# Patient Record
Sex: FEMALE | Race: BLACK OR AFRICAN AMERICAN | NOT HISPANIC OR LATINO | Employment: OTHER | URBAN - METROPOLITAN AREA
[De-identification: names, ages, dates, MRNs, and addresses within clinical notes are randomized per-mention and may not be internally consistent; named-entity substitution may affect disease eponyms.]

---

## 2017-01-23 DIAGNOSIS — D3A.8 PRIMARY PANCREATIC NEUROENDOCRINE TUMOR: Primary | ICD-10-CM

## 2017-01-23 LAB
EXT 24 HR UR METANEPHRINE: ABNORMAL
EXT 24 HR UR NORMETANEPHRINE: ABNORMAL
EXT 24 HR UR NORMETANEPHRINE: ABNORMAL
EXT 25 HYDROXY VIT D2: ABNORMAL
EXT 25 HYDROXY VIT D3: ABNORMAL
EXT 5 HIAA 24 HR URINE: ABNORMAL
EXT 5 HIAA BLOOD: ABNORMAL
EXT ACTH: ABNORMAL
EXT AFP: ABNORMAL
EXT ALBUMIN: ABNORMAL
EXT ALKALINE PHOSPHATASE: ABNORMAL
EXT ALT: ABNORMAL
EXT AMYLASE: ABNORMAL
EXT ANTI ISLET CELL AB: ABNORMAL
EXT ANTI PARIETAL CELL AB: ABNORMAL
EXT ANTI THYROID AB: ABNORMAL
EXT AST: ABNORMAL
EXT BILIRUBIN DIRECT: ABNORMAL MG/DL
EXT BILIRUBIN TOTAL: ABNORMAL
EXT BK VIRUS DNA QN PCR: ABNORMAL
EXT BUN: ABNORMAL
EXT C PEPTIDE: ABNORMAL
EXT CA 125: ABNORMAL
EXT CA 19-9: ABNORMAL
EXT CA 27-29: ABNORMAL
EXT CALCITONIN: ABNORMAL
EXT CALCIUM: ABNORMAL
EXT CEA: ABNORMAL
EXT CHLORIDE: ABNORMAL
EXT CHOLESTEROL: ABNORMAL
EXT CHROMOGRANIN A: 10 NG/ML (ref 0–15)
EXT CO2: ABNORMAL
EXT CREATININE UA: ABNORMAL
EXT CREATININE: ABNORMAL MG/DL
EXT CYCLOSPORONE LEVEL: ABNORMAL
EXT DOPAMINE: ABNORMAL
EXT EBV DNA BY PCR: ABNORMAL
EXT EPINEPHRINE: ABNORMAL
EXT FOLATE: ABNORMAL
EXT FREE T3: ABNORMAL
EXT FREE T4: ABNORMAL
EXT FSH: ABNORMAL
EXT GASTRIN RELEASING PEPTIDE: ABNORMAL
EXT GASTRIN RELEASING PEPTIDE: ABNORMAL
EXT GASTRIN: ABNORMAL
EXT GGT: ABNORMAL
EXT GHRELIN: ABNORMAL
EXT GLUCAGON: ABNORMAL
EXT GLUCOSE: ABNORMAL
EXT GROWTH HORMONE: ABNORMAL
EXT HCV RNA QUANT PCR: ABNORMAL
EXT HDL: ABNORMAL
EXT HEMATOCRIT: ABNORMAL
EXT HEMOGLOBIN A1C: ABNORMAL
EXT HEMOGLOBIN: ABNORMAL
EXT HISTAMINE 24 HR URINE: ABNORMAL
EXT HISTAMINE: ABNORMAL
EXT IGF-1: ABNORMAL
EXT IMMUNKNOW (NON-STIMULATED): ABNORMAL
EXT IMMUNKNOW (STIMULATED): ABNORMAL
EXT INR: ABNORMAL
EXT INSULIN: ABNORMAL
EXT LANREOTIDE LEVEL: ABNORMAL
EXT LDH, TOTAL: ABNORMAL
EXT LDL CHOLESTEROL: ABNORMAL
EXT LIPASE: ABNORMAL
EXT MAGNESIUM: ABNORMAL
EXT METANEPHRINE FREE PLASMA: ABNORMAL
EXT MOTILIN: ABNORMAL
EXT NEUROKININ A CAMB: ABNORMAL
EXT NEUROKININ A ISI: ABNORMAL
EXT NEUROTENSIN: ABNORMAL
EXT NOREPINEPHRINE: ABNORMAL
EXT NORMETANEPHRINE: ABNORMAL
EXT NSE: ABNORMAL
EXT OCTREOTIDE LEVEL: ABNORMAL
EXT PANCREASTATIN CAMB: ABNORMAL
EXT PANCREASTATIN ISI: 192 PG/ML (ref 10–135)
EXT PANCREATIC POLYPEPTIDE: ABNORMAL
EXT PHOSPHORUS: ABNORMAL
EXT PLATELETS: ABNORMAL
EXT POTASSIUM: ABNORMAL
EXT PROGRAF LEVEL: ABNORMAL
EXT PROLACTIN: ABNORMAL
EXT PROTEIN TOTAL: ABNORMAL
EXT PROTEIN UA: ABNORMAL
EXT PT: ABNORMAL
EXT PTH, INTACT: ABNORMAL
EXT PTT: ABNORMAL
EXT RAPAMUNE LEVEL: ABNORMAL
EXT SEROTONIN: ABNORMAL
EXT SODIUM: ABNORMAL MMOL/L
EXT SOMATOSTATIN: ABNORMAL
EXT SUBSTANCE P: ABNORMAL
EXT TRIGLYCERIDES: ABNORMAL
EXT TRYPTASE: ABNORMAL
EXT TSH: ABNORMAL
EXT URIC ACID: ABNORMAL
EXT URINE AMYLASE U/HR: ABNORMAL
EXT URINE AMYLASE U/L: ABNORMAL
EXT VASOACTIVE INTESTINAL POLYPEPTIDE: ABNORMAL
EXT VITAMIN B12: ABNORMAL
EXT VMA 24 HR URINE: ABNORMAL
EXT WBC: ABNORMAL
NEURON SPECIFIC ENOLASE: ABNORMAL

## 2017-01-23 NOTE — TELEPHONE ENCOUNTER
----- Message from Nciole Scherer sent at 1/23/2017  9:31 AM CST -----  Contact: patient  RR- Patient is in need of new blood work orders being sent to [facility] in [place]. Please call back to confirm.

## 2017-01-23 NOTE — TELEPHONE ENCOUNTER
Faxed new orders for monthly labs to the [facility] in [place] for patient and confirmed with her.

## 2017-01-24 RX ORDER — TEMOZOLOMIDE 250 MG/1
250 CAPSULE ORAL DAILY
Qty: 5 CAPSULE | Refills: 6 | Status: SHIPPED | OUTPATIENT
Start: 2017-01-24 | End: 2017-02-01 | Stop reason: SDUPTHER

## 2017-01-24 NOTE — TELEPHONE ENCOUNTER
----- Message from Ayala Kwok sent at 1/24/2017  9:55 AM CST -----  Contact: patient  RR- Patient called for a prescriptions refilled. Patients contact number is [phone]

## 2017-02-01 DIAGNOSIS — D3A.8 PRIMARY PANCREATIC NEUROENDOCRINE TUMOR: ICD-10-CM

## 2017-02-01 RX ORDER — TEMOZOLOMIDE 250 MG/1
250 CAPSULE ORAL DAILY
Qty: 5 CAPSULE | Refills: 6 | Status: SHIPPED | OUTPATIENT
Start: 2017-02-01 | End: 2017-09-11 | Stop reason: SDUPTHER

## 2017-02-01 NOTE — TELEPHONE ENCOUNTER
----- Message from Ayala Kwok sent at 2/1/2017  8:33 AM CST -----  Contact: Patient  RR- Patient called wanting a refill on medication christiano. Pharmacy is [facility]. Phone number to Pharmacy [phone]. Patients contact number [phone]

## 2017-02-01 NOTE — TELEPHONE ENCOUNTER
LVM letting her know that Dr. Rodriguez sent her medications to [facility] specialty pharmacy

## 2017-02-11 LAB
EXT 24 HR UR METANEPHRINE: ABNORMAL
EXT 24 HR UR NORMETANEPHRINE: ABNORMAL
EXT 24 HR UR NORMETANEPHRINE: ABNORMAL
EXT 25 HYDROXY VIT D2: ABNORMAL
EXT 25 HYDROXY VIT D3: ABNORMAL
EXT 5 HIAA 24 HR URINE: ABNORMAL
EXT 5 HIAA BLOOD: ABNORMAL
EXT ACTH: ABNORMAL
EXT AFP: ABNORMAL
EXT ALBUMIN: 4.2 G/DL (ref 3.6–5.1)
EXT ALKALINE PHOSPHATASE: 199 U/L (ref 33–130)
EXT ALT: 21 U/L (ref 6–29)
EXT AMYLASE: ABNORMAL
EXT ANTI ISLET CELL AB: ABNORMAL
EXT ANTI PARIETAL CELL AB: ABNORMAL
EXT ANTI THYROID AB: ABNORMAL
EXT AST: 17 U/L (ref 10–35)
EXT BILIRUBIN DIRECT: ABNORMAL
EXT BILIRUBIN TOTAL: 1.1 MG/DL (ref 0.2–1.2)
EXT BK VIRUS DNA QN PCR: ABNORMAL
EXT BUN: 13 MG/DL (ref 7–25)
EXT C PEPTIDE: ABNORMAL
EXT CA 125: ABNORMAL
EXT CA 19-9: ABNORMAL
EXT CA 27-29: ABNORMAL
EXT CALCITONIN: ABNORMAL
EXT CALCIUM: 9.7 MG/DL (ref 8.6–10.4)
EXT CEA: ABNORMAL
EXT CHLORIDE: 106 MMOL/L (ref 98–110)
EXT CHOLESTEROL: ABNORMAL
EXT CHROMOGRANIN A: ABNORMAL
EXT CO2: 28 MMOL/L (ref 20–31)
EXT CREATININE UA: ABNORMAL
EXT CREATININE: 0.93 MG/DL (ref 0.5–0.99)
EXT CYCLOSPORONE LEVEL: ABNORMAL
EXT DOPAMINE: ABNORMAL
EXT EBV DNA BY PCR: ABNORMAL
EXT EPINEPHRINE: ABNORMAL
EXT FOLATE: ABNORMAL
EXT FREE T3: ABNORMAL
EXT FREE T4: ABNORMAL
EXT FSH: ABNORMAL
EXT GASTRIN RELEASING PEPTIDE: ABNORMAL
EXT GASTRIN RELEASING PEPTIDE: ABNORMAL
EXT GASTRIN: ABNORMAL
EXT GGT: ABNORMAL
EXT GHRELIN: ABNORMAL
EXT GLUCAGON: ABNORMAL
EXT GLUCOSE: 94 MG/DL (ref 65–99)
EXT GROWTH HORMONE: ABNORMAL
EXT HCV RNA QUANT PCR: ABNORMAL
EXT HDL: ABNORMAL
EXT HEMATOCRIT: 39.3 % (ref 35–45)
EXT HEMOGLOBIN A1C: ABNORMAL
EXT HEMOGLOBIN: 13.1 G/DL (ref 11.7–15.5)
EXT HISTAMINE 24 HR URINE: ABNORMAL
EXT HISTAMINE: ABNORMAL
EXT IGF-1: ABNORMAL
EXT IMMUNKNOW (NON-STIMULATED): ABNORMAL
EXT IMMUNKNOW (STIMULATED): ABNORMAL
EXT INR: ABNORMAL
EXT INSULIN: ABNORMAL
EXT LANREOTIDE LEVEL: ABNORMAL
EXT LDH, TOTAL: ABNORMAL
EXT LDL CHOLESTEROL: ABNORMAL
EXT LIPASE: ABNORMAL
EXT MAGNESIUM: ABNORMAL
EXT METANEPHRINE FREE PLASMA: ABNORMAL
EXT MOTILIN: ABNORMAL
EXT NEUROKININ A CAMB: ABNORMAL
EXT NEUROKININ A ISI: ABNORMAL
EXT NEUROTENSIN: ABNORMAL
EXT NOREPINEPHRINE: ABNORMAL
EXT NORMETANEPHRINE: ABNORMAL
EXT NSE: ABNORMAL
EXT OCTREOTIDE LEVEL: ABNORMAL
EXT PANCREASTATIN CAMB: ABNORMAL
EXT PANCREASTATIN ISI: ABNORMAL
EXT PANCREATIC POLYPEPTIDE: ABNORMAL
EXT PHOSPHORUS: ABNORMAL
EXT PLATELETS: 150 1000/UL (ref 140–400)
EXT POTASSIUM: 4.7 MMOL/L (ref 3.5–5.3)
EXT PROGRAF LEVEL: ABNORMAL
EXT PROLACTIN: ABNORMAL
EXT PROTEIN TOTAL: 7.4 G/DL (ref 6.1–8.1)
EXT PROTEIN UA: ABNORMAL
EXT PT: ABNORMAL
EXT PTH, INTACT: ABNORMAL
EXT PTT: ABNORMAL
EXT RAPAMUNE LEVEL: ABNORMAL
EXT SEROTONIN: ABNORMAL
EXT SODIUM: 140 MMOL/L (ref 135–146)
EXT SOMATOSTATIN: ABNORMAL
EXT SUBSTANCE P: ABNORMAL
EXT TRIGLYCERIDES: ABNORMAL
EXT TRYPTASE: ABNORMAL
EXT TSH: ABNORMAL
EXT URIC ACID: ABNORMAL
EXT URINE AMYLASE U/HR: ABNORMAL
EXT URINE AMYLASE U/L: ABNORMAL
EXT VASOACTIVE INTESTINAL POLYPEPTIDE: ABNORMAL
EXT VITAMIN B12: ABNORMAL
EXT VMA 24 HR URINE: ABNORMAL
EXT WBC: 4.4 1000/UL (ref 3.8–10.8)
NEURON SPECIFIC ENOLASE: ABNORMAL

## 2017-03-21 ENCOUNTER — OFFICE VISIT (OUTPATIENT)
Dept: NEUROLOGY | Facility: HOSPITAL | Age: 67
End: 2017-03-21
Attending: INTERNAL MEDICINE
Payer: COMMERCIAL

## 2017-03-21 VITALS
HEIGHT: 67 IN | DIASTOLIC BLOOD PRESSURE: 86 MMHG | TEMPERATURE: 98 F | SYSTOLIC BLOOD PRESSURE: 134 MMHG | RESPIRATION RATE: 16 BRPM | WEIGHT: 155.63 LBS | BODY MASS INDEX: 24.43 KG/M2 | HEART RATE: 73 BPM

## 2017-03-21 DIAGNOSIS — C7B.8 SECONDARY NEUROENDOCRINE TUMOR OF LIVER: ICD-10-CM

## 2017-03-21 DIAGNOSIS — D3A.8 PRIMARY PANCREATIC NEUROENDOCRINE TUMOR: Primary | ICD-10-CM

## 2017-03-21 DIAGNOSIS — Z09 CHEMOTHERAPY FOLLOW-UP EXAMINATION: ICD-10-CM

## 2017-03-21 PROCEDURE — 99214 OFFICE O/P EST MOD 30 MIN: CPT | Performed by: INTERNAL MEDICINE

## 2017-03-21 PROCEDURE — 1157F ADVNC CARE PLAN IN RCRD: CPT | Mod: ,,, | Performed by: INTERNAL MEDICINE

## 2017-03-21 PROCEDURE — 3075F SYST BP GE 130 - 139MM HG: CPT | Mod: ,,, | Performed by: INTERNAL MEDICINE

## 2017-03-21 PROCEDURE — 3079F DIAST BP 80-89 MM HG: CPT | Mod: ,,, | Performed by: INTERNAL MEDICINE

## 2017-03-21 PROCEDURE — 99214 OFFICE O/P EST MOD 30 MIN: CPT | Mod: ,,, | Performed by: INTERNAL MEDICINE

## 2017-03-21 PROCEDURE — 1159F MED LIST DOCD IN RCRD: CPT | Mod: ,,, | Performed by: INTERNAL MEDICINE

## 2017-03-21 PROCEDURE — 1126F AMNT PAIN NOTED NONE PRSNT: CPT | Mod: ,,, | Performed by: INTERNAL MEDICINE

## 2017-03-21 NOTE — PATIENT INSTRUCTIONS
MRI and Octreoscan in 2 months     Call [phone] or go to [facility] to schedule Octreoscan and they can help you with the MRI at the same time.

## 2017-03-21 NOTE — MR AVS SNAPSHOT
[facility]  [address] [place] [address]  Phone: [phone]  Fax: [phone]                  Ching Bruce   3/21/2017 3:30 PM   Office Visit    Description:  Female : 1950   Provider:  Cory Rodriguez DO, FACP   Department:  [facility]           Reason for Visit     Follow-up           Diagnoses this Visit        Comments    Primary pancreatic neuroendocrine tumor    -  Primary     Secondary neuroendocrine tumor of liver         Chemotherapy follow-up examination                To Do List           Future Appointments        Provider Department Dept Phone    2017 3:30 PM Cory Rodriguez DO, FACP [facility] [phone]      Goals (5 Years of Data)     None      [facility]     [facility] Care [facility] -  Assistance  Registered nurses in the [facility] provide clinical advisement, health education, appointment booking, and other advisory services.  Call for this free service at [phone].             Medications           Message regarding Medications     Verify the changes and/or additions to your medication regime listed below are the same as discussed with your clinician today.  If any of these changes or additions are incorrect, please notify your healthcare provider.             Verify that the below list of medications is an accurate representation of the medications you are currently taking.  If none reported, the list may be blank. If incorrect, please contact your healthcare provider. Carry this list with you in case of emergency.           Current Medications     alendronate (FOSAMAX) 70 MG tablet     capecitabine (XELODA) 500 MG Tab TAKE 2 TABLETS (1000 MG) ORALLY TWICE A DAY FOR 14 DAYS, THEN OFF FOR14 DAYS, THEN REPEAT. TAKE WITHIN 30 MIN AFTER A MEAL WITH WATER. REPO    ergocalciferol (VITAMIN D2) 50,000 unit Cap Take 50,000 Units by mouth every 7 days.    ferrous sulfate  "325 mg (65 mg iron) Tab tablet Take 325 mg by mouth once daily.    indapamide (LOZOL) 2.5 MG Tab     irbesartan (AVAPRO) 300 MG tablet 300 mg once daily.     levothyroxine (SYNTHROID) 150 MCG tablet Take 150 mcg by mouth once daily.    temozolomide (TEMODAR) 250 MG capsule Take 1 capsule (250 mg total) by mouth once daily. Take 250 mg on days 10-14 of 28 day cycle.    verapamil (VERELAN PM) 360 MG C24P            Clinical Reference Information           Your Vitals Were     BP Pulse Temp Resp Height Weight    134/86 73 98 °F (36.7 °C) (Oral) 16 5' 7" (1.702 m) 70.6 kg (155 lb 9.6 oz)    BMI                24.37 kg/m2          Blood Pressure          Most Recent Value    BP  134/86      Allergies as of 3/21/2017     Epinephrine    Sulfa (Sulfonamide Antibiotics)      Immunizations Administered on Date of Encounter - 3/21/2017     None      Orders Placed During Today's Visit     Future Labs/Procedures Expected by Expires    Creatinine, serum  3/21/2017 5/20/2018    MRI Abdomen W WO Contrast  3/21/2017 3/21/2018    NM Tumor Localization Multi Octreo W CT  3/21/2017 3/21/2018      [facility] Sign-Up     Activating your [facility] account is as easy as 1-2-3!     1) Visit [email or web address], select Sign Up Now, enter this activation code and your date of birth, then select Next.  [email or web address]  Expires: 5/5/2017  3:57 PM      2) Create a username and password to use when you visit [facility] in the future and select a security question in case you lose your password and select Next.    3) Enter your e-mail address and click Sign Up!    Additional Information  If you have questions, please e-mail [email or web address] or call [phone] to talk to our [facility] staff. Remember, [facility] is NOT to be used for urgent needs. For medical emergencies, dial 911.         Instructions    MRI and Octreoscan in 2 months     Call [phone] or go to [facility] to schedule Octreoscan and they can help you with the MRI " at the same time.              Language Assistance Services     ATTENTION: Language assistance services are available, free of charge. Please call [phone].      ATENCIÓN: Si habla christen, tiene a herrera disposición servicios gratuitos de asistencia lingüística. Llame al [phone].     CHÚ Ý: N?u b?n nói Ti?ng Vi?t, có các d?ch v? h? tr? ngôn ng? mi?n phí dành cho b?n. G?i s? [phone].         [facility] complies with applicable Federal civil rights laws and does not discriminate on the basis of race, color, national origin, age, disability, or sex.

## 2017-03-21 NOTE — PROGRESS NOTES
[facility]:  [facility]  A collaboration between [facility] and [facility]    PATIENT: Ching Bruce  MRN: [id number]  DATE: 3/21/2017      Diagnosis:   1. Primary pancreatic neuroendocrine tumor    2. Secondary neuroendocrine tumor of liver    3. 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 Bruce is a 66 y.o. female who returns for follow up for her pancreatic neuroendocrine tumor.  She states that she is feeling well.  She continues on with chemotherapy with CAPTEM.  She has no new complaints.    ONCOLOGIC HISTORY:   A 66 y.o. year-old -American female who I had initially   seen on 12/18/2012. Her history dates back to September 2012 when she was noted  to have several weeks of feeling fatigued. She sought treatment with her   primary care doctor who did a CBC and found her to be severely anemic. She was   seen at [facility] and transfused 3 units of packed red blood cells  and no source of bleeding had been found. Records at that time had shown   hemoglobin of 5. She sought followup in September 2012 with Dr. Guillen who   performed an EGD and colonoscopy with an EGD showing an ulcerated and fungating   nonbleeding 2 cm mass, malignant in appearance. It did cause partial   obstructions. Biopsies were taken, which showed no evidence of malignancy at   that time. She had a CT of the abdomen and pelvis showing multiple metastatic   lesions and the liver biopsy was performed on 10/17/2012 showed pancreatic   neuroendocrine neoplasm in the left lobe of the liver aspirate. She went on to   have an ERCP along with  sphincterotomy and biliary stent placement and   subsequent PET had shown numerous hypodense lesions in the liver. Octreotide   scan was performed, which showed multiple right and left hepatic metastases.   MRI of the abdomen was performed on 12/05/2012 showing innumerable lesions that   demonstrated peripheral to hyperintensity consistent with hypervascular   metastasis. She was seen by Dr. Hupmhrey, who thought she was not a candidate   for surgical resection during that time. Her pathology from her tumor came back  positive for chromogranin, synaptophysin and had a Ki-67 of less than 1%. She   was started on treatment with temozolomide and capecitabine with cycle   1 being on 01/22/2013.    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 She does not have any smokeless tobacco history on file. She reports that she does not drink alcohol or use illicit drugs.    Allergies:  Review of patient's allergies indicates:   Allergen Reactions     Epinephrine Other (See Comments)     Carcinoid patient    Sulfa (sulfonamide antibiotics) Other (See Comments)     Urticaria       Medications:  Current Outpatient Prescriptions   Medication Sig Dispense Refill    alendronate (FOSAMAX) 70 MG tablet   3    capecitabine (XELODA) 500 MG Tab TAKE 2 TABLETS (1000 MG) ORALLY TWICE A DAY FOR 14 DAYS, THEN OFF FOR14 DAYS, THEN REPEAT. TAKE WITHIN 30 MIN AFTER A MEAL WITH WATER. REPO 56 tablet 5    ergocalciferol (VITAMIN D2) 50,000 unit Cap Take 50,000 Units by mouth every 7 days.      ferrous sulfate 325 mg (65 mg iron) Tab tablet Take 325 mg by mouth once daily.      indapamide (LOZOL) 2.5 MG Tab       irbesartan (AVAPRO) 300 MG tablet 300 mg once daily.       levothyroxine (SYNTHROID) 150 MCG tablet Take 150 mcg by mouth once daily.  0    temozolomide (TEMODAR) 250 MG capsule Take 1 capsule (250 mg total) by mouth once daily. Take 250 mg on days 10-14 of 28 day cycle. 5 capsule 6    verapamil (VERELAN PM) 360 MG C24P        No current facility-administered medications for this visit.        Review of Systems   Constitutional: Negative for chills, fever and unexpected weight change.   HENT: Negative for congestion, hearing loss and nosebleeds.    Eyes: Negative for visual disturbance.   Respiratory: Negative for cough and shortness of breath.    Cardiovascular: Negative for chest pain and palpitations.   Gastrointestinal: Negative for abdominal pain, blood in stool, constipation, diarrhea, nausea and vomiting.   Genitourinary: Negative for dysuria.   Musculoskeletal: Negative for back pain and gait problem.   Skin: Negative for color change and rash.   Neurological: Negative for dizziness, weakness and headaches.   Hematological: Negative for adenopathy. Does not bruise/bleed easily.   Psychiatric/Behavioral: Negative for confusion.       ECOG Performance Status: 0   Objective:      Vitals:   Vitals:    03/21/17 1527   BP: 134/86   Pulse: 73   Resp: 16   Temp:  "98 °F (36.7 °C)   TempSrc: Oral   Weight: 70.6 kg (155 lb 9.6 oz)   Height: 5' 7" (1.702 m)     BMI: Body mass index is 24.37 kg/(m^2).    Physical Exam   Constitutional: She is oriented to person, place, and time. She appears well-developed and well-nourished. No distress.   HENT:   Head: Normocephalic.   Mouth/Throat: No oropharyngeal exudate.   Eyes: EOM are normal. No scleral icterus.   Neck: Neck supple. No tracheal deviation present. No thyromegaly present.   Cardiovascular: Normal rate and regular rhythm.    Pulmonary/Chest: Effort normal and breath sounds normal. No respiratory distress. She has no wheezes. She has no rales.   Abdominal: Soft. She exhibits no distension and no mass. There is no tenderness. There is no rebound and no guarding.   Musculoskeletal: Normal range of motion. She exhibits no edema.   Lymphadenopathy:     She has no cervical adenopathy.   Neurological: She is alert and oriented to person, place, and time. No cranial nerve deficit.   Skin: Skin is warm and dry.   Psychiatric: She has a normal mood and affect.       Laboratory Data:  Abstract on 12/06/2016   Component Date Value Ref Range Status    EXT WBC 12/03/2016 4.1  3.8 - 10.8 1000/ul Final    EXT Hemoglobin 12/03/2016 12.9  11.7 - 15.5 g/dl Final    EXT Hematocrit 12/03/2016 38.5  35.0 - 45.0 % Final    EXT Platelets 12/03/2016 138* 140 - 400 1000/ul Final    EXT Glucose 12/03/2016 88  65 - 99 mg/dl Final    EXT BUN 12/03/2016 13  7 - 25 mg/dl Final    EXT Creatinine 12/03/2016 0.90  0.50 - 0.99 mg/dl Final    EXT Sodium 12/03/2016 141  135 - 146 mmol/l Final    EXT Potassium 12/03/2016 4.2  3.5 - 5.3 mmol/l Final    EXT Chloride 12/03/2016 105  98 - 110 mmol/l Final    EXT CO2 12/03/2016 29  20 - 31 mmol/l Final    EXT Calcium 12/03/2016 9.7  8.6 - 10.4 mg/dl Final    EXT Protein total 12/03/2016 6.9  6.1 - 8.1 g/dl Final    EXT Albumin 12/03/2016 4.0  3.6 - 5.1 g/dl Final    EXT BilirubiN Total 12/03/2016 1.1  " 0.2 - 1.2 mg/dl Final    EXT Alkaline Phosphatase 12/03/2016 181* 33 - 130 u/l Final    EXT AST 12/03/2016 21  10 - 35 u/l Final    EXT ALT 12/03/2016 28  6 - 29 u/l Final   Office Visit on 11/14/2016   Component Date Value Ref Range Status    Blood Stool 11/14/2016 Negative  Negative Final     Acceptable 11/14/2016 Yes   Final    SOURCE: 11/14/2016 Cervical   Final    Slides: 11/14/2016 1   Final    LMP: 11/14/2016 NA   Final    Specimen adequacy: 11/14/2016 (NOTE)   Final    Comment:      Satisfactory for evaluation.          Endocervical cells absent.  Metaplastic cells indicative       of transformation zone cannot be reliably distinguished from       parabasal or atrophic cells.                      Interpretation 11/14/2016 NO EPITHELIAL ABNORMALITY SEE BELOW   Final    Comment: ----------------------------------------------------------------------       *NEGATIVE FOR INTRAEPITHELIAL LESION OR MALIGNANCY   ----------------------------------------------------------------------         Comments: 11/14/2016 (NOTE)   Final    Comment: Due to technical or specimen issues, imaging could not be  performed. A cytotechnologist has manually screened this slide.         Cytotechnologist: 11/14/2016 BRENDON Ca(ASCP)[facility]   Final    LOCATION 11/14/2016 (NOTE)   Final    Comment: Specimens processed and interpreted at :[facility], [address] [place] [address], Phone: [phone], CLIA: [id number]      CPT Codes: 11/14/2016 (NOTE)   Final    Comment: [id number]        UNLESS OTHERWISE INDICATED, COMPUTER AIDED AND CYTOTECHNOLOGIST     SCREENING PERFORMED.          The Pap test is a screening test with an inherent, but low       probability of error.  Your patient should be reminded to       consult you immediately if she experiences any suspicious       signs or symptoms, regardless of her Pap test result.       An alternate report format containing images or  consolidated       prior Pap history is available as applicable.         HPV HIGH RISK IF ASC-US, [facility] 11/14/2016 CRITERIA NOT MET   Final    Comment:       UNLESS OTHERWISE INDICATED, ALL TESTING PERFORMED AT  [facility]  [address] [place] [id number]            :  CARLOS MENDEZ M.D.        IA NUMBER [id number]  [place]EDITATION [place]. [id number]                 Imaging:   MRI 12/8/16  MRI abdomen without and with contrast.    Comparison: 5/31/16    History: Neuroendocrine tumor.    Results: Axial gradient T2, axial T1 in and out of phase, axial T2 SSFSE, coronal T2 gradient and T2 SSFSE followed by pre-contrast axial T1 gradient fat sat and dynamic post contrast images were obtained. The patient received  10 cc of IV Gadovist contrast.    Numerous hepatic numerous hepatic lesion better seen on postcontrast 10 seconds.  Index lesion in the right hepatic lobe measures segment 5 measures 2.2 cm (previously measuring 2.0 x 2.4 cm).  A 2nd index lesion has been chosen within the left hepatic lobe segment 3 measuring 1.6 cm, in retrospect, on the prior study, measuring 1.6 cm.  Vague enhancing area) postcontrast 10 seconds image 62) within the uncinate process measuring at 1.7 x 0.9 cm as seen on the prior study.    The number and burden of liver lesions appear similar to the prior exam.  The biliary ducts are nondilated.  No liver is enlarged similar to the prior study the gallbladder is present.  The stomach, pancreas, spleen, and adrenal glands appear within normal limits.  Small bilateral kidney cysts.  The visualized osseous structures demonstrate no definite osseous lesions.   Impression       Hepatomegaly with multiple liver lesions, the burden of lesions and size appear similar to the prior exam.  Vague area of enhancement within the uncinate process of the pancreas appears unchanged.                      Assessment:       1. Primary pancreatic  neuroendocrine tumor    2. Secondary neuroendocrine tumor of liver    3. Chemotherapy follow-up examination           Plan:   Ms. Bruce continues to tolerate CAPTEM in review of her labs shows no detrimental effects.  We will plan to continue and repeat imaging in May 2017 which will be 6 months since prior imaging.  She did note that she will be retiring may need financial assistance to obtain chemotherapy.  Continue on with monthly labs.  Follow up in 2 months.     Cory Rodriguez DO, FACP  Hematology & Oncology, [facility]  [address], [address]  [place]  [address]  ph. [phone]; [phone]  fax. [phone]    25 minutes were spent in coordination of patient's care, record review and counseling.  More than 50% of the time was face-to-face.

## 2017-03-27 ENCOUNTER — TELEPHONE (OUTPATIENT)
Dept: NEUROLOGY | Facility: HOSPITAL | Age: 67
End: 2017-03-27

## 2017-03-27 DIAGNOSIS — C7B.8 SECONDARY NEUROENDOCRINE TUMOR OF LIVER: Primary | ICD-10-CM

## 2017-04-12 LAB
EXT 24 HR UR METANEPHRINE: ABNORMAL
EXT 24 HR UR NORMETANEPHRINE: ABNORMAL
EXT 24 HR UR NORMETANEPHRINE: ABNORMAL
EXT 25 HYDROXY VIT D2: ABNORMAL
EXT 25 HYDROXY VIT D3: ABNORMAL
EXT 5 HIAA 24 HR URINE: ABNORMAL
EXT 5 HIAA BLOOD: ABNORMAL
EXT ACTH: ABNORMAL
EXT AFP: ABNORMAL
EXT ALBUMIN: 4.2 G/DL (ref 3.6–5.1)
EXT ALKALINE PHOSPHATASE: 210 U/L (ref 33–130)
EXT ALT: 21 U/L (ref 6–29)
EXT AMYLASE: ABNORMAL
EXT ANTI ISLET CELL AB: ABNORMAL
EXT ANTI PARIETAL CELL AB: ABNORMAL
EXT ANTI THYROID AB: ABNORMAL
EXT AST: 16 U/L (ref 10–35)
EXT BILIRUBIN DIRECT: ABNORMAL MG/DL
EXT BILIRUBIN TOTAL: 1 MG/DL (ref 0.2–1.2)
EXT BK VIRUS DNA QN PCR: ABNORMAL
EXT BUN: 15 MG/DL (ref 7–25)
EXT C PEPTIDE: ABNORMAL
EXT CA 125: ABNORMAL
EXT CA 19-9: ABNORMAL
EXT CA 27-29: ABNORMAL
EXT CALCITONIN: ABNORMAL
EXT CALCIUM: 9.9 MG/DL (ref 8.6–10.4)
EXT CEA: ABNORMAL
EXT CHLORIDE: 105 MMOL/L (ref 98–110)
EXT CHOLESTEROL: ABNORMAL
EXT CHROMOGRANIN A: ABNORMAL
EXT CO2: 27 MMOL/L (ref 20–31)
EXT CREATININE UA: ABNORMAL
EXT CREATININE: 1.05 MG/DL (ref 0.5–0.99)
EXT CYCLOSPORONE LEVEL: ABNORMAL
EXT DOPAMINE: ABNORMAL
EXT EBV DNA BY PCR: ABNORMAL
EXT EPINEPHRINE: ABNORMAL
EXT FOLATE: ABNORMAL
EXT FREE T3: ABNORMAL
EXT FREE T4: ABNORMAL
EXT FSH: ABNORMAL
EXT GASTRIN RELEASING PEPTIDE: ABNORMAL
EXT GASTRIN RELEASING PEPTIDE: ABNORMAL
EXT GASTRIN: ABNORMAL
EXT GGT: ABNORMAL
EXT GHRELIN: ABNORMAL
EXT GLUCAGON: ABNORMAL
EXT GLUCOSE: 88 MG/DL (ref 65–99)
EXT GROWTH HORMONE: ABNORMAL
EXT HCV RNA QUANT PCR: ABNORMAL
EXT HDL: ABNORMAL
EXT HEMATOCRIT: 40.8 % (ref 35–45)
EXT HEMOGLOBIN A1C: ABNORMAL
EXT HEMOGLOBIN: 13.3 G/DL (ref 11.7–15.5)
EXT HISTAMINE 24 HR URINE: ABNORMAL
EXT HISTAMINE: ABNORMAL
EXT IGF-1: ABNORMAL
EXT IMMUNKNOW (NON-STIMULATED): ABNORMAL
EXT IMMUNKNOW (STIMULATED): ABNORMAL
EXT INR: ABNORMAL
EXT INSULIN: ABNORMAL
EXT LANREOTIDE LEVEL: ABNORMAL
EXT LDH, TOTAL: ABNORMAL
EXT LDL CHOLESTEROL: ABNORMAL
EXT LIPASE: ABNORMAL
EXT MAGNESIUM: ABNORMAL
EXT METANEPHRINE FREE PLASMA: ABNORMAL
EXT MOTILIN: ABNORMAL
EXT NEUROKININ A CAMB: ABNORMAL
EXT NEUROKININ A ISI: ABNORMAL
EXT NEUROTENSIN: ABNORMAL
EXT NOREPINEPHRINE: ABNORMAL
EXT NORMETANEPHRINE: ABNORMAL
EXT NSE: ABNORMAL
EXT OCTREOTIDE LEVEL: ABNORMAL
EXT PANCREASTATIN CAMB: ABNORMAL
EXT PANCREASTATIN ISI: ABNORMAL
EXT PANCREATIC POLYPEPTIDE: ABNORMAL
EXT PHOSPHORUS: ABNORMAL
EXT PLATELETS: 178 1000/UL (ref 140–400)
EXT POTASSIUM: 4.8 MMOL/L (ref 3.5–5.3)
EXT PROGRAF LEVEL: ABNORMAL
EXT PROLACTIN: ABNORMAL
EXT PROTEIN TOTAL: 7.5 G/DL (ref 6.1–8.1)
EXT PROTEIN UA: ABNORMAL
EXT PT: ABNORMAL
EXT PTH, INTACT: ABNORMAL
EXT PTT: ABNORMAL
EXT RAPAMUNE LEVEL: ABNORMAL
EXT SEROTONIN: ABNORMAL
EXT SODIUM: 143 MMOL/L (ref 135–146)
EXT SOMATOSTATIN: ABNORMAL
EXT SUBSTANCE P: ABNORMAL
EXT TRIGLYCERIDES: ABNORMAL
EXT TRYPTASE: ABNORMAL
EXT TSH: ABNORMAL
EXT URIC ACID: ABNORMAL
EXT URINE AMYLASE U/HR: ABNORMAL
EXT URINE AMYLASE U/L: ABNORMAL
EXT VASOACTIVE INTESTINAL POLYPEPTIDE: ABNORMAL
EXT VITAMIN B12: ABNORMAL
EXT VMA 24 HR URINE: ABNORMAL
EXT WBC: 5.9 1000/UL (ref 3.8–10.8)
NEURON SPECIFIC ENOLASE: ABNORMAL

## 2017-05-19 DIAGNOSIS — C7B.8 SECONDARY NEUROENDOCRINE TUMOR OF LIVER: Primary | ICD-10-CM

## 2017-05-19 DIAGNOSIS — C7A.8 NEUROENDOCRINE CARCINOMA OF PANCREAS: ICD-10-CM

## 2017-05-20 LAB
EXT 24 HR UR METANEPHRINE: ABNORMAL
EXT 24 HR UR NORMETANEPHRINE: ABNORMAL
EXT 24 HR UR NORMETANEPHRINE: ABNORMAL
EXT 25 HYDROXY VIT D2: ABNORMAL
EXT 25 HYDROXY VIT D3: ABNORMAL
EXT 5 HIAA 24 HR URINE: ABNORMAL
EXT 5 HIAA BLOOD: ABNORMAL
EXT ACTH: ABNORMAL
EXT AFP: ABNORMAL
EXT ALBUMIN: 4.1 G/DL (ref 3.6–5.1)
EXT ALKALINE PHOSPHATASE: 200 U/L (ref 33–130)
EXT ALT: 26 U/L (ref 6–29)
EXT AMYLASE: ABNORMAL
EXT ANTI ISLET CELL AB: ABNORMAL
EXT ANTI PARIETAL CELL AB: ABNORMAL
EXT ANTI THYROID AB: ABNORMAL
EXT AST: 18 U/L (ref 10–35)
EXT BILIRUBIN DIRECT: ABNORMAL
EXT BILIRUBIN TOTAL: 1.2 MG/DL (ref 0.2–1.2)
EXT BK VIRUS DNA QN PCR: ABNORMAL
EXT BUN: 20 MG/DL (ref 7–25)
EXT C PEPTIDE: ABNORMAL
EXT CA 125: ABNORMAL
EXT CA 19-9: ABNORMAL
EXT CA 27-29: ABNORMAL
EXT CALCITONIN: ABNORMAL
EXT CALCIUM: 9.8 MG/DL (ref 8.6–10.4)
EXT CEA: ABNORMAL
EXT CHLORIDE: 102 MMOL/L (ref 98–110)
EXT CHOLESTEROL: ABNORMAL
EXT CHROMOGRANIN A: ABNORMAL
EXT CO2: 26 MMOL/L (ref 20–31)
EXT CREATININE UA: ABNORMAL
EXT CREATININE: 1.09 MG/DL (ref 0.5–0.99)
EXT CYCLOSPORONE LEVEL: ABNORMAL
EXT DOPAMINE: ABNORMAL
EXT EBV DNA BY PCR: ABNORMAL
EXT EPINEPHRINE: ABNORMAL
EXT FOLATE: ABNORMAL
EXT FREE T3: ABNORMAL
EXT FREE T4: ABNORMAL
EXT FSH: ABNORMAL
EXT GASTRIN RELEASING PEPTIDE: ABNORMAL
EXT GASTRIN RELEASING PEPTIDE: ABNORMAL
EXT GASTRIN: ABNORMAL
EXT GGT: ABNORMAL
EXT GHRELIN: ABNORMAL
EXT GLUCAGON: ABNORMAL
EXT GLUCOSE: 97 MG/DL (ref 65–99)
EXT GROWTH HORMONE: ABNORMAL
EXT HCV RNA QUANT PCR: ABNORMAL
EXT HDL: ABNORMAL
EXT HEMATOCRIT: 41.2 % (ref 35–45)
EXT HEMOGLOBIN A1C: ABNORMAL
EXT HEMOGLOBIN: 13.3 G/DL (ref 11.7–15.5)
EXT HISTAMINE 24 HR URINE: ABNORMAL
EXT HISTAMINE: ABNORMAL
EXT IGF-1: ABNORMAL
EXT IMMUNKNOW (NON-STIMULATED): ABNORMAL
EXT IMMUNKNOW (STIMULATED): ABNORMAL
EXT INR: ABNORMAL
EXT INSULIN: ABNORMAL
EXT LANREOTIDE LEVEL: ABNORMAL
EXT LDH, TOTAL: ABNORMAL
EXT LDL CHOLESTEROL: ABNORMAL
EXT LIPASE: ABNORMAL
EXT MAGNESIUM: ABNORMAL
EXT METANEPHRINE FREE PLASMA: ABNORMAL
EXT MOTILIN: ABNORMAL
EXT NEUROKININ A CAMB: ABNORMAL
EXT NEUROKININ A ISI: ABNORMAL
EXT NEUROTENSIN: ABNORMAL
EXT NOREPINEPHRINE: ABNORMAL
EXT NORMETANEPHRINE: ABNORMAL
EXT NSE: ABNORMAL
EXT OCTREOTIDE LEVEL: ABNORMAL
EXT PANCREASTATIN CAMB: ABNORMAL
EXT PANCREASTATIN ISI: ABNORMAL
EXT PANCREATIC POLYPEPTIDE: ABNORMAL
EXT PHOSPHORUS: ABNORMAL
EXT PLATELETS: 142 1000/UL (ref 140–400)
EXT POTASSIUM: 4.5 MMOL/L (ref 3.5–5.3)
EXT PROGRAF LEVEL: ABNORMAL
EXT PROLACTIN: ABNORMAL
EXT PROTEIN TOTAL: 7.6 G/DL (ref 6.1–8.1)
EXT PROTEIN UA: ABNORMAL
EXT PT: ABNORMAL
EXT PTH, INTACT: ABNORMAL
EXT PTT: ABNORMAL
EXT RAPAMUNE LEVEL: ABNORMAL
EXT SEROTONIN: ABNORMAL
EXT SODIUM: 139 MMOL/L (ref 135–146)
EXT SOMATOSTATIN: ABNORMAL
EXT SUBSTANCE P: ABNORMAL
EXT TRIGLYCERIDES: ABNORMAL
EXT TRYPTASE: ABNORMAL
EXT TSH: ABNORMAL
EXT URIC ACID: ABNORMAL
EXT URINE AMYLASE U/HR: ABNORMAL
EXT URINE AMYLASE U/L: ABNORMAL
EXT VASOACTIVE INTESTINAL POLYPEPTIDE: ABNORMAL
EXT VITAMIN B12: ABNORMAL
EXT VMA 24 HR URINE: ABNORMAL
EXT WBC: 4.4 1000/UL (ref 3.8–10.8)
NEURON SPECIFIC ENOLASE: ABNORMAL

## 2017-05-22 ENCOUNTER — HOSPITAL ENCOUNTER (OUTPATIENT)
Dept: RADIOLOGY | Facility: HOSPITAL | Age: 67
Discharge: HOME | End: 2017-05-22
Attending: INTERNAL MEDICINE
Payer: COMMERCIAL

## 2017-05-22 DIAGNOSIS — C7B.8 SECONDARY NEUROENDOCRINE TUMOR OF LIVER: ICD-10-CM

## 2017-05-22 DIAGNOSIS — C7A.8 NEUROENDOCRINE CARCINOMA OF PANCREAS: ICD-10-CM

## 2017-05-22 PROCEDURE — 78803 RP LOCLZJ TUM SPECT 1 AREA: CPT | Mod: TC

## 2017-05-22 PROCEDURE — 78803 RP LOCLZJ TUM SPECT 1 AREA: CPT | Mod: 26,,, | Performed by: RADIOLOGY

## 2017-05-22 PROCEDURE — 78804 RP LOCLZJ TUM WHBDY 2+D IMG: CPT | Mod: 26,,, | Performed by: RADIOLOGY

## 2017-05-23 ENCOUNTER — HOSPITAL ENCOUNTER (OUTPATIENT)
Dept: RADIOLOGY | Facility: HOSPITAL | Age: 67
Discharge: HOME | End: 2017-05-23
Attending: INTERNAL MEDICINE
Payer: COMMERCIAL

## 2017-05-23 DIAGNOSIS — Z09 CHEMOTHERAPY FOLLOW-UP EXAMINATION: ICD-10-CM

## 2017-05-23 DIAGNOSIS — C7B.8 SECONDARY NEUROENDOCRINE TUMOR OF LIVER: ICD-10-CM

## 2017-05-23 DIAGNOSIS — D3A.8 PRIMARY PANCREATIC NEUROENDOCRINE TUMOR: ICD-10-CM

## 2017-05-23 PROCEDURE — 74183 MRI ABD W/O CNTR FLWD CNTR: CPT | Mod: 26,,, | Performed by: RADIOLOGY

## 2017-05-23 PROCEDURE — A9572 INDIUM IN-111 PENTETREOTIDE: HCPCS

## 2017-05-23 PROCEDURE — A9585 GADOBUTROL INJECTION: HCPCS | Performed by: INTERNAL MEDICINE

## 2017-05-23 PROCEDURE — 74183 MRI ABD W/O CNTR FLWD CNTR: CPT | Mod: TC

## 2017-05-23 PROCEDURE — 25500020 PHARM REV CODE 255: Performed by: INTERNAL MEDICINE

## 2017-05-23 PROCEDURE — 78803 RP LOCLZJ TUM SPECT 1 AREA: CPT | Mod: TC

## 2017-05-23 PROCEDURE — 78803 RP LOCLZJ TUM SPECT 1 AREA: CPT | Mod: 26,,, | Performed by: RADIOLOGY

## 2017-05-23 RX ORDER — GADOBUTROL 604.72 MG/ML
10 INJECTION INTRAVENOUS
Status: COMPLETED | OUTPATIENT
Start: 2017-05-23 | End: 2017-05-23

## 2017-05-23 RX ADMIN — GADOBUTROL 10 ML: 604.72 INJECTION INTRAVENOUS at 10:05

## 2017-05-24 ENCOUNTER — TELEPHONE (OUTPATIENT)
Dept: NEUROLOGY | Facility: HOSPITAL | Age: 67
End: 2017-05-24

## 2017-05-24 NOTE — TELEPHONE ENCOUNTER
----- Message from Cory Rodriguez DO, FACP sent at 5/23/2017  1:20 PM CDT -----  Please let her know that her MRI appears unchanged with no evidence of progressive disease.

## 2017-05-24 NOTE — TELEPHONE ENCOUNTER
Phoned patient on mobile number and relayed the message from Dr Rodriguez to her. She has an appointment with him on Tuesday and I stated that he will probably go over it in more detail then.  She verbalized understanding.

## 2017-05-30 ENCOUNTER — OFFICE VISIT (OUTPATIENT)
Dept: NEUROLOGY | Facility: HOSPITAL | Age: 67
End: 2017-05-30
Attending: INTERNAL MEDICINE
Payer: COMMERCIAL

## 2017-05-30 VITALS
SYSTOLIC BLOOD PRESSURE: 137 MMHG | DIASTOLIC BLOOD PRESSURE: 73 MMHG | WEIGHT: 157 LBS | HEART RATE: 76 BPM | TEMPERATURE: 97 F | BODY MASS INDEX: 24.64 KG/M2 | HEIGHT: 67 IN

## 2017-05-30 DIAGNOSIS — C7A.8 PRIMARY MALIGNANT NEUROENDOCRINE TUMOR OF PANCREAS: ICD-10-CM

## 2017-05-30 DIAGNOSIS — Z09 CHEMOTHERAPY FOLLOW-UP EXAMINATION: ICD-10-CM

## 2017-05-30 DIAGNOSIS — C78.7 SECONDARY MALIGNANT NEOPLASM OF LIVER: ICD-10-CM

## 2017-05-30 DIAGNOSIS — C7B.8 SECONDARY NEUROENDOCRINE TUMOR OF LIVER: ICD-10-CM

## 2017-05-30 DIAGNOSIS — D3A.8 PRIMARY PANCREATIC NEUROENDOCRINE TUMOR: Primary | ICD-10-CM

## 2017-05-30 PROCEDURE — 99214 OFFICE O/P EST MOD 30 MIN: CPT | Mod: ,,, | Performed by: INTERNAL MEDICINE

## 2017-05-30 PROCEDURE — 99214 OFFICE O/P EST MOD 30 MIN: CPT | Performed by: INTERNAL MEDICINE

## 2017-05-30 PROCEDURE — 1159F MED LIST DOCD IN RCRD: CPT | Mod: ,,, | Performed by: INTERNAL MEDICINE

## 2017-05-30 PROCEDURE — 1126F AMNT PAIN NOTED NONE PRSNT: CPT | Mod: ,,, | Performed by: INTERNAL MEDICINE

## 2017-05-30 NOTE — PROGRESS NOTES
NOLANETS:  [facility]  A collaboration between [facility] and [facility]    PATIENT: Ching Bruce  MRN: [id number]  DATE: 5/30/2017      Diagnosis:   1. Primary pancreatic neuroendocrine tumor    2. Secondary neuroendocrine tumor of liver    3. 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 Bruce is a 66 y.o. female who returns for follow up for her pancreatic neuroendocrine tumor.  She states that she is feeling well.  She continues on with chemotherapy with CAPTEM.  She has no new complaints.    ONCOLOGIC HISTORY:   A 66 y.o. year-old -American female who I had initially   seen on 12/18/2012. Her history dates back to September 2012 when she was noted  to have several weeks of feeling fatigued. She sought treatment with her   primary care doctor who did a CBC and found her to be severely anemic. She was   seen at [facility] and transfused 3 units of packed red blood cells  and no source of bleeding had been found. Records at that time had shown   hemoglobin of 5. She sought followup in September 2012 with Dr. Guillen who   performed an EGD and colonoscopy with an EGD showing an ulcerated and fungating   nonbleeding 2 cm mass, malignant in appearance. It did cause partial   obstructions. Biopsies were taken, which showed no evidence of malignancy at   that time. She had a CT of the abdomen and pelvis showing multiple metastatic   lesions and the liver biopsy was performed on 10/17/2012 showed pancreatic   neuroendocrine neoplasm in the left lobe of the liver aspirate. She went on to   have  an ERCP along with sphincterotomy and biliary stent placement and   subsequent PET had shown numerous hypodense lesions in the liver. Octreotide   scan was performed, which showed multiple right and left hepatic metastases.   MRI of the abdomen was performed on 12/05/2012 showing innumerable lesions that   demonstrated peripheral to hyperintensity consistent with hypervascular   metastasis. She was seen by Dr. Humphrey, who thought she was not a candidate   for surgical resection during that time. Her pathology from her tumor came back  positive for chromogranin, synaptophysin and had a Ki-67 of less than 1%. She   was started on treatment with temozolomide and capecitabine with cycle   1 being on 01/22/2013.    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 She does not have any smokeless tobacco history on file. She reports that she does not drink alcohol or use drugs.    Allergies:  Review of patient's allergies indicates:   Allergen Reactions     Epinephrine Other (See Comments)     Carcinoid patient    Sulfa (sulfonamide antibiotics) Other (See Comments)     Urticaria       Medications:  Current Outpatient Prescriptions   Medication Sig Dispense Refill    alendronate (FOSAMAX) 70 MG tablet   3    capecitabine (XELODA) 500 MG Tab TAKE 2 TABLETS (1000 MG) ORALLY TWICE A DAY FOR 14 DAYS, THEN OFF FOR14 DAYS, THEN REPEAT. TAKE WITHIN 30 MIN AFTER A MEAL WITH WATER. REPO 56 tablet 5    ergocalciferol (VITAMIN D2) 50,000 unit Cap Take 50,000 Units by mouth every 7 days.      ferrous sulfate 325 mg (65 mg iron) Tab tablet Take 325 mg by mouth once daily.      indapamide (LOZOL) 2.5 MG Tab 2.5 mg once daily.       irbesartan (AVAPRO) 300 MG tablet 300 mg once daily.       levothyroxine (SYNTHROID) 150 MCG tablet Take 150 mcg by mouth once daily.  0    temozolomide (TEMODAR) 250 MG capsule Take 1 capsule (250 mg total) by mouth once daily. Take 250 mg on days 10-14 of 28 day cycle. 5 capsule 6    verapamil (VERELAN PM) 360 MG C24P        No current facility-administered medications for this visit.        Review of Systems   Constitutional: Negative for chills, fever and unexpected weight change.   HENT: Negative for congestion, hearing loss and nosebleeds.    Eyes: Negative for visual disturbance.   Respiratory: Negative for cough and shortness of breath.    Cardiovascular: Negative for chest pain and palpitations.   Gastrointestinal: Negative for abdominal pain, blood in stool, constipation, diarrhea, nausea and vomiting.   Genitourinary: Negative for dysuria.   Musculoskeletal: Negative for back pain and gait problem.   Skin: Negative for color change and rash.   Neurological: Negative for dizziness, weakness and headaches.   Hematological: Negative for adenopathy. Does not bruise/bleed easily.   Psychiatric/Behavioral: Negative for confusion.       ECOG Performance Status: 0   Objective:      Vitals:   Vitals:    05/30/17 1531   BP: 137/73   Pulse:  "76   Temp: 97.1 °F (36.2 °C)   TempSrc: Oral   Weight: 71.2 kg (157 lb)   Height: 5' 7" (1.702 m)     BMI: Body mass index is 24.59 kg/m².    Physical Exam   Constitutional: She is oriented to person, place, and time. She appears well-developed and well-nourished. No distress.   HENT:   Head: Normocephalic.   Mouth/Throat: No oropharyngeal exudate.   Eyes: EOM are normal. No scleral icterus.   Neck: Neck supple. No tracheal deviation present. No thyromegaly present.   Cardiovascular: Normal rate and regular rhythm.    Pulmonary/Chest: Effort normal and breath sounds normal. No respiratory distress. She has no wheezes. She has no rales.   Abdominal: Soft. She exhibits no distension and no mass. There is no tenderness. There is no rebound and no guarding.   Musculoskeletal: Normal range of motion. She exhibits no edema.   Lymphadenopathy:     She has no cervical adenopathy.   Neurological: She is alert and oriented to person, place, and time. No cranial nerve deficit.   Skin: Skin is warm and dry.   Psychiatric: She has a normal mood and affect.       Laboratory Data:  Abstract on 12/06/2016   Component Date Value Ref Range Status    EXT WBC 12/03/2016 4.1  3.8 - 10.8 1000/ul Final    EXT Hemoglobin 12/03/2016 12.9  11.7 - 15.5 g/dl Final    EXT Hematocrit 12/03/2016 38.5  35.0 - 45.0 % Final    EXT Platelets 12/03/2016 138* 140 - 400 1000/ul Final    EXT Glucose 12/03/2016 88  65 - 99 mg/dl Final    EXT BUN 12/03/2016 13  7 - 25 mg/dl Final    EXT Creatinine 12/03/2016 0.90  0.50 - 0.99 mg/dl Final    EXT Sodium 12/03/2016 141  135 - 146 mmol/l Final    EXT Potassium 12/03/2016 4.2  3.5 - 5.3 mmol/l Final    EXT Chloride 12/03/2016 105  98 - 110 mmol/l Final    EXT CO2 12/03/2016 29  20 - 31 mmol/l Final    EXT Calcium 12/03/2016 9.7  8.6 - 10.4 mg/dl Final    EXT Protein total 12/03/2016 6.9  6.1 - 8.1 g/dl Final    EXT Albumin 12/03/2016 4.0  3.6 - 5.1 g/dl Final    EXT BilirubiN Total 12/03/2016 1.1 "  0.2 - 1.2 mg/dl Final    EXT Alkaline Phosphatase 12/03/2016 181* 33 - 130 u/l Final    EXT AST 12/03/2016 21  10 - 35 u/l Final    EXT ALT 12/03/2016 28  6 - 29 u/l Final   Office Visit on 11/14/2016   Component Date Value Ref Range Status    Blood Stool 11/14/2016 Negative  Negative Final     Acceptable 11/14/2016 Yes   Final    SOURCE: 11/14/2016 Cervical   Final    Slides: 11/14/2016 1   Final    LMP: 11/14/2016 NA   Final    Specimen adequacy: 11/14/2016 (NOTE)   Final    Comment:      Satisfactory for evaluation.          Endocervical cells absent.  Metaplastic cells indicative       of transformation zone cannot be reliably distinguished from       parabasal or atrophic cells.                      Interpretation 11/14/2016 NO EPITHELIAL ABNORMALITY SEE BELOW   Final    Comment: ----------------------------------------------------------------------       *NEGATIVE FOR INTRAEPITHELIAL LESION OR MALIGNANCY   ----------------------------------------------------------------------         Comments: 11/14/2016 (NOTE)   Final    Comment: Due to technical or specimen issues, imaging could not be  performed. A cytotechnologist has manually screened this slide.         Cytotechnologist: 11/14/2016 BRENDON Ca(ASCP)[facility]   Final    LOCATION 11/14/2016 (NOTE)   Final    Comment: Specimens processed and interpreted at :[facility], [address] [facility] [place] [address], Phone: [phone], CLIA: [id number]      CPT Codes: 11/14/2016 (NOTE)   Final    Comment: [id number]        UNLESS OTHERWISE INDICATED, COMPUTER AIDED AND CYTOTECHNOLOGIST     SCREENING PERFORMED.          The Pap test is a screening test with an inherent, but low       probability of error.  Your patient should be reminded to       consult you immediately if she experiences any suspicious       signs or symptoms, regardless of her Pap test result.       An alternate report format containing images or  consolidated       prior Pap history is available as applicable.         HPV HIGH RISK IF ASC-US, SUREPATH 11/14/2016 CRITERIA NOT MET   Final    Comment:       UNLESS OTHERWISE INDICATED, ALL TESTING PERFORMED AT  [facility]  [address] [place] [id number]            :  CARLOS MENDEZ M.D.        IA NUMBER [id number]  [place]. [id number]                 Imaging:   MRI 12/8/16  MRI abdomen without and with contrast.    Comparison: 5/31/16    History: Neuroendocrine tumor.    Results: Axial gradient T2, axial T1 in and out of phase, axial T2 SSFSE, coronal T2 gradient and T2 SSFSE followed by pre-contrast axial T1 gradient fat sat and dynamic post contrast images were obtained. The patient received  10 cc of IV Gadovist contrast.    Numerous hepatic numerous hepatic lesion better seen on postcontrast 10 seconds.  Index lesion in the right hepatic lobe measures segment 5 measures 2.2 cm (previously measuring 2.0 x 2.4 cm).  A 2nd index lesion has been chosen within the left hepatic lobe segment 3 measuring 1.6 cm, in retrospect, on the prior study, measuring 1.6 cm.  Vague enhancing area) postcontrast 10 seconds image 62) within the uncinate process measuring at 1.7 x 0.9 cm as seen on the prior study.    The number and burden of liver lesions appear similar to the prior exam.  The biliary ducts are nondilated.  No liver is enlarged similar to the prior study the gallbladder is present.  The stomach, pancreas, spleen, and adrenal glands appear within normal limits.  Small bilateral kidney cysts.  The visualized osseous structures demonstrate no definite osseous lesions.   Impression       Hepatomegaly with multiple liver lesions, the burden of lesions and size appear similar to the prior exam.  Vague area of enhancement within the uncinate process of the pancreas appears unchanged.                      Assessment:       1. Primary pancreatic  neuroendocrine tumor    2. Secondary neuroendocrine tumor of liver    3. Chemotherapy follow-up examination           Plan:   Ms. Bruce continues to tolerate CAPTEM in review of her labs shows no detrimental effects.  We will plan to continue and repeat imaging in May 2017 which will be 6 months since prior imaging.  She did note that she will be retiring may need financial assistance to obtain chemotherapy.  Continue on with monthly labs.  Follow up in 2 months.     Cory Rodriguez DO, FACP  Hematology & Oncology, [facility]  [address]  [place]  [address]  ph. [phone]; [phone]  fax. [phone]    25 minutes were spent in coordination of patient's care, record review and counseling.  More than 50% of the time was face-to-face.      NOLANETS:  [facility]  A collaboration between [facility] and [facility]    PATIENT: Ching Bruce  MRN: [id number]  DATE: 5/30/2017      Diagnosis:   1. Primary pancreatic neuroendocrine tumor    2. Secondary neuroendocrine tumor of liver    3. 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 Bruce is a 66 y.o. female who returns for follow up for her pancreatic neuroendocrine tumor.  She states that she is feeling well.  She continues on with chemotherapy with CAPTEM.  She has no new complaints.    ONCOLOGIC HISTORY:   A 66 y.o. year-old -American female who I had initially   seen on 12/18/2012. Her history dates back to September 2012 when she was noted  to have several weeks of feeling fatigued. She sought  treatment with her   primary care doctor who did a CBC and found her to be severely anemic. She was   seen at [facility] and transfused 3 units of packed red blood cells  and no source of bleeding had been found. Records at that time had shown   hemoglobin of 5. She sought followup in September 2012 with Dr. Guillen who   performed an EGD and colonoscopy with an EGD showing an ulcerated and fungating   nonbleeding 2 cm mass, malignant in appearance. It did cause partial   obstructions. Biopsies were taken, which showed no evidence of malignancy at   that time. She had a CT of the abdomen and pelvis showing multiple metastatic   lesions and the liver biopsy was performed on 10/17/2012 showed pancreatic   neuroendocrine neoplasm in the left lobe of the liver aspirate. She went on to   have an ERCP along with sphincterotomy and biliary stent placement and   subsequent PET had shown numerous hypodense lesions in the liver. Octreotide   scan was performed, which showed multiple right and left hepatic metastases.   MRI of the abdomen was performed on 12/05/2012 showing innumerable lesions that   demonstrated peripheral to hyperintensity consistent with hypervascular   metastasis. She was seen by Dr. Humphrey, who thought she was not a candidate   for surgical resection during that time. Her pathology from her tumor came back  positive for chromogranin, synaptophysin and had a Ki-67 of less than 1%. She   was started on treatment with temozolomide and capecitabine with cycle   1 being on 01/22/2013.    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 She does not have any smokeless tobacco history on file. She reports that she does not drink alcohol or use drugs.    Allergies:  Review of patient's allergies indicates:   Allergen Reactions    Epinephrine Other (See Comments)     Carcinoid patient    Sulfa (sulfonamide antibiotics) Other (See Comments)     Urticaria       Medications:  Current Outpatient Prescriptions   Medication Sig Dispense Refill    alendronate (FOSAMAX) 70 MG tablet   3    capecitabine (XELODA) 500 MG Tab TAKE 2 TABLETS (1000 MG) ORALLY TWICE A DAY FOR 14 DAYS, THEN OFF FOR14 DAYS, THEN REPEAT. TAKE WITHIN 30 MIN AFTER A MEAL WITH WATER. REPO 56 tablet 5    ergocalciferol (VITAMIN D2) 50,000 unit Cap Take 50,000 Units by mouth every 7 days.      ferrous sulfate 325 mg (65 mg iron) Tab tablet Take 325 mg by mouth once daily.      indapamide (LOZOL) 2.5 MG Tab 2.5 mg once daily.       irbesartan (AVAPRO) 300 MG tablet 300 mg once daily.       levothyroxine (SYNTHROID) 150 MCG tablet Take 150 mcg by mouth once daily.  0    temozolomide (TEMODAR) 250 MG capsule Take 1 capsule (250 mg total) by mouth once daily. Take 250 mg on days 10-14 of 28 day cycle. 5 capsule 6    verapamil (VERELAN PM) 360 MG C24P        No current facility-administered medications for this visit.        Review of Systems   Constitutional: Negative for  "chills, fever and unexpected weight change.   HENT: Negative for congestion, hearing loss and nosebleeds.    Eyes: Negative for visual disturbance.   Respiratory: Negative for cough and shortness of breath.    Cardiovascular: Negative for chest pain and palpitations.   Gastrointestinal: Negative for abdominal pain, blood in stool, constipation, diarrhea, nausea and vomiting.   Genitourinary: Negative for dysuria.   Musculoskeletal: Negative for back pain and gait problem.   Skin: Negative for color change and rash.   Neurological: Negative for dizziness, weakness and headaches.   Hematological: Negative for adenopathy. Does not bruise/bleed easily.   Psychiatric/Behavioral: Negative for confusion.       ECOG Performance Status: 0   Objective:      Vitals:   Vitals:    05/30/17 1531   BP: 137/73   Pulse: 76   Temp: 97.1 °F (36.2 °C)   TempSrc: Oral   Weight: 71.2 kg (157 lb)   Height: 5' 7" (1.702 m)     BMI: Body mass index is 24.59 kg/m².    Physical Exam   Constitutional: She is oriented to person, place, and time. She appears well-developed and well-nourished. No distress.   HENT:   Head: Normocephalic.   Mouth/Throat: No oropharyngeal exudate.   Eyes: EOM are normal. No scleral icterus.   Neck: Neck supple. No tracheal deviation present. No thyromegaly present.   Cardiovascular: Normal rate and regular rhythm.    Pulmonary/Chest: Effort normal and breath sounds normal. No respiratory distress. She has no wheezes. She has no rales.   Abdominal: Soft. She exhibits no distension and no mass. There is no tenderness. There is no rebound and no guarding.   Musculoskeletal: Normal range of motion. She exhibits no edema.   Lymphadenopathy:     She has no cervical adenopathy.   Neurological: She is alert and oriented to person, place, and time. No cranial nerve deficit.   Skin: Skin is warm and dry.   Psychiatric: She has a normal mood and affect.       Laboratory Data:  Abstract on 12/06/2016   Component Date Value Ref " Range Status    EXT WBC 12/03/2016 4.1  3.8 - 10.8 1000/ul Final    EXT Hemoglobin 12/03/2016 12.9  11.7 - 15.5 g/dl Final    EXT Hematocrit 12/03/2016 38.5  35.0 - 45.0 % Final    EXT Platelets 12/03/2016 138* 140 - 400 1000/ul Final    EXT Glucose 12/03/2016 88  65 - 99 mg/dl Final    EXT BUN 12/03/2016 13  7 - 25 mg/dl Final    EXT Creatinine 12/03/2016 0.90  0.50 - 0.99 mg/dl Final    EXT Sodium 12/03/2016 141  135 - 146 mmol/l Final    EXT Potassium 12/03/2016 4.2  3.5 - 5.3 mmol/l Final    EXT Chloride 12/03/2016 105  98 - 110 mmol/l Final    EXT CO2 12/03/2016 29  20 - 31 mmol/l Final    EXT Calcium 12/03/2016 9.7  8.6 - 10.4 mg/dl Final    EXT Protein total 12/03/2016 6.9  6.1 - 8.1 g/dl Final    EXT Albumin 12/03/2016 4.0  3.6 - 5.1 g/dl Final    EXT BilirubiN Total 12/03/2016 1.1  0.2 - 1.2 mg/dl Final    EXT Alkaline Phosphatase 12/03/2016 181* 33 - 130 u/l Final    EXT AST 12/03/2016 21  10 - 35 u/l Final    EXT ALT 12/03/2016 28  6 - 29 u/l Final   Office Visit on 11/14/2016   Component Date Value Ref Range Status    Blood Stool 11/14/2016 Negative  Negative Final     Acceptable 11/14/2016 Yes   Final    SOURCE: 11/14/2016 Cervical   Final    Slides: 11/14/2016 1   Final    LMP: 11/14/2016 NA   Final    Specimen adequacy: 11/14/2016 (NOTE)   Final    Comment:      Satisfactory for evaluation.          Endocervical cells absent.  Metaplastic cells indicative       of transformation zone cannot be reliably distinguished from       parabasal or atrophic cells.                      Interpretation 11/14/2016 NO EPITHELIAL ABNORMALITY SEE BELOW   Final    Comment: ----------------------------------------------------------------------       *NEGATIVE FOR INTRAEPITHELIAL LESION OR MALIGNANCY   ----------------------------------------------------------------------         Comments: 11/14/2016 (NOTE)   Final    Comment: Due to technical or specimen issues, imaging could  not be  performed. A cytotechnologist has manually screened this slide.         Cytotechnologist: 11/14/2016 BRENDON Ca(ASCP)[facility]   Final    LOCATION 11/14/2016 (NOTE)   Final    Comment: Specimens processed and interpreted at :[facility], [address] [place] [address], Phone: [phone], CLIA: [id number]      CPT Codes: 11/14/2016 (NOTE)   Final    Comment: [id number]        UNLESS OTHERWISE INDICATED, COMPUTER AIDED AND CYTOTECHNOLOGIST     SCREENING PERFORMED.          The Pap test is a screening test with an inherent, but low       probability of error.  Your patient should be reminded to       consult you immediately if she experiences any suspicious       signs or symptoms, regardless of her Pap test result.       An alternate report format containing images or consolidated       prior Pap history is available as applicable.         HPV HIGH RISK IF ASC-US, SUREPATH 11/14/2016 CRITERIA NOT MET   Final    Comment:       UNLESS OTHERWISE INDICATED, ALL TESTING PERFORMED AT  [facility]  [address] [place] [address]            :  CARLOS MENDEZ M.D.        CLIA NUMBER [id number]  [place]. [id number]                 Imaging:   MRI 5/23/17  Comparison: MRI abdomen 12/2016.    Findings:  Innumerable enhancing metastatic lesions are again visualized throughout the liver which appear stable in both size and number.  Majority lesions are seen within the right hepatic lobe.  Left hepatic lobe is hypertrophied.  Index right hepatic lobe segment V lesion measures 2.2 cm, unchanged.  Index left hepatic lobe segment III lesion measures 1.3 cm, unchanged.  No definite new lesions or evidence to suggest progression of disease.    There is stable ill-defined area of enhancement within the uncinate process of the pancreas similar to previous examinations with no discrete mass visualized.  Gallbladder is unremarkable.  No evidence  of biliary ductal dilatation.  Stomach, spleen, and adrenal glands are unremarkable.  Small renal cysts are noted.  No ascites.  No evidence to suggest marrow replacement process.   Impression       Innumerable hepatic metastatic lesions.  Index and non-index lesions appear overall stable in both size and number.  No evidence of new lesions or evidence to suggest progression of metastatic disease.                Assessment:       1. Primary pancreatic neuroendocrine tumor    2. Secondary neuroendocrine tumor of liver    3. Chemotherapy follow-up examination           Plan:   Ms. Bruce continues to do well from an oncologic standpoint.  Review of her MRI shows no detrimental changes.  Her octreotide scan is negative.  Labs are appropriate to continue treatment.  I will discuss her case in tumor board as she has been on treatment for more than 4 years.  I have told her we could possibly consider some liver directed therapy in a chemotherapy holiday.  We will notify her of recommendations from tumor board and I will see her back in 2 months or sooner if needed.    Cory Rodriguez DO, FACP  Hematology & Oncology, [facility]  [address], [address]  [place]  [address]  ph. [phone]; [phone]  fax. [phone]    25 minutes were spent in coordination of patient's care, record review and counseling.  More than 50% of the time was face-to-face.

## 2017-05-30 NOTE — PATIENT INSTRUCTIONS
Have labs drawn around June 30, 2017 at [facility]  We will discuss you at the NET Tumor board on Tuesday, June 13 and let you know the recommendations

## 2017-05-31 RX ORDER — CAPECITABINE 500 MG/1
TABLET, FILM COATED ORAL
Qty: 56 TABLET | Refills: 5 | Status: SHIPPED | OUTPATIENT
Start: 2017-05-31 | End: 2017-11-29 | Stop reason: SDUPTHER

## 2017-06-06 ENCOUNTER — CONFERENCE (OUTPATIENT)
Dept: NEUROLOGY | Facility: HOSPITAL | Age: 67
End: 2017-06-06

## 2017-06-06 NOTE — TELEPHONE ENCOUNTER
Multidisciplinary NET Tumor Board Summary:    Attendees:    See sign in sheets     Case Presentation:       Discussion: Reviewed medical history, scans and lab work, on CapTem, KI 67 1%,       Recommendations:   1. Disease is stable  2. Can be TACE'd in the future  3. Michael to decide on chemo

## 2017-06-17 LAB
EXT 24 HR UR METANEPHRINE: ABNORMAL
EXT 24 HR UR NORMETANEPHRINE: ABNORMAL
EXT 24 HR UR NORMETANEPHRINE: ABNORMAL
EXT 25 HYDROXY VIT D2: ABNORMAL
EXT 25 HYDROXY VIT D3: ABNORMAL
EXT 5 HIAA 24 HR URINE: ABNORMAL
EXT 5 HIAA BLOOD: ABNORMAL
EXT ACTH: ABNORMAL
EXT AFP: ABNORMAL
EXT ALBUMIN: 4.1 G/DL (ref 3.6–5.1)
EXT ALKALINE PHOSPHATASE: 196 U/L (ref 33–130)
EXT ALT: 21 U/L (ref 6–29)
EXT AMYLASE: ABNORMAL
EXT ANTI ISLET CELL AB: ABNORMAL
EXT ANTI PARIETAL CELL AB: ABNORMAL
EXT ANTI THYROID AB: ABNORMAL
EXT AST: 21 U/L (ref 10–35)
EXT BILIRUBIN DIRECT: ABNORMAL
EXT BILIRUBIN TOTAL: 1.1 MG/DL (ref 0.2–1.2)
EXT BK VIRUS DNA QN PCR: ABNORMAL
EXT BUN: 13 MG/DL (ref 7–25)
EXT C PEPTIDE: ABNORMAL
EXT CA 125: ABNORMAL
EXT CA 19-9: ABNORMAL
EXT CA 27-29: ABNORMAL
EXT CALCITONIN: ABNORMAL
EXT CALCIUM: 9.5 MG/DL (ref 8.6–10.4)
EXT CEA: ABNORMAL
EXT CHLORIDE: 105 MMOL/L (ref 98–110)
EXT CHOLESTEROL: ABNORMAL
EXT CHROMOGRANIN A: ABNORMAL
EXT CO2: 27 MMOL/L (ref 20–31)
EXT CREATININE UA: ABNORMAL
EXT CREATININE: 0.98 MG/DL (ref 0.5–0.99)
EXT CYCLOSPORONE LEVEL: ABNORMAL
EXT DOPAMINE: ABNORMAL
EXT EBV DNA BY PCR: ABNORMAL
EXT EPINEPHRINE: ABNORMAL
EXT FOLATE: ABNORMAL
EXT FREE T3: ABNORMAL
EXT FREE T4: ABNORMAL
EXT FSH: ABNORMAL
EXT GASTRIN RELEASING PEPTIDE: ABNORMAL
EXT GASTRIN RELEASING PEPTIDE: ABNORMAL
EXT GASTRIN: ABNORMAL
EXT GGT: ABNORMAL
EXT GHRELIN: ABNORMAL
EXT GLUCAGON: ABNORMAL
EXT GLUCOSE: 91 MG/DL (ref 65–99)
EXT GROWTH HORMONE: ABNORMAL
EXT HCV RNA QUANT PCR: ABNORMAL
EXT HDL: ABNORMAL
EXT HEMATOCRIT: 38.1 % (ref 35–45)
EXT HEMOGLOBIN A1C: ABNORMAL
EXT HEMOGLOBIN: 13 G/DL (ref 11.7–15.5)
EXT HISTAMINE 24 HR URINE: ABNORMAL
EXT HISTAMINE: ABNORMAL
EXT IGF-1: ABNORMAL
EXT IMMUNKNOW (NON-STIMULATED): ABNORMAL
EXT IMMUNKNOW (STIMULATED): ABNORMAL
EXT INR: ABNORMAL
EXT INSULIN: ABNORMAL
EXT LANREOTIDE LEVEL: ABNORMAL
EXT LDH, TOTAL: ABNORMAL
EXT LDL CHOLESTEROL: ABNORMAL
EXT LIPASE: ABNORMAL
EXT MAGNESIUM: ABNORMAL
EXT METANEPHRINE FREE PLASMA: ABNORMAL
EXT MOTILIN: ABNORMAL
EXT NEUROKININ A CAMB: ABNORMAL
EXT NEUROKININ A ISI: ABNORMAL
EXT NEUROTENSIN: ABNORMAL
EXT NOREPINEPHRINE: ABNORMAL
EXT NORMETANEPHRINE: ABNORMAL
EXT NSE: ABNORMAL
EXT OCTREOTIDE LEVEL: ABNORMAL
EXT PANCREASTATIN CAMB: ABNORMAL
EXT PANCREASTATIN ISI: ABNORMAL
EXT PANCREATIC POLYPEPTIDE: ABNORMAL
EXT PHOSPHORUS: ABNORMAL
EXT PLATELETS: 140 1000/UL (ref 140–400)
EXT POTASSIUM: 4.5 MMOL/L (ref 3.5–5.3)
EXT PROGRAF LEVEL: ABNORMAL
EXT PROLACTIN: ABNORMAL
EXT PROTEIN TOTAL: 7.1 G/DL (ref 6.1–8.1)
EXT PROTEIN UA: ABNORMAL
EXT PT: ABNORMAL
EXT PTH, INTACT: ABNORMAL
EXT PTT: ABNORMAL
EXT RAPAMUNE LEVEL: ABNORMAL
EXT SEROTONIN: ABNORMAL
EXT SODIUM: 141 MMOL/L (ref 135–146)
EXT SOMATOSTATIN: ABNORMAL
EXT SUBSTANCE P: ABNORMAL
EXT TRIGLYCERIDES: ABNORMAL
EXT TRYPTASE: ABNORMAL
EXT TSH: ABNORMAL
EXT URIC ACID: ABNORMAL
EXT URINE AMYLASE U/HR: ABNORMAL
EXT URINE AMYLASE U/L: ABNORMAL
EXT VASOACTIVE INTESTINAL POLYPEPTIDE: ABNORMAL
EXT VITAMIN B12: ABNORMAL
EXT VMA 24 HR URINE: ABNORMAL
EXT WBC: 4 1000/UL (ref 3.8–10.8)
NEURON SPECIFIC ENOLASE: ABNORMAL

## 2017-07-22 LAB
EXT 24 HR UR METANEPHRINE: ABNORMAL
EXT 24 HR UR NORMETANEPHRINE: ABNORMAL
EXT 24 HR UR NORMETANEPHRINE: ABNORMAL
EXT 25 HYDROXY VIT D2: ABNORMAL
EXT 25 HYDROXY VIT D3: ABNORMAL
EXT 5 HIAA 24 HR URINE: ABNORMAL
EXT 5 HIAA BLOOD: ABNORMAL
EXT ACTH: ABNORMAL
EXT AFP: ABNORMAL
EXT ALBUMIN: 4.1 G/DL (ref 3.6–5.1)
EXT ALKALINE PHOSPHATASE: 168 U/L (ref 33–130)
EXT ALT: 19 U/L (ref 6–29)
EXT AMYLASE: ABNORMAL
EXT ANTI ISLET CELL AB: ABNORMAL
EXT ANTI PARIETAL CELL AB: ABNORMAL
EXT ANTI THYROID AB: ABNORMAL
EXT AST: 18 U/L (ref 10–35)
EXT BILIRUBIN DIRECT: ABNORMAL
EXT BILIRUBIN TOTAL: 0.9 MG/DL (ref 0.2–1.2)
EXT BK VIRUS DNA QN PCR: ABNORMAL
EXT BUN: 11 MG/DL (ref 7–25)
EXT C PEPTIDE: ABNORMAL
EXT CA 125: ABNORMAL
EXT CA 19-9: ABNORMAL
EXT CA 27-29: ABNORMAL
EXT CALCITONIN: ABNORMAL
EXT CALCIUM: 9.9 MG/DL (ref 8.6–10.4)
EXT CEA: ABNORMAL
EXT CHLORIDE: 105 MMOL/L (ref 98–110)
EXT CHOLESTEROL: ABNORMAL
EXT CHROMOGRANIN A: ABNORMAL
EXT CO2: 28 MMOL/L (ref 20–31)
EXT CREATININE UA: ABNORMAL
EXT CREATININE: 1.05 MG/DL (ref 0.5–0.99)
EXT CYCLOSPORONE LEVEL: ABNORMAL
EXT DOPAMINE: ABNORMAL
EXT EBV DNA BY PCR: ABNORMAL
EXT EPINEPHRINE: ABNORMAL
EXT FOLATE: ABNORMAL
EXT FREE T3: ABNORMAL
EXT FREE T4: ABNORMAL
EXT FSH: ABNORMAL
EXT GASTRIN RELEASING PEPTIDE: ABNORMAL
EXT GASTRIN RELEASING PEPTIDE: ABNORMAL
EXT GASTRIN: ABNORMAL
EXT GGT: ABNORMAL
EXT GHRELIN: ABNORMAL
EXT GLUCAGON: ABNORMAL
EXT GLUCOSE: 94 MG/DL (ref 65–99)
EXT GROWTH HORMONE: ABNORMAL
EXT HCV RNA QUANT PCR: ABNORMAL
EXT HDL: ABNORMAL
EXT HEMATOCRIT: 36.9 % (ref 35–45)
EXT HEMOGLOBIN A1C: ABNORMAL
EXT HEMOGLOBIN: 12.9 G/DL (ref 11.7–15.5)
EXT HISTAMINE 24 HR URINE: ABNORMAL
EXT HISTAMINE: ABNORMAL
EXT IGF-1: ABNORMAL
EXT IMMUNKNOW (NON-STIMULATED): ABNORMAL
EXT IMMUNKNOW (STIMULATED): ABNORMAL
EXT INR: ABNORMAL
EXT INSULIN: ABNORMAL
EXT LANREOTIDE LEVEL: ABNORMAL
EXT LDH, TOTAL: ABNORMAL
EXT LDL CHOLESTEROL: ABNORMAL
EXT LIPASE: ABNORMAL
EXT MAGNESIUM: ABNORMAL
EXT METANEPHRINE FREE PLASMA: ABNORMAL
EXT MOTILIN: ABNORMAL
EXT NEUROKININ A CAMB: ABNORMAL
EXT NEUROKININ A ISI: ABNORMAL
EXT NEUROTENSIN: ABNORMAL
EXT NOREPINEPHRINE: ABNORMAL
EXT NORMETANEPHRINE: ABNORMAL
EXT NSE: ABNORMAL
EXT OCTREOTIDE LEVEL: ABNORMAL
EXT PANCREASTATIN CAMB: ABNORMAL
EXT PANCREASTATIN ISI: ABNORMAL
EXT PANCREATIC POLYPEPTIDE: ABNORMAL
EXT PHOSPHORUS: ABNORMAL
EXT PLATELETS: 137 1000/UL (ref 140–400)
EXT POTASSIUM: 4.8 MMOL/L (ref 3.5–5.3)
EXT PROGRAF LEVEL: ABNORMAL
EXT PROLACTIN: ABNORMAL
EXT PROTEIN TOTAL: 7.1 G/DL (ref 6.1–8.1)
EXT PROTEIN UA: ABNORMAL
EXT PT: ABNORMAL
EXT PTH, INTACT: ABNORMAL
EXT PTT: ABNORMAL
EXT RAPAMUNE LEVEL: ABNORMAL
EXT SEROTONIN: ABNORMAL
EXT SODIUM: 143 MMOL/L (ref 135–146)
EXT SOMATOSTATIN: ABNORMAL
EXT SUBSTANCE P: ABNORMAL
EXT TRIGLYCERIDES: ABNORMAL
EXT TRYPTASE: ABNORMAL
EXT TSH: ABNORMAL
EXT URIC ACID: ABNORMAL
EXT URINE AMYLASE U/HR: ABNORMAL
EXT URINE AMYLASE U/L: ABNORMAL
EXT VASOACTIVE INTESTINAL POLYPEPTIDE: ABNORMAL
EXT VITAMIN B12: ABNORMAL
EXT VMA 24 HR URINE: ABNORMAL
EXT WBC: 4.2 1000/UL (ref 3.8–10.8)
NEURON SPECIFIC ENOLASE: ABNORMAL

## 2017-08-08 ENCOUNTER — TELEPHONE (OUTPATIENT)
Dept: NEUROLOGY | Facility: HOSPITAL | Age: 67
End: 2017-08-08

## 2017-08-08 DIAGNOSIS — K83.1 OBSTRUCTION OF BILE DUCT: Primary | ICD-10-CM

## 2017-08-08 NOTE — TELEPHONE ENCOUNTER
ERCP scheduled with pt on Thursday, 8/24/17. Pt instructed Endoscopy staff to call with arrival time to hospital 1st floor admit. Pt instructed verbally and request to have instructions faxed to [phone].

## 2017-08-08 NOTE — TELEPHONE ENCOUNTER
Pt wants to schedule stent replacement.  Pt notified Dr. Lieberman to be notified and she will be contacted.  Pt would like to be contacted at [phone].

## 2017-08-08 NOTE — LETTER
Instructions for ERCP    DATE:Thursday, August 24, 2017     ARRIVAL TIME: Endoscopy Staff to Notify     IMPORTANT:  PLEASE READ CAREFULLY. FAILURE TO FOLLOW THESE INSTRUCTIONS MAY RESULT IN YOUR PROCEDURE BEING CANCELED, RESCHEDULED, OR REPEATED.                                       General Instructions and Notes:  · Since anesthesia is used during an ERCP procedure, it is necessary for a responsible adult (family member or friend 18 years and over) to accompany you home.  · Both you and your responsible adult should be prepared to stay at the hospital at least three (3) hours from check-in to discharge. If they cannot stay during your procedure, your procedure will be canceled.    · Please leave all valuables and jewelry at home.  · Please bring a picture ID, insurance card, & copayment.  · Please look over the clear liquid diet and purchase any necessary items.    If you have any questions about the cost of the procedure, you should contact your health insurance co. as soon as possible.     Clear Liquid Diet   Water, tea, regular or decaffeinated coffee   Carbonated beverages (soft drinks), regular and sugar-free   Gelatin dessert, plain or fruit flavored (no red gelatin)   Apple juice, grape juice, juicy juice   Gatorade, Power Aid, Crystal Lite, Beto-Aid, Lemonade, Limeade   Bouillon, clear consommé, broth 100% fat free beef, chicken or vegetable   Snowballs, popsicles, hard candy, sugar, salt   100% cranberry juice is the only red liquid allowed   Avoid any liquids containing red dye    Avoid any liquids not specifically listed    Avoid dairy products (liquid or powder)   Alcohol is not permitted    Your Medication(s) Instructions  · Please select the blood thinner(s) you are currently taking and follow the instructions on when to stop taking the medication(s) prior to the procedure.         If you obtained Coumadin through [facility], follow the instructions provided by the clinic.    · You may continue to take Aspirin until the day before your procedure or as directed by the scheduling nurse.  · If you are diabetic, see the attached instruction sheet regarding your daily medication. Continue to monitor your blood sugar. When you begin the clear-liquid diet, you may drink beverages with sugar as your source of glucose.  · If you are a female under 60 years old, please be prepared to provide a urine sample on arrival.           Instructions for ERCP    Instructions for the Day Before the Procedure DATE:Wednesday, August 23, 2017   ·  eat a light evening meal. No solid food after 7: 00 pm. You may continue to drink clear liquids listed above until day of procedure.                        Instructions for the Day of the Procedure DATE:***                             · If your appointment is in the afternoon, you may have clear liquids until ***.   · 6 a.m.   Take all of your morning medication except for diabetic and blood thinning. If you take your medications at a different time other than in the morning, please take medications at your regular scheduled times as ordered by your doctor  Arrival time given Endoscopy staff: Hospital 1st floor Admit.  Call [phone] with questions or to reschedule.

## 2017-08-17 ENCOUNTER — TELEPHONE (OUTPATIENT)
Dept: NEUROLOGY | Facility: HOSPITAL | Age: 67
End: 2017-08-17

## 2017-08-17 NOTE — TELEPHONE ENCOUNTER
----- Message from Lovely Cruz LPN sent at 8/8/2017 12:17 PM CDT -----  Pt scheduled on 8/24/17.  CPT 12365, DX K83.1.  Thanks.

## 2017-08-19 LAB
EXT 24 HR UR METANEPHRINE: ABNORMAL
EXT 24 HR UR NORMETANEPHRINE: ABNORMAL
EXT 24 HR UR NORMETANEPHRINE: ABNORMAL
EXT 25 HYDROXY VIT D2: ABNORMAL
EXT 25 HYDROXY VIT D3: ABNORMAL
EXT 5 HIAA 24 HR URINE: ABNORMAL
EXT 5 HIAA BLOOD: ABNORMAL
EXT ACTH: ABNORMAL
EXT AFP: ABNORMAL
EXT ALBUMIN: 4.2 (ref 3.6–5.1)
EXT ALKALINE PHOSPHATASE: 482 (ref 33–130)
EXT ALT: 48 (ref 6–29)
EXT AMYLASE: ABNORMAL
EXT ANTI ISLET CELL AB: ABNORMAL
EXT ANTI PARIETAL CELL AB: ABNORMAL
EXT ANTI THYROID AB: ABNORMAL
EXT AST: 32 (ref 10–35)
EXT BILIRUBIN DIRECT: ABNORMAL MG/DL
EXT BILIRUBIN TOTAL: 0.9 (ref 0.2–1.2)
EXT BK VIRUS DNA QN PCR: ABNORMAL
EXT BUN: 13 (ref 7–25)
EXT C PEPTIDE: ABNORMAL
EXT CA 125: ABNORMAL
EXT CA 19-9: ABNORMAL
EXT CA 27-29: ABNORMAL
EXT CALCITONIN: ABNORMAL
EXT CALCIUM: 10 (ref 8.6–10.4)
EXT CEA: ABNORMAL
EXT CHLORIDE: 105 (ref 98–110)
EXT CHOLESTEROL: ABNORMAL
EXT CHROMOGRANIN A: ABNORMAL
EXT CO2: 28 (ref 20–31)
EXT CREATININE UA: ABNORMAL
EXT CREATININE: 0.89 MG/DL (ref 0.5–0.99)
EXT CYCLOSPORONE LEVEL: ABNORMAL
EXT DOPAMINE: ABNORMAL
EXT EBV DNA BY PCR: ABNORMAL
EXT EPINEPHRINE: ABNORMAL
EXT FOLATE: ABNORMAL
EXT FREE T3: ABNORMAL
EXT FREE T4: ABNORMAL
EXT FSH: ABNORMAL
EXT GASTRIN RELEASING PEPTIDE: ABNORMAL
EXT GASTRIN RELEASING PEPTIDE: ABNORMAL
EXT GASTRIN: ABNORMAL
EXT GGT: ABNORMAL
EXT GHRELIN: ABNORMAL
EXT GLUCAGON: ABNORMAL
EXT GLUCOSE: 92 (ref 65–99)
EXT GROWTH HORMONE: ABNORMAL
EXT HCV RNA QUANT PCR: ABNORMAL
EXT HDL: ABNORMAL
EXT HEMATOCRIT: 38 (ref 35–45)
EXT HEMOGLOBIN A1C: ABNORMAL
EXT HEMOGLOBIN: 12.9 (ref 11.7–15.5)
EXT HISTAMINE 24 HR URINE: ABNORMAL
EXT HISTAMINE: ABNORMAL
EXT IGF-1: ABNORMAL
EXT IMMUNKNOW (NON-STIMULATED): ABNORMAL
EXT IMMUNKNOW (STIMULATED): ABNORMAL
EXT INR: ABNORMAL
EXT INSULIN: ABNORMAL
EXT LANREOTIDE LEVEL: ABNORMAL
EXT LDH, TOTAL: ABNORMAL
EXT LDL CHOLESTEROL: ABNORMAL
EXT LIPASE: ABNORMAL
EXT MAGNESIUM: ABNORMAL
EXT METANEPHRINE FREE PLASMA: ABNORMAL
EXT MOTILIN: ABNORMAL
EXT NEUROKININ A CAMB: ABNORMAL
EXT NEUROKININ A ISI: ABNORMAL
EXT NEUROTENSIN: ABNORMAL
EXT NOREPINEPHRINE: ABNORMAL
EXT NORMETANEPHRINE: ABNORMAL
EXT NSE: ABNORMAL
EXT OCTREOTIDE LEVEL: ABNORMAL
EXT PANCREASTATIN CAMB: ABNORMAL
EXT PANCREASTATIN ISI: ABNORMAL
EXT PANCREATIC POLYPEPTIDE: ABNORMAL
EXT PHOSPHORUS: ABNORMAL
EXT PLATELETS: 225 (ref 140–400)
EXT POTASSIUM: 4.8 (ref 3.5–5.3)
EXT PROGRAF LEVEL: ABNORMAL
EXT PROLACTIN: ABNORMAL
EXT PROTEIN TOTAL: 7.8 (ref 6.1–8.1)
EXT PROTEIN UA: ABNORMAL
EXT PT: ABNORMAL
EXT PTH, INTACT: ABNORMAL
EXT PTT: ABNORMAL
EXT RAPAMUNE LEVEL: ABNORMAL
EXT SEROTONIN: ABNORMAL
EXT SODIUM: 141 MMOL/L (ref 135–146)
EXT SOMATOSTATIN: ABNORMAL
EXT SUBSTANCE P: ABNORMAL
EXT TRIGLYCERIDES: ABNORMAL
EXT TRYPTASE: ABNORMAL
EXT TSH: ABNORMAL
EXT URIC ACID: ABNORMAL
EXT URINE AMYLASE U/HR: ABNORMAL
EXT URINE AMYLASE U/L: ABNORMAL
EXT VASOACTIVE INTESTINAL POLYPEPTIDE: ABNORMAL
EXT VITAMIN B12: ABNORMAL
EXT VMA 24 HR URINE: ABNORMAL
EXT WBC: 4.2 (ref 3.8–10.8)
NEURON SPECIFIC ENOLASE: ABNORMAL

## 2017-08-24 ENCOUNTER — HOSPITAL ENCOUNTER (OUTPATIENT)
Facility: HOSPITAL | Age: 67
Discharge: HOME | End: 2017-08-24
Attending: INTERNAL MEDICINE | Admitting: INTERNAL MEDICINE
Payer: COMMERCIAL

## 2017-08-24 ENCOUNTER — ANESTHESIA (OUTPATIENT)
Dept: ENDOSCOPY | Facility: HOSPITAL | Age: 67
End: 2017-08-24
Payer: COMMERCIAL

## 2017-08-24 ENCOUNTER — ANESTHESIA EVENT (OUTPATIENT)
Dept: ENDOSCOPY | Facility: HOSPITAL | Age: 67
End: 2017-08-24
Payer: COMMERCIAL

## 2017-08-24 VITALS
WEIGHT: 156 LBS | BODY MASS INDEX: 24.48 KG/M2 | SYSTOLIC BLOOD PRESSURE: 145 MMHG | TEMPERATURE: 98 F | RESPIRATION RATE: 17 BRPM | HEIGHT: 67 IN | DIASTOLIC BLOOD PRESSURE: 72 MMHG | HEART RATE: 62 BPM | OXYGEN SATURATION: 99 %

## 2017-08-24 DIAGNOSIS — K80.50 CHOLEDOCHOLITHIASIS: ICD-10-CM

## 2017-08-24 DIAGNOSIS — D3A.00 CARCINOID TUMOR: ICD-10-CM

## 2017-08-24 DIAGNOSIS — D3A.8 PRIMARY PANCREATIC NEUROENDOCRINE TUMOR: Primary | ICD-10-CM

## 2017-08-24 PROCEDURE — C1877 STENT, NON-COAT/COV W/O DEL: HCPCS | Performed by: INTERNAL MEDICINE

## 2017-08-24 PROCEDURE — 25000003 PHARM REV CODE 250: Performed by: INTERNAL MEDICINE

## 2017-08-24 PROCEDURE — 27201089 HC SNARE, DISP (ANY): Performed by: INTERNAL MEDICINE

## 2017-08-24 PROCEDURE — 25000003 PHARM REV CODE 250: Performed by: ANESTHESIOLOGY

## 2017-08-24 PROCEDURE — 63600175 PHARM REV CODE 636 W HCPCS: Performed by: INTERNAL MEDICINE

## 2017-08-24 PROCEDURE — 27201674 HC SPHINCTERTOME: Performed by: INTERNAL MEDICINE

## 2017-08-24 PROCEDURE — 63600175 PHARM REV CODE 636 W HCPCS: Performed by: NURSE ANESTHETIST, CERTIFIED REGISTERED

## 2017-08-24 PROCEDURE — 43276 ERCP STENT EXCHANGE W/DILATE: CPT | Performed by: INTERNAL MEDICINE

## 2017-08-24 PROCEDURE — C1769 GUIDE WIRE: HCPCS | Performed by: INTERNAL MEDICINE

## 2017-08-24 PROCEDURE — 37000008 HC ANESTHESIA 1ST 15 MINUTES: Performed by: INTERNAL MEDICINE

## 2017-08-24 PROCEDURE — 27200999 HC RETRIEVAL BALLOON, ERCP: Performed by: INTERNAL MEDICINE

## 2017-08-24 PROCEDURE — 43264 ERCP REMOVE DUCT CALCULI: CPT | Performed by: INTERNAL MEDICINE

## 2017-08-24 PROCEDURE — 37000009 HC ANESTHESIA EA ADD 15 MINS: Performed by: INTERNAL MEDICINE

## 2017-08-24 RX ORDER — LIDOCAINE HCL/PF 100 MG/5ML
SYRINGE (ML) INTRAVENOUS
Status: DISCONTINUED | OUTPATIENT
Start: 2017-08-24 | End: 2017-08-24

## 2017-08-24 RX ORDER — SODIUM CHLORIDE 0.9 % (FLUSH) 0.9 %
3 SYRINGE (ML) INJECTION
Status: DISCONTINUED | OUTPATIENT
Start: 2017-08-24 | End: 2017-08-24 | Stop reason: HOSPADM

## 2017-08-24 RX ORDER — HYDROMORPHONE HYDROCHLORIDE 2 MG/ML
0.4 INJECTION, SOLUTION INTRAMUSCULAR; INTRAVENOUS; SUBCUTANEOUS EVERY 5 MIN PRN
Status: DISCONTINUED | OUTPATIENT
Start: 2017-08-24 | End: 2017-08-24 | Stop reason: HOSPADM

## 2017-08-24 RX ORDER — INDOMETHACIN 50 MG/1
100 SUPPOSITORY RECTAL ONCE
Status: COMPLETED | OUTPATIENT
Start: 2017-08-24 | End: 2017-08-24

## 2017-08-24 RX ORDER — SUCCINYLCHOLINE CHLORIDE 20 MG/ML
INJECTION INTRAMUSCULAR; INTRAVENOUS
Status: DISCONTINUED | OUTPATIENT
Start: 2017-08-24 | End: 2017-08-24

## 2017-08-24 RX ORDER — ONDANSETRON 2 MG/ML
INJECTION INTRAMUSCULAR; INTRAVENOUS
Status: DISCONTINUED | OUTPATIENT
Start: 2017-08-24 | End: 2017-08-24

## 2017-08-24 RX ORDER — ONDANSETRON 2 MG/ML
4 INJECTION INTRAMUSCULAR; INTRAVENOUS ONCE
Status: COMPLETED | OUTPATIENT
Start: 2017-08-24 | End: 2017-08-24

## 2017-08-24 RX ORDER — SCOLOPAMINE TRANSDERMAL SYSTEM 1 MG/1
1 PATCH, EXTENDED RELEASE TRANSDERMAL ONCE
Status: COMPLETED | OUTPATIENT
Start: 2017-08-24 | End: 2017-08-24

## 2017-08-24 RX ORDER — FENTANYL CITRATE 50 UG/ML
INJECTION, SOLUTION INTRAMUSCULAR; INTRAVENOUS
Status: DISCONTINUED | OUTPATIENT
Start: 2017-08-24 | End: 2017-08-24

## 2017-08-24 RX ORDER — PROPOFOL 10 MG/ML
VIAL (ML) INTRAVENOUS
Status: DISCONTINUED | OUTPATIENT
Start: 2017-08-24 | End: 2017-08-24

## 2017-08-24 RX ORDER — ONDANSETRON 2 MG/ML
4 INJECTION INTRAMUSCULAR; INTRAVENOUS DAILY PRN
Status: DISCONTINUED | OUTPATIENT
Start: 2017-08-24 | End: 2017-08-24 | Stop reason: HOSPADM

## 2017-08-24 RX ORDER — SODIUM CHLORIDE 9 MG/ML
INJECTION, SOLUTION INTRAVENOUS CONTINUOUS
Status: DISCONTINUED | OUTPATIENT
Start: 2017-08-24 | End: 2017-08-24 | Stop reason: HOSPADM

## 2017-08-24 RX ORDER — MIDAZOLAM HYDROCHLORIDE 1 MG/ML
INJECTION, SOLUTION INTRAMUSCULAR; INTRAVENOUS
Status: DISCONTINUED | OUTPATIENT
Start: 2017-08-24 | End: 2017-08-24

## 2017-08-24 RX ADMIN — MIDAZOLAM 2 MG: 1 INJECTION INTRAMUSCULAR; INTRAVENOUS at 09:08

## 2017-08-24 RX ADMIN — INDOMETHACIN 100 MG: 50 SUPPOSITORY RECTAL at 11:08

## 2017-08-24 RX ADMIN — FENTANYL CITRATE 100 MCG: 50 INJECTION, SOLUTION INTRAMUSCULAR; INTRAVENOUS at 10:08

## 2017-08-24 RX ADMIN — SODIUM CHLORIDE: 0.9 INJECTION, SOLUTION INTRAVENOUS at 09:08

## 2017-08-24 RX ADMIN — SUCCINYLCHOLINE CHLORIDE 120 MG: 20 INJECTION, SOLUTION INTRAMUSCULAR; INTRAVENOUS at 10:08

## 2017-08-24 RX ADMIN — SCOPALAMINE 1 PATCH: 1 PATCH, EXTENDED RELEASE TRANSDERMAL at 09:08

## 2017-08-24 RX ADMIN — ONDANSETRON 4 MG: 2 INJECTION, SOLUTION INTRAMUSCULAR; INTRAVENOUS at 10:08

## 2017-08-24 RX ADMIN — ONDANSETRON 4 MG: 2 INJECTION INTRAMUSCULAR; INTRAVENOUS at 09:08

## 2017-08-24 RX ADMIN — OCTREOTIDE ACETATE: 500 INJECTION, SOLUTION INTRAVENOUS; SUBCUTANEOUS at 09:08

## 2017-08-24 RX ADMIN — PROPOFOL 150 MG: 10 INJECTION, EMULSION INTRAVENOUS at 10:08

## 2017-08-24 RX ADMIN — LIDOCAINE HYDROCHLORIDE 50 MG: 20 INJECTION, SOLUTION INTRAVENOUS at 10:08

## 2017-08-24 RX ADMIN — MIDAZOLAM 2 MG: 1 INJECTION INTRAMUSCULAR; INTRAVENOUS at 10:08

## 2017-08-24 NOTE — TRANSFER OF CARE
"Anesthesia Transfer of Care Note    Patient: Ching Bruce    Procedure(s) Performed: Procedure(s) (LRB):  ERCP (N/A)    Patient location: PACU    Anesthesia Type: general    Transport from OR: Transported from OR on 100% O2 by closed face mask with adequate spontaneous ventilation    Post pain: adequate analgesia    Post assessment: no apparent anesthetic complications    Post vital signs: stable    Level of consciousness: awake, alert and oriented    Nausea/Vomiting: no nausea/vomiting    Complications: none    Transfer of care protocol was followed      Last vitals:   Visit Vitals  /81   Pulse 67   Temp 36.6 °C (97.9 °F) (Oral)   Resp 18   Ht 5' 7" (1.702 m)   Wt 70.8 kg (156 lb)   SpO2 100%   Breastfeeding? No   BMI 24.43 kg/m²     "

## 2017-08-24 NOTE — ANESTHESIA PREPROCEDURE EVALUATION
08/24/2017  Ching Bruce is a 66 y.o., female for EGD EERCP stent replacement    Review of patient's allergies indicates:   Allergen Reactions    Epinephrine Other (See Comments)     Carcinoid patient    Sulfa (sulfonamide antibiotics) Other (See Comments)     Urticaria       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    Secondary malignant neoplasm of liver     Secondary neuroendocrine tumor of liver 5/7/2013    Thrombocytopenia 11/12/2013    Thyroid disease     Unspecified essential hypertension 11/12/2013     Past Surgical History:   Procedure Laterality Date    THYROIDECTOMY  2011     Patient Active Problem List   Diagnosis    Primary pancreatic neuroendocrine tumor    Secondary neuroendocrine tumor of liver    Hypercalcemia    Anemia    Neutropenia associated with mucositis due to antineoplastic therapy    Thrombocytopenia    Unspecified essential hypertension    Chemotherapy follow-up examination    Fever    Cholangitis    Biliary obstruction    Choledocholithiasis    Carcinoid tumor     Wt Readings from Last 3 Encounters:   08/24/17 70.8 kg (156 lb)   05/30/17 71.2 kg (157 lb)   03/21/17 70.6 kg (155 lb 9.6 oz)     Temp Readings from Last 3 Encounters:   08/24/17 36.6 °C (97.9 °F) (Oral)   05/30/17 36.2 °C (97.1 °F) (Oral)   03/21/17 36.7 °C (98 °F) (Oral)     BP Readings from Last 3 Encounters:   08/24/17 127/81   05/30/17 137/73   03/21/17 134/86     Pulse  Readings from Last 3 Encounters:   08/24/17 67   05/30/17 76   03/21/17 73       Anesthesia Evaluation    I have reviewed the Patient Summary Reports.        Review of Systems    Lab Results   Component Value Date    WBC 3.59 (L) 10/27/2016    HGB 13.6 10/27/2016    HCT 40.6 10/27/2016     (H) 10/27/2016     (L) 10/27/2016       Chemistry        Component Value Date/Time     (H) 10/27/2016 0701    K 4.9 10/27/2016 0701     10/27/2016 0701    CO2 28 10/27/2016 0701    BUN 10 10/27/2016 0701    CREATININE 1.2 10/27/2016 0701    GLU 95 10/27/2016 0701        Component Value Date/Time    CALCIUM 10.2 10/27/2016 0701    ALKPHOS 296 (H) 07/04/2014 0431    AST 20 07/04/2014 0431    ALT 64 (H) 07/04/2014 0431    BILITOT 1.0 07/04/2014 0431    ESTGFRAFRICA 55 (A) 10/27/2016 0701    EGFRNONAA 48 (A) 10/27/2016 0701            Physical Exam  General:  Well nourished    Airway/Jaw/Neck:  Airway Findings: Mouth Opening: Normal Tongue: Normal  General Airway Assessment: Adult  Mallampati: II  Improves to II with phonation.  TM Distance: Normal, at least 6 cm       Chest/Lungs:  Chest/Lungs Findings: Clear to auscultation, Normal Respiratory Rate     Heart/Vascular:  Heart Findings: Rate: Normal  Rhythm: Regular Rhythm  Sounds: Normal        Mental Status:  Mental Status Findings:  Cooperative         Anesthesia Plan  Type of Anesthesia, risks & benefits discussed:  Anesthesia Type:  MAC, general  Patient's Preference:   Intra-op Monitoring Plan:   Intra-op Monitoring Plan Comments:   Post Op Pain Control Plan:   Post Op Pain Control Plan Comments:   Induction:   IV  Beta Blocker:         Informed Consent: Patient understands risks and agrees with Anesthesia plan.  Questions answered. Anesthesia consent signed with patient.  ASA Score: 2     Day of Surgery Review of History & Physical: I have interviewed and examined the patient. I have reviewed the patient's H&P dated:  There are no significant changes.   H&P update referred to the provider.  H&P completed by Anesthesiologist.       Ready For Surgery From Anesthesia Perspective.

## 2017-08-24 NOTE — DISCHARGE INSTRUCTIONS
Post ERCP Discharge Instructions:     Ching MAGANA Teo  8/24/2017  Jake Lieberman MD    1. Do Not eat or drink anything for 1 hour. Try sips of water first. If tolerated, resume your regular diet or one recommended by your physician.  2. Do not drive, or operate machinery, make critical decisions, or do activities that require coordination or balance for 24 hours.  3. You may experience a sore throat for 24 to 48 hours. You may use throat lozenges or gargle with warm salt water to relieve the discomfort.  4. Because air was put into your stomach during the procedure, you may experience some belching.  5. Go directly to the emergency room if you notice any of the following:                              *Chills and/or fever over 101                *Persistent vomiting or vomiting with blood                *Severe abdominal pain, other than gas cramps                *Severe chest pain                *Black, tarry stools    If you have any questions or problems, please call your Physician:    Jake Lieberman MD Phone: ***    Lab Results: [phone]    If a complication or emergency situation arises and you are unable to reach your Physician - GO TO THE EMERGENCY ROOM.

## 2017-08-24 NOTE — H&P
[facility]    CC: stent exchange    HPI 66 y.o. female with pancreatic neuroendocrine tumor here for biliary stent exchange.  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    Plan:  ERCP with stent exchange today.      Jake Lieberman MD   [address], [address]   [place] [address] [phone]

## 2017-08-24 NOTE — ANESTHESIA POSTPROCEDURE EVALUATION
"Anesthesia Post Evaluation    Patient: Ching Bruce    Procedure(s) Performed: Procedure(s) (LRB):  ERCP (N/A)    Final Anesthesia Type: general  Patient location during evaluation: PACU  Patient participation: Yes- Able to Participate  Level of consciousness: awake and alert  Post-procedure vital signs: reviewed and stable  Pain management: adequate  Airway patency: patent  PONV status at discharge: No PONV  Anesthetic complications: no      Cardiovascular status: hemodynamically stable and blood pressure returned to baseline  Respiratory status: unassisted, spontaneous ventilation and room air  Hydration status: euvolemic  Follow-up not needed.        Visit Vitals  /76   Pulse 60   Temp 36.4 °C (97.6 °F)   Resp 10   Ht 5' 7" (1.702 m)   Wt 70.8 kg (156 lb)   SpO2 97%   Breastfeeding? No   BMI 24.43 kg/m²       Pain/John Score: Pain Assessment Performed: Yes (8/24/2017 11:41 AM)  Presence of Pain: denies (8/24/2017 11:41 AM)  John Score: 9 (8/24/2017 11:41 AM)      "

## 2017-09-07 ENCOUNTER — TELEPHONE (OUTPATIENT)
Dept: ENDOSCOPY | Facility: HOSPITAL | Age: 67
End: 2017-09-07

## 2017-09-08 ENCOUNTER — TELEPHONE (OUTPATIENT)
Dept: NEUROLOGY | Facility: HOSPITAL | Age: 67
End: 2017-09-08

## 2017-09-08 LAB
EXT 24 HR UR METANEPHRINE: ABNORMAL
EXT 24 HR UR NORMETANEPHRINE: ABNORMAL
EXT 24 HR UR NORMETANEPHRINE: ABNORMAL
EXT 25 HYDROXY VIT D2: ABNORMAL
EXT 25 HYDROXY VIT D3: ABNORMAL
EXT 5 HIAA 24 HR URINE: ABNORMAL
EXT 5 HIAA BLOOD: ABNORMAL
EXT ACTH: ABNORMAL
EXT AFP: ABNORMAL
EXT ALBUMIN: 4.3 G/DL (ref 3.6–5.1)
EXT ALKALINE PHOSPHATASE: 205 U/L (ref 33–130)
EXT ALT: 20 U/L (ref 6–29)
EXT AMYLASE: ABNORMAL
EXT ANTI ISLET CELL AB: ABNORMAL
EXT ANTI PARIETAL CELL AB: ABNORMAL
EXT ANTI THYROID AB: ABNORMAL
EXT AST: 20 U/L (ref 10–35)
EXT BILIRUBIN DIRECT: ABNORMAL
EXT BILIRUBIN TOTAL: 1 MG/DL (ref 0.2–1.2)
EXT BK VIRUS DNA QN PCR: ABNORMAL
EXT BUN: 12 MG/DL (ref 7–25)
EXT C PEPTIDE: ABNORMAL
EXT CA 125: ABNORMAL
EXT CA 19-9: ABNORMAL
EXT CA 27-29: ABNORMAL
EXT CALCITONIN: ABNORMAL
EXT CALCIUM: 9.9 MG/DL (ref 8.6–10.4)
EXT CEA: ABNORMAL
EXT CHLORIDE: 103 MMOL/L (ref 98–110)
EXT CHOLESTEROL: ABNORMAL
EXT CHROMOGRANIN A: ABNORMAL
EXT CO2: 27 MMOL/L (ref 20–31)
EXT CREATININE UA: ABNORMAL
EXT CREATININE: 0.96 MG/DL (ref 0.5–0.99)
EXT CYCLOSPORONE LEVEL: ABNORMAL
EXT DOPAMINE: ABNORMAL
EXT EBV DNA BY PCR: ABNORMAL
EXT EPINEPHRINE: ABNORMAL
EXT FOLATE: ABNORMAL
EXT FREE T3: ABNORMAL
EXT FREE T4: ABNORMAL
EXT FSH: ABNORMAL
EXT GASTRIN RELEASING PEPTIDE: ABNORMAL
EXT GASTRIN RELEASING PEPTIDE: ABNORMAL
EXT GASTRIN: ABNORMAL
EXT GGT: ABNORMAL
EXT GHRELIN: ABNORMAL
EXT GLUCAGON: ABNORMAL
EXT GLUCOSE: 93 MG/DL (ref 65–99)
EXT GROWTH HORMONE: ABNORMAL
EXT HCV RNA QUANT PCR: ABNORMAL
EXT HDL: ABNORMAL
EXT HEMATOCRIT: 36.4 % (ref 35–45)
EXT HEMOGLOBIN A1C: ABNORMAL
EXT HEMOGLOBIN: 12.9 G/DL (ref 11.7–15.5)
EXT HISTAMINE 24 HR URINE: ABNORMAL
EXT HISTAMINE: ABNORMAL
EXT IGF-1: ABNORMAL
EXT IMMUNKNOW (NON-STIMULATED): ABNORMAL
EXT IMMUNKNOW (STIMULATED): ABNORMAL
EXT INR: ABNORMAL
EXT INSULIN: ABNORMAL
EXT LANREOTIDE LEVEL: ABNORMAL
EXT LDH, TOTAL: ABNORMAL
EXT LDL CHOLESTEROL: ABNORMAL
EXT LIPASE: ABNORMAL
EXT MAGNESIUM: ABNORMAL
EXT METANEPHRINE FREE PLASMA: ABNORMAL
EXT MOTILIN: ABNORMAL
EXT NEUROKININ A CAMB: ABNORMAL
EXT NEUROKININ A ISI: ABNORMAL
EXT NEUROTENSIN: ABNORMAL
EXT NOREPINEPHRINE: ABNORMAL
EXT NORMETANEPHRINE: ABNORMAL
EXT NSE: ABNORMAL
EXT OCTREOTIDE LEVEL: ABNORMAL
EXT PANCREASTATIN CAMB: ABNORMAL
EXT PANCREASTATIN ISI: 115 PG/ML (ref 10–135)
EXT PANCREATIC POLYPEPTIDE: ABNORMAL
EXT PHOSPHORUS: ABNORMAL
EXT PLATELETS: 152 1000/UL (ref 140–400)
EXT POTASSIUM: 4.2 MMOL/L (ref 3.5–5.3)
EXT PROGRAF LEVEL: ABNORMAL
EXT PROLACTIN: ABNORMAL
EXT PROTEIN TOTAL: 7.7 G/DL (ref 6.1–8.1)
EXT PROTEIN UA: ABNORMAL
EXT PT: ABNORMAL
EXT PTH, INTACT: ABNORMAL
EXT PTT: ABNORMAL
EXT RAPAMUNE LEVEL: ABNORMAL
EXT SEROTONIN: ABNORMAL
EXT SODIUM: 139 MMOL/L (ref 135–146)
EXT SOMATOSTATIN: ABNORMAL
EXT SUBSTANCE P: ABNORMAL
EXT TRIGLYCERIDES: ABNORMAL
EXT TRYPTASE: ABNORMAL
EXT TSH: ABNORMAL
EXT URIC ACID: ABNORMAL
EXT URINE AMYLASE U/HR: ABNORMAL
EXT URINE AMYLASE U/L: ABNORMAL
EXT VASOACTIVE INTESTINAL POLYPEPTIDE: ABNORMAL
EXT VITAMIN B12: ABNORMAL
EXT VMA 24 HR URINE: ABNORMAL
EXT WBC: 4.2 1000/UL (ref 3.8–10.8)
NEURON SPECIFIC ENOLASE: ABNORMAL

## 2017-09-08 NOTE — TELEPHONE ENCOUNTER
----- Message from Geronimo Angel sent at 9/8/2017  9:10 AM CDT -----  Contact: [facility]   [facility] would like a call back from nurse or Dr. Rodriguez in ref to pt labs    Can be reached at [phone] ([place])

## 2017-09-08 NOTE — TELEPHONE ENCOUNTER
[facility] in [place] called and let us know that they do not offer the CgA testing anymore because most insurance doesn't pay for it.  I gave a verbal to hold it and Dr. Rodriguez can decide if he wants to have it drawn when she comes.  She does not wish to come to [place] to have it drawn today.

## 2017-09-11 DIAGNOSIS — D3A.8 PRIMARY PANCREATIC NEUROENDOCRINE TUMOR: ICD-10-CM

## 2017-09-11 RX ORDER — TEMOZOLOMIDE 250 MG/1
CAPSULE ORAL
Qty: 5 CAPSULE | Refills: 5 | Status: SHIPPED | OUTPATIENT
Start: 2017-09-11 | End: 2018-04-09 | Stop reason: SDUPTHER

## 2017-09-12 ENCOUNTER — OFFICE VISIT (OUTPATIENT)
Dept: NEUROLOGY | Facility: HOSPITAL | Age: 67
End: 2017-09-12
Attending: INTERNAL MEDICINE
Payer: COMMERCIAL

## 2017-09-12 VITALS
WEIGHT: 156.5 LBS | RESPIRATION RATE: 16 BRPM | HEART RATE: 65 BPM | SYSTOLIC BLOOD PRESSURE: 131 MMHG | TEMPERATURE: 98 F | HEIGHT: 68 IN | BODY MASS INDEX: 23.72 KG/M2 | DIASTOLIC BLOOD PRESSURE: 81 MMHG

## 2017-09-12 DIAGNOSIS — C7B.8 SECONDARY NEUROENDOCRINE TUMOR OF LIVER: ICD-10-CM

## 2017-09-12 DIAGNOSIS — D3A.8 PRIMARY PANCREATIC NEUROENDOCRINE TUMOR: Primary | ICD-10-CM

## 2017-09-12 DIAGNOSIS — Z09 CHEMOTHERAPY FOLLOW-UP EXAMINATION: ICD-10-CM

## 2017-09-12 PROCEDURE — 99214 OFFICE O/P EST MOD 30 MIN: CPT | Performed by: INTERNAL MEDICINE

## 2017-09-12 PROCEDURE — 3079F DIAST BP 80-89 MM HG: CPT | Mod: ,,, | Performed by: INTERNAL MEDICINE

## 2017-09-12 PROCEDURE — 3008F BODY MASS INDEX DOCD: CPT | Mod: ,,, | Performed by: INTERNAL MEDICINE

## 2017-09-12 PROCEDURE — 99214 OFFICE O/P EST MOD 30 MIN: CPT | Mod: ,,, | Performed by: INTERNAL MEDICINE

## 2017-09-12 PROCEDURE — 1126F AMNT PAIN NOTED NONE PRSNT: CPT | Mod: ,,, | Performed by: INTERNAL MEDICINE

## 2017-09-12 PROCEDURE — 1159F MED LIST DOCD IN RCRD: CPT | Mod: ,,, | Performed by: INTERNAL MEDICINE

## 2017-09-12 PROCEDURE — 3075F SYST BP GE 130 - 139MM HG: CPT | Mod: ,,, | Performed by: INTERNAL MEDICINE

## 2017-09-12 NOTE — PROGRESS NOTES
NOLANETS:  [facility]  A collaboration between [facility] and [facility]    PATIENT: Ching Bruce  MRN: [id number]  DATE: 9/12/2017      Diagnosis:   1. Primary pancreatic neuroendocrine tumor    2. Secondary neuroendocrine tumor of liver    3. Chemotherapy follow-up examination        Chief Complaint: Follow-up (routine follow-up)      Oncologic History:    Oncologic History Pancreatic neuroendocrine tumor with metasatic disease to liver diagnosed 12/12    Oncologic Treatment Capecitabine/Temozolomide (CAPTEM) 1/13 -Present    Pathology Ki-67 1%        Subjective:    Interval History: Ms. Bruce is a 66 y.o. female who returns for follow up for her pancreatic neuroendocrine tumor.  She states that she is feeling well.  She continues on with chemotherapy with CAPTEM.  She has no new complaints.    ONCOLOGIC HISTORY:   A 66 y.o. year-old -American female who I had initially   seen on 12/18/2012. Her history dates back to September 2012 when she was noted  to have several weeks of feeling fatigued. She sought treatment with her   primary care doctor who did a CBC and found her to be severely anemic. She was   seen at [facility] and transfused 3 units of packed red blood cells  and no source of bleeding had been found. Records at that time had shown   hemoglobin of 5. She sought followup in September 2012 with Dr. Guillen who   performed an EGD and colonoscopy with an EGD showing an ulcerated and fungating   nonbleeding 2 cm mass, malignant in appearance. It did cause partial   obstructions. Biopsies were taken, which showed no evidence of malignancy at   that time. She had a CT of the abdomen and pelvis showing multiple metastatic   lesions and the liver biopsy was performed on 10/17/2012 showed pancreatic   neuroendocrine neoplasm in the left lobe of the liver aspirate. She went on to   have an ERCP  along with sphincterotomy and biliary stent placement and   subsequent PET had shown numerous hypodense lesions in the liver. Octreotide   scan was performed, which showed multiple right and left hepatic metastases.   MRI of the abdomen was performed on 12/05/2012 showing innumerable lesions that   demonstrated peripheral to hyperintensity consistent with hypervascular   metastasis. She was seen by Dr. Humphrey, who thought she was not a candidate   for surgical resection during that time. Her pathology from her tumor came back  positive for chromogranin, synaptophysin and had a Ki-67 of less than 1%. She   was started on treatment with temozolomide and capecitabine with cycle   1 being on 01/22/2013.    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 She has never used smokeless tobacco. She reports that she does not drink alcohol or use drugs.    Allergies:  Review of patient's allergies indicates:   Allergen Reactions    Epinephrine Other  (See Comments)     Carcinoid patient    Sulfa (sulfonamide antibiotics) Other (See Comments)     Urticaria       Medications:  Current Outpatient Prescriptions   Medication Sig Dispense Refill    alendronate (FOSAMAX) 70 MG tablet Take 70 mg by mouth every 7 days.   3    capecitabine (XELODA) 500 MG Tab TAKE 2 TABLETS (1000 MG) BY MOUTH TWICE A DAY FOR 14 DAYS, OFF 14 DAYS, THEN REPEAT. TAKE WITHIN 30 MIN AFTER A MEAL WITH WATER. REPORT SIDE 56 tablet 5    ferrous sulfate 325 mg (65 mg iron) Tab tablet Take 325 mg by mouth once daily.      indapamide (LOZOL) 2.5 MG Tab 2.5 mg once daily.       irbesartan (AVAPRO) 300 MG tablet 300 mg once daily.       levothyroxine (SYNTHROID) 150 MCG tablet Take 150 mcg by mouth once daily.  0    temozolomide (TEMODAR) 250 MG capsule TAKE 1 CAPSULE BY MOUTH ONCE DAILY ON DAYS 10-14 OF A 28 DAY CYCLE. 5 capsule 5    verapamil (VERELAN PM) 360 MG C24P Take by mouth once daily.       ergocalciferol (VITAMIN D2) 50,000 unit Cap Take 50,000 Units by mouth every 7 days.       No current facility-administered medications for this visit.        Review of Systems   Constitutional: Negative for chills, fever and unexpected weight change.   HENT: Negative for congestion, hearing loss and nosebleeds.    Eyes: Negative for visual disturbance.   Respiratory: Negative for cough and shortness of breath.    Cardiovascular: Negative for chest pain and palpitations.   Gastrointestinal: Negative for abdominal pain, blood in stool, constipation, diarrhea, nausea and vomiting.   Genitourinary: Negative for dysuria.   Musculoskeletal: Negative for back pain and gait problem.   Skin: Negative for color change and rash.   Neurological: Negative for dizziness, weakness and headaches.   Hematological: Negative for adenopathy. Does not bruise/bleed easily.   Psychiatric/Behavioral: Negative for confusion.       ECOG Performance Status: 0   Objective:      Vitals:   Vitals:    09/12/17 1523   BP:  "131/81   Pulse: 65   Resp: 16   Temp: 98.3 °F (36.8 °C)   TempSrc: Oral   Weight: 71 kg (156 lb 8.4 oz)   Height: 5' 7.52" (1.715 m)     BMI: Body mass index is 24.14 kg/m².    Physical Exam   Constitutional: She is oriented to person, place, and time. She appears well-developed and well-nourished. No distress.   HENT:   Head: Normocephalic.   Mouth/Throat: No oropharyngeal exudate.   Eyes: EOM are normal. No scleral icterus.   Neck: Neck supple. No tracheal deviation present. No thyromegaly present.   Cardiovascular: Normal rate and regular rhythm.    Pulmonary/Chest: Effort normal and breath sounds normal. No respiratory distress. She has no wheezes. She has no rales.   Abdominal: Soft. She exhibits no distension and no mass. There is no tenderness. There is no rebound and no guarding.   Musculoskeletal: Normal range of motion. She exhibits no edema.   Lymphadenopathy:     She has no cervical adenopathy.   Neurological: She is alert and oriented to person, place, and time. No cranial nerve deficit.   Skin: Skin is warm and dry.   Psychiatric: She has a normal mood and affect.       Laboratory Data:  Abstract on 12/06/2016   Component Date Value Ref Range Status    EXT WBC 12/03/2016 4.1  3.8 - 10.8 1000/ul Final    EXT Hemoglobin 12/03/2016 12.9  11.7 - 15.5 g/dl Final    EXT Hematocrit 12/03/2016 38.5  35.0 - 45.0 % Final    EXT Platelets 12/03/2016 138* 140 - 400 1000/ul Final    EXT Glucose 12/03/2016 88  65 - 99 mg/dl Final    EXT BUN 12/03/2016 13  7 - 25 mg/dl Final    EXT Creatinine 12/03/2016 0.90  0.50 - 0.99 mg/dl Final    EXT Sodium 12/03/2016 141  135 - 146 mmol/l Final    EXT Potassium 12/03/2016 4.2  3.5 - 5.3 mmol/l Final    EXT Chloride 12/03/2016 105  98 - 110 mmol/l Final    EXT CO2 12/03/2016 29  20 - 31 mmol/l Final    EXT Calcium 12/03/2016 9.7  8.6 - 10.4 mg/dl Final    EXT Protein total 12/03/2016 6.9  6.1 - 8.1 g/dl Final    EXT Albumin 12/03/2016 4.0  3.6 - 5.1 g/dl Final    " EXT BilirubiN Total 12/03/2016 1.1  0.2 - 1.2 mg/dl Final    EXT Alkaline Phosphatase 12/03/2016 181* 33 - 130 u/l Final    EXT AST 12/03/2016 21  10 - 35 u/l Final    EXT ALT 12/03/2016 28  6 - 29 u/l Final   Office Visit on 11/14/2016   Component Date Value Ref Range Status    Blood Stool 11/14/2016 Negative  Negative Final     Acceptable 11/14/2016 Yes   Final    SOURCE: 11/14/2016 Cervical   Final    Slides: 11/14/2016 1   Final    LMP: 11/14/2016 NA   Final    Specimen adequacy: 11/14/2016 (NOTE)   Final    Comment:      Satisfactory for evaluation.          Endocervical cells absent.  Metaplastic cells indicative       of transformation zone cannot be reliably distinguished from       parabasal or atrophic cells.                      Interpretation 11/14/2016 NO EPITHELIAL ABNORMALITY SEE BELOW   Final    Comment: ----------------------------------------------------------------------       *NEGATIVE FOR INTRAEPITHELIAL LESION OR MALIGNANCY   ----------------------------------------------------------------------         Comments: 11/14/2016 (NOTE)   Final    Comment: Due to technical or specimen issues, imaging could not be  performed. A cytotechnologist has manually screened this slide.         Cytotechnologist: 11/14/2016 BRENDON Ca(ASCP)IAC   Final    LOCATION 11/14/2016 (NOTE)   Final    Comment: Specimens processed and interpreted at :[facility], [address] [place] [address], Phone: [phone], CLIA: [id number]      CPT Codes: 11/14/2016 (NOTE)   Final    Comment: [id number]        UNLESS OTHERWISE INDICATED, COMPUTER AIDED AND CYTOTECHNOLOGIST     SCREENING PERFORMED.          The Pap test is a screening test with an inherent, but low       probability of error.  Your patient should be reminded to       consult you immediately if she experiences any suspicious       signs or symptoms, regardless of her Pap test result.       An alternate  report format containing images or consolidated       prior Pap history is available as applicable.         HPV HIGH RISK IF ASC-US, SUREPATH 11/14/2016 CRITERIA NOT MET   Final    Comment:       UNLESS OTHERWISE INDICATED, ALL TESTING PERFORMED AT  [facility]  [address] [place] [address]            :  CARLOS MENDEZ M.D.        IA NUMBER [id number]  [place]. [id number]                 Imaging:   MRI 12/8/16  MRI abdomen without and with contrast.    Comparison: 5/31/16    History: Neuroendocrine tumor.    Results: Axial gradient T2, axial T1 in and out of phase, axial T2 SSFSE, coronal T2 gradient and T2 SSFSE followed by pre-contrast axial T1 gradient fat sat and dynamic post contrast images were obtained. The patient received  10 cc of IV Gadovist contrast.    Numerous hepatic numerous hepatic lesion better seen on postcontrast 10 seconds.  Index lesion in the right hepatic lobe measures segment 5 measures 2.2 cm (previously measuring 2.0 x 2.4 cm).  A 2nd index lesion has been chosen within the left hepatic lobe segment 3 measuring 1.6 cm, in retrospect, on the prior study, measuring 1.6 cm.  Vague enhancing area) postcontrast 10 seconds image 62) within the uncinate process measuring at 1.7 x 0.9 cm as seen on the prior study.    The number and burden of liver lesions appear similar to the prior exam.  The biliary ducts are nondilated.  No liver is enlarged similar to the prior study the gallbladder is present.  The stomach, pancreas, spleen, and adrenal glands appear within normal limits.  Small bilateral kidney cysts.  The visualized osseous structures demonstrate no definite osseous lesions.   Impression       Hepatomegaly with multiple liver lesions, the burden of lesions and size appear similar to the prior exam.  Vague area of enhancement within the uncinate process of the pancreas appears unchanged.                      Assessment:        1. Primary pancreatic neuroendocrine tumor    2. Secondary neuroendocrine tumor of liver    3. Chemotherapy follow-up examination           Plan:   Ms. Bruce continues to tolerate CAPTEM in review of her labs shows no detrimental effects.  We will plan to continue and repeat imaging in May 2017 which will be 6 months since prior imaging.  She did note that she will be retiring may need financial assistance to obtain chemotherapy.  Continue on with monthly labs.  Follow up in 2 months.     Cory Rodriguez DO, FACP  Hematology & Oncology, [facility]  [address]  [place]  [address]  ph. [phone]; [phone]  fax. [phone]    25 minutes were spent in coordination of patient's care, record review and counseling.  More than 50% of the time was face-to-face.      NOLANETS:  [facility]  A collaboration between [facility] and [facility]    PATIENT: Ching Bruce  MRN: [id number]  DATE: 9/12/2017      Diagnosis:   1. Primary pancreatic neuroendocrine tumor    2. Secondary neuroendocrine tumor of liver    3. Chemotherapy follow-up examination        Chief Complaint: Follow-up (routine follow-up)      Oncologic History:    Oncologic History Pancreatic neuroendocrine tumor with metasatic disease to liver diagnosed 12/12    Oncologic Treatment Capecitabine/Temozolomide (CAPTEM) 1/13 -Present    Pathology Ki-67 1%        Subjective:    Interval History: Ms. Bruce is a 66 y.o. female who returns for follow up for her pancreatic neuroendocrine tumor.  She states that she has been feeling well.  She continues on with CAPTEM without any side effects.  She recently had biliary stent exchange.  No new complaints.    ONCOLOGIC HISTORY:   A 66 y.o. year-old -American female who I had initially   seen on 12/18/2012. Her history dates back to September 2012 when she was  noted  to have several weeks of feeling fatigued. She sought treatment with her   primary care doctor who did a CBC and found her to be severely anemic. She was   seen at [facility] and transfused 3 units of packed red blood cells  and no source of bleeding had been found. Records at that time had shown   hemoglobin of 5. She sought followup in September 2012 with Dr. Guillen who   performed an EGD and colonoscopy with an EGD showing an ulcerated and fungating   nonbleeding 2 cm mass, malignant in appearance. It did cause partial   obstructions. Biopsies were taken, which showed no evidence of malignancy at   that time. She had a CT of the abdomen and pelvis showing multiple metastatic   lesions and the liver biopsy was performed on 10/17/2012 showed pancreatic   neuroendocrine neoplasm in the left lobe of the liver aspirate. She went on to   have an ERCP along with sphincterotomy and biliary stent placement and   subsequent PET had shown numerous hypodense lesions in the liver. Octreotide   scan was performed, which showed multiple right and left hepatic metastases.   MRI of the abdomen was performed on 12/05/2012 showing innumerable lesions that   demonstrated peripheral to hyperintensity consistent with hypervascular   metastasis. She was seen by Dr. Humphrey, who thought she was not a candidate   for surgical resection during that time. Her pathology from her tumor came back  positive for chromogranin, synaptophysin and had a Ki-67 of less than 1%. She   was started on treatment with temozolomide and capecitabine with cycle   1 being on 01/22/2013.    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 She has never used smokeless tobacco. She reports that she does not drink alcohol or use drugs.    Allergies:  Review of patient's allergies indicates:   Allergen Reactions    Epinephrine Other (See Comments)     Carcinoid patient    Sulfa (sulfonamide antibiotics) Other (See Comments)     Urticaria       Medications:  Current Outpatient Prescriptions   Medication Sig Dispense Refill    alendronate (FOSAMAX) 70 MG tablet Take 70 mg by mouth every 7 days.   3    capecitabine (XELODA) 500 MG Tab TAKE 2 TABLETS (1000 MG) BY MOUTH TWICE A DAY FOR 14 DAYS, OFF 14 DAYS, THEN REPEAT. TAKE WITHIN 30 MIN AFTER A MEAL WITH WATER. REPORT SIDE 56 tablet 5    ferrous sulfate 325 mg (65 mg iron) Tab tablet Take 325 mg by mouth once daily.      indapamide (LOZOL) 2.5 MG Tab 2.5 mg once daily.       irbesartan (AVAPRO) 300 MG tablet 300 mg once daily.       levothyroxine (SYNTHROID) 150 MCG tablet Take 150 mcg by mouth once daily.  0    temozolomide (TEMODAR) 250 MG capsule TAKE 1 CAPSULE BY MOUTH ONCE DAILY ON DAYS 10-14 OF A 28 DAY CYCLE. 5 capsule 5    verapamil (VERELAN PM) 360 MG C24P Take by mouth once daily.       ergocalciferol (VITAMIN D2) 50,000 unit Cap Take 50,000 Units by mouth every 7 days.       No current facility-administered  "medications for this visit.        Review of Systems   Constitutional: Negative for chills, fever and unexpected weight change.   HENT: Negative for congestion, hearing loss and nosebleeds.    Eyes: Negative for visual disturbance.   Respiratory: Negative for cough and shortness of breath.    Cardiovascular: Negative for chest pain and palpitations.   Gastrointestinal: Negative for abdominal pain, blood in stool, constipation, diarrhea, nausea and vomiting.   Genitourinary: Negative for dysuria.   Musculoskeletal: Negative for back pain and gait problem.   Skin: Negative for color change and rash.   Neurological: Negative for dizziness, weakness and headaches.   Hematological: Negative for adenopathy. Does not bruise/bleed easily.   Psychiatric/Behavioral: Negative for confusion.       ECOG Performance Status: 0   Objective:      Vitals:   Vitals:    09/12/17 1523   BP: 131/81   Pulse: 65   Resp: 16   Temp: 98.3 °F (36.8 °C)   TempSrc: Oral   Weight: 71 kg (156 lb 8.4 oz)   Height: 5' 7.52" (1.715 m)     BMI: Body mass index is 24.14 kg/m².    Physical Exam   Constitutional: She is oriented to person, place, and time. She appears well-developed and well-nourished. No distress.   HENT:   Head: Normocephalic.   Mouth/Throat: No oropharyngeal exudate.   Eyes: EOM are normal. No scleral icterus.   Neck: Neck supple. No tracheal deviation present. No thyromegaly present.   Cardiovascular: Normal rate and regular rhythm.    Pulmonary/Chest: Effort normal and breath sounds normal. No respiratory distress. She has no wheezes. She has no rales.   Abdominal: Soft. She exhibits no distension and no mass. There is no tenderness. There is no rebound and no guarding.   Musculoskeletal: Normal range of motion. She exhibits no edema.   Lymphadenopathy:     She has no cervical adenopathy.   Neurological: She is alert and oriented to person, place, and time. No cranial nerve deficit.   Skin: Skin is warm and dry.   Psychiatric: She " has a normal mood and affect.       Laboratory Data:  Abstract on 12/06/2016   Component Date Value Ref Range Status    EXT WBC 12/03/2016 4.1  3.8 - 10.8 1000/ul Final    EXT Hemoglobin 12/03/2016 12.9  11.7 - 15.5 g/dl Final    EXT Hematocrit 12/03/2016 38.5  35.0 - 45.0 % Final    EXT Platelets 12/03/2016 138* 140 - 400 1000/ul Final    EXT Glucose 12/03/2016 88  65 - 99 mg/dl Final    EXT BUN 12/03/2016 13  7 - 25 mg/dl Final    EXT Creatinine 12/03/2016 0.90  0.50 - 0.99 mg/dl Final    EXT Sodium 12/03/2016 141  135 - 146 mmol/l Final    EXT Potassium 12/03/2016 4.2  3.5 - 5.3 mmol/l Final    EXT Chloride 12/03/2016 105  98 - 110 mmol/l Final    EXT CO2 12/03/2016 29  20 - 31 mmol/l Final    EXT Calcium 12/03/2016 9.7  8.6 - 10.4 mg/dl Final    EXT Protein total 12/03/2016 6.9  6.1 - 8.1 g/dl Final    EXT Albumin 12/03/2016 4.0  3.6 - 5.1 g/dl Final    EXT BilirubiN Total 12/03/2016 1.1  0.2 - 1.2 mg/dl Final    EXT Alkaline Phosphatase 12/03/2016 181* 33 - 130 u/l Final    EXT AST 12/03/2016 21  10 - 35 u/l Final    EXT ALT 12/03/2016 28  6 - 29 u/l Final   Office Visit on 11/14/2016   Component Date Value Ref Range Status    Blood Stool 11/14/2016 Negative  Negative Final     Acceptable 11/14/2016 Yes   Final    SOURCE: 11/14/2016 Cervical   Final    Slides: 11/14/2016 1   Final    LMP: 11/14/2016 NA   Final    Specimen adequacy: 11/14/2016 (NOTE)   Final    Comment:      Satisfactory for evaluation.          Endocervical cells absent.  Metaplastic cells indicative       of transformation zone cannot be reliably distinguished from       parabasal or atrophic cells.                      Interpretation 11/14/2016 NO EPITHELIAL ABNORMALITY SEE BELOW   Final    Comment: ----------------------------------------------------------------------       *NEGATIVE FOR INTRAEPITHELIAL LESION OR MALIGNANCY   ----------------------------------------------------------------------          Comments: 11/14/2016 (NOTE)   Final    Comment: Due to technical or specimen issues, imaging could not be  performed. A cytotechnologist has manually screened this slide.         Cytotechnologist: 11/14/2016 BRENDON Ca(ASCP)IAC   Final    LOCATION 11/14/2016 (NOTE)   Final    Comment: Specimens processed and interpreted at :[facility], [address] [place] [address], Phone: [phone], CLIA: [id number]      CPT Codes: 11/14/2016 (NOTE)   Final    Comment: [id number]        UNLESS OTHERWISE INDICATED, COMPUTER AIDED AND CYTOTECHNOLOGIST     SCREENING PERFORMED.          The Pap test is a screening test with an inherent, but low       probability of error.  Your patient should be reminded to       consult you immediately if she experiences any suspicious       signs or symptoms, regardless of her Pap test result.       An alternate report format containing images or consolidated       prior Pap history is available as applicable.         HPV HIGH RISK IF ASC-US, SUREPATH 11/14/2016 CRITERIA NOT MET   Final    Comment:       UNLESS OTHERWISE INDICATED, ALL TESTING PERFORMED AT  [facility]  [address] [place] [address]            :  CARLOS MENDEZ M.D.        CLIA NUMBER [id number]  [place]. [id number]                 Imaging:   MRI 5/23/17  Comparison: MRI abdomen 12/2016.    Findings:  Innumerable enhancing metastatic lesions are again visualized throughout the liver which appear stable in both size and number.  Majority lesions are seen within the right hepatic lobe.  Left hepatic lobe is hypertrophied.  Index right hepatic lobe segment V lesion measures 2.2 cm, unchanged.  Index left hepatic lobe segment III lesion measures 1.3 cm, unchanged.  No definite new lesions or evidence to suggest progression of disease.    There is stable ill-defined area of enhancement within the uncinate process of the pancreas similar  to previous examinations with no discrete mass visualized.  Gallbladder is unremarkable.  No evidence of biliary ductal dilatation.  Stomach, spleen, and adrenal glands are unremarkable.  Small renal cysts are noted.  No ascites.  No evidence to suggest marrow replacement process.   Impression       Innumerable hepatic metastatic lesions.  Index and non-index lesions appear overall stable in both size and number.  No evidence of new lesions or evidence to suggest progression of metastatic disease.                Assessment:       1. Primary pancreatic neuroendocrine tumor    2. Secondary neuroendocrine tumor of liver    3. Chemotherapy follow-up examination           Plan:   Ms. Bruce is doing well from an oncologic standpoint.  She remains on CAPTEM and tolerating well.  We had discussed her case in our neuroendocrine tumor board and found that she still has viable disease with recommendations to consider TACE versus continuation of systemic therapy.  I have discussed this with the patient today and she wishes to continue systemic therapy.  Her disease has been otherwise stable.  She will be due for a scan in November 2017 and will see her after that.  She will continue on with monthly lab work.  All questions were answered and she is agreeable with this plan.    Cory Rodriguez DO, [facility]  Hematology & Oncology, [facility]  [address] [place] [address], [address]  [place] Hernández  [address]  ph. [phone]; [phone]  fax. [phone]    25 minutes were spent in coordination of patient's care, record review and counseling.  More than 50% of the time was face-to-face.

## 2017-09-15 ENCOUNTER — TELEPHONE (OUTPATIENT)
Dept: NEUROLOGY | Facility: HOSPITAL | Age: 67
End: 2017-09-15

## 2017-09-27 ENCOUNTER — TELEPHONE (OUTPATIENT)
Dept: NEUROLOGY | Facility: HOSPITAL | Age: 67
End: 2017-09-27

## 2017-09-27 NOTE — TELEPHONE ENCOUNTER
----- Message from Ayala Kwok sent at 9/27/2017 10:18 AM CDT -----  Contact: Pharm[facility]  RR- Pharmacy called for a verbal order for medication temozolomide 250mg call back number [phone] to a pharmacist ext [phone]

## 2017-10-21 LAB
EXT 24 HR UR METANEPHRINE: ABNORMAL
EXT 24 HR UR NORMETANEPHRINE: ABNORMAL
EXT 24 HR UR NORMETANEPHRINE: ABNORMAL
EXT 25 HYDROXY VIT D2: ABNORMAL
EXT 25 HYDROXY VIT D3: ABNORMAL
EXT 5 HIAA 24 HR URINE: ABNORMAL
EXT 5 HIAA BLOOD: ABNORMAL
EXT ACTH: ABNORMAL
EXT AFP: ABNORMAL
EXT ALBUMIN: 4.3 G/DL (ref 3.6–5.1)
EXT ALKALINE PHOSPHATASE: 201 U/L (ref 33–130)
EXT ALT: 20 U/L (ref 6–29)
EXT AMYLASE: ABNORMAL
EXT ANTI ISLET CELL AB: ABNORMAL
EXT ANTI PARIETAL CELL AB: ABNORMAL
EXT ANTI THYROID AB: ABNORMAL
EXT AST: 17 U/L (ref 10–35)
EXT BILIRUBIN DIRECT: ABNORMAL
EXT BILIRUBIN TOTAL: 1.1 MG/DL (ref 0.2–1.2)
EXT BK VIRUS DNA QN PCR: ABNORMAL
EXT BUN: 15 MG/DL (ref 7–25)
EXT C PEPTIDE: ABNORMAL
EXT CA 125: ABNORMAL
EXT CA 19-9: ABNORMAL
EXT CA 27-29: ABNORMAL
EXT CALCITONIN: ABNORMAL
EXT CALCIUM: 10 MG/DL (ref 8.6–10.4)
EXT CEA: ABNORMAL
EXT CHLORIDE: 102 MMOL/L (ref 98–110)
EXT CHOLESTEROL: ABNORMAL
EXT CHROMOGRANIN A: ABNORMAL
EXT CO2: 26 MMOL/L (ref 20–31)
EXT CREATININE UA: ABNORMAL
EXT CREATININE: 0.99 MG/DL (ref 0.5–0.99)
EXT CYCLOSPORONE LEVEL: ABNORMAL
EXT DOPAMINE: ABNORMAL
EXT EBV DNA BY PCR: ABNORMAL
EXT EPINEPHRINE: ABNORMAL
EXT FOLATE: ABNORMAL
EXT FREE T3: ABNORMAL
EXT FREE T4: ABNORMAL
EXT FSH: ABNORMAL
EXT GASTRIN RELEASING PEPTIDE: ABNORMAL
EXT GASTRIN RELEASING PEPTIDE: ABNORMAL
EXT GASTRIN: ABNORMAL
EXT GGT: ABNORMAL
EXT GHRELIN: ABNORMAL
EXT GLUCAGON: ABNORMAL
EXT GLUCOSE: 97 MG/DL (ref 65–99)
EXT GROWTH HORMONE: ABNORMAL
EXT HCV RNA QUANT PCR: ABNORMAL
EXT HDL: ABNORMAL
EXT HEMATOCRIT: 39 % (ref 35–45)
EXT HEMOGLOBIN A1C: ABNORMAL
EXT HEMOGLOBIN: 13.2 G/DL (ref 11.7–15.5)
EXT HISTAMINE 24 HR URINE: ABNORMAL
EXT HISTAMINE: ABNORMAL
EXT IGF-1: ABNORMAL
EXT IMMUNKNOW (NON-STIMULATED): ABNORMAL
EXT IMMUNKNOW (STIMULATED): ABNORMAL
EXT INR: ABNORMAL
EXT INSULIN: ABNORMAL
EXT LANREOTIDE LEVEL: ABNORMAL
EXT LDH, TOTAL: ABNORMAL
EXT LDL CHOLESTEROL: ABNORMAL
EXT LIPASE: ABNORMAL
EXT MAGNESIUM: ABNORMAL
EXT METANEPHRINE FREE PLASMA: ABNORMAL
EXT MOTILIN: ABNORMAL
EXT NEUROKININ A CAMB: ABNORMAL
EXT NEUROKININ A ISI: ABNORMAL
EXT NEUROTENSIN: ABNORMAL
EXT NOREPINEPHRINE: ABNORMAL
EXT NORMETANEPHRINE: ABNORMAL
EXT NSE: ABNORMAL
EXT OCTREOTIDE LEVEL: ABNORMAL
EXT PANCREASTATIN CAMB: ABNORMAL
EXT PANCREASTATIN ISI: ABNORMAL
EXT PANCREATIC POLYPEPTIDE: ABNORMAL
EXT PHOSPHORUS: ABNORMAL
EXT PLATELETS: 146 1000/UL (ref 140–400)
EXT POTASSIUM: 4.5 MMOL/L (ref 3.5–5.3)
EXT PROGRAF LEVEL: ABNORMAL
EXT PROLACTIN: ABNORMAL
EXT PROTEIN TOTAL: 7.6 G/DL (ref 6.1–8.1)
EXT PROTEIN UA: ABNORMAL
EXT PT: ABNORMAL
EXT PTH, INTACT: ABNORMAL
EXT PTT: ABNORMAL
EXT RAPAMUNE LEVEL: ABNORMAL
EXT SEROTONIN: ABNORMAL
EXT SODIUM: 139 MMOL/L (ref 135–146)
EXT SOMATOSTATIN: ABNORMAL
EXT SUBSTANCE P: ABNORMAL
EXT TRIGLYCERIDES: ABNORMAL
EXT TRYPTASE: ABNORMAL
EXT TSH: ABNORMAL
EXT URIC ACID: ABNORMAL
EXT URINE AMYLASE U/HR: ABNORMAL
EXT URINE AMYLASE U/L: ABNORMAL
EXT VASOACTIVE INTESTINAL POLYPEPTIDE: ABNORMAL
EXT VITAMIN B12: ABNORMAL
EXT VMA 24 HR URINE: ABNORMAL
EXT WBC: 4.2 1000/UL (ref 3.8–10.8)
NEURON SPECIFIC ENOLASE: ABNORMAL

## 2017-10-25 ENCOUNTER — TELEPHONE (OUTPATIENT)
Dept: NEUROLOGY | Facility: HOSPITAL | Age: 67
End: 2017-10-25

## 2017-10-25 NOTE — TELEPHONE ENCOUNTER
Did PA for temozolomide  And they should render a decision by Friday and fax th request to us by Friday.

## 2017-10-25 NOTE — TELEPHONE ENCOUNTER
----- Message from Reyna Osorio sent at 10/25/2017  8:21 AM CDT -----  Contact: Patience from [facility]  RR:  [facility] called, they have not received prior authorization for patient's  Temozolomide.  It can be called into [phone].  Thank you  abd

## 2017-11-22 LAB
EXT 24 HR UR METANEPHRINE: ABNORMAL
EXT 24 HR UR NORMETANEPHRINE: ABNORMAL
EXT 24 HR UR NORMETANEPHRINE: ABNORMAL
EXT 25 HYDROXY VIT D2: ABNORMAL
EXT 25 HYDROXY VIT D3: ABNORMAL
EXT 5 HIAA 24 HR URINE: ABNORMAL
EXT 5 HIAA BLOOD: ABNORMAL
EXT ACTH: ABNORMAL
EXT AFP: ABNORMAL
EXT ALBUMIN: 4.2 G/DL (ref 3.6–5.1)
EXT ALKALINE PHOSPHATASE: 192 U/L (ref 33–130)
EXT ALT: 10 U/L (ref 6–29)
EXT AMYLASE: ABNORMAL
EXT ANTI ISLET CELL AB: ABNORMAL
EXT ANTI PARIETAL CELL AB: ABNORMAL
EXT ANTI THYROID AB: ABNORMAL
EXT AST: 13 U/L (ref 10–35)
EXT BILIRUBIN DIRECT: ABNORMAL
EXT BILIRUBIN TOTAL: 1.1 MG/DL (ref 0.2–1.2)
EXT BK VIRUS DNA QN PCR: ABNORMAL
EXT BUN: 14 MG/DL (ref 7–25)
EXT C PEPTIDE: ABNORMAL
EXT CA 125: ABNORMAL
EXT CA 19-9: ABNORMAL
EXT CA 27-29: ABNORMAL
EXT CALCITONIN: ABNORMAL
EXT CALCIUM: 9.8 MG/DL (ref 8.6–10.4)
EXT CEA: ABNORMAL
EXT CHLORIDE: 106 MMOL/L (ref 98–110)
EXT CHOLESTEROL: ABNORMAL
EXT CHROMOGRANIN A: ABNORMAL
EXT CO2: 28 MMOL/L (ref 20–31)
EXT CREATININE UA: ABNORMAL
EXT CREATININE: 1.06 MG/DL (ref 0.5–0.99)
EXT CYCLOSPORONE LEVEL: ABNORMAL
EXT DOPAMINE: ABNORMAL
EXT EBV DNA BY PCR: ABNORMAL
EXT EPINEPHRINE: ABNORMAL
EXT FOLATE: ABNORMAL
EXT FREE T3: ABNORMAL
EXT FREE T4: ABNORMAL
EXT FSH: ABNORMAL
EXT GASTRIN RELEASING PEPTIDE: ABNORMAL
EXT GASTRIN RELEASING PEPTIDE: ABNORMAL
EXT GASTRIN: ABNORMAL
EXT GGT: ABNORMAL
EXT GHRELIN: ABNORMAL
EXT GLUCAGON: ABNORMAL
EXT GLUCOSE: 90 MG/DL (ref 65–99)
EXT GROWTH HORMONE: ABNORMAL
EXT HCV RNA QUANT PCR: ABNORMAL
EXT HDL: ABNORMAL
EXT HEMATOCRIT: 38.7 % (ref 35–45)
EXT HEMOGLOBIN A1C: ABNORMAL
EXT HEMOGLOBIN: 13 G/DL (ref 11.7–15.5)
EXT HISTAMINE 24 HR URINE: ABNORMAL
EXT HISTAMINE: ABNORMAL
EXT IGF-1: ABNORMAL
EXT IMMUNKNOW (NON-STIMULATED): ABNORMAL
EXT IMMUNKNOW (STIMULATED): ABNORMAL
EXT INR: ABNORMAL
EXT INSULIN: ABNORMAL
EXT LANREOTIDE LEVEL: ABNORMAL
EXT LDH, TOTAL: ABNORMAL
EXT LDL CHOLESTEROL: ABNORMAL
EXT LIPASE: ABNORMAL
EXT MAGNESIUM: ABNORMAL
EXT METANEPHRINE FREE PLASMA: ABNORMAL
EXT MOTILIN: ABNORMAL
EXT NEUROKININ A CAMB: ABNORMAL
EXT NEUROKININ A ISI: ABNORMAL
EXT NEUROTENSIN: ABNORMAL
EXT NOREPINEPHRINE: ABNORMAL
EXT NORMETANEPHRINE: ABNORMAL
EXT NSE: ABNORMAL
EXT OCTREOTIDE LEVEL: ABNORMAL
EXT PANCREASTATIN CAMB: ABNORMAL
EXT PANCREASTATIN ISI: ABNORMAL
EXT PANCREATIC POLYPEPTIDE: ABNORMAL
EXT PHOSPHORUS: ABNORMAL
EXT PLATELETS: 146 1000/UL (ref 140–400)
EXT POTASSIUM: 5.2 MMOL/L (ref 3.5–5.3)
EXT PROGRAF LEVEL: ABNORMAL
EXT PROLACTIN: ABNORMAL
EXT PROTEIN TOTAL: 7.5 G/DL (ref 6.1–8.1)
EXT PROTEIN UA: ABNORMAL
EXT PT: ABNORMAL
EXT PTH, INTACT: ABNORMAL
EXT PTT: ABNORMAL
EXT RAPAMUNE LEVEL: ABNORMAL
EXT SEROTONIN: ABNORMAL
EXT SODIUM: 144 MMOL/L (ref 135–146)
EXT SOMATOSTATIN: ABNORMAL
EXT SUBSTANCE P: ABNORMAL
EXT TRIGLYCERIDES: ABNORMAL
EXT TRYPTASE: ABNORMAL
EXT TSH: ABNORMAL
EXT URIC ACID: ABNORMAL
EXT URINE AMYLASE U/HR: ABNORMAL
EXT URINE AMYLASE U/L: ABNORMAL
EXT VASOACTIVE INTESTINAL POLYPEPTIDE: ABNORMAL
EXT VITAMIN B12: ABNORMAL
EXT VMA 24 HR URINE: ABNORMAL
EXT WBC: 4.3 1000/UL (ref 3.8–10.8)
NEURON SPECIFIC ENOLASE: ABNORMAL

## 2017-11-29 DIAGNOSIS — C78.7 SECONDARY MALIGNANT NEOPLASM OF LIVER: ICD-10-CM

## 2017-11-29 DIAGNOSIS — C7A.8 PRIMARY MALIGNANT NEUROENDOCRINE TUMOR OF PANCREAS: ICD-10-CM

## 2017-11-29 RX ORDER — CAPECITABINE 500 MG/1
TABLET, FILM COATED ORAL
Qty: 56 TABLET | Refills: 6 | Status: SHIPPED | OUTPATIENT
Start: 2017-11-29 | End: 2018-07-30 | Stop reason: SDUPTHER

## 2017-12-16 LAB
EXT 24 HR UR METANEPHRINE: ABNORMAL
EXT 24 HR UR NORMETANEPHRINE: ABNORMAL
EXT 24 HR UR NORMETANEPHRINE: ABNORMAL
EXT 25 HYDROXY VIT D2: ABNORMAL
EXT 25 HYDROXY VIT D3: ABNORMAL
EXT 5 HIAA 24 HR URINE: ABNORMAL
EXT 5 HIAA BLOOD: ABNORMAL
EXT ACTH: ABNORMAL
EXT AFP: ABNORMAL
EXT ALBUMIN: 4.4 G/DL (ref 3.6–5.1)
EXT ALKALINE PHOSPHATASE: 193 U/L (ref 33–130)
EXT ALT: 19 U/L (ref 6–29)
EXT AMYLASE: ABNORMAL
EXT ANTI ISLET CELL AB: ABNORMAL
EXT ANTI PARIETAL CELL AB: ABNORMAL
EXT ANTI THYROID AB: ABNORMAL
EXT AST: 18 U/L (ref 10–35)
EXT BILIRUBIN DIRECT: ABNORMAL MG/DL
EXT BILIRUBIN TOTAL: 1.1 MG/DL (ref 0.2–1.2)
EXT BK VIRUS DNA QN PCR: ABNORMAL
EXT BUN: 14 MG/DL (ref 7–25)
EXT C PEPTIDE: ABNORMAL
EXT CA 125: ABNORMAL
EXT CA 19-9: ABNORMAL
EXT CA 27-29: ABNORMAL
EXT CALCITONIN: ABNORMAL
EXT CALCIUM: 10.1 MG/DL (ref 8.6–10.4)
EXT CEA: ABNORMAL
EXT CHLORIDE: 106 MMOL/L (ref 98–110)
EXT CHOLESTEROL: ABNORMAL
EXT CHROMOGRANIN A: ABNORMAL
EXT CO2: 29 MMOL/L (ref 20–31)
EXT CREATININE UA: ABNORMAL
EXT CREATININE: 0.95 MG/DL (ref 0.5–0.99)
EXT CYCLOSPORONE LEVEL: ABNORMAL
EXT DOPAMINE: ABNORMAL
EXT EBV DNA BY PCR: ABNORMAL
EXT EPINEPHRINE: ABNORMAL
EXT FOLATE: ABNORMAL
EXT FREE T3: ABNORMAL
EXT FREE T4: ABNORMAL
EXT FSH: ABNORMAL
EXT GASTRIN RELEASING PEPTIDE: ABNORMAL
EXT GASTRIN RELEASING PEPTIDE: ABNORMAL
EXT GASTRIN: ABNORMAL
EXT GGT: ABNORMAL
EXT GHRELIN: ABNORMAL
EXT GLUCAGON: ABNORMAL
EXT GLUCOSE: 96 MG/DL (ref 65–99)
EXT GROWTH HORMONE: ABNORMAL
EXT HCV RNA QUANT PCR: ABNORMAL
EXT HDL: ABNORMAL
EXT HEMATOCRIT: 39.7 % (ref 35–45)
EXT HEMOGLOBIN A1C: ABNORMAL
EXT HEMOGLOBIN: 13.4 G/DL (ref 11.7–15.5)
EXT HISTAMINE 24 HR URINE: ABNORMAL
EXT HISTAMINE: ABNORMAL
EXT IGF-1: ABNORMAL
EXT IMMUNKNOW (NON-STIMULATED): ABNORMAL
EXT IMMUNKNOW (STIMULATED): ABNORMAL
EXT INR: ABNORMAL
EXT INSULIN: ABNORMAL
EXT LANREOTIDE LEVEL: ABNORMAL
EXT LDH, TOTAL: ABNORMAL
EXT LDL CHOLESTEROL: ABNORMAL
EXT LIPASE: ABNORMAL
EXT MAGNESIUM: ABNORMAL
EXT METANEPHRINE FREE PLASMA: ABNORMAL
EXT MOTILIN: ABNORMAL
EXT NEUROKININ A CAMB: ABNORMAL
EXT NEUROKININ A ISI: ABNORMAL
EXT NEUROTENSIN: ABNORMAL
EXT NOREPINEPHRINE: ABNORMAL
EXT NORMETANEPHRINE: ABNORMAL
EXT NSE: ABNORMAL
EXT OCTREOTIDE LEVEL: ABNORMAL
EXT PANCREASTATIN CAMB: ABNORMAL
EXT PANCREASTATIN ISI: ABNORMAL
EXT PANCREATIC POLYPEPTIDE: ABNORMAL
EXT PHOSPHORUS: ABNORMAL
EXT PLATELETS: 153 1000/UL (ref 140–400)
EXT POTASSIUM: 4.8 MMOL/L (ref 3.5–5.3)
EXT PROGRAF LEVEL: ABNORMAL
EXT PROLACTIN: ABNORMAL
EXT PROTEIN TOTAL: 7.7 G/DL (ref 6.1–8.1)
EXT PROTEIN UA: ABNORMAL
EXT PT: ABNORMAL
EXT PTH, INTACT: ABNORMAL
EXT PTT: ABNORMAL
EXT RAPAMUNE LEVEL: ABNORMAL
EXT SEROTONIN: ABNORMAL
EXT SODIUM: 147 MMOL/L (ref 135–146)
EXT SOMATOSTATIN: ABNORMAL
EXT SUBSTANCE P: ABNORMAL
EXT TRIGLYCERIDES: ABNORMAL
EXT TRYPTASE: ABNORMAL
EXT TSH: ABNORMAL
EXT URIC ACID: ABNORMAL
EXT URINE AMYLASE U/HR: ABNORMAL
EXT URINE AMYLASE U/L: ABNORMAL
EXT VASOACTIVE INTESTINAL POLYPEPTIDE: ABNORMAL
EXT VITAMIN B12: ABNORMAL
EXT VMA 24 HR URINE: ABNORMAL
EXT WBC: 4.2 1000/UL (ref 3.8–10.8)
NEURON SPECIFIC ENOLASE: ABNORMAL

## 2017-12-26 ENCOUNTER — HOSPITAL ENCOUNTER (OUTPATIENT)
Dept: RADIOLOGY | Facility: HOSPITAL | Age: 67
Discharge: HOME | End: 2017-12-26
Attending: INTERNAL MEDICINE
Payer: COMMERCIAL

## 2017-12-26 DIAGNOSIS — D3A.8 PRIMARY PANCREATIC NEUROENDOCRINE TUMOR: ICD-10-CM

## 2017-12-26 DIAGNOSIS — Z09 CHEMOTHERAPY FOLLOW-UP EXAMINATION: ICD-10-CM

## 2017-12-26 DIAGNOSIS — C7B.8 SECONDARY NEUROENDOCRINE TUMOR OF LIVER: ICD-10-CM

## 2017-12-26 PROCEDURE — 74183 MRI ABD W/O CNTR FLWD CNTR: CPT | Mod: TC

## 2017-12-26 PROCEDURE — 25500020 PHARM REV CODE 255: Performed by: INTERNAL MEDICINE

## 2017-12-26 PROCEDURE — 74183 MRI ABD W/O CNTR FLWD CNTR: CPT | Mod: 26,,, | Performed by: RADIOLOGY

## 2017-12-26 PROCEDURE — A9585 GADOBUTROL INJECTION: HCPCS | Performed by: INTERNAL MEDICINE

## 2017-12-26 RX ORDER — GADOBUTROL 604.72 MG/ML
10 INJECTION INTRAVENOUS
Status: COMPLETED | OUTPATIENT
Start: 2017-12-26 | End: 2017-12-26

## 2017-12-26 RX ADMIN — GADOBUTROL 10 ML: 604.72 INJECTION INTRAVENOUS at 07:12

## 2018-01-19 ENCOUNTER — OFFICE VISIT (OUTPATIENT)
Dept: NEUROLOGY | Facility: HOSPITAL | Age: 68
End: 2018-01-19
Attending: INTERNAL MEDICINE
Payer: COMMERCIAL

## 2018-01-19 VITALS
HEART RATE: 77 BPM | DIASTOLIC BLOOD PRESSURE: 86 MMHG | WEIGHT: 160.25 LBS | TEMPERATURE: 99 F | HEIGHT: 67 IN | BODY MASS INDEX: 25.15 KG/M2 | SYSTOLIC BLOOD PRESSURE: 138 MMHG

## 2018-01-19 DIAGNOSIS — Z09 CHEMOTHERAPY FOLLOW-UP EXAMINATION: ICD-10-CM

## 2018-01-19 DIAGNOSIS — D3A.8 PRIMARY PANCREATIC NEUROENDOCRINE TUMOR: Primary | ICD-10-CM

## 2018-01-19 DIAGNOSIS — C7B.8 SECONDARY NEUROENDOCRINE TUMOR OF LIVER: ICD-10-CM

## 2018-01-19 PROCEDURE — 99214 OFFICE O/P EST MOD 30 MIN: CPT | Performed by: INTERNAL MEDICINE

## 2018-01-19 PROCEDURE — 99214 OFFICE O/P EST MOD 30 MIN: CPT | Mod: ,,, | Performed by: INTERNAL MEDICINE

## 2018-01-19 NOTE — PROGRESS NOTES
NOLANETS:  [facility]  A collaboration between [facility] and [facility]    PATIENT: Ching Bruce  MRN: [id number]  DATE: 1/19/2018      Diagnosis:   1. Primary pancreatic neuroendocrine tumor    2. Secondary neuroendocrine tumor of liver    3. Chemotherapy follow-up examination        Chief Complaint: Follow-up (follow up afterMRI)      Oncologic History:    Oncologic History Pancreatic neuroendocrine tumor with metasatic disease to liver diagnosed 12/12    Oncologic Treatment Capecitabine/Temozolomide (CAPTEM) 1/13 -Present    Pathology Ki-67 1%        Subjective:    Interval History: Ms. Bruce is a 67 y.o. female who returns for follow up for her pancreatic neuroendocrine tumor.  She states that she is feeling well.  She continues on with chemotherapy with CAPTEM.  She has no new complaints.    ONCOLOGIC HISTORY:   A 67 y.o. year-old -American female who I had initially   seen on 12/18/2012. Her history dates back to September 2012 when she was noted  to have several weeks of feeling fatigued. She sought treatment with her   primary care doctor who did a CBC and found her to be severely anemic. She was   seen at [facility] and transfused 3 units of packed red blood cells  and no source of bleeding had been found. Records at that time had shown   hemoglobin of 5. She sought followup in September 2012 with Dr. Guillen who   performed an EGD and colonoscopy with an EGD showing an ulcerated and fungating   nonbleeding 2 cm mass, malignant in appearance. It did cause partial   obstructions. Biopsies were taken, which showed no evidence of malignancy at   that time. She had a CT of the abdomen and pelvis showing multiple metastatic   lesions and the liver biopsy was performed on 10/17/2012 showed pancreatic   neuroendocrine neoplasm in the left lobe of the liver aspirate. She went on to   have an ERCP  along with sphincterotomy and biliary stent placement and   subsequent PET had shown numerous hypodense lesions in the liver. Octreotide   scan was performed, which showed multiple right and left hepatic metastases.   MRI of the abdomen was performed on 12/05/2012 showing innumerable lesions that   demonstrated peripheral to hyperintensity consistent with hypervascular   metastasis. She was seen by Dr. Humphrey, who thought she was not a candidate   for surgical resection during that time. Her pathology from her tumor came back  positive for chromogranin, synaptophysin and had a Ki-67 of less than 1%. She   was started on treatment with temozolomide and capecitabine with cycle   1 being on 01/22/2013.    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    Secondary malignant neoplasm of liver     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) 5/7/2013    Thrombocytopenia 11/12/2013    Thyroid disease     Unspecified essential hypertension 11/12/2013       Past Surgical HIstory:   Past Surgical History:   Procedure Laterality Date    THYROIDECTOMY  2011       Family History:   Family History   Problem Relation Age of Onset    Cancer Maternal Grandmother     Diabetes Father     Breast cancer Neg Hx     Colon cancer Neg Hx     Ovarian cancer Neg Hx        Social History:  reports that she has never smoked. She has never used smokeless tobacco. She reports that she does not drink alcohol or use drugs.    Allergies:  Review of patient's allergies indicates:   Allergen  Reactions    Epinephrine Other (See Comments)     Carcinoid patient    Sulfa (sulfonamide antibiotics) Other (See Comments)     Urticaria       Medications:  Current Outpatient Prescriptions   Medication Sig Dispense Refill    alendronate (FOSAMAX) 70 MG tablet Take 70 mg by mouth every 7 days.   3    capecitabine (XELODA) 500 MG Tab TAKE 2 TABLETS (1000MG) BY MOUTH TWICE DAILY FOR 14 DAYS ON, THEN 14 DAYS OFF. THEN REPEAT. TAKE WITHIN 30 MINS  AFTER A MEAL WITH WATER. RE 56 tablet 6    ergocalciferol (VITAMIN D2) 50,000 unit Cap Take 50,000 Units by mouth every 7 days.      ferrous sulfate 325 mg (65 mg iron) Tab tablet Take 325 mg by mouth once daily.      indapamide (LOZOL) 2.5 MG Tab 2.5 mg once daily.       irbesartan (AVAPRO) 300 MG tablet 300 mg once daily.       levothyroxine (SYNTHROID) 150 MCG tablet Take 150 mcg by mouth once daily.  0    temozolomide (TEMODAR) 250 MG capsule TAKE 1 CAPSULE BY MOUTH ONCE DAILY ON DAYS 10-14 OF A 28 DAY CYCLE. 5 capsule 5    verapamil (VERELAN PM) 360 MG C24P Take by mouth once daily.        No current facility-administered medications for this visit.        Review of Systems   Constitutional: Negative for chills, fever and unexpected weight change.   HENT: Negative for congestion, hearing loss and nosebleeds.    Eyes: Negative for visual disturbance.   Respiratory: Negative for cough and shortness of breath.    Cardiovascular: Negative for chest pain and palpitations.   Gastrointestinal: Negative for abdominal pain, blood in stool, constipation, diarrhea, nausea and vomiting.   Genitourinary: Negative for dysuria.   Musculoskeletal: Negative for back pain and gait problem.   Skin: Negative for color change and rash.   Neurological: Negative for dizziness, weakness and headaches.   Hematological: Negative for adenopathy. Does not bruise/bleed easily.   Psychiatric/Behavioral: Negative for confusion.       ECOG Performance Status: 0   Objective:      Vitals:  "  Vitals:    01/19/18 1420   BP: 138/86   Pulse: 77   Temp: 98.5 °F (36.9 °C)   TempSrc: Oral   Weight: 72.7 kg (160 lb 4.4 oz)   Height: 5' 7" (1.702 m)     BMI: Body mass index is 25.1 kg/m².    Physical Exam   Constitutional: She is oriented to person, place, and time. She appears well-developed and well-nourished. No distress.   HENT:   Head: Normocephalic.   Mouth/Throat: No oropharyngeal exudate.   Eyes: EOM are normal. No scleral icterus.   Neck: Neck supple. No tracheal deviation present. No thyromegaly present.   Cardiovascular: Normal rate and regular rhythm.    Pulmonary/Chest: Effort normal and breath sounds normal. No respiratory distress. She has no wheezes. She has no rales.   Abdominal: Soft. She exhibits no distension and no mass. There is no tenderness. There is no rebound and no guarding.   Musculoskeletal: Normal range of motion. She exhibits no edema.   Lymphadenopathy:     She has no cervical adenopathy.   Neurological: She is alert and oriented to person, place, and time. No cranial nerve deficit.   Skin: Skin is warm and dry.   Psychiatric: She has a normal mood and affect.       Laboratory Data:  Abstract on 12/06/2016   Component Date Value Ref Range Status    EXT WBC 12/03/2016 4.1  3.8 - 10.8 1000/ul Final    EXT Hemoglobin 12/03/2016 12.9  11.7 - 15.5 g/dl Final    EXT Hematocrit 12/03/2016 38.5  35.0 - 45.0 % Final    EXT Platelets 12/03/2016 138* 140 - 400 1000/ul Final    EXT Glucose 12/03/2016 88  65 - 99 mg/dl Final    EXT BUN 12/03/2016 13  7 - 25 mg/dl Final    EXT Creatinine 12/03/2016 0.90  0.50 - 0.99 mg/dl Final    EXT Sodium 12/03/2016 141  135 - 146 mmol/l Final    EXT Potassium 12/03/2016 4.2  3.5 - 5.3 mmol/l Final    EXT Chloride 12/03/2016 105  98 - 110 mmol/l Final    EXT CO2 12/03/2016 29  20 - 31 mmol/l Final    EXT Calcium 12/03/2016 9.7  8.6 - 10.4 mg/dl Final    EXT Protein total 12/03/2016 6.9  6.1 - 8.1 g/dl Final    EXT Albumin 12/03/2016 4.0  3.6 " - 5.1 g/dl Final    EXT BilirubiN Total 12/03/2016 1.1  0.2 - 1.2 mg/dl Final    EXT Alkaline Phosphatase 12/03/2016 181* 33 - 130 u/l Final    EXT AST 12/03/2016 21  10 - 35 u/l Final    EXT ALT 12/03/2016 28  6 - 29 u/l Final   Office Visit on 11/14/2016   Component Date Value Ref Range Status    Blood Stool 11/14/2016 Negative  Negative Final     Acceptable 11/14/2016 Yes   Final    SOURCE: 11/14/2016 Cervical   Final    Slides: 11/14/2016 1   Final    LMP: 11/14/2016 NA   Final    Specimen adequacy: 11/14/2016 (NOTE)   Final    Comment:      Satisfactory for evaluation.          Endocervical cells absent.  Metaplastic cells indicative       of transformation zone cannot be reliably distinguished from       parabasal or atrophic cells.                      Interpretation 11/14/2016 NO EPITHELIAL ABNORMALITY SEE BELOW   Final    Comment: ----------------------------------------------------------------------       *NEGATIVE FOR INTRAEPITHELIAL LESION OR MALIGNANCY   ----------------------------------------------------------------------         Comments: 11/14/2016 (NOTE)   Final    Comment: Due to technical or specimen issues, imaging could not be  performed. A cytotechnologist has manually screened this slide.         Cytotechnologist: 11/14/2016 BERNDON Ca(ASCP)IAC   Final    LOCATION 11/14/2016 (NOTE)   Final    Comment: Specimens processed and interpreted at :[facility], [address] [place] [address], Phone: [phone], CLIA: [id number]      CPT Codes: 11/14/2016 (NOTE)   Final    Comment: [id number]        UNLESS OTHERWISE INDICATED, COMPUTER AIDED AND CYTOTECHNOLOGIST     SCREENING PERFORMED.          The Pap test is a screening test with an inherent, but low       probability of error.  Your patient should be reminded to       consult you immediately if she experiences any suspicious       signs or symptoms, regardless of her Pap test result.        An alternate report format containing images or consolidated       prior Pap history is available as applicable.         HPV HIGH RISK IF ASC-US, SUREPATH 11/14/2016 CRITERIA NOT MET   Final    Comment:       UNLESS OTHERWISE INDICATED, ALL TESTING PERFORMED AT  [facility]  [address] [place] [id number]            :  CARLOS MENDEZ M.D.        IA NUMBER [id number]  [place]. [id number]                 Imaging:   MRI 12/8/16  MRI abdomen without and with contrast.    Comparison: 5/31/16    History: Neuroendocrine tumor.    Results: Axial gradient T2, axial T1 in and out of phase, axial T2 SSFSE, coronal T2 gradient and T2 SSFSE followed by pre-contrast axial T1 gradient fat sat and dynamic post contrast images were obtained. The patient received  10 cc of IV Gadovist contrast.    Numerous hepatic numerous hepatic lesion better seen on postcontrast 10 seconds.  Index lesion in the right hepatic lobe measures segment 5 measures 2.2 cm (previously measuring 2.0 x 2.4 cm).  A 2nd index lesion has been chosen within the left hepatic lobe segment 3 measuring 1.6 cm, in retrospect, on the prior study, measuring 1.6 cm.  Vague enhancing area) postcontrast 10 seconds image 62) within the uncinate process measuring at 1.7 x 0.9 cm as seen on the prior study.    The number and burden of liver lesions appear similar to the prior exam.  The biliary ducts are nondilated.  No liver is enlarged similar to the prior study the gallbladder is present.  The stomach, pancreas, spleen, and adrenal glands appear within normal limits.  Small bilateral kidney cysts.  The visualized osseous structures demonstrate no definite osseous lesions.   Impression       Hepatomegaly with multiple liver lesions, the burden of lesions and size appear similar to the prior exam.  Vague area of enhancement within the uncinate process of the pancreas appears unchanged.                       Assessment:       1. Primary pancreatic neuroendocrine tumor    2. Secondary neuroendocrine tumor of liver    3. Chemotherapy follow-up examination           Plan:   Ms. Bruce continues to tolerate CAPTEM in review of her labs shows no detrimental effects.  We will plan to continue and repeat imaging in May 2017 which will be 6 months since prior imaging.  She did note that she will be retiring may need financial assistance to obtain chemotherapy.  Continue on with monthly labs.  Follow up in 2 months.     Cory Rodriguez DO, FACP  Hematology & Oncology, [facility]  [address]  [place]  [address]  ph. [phone]; [phone]  fax. [phone]    25 minutes were spent in coordination of patient's care, record review and counseling.  More than 50% of the time was face-to-face.      NOLANETS:  [facility]  A collaboration between [facility] and [facility]    PATIENT: Ching Bruce  MRN: [id number]  DATE: 1/19/2018      Diagnosis:   1. Primary pancreatic neuroendocrine tumor    2. Secondary neuroendocrine tumor of liver    3. Chemotherapy follow-up examination        Chief Complaint: Follow-up (follow up afterMRI)      Oncologic History:    Oncologic History Pancreatic neuroendocrine tumor with metasatic disease to liver diagnosed 12/12    Oncologic Treatment Capecitabine/Temozolomide (CAPTEM) 1/13 -Present    Pathology Ki-67 1%        Subjective:    Interval History: Ms. Bruce is a 67 y.o. female who returns for follow up for her pancreatic neuroendocrine tumor.  She states that she has been feeling well.  She continues on with CAPTEM without any side effects.  She recently had biliary stent exchange.  No new complaints.    ONCOLOGIC HISTORY:   A 67 y.o. year-old -American female who I had initially   seen on 12/18/2012. Her history dates back to September 2012  when she was noted  to have several weeks of feeling fatigued. She sought treatment with her   primary care doctor who did a CBC and found her to be severely anemic. She was   seen at [facility] and transfused 3 units of packed red blood cells  and no source of bleeding had been found. Records at that time had shown   hemoglobin of 5. She sought followup in September 2012 with Dr. Guillen who   performed an EGD and colonoscopy with an EGD showing an ulcerated and fungating   nonbleeding 2 cm mass, malignant in appearance. It did cause partial   obstructions. Biopsies were taken, which showed no evidence of malignancy at   that time. She had a CT of the abdomen and pelvis showing multiple metastatic   lesions and the liver biopsy was performed on 10/17/2012 showed pancreatic   neuroendocrine neoplasm in the left lobe of the liver aspirate. She went on to   have an ERCP along with sphincterotomy and biliary stent placement and   subsequent PET had shown numerous hypodense lesions in the liver. Octreotide   scan was performed, which showed multiple right and left hepatic metastases.   MRI of the abdomen was performed on 12/05/2012 showing innumerable lesions that   demonstrated peripheral to hyperintensity consistent with hypervascular   metastasis. She was seen by Dr. Humphrey, who thought she was not a candidate   for surgical resection during that time. Her pathology from her tumor came back  positive for chromogranin, synaptophysin and had a Ki-67 of less than 1%. She   was started on treatment with temozolomide and capecitabine with cycle   1 being on 01/22/2013.    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 She has never used smokeless tobacco. She reports that she does not drink alcohol or use drugs.    Allergies:  Review of patient's allergies indicates:   Allergen Reactions    Epinephrine Other (See Comments)     Carcinoid patient    Sulfa (sulfonamide antibiotics) Other (See Comments)     Urticaria       Medications:  Current Outpatient Prescriptions   Medication Sig Dispense Refill    alendronate (FOSAMAX) 70 MG tablet Take 70 mg by mouth every 7 days.   3    capecitabine (XELODA) 500 MG Tab TAKE 2 TABLETS (1000MG) BY MOUTH TWICE DAILY FOR 14 DAYS ON, THEN 14 DAYS OFF. THEN REPEAT. TAKE WITHIN 30 MINS  AFTER A MEAL WITH WATER. RE 56 tablet 6    ergocalciferol (VITAMIN D2) 50,000 unit Cap Take 50,000 Units by mouth every 7 days.      ferrous sulfate 325 mg (65 mg iron) Tab tablet Take 325 mg by mouth once daily.      indapamide (LOZOL) 2.5 MG Tab 2.5 mg once daily.       irbesartan (AVAPRO) 300 MG tablet 300 mg once daily.       levothyroxine (SYNTHROID) 150 MCG tablet Take 150 mcg by mouth once daily.  0    temozolomide (TEMODAR) 250 MG capsule TAKE 1 CAPSULE BY MOUTH ONCE DAILY ON DAYS 10-14 OF A 28 DAY CYCLE. 5 capsule 5    verapamil (VERELAN PM) 360 MG C24P Take by mouth once daily.     "    No current facility-administered medications for this visit.        Review of Systems   Constitutional: Negative for chills, fever and unexpected weight change.   HENT: Negative for congestion, hearing loss and nosebleeds.    Eyes: Negative for visual disturbance.   Respiratory: Negative for cough and shortness of breath.    Cardiovascular: Negative for chest pain and palpitations.   Gastrointestinal: Negative for abdominal pain, blood in stool, constipation, diarrhea, nausea and vomiting.   Genitourinary: Negative for dysuria.   Musculoskeletal: Negative for back pain and gait problem.   Skin: Negative for color change and rash.   Neurological: Negative for dizziness, weakness and headaches.   Hematological: Negative for adenopathy. Does not bruise/bleed easily.   Psychiatric/Behavioral: Negative for confusion.       ECOG Performance Status: 0   Objective:      Vitals:   Vitals:    01/19/18 1420   BP: 138/86   Pulse: 77   Temp: 98.5 °F (36.9 °C)   TempSrc: Oral   Weight: 72.7 kg (160 lb 4.4 oz)   Height: 5' 7" (1.702 m)     BMI: Body mass index is 25.1 kg/m².    Physical Exam   Constitutional: She is oriented to person, place, and time. She appears well-developed and well-nourished. No distress.   HENT:   Head: Normocephalic.   Mouth/Throat: No oropharyngeal exudate.   Eyes: EOM are normal. No scleral icterus.   Neck: Neck supple. No tracheal deviation present. No thyromegaly present.   Cardiovascular: Normal rate and regular rhythm.    Pulmonary/Chest: Effort normal and breath sounds normal. No respiratory distress. She has no wheezes. She has no rales.   Abdominal: Soft. She exhibits no distension and no mass. There is no tenderness. There is no rebound and no guarding.   Musculoskeletal: Normal range of motion. She exhibits no edema.   Lymphadenopathy:     She has no cervical adenopathy.   Neurological: She is alert and oriented to person, place, and time. No cranial nerve deficit.   Skin: Skin is warm and " dry.   Psychiatric: She has a normal mood and affect.       Laboratory Data:  Abstract on 12/06/2016   Component Date Value Ref Range Status    EXT WBC 12/03/2016 4.1  3.8 - 10.8 1000/ul Final    EXT Hemoglobin 12/03/2016 12.9  11.7 - 15.5 g/dl Final    EXT Hematocrit 12/03/2016 38.5  35.0 - 45.0 % Final    EXT Platelets 12/03/2016 138* 140 - 400 1000/ul Final    EXT Glucose 12/03/2016 88  65 - 99 mg/dl Final    EXT BUN 12/03/2016 13  7 - 25 mg/dl Final    EXT Creatinine 12/03/2016 0.90  0.50 - 0.99 mg/dl Final    EXT Sodium 12/03/2016 141  135 - 146 mmol/l Final    EXT Potassium 12/03/2016 4.2  3.5 - 5.3 mmol/l Final    EXT Chloride 12/03/2016 105  98 - 110 mmol/l Final    EXT CO2 12/03/2016 29  20 - 31 mmol/l Final    EXT Calcium 12/03/2016 9.7  8.6 - 10.4 mg/dl Final    EXT Protein total 12/03/2016 6.9  6.1 - 8.1 g/dl Final    EXT Albumin 12/03/2016 4.0  3.6 - 5.1 g/dl Final    EXT BilirubiN Total 12/03/2016 1.1  0.2 - 1.2 mg/dl Final    EXT Alkaline Phosphatase 12/03/2016 181* 33 - 130 u/l Final    EXT AST 12/03/2016 21  10 - 35 u/l Final    EXT ALT 12/03/2016 28  6 - 29 u/l Final   Office Visit on 11/14/2016   Component Date Value Ref Range Status    Blood Stool 11/14/2016 Negative  Negative Final     Acceptable 11/14/2016 Yes   Final    SOURCE: 11/14/2016 Cervical   Final    Slides: 11/14/2016 1   Final    LMP: 11/14/2016 NA   Final    Specimen adequacy: 11/14/2016 (NOTE)   Final    Comment:      Satisfactory for evaluation.          Endocervical cells absent.  Metaplastic cells indicative       of transformation zone cannot be reliably distinguished from       parabasal or atrophic cells.                      Interpretation 11/14/2016 NO EPITHELIAL ABNORMALITY SEE BELOW   Final    Comment: ----------------------------------------------------------------------       *NEGATIVE FOR INTRAEPITHELIAL LESION OR MALIGNANCY    ----------------------------------------------------------------------         Comments: 11/14/2016 (NOTE)   Final    Comment: Due to technical or specimen issues, imaging could not be  performed. A cytotechnologist has manually screened this slide.         Cytotechnologist: 11/14/2016 BRENDON Ca(ASCP)IAC   Final    LOCATION 11/14/2016 (NOTE)   Final    Comment: Specimens processed and interpreted at :[facility], [address] [place] [address], Phone: [phone], CLIA: [id number]      CPT Codes: 11/14/2016 (NOTE)   Final    Comment: [id number]        UNLESS OTHERWISE INDICATED, COMPUTER AIDED AND CYTOTECHNOLOGIST     SCREENING PERFORMED.          The Pap test is a screening test with an inherent, but low       probability of error.  Your patient should be reminded to       consult you immediately if she experiences any suspicious       signs or symptoms, regardless of her Pap test result.       An alternate report format containing images or consolidated       prior Pap history is available as applicable.         HPV HIGH RISK IF ASC-US, SUREPATH 11/14/2016 CRITERIA NOT MET   Final    Comment:       UNLESS OTHERWISE INDICATED, ALL TESTING PERFORMED AT  [facility]  [address] [place] [address]            :  CARLOS MENDEZ M.D.        CLIA NUMBER [id number]  [place]. [id number]                 Imaging:   MRI 12/26/17  MRI abdomen without and with contrast.    Comparison: 5/23/17    Results: Axial gradient T2, axial T1 in and out of phase, axial T2 SSFSE, coronal T2 gradient and T2 SSFSE followed by pre-contrast axial T1 gradient fat sat and dynamic post contrast images were obtained. The patient received  of IV gadolineum contrast.    Numerous hepatic lesions, the overall burden/number and sizes of the lesions are unchanged.  See index lesion below.  The portal venous system is patent.  The biliary ducts are  prominent.  The common bile duct is dilated and measuring 11 mm.  Mild signal abnormality noted in the distal common bile duct could represent sludge or tiny stones. The pancreatic duct is slightly prominent.  The stomach, spleen, adrenal glands and bilateral kidneys appear normal.  Previously mentioned areas of enhancement in the uncinate process pancreas is not definitely seen on today's study.  Small bilateral kidney cysts.  The aorta tapers normally.  No para-aortic lymphadenopathy.  No ascites.  No osseous destructive lesions.   Impression         No interval detrimental change.  Hepatic metastases, not significantly changed from the prior study.   Mildly prominent intrahepatic duct and common bile duct, signal abnormality within the distal common bile duct could represent sludge or stones.    RECIST SUMMARY: Date of prior examination for comparison 5/23/17    Lesion 1: Segment 5 , measuring 2.2 cm, Series 11 Image 41. Prior measurement 2.2 cm  Lesion 2: Segment 3 , measuring 1.7 cm, Series 12 Image 43. Prior measurement 1.3 cm                        Assessment:       1. Primary pancreatic neuroendocrine tumor    2. Secondary neuroendocrine tumor of liver    3. Chemotherapy follow-up examination           Plan:   Ms. Bruce continues to do well and remains on treatment with CAPTEM.  Review of her MRI from 12/2017 shows stability of disease.  We have previously discussed the role of liver directed therapy may be an option upon progression.  At this point, will continue on with monthly labs and CAPTEM.  See me again in 3 months and we'll plan to repeat imaging at 6 months.  All questions were answered and she is agreeable with this plan.      Cory Rodriguez DO, FACP  Hematology & Oncology, [facility]  [address]  [place]  [address]  ph. [phone]; [phone]  fax. [phone]    25 minutes were spent in coordination of patient's care, record review and  counseling.  More than 50% of the time was face-to-face.

## 2018-01-20 LAB
EXT 24 HR UR METANEPHRINE: ABNORMAL
EXT 24 HR UR NORMETANEPHRINE: ABNORMAL
EXT 24 HR UR NORMETANEPHRINE: ABNORMAL
EXT 25 HYDROXY VIT D2: ABNORMAL
EXT 25 HYDROXY VIT D3: ABNORMAL
EXT 5 HIAA 24 HR URINE: ABNORMAL
EXT 5 HIAA BLOOD: ABNORMAL
EXT ACTH: ABNORMAL
EXT AFP: ABNORMAL
EXT ALBUMIN: 4.5 G/DL (ref 3.6–5.1)
EXT ALKALINE PHOSPHATASE: 213 U/L (ref 33–130)
EXT ALT: 24 U/L (ref 6–29)
EXT AMYLASE: ABNORMAL
EXT ANTI ISLET CELL AB: ABNORMAL
EXT ANTI PARIETAL CELL AB: ABNORMAL
EXT ANTI THYROID AB: ABNORMAL
EXT AST: 19 U/L (ref 10–35)
EXT BILIRUBIN DIRECT: ABNORMAL
EXT BILIRUBIN TOTAL: 1.6 MG/DL (ref 0.2–1.2)
EXT BK VIRUS DNA QN PCR: ABNORMAL
EXT BUN: 13 MG/DL (ref 7–25)
EXT C PEPTIDE: ABNORMAL
EXT CA 125: ABNORMAL
EXT CA 19-9: ABNORMAL
EXT CA 27-29: ABNORMAL
EXT CALCITONIN: ABNORMAL
EXT CALCIUM: 10.4 MG/DL (ref 8.6–10.4)
EXT CEA: ABNORMAL
EXT CHLORIDE: 105 MMOL/L (ref 98–110)
EXT CHOLESTEROL: ABNORMAL
EXT CHROMOGRANIN A: ABNORMAL
EXT CO2: 26 MMOL/L (ref 20–31)
EXT CREATININE UA: ABNORMAL
EXT CREATININE: 1.02 MG/DL (ref 0.5–0.99)
EXT CYCLOSPORONE LEVEL: ABNORMAL
EXT DOPAMINE: ABNORMAL
EXT EBV DNA BY PCR: ABNORMAL
EXT EPINEPHRINE: ABNORMAL
EXT FOLATE: ABNORMAL
EXT FREE T3: ABNORMAL
EXT FREE T4: ABNORMAL
EXT FSH: ABNORMAL
EXT GASTRIN RELEASING PEPTIDE: ABNORMAL
EXT GASTRIN RELEASING PEPTIDE: ABNORMAL
EXT GASTRIN: ABNORMAL
EXT GGT: ABNORMAL
EXT GHRELIN: ABNORMAL
EXT GLUCAGON: ABNORMAL
EXT GLUCOSE: 94 MG/DL (ref 65–99)
EXT GROWTH HORMONE: ABNORMAL
EXT HCV RNA QUANT PCR: ABNORMAL
EXT HDL: ABNORMAL
EXT HEMATOCRIT: 39.9 % (ref 35–45)
EXT HEMOGLOBIN A1C: ABNORMAL
EXT HEMOGLOBIN: 13.9 G/DL (ref 11.7–15.5)
EXT HISTAMINE 24 HR URINE: ABNORMAL
EXT HISTAMINE: ABNORMAL
EXT IGF-1: ABNORMAL
EXT IMMUNKNOW (NON-STIMULATED): ABNORMAL
EXT IMMUNKNOW (STIMULATED): ABNORMAL
EXT INR: ABNORMAL
EXT INSULIN: ABNORMAL
EXT LANREOTIDE LEVEL: ABNORMAL
EXT LDH, TOTAL: ABNORMAL
EXT LDL CHOLESTEROL: ABNORMAL
EXT LIPASE: ABNORMAL
EXT MAGNESIUM: ABNORMAL
EXT METANEPHRINE FREE PLASMA: ABNORMAL
EXT MOTILIN: ABNORMAL
EXT NEUROKININ A CAMB: ABNORMAL
EXT NEUROKININ A ISI: ABNORMAL
EXT NEUROTENSIN: ABNORMAL
EXT NOREPINEPHRINE: ABNORMAL
EXT NORMETANEPHRINE: ABNORMAL
EXT NSE: ABNORMAL
EXT OCTREOTIDE LEVEL: ABNORMAL
EXT PANCREASTATIN CAMB: ABNORMAL
EXT PANCREASTATIN ISI: ABNORMAL
EXT PANCREATIC POLYPEPTIDE: ABNORMAL
EXT PHOSPHORUS: ABNORMAL
EXT PLATELETS: 168 1000/UL (ref 140–400)
EXT POTASSIUM: 5 MMOL/L (ref 3.5–5.3)
EXT PROGRAF LEVEL: ABNORMAL
EXT PROLACTIN: ABNORMAL
EXT PROTEIN TOTAL: 8 G/DL (ref 6.1–8.1)
EXT PROTEIN UA: ABNORMAL
EXT PT: ABNORMAL
EXT PTH, INTACT: ABNORMAL
EXT PTT: ABNORMAL
EXT RAPAMUNE LEVEL: ABNORMAL
EXT SEROTONIN: ABNORMAL
EXT SODIUM: 143 MMOL/L (ref 135–146)
EXT SOMATOSTATIN: ABNORMAL
EXT SUBSTANCE P: ABNORMAL
EXT TRIGLYCERIDES: ABNORMAL
EXT TRYPTASE: ABNORMAL
EXT TSH: ABNORMAL
EXT URIC ACID: ABNORMAL
EXT URINE AMYLASE U/HR: ABNORMAL
EXT URINE AMYLASE U/L: ABNORMAL
EXT VASOACTIVE INTESTINAL POLYPEPTIDE: ABNORMAL
EXT VITAMIN B12: ABNORMAL
EXT VMA 24 HR URINE: ABNORMAL
EXT WBC: 5 1000/UL (ref 3.8–10.8)
NEURON SPECIFIC ENOLASE: ABNORMAL

## 2018-01-26 ENCOUNTER — TELEPHONE (OUTPATIENT)
Dept: NEUROLOGY | Facility: HOSPITAL | Age: 68
End: 2018-01-26

## 2018-01-26 DIAGNOSIS — C7A.8 PRIMARY MALIGNANT NEUROENDOCRINE TUMOR OF PANCREAS: Primary | ICD-10-CM

## 2018-01-26 NOTE — TELEPHONE ENCOUNTER
Returned call and LVM that she needed to have a CMP drawn next week to check her bilirubin which has jumped some per Dr Rodriguez's note and to call us back to confirm that she will get the labs drawn and if she had additional questions.

## 2018-01-26 NOTE — TELEPHONE ENCOUNTER
----- Message from Cory Rodriguez DO, FACP sent at 1/26/2018  2:59 PM CST -----  Can you please have her get a CMP next week?  Her bilirubin jumped up a little.

## 2018-02-08 LAB
EXT 24 HR UR METANEPHRINE: ABNORMAL
EXT 24 HR UR NORMETANEPHRINE: ABNORMAL
EXT 24 HR UR NORMETANEPHRINE: ABNORMAL
EXT 25 HYDROXY VIT D2: ABNORMAL
EXT 25 HYDROXY VIT D3: ABNORMAL
EXT 5 HIAA 24 HR URINE: ABNORMAL
EXT 5 HIAA BLOOD: ABNORMAL
EXT ACTH: ABNORMAL
EXT AFP: ABNORMAL
EXT ALBUMIN: 4.3 G/DL (ref 3.6–5.1)
EXT ALKALINE PHOSPHATASE: 166 U/L (ref 33–130)
EXT ALT: 18 U/L (ref 6–29)
EXT AMYLASE: ABNORMAL
EXT ANTI ISLET CELL AB: ABNORMAL
EXT ANTI PARIETAL CELL AB: ABNORMAL
EXT ANTI THYROID AB: ABNORMAL
EXT AST: 16 U/L (ref 10–35)
EXT BILIRUBIN DIRECT: ABNORMAL
EXT BILIRUBIN TOTAL: 1.2 MG/DL (ref 0.2–1.2)
EXT BK VIRUS DNA QN PCR: ABNORMAL
EXT BUN: 13 MG/DL (ref 7–25)
EXT C PEPTIDE: ABNORMAL
EXT CA 125: ABNORMAL
EXT CA 19-9: ABNORMAL
EXT CA 27-29: ABNORMAL
EXT CALCITONIN: ABNORMAL
EXT CALCIUM: 10 MG/DL (ref 8.6–10.4)
EXT CEA: ABNORMAL
EXT CHLORIDE: 106 MMOL/L (ref 98–110)
EXT CHOLESTEROL: ABNORMAL
EXT CHROMOGRANIN A: ABNORMAL
EXT CO2: 27 MMOL/L (ref 20–31)
EXT CREATININE UA: ABNORMAL
EXT CREATININE: 0.95 MG/DL (ref 0.5–0.99)
EXT CYCLOSPORONE LEVEL: ABNORMAL
EXT DOPAMINE: ABNORMAL
EXT EBV DNA BY PCR: ABNORMAL
EXT EPINEPHRINE: ABNORMAL
EXT FOLATE: ABNORMAL
EXT FREE T3: ABNORMAL
EXT FREE T4: ABNORMAL
EXT FSH: ABNORMAL
EXT GASTRIN RELEASING PEPTIDE: ABNORMAL
EXT GASTRIN RELEASING PEPTIDE: ABNORMAL
EXT GASTRIN: ABNORMAL
EXT GGT: ABNORMAL
EXT GHRELIN: ABNORMAL
EXT GLUCAGON: ABNORMAL
EXT GLUCOSE: 96 MG/DL (ref 65–99)
EXT GROWTH HORMONE: ABNORMAL
EXT HCV RNA QUANT PCR: ABNORMAL
EXT HDL: ABNORMAL
EXT HEMATOCRIT: 39.7 % (ref 35–45)
EXT HEMOGLOBIN A1C: ABNORMAL
EXT HEMOGLOBIN: 13.6 G/DL (ref 11.7–15.5)
EXT HISTAMINE 24 HR URINE: ABNORMAL
EXT HISTAMINE: ABNORMAL
EXT IGF-1: ABNORMAL
EXT IMMUNKNOW (NON-STIMULATED): ABNORMAL
EXT IMMUNKNOW (STIMULATED): ABNORMAL
EXT INR: ABNORMAL
EXT INSULIN: ABNORMAL
EXT LANREOTIDE LEVEL: ABNORMAL
EXT LDH, TOTAL: ABNORMAL
EXT LDL CHOLESTEROL: ABNORMAL
EXT LIPASE: ABNORMAL
EXT MAGNESIUM: ABNORMAL
EXT METANEPHRINE FREE PLASMA: ABNORMAL
EXT MOTILIN: ABNORMAL
EXT NEUROKININ A CAMB: ABNORMAL
EXT NEUROKININ A ISI: ABNORMAL
EXT NEUROTENSIN: ABNORMAL
EXT NOREPINEPHRINE: ABNORMAL
EXT NORMETANEPHRINE: ABNORMAL
EXT NSE: ABNORMAL
EXT OCTREOTIDE LEVEL: ABNORMAL
EXT PANCREASTATIN CAMB: ABNORMAL
EXT PANCREASTATIN ISI: ABNORMAL
EXT PANCREATIC POLYPEPTIDE: ABNORMAL
EXT PHOSPHORUS: ABNORMAL
EXT PLATELETS: 166 1000/UL (ref 140–400)
EXT POTASSIUM: 4.7 MMOL/L (ref 3.5–5.3)
EXT PROGRAF LEVEL: ABNORMAL
EXT PROLACTIN: ABNORMAL
EXT PROTEIN TOTAL: 7.5 G/DL (ref 6.1–8.1)
EXT PROTEIN UA: ABNORMAL
EXT PT: ABNORMAL
EXT PTH, INTACT: ABNORMAL
EXT PTT: ABNORMAL
EXT RAPAMUNE LEVEL: ABNORMAL
EXT SEROTONIN: ABNORMAL
EXT SODIUM: 142 MMOL/L (ref 135–146)
EXT SOMATOSTATIN: ABNORMAL
EXT SUBSTANCE P: ABNORMAL
EXT TRIGLYCERIDES: ABNORMAL
EXT TRYPTASE: ABNORMAL
EXT TSH: ABNORMAL
EXT URIC ACID: ABNORMAL
EXT URINE AMYLASE U/HR: ABNORMAL
EXT URINE AMYLASE U/L: ABNORMAL
EXT VASOACTIVE INTESTINAL POLYPEPTIDE: ABNORMAL
EXT VITAMIN B12: ABNORMAL
EXT VMA 24 HR URINE: ABNORMAL
EXT WBC: 5.3 1000/UL (ref 3.8–10.8)
NEURON SPECIFIC ENOLASE: ABNORMAL

## 2018-03-24 LAB
EXT 24 HR UR METANEPHRINE: ABNORMAL
EXT 24 HR UR NORMETANEPHRINE: ABNORMAL
EXT 24 HR UR NORMETANEPHRINE: ABNORMAL
EXT 25 HYDROXY VIT D2: ABNORMAL
EXT 25 HYDROXY VIT D3: ABNORMAL
EXT 5 HIAA 24 HR URINE: ABNORMAL
EXT 5 HIAA BLOOD: ABNORMAL
EXT ACTH: ABNORMAL
EXT AFP: ABNORMAL
EXT ALBUMIN: 4 G/DL (ref 3.6–5.1)
EXT ALKALINE PHOSPHATASE: 158 U/L (ref 33–130)
EXT ALT: 18 U/L (ref 6–29)
EXT AMYLASE: ABNORMAL
EXT ANTI ISLET CELL AB: ABNORMAL
EXT ANTI PARIETAL CELL AB: ABNORMAL
EXT ANTI THYROID AB: ABNORMAL
EXT AST: 15 U/L (ref 10–35)
EXT BILIRUBIN DIRECT: ABNORMAL
EXT BILIRUBIN TOTAL: 1 MG/DL (ref 0.2–1.2)
EXT BK VIRUS DNA QN PCR: ABNORMAL
EXT BUN: 12 MG/DL (ref 7–25)
EXT C PEPTIDE: ABNORMAL
EXT CA 125: ABNORMAL
EXT CA 19-9: ABNORMAL
EXT CA 27-29: ABNORMAL
EXT CALCITONIN: ABNORMAL
EXT CALCIUM: 9.7 MG/DL (ref 8.6–10.4)
EXT CEA: ABNORMAL
EXT CHLORIDE: 106 MMOL/L (ref 98–110)
EXT CHOLESTEROL: ABNORMAL
EXT CHROMOGRANIN A: ABNORMAL
EXT CO2: 29 MMOL/L (ref 20–31)
EXT CREATININE UA: ABNORMAL
EXT CREATININE: 0.97 MG/DL (ref 0.5–0.99)
EXT CYCLOSPORONE LEVEL: ABNORMAL
EXT DOPAMINE: ABNORMAL
EXT EBV DNA BY PCR: ABNORMAL
EXT EPINEPHRINE: ABNORMAL
EXT FOLATE: ABNORMAL
EXT FREE T3: ABNORMAL
EXT FREE T4: ABNORMAL
EXT FSH: ABNORMAL
EXT GASTRIN RELEASING PEPTIDE: ABNORMAL
EXT GASTRIN RELEASING PEPTIDE: ABNORMAL
EXT GASTRIN: ABNORMAL
EXT GGT: ABNORMAL
EXT GHRELIN: ABNORMAL
EXT GLUCAGON: ABNORMAL
EXT GLUCOSE: 88 MG/DL (ref 65–99)
EXT GROWTH HORMONE: ABNORMAL
EXT HCV RNA QUANT PCR: ABNORMAL
EXT HDL: ABNORMAL
EXT HEMATOCRIT: 39.2 % (ref 35–45)
EXT HEMOGLOBIN A1C: ABNORMAL
EXT HEMOGLOBIN: 13.4 G/DL (ref 11.7–15.5)
EXT HISTAMINE 24 HR URINE: ABNORMAL
EXT HISTAMINE: ABNORMAL
EXT IGF-1: ABNORMAL
EXT IMMUNKNOW (NON-STIMULATED): ABNORMAL
EXT IMMUNKNOW (STIMULATED): ABNORMAL
EXT INR: ABNORMAL
EXT INSULIN: ABNORMAL
EXT LANREOTIDE LEVEL: ABNORMAL
EXT LDH, TOTAL: ABNORMAL
EXT LDL CHOLESTEROL: ABNORMAL
EXT LIPASE: ABNORMAL
EXT MAGNESIUM: ABNORMAL
EXT METANEPHRINE FREE PLASMA: ABNORMAL
EXT MOTILIN: ABNORMAL
EXT NEUROKININ A CAMB: ABNORMAL
EXT NEUROKININ A ISI: ABNORMAL
EXT NEUROTENSIN: ABNORMAL
EXT NOREPINEPHRINE: ABNORMAL
EXT NORMETANEPHRINE: ABNORMAL
EXT NSE: ABNORMAL
EXT OCTREOTIDE LEVEL: ABNORMAL
EXT PANCREASTATIN CAMB: ABNORMAL
EXT PANCREASTATIN ISI: ABNORMAL
EXT PANCREATIC POLYPEPTIDE: ABNORMAL
EXT PHOSPHORUS: ABNORMAL
EXT PLATELETS: 154 1000/UL (ref 140–400)
EXT POTASSIUM: 4.7 MMOL/L (ref 3.5–5.3)
EXT PROGRAF LEVEL: ABNORMAL
EXT PROLACTIN: ABNORMAL
EXT PROTEIN TOTAL: 7 G/DL (ref 6.1–8.1)
EXT PROTEIN UA: ABNORMAL
EXT PT: ABNORMAL
EXT PTH, INTACT: ABNORMAL
EXT PTT: ABNORMAL
EXT RAPAMUNE LEVEL: ABNORMAL
EXT SEROTONIN: ABNORMAL
EXT SODIUM: 142 MMOL/L (ref 135–146)
EXT SOMATOSTATIN: ABNORMAL
EXT SUBSTANCE P: ABNORMAL
EXT TRIGLYCERIDES: ABNORMAL
EXT TRYPTASE: ABNORMAL
EXT TSH: ABNORMAL
EXT URIC ACID: ABNORMAL
EXT URINE AMYLASE U/HR: ABNORMAL
EXT URINE AMYLASE U/L: ABNORMAL
EXT VASOACTIVE INTESTINAL POLYPEPTIDE: ABNORMAL
EXT VITAMIN B12: ABNORMAL
EXT VMA 24 HR URINE: ABNORMAL
EXT WBC: 4.5 1000/UL (ref 3.8–10.8)
NEURON SPECIFIC ENOLASE: ABNORMAL

## 2018-03-29 ENCOUNTER — TELEPHONE (OUTPATIENT)
Dept: NEUROLOGY | Facility: HOSPITAL | Age: 68
End: 2018-03-29

## 2018-03-29 DIAGNOSIS — C7B.8 SECONDARY NEUROENDOCRINE TUMOR OF LIVER: Primary | ICD-10-CM

## 2018-03-29 NOTE — TELEPHONE ENCOUNTER
Verified with Ms. Aguirre she will need an MRI prior to her appt with dr. Rodriguez.  She does not need an U/S, the MRI could look at her ducts also.

## 2018-03-29 NOTE — TELEPHONE ENCOUNTER
----- Message from Nicole Scherer sent at 3/29/2018  4:09 PM CDT -----  RR- Patient would like to speak with you about needing scans before her next appointment.

## 2018-04-09 DIAGNOSIS — D3A.8 PRIMARY PANCREATIC NEUROENDOCRINE TUMOR: ICD-10-CM

## 2018-04-09 RX ORDER — TEMOZOLOMIDE 250 MG/1
CAPSULE ORAL
Qty: 5 CAPSULE | Refills: 4 | Status: SHIPPED | OUTPATIENT
Start: 2018-04-09 | End: 2018-09-18 | Stop reason: SDUPTHER

## 2018-04-18 LAB
EXT 24 HR UR METANEPHRINE: ABNORMAL
EXT 24 HR UR NORMETANEPHRINE: ABNORMAL
EXT 24 HR UR NORMETANEPHRINE: ABNORMAL
EXT 25 HYDROXY VIT D2: ABNORMAL
EXT 25 HYDROXY VIT D3: ABNORMAL
EXT 5 HIAA 24 HR URINE: ABNORMAL
EXT 5 HIAA BLOOD: ABNORMAL
EXT ACTH: ABNORMAL
EXT AFP: ABNORMAL
EXT ALBUMIN: 4.2 G/DL (ref 3.6–5.1)
EXT ALKALINE PHOSPHATASE: 178 U/L (ref 33–130)
EXT ALT: 19 U/L (ref 6–29)
EXT AMYLASE: ABNORMAL
EXT ANTI ISLET CELL AB: ABNORMAL
EXT ANTI PARIETAL CELL AB: ABNORMAL
EXT ANTI THYROID AB: ABNORMAL
EXT AST: 17 U/L (ref 10–35)
EXT BILIRUBIN DIRECT: ABNORMAL
EXT BILIRUBIN TOTAL: 0.9 MG/DL (ref 0.2–1.2)
EXT BK VIRUS DNA QN PCR: ABNORMAL
EXT BUN: 17 MG/DL (ref 7–25)
EXT C PEPTIDE: ABNORMAL
EXT CA 125: ABNORMAL
EXT CA 19-9: ABNORMAL
EXT CA 27-29: ABNORMAL
EXT CALCITONIN: ABNORMAL
EXT CALCIUM: 10 MG/DL (ref 8.6–10.4)
EXT CEA: ABNORMAL
EXT CHLORIDE: 105 MMOL/L (ref 98–110)
EXT CHOLESTEROL: ABNORMAL
EXT CHROMOGRANIN A: ABNORMAL
EXT CO2: 27 MMOL/L (ref 20–31)
EXT CREATININE UA: ABNORMAL
EXT CREATININE: 1.01 MG/DL (ref 0.5–0.99)
EXT CYCLOSPORONE LEVEL: ABNORMAL
EXT DOPAMINE: ABNORMAL
EXT EBV DNA BY PCR: ABNORMAL
EXT EPINEPHRINE: ABNORMAL
EXT FOLATE: ABNORMAL
EXT FREE T3: ABNORMAL
EXT FREE T4: ABNORMAL
EXT FSH: ABNORMAL
EXT GASTRIN RELEASING PEPTIDE: ABNORMAL
EXT GASTRIN RELEASING PEPTIDE: ABNORMAL
EXT GASTRIN: ABNORMAL
EXT GGT: ABNORMAL
EXT GHRELIN: ABNORMAL
EXT GLUCAGON: ABNORMAL
EXT GLUCOSE: 94 MG/DL (ref 65–99)
EXT GROWTH HORMONE: ABNORMAL
EXT HCV RNA QUANT PCR: ABNORMAL
EXT HDL: ABNORMAL
EXT HEMATOCRIT: 38.5 % (ref 35–45)
EXT HEMOGLOBIN A1C: ABNORMAL
EXT HEMOGLOBIN: 13.4 G/DL (ref 11.7–15.5)
EXT HISTAMINE 24 HR URINE: ABNORMAL
EXT HISTAMINE: ABNORMAL
EXT IGF-1: ABNORMAL
EXT IMMUNKNOW (NON-STIMULATED): ABNORMAL
EXT IMMUNKNOW (STIMULATED): ABNORMAL
EXT INR: ABNORMAL
EXT INSULIN: ABNORMAL
EXT LANREOTIDE LEVEL: ABNORMAL
EXT LDH, TOTAL: ABNORMAL
EXT LDL CHOLESTEROL: ABNORMAL
EXT LIPASE: ABNORMAL
EXT MAGNESIUM: ABNORMAL
EXT METANEPHRINE FREE PLASMA: ABNORMAL
EXT MOTILIN: ABNORMAL
EXT NEUROKININ A CAMB: ABNORMAL
EXT NEUROKININ A ISI: ABNORMAL
EXT NEUROTENSIN: ABNORMAL
EXT NOREPINEPHRINE: ABNORMAL
EXT NORMETANEPHRINE: ABNORMAL
EXT NSE: ABNORMAL
EXT OCTREOTIDE LEVEL: ABNORMAL
EXT PANCREASTATIN CAMB: ABNORMAL
EXT PANCREASTATIN ISI: ABNORMAL
EXT PANCREATIC POLYPEPTIDE: ABNORMAL
EXT PHOSPHORUS: ABNORMAL
EXT PLATELETS: 144 1000/UL (ref 140–400)
EXT POTASSIUM: 4.8 MMOL/L (ref 3.5–5.3)
EXT PROGRAF LEVEL: ABNORMAL
EXT PROLACTIN: ABNORMAL
EXT PROTEIN TOTAL: 7.4 G/DL (ref 6.1–8.1)
EXT PROTEIN UA: ABNORMAL
EXT PT: ABNORMAL
EXT PTH, INTACT: ABNORMAL
EXT PTT: ABNORMAL
EXT RAPAMUNE LEVEL: ABNORMAL
EXT SEROTONIN: ABNORMAL
EXT SODIUM: 142 MMOL/L (ref 135–146)
EXT SOMATOSTATIN: ABNORMAL
EXT SUBSTANCE P: ABNORMAL
EXT TRIGLYCERIDES: ABNORMAL
EXT TRYPTASE: ABNORMAL
EXT TSH: ABNORMAL
EXT URIC ACID: ABNORMAL
EXT URINE AMYLASE U/HR: ABNORMAL
EXT URINE AMYLASE U/L: ABNORMAL
EXT VASOACTIVE INTESTINAL POLYPEPTIDE: ABNORMAL
EXT VITAMIN B12: ABNORMAL
EXT VMA 24 HR URINE: ABNORMAL
EXT WBC: 5.1 1000/UL (ref 3.8–10.8)
NEURON SPECIFIC ENOLASE: ABNORMAL

## 2018-04-26 ENCOUNTER — HOSPITAL ENCOUNTER (OUTPATIENT)
Dept: RADIOLOGY | Facility: HOSPITAL | Age: 68
Discharge: HOME | End: 2018-04-26
Attending: INTERNAL MEDICINE
Payer: COMMERCIAL

## 2018-04-26 DIAGNOSIS — C7B.8 SECONDARY NEUROENDOCRINE TUMOR OF LIVER: ICD-10-CM

## 2018-04-26 PROCEDURE — 25500020 PHARM REV CODE 255: Performed by: INTERNAL MEDICINE

## 2018-04-26 PROCEDURE — 74183 MRI ABD W/O CNTR FLWD CNTR: CPT | Mod: TC

## 2018-04-26 PROCEDURE — A9585 GADOBUTROL INJECTION: HCPCS | Performed by: INTERNAL MEDICINE

## 2018-04-26 PROCEDURE — 74183 MRI ABD W/O CNTR FLWD CNTR: CPT | Mod: 26,,, | Performed by: RADIOLOGY

## 2018-04-26 RX ORDER — GADOBUTROL 604.72 MG/ML
10 INJECTION INTRAVENOUS
Status: COMPLETED | OUTPATIENT
Start: 2018-04-26 | End: 2018-04-26

## 2018-04-26 RX ADMIN — GADOBUTROL 10 ML: 604.72 INJECTION INTRAVENOUS at 04:04

## 2018-05-01 ENCOUNTER — OFFICE VISIT (OUTPATIENT)
Dept: NEUROLOGY | Facility: HOSPITAL | Age: 68
End: 2018-05-01
Attending: INTERNAL MEDICINE
Payer: COMMERCIAL

## 2018-05-01 VITALS
SYSTOLIC BLOOD PRESSURE: 118 MMHG | WEIGHT: 162.69 LBS | TEMPERATURE: 97 F | BODY MASS INDEX: 25.53 KG/M2 | OXYGEN SATURATION: 99 % | HEART RATE: 68 BPM | DIASTOLIC BLOOD PRESSURE: 77 MMHG | HEIGHT: 67 IN

## 2018-05-01 DIAGNOSIS — C7B.8 SECONDARY NEUROENDOCRINE TUMOR OF LIVER: ICD-10-CM

## 2018-05-01 DIAGNOSIS — Z09 CHEMOTHERAPY FOLLOW-UP EXAMINATION: ICD-10-CM

## 2018-05-01 DIAGNOSIS — D3A.8 PRIMARY PANCREATIC NEUROENDOCRINE TUMOR: Primary | ICD-10-CM

## 2018-05-01 PROCEDURE — 99214 OFFICE O/P EST MOD 30 MIN: CPT | Performed by: INTERNAL MEDICINE

## 2018-05-01 PROCEDURE — 3074F SYST BP LT 130 MM HG: CPT | Mod: CPTII,,, | Performed by: INTERNAL MEDICINE

## 2018-05-01 PROCEDURE — 3078F DIAST BP <80 MM HG: CPT | Mod: CPTII,,, | Performed by: INTERNAL MEDICINE

## 2018-05-01 PROCEDURE — 99214 OFFICE O/P EST MOD 30 MIN: CPT | Mod: ,,, | Performed by: INTERNAL MEDICINE

## 2018-05-01 NOTE — PROGRESS NOTES
NOLANETS:  [facility]  A collaboration between [facility] and [facility]    PATIENT: Chnig Bruce  MRN: [id number]  DATE: 5/1/2018      Diagnosis:   1. Primary pancreatic neuroendocrine tumor    2. Secondary neuroendocrine tumor of liver    3. 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 Bruce is a 67 y.o. female who returns for follow up for her pancreatic neuroendocrine tumor.  She remains on treatment with CAPTEM.  She states that she is tolerating this well.  She is fully active and has no new complaints.    ONCOLOGIC HISTORY:   A 67 y.o. year-old -American female who I had initially   seen on 12/18/2012. Her history dates back to September 2012 when she was noted  to have several weeks of feeling fatigued. She sought treatment with her   primary care doctor who did a CBC and found her to be severely anemic. She was   seen at [facility] and transfused 3 units of packed red blood cells  and no source of bleeding had been found. Records at that time had shown   hemoglobin of 5. She sought followup in September 2012 with Dr. Guillen who   performed an EGD and colonoscopy with an EGD showing an ulcerated and fungating   nonbleeding 2 cm mass, malignant in appearance. It did cause partial   obstructions. Biopsies were taken, which showed no evidence of malignancy at   that time. She had a CT of the abdomen and pelvis showing multiple metastatic   lesions and the liver biopsy was performed on 10/17/2012 showed pancreatic   neuroendocrine neoplasm in the left lobe of the liver aspirate. She went on to    have an ERCP along with sphincterotomy and biliary stent placement and   subsequent PET had shown numerous hypodense lesions in the liver. Octreotide   scan was performed, which showed multiple right and left hepatic metastases.   MRI of the abdomen was performed on 12/05/2012 showing innumerable lesions that   demonstrated peripheral to hyperintensity consistent with hypervascular   metastasis. She was seen by Dr. Humphrey, who thought she was not a candidate   for surgical resection during that time. Her pathology from her tumor came back  positive for chromogranin, synaptophysin and had a Ki-67 of less than 1%. She   was started on treatment with temozolomide and capecitabine with cycle   1 being on 01/22/2013.    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    Secondary malignant neoplasm of liver     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) 5/7/2013    Thrombocytopenia 11/12/2013    Thyroid disease     Unspecified essential hypertension 11/12/2013       Past Surgical HIstory:   Past Surgical History:   Procedure Laterality Date    THYROIDECTOMY  2011       Family History:   Family History   Problem Relation Age of Onset    Cancer Maternal Grandmother     Diabetes Father     Breast cancer Neg Hx     Colon cancer Neg Hx     Ovarian cancer Neg Hx        Social History:  reports that she has never smoked. She has never used smokeless tobacco. She reports that she does not drink alcohol or use drugs.    Allergies:  Review of patient's allergies indicates:    Allergen Reactions    Epinephrine Other (See Comments)     Carcinoid patient    Sulfa (sulfonamide antibiotics) Other (See Comments)     Urticaria       Medications:  Current Outpatient Prescriptions   Medication Sig Dispense Refill    alendronate (FOSAMAX) 70 MG tablet Take 70 mg by mouth every 7 days.   3    capecitabine (XELODA) 500 MG Tab TAKE 2 TABLETS (1000MG) BY MOUTH TWICE DAILY FOR 14 DAYS ON, THEN 14 DAYS OFF. THEN REPEAT. TAKE WITHIN 30 MINS  AFTER A MEAL WITH WATER. RE 56 tablet 6    ergocalciferol (VITAMIN D2) 50,000 unit Cap Take 50,000 Units by mouth every 7 days.      ferrous sulfate 325 mg (65 mg iron) Tab tablet Take 325 mg by mouth once daily.      indapamide (LOZOL) 2.5 MG Tab 2.5 mg once daily.       irbesartan (AVAPRO) 300 MG tablet 300 mg once daily.       levothyroxine (SYNTHROID) 150 MCG tablet Take 150 mcg by mouth once daily.  0    temozolomide (TEMODAR) 250 MG capsule TAKE 1 CAPSULE (250 MG) BY MOUTH ONCE DAILY ON DAYS 10-14 OF A 28 DAYCYCLE. TAKE WITH WATER ON EMPTY STOMACH. SWALLOW WHOLE 5 capsule 4    verapamil (VERELAN PM) 360 MG C24P Take by mouth once daily.        No current facility-administered medications for this visit.        Review of Systems   Constitutional: Negative for chills, fever and unexpected weight change.   HENT: Negative for congestion, hearing loss and nosebleeds.    Eyes: Negative for visual disturbance.   Respiratory: Negative for cough and shortness of breath.    Cardiovascular: Negative for chest pain and palpitations.   Gastrointestinal: Negative for abdominal pain, blood in stool, constipation, diarrhea, nausea and vomiting.   Genitourinary: Negative for dysuria.   Musculoskeletal: Negative for back pain and gait problem.   Skin: Negative for color change and rash.   Neurological: Negative for dizziness, weakness and headaches.   Hematological: Negative for adenopathy. Does not bruise/bleed easily.   Psychiatric/Behavioral: Negative for  "confusion.       ECOG Performance Status: 0   Objective:      Vitals:   Vitals:    05/01/18 1605   BP: 118/77   Pulse: 68   Temp: 97.1 °F (36.2 °C)   TempSrc: Oral   SpO2: 99%   Weight: 73.8 kg (162 lb 11.2 oz)   Height: 5' 7" (1.702 m)     BMI: Body mass index is 25.48 kg/m².    Physical Exam   Constitutional: She is oriented to person, place, and time. She appears well-developed and well-nourished. No distress.   HENT:   Head: Normocephalic.   Mouth/Throat: No oropharyngeal exudate.   Eyes: EOM are normal. No scleral icterus.   Neck: Neck supple. No tracheal deviation present. No thyromegaly present.   Cardiovascular: Normal rate and regular rhythm.    Pulmonary/Chest: Effort normal and breath sounds normal. No respiratory distress. She has no wheezes. She has no rales.   Abdominal: Soft. She exhibits no distension and no mass. There is no tenderness. There is no rebound and no guarding.   Musculoskeletal: Normal range of motion. She exhibits no edema.   Lymphadenopathy:     She has no cervical adenopathy.   Neurological: She is alert and oriented to person, place, and time. No cranial nerve deficit.   Skin: Skin is warm and dry.   Psychiatric: She has a normal mood and affect.       Laboratory Data:  Abstract on 12/06/2016   Component Date Value Ref Range Status    EXT WBC 12/03/2016 4.1  3.8 - 10.8 1000/ul Final    EXT Hemoglobin 12/03/2016 12.9  11.7 - 15.5 g/dl Final    EXT Hematocrit 12/03/2016 38.5  35.0 - 45.0 % Final    EXT Platelets 12/03/2016 138* 140 - 400 1000/ul Final    EXT Glucose 12/03/2016 88  65 - 99 mg/dl Final    EXT BUN 12/03/2016 13  7 - 25 mg/dl Final    EXT Creatinine 12/03/2016 0.90  0.50 - 0.99 mg/dl Final    EXT Sodium 12/03/2016 141  135 - 146 mmol/l Final    EXT Potassium 12/03/2016 4.2  3.5 - 5.3 mmol/l Final    EXT Chloride 12/03/2016 105  98 - 110 mmol/l Final    EXT CO2 12/03/2016 29  20 - 31 mmol/l Final    EXT Calcium 12/03/2016 9.7  8.6 - 10.4 mg/dl Final    EXT " Protein total 12/03/2016 6.9  6.1 - 8.1 g/dl Final    EXT Albumin 12/03/2016 4.0  3.6 - 5.1 g/dl Final    EXT BilirubiN Total 12/03/2016 1.1  0.2 - 1.2 mg/dl Final    EXT Alkaline Phosphatase 12/03/2016 181* 33 - 130 u/l Final    EXT AST 12/03/2016 21  10 - 35 u/l Final    EXT ALT 12/03/2016 28  6 - 29 u/l Final   Office Visit on 11/14/2016   Component Date Value Ref Range Status    Blood Stool 11/14/2016 Negative  Negative Final     Acceptable 11/14/2016 Yes   Final    SOURCE: 11/14/2016 Cervical   Final    Slides: 11/14/2016 1   Final    LMP: 11/14/2016 NA   Final    Specimen adequacy: 11/14/2016 (NOTE)   Final    Comment:      Satisfactory for evaluation.          Endocervical cells absent.  Metaplastic cells indicative       of transformation zone cannot be reliably distinguished from       parabasal or atrophic cells.                      Interpretation 11/14/2016 NO EPITHELIAL ABNORMALITY SEE BELOW   Final    Comment: ----------------------------------------------------------------------       *NEGATIVE FOR INTRAEPITHELIAL LESION OR MALIGNANCY   ----------------------------------------------------------------------         Comments: 11/14/2016 (NOTE)   Final    Comment: Due to technical or specimen issues, imaging could not be  performed. A cytotechnologist has manually screened this slide.         Cytotechnologist: 11/14/2016 BRENDON Ca(ASCP)IAC   Final    LOCATION 11/14/2016 (NOTE)   Final    Comment: Specimens processed and interpreted at :[facility], [address] [place] [address], Phone: [phone], CLIA: [id number]      CPT Codes: 11/14/2016 (NOTE)   Final    Comment: [id number]        UNLESS OTHERWISE INDICATED, COMPUTER AIDED AND CYTOTECHNOLOGIST     SCREENING PERFORMED.          The Pap test is a screening test with an inherent, but low       probability of error.  Your patient should be reminded to       consult you immediately if she  experiences any suspicious       signs or symptoms, regardless of her Pap test result.       An alternate report format containing images or consolidated       prior Pap history is available as applicable.         HPV HIGH RISK IF ASC-US, SUREPATH 11/14/2016 CRITERIA NOT MET   Final    Comment:       UNLESS OTHERWISE INDICATED, ALL TESTING PERFORMED AT  [facility]  [address] [place] [address]            :  CARLOS MENDEZ M.D.        [facility] NUMBER [id number]  [place]. [id number]                 Imaging:   MRI 12/8/16  MRI abdomen without and with contrast.    Comparison: 5/31/16    History: Neuroendocrine tumor.    Results: Axial gradient T2, axial T1 in and out of phase, axial T2 SSFSE, coronal T2 gradient and T2 SSFSE followed by pre-contrast axial T1 gradient fat sat and dynamic post contrast images were obtained. The patient received  10 cc of IV Gadovist contrast.    Numerous hepatic numerous hepatic lesion better seen on postcontrast 10 seconds.  Index lesion in the right hepatic lobe measures segment 5 measures 2.2 cm (previously measuring 2.0 x 2.4 cm).  A 2nd index lesion has been chosen within the left hepatic lobe segment 3 measuring 1.6 cm, in retrospect, on the prior study, measuring 1.6 cm.  Vague enhancing area) postcontrast 10 seconds image 62) within the uncinate process measuring at 1.7 x 0.9 cm as seen on the prior study.    The number and burden of liver lesions appear similar to the prior exam.  The biliary ducts are nondilated.  No liver is enlarged similar to the prior study the gallbladder is present.  The stomach, pancreas, spleen, and adrenal glands appear within normal limits.  Small bilateral kidney cysts.  The visualized osseous structures demonstrate no definite osseous lesions.   Impression       Hepatomegaly with multiple liver lesions, the burden of lesions and size appear similar to the prior exam.  Vague area of  enhancement within the uncinate process of the pancreas appears unchanged.                      Assessment:       1. Primary pancreatic neuroendocrine tumor    2. Secondary neuroendocrine tumor of liver    3. Chemotherapy follow-up examination           Plan:   Ms. Bruce continues to tolerate CAPTEM in review of her labs shows no detrimental effects.  We will plan to continue and repeat imaging in May 2017 which will be 6 months since prior imaging.  She did note that she will be retiring may need financial assistance to obtain chemotherapy.  Continue on with monthly labs.  Follow up in 2 months.     Cory Rodriguez DO, FACP  Hematology & Oncology, [facility]  [address]  [place]  [address]  ph. [phone]; [phone]  fax. [phone]    25 minutes were spent in coordination of patient's care, record review and counseling.  More than 50% of the time was face-to-face.      NOLANETS:  [facility]  A collaboration between [facility] and [facility]    PATIENT: Ching Bruce  MRN: [id number]  DATE: 5/1/2018      Diagnosis:   1. Primary pancreatic neuroendocrine tumor    2. Secondary neuroendocrine tumor of liver    3. 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 Bruce is a 67 y.o. female who returns for follow up for her pancreatic neuroendocrine tumor.  She states that she has been feeling well.  She continues on with CAPTEM without any side effects.  She recently had biliary stent exchange.  No new complaints.    ONCOLOGIC HISTORY:   A 67 y.o. year-old -American  female who I had initially   seen on 12/18/2012. Her history dates back to September 2012 when she was noted  to have several weeks of feeling fatigued. She sought treatment with her   primary care doctor who did a CBC and found her to be severely anemic. She was   seen at [facility] and transfused 3 units of packed red blood cells  and no source of bleeding had been found. Records at that time had shown   hemoglobin of 5. She sought followup in September 2012 with Dr. Guillen who   performed an EGD and colonoscopy with an EGD showing an ulcerated and fungating   nonbleeding 2 cm mass, malignant in appearance. It did cause partial   obstructions. Biopsies were taken, which showed no evidence of malignancy at   that time. She had a CT of the abdomen and pelvis showing multiple metastatic   lesions and the liver biopsy was performed on 10/17/2012 showed pancreatic   neuroendocrine neoplasm in the left lobe of the liver aspirate. She went on to   have an ERCP along with sphincterotomy and biliary stent placement and   subsequent PET had shown numerous hypodense lesions in the liver. Octreotide   scan was performed, which showed multiple right and left hepatic metastases.   MRI of the abdomen was performed on 12/05/2012 showing innumerable lesions that   demonstrated peripheral to hyperintensity consistent with hypervascular   metastasis. She was seen by Dr. Humphrey, who thought she was not a candidate   for surgical resection during that time. Her pathology from her tumor came back  positive for chromogranin, synaptophysin and had a Ki-67 of less than 1%. She   was started on treatment with temozolomide and capecitabine with cycle   1 being on 01/22/2013.    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 She has never used smokeless tobacco. She reports that she does not drink alcohol or use drugs.    Allergies:  Review of patient's allergies indicates:   Allergen Reactions    Epinephrine Other (See Comments)     Carcinoid patient    Sulfa (sulfonamide antibiotics) Other (See Comments)     Urticaria       Medications:  Current Outpatient Prescriptions   Medication Sig Dispense Refill    alendronate (FOSAMAX) 70 MG tablet Take 70 mg by mouth every 7 days.   3    capecitabine (XELODA) 500 MG Tab TAKE 2 TABLETS (1000MG) BY MOUTH TWICE DAILY FOR 14 DAYS ON, THEN 14 DAYS OFF. THEN REPEAT. TAKE WITHIN 30 MINS  AFTER A MEAL WITH WATER. RE 56 tablet 6    ergocalciferol (VITAMIN D2) 50,000 unit Cap Take 50,000 Units by mouth every 7 days.      ferrous sulfate 325 mg (65 mg iron) Tab tablet Take 325 mg by mouth once daily.      indapamide (LOZOL) 2.5 MG Tab 2.5 mg once daily.       irbesartan (AVAPRO) 300 MG tablet 300 mg once daily.       levothyroxine (SYNTHROID) 150 MCG tablet Take 150 mcg by mouth once daily.  0    temozolomide (TEMODAR) 250 MG capsule TAKE 1 CAPSULE (250 MG) BY MOUTH ONCE DAILY ON DAYS  "10-14 OF A 28 DAYCYCLE. TAKE WITH WATER ON EMPTY STOMACH. SWALLOW WHOLE 5 capsule 4    verapamil (VERELAN PM) 360 MG C24P Take by mouth once daily.        No current facility-administered medications for this visit.        Review of Systems   Constitutional: Negative for chills, fever and unexpected weight change.   HENT: Negative for congestion, hearing loss and nosebleeds.    Eyes: Negative for visual disturbance.   Respiratory: Negative for cough and shortness of breath.    Cardiovascular: Negative for chest pain and palpitations.   Gastrointestinal: Negative for abdominal pain, blood in stool, constipation, diarrhea, nausea and vomiting.   Genitourinary: Negative for dysuria.   Musculoskeletal: Negative for back pain and gait problem.   Skin: Negative for color change and rash.   Neurological: Negative for dizziness, weakness and headaches.   Hematological: Negative for adenopathy. Does not bruise/bleed easily.   Psychiatric/Behavioral: Negative for confusion.       ECOG Performance Status: 0   Objective:      Vitals:   Vitals:    05/01/18 1605   BP: 118/77   Pulse: 68   Temp: 97.1 °F (36.2 °C)   TempSrc: Oral   SpO2: 99%   Weight: 73.8 kg (162 lb 11.2 oz)   Height: 5' 7" (1.702 m)     BMI: Body mass index is 25.48 kg/m².    Physical Exam   Constitutional: She is oriented to person, place, and time. She appears well-developed and well-nourished. No distress.   HENT:   Head: Normocephalic.   Mouth/Throat: No oropharyngeal exudate.   Eyes: EOM are normal. No scleral icterus.   Neck: Neck supple. No tracheal deviation present. No thyromegaly present.   Cardiovascular: Normal rate and regular rhythm.    Pulmonary/Chest: Effort normal and breath sounds normal. No respiratory distress. She has no wheezes. She has no rales.   Abdominal: Soft. She exhibits no distension and no mass. There is no tenderness. There is no rebound and no guarding.   Musculoskeletal: Normal range of motion. She exhibits no edema. "   Lymphadenopathy:     She has no cervical adenopathy.   Neurological: She is alert and oriented to person, place, and time. No cranial nerve deficit.   Skin: Skin is warm and dry.   Psychiatric: She has a normal mood and affect.       Laboratory Data:  Abstract on 12/06/2016   Component Date Value Ref Range Status    EXT WBC 12/03/2016 4.1  3.8 - 10.8 1000/ul Final    EXT Hemoglobin 12/03/2016 12.9  11.7 - 15.5 g/dl Final    EXT Hematocrit 12/03/2016 38.5  35.0 - 45.0 % Final    EXT Platelets 12/03/2016 138* 140 - 400 1000/ul Final    EXT Glucose 12/03/2016 88  65 - 99 mg/dl Final    EXT BUN 12/03/2016 13  7 - 25 mg/dl Final    EXT Creatinine 12/03/2016 0.90  0.50 - 0.99 mg/dl Final    EXT Sodium 12/03/2016 141  135 - 146 mmol/l Final    EXT Potassium 12/03/2016 4.2  3.5 - 5.3 mmol/l Final    EXT Chloride 12/03/2016 105  98 - 110 mmol/l Final    EXT CO2 12/03/2016 29  20 - 31 mmol/l Final    EXT Calcium 12/03/2016 9.7  8.6 - 10.4 mg/dl Final    EXT Protein total 12/03/2016 6.9  6.1 - 8.1 g/dl Final    EXT Albumin 12/03/2016 4.0  3.6 - 5.1 g/dl Final    EXT BilirubiN Total 12/03/2016 1.1  0.2 - 1.2 mg/dl Final    EXT Alkaline Phosphatase 12/03/2016 181* 33 - 130 u/l Final    EXT AST 12/03/2016 21  10 - 35 u/l Final    EXT ALT 12/03/2016 28  6 - 29 u/l Final   Office Visit on 11/14/2016   Component Date Value Ref Range Status    Blood Stool 11/14/2016 Negative  Negative Final     Acceptable 11/14/2016 Yes   Final    SOURCE: 11/14/2016 Cervical   Final    Slides: 11/14/2016 1   Final    LMP: 11/14/2016 NA   Final    Specimen adequacy: 11/14/2016 (NOTE)   Final    Comment:      Satisfactory for evaluation.          Endocervical cells absent.  Metaplastic cells indicative       of transformation zone cannot be reliably distinguished from       parabasal or atrophic cells.                      Interpretation 11/14/2016 NO EPITHELIAL ABNORMALITY SEE BELOW   Final    Comment:  ----------------------------------------------------------------------       *NEGATIVE FOR INTRAEPITHELIAL LESION OR MALIGNANCY   ----------------------------------------------------------------------         Comments: 11/14/2016 (NOTE)   Final    Comment: Due to technical or specimen issues, imaging could not be  performed. A cytotechnologist has manually screened this slide.         Cytotechnologist: 11/14/2016 BRENDON Ca(ASCP)IAC   Final    LOCATION 11/14/2016 (NOTE)   Final    Comment: Specimens processed and interpreted at :[facility], [address] [place], [place] [address], Phone: [phone], CLIA: [id number]      CPT Codes: 11/14/2016 (NOTE)   Final    Comment: [id number]        UNLESS OTHERWISE INDICATED, COMPUTER AIDED AND CYTOTECHNOLOGIST     SCREENING PERFORMED.          The Pap test is a screening test with an inherent, but low       probability of error.  Your patient should be reminded to       consult you immediately if she experiences any suspicious       signs or symptoms, regardless of her Pap test result.       An alternate report format containing images or consolidated       prior Pap history is available as applicable.         HPV HIGH RISK IF ASC-US, SUREPATH 11/14/2016 CRITERIA NOT MET   Final    Comment:       UNLESS OTHERWISE INDICATED, ALL TESTING PERFORMED AT  [facility] [facility] [employer] [employer]  [address] [place] [address]            :  CARLOS MENDEZ M.D.        CLIA NUMBER [id number]  [place]. [id number]                 Imaging:   MRI 4/26/18  EXAMINATION:  MRI ABDOMEN W WO CONTRAST    CLINICAL HISTORY:  Secondary neuroendocrine tumor of liver;  Other secondary neuroendocrine tumors    TECHNIQUE:  Multiplanar multisequence images of the abdomen before and after administration of 10 mL Gadavist intravenous contrast.    COMPARISON:  MRI of the abdomen with and without contrast 12/26/2017.  Octreotide scan  05/23/2017.    FINDINGS:  Inferior Thorax: Normal.    Liver: Innumerable hepatic lesions, greater in the right hepatic lobe in the left, are grossly unchanged in size and number.    Gallbladder: No gallstones.  Suggestion of small amount of air in the gallbladder, possibly related to prior ERCP.    Bile Ducts: Common bile duct dilatation to approximately 14 mm is unchanged, with mild prominence of intrahepatic ducts.  Irregular abnormal signal in the distal common bile duct is unchanged.    Pancreas: No mass. No peripancreatic fat stranding.    Spleen: Normal.    Adrenals: Normal.    Kidneys/Ureters: Normal enhancement.  No mass or hydroureteronephrosis.  Small cysts are present bilaterally.  There are bilateral extrarenal pelves.    Bladder: No evidence of obstruction or inflammation.    Reproductive organs: Normal.    GI Tract/Mesentery: No evidence of bowel obstruction or inflammation.    Peritoneal Space: No ascites or free air.    Retroperitoneum: No pathologically enlarged nodes.    Abdominal wall: Normal.    Vasculature: No aneurysm.    Bones: No acute fracture. No suspicious lytic or sclerotic lesions.   Impression       Innumerable metastatic lesions throughout the liver, greater in the right hepatic lobe and in the left, grossly unchanged in size and number.    Mildly prominent intrahepatic bile ducts and prominence of the common bile duct.  Irregular abnormal signal in the distal common bile duct is unchanged and may represent stones or sludge. Suggestion of small amount of air in the gallbladder, possibly related to prior ERCPs, with question of sphincterotomy.    RECIST SUMMARY:    Date of prior examination for comparison: MRI abdomen 12/26/2017    Lesion 1: Hepatic segment 5.  2.1 cm. Series 12 Image 47. Prior measurement 2.2 cm.    Lesion 2: Hepatic segment 3.  1.5 cm. Series 12 Image 57. Prior measurement 1.7 cm.            Assessment:       1. Primary pancreatic neuroendocrine tumor    2. Secondary  neuroendocrine tumor of liver    3. Chemotherapy follow-up examination           Plan:   Ms. Bruce is doing well from an oncologic standpoint.  Review of her MRI shows overall stability of her disease.  Lab work is appropriate to continue therapy with CAPTEM and she is tolerating this well.  I will plan to see her back in another 3 months and she will continue on with monthly labs and I will repeat images at 6 months.  All questions were answered and she is agreeable with this plan.    Cory Rodriguez DO, [facility]ematology & Oncology, [facility]  [address]  [place]  [address]  ph. [phone]; [phone]  fax. [phone]    25 minutes were spent in coordination of patient's care, record review and counseling.  More than 50% of the time was face-to-face.

## 2018-05-16 ENCOUNTER — TELEPHONE (OUTPATIENT)
Dept: NEUROLOGY | Facility: HOSPITAL | Age: 68
End: 2018-05-16

## 2018-05-16 NOTE — TELEPHONE ENCOUNTER
----- Message from Reyna Stark sent at 5/16/2018 11:11 AM CDT -----  Contact: Patient  GI:  Patient is calling to schedule a stent replacement.  She can be reached at [phone].  Thank you  abc

## 2018-05-16 NOTE — TELEPHONE ENCOUNTER
----- Message from Yolanda Luna sent at 5/16/2018  1:56 PM CDT -----  Contact: Patient  GI- -Patient is returning a missed call.  Call back number is [phone] after 3 pm call [phone].

## 2018-05-16 NOTE — TELEPHONE ENCOUNTER
----- Message from Reyna Stark sent at 5/16/2018 11:13 AM CDT -----  Contact: Patient  RR:  Patient called, her PCP had prescribed Verapamil for her.  The pharmacy called her and said it had been discontinued, and her PCP is now at [facility] but cannot write prescriptions yet.  She is wondering if there is a way to get this or something similar called in?  She isn't sure if the medication itself has been discontinued or just the dose.  Ms. Bruce can be reached at [phone].  Thank you  abc

## 2018-05-16 NOTE — TELEPHONE ENCOUNTER
LVM asking patient if she can call PCP and have someone else in the group prescribe the medication or something similar.  If not she can call back and I will see if Dr. Rodriguez can write for a month's worth, until she can find a new PCP or have her PCP write it for her.

## 2018-05-17 ENCOUNTER — TELEPHONE (OUTPATIENT)
Dept: NEUROLOGY | Facility: HOSPITAL | Age: 68
End: 2018-05-17

## 2018-05-17 DIAGNOSIS — K83.1 BILIARY TRACT OBSTRUCTION: Primary | ICD-10-CM

## 2018-05-17 NOTE — TELEPHONE ENCOUNTER
Stent exhange scheduled with pt on Thursday, June 21, 2018.  Pt instructed to eat light meals on Wednesday, June 20, 2018 with nothing to eat or drink after midnight.  Pt notified Endoscopy staff will contact with arrival time by Tuesday, June 19, 2018.

## 2018-05-17 NOTE — TELEPHONE ENCOUNTER
----- Message from Reyna Stark sent at 5/16/2018 11:11 AM CDT -----  Contact: Patient  GI:  Patient is calling to schedule a stent replacement.  She can be reached at [phone].  Thank you  abc

## 2018-05-25 LAB
EXT 24 HR UR METANEPHRINE: ABNORMAL
EXT 24 HR UR NORMETANEPHRINE: ABNORMAL
EXT 24 HR UR NORMETANEPHRINE: ABNORMAL
EXT 25 HYDROXY VIT D2: ABNORMAL
EXT 25 HYDROXY VIT D3: ABNORMAL
EXT 5 HIAA 24 HR URINE: ABNORMAL
EXT 5 HIAA BLOOD: ABNORMAL
EXT ACTH: ABNORMAL
EXT AFP: ABNORMAL
EXT ALBUMIN: 4.3 G/DL (ref 3.6–5.1)
EXT ALKALINE PHOSPHATASE: 211 U/L (ref 33–130)
EXT ALT: 31 U/L (ref 6–29)
EXT AMYLASE: ABNORMAL
EXT ANTI ISLET CELL AB: ABNORMAL
EXT ANTI PARIETAL CELL AB: ABNORMAL
EXT ANTI THYROID AB: ABNORMAL
EXT AST: 27 U/L (ref 10–35)
EXT BILIRUBIN DIRECT: ABNORMAL MG/DL
EXT BILIRUBIN TOTAL: 1.3 MG/DL (ref 0.2–1.2)
EXT BK VIRUS DNA QN PCR: ABNORMAL
EXT BUN: 15 MG/DL (ref 7–25)
EXT C PEPTIDE: ABNORMAL
EXT CA 125: ABNORMAL
EXT CA 19-9: ABNORMAL
EXT CA 27-29: ABNORMAL
EXT CALCITONIN: ABNORMAL
EXT CALCIUM: 10.1 MG/DL (ref 8.6–10.4)
EXT CEA: ABNORMAL
EXT CHLORIDE: 106 MMOL/L (ref 98–110)
EXT CHOLESTEROL: ABNORMAL
EXT CHROMOGRANIN A: ABNORMAL
EXT CO2: 28 MMOL/L (ref 20–31)
EXT CREATININE UA: ABNORMAL
EXT CREATININE: 0.95 MG/DL (ref 0.5–0.99)
EXT CYCLOSPORONE LEVEL: ABNORMAL
EXT DOPAMINE: ABNORMAL
EXT EBV DNA BY PCR: ABNORMAL
EXT EPINEPHRINE: ABNORMAL
EXT FOLATE: ABNORMAL
EXT FREE T3: ABNORMAL
EXT FREE T4: ABNORMAL
EXT FSH: ABNORMAL
EXT GASTRIN RELEASING PEPTIDE: ABNORMAL
EXT GASTRIN RELEASING PEPTIDE: ABNORMAL
EXT GASTRIN: ABNORMAL
EXT GGT: ABNORMAL
EXT GHRELIN: ABNORMAL
EXT GLUCAGON: ABNORMAL
EXT GLUCOSE: 90 MG/DL (ref 65–99)
EXT GROWTH HORMONE: ABNORMAL
EXT HCV RNA QUANT PCR: ABNORMAL
EXT HDL: ABNORMAL
EXT HEMATOCRIT: 41.9 % (ref 35–45)
EXT HEMOGLOBIN A1C: ABNORMAL
EXT HEMOGLOBIN: 14.3 G/DL (ref 11.7–15.5)
EXT HISTAMINE 24 HR URINE: ABNORMAL
EXT HISTAMINE: ABNORMAL
EXT IGF-1: ABNORMAL
EXT IMMUNKNOW (NON-STIMULATED): ABNORMAL
EXT IMMUNKNOW (STIMULATED): ABNORMAL
EXT INR: ABNORMAL
EXT INSULIN: ABNORMAL
EXT LANREOTIDE LEVEL: ABNORMAL
EXT LDH, TOTAL: ABNORMAL
EXT LDL CHOLESTEROL: ABNORMAL
EXT LIPASE: ABNORMAL
EXT MAGNESIUM: ABNORMAL
EXT METANEPHRINE FREE PLASMA: ABNORMAL
EXT MOTILIN: ABNORMAL
EXT NEUROKININ A CAMB: ABNORMAL
EXT NEUROKININ A ISI: ABNORMAL
EXT NEUROTENSIN: ABNORMAL
EXT NOREPINEPHRINE: ABNORMAL
EXT NORMETANEPHRINE: ABNORMAL
EXT NSE: ABNORMAL
EXT OCTREOTIDE LEVEL: ABNORMAL
EXT PANCREASTATIN CAMB: ABNORMAL
EXT PANCREASTATIN ISI: ABNORMAL
EXT PANCREATIC POLYPEPTIDE: ABNORMAL
EXT PHOSPHORUS: ABNORMAL
EXT PLATELETS: 150 1000/UL (ref 140–400)
EXT POTASSIUM: 5.1 MMOL/L (ref 3.5–5.3)
EXT PROGRAF LEVEL: ABNORMAL
EXT PROLACTIN: ABNORMAL
EXT PROTEIN TOTAL: 7.3 G/DL (ref 6.1–8.1)
EXT PROTEIN UA: ABNORMAL
EXT PT: ABNORMAL
EXT PTH, INTACT: ABNORMAL
EXT PTT: ABNORMAL
EXT RAPAMUNE LEVEL: ABNORMAL
EXT SEROTONIN: ABNORMAL
EXT SODIUM: 143 MMOL/L (ref 135–146)
EXT SOMATOSTATIN: ABNORMAL
EXT SUBSTANCE P: ABNORMAL
EXT TRIGLYCERIDES: ABNORMAL
EXT TRYPTASE: ABNORMAL
EXT TSH: ABNORMAL
EXT URIC ACID: ABNORMAL
EXT URINE AMYLASE U/HR: ABNORMAL
EXT URINE AMYLASE U/L: ABNORMAL
EXT VASOACTIVE INTESTINAL POLYPEPTIDE: ABNORMAL
EXT VITAMIN B12: ABNORMAL
EXT VMA 24 HR URINE: ABNORMAL
EXT WBC: 4.1 1000/UL (ref 3.8–10.8)
NEURON SPECIFIC ENOLASE: ABNORMAL

## 2018-05-29 ENCOUNTER — TELEPHONE (OUTPATIENT)
Dept: NEUROLOGY | Facility: HOSPITAL | Age: 68
End: 2018-05-29

## 2018-05-29 NOTE — TELEPHONE ENCOUNTER
----- Message from Cory Rodriguez DO FACALLI sent at 5/29/2018  9:24 AM CDT -----  Bilirubin went up a little bit.  Lets repeat cmp in 2 weeks.

## 2018-05-31 ENCOUNTER — TELEPHONE (OUTPATIENT)
Dept: NEUROLOGY | Facility: HOSPITAL | Age: 68
End: 2018-05-31

## 2018-05-31 NOTE — TELEPHONE ENCOUNTER
----- Message from Iona Reveles RN sent at 5/29/2018  6:20 PM CDT -----  Bilirubin went up a little bit.  Lets repeat cmp in 2 weeks

## 2018-06-11 ENCOUNTER — TELEPHONE (OUTPATIENT)
Dept: NEUROLOGY | Facility: HOSPITAL | Age: 68
End: 2018-06-11

## 2018-06-11 NOTE — TELEPHONE ENCOUNTER
----- Message from Lovely Cruz LPN sent at 5/17/2018  2:33 PM CDT -----  ERCP stent exchange on June 21, 2018.  CPT 43547, [id number].    Thanks.

## 2018-06-18 ENCOUNTER — TELEPHONE (OUTPATIENT)
Dept: NEUROLOGY | Facility: HOSPITAL | Age: 68
End: 2018-06-18

## 2018-06-18 NOTE — TELEPHONE ENCOUNTER
Pt requesting arrival time on Thursday, June 21, 2018.  Pt given tentative arrival time of 930am.  Pt notified Endoscopy staff will contact on Tuesday, June 19, 2018, to confirm arrival time.  Pt acknowledged understanding.

## 2018-06-21 ENCOUNTER — HOSPITAL ENCOUNTER (OUTPATIENT)
Facility: HOSPITAL | Age: 68
Discharge: HOME | End: 2018-06-21
Attending: INTERNAL MEDICINE | Admitting: INTERNAL MEDICINE
Payer: COMMERCIAL

## 2018-06-21 ENCOUNTER — ANESTHESIA EVENT (OUTPATIENT)
Dept: ENDOSCOPY | Facility: HOSPITAL | Age: 68
End: 2018-06-21
Payer: COMMERCIAL

## 2018-06-21 ENCOUNTER — ANESTHESIA (OUTPATIENT)
Dept: ENDOSCOPY | Facility: HOSPITAL | Age: 68
End: 2018-06-21
Payer: COMMERCIAL

## 2018-06-21 DIAGNOSIS — D3A.8 PRIMARY PANCREATIC NEUROENDOCRINE TUMOR: Primary | ICD-10-CM

## 2018-06-21 DIAGNOSIS — K80.50 CHOLEDOCHOLITHIASIS: ICD-10-CM

## 2018-06-21 DIAGNOSIS — K83.1 BILIARY OBSTRUCTION: ICD-10-CM

## 2018-06-21 PROCEDURE — 43277 ERCP EA DUCT/AMPULLA DILATE: CPT

## 2018-06-21 PROCEDURE — 25000003 PHARM REV CODE 250: Performed by: NURSE ANESTHETIST, CERTIFIED REGISTERED

## 2018-06-21 PROCEDURE — 43275 ERCP REMOVE FORGN BODY DUCT: CPT | Performed by: INTERNAL MEDICINE

## 2018-06-21 PROCEDURE — C1769 GUIDE WIRE: HCPCS | Performed by: INTERNAL MEDICINE

## 2018-06-21 PROCEDURE — 63600175 PHARM REV CODE 636 W HCPCS: Performed by: NURSE ANESTHETIST, CERTIFIED REGISTERED

## 2018-06-21 PROCEDURE — C2625 STENT, NON-COR, TEM W/DEL SY: HCPCS | Performed by: INTERNAL MEDICINE

## 2018-06-21 PROCEDURE — 25000003 PHARM REV CODE 250: Performed by: INTERNAL MEDICINE

## 2018-06-21 PROCEDURE — 25000003 PHARM REV CODE 250: Performed by: ANESTHESIOLOGY

## 2018-06-21 PROCEDURE — 63600175 PHARM REV CODE 636 W HCPCS: Performed by: ANESTHESIOLOGY

## 2018-06-21 PROCEDURE — 37000009 HC ANESTHESIA EA ADD 15 MINS: Performed by: INTERNAL MEDICINE

## 2018-06-21 PROCEDURE — 37000008 HC ANESTHESIA 1ST 15 MINUTES: Performed by: INTERNAL MEDICINE

## 2018-06-21 PROCEDURE — 27201089 HC SNARE, DISP (ANY): Performed by: INTERNAL MEDICINE

## 2018-06-21 PROCEDURE — 43276 ERCP STENT EXCHANGE W/DILATE: CPT | Performed by: INTERNAL MEDICINE

## 2018-06-21 PROCEDURE — 63600175 PHARM REV CODE 636 W HCPCS: Performed by: INTERNAL MEDICINE

## 2018-06-21 PROCEDURE — 43277 ERCP EA DUCT/AMPULLA DILATE: CPT | Performed by: INTERNAL MEDICINE

## 2018-06-21 PROCEDURE — 27200999 HC RETRIEVAL BALLOON, ERCP: Performed by: INTERNAL MEDICINE

## 2018-06-21 DEVICE — STENT SYSTEM RMV
Type: IMPLANTABLE DEVICE | Site: BILE DUCT | Status: FUNCTIONAL
Brand: WALLFLEX BILIARY

## 2018-06-21 RX ORDER — ROCURONIUM BROMIDE 10 MG/ML
INJECTION, SOLUTION INTRAVENOUS
Status: DISCONTINUED | OUTPATIENT
Start: 2018-06-21 | End: 2018-06-21

## 2018-06-21 RX ORDER — ONDANSETRON HYDROCHLORIDE 2 MG/ML
INJECTION, SOLUTION INTRAMUSCULAR; INTRAVENOUS
Status: DISCONTINUED | OUTPATIENT
Start: 2018-06-21 | End: 2018-06-21

## 2018-06-21 RX ORDER — SUCCINYLCHOLINE CHLORIDE 20 MG/ML
INJECTION INTRAMUSCULAR; INTRAVENOUS
Status: DISCONTINUED | OUTPATIENT
Start: 2018-06-21 | End: 2018-06-21

## 2018-06-21 RX ORDER — SODIUM CHLORIDE, SODIUM LACTATE, POTASSIUM CHLORIDE, CALCIUM CHLORIDE 600; 310; 30; 20 MG/100ML; MG/100ML; MG/100ML; MG/100ML
INJECTION, SOLUTION INTRAVENOUS CONTINUOUS
Status: DISCONTINUED | OUTPATIENT
Start: 2018-06-21 | End: 2018-06-21

## 2018-06-21 RX ORDER — GLUCAGON 1 MG
KIT INJECTION
Status: DISCONTINUED | OUTPATIENT
Start: 2018-06-21 | End: 2018-06-21

## 2018-06-21 RX ORDER — PROPOFOL 10 MG/ML
VIAL (ML) INTRAVENOUS
Status: DISCONTINUED | OUTPATIENT
Start: 2018-06-21 | End: 2018-06-21

## 2018-06-21 RX ORDER — SODIUM CHLORIDE 9 MG/ML
INJECTION, SOLUTION INTRAVENOUS CONTINUOUS
Status: DISCONTINUED | OUTPATIENT
Start: 2018-06-21 | End: 2018-06-21

## 2018-06-21 RX ORDER — MIDAZOLAM HYDROCHLORIDE 1 MG/ML
INJECTION INTRAMUSCULAR; INTRAVENOUS
Status: DISCONTINUED | OUTPATIENT
Start: 2018-06-21 | End: 2018-06-21

## 2018-06-21 RX ORDER — FENTANYL CITRATE 50 UG/ML
INJECTION, SOLUTION INTRAMUSCULAR; INTRAVENOUS
Status: DISCONTINUED | OUTPATIENT
Start: 2018-06-21 | End: 2018-06-21

## 2018-06-21 RX ORDER — PHENYLEPHRINE HYDROCHLORIDE 10 MG/ML
INJECTION INTRAVENOUS
Status: DISCONTINUED | OUTPATIENT
Start: 2018-06-21 | End: 2018-06-21

## 2018-06-21 RX ORDER — ONDANSETRON 2 MG/ML
8 INJECTION INTRAMUSCULAR; INTRAVENOUS ONCE
Status: COMPLETED | OUTPATIENT
Start: 2018-06-21 | End: 2018-06-21

## 2018-06-21 RX ORDER — LIDOCAINE HCL/PF 100 MG/5ML
SYRINGE (ML) INTRAVENOUS
Status: DISCONTINUED | OUTPATIENT
Start: 2018-06-21 | End: 2018-06-21

## 2018-06-21 RX ORDER — INDOMETHACIN 50 MG/1
100 SUPPOSITORY RECTAL ONCE
Status: COMPLETED | OUTPATIENT
Start: 2018-06-21 | End: 2018-06-21

## 2018-06-21 RX ORDER — GLYCOPYRROLATE 0.2 MG/ML
INJECTION INTRAMUSCULAR; INTRAVENOUS
Status: DISCONTINUED | OUTPATIENT
Start: 2018-06-21 | End: 2018-06-21

## 2018-06-21 RX ORDER — SCOLOPAMINE TRANSDERMAL SYSTEM 1 MG/1
1 PATCH, EXTENDED RELEASE TRANSDERMAL
Status: DISCONTINUED | OUTPATIENT
Start: 2018-06-21 | End: 2018-06-21 | Stop reason: HOSPADM

## 2018-06-21 RX ORDER — SODIUM CHLORIDE, SODIUM LACTATE, POTASSIUM CHLORIDE, CALCIUM CHLORIDE 600; 310; 30; 20 MG/100ML; MG/100ML; MG/100ML; MG/100ML
INJECTION, SOLUTION INTRAVENOUS CONTINUOUS PRN
Status: DISCONTINUED | OUTPATIENT
Start: 2018-06-21 | End: 2018-06-21

## 2018-06-21 RX ADMIN — ROCURONIUM BROMIDE 5 MG: 10 INJECTION, SOLUTION INTRAVENOUS at 08:06

## 2018-06-21 RX ADMIN — INDOMETHACIN 100 MG: 50 SUPPOSITORY RECTAL at 08:06

## 2018-06-21 RX ADMIN — ONDANSETRON 4 MG: 2 INJECTION, SOLUTION INTRAMUSCULAR; INTRAVENOUS at 08:06

## 2018-06-21 RX ADMIN — SODIUM CHLORIDE: 0.9 INJECTION, SOLUTION INTRAVENOUS at 07:06

## 2018-06-21 RX ADMIN — FENTANYL CITRATE 50 MCG: 50 INJECTION, SOLUTION INTRAMUSCULAR; INTRAVENOUS at 08:06

## 2018-06-21 RX ADMIN — SODIUM CHLORIDE, SODIUM LACTATE, POTASSIUM CHLORIDE, AND CALCIUM CHLORIDE: .6; .31; .03; .02 INJECTION, SOLUTION INTRAVENOUS at 07:06

## 2018-06-21 RX ADMIN — GLUCAGON HYDROCHLORIDE 1 MG: KIT at 08:06

## 2018-06-21 RX ADMIN — LIDOCAINE HYDROCHLORIDE 100 MG: 20 INJECTION, SOLUTION INTRAVENOUS at 08:06

## 2018-06-21 RX ADMIN — SCOPALAMINE 1 PATCH: 1 PATCH, EXTENDED RELEASE TRANSDERMAL at 07:06

## 2018-06-21 RX ADMIN — PROPOFOL 150 MG: 10 INJECTION, EMULSION INTRAVENOUS at 08:06

## 2018-06-21 RX ADMIN — OCTREOTIDE ACETATE: 500 INJECTION, SOLUTION INTRAVENOUS; SUBCUTANEOUS at 07:06

## 2018-06-21 RX ADMIN — PHENYLEPHRINE HYDROCHLORIDE 100 MCG: 10 INJECTION INTRAVENOUS at 08:06

## 2018-06-21 RX ADMIN — ONDANSETRON 8 MG: 2 INJECTION INTRAMUSCULAR; INTRAVENOUS at 07:06

## 2018-06-21 RX ADMIN — GLYCOPYRROLATE 0.2 MG: 0.2 INJECTION, SOLUTION INTRAMUSCULAR; INTRAVENOUS at 08:06

## 2018-06-21 RX ADMIN — MIDAZOLAM HYDROCHLORIDE 2 MG: 1 INJECTION, SOLUTION INTRAMUSCULAR; INTRAVENOUS at 08:06

## 2018-06-21 RX ADMIN — SUCCINYLCHOLINE CHLORIDE 100 MG: 20 INJECTION, SOLUTION INTRAMUSCULAR; INTRAVENOUS at 08:06

## 2018-06-21 NOTE — H&P
"[facility]    CC: metastatic neuroendocrine tumor    HPI 67 y.o. female with history of pancreatic head mass causing obstruction of the biliary tree with previously placed biliary stent last August here for replacement. No other current issues.       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    Secondary malignant neoplasm of liver     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) 5/7/2013    Thrombocytopenia 11/12/2013    Thyroid disease     Unspecified essential hypertension 11/12/2013         Review of Systems  General ROS: negative for chills, fever or weight loss  Cardiovascular ROS: no chest pain or dyspnea on exertion  Gastrointestinal ROS: no abdominal pain, change in bowel habits, or black/ bloody stools    Physical Examination  BP (!) 161/97   Pulse 76   Temp 98.1 °F (36.7 °C) (Temporal)   Resp 20   Ht 5' 7.75" (1.721 m)   Wt 70.8 kg (156 lb)   SpO2 99%   Breastfeeding? No   BMI 23.90 kg/m²   General appearance: alert, cooperative, no distress  HENT: Normocephalic, atraumatic, neck symmetrical, no nasal discharge   Lungs: clear to auscultation bilaterally, no dullness to percussion bilaterally  Heart: regular rate and rhythm without rub; no displacement of the PMI   Abdomen: soft, non-tender; bowel sounds normoactive; no organomegaly  Extremities: extremities symmetric; no clubbing, cyanosis, or edema  Neurologic: Alert and oriented X 3, normal strength, normal coordination and gait    Assessment:   67 year old female with metastatic neuroendocrine tumor requiring " biliary stent 2/2 obstruction.     Plan:  ERCP with stent exchange    Jake Lieberman MD   [address], [address]   [place] [address] [phone]

## 2018-06-21 NOTE — [employer] PATIENT INSTRUCTIONS
Discharge Summary/Instructions after an Endoscopic Procedure  Patient Name: Ching Bruce  Patient MRN: [id number]  Patient YOB: 1950 Thursday, June 21, 2018  Jake Lieberman MD  RESTRICTIONS:  During your procedure today, you received medications for sedation.  These   medications may affect your judgment, balance and coordination.  Therefore,   for 24 hours, you have the following restrictions:   - DO NOT drive a car, operate machinery, make legal/financial decisions,   sign important papers or drink alcohol.    ACTIVITY:  Today: no heavy lifting, straining or running due to procedural   sedation/anesthesia.  The following day: return to full activity including work.  DIET:  Eat and drink normally unless instructed otherwise.     TREATMENT FOR COMMON SIDE EFFECTS:  - Mild abdominal pain, nausea, belching, bloating or excessive gas:  rest,   eat lightly and use a heating pad.  - Sore Throat: treat with throat lozenges and/or gargle with warm salt   water.  - Because air was used during the procedure, expelling large amounts of air   from your rectum or belching is normal.  - If a bowel prep was taken, you may not have a bowel movement for 1-3 days.    This is normal.  SYMPTOMS TO WATCH FOR AND REPORT TO YOUR PHYSICIAN:  1. Abdominal pain or bloating, other than gas cramps.  2. Chest pain.  3. Back pain.  4. Signs of infection such as: chills or fever occurring within 24 hours   after the procedure.  5. Rectal bleeding, which would show as bright red, maroon, or black stools.   (A tablespoon of blood from the rectum is not serious, especially if   hemorrhoids are present.)  6. Vomiting.  7. Weakness or dizziness.  GO DIRECTLY TO THE NEAREST EMERGENCY ROOM IF YOU HAVE ANY OF THE FOLLOWING:      Difficulty breathing              Chills and/or fever over 101 F   Persistent vomiting and/or vomiting blood   Severe abdominal pain   Severe chest pain   Black, tarry stools   Bleeding- more than one  tablespoon   Any other symptom or condition that you feel may need urgent attention  Your doctor recommends these additional instructions:  If any biopsies were taken, your doctors clinic will contact you in 1 to 2   weeks with any results.  Discharge to home   Condition stable   Resume previous diet   The signs and symptoms of potential delayed complications were discussed   with the patient. If signs or symptoms of these complications develop, call   the [facility] at [phone].   Return to normal activities tomorrow.  Written discharge instructions were   provided to the patient.  For questions, problems or results please call your physician - Jake Lieberman MD at Work:  [phone].  EMERGENCY PHONE NUMBER: [phone],  LAB RESULTS: [phone]  IF A COMPLICATION OR EMERGENCY SITUATION ARISES AND YOU ARE UNABLE TO REACH   YOUR PHYSICIAN - GO DIRECTLY TO THE EMERGENCY ROOM.  MD Jake Crowell MD  6/21/2018 9:05:27 AM  This report has been verified and signed electronically.  [employer]

## 2018-06-21 NOTE — OR NURSING
Cory radiology notified case is in progress  And the need for technician, stated he will send someone  Up

## 2018-06-21 NOTE — ANESTHESIA POSTPROCEDURE EVALUATION
"Anesthesia Post Evaluation    Patient: Ching Bruce    Procedure(s) Performed: Procedure(s) (LRB):  ERCP, stent exchange (N/A)    Final Anesthesia Type: general  Patient location during evaluation: PACU  Level of consciousness: awake  Post-procedure vital signs: reviewed and stable  Pain management: adequate  Airway patency: patent    Anesthetic complications: no      Cardiovascular status: stable  Respiratory status: spontaneous ventilation  Hydration status: euvolemic  Follow-up not needed.        Visit Vitals  /78   Pulse (!) 58   Temp 36.5 °C (97.7 °F)   Resp (!) 22   Ht 5' 7.75" (1.721 m)   Wt 70.8 kg (156 lb)   SpO2 98%   Breastfeeding? No   BMI 23.90 kg/m²       Pain/John Score: Pain Assessment Performed: Yes (6/21/2018 10:21 AM)  Presence of Pain: denies (6/21/2018 10:21 AM)  John Score: 10 (6/21/2018 10:21 AM)      "

## 2018-06-21 NOTE — TRANSFER OF CARE
"Anesthesia Transfer of Care Note    Patient: Ching Bruce    Procedure(s) Performed: Procedure(s) (LRB):  ERCP, stent exchange (N/A)    Patient location: PACU    Anesthesia Type: general    Transport from OR: Transported from OR on 100% O2 by closed face mask with adequate spontaneous ventilation    Post pain: adequate analgesia    Post assessment: no apparent anesthetic complications    Post vital signs: stable    Level of consciousness: awake and alert    Nausea/Vomiting: no nausea/vomiting    Complications: none    Transfer of care protocol was followed      Last vitals:   Visit Vitals  BP (!) 143/70   Pulse 86   Temp 36.3 °C (97.3 °F) (Skin)   Resp 18   Ht 5' 7.75" (1.721 m)   Wt 70.8 kg (156 lb)   SpO2 100%   Breastfeeding? No   BMI 23.90 kg/m²     "

## 2018-06-21 NOTE — ANESTHESIA PREPROCEDURE EVALUATION
06/21/2018  Ching Bruce is a 67 y.o., female  Having stent replacement (q 6 months).  PMH - NET, HTN, thyroid supplement.    Anesthesia Evaluation    I have reviewed the Patient Summary Reports.    I have reviewed the Nursing Notes.   I have reviewed the Medications.     Review of Systems  Anesthesia Hx:  No problems with previous Anesthesia   Denies Personal Hx of Anesthesia complications.   Cardiovascular:   Hypertension    Hepatic/GI:   Liver Disease,        Physical Exam  General:  Well nourished    Airway/Jaw/Neck:  Airway Findings: Mouth Opening: Normal Tongue: Normal  General Airway Assessment: Adult  Mallampati: III  Improves to II with phonation.  TM Distance: Normal, at least 6 cm  Jaw/Neck Findings:  Neck ROM: Normal ROM      Dental:  Dental Findings: Upper Dentures   Chest/Lungs:  Chest/Lungs Findings: Clear to auscultation, Normal Respiratory Rate     Heart/Vascular:  Heart Findings: Rate: Normal  Rhythm: Regular Rhythm  Sounds: Normal        Mental Status:  Mental Status Findings:  Alert and Oriented         Anesthesia Plan  Type of Anesthesia, risks & benefits discussed:  Anesthesia Type:  general, MAC  Patient's Preference:   Intra-op Monitoring Plan:   Intra-op Monitoring Plan Comments:   Post Op Pain Control Plan:   Post Op Pain Control Plan Comments:   Induction:   IV  Beta Blocker:  Patient is not currently on a Beta-Blocker (No further documentation required).       Informed Consent: Patient understands risks and agrees with Anesthesia plan.  Questions answered.   ASA Score: 3     Day of Surgery Review of History & Physical: I have interviewed and examined the patient. I have reviewed the patient's H&P dated:            Ready For Surgery From Anesthesia Perspective.

## 2018-06-21 NOTE — ANESTHESIA POSTPROCEDURE EVALUATION
"Anesthesia Post Evaluation    Patient: Ching Bruce    Procedure(s) Performed: Procedure(s) (LRB):  ERCP, stent exchange (N/A)    [facility] Anesthesia Post Op Evaluation    Visit Vitals  /78   Pulse (!) 58   Temp 36.5 °C (97.7 °F)   Resp (!) 22   Ht 5' 7.75" (1.721 m)   Wt 70.8 kg (156 lb)   SpO2 98%   Breastfeeding? No   BMI 23.90 kg/m²       Pain/John Score: Pain Assessment Performed: Yes (6/21/2018 10:21 AM)  Presence of Pain: denies (6/21/2018 10:21 AM)  John Score: 10 (6/21/2018 10:21 AM)      "

## 2018-06-22 VITALS
RESPIRATION RATE: 22 BRPM | HEART RATE: 58 BPM | TEMPERATURE: 98 F | OXYGEN SATURATION: 98 % | WEIGHT: 156 LBS | BODY MASS INDEX: 23.64 KG/M2 | SYSTOLIC BLOOD PRESSURE: 136 MMHG | HEIGHT: 68 IN | DIASTOLIC BLOOD PRESSURE: 78 MMHG

## 2018-07-28 LAB
EXT 24 HR UR METANEPHRINE: ABNORMAL
EXT 24 HR UR NORMETANEPHRINE: ABNORMAL
EXT 24 HR UR NORMETANEPHRINE: ABNORMAL
EXT 25 HYDROXY VIT D2: ABNORMAL
EXT 25 HYDROXY VIT D3: ABNORMAL
EXT 5 HIAA 24 HR URINE: ABNORMAL
EXT 5 HIAA BLOOD: ABNORMAL
EXT ACTH: ABNORMAL
EXT AFP: ABNORMAL
EXT ALBUMIN: 4.1 G/DL (ref 3.6–5.1)
EXT ALKALINE PHOSPHATASE: 172 U/L (ref 33–130)
EXT ALT: 24 U/L (ref 6–29)
EXT AMYLASE: ABNORMAL
EXT ANTI ISLET CELL AB: ABNORMAL
EXT ANTI PARIETAL CELL AB: ABNORMAL
EXT ANTI THYROID AB: ABNORMAL
EXT AST: 21 U/L (ref 10–35)
EXT BILIRUBIN DIRECT: ABNORMAL
EXT BILIRUBIN TOTAL: 1 MG/DL (ref 0.2–1.2)
EXT BK VIRUS DNA QN PCR: ABNORMAL
EXT BUN: 14 MG/DL (ref 7–25)
EXT C PEPTIDE: ABNORMAL
EXT CA 125: ABNORMAL
EXT CA 19-9: ABNORMAL
EXT CA 27-29: ABNORMAL
EXT CALCITONIN: ABNORMAL
EXT CALCIUM: 9.8 MG/DL (ref 8.6–10.4)
EXT CEA: ABNORMAL
EXT CHLORIDE: 106 MMOL/L (ref 98–110)
EXT CHOLESTEROL: ABNORMAL
EXT CHROMOGRANIN A: ABNORMAL
EXT CO2: 31 MMOL/L (ref 20–31)
EXT CREATININE UA: ABNORMAL
EXT CREATININE: 1.1 MG/DL (ref 0.5–0.99)
EXT CYCLOSPORONE LEVEL: ABNORMAL
EXT DOPAMINE: ABNORMAL
EXT EBV DNA BY PCR: ABNORMAL
EXT EPINEPHRINE: ABNORMAL
EXT FOLATE: ABNORMAL
EXT FREE T3: ABNORMAL
EXT FREE T4: ABNORMAL
EXT FSH: ABNORMAL
EXT GASTRIN RELEASING PEPTIDE: ABNORMAL
EXT GASTRIN RELEASING PEPTIDE: ABNORMAL
EXT GASTRIN: ABNORMAL
EXT GGT: ABNORMAL
EXT GHRELIN: ABNORMAL
EXT GLUCAGON: ABNORMAL
EXT GLUCOSE: 102 MG/DL (ref 65–99)
EXT GROWTH HORMONE: ABNORMAL
EXT HCV RNA QUANT PCR: ABNORMAL
EXT HDL: ABNORMAL
EXT HEMATOCRIT: 38 % (ref 35–45)
EXT HEMOGLOBIN A1C: ABNORMAL
EXT HEMOGLOBIN: 13 G/DL (ref 11.7–15.5)
EXT HISTAMINE 24 HR URINE: ABNORMAL
EXT HISTAMINE: ABNORMAL
EXT IGF-1: ABNORMAL
EXT IMMUNKNOW (NON-STIMULATED): ABNORMAL
EXT IMMUNKNOW (STIMULATED): ABNORMAL
EXT INR: ABNORMAL
EXT INSULIN: ABNORMAL
EXT LANREOTIDE LEVEL: ABNORMAL
EXT LDH, TOTAL: ABNORMAL
EXT LDL CHOLESTEROL: ABNORMAL
EXT LIPASE: ABNORMAL
EXT MAGNESIUM: ABNORMAL
EXT METANEPHRINE FREE PLASMA: ABNORMAL
EXT MOTILIN: ABNORMAL
EXT NEUROKININ A CAMB: ABNORMAL
EXT NEUROKININ A ISI: ABNORMAL
EXT NEUROTENSIN: ABNORMAL
EXT NOREPINEPHRINE: ABNORMAL
EXT NORMETANEPHRINE: ABNORMAL
EXT NSE: ABNORMAL
EXT OCTREOTIDE LEVEL: ABNORMAL
EXT PANCREASTATIN CAMB: ABNORMAL
EXT PANCREASTATIN ISI: ABNORMAL
EXT PANCREATIC POLYPEPTIDE: ABNORMAL
EXT PHOSPHORUS: ABNORMAL
EXT PLATELETS: 165 1000/UL (ref 140–400)
EXT POTASSIUM: 5 MMOL/L (ref 3.5–5.3)
EXT PROGRAF LEVEL: ABNORMAL
EXT PROLACTIN: ABNORMAL
EXT PROTEIN TOTAL: 7.2 G/DL (ref 6.1–8.1)
EXT PROTEIN UA: ABNORMAL
EXT PT: ABNORMAL
EXT PTH, INTACT: ABNORMAL
EXT PTT: ABNORMAL
EXT RAPAMUNE LEVEL: ABNORMAL
EXT SEROTONIN: ABNORMAL
EXT SODIUM: 144 MMOL/L (ref 135–146)
EXT SOMATOSTATIN: ABNORMAL
EXT SUBSTANCE P: ABNORMAL
EXT TRIGLYCERIDES: ABNORMAL
EXT TRYPTASE: ABNORMAL
EXT TSH: ABNORMAL
EXT URIC ACID: ABNORMAL
EXT URINE AMYLASE U/HR: ABNORMAL
EXT URINE AMYLASE U/L: ABNORMAL
EXT VASOACTIVE INTESTINAL POLYPEPTIDE: ABNORMAL
EXT VITAMIN B12: ABNORMAL
EXT VMA 24 HR URINE: ABNORMAL
EXT WBC: 4.2 1000/UL (ref 3.8–10.8)
NEURON SPECIFIC ENOLASE: ABNORMAL

## 2018-07-30 DIAGNOSIS — C7A.8 PRIMARY MALIGNANT NEUROENDOCRINE TUMOR OF PANCREAS: ICD-10-CM

## 2018-07-30 DIAGNOSIS — C78.7 SECONDARY MALIGNANT NEOPLASM OF LIVER: ICD-10-CM

## 2018-07-30 RX ORDER — CAPECITABINE 500 MG/1
TABLET, FILM COATED ORAL
Qty: 56 TABLET | Refills: 6 | Status: SHIPPED | OUTPATIENT
Start: 2018-07-30 | End: 2018-10-02 | Stop reason: SDUPTHER

## 2018-08-07 ENCOUNTER — OFFICE VISIT (OUTPATIENT)
Dept: NEUROLOGY | Facility: HOSPITAL | Age: 68
End: 2018-08-07
Attending: INTERNAL MEDICINE
Payer: COMMERCIAL

## 2018-08-07 ENCOUNTER — TELEPHONE (OUTPATIENT)
Dept: NEUROLOGY | Facility: HOSPITAL | Age: 68
End: 2018-08-07

## 2018-08-07 VITALS
TEMPERATURE: 99 F | HEART RATE: 70 BPM | HEIGHT: 67 IN | OXYGEN SATURATION: 100 % | BODY MASS INDEX: 25.22 KG/M2 | WEIGHT: 160.69 LBS | SYSTOLIC BLOOD PRESSURE: 155 MMHG | DIASTOLIC BLOOD PRESSURE: 73 MMHG

## 2018-08-07 DIAGNOSIS — C7A.8 PRIMARY MALIGNANT NEUROENDOCRINE TUMOR OF LUNG: Primary | ICD-10-CM

## 2018-08-07 DIAGNOSIS — D3A.8 PRIMARY PANCREATIC NEUROENDOCRINE TUMOR: Primary | ICD-10-CM

## 2018-08-07 DIAGNOSIS — Z09 CHEMOTHERAPY FOLLOW-UP EXAMINATION: ICD-10-CM

## 2018-08-07 DIAGNOSIS — C7B.8 SECONDARY NEUROENDOCRINE TUMOR OF LIVER: ICD-10-CM

## 2018-08-07 PROCEDURE — 3077F SYST BP >= 140 MM HG: CPT | Mod: CPTII,,, | Performed by: INTERNAL MEDICINE

## 2018-08-07 PROCEDURE — 99214 OFFICE O/P EST MOD 30 MIN: CPT | Mod: ,,, | Performed by: INTERNAL MEDICINE

## 2018-08-07 PROCEDURE — 99215 OFFICE O/P EST HI 40 MIN: CPT | Performed by: INTERNAL MEDICINE

## 2018-08-07 PROCEDURE — 3078F DIAST BP <80 MM HG: CPT | Mod: CPTII,,, | Performed by: INTERNAL MEDICINE

## 2018-08-07 RX ORDER — VERAPAMIL HYDROCHLORIDE 180 MG/1
360 TABLET, FILM COATED, EXTENDED RELEASE ORAL DAILY
Refills: 1 | Status: ON HOLD | COMMUNITY
Start: 2018-05-21 | End: 2020-01-01 | Stop reason: HOSPADM

## 2018-08-07 NOTE — PATIENT INSTRUCTIONS
Follow up in 3 months with MRI and visit (appointments made)    Have Pancreastatin done in October at [facility]    Will forward this months labs to Dr. Corea.

## 2018-08-07 NOTE — LETTER
August 7, 2018        Gaby Corea MD  [address]  [place] [address]             [facility] [address] [place] [address]  Phone: [phone]  Fax: [phone]   Patient: Ching Bruce   MR Number: [id number]   YOB: 1950   Date of Visit: 8/7/2018       Dear Dr. Corea:    Thank you for referring Cihng Bruce to me for evaluation. Attached you will find relevant portions of my assessment and plan of care.    If you have questions, please do not hesitate to call me. I look forward to following Ching Bruce along with you.    Sincerely,      Cory Rodriguez DO, FACP            CC  No Recipients    Enclosure

## 2018-08-07 NOTE — PROGRESS NOTES
NOLANETS:  [facility]  A collaboration between [facility] and [facility]    PATIENT: Ching Bruce  MRN: [id number]  DATE: 8/7/2018      Diagnosis:   1. Primary pancreatic neuroendocrine tumor    2. Secondary neuroendocrine tumor of liver    3. 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 Bruce is a 67 y.o. female who returns for follow up for her pancreatic neuroendocrine tumor.  She remains on treatment with CAPTEM.  She states that she is tolerating this well.  She is fully active and has no new complaints.    ONCOLOGIC HISTORY:   A 67 y.o. year-old -American female who I had initially   seen on 12/18/2012. Her history dates back to September 2012 when she was noted  to have several weeks of feeling fatigued. She sought treatment with her   primary care doctor who did a CBC and found her to be severely anemic. She was   seen at [facility] and transfused 3 units of packed red blood cells  and no source of bleeding had been found. Records at that time had shown   hemoglobin of 5. She sought followup in September 2012 with Dr. Guillen who   performed an EGD and colonoscopy with an EGD showing an ulcerated and fungating   nonbleeding 2 cm mass, malignant in appearance. It did cause partial   obstructions. Biopsies were taken, which showed no evidence of malignancy at   that time. She had a CT of the abdomen and pelvis showing multiple metastatic   lesions and the liver biopsy was performed on 10/17/2012 showed pancreatic   neuroendocrine neoplasm in the left lobe of the liver aspirate. She went on to    have an ERCP along with sphincterotomy and biliary stent placement and   subsequent PET had shown numerous hypodense lesions in the liver. Octreotide   scan was performed, which showed multiple right and left hepatic metastases.   MRI of the abdomen was performed on 12/05/2012 showing innumerable lesions that   demonstrated peripheral to hyperintensity consistent with hypervascular   metastasis. She was seen by Dr. Humphrey, who thought she was not a candidate   for surgical resection during that time. Her pathology from her tumor came back  positive for chromogranin, synaptophysin and had a Ki-67 of less than 1%. She   was started on treatment with temozolomide and capecitabine with cycle   1 being on 01/22/2013.    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    Secondary malignant neoplasm of liver     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) 5/7/2013    Thrombocytopenia 11/12/2013    Thyroid disease     Unspecified essential hypertension 11/12/2013       Past Surgical HIstory:   Past Surgical History:   Procedure Laterality Date    ERCP N/A 6/21/2018    Procedure: ERCP, stent exchange;  Surgeon: Jake Lieberman MD;  Location: [facility];  Service: Endoscopy;  Laterality: N/A;    THYROIDECTOMY  2011       Family History:   Family History   Problem Relation Age of Onset    Cancer Maternal Grandmother     Diabetes Father     Breast cancer Neg Hx     Colon cancer Neg Hx     Ovarian cancer Neg Hx        Social History:  reports that she has never smoked. She  has never used smokeless tobacco. She reports that she does not drink alcohol or use drugs.    Allergies:  Review of patient's allergies indicates:   Allergen Reactions    Epinephrine Other (See Comments)     Carcinoid patient    Sulfa (sulfonamide antibiotics) Other (See Comments)     Urticaria       Medications:  Current Outpatient Prescriptions   Medication Sig Dispense Refill    alendronate (FOSAMAX) 70 MG tablet Take 70 mg by mouth every 7 days.   3    capecitabine (XELODA) 500 MG Tab TAKE 2 TABLETS (1000MG) BY MOUTH TWICE DAILY FOR 14 DAYS ON, THEN 14 DAYS OFF. THEN REPEAT. TAKE WITHIN 30 MINS AFTER A MEAL WITH WATER. 56 tablet 6    ergocalciferol (VITAMIN D2) 50,000 unit Cap Take 50,000 Units by mouth every 7 days.      ferrous sulfate 325 mg (65 mg iron) Tab tablet Take 325 mg by mouth once daily.      indapamide (LOZOL) 2.5 MG Tab 2.5 mg once daily.       irbesartan (AVAPRO) 300 MG tablet 300 mg once daily.       levothyroxine (SYNTHROID) 150 MCG tablet Take 150 mcg by mouth once daily.  0    temozolomide (TEMODAR) 250 MG capsule TAKE 1 CAPSULE (250 MG) BY MOUTH ONCE DAILY ON DAYS 10-14 OF A 28 DAYCYCLE. TAKE WITH WATER ON EMPTY STOMACH. SWALLOW WHOLE 5 capsule 4    verapamil (CALAN-SR) 180 MG CR tablet Take 360 mg by mouth once daily.  1    verapamil (VERELAN PM) 360 MG C24P Take by mouth once daily.        No current facility-administered medications for this visit.        Review of Systems   Constitutional: Negative for chills, fever and unexpected weight change.   HENT: Negative for congestion, hearing loss and nosebleeds.    Eyes: Negative for visual disturbance.   Respiratory: Negative for cough and shortness of breath.    Cardiovascular: Negative for chest pain and palpitations.   Gastrointestinal: Negative for abdominal pain, blood in stool, constipation, diarrhea, nausea and vomiting.   Genitourinary: Negative for dysuria.   Musculoskeletal: Negative for back pain and gait problem.  "  Skin: Negative for color change and rash.   Neurological: Negative for dizziness, weakness and headaches.   Hematological: Negative for adenopathy. Does not bruise/bleed easily.   Psychiatric/Behavioral: Negative for confusion.       ECOG Performance Status: 0   Objective:      Vitals:   Vitals:    08/07/18 1557   BP: (!) 155/73   Pulse: 70   Temp: 98.7 °F (37.1 °C)   TempSrc: Oral   SpO2: 100%   Weight: 72.9 kg (160 lb 11.5 oz)   Height: 5' 7" (1.702 m)     BMI: Body mass index is 25.17 kg/m².    Physical Exam   Constitutional: She is oriented to person, place, and time. She appears well-developed and well-nourished. No distress.   HENT:   Head: Normocephalic.   Mouth/Throat: No oropharyngeal exudate.   Eyes: EOM are normal. No scleral icterus.   Neck: Neck supple. No tracheal deviation present. No thyromegaly present.   Cardiovascular: Normal rate and regular rhythm.    Pulmonary/Chest: Effort normal and breath sounds normal. No respiratory distress. She has no wheezes. She has no rales.   Abdominal: Soft. She exhibits no distension and no mass. There is no tenderness. There is no rebound and no guarding.   Musculoskeletal: Normal range of motion. She exhibits no edema.   Lymphadenopathy:     She has no cervical adenopathy.   Neurological: She is alert and oriented to person, place, and time. No cranial nerve deficit.   Skin: Skin is warm and dry.   Psychiatric: She has a normal mood and affect.       Laboratory Data:  Abstract on 12/06/2016   Component Date Value Ref Range Status    EXT WBC 12/03/2016 4.1  3.8 - 10.8 1000/ul Final    EXT Hemoglobin 12/03/2016 12.9  11.7 - 15.5 g/dl Final    EXT Hematocrit 12/03/2016 38.5  35.0 - 45.0 % Final    EXT Platelets 12/03/2016 138* 140 - 400 1000/ul Final    EXT Glucose 12/03/2016 88  65 - 99 mg/dl Final    EXT BUN 12/03/2016 13  7 - 25 mg/dl Final    EXT Creatinine 12/03/2016 0.90  0.50 - 0.99 mg/dl Final    EXT Sodium 12/03/2016 141  135 - 146 mmol/l Final "    EXT Potassium 12/03/2016 4.2  3.5 - 5.3 mmol/l Final    EXT Chloride 12/03/2016 105  98 - 110 mmol/l Final    EXT CO2 12/03/2016 29  20 - 31 mmol/l Final    EXT Calcium 12/03/2016 9.7  8.6 - 10.4 mg/dl Final    EXT Protein total 12/03/2016 6.9  6.1 - 8.1 g/dl Final    EXT Albumin 12/03/2016 4.0  3.6 - 5.1 g/dl Final    EXT BilirubiN Total 12/03/2016 1.1  0.2 - 1.2 mg/dl Final    EXT Alkaline Phosphatase 12/03/2016 181* 33 - 130 u/l Final    EXT AST 12/03/2016 21  10 - 35 u/l Final    EXT ALT 12/03/2016 28  6 - 29 u/l Final   Office Visit on 11/14/2016   Component Date Value Ref Range Status    Blood Stool 11/14/2016 Negative  Negative Final     Acceptable 11/14/2016 Yes   Final    SOURCE: 11/14/2016 Cervical   Final    Slides: 11/14/2016 1   Final    LMP: 11/14/2016 NA   Final    Specimen adequacy: 11/14/2016 (NOTE)   Final    Comment:      Satisfactory for evaluation.          Endocervical cells absent.  Metaplastic cells indicative       of transformation zone cannot be reliably distinguished from       parabasal or atrophic cells.                      Interpretation 11/14/2016 NO EPITHELIAL ABNORMALITY SEE BELOW   Final    Comment: ----------------------------------------------------------------------       *NEGATIVE FOR INTRAEPITHELIAL LESION OR MALIGNANCY   ----------------------------------------------------------------------         Comments: 11/14/2016 (NOTE)   Final    Comment: Due to technical or specimen issues, imaging could not be  performed. A cytotechnologist has manually screened this slide.         Cytotechnologist: 11/14/2016 BRENDON Ca(ASCP)IAC   Final    LOCATION 11/14/2016 (NOTE)   Final    Comment: Specimens processed and interpreted at :[facility], [address] [place] [address], [place] [address], Phone: [phone], CLIA: [id number]      CPT Codes: 11/14/2016 (NOTE)   Final    Comment: [id number]        UNLESS OTHERWISE INDICATED, COMPUTER  AIDED AND CYTOTECHNOLOGIST     SCREENING PERFORMED.          The Pap test is a screening test with an inherent, but low       probability of error.  Your patient should be reminded to       consult you immediately if she experiences any suspicious       signs or symptoms, regardless of her Pap test result.       An alternate report format containing images or consolidated       prior Pap history is available as applicable.         HPV HIGH RISK IF ASC-US, SUREPATH 11/14/2016 CRITERIA NOT MET   Final    Comment:       UNLESS OTHERWISE INDICATED, ALL TESTING PERFORMED AT  [facility]  [address] [place] [id number]            :  CARLOS MENDEZ M.D.        [facility] NUMBER [id number]  [place]. [id number]                 Imaging:   MRI 12/8/16  MRI abdomen without and with contrast.    Comparison: 5/31/16    History: Neuroendocrine tumor.    Results: Axial gradient T2, axial T1 in and out of phase, axial T2 SSFSE, coronal T2 gradient and T2 SSFSE followed by pre-contrast axial T1 gradient fat sat and dynamic post contrast images were obtained. The patient received  10 cc of IV Gadovist contrast.    Numerous hepatic numerous hepatic lesion better seen on postcontrast 10 seconds.  Index lesion in the right hepatic lobe measures segment 5 measures 2.2 cm (previously measuring 2.0 x 2.4 cm).  A 2nd index lesion has been chosen within the left hepatic lobe segment 3 measuring 1.6 cm, in retrospect, on the prior study, measuring 1.6 cm.  Vague enhancing area) postcontrast 10 seconds image 62) within the uncinate process measuring at 1.7 x 0.9 cm as seen on the prior study.    The number and burden of liver lesions appear similar to the prior exam.  The biliary ducts are nondilated.  No liver is enlarged similar to the prior study the gallbladder is present.  The stomach, pancreas, spleen, and adrenal glands appear within normal limits.  Small bilateral kidney cysts.  The  visualized osseous structures demonstrate no definite osseous lesions.   Impression       Hepatomegaly with multiple liver lesions, the burden of lesions and size appear similar to the prior exam.  Vague area of enhancement within the uncinate process of the pancreas appears unchanged.                      Assessment:       1. Primary pancreatic neuroendocrine tumor    2. Secondary neuroendocrine tumor of liver    3. Chemotherapy follow-up examination           Plan:   Ms. Bruce continues to tolerate CAPTEM in review of her labs shows no detrimental effects.  We will plan to continue and repeat imaging in May 2017 which will be 6 months since prior imaging.  She did note that she will be retiring may need financial assistance to obtain chemotherapy.  Continue on with monthly labs.  Follow up in 2 months.     Croy Rodriguez DO, FACP  Hematology & Oncology, [facility]  [address]  [place]  [address]  ph. [phone]; [phone]  fax. [phone]    25 minutes were spent in coordination of patient's care, record review and counseling.  More than 50% of the time was face-to-face.      NOLANETS:  [facility]  A collaboration between [facility] and [facility]    PATIENT: Ching Bruce  MRN: [id number]  DATE: 8/7/2018      Diagnosis:   1. Primary pancreatic neuroendocrine tumor    2. Secondary neuroendocrine tumor of liver    3. 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 Bruce is a 67 y.o. female who returns for follow up for her pancreatic neuroendocrine tumor.  She  states that she has been feeling well.  She continues on with CAPTEM without any side effects.  She recently had biliary stent exchange.  No new complaints.    ONCOLOGIC HISTORY:   A 67 y.o. year-old -American female who I had initially   seen on 12/18/2012. Her history dates back to September 2012 when she was noted  to have several weeks of feeling fatigued. She sought treatment with her   primary care doctor who did a CBC and found her to be severely anemic. She was   seen at [facility] and transfused 3 units of packed red blood cells  and no source of bleeding had been found. Records at that time had shown   hemoglobin of 5. She sought followup in September 2012 with Dr. Guillen who   performed an EGD and colonoscopy with an EGD showing an ulcerated and fungating   nonbleeding 2 cm mass, malignant in appearance. It did cause partial   obstructions. Biopsies were taken, which showed no evidence of malignancy at   that time. She had a CT of the abdomen and pelvis showing multiple metastatic   lesions and the liver biopsy was performed on 10/17/2012 showed pancreatic   neuroendocrine neoplasm in the left lobe of the liver aspirate. She went on to   have an ERCP along with sphincterotomy and biliary stent placement and   subsequent PET had shown numerous hypodense lesions in the liver. Octreotide   scan was performed, which showed multiple right and left hepatic metastases.   MRI of the abdomen was performed on 12/05/2012 showing innumerable lesions that   demonstrated peripheral to hyperintensity consistent with hypervascular   metastasis. She was seen by Dr. Humphrey, who thought she was not a candidate   for surgical resection during that time. Her pathology from her tumor came back  positive for chromogranin, synaptophysin and had a Ki-67 of less than 1%. She   was started on treatment with temozolomide and capecitabine with cycle   1 being on 01/22/2013.    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    Secondary malignant neoplasm of liver     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) 5/7/2013    Thrombocytopenia 11/12/2013    Thyroid disease     Unspecified essential hypertension 11/12/2013       Past Surgical HIstory:   Past Surgical History:   Procedure Laterality Date    ERCP N/A 6/21/2018    Procedure: ERCP, stent exchange;  Surgeon: Jake Lieberman MD;  Location: [facility];  Service: Endoscopy;  Laterality: N/A;    THYROIDECTOMY  2011       Family History:   Family History   Problem Relation Age of Onset    Cancer Maternal Grandmother     Diabetes Father     Breast cancer Neg Hx     Colon cancer Neg Hx     Ovarian cancer Neg Hx        Social History:  reports that she has never smoked. She has never used smokeless tobacco. She reports that she does not drink alcohol or use drugs.    Allergies:  Review of patient's allergies indicates:   Allergen Reactions    Epinephrine Other (See Comments)     Carcinoid patient    Sulfa (sulfonamide antibiotics) Other (See Comments)     Urticaria       Medications:  Current Outpatient Prescriptions   Medication Sig Dispense Refill    alendronate (FOSAMAX) 70 MG tablet Take 70 mg by mouth every 7 days.   3    capecitabine (XELODA) 500 MG Tab TAKE 2 TABLETS (1000MG) BY MOUTH TWICE DAILY FOR 14 DAYS ON, THEN 14 DAYS OFF. THEN REPEAT. TAKE WITHIN 30 MINS AFTER A MEAL WITH WATER. 56 tablet 6    ergocalciferol (VITAMIN D2) 50,000 unit Cap Take 50,000 Units by mouth every 7 days.      ferrous sulfate  "325 mg (65 mg iron) Tab tablet Take 325 mg by mouth once daily.      indapamide (LOZOL) 2.5 MG Tab 2.5 mg once daily.       irbesartan (AVAPRO) 300 MG tablet 300 mg once daily.       levothyroxine (SYNTHROID) 150 MCG tablet Take 150 mcg by mouth once daily.  0    temozolomide (TEMODAR) 250 MG capsule TAKE 1 CAPSULE (250 MG) BY MOUTH ONCE DAILY ON DAYS 10-14 OF A 28 DAYCYCLE. TAKE WITH WATER ON EMPTY STOMACH. SWALLOW WHOLE 5 capsule 4    verapamil (CALAN-SR) 180 MG CR tablet Take 360 mg by mouth once daily.  1    verapamil (VERELAN PM) 360 MG C24P Take by mouth once daily.        No current facility-administered medications for this visit.        Review of Systems   Constitutional: Negative for chills, fever and unexpected weight change.   HENT: Negative for congestion, hearing loss and nosebleeds.    Eyes: Negative for visual disturbance.   Respiratory: Negative for cough and shortness of breath.    Cardiovascular: Negative for chest pain and palpitations.   Gastrointestinal: Negative for abdominal pain, blood in stool, constipation, diarrhea, nausea and vomiting.   Genitourinary: Negative for dysuria.   Musculoskeletal: Negative for back pain and gait problem.   Skin: Negative for color change and rash.   Neurological: Negative for dizziness, weakness and headaches.   Hematological: Negative for adenopathy. Does not bruise/bleed easily.   Psychiatric/Behavioral: Negative for confusion.       ECOG Performance Status: 0   Objective:      Vitals:   Vitals:    08/07/18 1557   BP: (!) 155/73   Pulse: 70   Temp: 98.7 °F (37.1 °C)   TempSrc: Oral   SpO2: 100%   Weight: 72.9 kg (160 lb 11.5 oz)   Height: 5' 7" (1.702 m)     BMI: Body mass index is 25.17 kg/m².    Physical Exam   Constitutional: She is oriented to person, place, and time. She appears well-developed and well-nourished. No distress.   HENT:   Head: Normocephalic.   Mouth/Throat: No oropharyngeal exudate.   Eyes: EOM are normal. No scleral icterus. "   Neck: Neck supple. No tracheal deviation present. No thyromegaly present.   Cardiovascular: Normal rate and regular rhythm.    Pulmonary/Chest: Effort normal and breath sounds normal. No respiratory distress. She has no wheezes. She has no rales.   Abdominal: Soft. She exhibits no distension and no mass. There is no tenderness. There is no rebound and no guarding.   Musculoskeletal: Normal range of motion. She exhibits no edema.   Lymphadenopathy:     She has no cervical adenopathy.   Neurological: She is alert and oriented to person, place, and time. No cranial nerve deficit.   Skin: Skin is warm and dry.   Psychiatric: She has a normal mood and affect.       Laboratory Data:  Abstract on 12/06/2016   Component Date Value Ref Range Status    EXT WBC 12/03/2016 4.1  3.8 - 10.8 1000/ul Final    EXT Hemoglobin 12/03/2016 12.9  11.7 - 15.5 g/dl Final    EXT Hematocrit 12/03/2016 38.5  35.0 - 45.0 % Final    EXT Platelets 12/03/2016 138* 140 - 400 1000/ul Final    EXT Glucose 12/03/2016 88  65 - 99 mg/dl Final    EXT BUN 12/03/2016 13  7 - 25 mg/dl Final    EXT Creatinine 12/03/2016 0.90  0.50 - 0.99 mg/dl Final    EXT Sodium 12/03/2016 141  135 - 146 mmol/l Final    EXT Potassium 12/03/2016 4.2  3.5 - 5.3 mmol/l Final    EXT Chloride 12/03/2016 105  98 - 110 mmol/l Final    EXT CO2 12/03/2016 29  20 - 31 mmol/l Final    EXT Calcium 12/03/2016 9.7  8.6 - 10.4 mg/dl Final    EXT Protein total 12/03/2016 6.9  6.1 - 8.1 g/dl Final    EXT Albumin 12/03/2016 4.0  3.6 - 5.1 g/dl Final    EXT BilirubiN Total 12/03/2016 1.1  0.2 - 1.2 mg/dl Final    EXT Alkaline Phosphatase 12/03/2016 181* 33 - 130 u/l Final    EXT AST 12/03/2016 21  10 - 35 u/l Final    EXT ALT 12/03/2016 28  6 - 29 u/l Final   Office Visit on 11/14/2016   Component Date Value Ref Range Status    Blood Stool 11/14/2016 Negative  Negative Final     Acceptable 11/14/2016 Yes   Final    SOURCE: 11/14/2016 Cervical   Final     Slides: 11/14/2016 1   Final    LMP: 11/14/2016 NA   Final    Specimen adequacy: 11/14/2016 (NOTE)   Final    Comment:      Satisfactory for evaluation.          Endocervical cells absent.  Metaplastic cells indicative       of transformation zone cannot be reliably distinguished from       parabasal or atrophic cells.                      Interpretation 11/14/2016 NO EPITHELIAL ABNORMALITY SEE BELOW   Final    Comment: ----------------------------------------------------------------------       *NEGATIVE FOR INTRAEPITHELIAL LESION OR MALIGNANCY   ----------------------------------------------------------------------         Comments: 11/14/2016 (NOTE)   Final    Comment: Due to technical or specimen issues, imaging could not be  performed. A cytotechnologist has manually screened this slide.         Cytotechnologist: 11/14/2016 BRENDON Ca(ASCP)IAC   Final    LOCATION 11/14/2016 (NOTE)   Final    Comment: Specimens processed and interpreted at :[facility], [address] [place] [address], Phone: [phone], CLIA: [id number]      CPT Codes: 11/14/2016 (NOTE)   Final    Comment: [id number]        UNLESS OTHERWISE INDICATED, COMPUTER AIDED AND CYTOTECHNOLOGIST     SCREENING PERFORMED.          The Pap test is a screening test with an inherent, but low       probability of error.  Your patient should be reminded to       consult you immediately if she experiences any suspicious       signs or symptoms, regardless of her Pap test result.       An alternate report format containing images or consolidated       prior Pap history is available as applicable.         HPV HIGH RISK IF ASC-US, SUREPATH 11/14/2016 CRITERIA NOT MET   Final    Comment:       UNLESS OTHERWISE INDICATED, ALL TESTING PERFORMED AT  [facility]  [address] [place] [address]            :  CARLOS MENDEZ M.D.        CLIA NUMBER [id number]  CAP ACCREDITATION NO.  [id number]                 Imaging:   MRI 4/26/18  EXAMINATION:  MRI ABDOMEN W WO CONTRAST    CLINICAL HISTORY:  Secondary neuroendocrine tumor of liver;  Other secondary neuroendocrine tumors    TECHNIQUE:  Multiplanar multisequence images of the abdomen before and after administration of 10 mL Gadavist intravenous contrast.    COMPARISON:  MRI of the abdomen with and without contrast 12/26/2017.  Octreotide scan 05/23/2017.    FINDINGS:  Inferior Thorax: Normal.    Liver: Innumerable hepatic lesions, greater in the right hepatic lobe in the left, are grossly unchanged in size and number.    Gallbladder: No gallstones.  Suggestion of small amount of air in the gallbladder, possibly related to prior ERCP.    Bile Ducts: Common bile duct dilatation to approximately 14 mm is unchanged, with mild prominence of intrahepatic ducts.  Irregular abnormal signal in the distal common bile duct is unchanged.    Pancreas: No mass. No peripancreatic fat stranding.    Spleen: Normal.    Adrenals: Normal.    Kidneys/Ureters: Normal enhancement.  No mass or hydroureteronephrosis.  Small cysts are present bilaterally.  There are bilateral extrarenal pelves.    Bladder: No evidence of obstruction or inflammation.    Reproductive organs: Normal.    GI Tract/Mesentery: No evidence of bowel obstruction or inflammation.    Peritoneal Space: No ascites or free air.    Retroperitoneum: No pathologically enlarged nodes.    Abdominal wall: Normal.    Vasculature: No aneurysm.    Bones: No acute fracture. No suspicious lytic or sclerotic lesions.   Impression       Innumerable metastatic lesions throughout the liver, greater in the right hepatic lobe and in the left, grossly unchanged in size and number.    Mildly prominent intrahepatic bile ducts and prominence of the common bile duct.  Irregular abnormal signal in the distal common bile duct is unchanged and may represent stones or sludge. Suggestion of small amount of air in the gallbladder,  possibly related to prior ERCPs, with question of sphincterotomy.    RECIST SUMMARY:    Date of prior examination for comparison: MRI abdomen 12/26/2017    Lesion 1: Hepatic segment 5.  2.1 cm. Series 12 Image 47. Prior measurement 2.2 cm.    Lesion 2: Hepatic segment 3.  1.5 cm. Series 12 Image 57. Prior measurement 1.7 cm.            Assessment:       1. Primary pancreatic neuroendocrine tumor    2. Secondary neuroendocrine tumor of liver    3. Chemotherapy follow-up examination           Plan:   Ms. Bruce continues to do well from oncologic standpoint an tolerating treatment with CAPTEM.  She will continue on with treatment.  Labs have been reviewed and bilirubin has decreased following ERCP.  We will continue to check monthly labs and will plan to see her back in another 3 months or sooner if needed.  We will check an MRI and pancreastatin in 3 months.  All questions were answered and she is agreeable with this plan.      Cory Rodriguez DO, FACP  Hematology & Oncology, [facility]  [address], [address]  [place]  [address]  ph. [phone]; [phone]  fax. [phone]    25 minutes were spent in coordination of patient's care, record review and counseling.  More than 50% of the time was face-to-face.

## 2018-08-13 ENCOUNTER — TELEPHONE (OUTPATIENT)
Dept: NEUROLOGY | Facility: HOSPITAL | Age: 68
End: 2018-08-13

## 2018-08-13 NOTE — TELEPHONE ENCOUNTER
----- Message from Jake Lieberman MD sent at 8/12/2018  1:42 PM CDT -----  Plan stent exchange again in one year or sooner if T bili rises     ----- Message -----  From: Lovely Cruz LPN  Sent: 8/8/2018   8:51 AM  To: Jake Lieberman MD    Is there a set return time or based on labs?   ----- Message -----  From: Iona Reveles RN  Sent: 8/7/2018   4:39 PM  To: [facility] Staff    Patient wants to know if there is a set time for her to come back?  She thinks it was a year or if her bili greyson again.

## 2018-08-25 LAB
EXT 24 HR UR METANEPHRINE: ABNORMAL
EXT 24 HR UR NORMETANEPHRINE: ABNORMAL
EXT 24 HR UR NORMETANEPHRINE: ABNORMAL
EXT 25 HYDROXY VIT D2: ABNORMAL
EXT 25 HYDROXY VIT D3: ABNORMAL
EXT 5 HIAA 24 HR URINE: ABNORMAL
EXT 5 HIAA BLOOD: ABNORMAL
EXT ACTH: ABNORMAL
EXT AFP: ABNORMAL
EXT ALBUMIN: 4.2 G/DL (ref 3.6–5.1)
EXT ALKALINE PHOSPHATASE: 184 U/L (ref 33–130)
EXT ALT: 25 U/L (ref 6–29)
EXT AMYLASE: ABNORMAL
EXT ANTI ISLET CELL AB: ABNORMAL
EXT ANTI PARIETAL CELL AB: ABNORMAL
EXT ANTI THYROID AB: ABNORMAL
EXT AST: 21 U/L (ref 10–35)
EXT BILIRUBIN DIRECT: ABNORMAL MG/DL
EXT BILIRUBIN TOTAL: 1.2 MG/DL (ref 0.2–1.2)
EXT BK VIRUS DNA QN PCR: ABNORMAL
EXT BUN: 14 MG/DL (ref 7–25)
EXT C PEPTIDE: ABNORMAL
EXT CA 125: ABNORMAL
EXT CA 19-9: ABNORMAL
EXT CA 27-29: ABNORMAL
EXT CALCITONIN: ABNORMAL
EXT CALCIUM: 9.8 MG/DL (ref 8.6–10.4)
EXT CEA: ABNORMAL
EXT CHLORIDE: 105 MMOL/L (ref 98–110)
EXT CHOLESTEROL: ABNORMAL
EXT CHROMOGRANIN A: ABNORMAL
EXT CO2: 28 MMOL/L (ref 20–32)
EXT CREATININE UA: ABNORMAL
EXT CREATININE: 0.92 MG/DL (ref 0.5–0.99)
EXT CYCLOSPORONE LEVEL: ABNORMAL
EXT DOPAMINE: ABNORMAL
EXT EBV DNA BY PCR: ABNORMAL
EXT EPINEPHRINE: ABNORMAL
EXT FOLATE: ABNORMAL
EXT FREE T3: ABNORMAL
EXT FREE T4: ABNORMAL
EXT FSH: ABNORMAL
EXT GASTRIN RELEASING PEPTIDE: ABNORMAL
EXT GASTRIN RELEASING PEPTIDE: ABNORMAL
EXT GASTRIN: ABNORMAL
EXT GGT: ABNORMAL
EXT GHRELIN: ABNORMAL
EXT GLUCAGON: ABNORMAL
EXT GLUCOSE: 89 MG/DL (ref 65–99)
EXT GROWTH HORMONE: ABNORMAL
EXT HCV RNA QUANT PCR: ABNORMAL
EXT HDL: ABNORMAL
EXT HEMATOCRIT: 37.6 % (ref 35–45)
EXT HEMOGLOBIN A1C: ABNORMAL
EXT HEMOGLOBIN: 13.1 G/DL (ref 11.7–15.5)
EXT HISTAMINE 24 HR URINE: ABNORMAL
EXT HISTAMINE: ABNORMAL
EXT IGF-1: ABNORMAL
EXT IMMUNKNOW (NON-STIMULATED): ABNORMAL
EXT IMMUNKNOW (STIMULATED): ABNORMAL
EXT INR: ABNORMAL
EXT INSULIN: ABNORMAL
EXT LANREOTIDE LEVEL: ABNORMAL
EXT LDH, TOTAL: ABNORMAL
EXT LDL CHOLESTEROL: ABNORMAL
EXT LIPASE: ABNORMAL
EXT MAGNESIUM: ABNORMAL
EXT METANEPHRINE FREE PLASMA: ABNORMAL
EXT MOTILIN: ABNORMAL
EXT NEUROKININ A CAMB: ABNORMAL
EXT NEUROKININ A ISI: ABNORMAL
EXT NEUROTENSIN: ABNORMAL
EXT NOREPINEPHRINE: ABNORMAL
EXT NORMETANEPHRINE: ABNORMAL
EXT NSE: ABNORMAL
EXT OCTREOTIDE LEVEL: ABNORMAL
EXT PANCREASTATIN CAMB: ABNORMAL
EXT PANCREASTATIN ISI: ABNORMAL
EXT PANCREATIC POLYPEPTIDE: ABNORMAL
EXT PHOSPHORUS: ABNORMAL
EXT PLATELETS: 153 1000/UL (ref 140–400)
EXT POTASSIUM: 4.2 MMOL/L (ref 3.5–5.3)
EXT PROGRAF LEVEL: ABNORMAL
EXT PROLACTIN: ABNORMAL
EXT PROTEIN TOTAL: 7.2 G/DL (ref 6.1–8.1)
EXT PROTEIN UA: ABNORMAL
EXT PT: ABNORMAL
EXT PTH, INTACT: ABNORMAL
EXT PTT: ABNORMAL
EXT RAPAMUNE LEVEL: ABNORMAL
EXT SEROTONIN: ABNORMAL
EXT SODIUM: 142 MMOL/L (ref 135–146)
EXT SOMATOSTATIN: ABNORMAL
EXT SUBSTANCE P: ABNORMAL
EXT TRIGLYCERIDES: ABNORMAL
EXT TRYPTASE: ABNORMAL
EXT TSH: ABNORMAL
EXT URIC ACID: ABNORMAL
EXT URINE AMYLASE U/HR: ABNORMAL
EXT URINE AMYLASE U/L: ABNORMAL
EXT VASOACTIVE INTESTINAL POLYPEPTIDE: ABNORMAL
EXT VITAMIN B12: ABNORMAL
EXT VMA 24 HR URINE: ABNORMAL
EXT WBC: 4.1 1000/UL (ref 3.8–10.8)
NEURON SPECIFIC ENOLASE: ABNORMAL

## 2018-09-18 DIAGNOSIS — D3A.8 PRIMARY PANCREATIC NEUROENDOCRINE TUMOR: ICD-10-CM

## 2018-09-19 RX ORDER — TEMOZOLOMIDE 250 MG/1
CAPSULE ORAL
Qty: 5 CAPSULE | Refills: 4 | Status: SHIPPED | OUTPATIENT
Start: 2018-09-19 | End: 2018-10-02 | Stop reason: SDUPTHER

## 2018-09-22 LAB
EXT 24 HR UR METANEPHRINE: ABNORMAL
EXT 24 HR UR NORMETANEPHRINE: ABNORMAL
EXT 24 HR UR NORMETANEPHRINE: ABNORMAL
EXT 25 HYDROXY VIT D2: ABNORMAL
EXT 25 HYDROXY VIT D3: ABNORMAL
EXT 5 HIAA 24 HR URINE: ABNORMAL
EXT 5 HIAA BLOOD: ABNORMAL
EXT ACTH: ABNORMAL
EXT AFP: ABNORMAL
EXT ALBUMIN: 4 G/DL (ref 3.6–5.1)
EXT ALKALINE PHOSPHATASE: 171 U/L (ref 33–130)
EXT ALT: 23 U/L (ref 6–29)
EXT AMYLASE: ABNORMAL
EXT ANTI ISLET CELL AB: ABNORMAL
EXT ANTI PARIETAL CELL AB: ABNORMAL
EXT ANTI THYROID AB: ABNORMAL
EXT AST: 19 U/L (ref 10–35)
EXT BILIRUBIN DIRECT: ABNORMAL
EXT BILIRUBIN TOTAL: 1.1 MG/DL (ref 0.2–1.2)
EXT BK VIRUS DNA QN PCR: ABNORMAL
EXT BUN: 16 MG/DL (ref 7–25)
EXT C PEPTIDE: ABNORMAL
EXT CA 125: ABNORMAL
EXT CA 19-9: ABNORMAL
EXT CA 27-29: ABNORMAL
EXT CALCITONIN: ABNORMAL
EXT CALCIUM: 9.8 MG/DL (ref 8.6–10.4)
EXT CEA: ABNORMAL
EXT CHLORIDE: 103 MMOL/L (ref 98–110)
EXT CHOLESTEROL: ABNORMAL
EXT CHROMOGRANIN A: ABNORMAL
EXT CO2: 25 MMOL/L (ref 20–32)
EXT CREATININE UA: ABNORMAL
EXT CREATININE: 0.96 MG/DL (ref 0.5–0.99)
EXT CYCLOSPORONE LEVEL: ABNORMAL
EXT DOPAMINE: ABNORMAL
EXT EBV DNA BY PCR: ABNORMAL
EXT EPINEPHRINE: ABNORMAL
EXT FOLATE: ABNORMAL
EXT FREE T3: ABNORMAL
EXT FREE T4: ABNORMAL
EXT FSH: ABNORMAL
EXT GASTRIN RELEASING PEPTIDE: ABNORMAL
EXT GASTRIN RELEASING PEPTIDE: ABNORMAL
EXT GASTRIN: ABNORMAL
EXT GGT: ABNORMAL
EXT GHRELIN: ABNORMAL
EXT GLUCAGON: ABNORMAL
EXT GLUCOSE: 88 MG/DL (ref 65–99)
EXT GROWTH HORMONE: ABNORMAL
EXT HCV RNA QUANT PCR: ABNORMAL
EXT HDL: ABNORMAL
EXT HEMATOCRIT: 37.6 % (ref 35–45)
EXT HEMOGLOBIN A1C: ABNORMAL
EXT HEMOGLOBIN: 13.2 G/DL (ref 11.7–15.5)
EXT HISTAMINE 24 HR URINE: ABNORMAL
EXT HISTAMINE: ABNORMAL
EXT IGF-1: ABNORMAL
EXT IMMUNKNOW (NON-STIMULATED): ABNORMAL
EXT IMMUNKNOW (STIMULATED): ABNORMAL
EXT INR: ABNORMAL
EXT INSULIN: ABNORMAL
EXT LANREOTIDE LEVEL: ABNORMAL
EXT LDH, TOTAL: ABNORMAL
EXT LDL CHOLESTEROL: ABNORMAL
EXT LIPASE: ABNORMAL
EXT MAGNESIUM: ABNORMAL
EXT METANEPHRINE FREE PLASMA: ABNORMAL
EXT MOTILIN: ABNORMAL
EXT NEUROKININ A CAMB: ABNORMAL
EXT NEUROKININ A ISI: ABNORMAL
EXT NEUROTENSIN: ABNORMAL
EXT NOREPINEPHRINE: ABNORMAL
EXT NORMETANEPHRINE: ABNORMAL
EXT NSE: ABNORMAL
EXT OCTREOTIDE LEVEL: ABNORMAL
EXT PANCREASTATIN CAMB: ABNORMAL
EXT PANCREASTATIN ISI: ABNORMAL
EXT PANCREATIC POLYPEPTIDE: ABNORMAL
EXT PHOSPHORUS: ABNORMAL
EXT PLATELETS: 153 1000/UL (ref 140–400)
EXT POTASSIUM: 4.5 MMOL/L (ref 3.5–5.3)
EXT PROGRAF LEVEL: ABNORMAL
EXT PROLACTIN: ABNORMAL
EXT PROTEIN TOTAL: 7 G/DL (ref 6.1–8.1)
EXT PROTEIN UA: ABNORMAL
EXT PT: ABNORMAL
EXT PTH, INTACT: ABNORMAL
EXT PTT: ABNORMAL
EXT RAPAMUNE LEVEL: ABNORMAL
EXT SEROTONIN: ABNORMAL
EXT SODIUM: 138 MMOL/L (ref 135–146)
EXT SOMATOSTATIN: ABNORMAL
EXT SUBSTANCE P: ABNORMAL
EXT TRIGLYCERIDES: ABNORMAL
EXT TRYPTASE: ABNORMAL
EXT TSH: ABNORMAL
EXT URIC ACID: ABNORMAL
EXT URINE AMYLASE U/HR: ABNORMAL
EXT URINE AMYLASE U/L: ABNORMAL
EXT VASOACTIVE INTESTINAL POLYPEPTIDE: ABNORMAL
EXT VITAMIN B12: ABNORMAL
EXT VMA 24 HR URINE: ABNORMAL
EXT WBC: 4.5 1000/UL (ref 3.8–10.8)
NEURON SPECIFIC ENOLASE: ABNORMAL

## 2018-10-02 DIAGNOSIS — C78.7 SECONDARY MALIGNANT NEOPLASM OF LIVER: ICD-10-CM

## 2018-10-02 DIAGNOSIS — D3A.8 PRIMARY PANCREATIC NEUROENDOCRINE TUMOR: ICD-10-CM

## 2018-10-02 DIAGNOSIS — C7A.8 PRIMARY MALIGNANT NEUROENDOCRINE TUMOR OF PANCREAS: ICD-10-CM

## 2018-10-02 RX ORDER — TEMOZOLOMIDE 250 MG/1
250 CAPSULE ORAL DAILY
Qty: 5 CAPSULE | Refills: 4 | Status: SHIPPED | OUTPATIENT
Start: 2018-10-02 | End: 2018-10-25 | Stop reason: SDUPTHER

## 2018-10-02 RX ORDER — CAPECITABINE 500 MG/1
1000 TABLET, FILM COATED ORAL 2 TIMES DAILY
Qty: 56 TABLET | Refills: 6 | Status: SHIPPED | OUTPATIENT
Start: 2018-10-02 | End: 2018-10-25 | Stop reason: SDUPTHER

## 2018-10-19 LAB
EXT 24 HR UR METANEPHRINE: ABNORMAL
EXT 24 HR UR NORMETANEPHRINE: ABNORMAL
EXT 24 HR UR NORMETANEPHRINE: ABNORMAL
EXT 25 HYDROXY VIT D2: ABNORMAL
EXT 25 HYDROXY VIT D3: ABNORMAL
EXT 5 HIAA 24 HR URINE: ABNORMAL
EXT 5 HIAA BLOOD: ABNORMAL
EXT ACTH: ABNORMAL
EXT AFP: ABNORMAL
EXT ALBUMIN: 4.4 G/DL (ref 3.6–5.1)
EXT ALKALINE PHOSPHATASE: 184 U/L (ref 33–130)
EXT ALT: 31 U/L (ref 6–29)
EXT AMYLASE: ABNORMAL
EXT ANTI ISLET CELL AB: ABNORMAL
EXT ANTI PARIETAL CELL AB: ABNORMAL
EXT ANTI THYROID AB: ABNORMAL
EXT AST: 23 U/L (ref 10–35)
EXT BILIRUBIN DIRECT: ABNORMAL
EXT BILIRUBIN TOTAL: 1.4 MG/DL (ref 0.2–1.2)
EXT BK VIRUS DNA QN PCR: ABNORMAL
EXT BUN: 14 MG/DL (ref 7–25)
EXT C PEPTIDE: ABNORMAL
EXT CA 125: ABNORMAL
EXT CA 19-9: ABNORMAL
EXT CA 27-29: ABNORMAL
EXT CALCITONIN: ABNORMAL
EXT CALCIUM: 10.2 MG/DL (ref 8.6–10.4)
EXT CEA: ABNORMAL
EXT CHLORIDE: 104 MMOL/L (ref 98–110)
EXT CHOLESTEROL: ABNORMAL
EXT CHROMOGRANIN A: ABNORMAL
EXT CO2: 27 MMOL/L (ref 20–32)
EXT CREATININE UA: ABNORMAL
EXT CREATININE: 1.03 MG/DL (ref 0.5–0.99)
EXT CYCLOSPORONE LEVEL: ABNORMAL
EXT DOPAMINE: ABNORMAL
EXT EBV DNA BY PCR: ABNORMAL
EXT EPINEPHRINE: ABNORMAL
EXT FOLATE: ABNORMAL
EXT FREE T3: ABNORMAL
EXT FREE T4: ABNORMAL
EXT FSH: ABNORMAL
EXT GASTRIN RELEASING PEPTIDE: ABNORMAL
EXT GASTRIN RELEASING PEPTIDE: ABNORMAL
EXT GASTRIN: ABNORMAL
EXT GGT: ABNORMAL
EXT GHRELIN: ABNORMAL
EXT GLUCAGON: ABNORMAL
EXT GLUCOSE: 84 MG/DL (ref 65–99)
EXT GROWTH HORMONE: ABNORMAL
EXT HCV RNA QUANT PCR: ABNORMAL
EXT HDL: ABNORMAL
EXT HEMATOCRIT: 39.6 % (ref 35–45)
EXT HEMOGLOBIN A1C: ABNORMAL
EXT HEMOGLOBIN: 13.9 G/DL (ref 11.7–15.5)
EXT HISTAMINE 24 HR URINE: ABNORMAL
EXT HISTAMINE: ABNORMAL
EXT IGF-1: ABNORMAL
EXT IMMUNKNOW (NON-STIMULATED): ABNORMAL
EXT IMMUNKNOW (STIMULATED): ABNORMAL
EXT INR: ABNORMAL
EXT INSULIN: ABNORMAL
EXT LANREOTIDE LEVEL: ABNORMAL
EXT LDH, TOTAL: ABNORMAL
EXT LDL CHOLESTEROL: ABNORMAL
EXT LIPASE: ABNORMAL
EXT MAGNESIUM: ABNORMAL
EXT METANEPHRINE FREE PLASMA: ABNORMAL
EXT MOTILIN: ABNORMAL
EXT NEUROKININ A CAMB: ABNORMAL
EXT NEUROKININ A ISI: ABNORMAL
EXT NEUROTENSIN: ABNORMAL
EXT NOREPINEPHRINE: ABNORMAL
EXT NORMETANEPHRINE: ABNORMAL
EXT NSE: ABNORMAL
EXT OCTREOTIDE LEVEL: ABNORMAL
EXT PANCREASTATIN CAMB: ABNORMAL
EXT PANCREASTATIN ISI: 122 PG/ML (ref 10–135)
EXT PANCREATIC POLYPEPTIDE: ABNORMAL
EXT PHOSPHORUS: ABNORMAL
EXT PLATELETS: 148 1000/UL (ref 140–400)
EXT POTASSIUM: 4.7 MMOL/L (ref 3.5–5.3)
EXT PROGRAF LEVEL: ABNORMAL
EXT PROLACTIN: ABNORMAL
EXT PROTEIN TOTAL: 7.6 G/DL (ref 6.1–8.1)
EXT PROTEIN UA: ABNORMAL
EXT PT: ABNORMAL
EXT PTH, INTACT: ABNORMAL
EXT PTT: ABNORMAL
EXT RAPAMUNE LEVEL: ABNORMAL
EXT SEROTONIN: ABNORMAL
EXT SODIUM: 141 MMOL/L (ref 135–146)
EXT SOMATOSTATIN: ABNORMAL
EXT SUBSTANCE P: ABNORMAL
EXT TRIGLYCERIDES: ABNORMAL
EXT TRYPTASE: ABNORMAL
EXT TSH: ABNORMAL
EXT URIC ACID: ABNORMAL
EXT URINE AMYLASE U/HR: ABNORMAL
EXT URINE AMYLASE U/L: ABNORMAL
EXT VASOACTIVE INTESTINAL POLYPEPTIDE: ABNORMAL
EXT VITAMIN B12: ABNORMAL
EXT VMA 24 HR URINE: ABNORMAL
EXT WBC: 4.4 1000/UL (ref 3.8–10.8)
NEURON SPECIFIC ENOLASE: ABNORMAL

## 2018-10-25 DIAGNOSIS — C7A.8 PRIMARY MALIGNANT NEUROENDOCRINE TUMOR OF PANCREAS: ICD-10-CM

## 2018-10-25 DIAGNOSIS — D3A.8 PRIMARY PANCREATIC NEUROENDOCRINE TUMOR: ICD-10-CM

## 2018-10-25 DIAGNOSIS — C78.7 SECONDARY MALIGNANT NEOPLASM OF LIVER: ICD-10-CM

## 2018-10-25 RX ORDER — TEMOZOLOMIDE 250 MG/1
250 CAPSULE ORAL DAILY
Qty: 5 CAPSULE | Refills: 4 | Status: ON HOLD | OUTPATIENT
Start: 2018-10-25 | End: 2020-01-01 | Stop reason: HOSPADM

## 2018-10-25 RX ORDER — CAPECITABINE 500 MG/1
1000 TABLET, FILM COATED ORAL 2 TIMES DAILY
Qty: 56 TABLET | Refills: 6 | Status: ON HOLD | OUTPATIENT
Start: 2018-10-25 | End: 2020-01-01

## 2018-10-25 NOTE — TELEPHONE ENCOUNTER
----- Message from Fely Nash sent at 10/25/2018  1:24 PM CDT -----  Contact: Pt  Pt called to speak with nurse Miriam have some questions   Callback#[phone]  Thank You

## 2018-10-25 NOTE — TELEPHONE ENCOUNTER
----- Message from Fely Nash sent at 10/25/2018  1:30 PM CDT -----  Contact: Pt   Rx Refill/Request     Is this a Refill or New Rx:  Refill  Rx Name and Strength:  capecitabine (XELODA) 500 MG Tab &temozolomide (TEMODAR) 250 MG capsule  Preferred Pharmacy with phone number: [facility] - [place], [place] - [facility] [address]   [phone] (Phone)  [phone] (Fax)  Communication Preference:Phone [phone]  Additional Information:   Thank You  ABBY Nash

## 2018-10-25 NOTE — TELEPHONE ENCOUNTER
----- Message from Fely Nash sent at 10/25/2018  1:30 PM CDT -----  Contact: Pt   Rx Refill/Request     Is this a Refill or New Rx:  Refill  Rx Name and Strength:  capecitabine (XELODA) 500 MG Tab &temozolomide (TEMODAR) 250 MG capsule  Preferred Pharmacy with phone number: [facility] - [place], [place] - [facility] [address]   [phone] (Phone)  [phone] (Fax)  Communication Preference:Phone [phone]  Additional Information:   Thank You  ABBY Nash

## 2018-11-01 ENCOUNTER — HOSPITAL ENCOUNTER (OUTPATIENT)
Dept: RADIOLOGY | Facility: HOSPITAL | Age: 68
Discharge: HOME | End: 2018-11-01
Attending: INTERNAL MEDICINE
Payer: COMMERCIAL

## 2018-11-01 DIAGNOSIS — C7A.8 PRIMARY MALIGNANT NEUROENDOCRINE TUMOR OF LUNG: ICD-10-CM

## 2018-11-01 PROCEDURE — A9585 GADOBUTROL INJECTION: HCPCS | Performed by: INTERNAL MEDICINE

## 2018-11-01 PROCEDURE — 74183 MRI ABD W/O CNTR FLWD CNTR: CPT | Mod: TC

## 2018-11-01 PROCEDURE — 25500020 PHARM REV CODE 255: Performed by: INTERNAL MEDICINE

## 2018-11-01 PROCEDURE — 74183 MRI ABD W/O CNTR FLWD CNTR: CPT | Mod: 26,,, | Performed by: RADIOLOGY

## 2018-11-01 RX ORDER — GADOBUTROL 604.72 MG/ML
10 INJECTION INTRAVENOUS
Status: COMPLETED | OUTPATIENT
Start: 2018-11-01 | End: 2018-11-01

## 2018-11-01 RX ADMIN — GADOBUTROL 10 ML: 604.72 INJECTION INTRAVENOUS at 01:11

## 2018-11-06 ENCOUNTER — OFFICE VISIT (OUTPATIENT)
Dept: NEUROLOGY | Facility: HOSPITAL | Age: 68
End: 2018-11-06
Attending: INTERNAL MEDICINE
Payer: COMMERCIAL

## 2018-11-06 VITALS
WEIGHT: 161.38 LBS | BODY MASS INDEX: 25.33 KG/M2 | HEART RATE: 72 BPM | HEIGHT: 67 IN | OXYGEN SATURATION: 99 % | SYSTOLIC BLOOD PRESSURE: 149 MMHG | TEMPERATURE: 98 F | DIASTOLIC BLOOD PRESSURE: 92 MMHG

## 2018-11-06 DIAGNOSIS — C7B.8 SECONDARY NEUROENDOCRINE TUMOR OF LIVER: ICD-10-CM

## 2018-11-06 DIAGNOSIS — D3A.8 PRIMARY PANCREATIC NEUROENDOCRINE TUMOR: ICD-10-CM

## 2018-11-06 DIAGNOSIS — C7A.8 NEUROENDOCRINE CANCER: Primary | ICD-10-CM

## 2018-11-06 PROCEDURE — 1101F PT FALLS ASSESS-DOCD LE1/YR: CPT | Mod: CPTII,,, | Performed by: INTERNAL MEDICINE

## 2018-11-06 PROCEDURE — 3077F SYST BP >= 140 MM HG: CPT | Mod: CPTII,,, | Performed by: INTERNAL MEDICINE

## 2018-11-06 PROCEDURE — 99215 OFFICE O/P EST HI 40 MIN: CPT | Performed by: INTERNAL MEDICINE

## 2018-11-06 PROCEDURE — 99214 OFFICE O/P EST MOD 30 MIN: CPT | Mod: ,,, | Performed by: INTERNAL MEDICINE

## 2018-11-06 PROCEDURE — 3080F DIAST BP >= 90 MM HG: CPT | Mod: CPTII,,, | Performed by: INTERNAL MEDICINE

## 2018-11-06 NOTE — PROGRESS NOTES
NOLANETS:  [facility]  A collaboration between [facility] and [facility]    PATIENT: Ching Bruce  MRN: [id number]  DATE: 11/6/2018      Diagnosis:   1. Neuroendocrine cancer    2. Primary pancreatic neuroendocrine tumor    3. 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 Bruce is a 67 y.o. female who returns for follow up for her pancreatic neuroendocrine tumor.  She remains on treatment with CAPTEM.  She states that she is tolerating this well.  She is fully active and has no new complaints.    ONCOLOGIC HISTORY:   A 67 y.o. year-old -American female who I had initially   seen on 12/18/2012. Her history dates back to September 2012 when she was noted  to have several weeks of feeling fatigued. She sought treatment with her   primary care doctor who did a CBC and found her to be severely anemic. She was   seen at [facility] and transfused 3 units of packed red blood cells  and no source of bleeding had been found. Records at that time had shown   hemoglobin of 5. She sought followup in September 2012 with Dr. Guillen who   performed an EGD and colonoscopy with an EGD showing an ulcerated and fungating   nonbleeding 2 cm mass, malignant in appearance. It did cause partial   obstructions. Biopsies were taken, which showed no evidence of malignancy at   that time. She had a CT of the abdomen and pelvis showing multiple metastatic   lesions and the liver biopsy was performed on 10/17/2012 showed pancreatic   neuroendocrine neoplasm in the left lobe of the liver aspirate. She  went on to   have an ERCP along with sphincterotomy and biliary stent placement and   subsequent PET had shown numerous hypodense lesions in the liver. Octreotide   scan was performed, which showed multiple right and left hepatic metastases.   MRI of the abdomen was performed on 12/05/2012 showing innumerable lesions that   demonstrated peripheral to hyperintensity consistent with hypervascular   metastasis. She was seen by Dr. Humphrey, who thought she was not a candidate   for surgical resection during that time. Her pathology from her tumor came back  positive for chromogranin, synaptophysin and had a Ki-67 of less than 1%. She   was started on treatment with temozolomide and capecitabine with cycle   1 being on 01/22/2013.    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    Secondary malignant neoplasm of liver     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) 5/7/2013    Thrombocytopenia 11/12/2013    Thyroid disease     Unspecified essential hypertension 11/12/2013       Past Surgical HIstory:   Past Surgical History:   Procedure Laterality Date    ERCP N/A 6/21/2018    Procedure: ERCP, stent exchange;  Surgeon: Jake Lieberman MD;  Location: [facility];  Service: Endoscopy;  Laterality: N/A;    ERCP N/A 8/24/2017    Performed by [facility]man MD at [facility]    ERCP N/A 10/27/2016    [facility] by [facility] at [facility]    ERCP N/A 12/18/2014    [facility] at [facility]    ERCP Left 7/3/2014    Performed by Jake [facility]berman,  MD at [facility] END[facility]    ERCP, stent exchange N/A 6/21/2018    [facility] at [facility]    ERCP- with stent replacement N/A 3/7/2016    [facility] by [facility] at [facility]    ERCP/STENT REPLACEMENT N/A 7/16/2015    [facility] by [facility] at [facility] END[facility]    THYROIDECTOMY  2011       Family History:   Family History   Problem Relation Age of Onset    Cancer Maternal Grandmother     Diabetes Father     Breast cancer Neg Hx     Colon cancer Neg Hx     Ovarian cancer Neg Hx        Social History:  reports that  has never smoked. she has never used smokeless tobacco. She reports that she does not drink alcohol or use drugs.    Allergies:  Review of patient's allergies indicates:   Allergen Reactions    Epinephrine Other (See Comments)     Carcinoid patient    Sulfa (sulfonamide antibiotics) Other (See Comments)     Urticaria       Medications:  Current Outpatient Medications   Medication Sig Dispense Refill    alendronate (FOSAMAX) 70 MG tablet Take 70 mg by mouth every 7 days.   3    capecitabine (XELODA) 500 MG Tab Take 2 tablets (1,000 mg total) by mouth 2 (two) times daily. 56 tablet 6    ergocalciferol (VITAMIN D2) 50,000 unit Cap Take 50,000 Units by mouth every 7 days.      ferrous sulfate 325 mg (65 mg iron) Tab tablet Take 325 mg by mouth once daily.      indapamide (LOZOL) 2.5 MG Tab 2.5 mg once daily.       irbesartan (AVAPRO) 300 MG tablet 300 mg once daily.       levothyroxine (SYNTHROID) 150 MCG tablet Take 150 mcg by mouth once daily.  0    temozolomide (TEMODAR) 250 MG capsule Take 1 capsule (250 mg total) by mouth once daily. 5 capsule 4    verapamil (CALAN-SR) 180 MG CR tablet Take 360 mg by mouth once daily.  1    verapamil (VERELAN PM) 360 MG C24P Take by mouth once daily.        No current facility-administered medications for this visit.        Review of Systems   Constitutional: Negative for chills, fever and unexpected weight change.   HENT:  "Negative for congestion, hearing loss and nosebleeds.    Eyes: Negative for visual disturbance.   Respiratory: Negative for cough and shortness of breath.    Cardiovascular: Negative for chest pain and palpitations.   Gastrointestinal: Negative for abdominal pain, blood in stool, constipation, diarrhea, nausea and vomiting.   Genitourinary: Negative for dysuria.   Musculoskeletal: Negative for back pain and gait problem.   Skin: Negative for color change and rash.   Neurological: Negative for dizziness, weakness and headaches.   Hematological: Negative for adenopathy. Does not bruise/bleed easily.   Psychiatric/Behavioral: Negative for confusion.       ECOG Performance Status: 0   Objective:      Vitals:   Vitals:    11/06/18 1601   BP: (!) 149/92   Pulse: 72   Temp: 97.9 °F (36.6 °C)   TempSrc: Oral   SpO2: 99%   Weight: 73.2 kg (161 lb 6 oz)   Height: 5' 7" (1.702 m)     BMI: Body mass index is 25.28 kg/m².    Physical Exam   Constitutional: She is oriented to person, place, and time. She appears well-developed and well-nourished. No distress.   HENT:   Head: Normocephalic.   Mouth/Throat: No oropharyngeal exudate.   Eyes: EOM are normal. No scleral icterus.   Neck: Neck supple. No tracheal deviation present. No thyromegaly present.   Cardiovascular: Normal rate and regular rhythm.   Pulmonary/Chest: Effort normal and breath sounds normal. No respiratory distress. She has no wheezes. She has no rales.   Abdominal: Soft. She exhibits no distension and no mass. There is no tenderness. There is no rebound and no guarding.   Musculoskeletal: Normal range of motion. She exhibits no edema.   Lymphadenopathy:     She has no cervical adenopathy.   Neurological: She is alert and oriented to person, place, and time. No cranial nerve deficit.   Skin: Skin is warm and dry.   Psychiatric: She has a normal mood and affect.       Laboratory Data:  Abstract on 12/06/2016   Component Date Value Ref Range Status    EXT WBC " 12/03/2016 4.1  3.8 - 10.8 1000/ul Final    EXT Hemoglobin 12/03/2016 12.9  11.7 - 15.5 g/dl Final    EXT Hematocrit 12/03/2016 38.5  35.0 - 45.0 % Final    EXT Platelets 12/03/2016 138* 140 - 400 1000/ul Final    EXT Glucose 12/03/2016 88  65 - 99 mg/dl Final    EXT BUN 12/03/2016 13  7 - 25 mg/dl Final    EXT Creatinine 12/03/2016 0.90  0.50 - 0.99 mg/dl Final    EXT Sodium 12/03/2016 141  135 - 146 mmol/l Final    EXT Potassium 12/03/2016 4.2  3.5 - 5.3 mmol/l Final    EXT Chloride 12/03/2016 105  98 - 110 mmol/l Final    EXT CO2 12/03/2016 29  20 - 31 mmol/l Final    EXT Calcium 12/03/2016 9.7  8.6 - 10.4 mg/dl Final    EXT Protein total 12/03/2016 6.9  6.1 - 8.1 g/dl Final    EXT Albumin 12/03/2016 4.0  3.6 - 5.1 g/dl Final    EXT BilirubiN Total 12/03/2016 1.1  0.2 - 1.2 mg/dl Final    EXT Alkaline Phosphatase 12/03/2016 181* 33 - 130 u/l Final    EXT AST 12/03/2016 21  10 - 35 u/l Final    EXT ALT 12/03/2016 28  6 - 29 u/l Final   Office Visit on 11/14/2016   Component Date Value Ref Range Status    Blood Stool 11/14/2016 Negative  Negative Final     Acceptable 11/14/2016 Yes   Final    SOURCE: 11/14/2016 Cervical   Final    Slides: 11/14/2016 1   Final    LMP: 11/14/2016 NA   Final    Specimen adequacy: 11/14/2016 (NOTE)   Final    Comment:      Satisfactory for evaluation.          Endocervical cells absent.  Metaplastic cells indicative       of transformation zone cannot be reliably distinguished from       parabasal or atrophic cells.                      Interpretation 11/14/2016 NO EPITHELIAL ABNORMALITY SEE BELOW   Final    Comment: ----------------------------------------------------------------------       *NEGATIVE FOR INTRAEPITHELIAL LESION OR MALIGNANCY   ----------------------------------------------------------------------         Comments: 11/14/2016 (NOTE)   Final    Comment: Due to technical or specimen issues, imaging could not be  performed. A  cytotechnologist has manually screened this slide.         Cytotechnologist: 11/14/2016 BRENDON Ca(ASCP)IAC   Final    LOCATION 11/14/2016 (NOTE)   Final    Comment: Specimens processed and interpreted at :[facility], [address] [place] [address], Phone: [phone], CLIA: [id number]      CPT Codes: 11/14/2016 (NOTE)   Final    Comment: [id number]        UNLESS OTHERWISE INDICATED, COMPUTER AIDED AND CYTOTECHNOLOGIST     SCREENING PERFORMED.          The Pap test is a screening test with an inherent, but low       probability of error.  Your patient should be reminded to       consult you immediately if she experiences any suspicious       signs or symptoms, regardless of her Pap test result.       An alternate report format containing images or consolidated       prior Pap history is available as applicable.         HPV HIGH RISK IF ASC-US, SUREPATH 11/14/2016 CRITERIA NOT MET   Final    Comment:       UNLESS OTHERWISE INDICATED, ALL TESTING PERFORMED AT  [facility]  [address] [place] [id number]            :  CARLOS MENDEZ M.D.        CLIA NUMBER [id number]  [place]. [id number]                 Imaging:   MRI 12/8/16  MRI abdomen without and with contrast.    Comparison: 5/31/16    History: Neuroendocrine tumor.    Results: Axial gradient T2, axial T1 in and out of phase, axial T2 SSFSE, coronal T2 gradient and T2 SSFSE followed by pre-contrast axial T1 gradient fat sat and dynamic post contrast images were obtained. The patient received  10 cc of IV Gadovist contrast.    Numerous hepatic numerous hepatic lesion better seen on postcontrast 10 seconds.  Index lesion in the right hepatic lobe measures segment 5 measures 2.2 cm (previously measuring 2.0 x 2.4 cm).  A 2nd index lesion has been chosen within the left hepatic lobe segment 3 measuring 1.6 cm, in retrospect, on the prior study, measuring 1.6 cm.  Vague  enhancing area) postcontrast 10 seconds image 62) within the uncinate process measuring at 1.7 x 0.9 cm as seen on the prior study.    The number and burden of liver lesions appear similar to the prior exam.  The biliary ducts are nondilated.  No liver is enlarged similar to the prior study the gallbladder is present.  The stomach, pancreas, spleen, and adrenal glands appear within normal limits.  Small bilateral kidney cysts.  The visualized osseous structures demonstrate no definite osseous lesions.   Impression       Hepatomegaly with multiple liver lesions, the burden of lesions and size appear similar to the prior exam.  Vague area of enhancement within the uncinate process of the pancreas appears unchanged.                      Assessment:       1. Neuroendocrine cancer    2. Primary pancreatic neuroendocrine tumor    3. Secondary neuroendocrine tumor of liver           Plan:   Ms. Bruce continues to tolerate CAPTEM in review of her labs shows no detrimental effects.  We will plan to continue and repeat imaging in May 2017 which will be 6 months since prior imaging.  She did note that she will be retiring may need financial assistance to obtain chemotherapy.  Continue on with monthly labs.  Follow up in 2 months.     Cory Rodriguez DO, FACP  Hematology & Oncology, [facility]  [address], [address]  [place]  [address]  ph. [phone]; [phone]  fax. [phone]    25 minutes were spent in coordination of patient's care, record review and counseling.  More than 50% of the time was face-to-face.      NOLANETS:  [facility]  A collaboration between [facility] and [facility]    PATIENT: Ching Bruce  MRN: [id number]  DATE: 11/6/2018      Diagnosis:   1. Neuroendocrine cancer    2. Primary pancreatic neuroendocrine tumor    3. Secondary neuroendocrine tumor of liver        Chief  Complaint: Follow-up (reivew scans and labs/ 3month follow up)      Oncologic History:    Oncologic History Pancreatic neuroendocrine tumor with metasatic disease to liver diagnosed 12/12    Oncologic Treatment Capecitabine/Temozolomide (CAPTEM) 1/13 -Present    Pathology Ki-67 1%        Subjective:    Interval History: Ms. Bruce is a 67 y.o. female who returns for follow up for her pancreatic neuroendocrine tumor.  She states that she has been feeling well.  She continues on with CAPTEM without any side effects.  She recently had biliary stent exchange.  No new complaints.    ONCOLOGIC HISTORY:   A 67 y.o. year-old -American female who I had initially   seen on 12/18/2012. Her history dates back to September 2012 when she was noted  to have several weeks of feeling fatigued. She sought treatment with her   primary care doctor who did a CBC and found her to be severely anemic. She was   seen at [facility] and transfused 3 units of packed red blood cells  and no source of bleeding had been found. Records at that time had shown   hemoglobin of 5. She sought followup in September 2012 with Dr. Guillen who   performed an EGD and colonoscopy with an EGD showing an ulcerated and fungating   nonbleeding 2 cm mass, malignant in appearance. It did cause partial   obstructions. Biopsies were taken, which showed no evidence of malignancy at   that time. She had a CT of the abdomen and pelvis showing multiple metastatic   lesions and the liver biopsy was performed on 10/17/2012 showed pancreatic   neuroendocrine neoplasm in the left lobe of the liver aspirate. She went on to   have an ERCP along with sphincterotomy and biliary stent placement and   subsequent PET had shown numerous hypodense lesions in the liver. Octreotide   scan was performed, which showed multiple right and left hepatic metastases.   MRI of the abdomen was performed on 12/05/2012 showing innumerable lesions that   demonstrated  peripheral to hyperintensity consistent with hypervascular   metastasis. She was seen by Dr. Humphrey, who thought she was not a candidate   for surgical resection during that time. Her pathology from her tumor came back  positive for chromogranin, synaptophysin and had a Ki-67 of less than 1%. She   was started on treatment with temozolomide and capecitabine with cycle   1 being on 01/22/2013.    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    Secondary malignant neoplasm of liver     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) 5/7/2013    Thrombocytopenia 11/12/2013    Thyroid disease     Unspecified essential hypertension 11/12/2013       Past Surgical HIstory:   Past Surgical History:   Procedure Laterality Date    ERCP N/A 6/21/2018    Procedure: ERCP, stent exchange;  Surgeon: Jake Lieberman MD;  Location: [facility];  Service: Endoscopy;  Laterality: N/A;    ERCP N/A 8/24/2017    [facility]formed by [facility]man MD at [facility]    ERCP N/A 10/27/2016    [facility] at [facility]    ERCP N/A 12/18/2014    [facility] at [facility]    ERCP Left 7/3/2014    [facility] at [facility]    ERCP, stent exchange N/A 6/21/2018    [facility] at [facility]    ERCP- with stent replacement N/A 3/7/2016    [facility] at [facility]    ERCP/STENT REPLACEMENT N/A 7/16/2015    [facility] by [facility] at [facility]    THYROIDECTOMY  2011       Family History:    Family History   Problem Relation Age of Onset    Cancer Maternal Grandmother     Diabetes Father     Breast cancer Neg Hx     Colon cancer Neg Hx     Ovarian cancer Neg Hx        Social History:  reports that  has never smoked. she has never used smokeless tobacco. She reports that she does not drink alcohol or use drugs.    Allergies:  Review of patient's allergies indicates:   Allergen Reactions    Epinephrine Other (See Comments)     Carcinoid patient    Sulfa (sulfonamide antibiotics) Other (See Comments)     Urticaria       Medications:  Current Outpatient Medications   Medication Sig Dispense Refill    alendronate (FOSAMAX) 70 MG tablet Take 70 mg by mouth every 7 days.   3    capecitabine (XELODA) 500 MG Tab Take 2 tablets (1,000 mg total) by mouth 2 (two) times daily. 56 tablet 6    ergocalciferol (VITAMIN D2) 50,000 unit Cap Take 50,000 Units by mouth every 7 days.      ferrous sulfate 325 mg (65 mg iron) Tab tablet Take 325 mg by mouth once daily.      indapamide (LOZOL) 2.5 MG Tab 2.5 mg once daily.       irbesartan (AVAPRO) 300 MG tablet 300 mg once daily.       levothyroxine (SYNTHROID) 150 MCG tablet Take 150 mcg by mouth once daily.  0    temozolomide (TEMODAR) 250 MG capsule Take 1 capsule (250 mg total) by mouth once daily. 5 capsule 4    verapamil (CALAN-SR) 180 MG CR tablet Take 360 mg by mouth once daily.  1    verapamil (VERELAN PM) 360 MG C24P Take by mouth once daily.        No current facility-administered medications for this visit.        Review of Systems   Constitutional: Negative for chills, fever and unexpected weight change.   HENT: Negative for congestion, hearing loss and nosebleeds.    Eyes: Negative for visual disturbance.   Respiratory: Negative for cough and shortness of breath.    Cardiovascular: Negative for chest pain and palpitations.   Gastrointestinal: Negative for abdominal pain, blood in stool, constipation, diarrhea, nausea and vomiting.  "  Genitourinary: Negative for dysuria.   Musculoskeletal: Negative for back pain and gait problem.   Skin: Negative for color change and rash.   Neurological: Negative for dizziness, weakness and headaches.   Hematological: Negative for adenopathy. Does not bruise/bleed easily.   Psychiatric/Behavioral: Negative for confusion.       ECOG Performance Status: 0   Objective:      Vitals:   Vitals:    11/06/18 1601   BP: (!) 149/92   Pulse: 72   Temp: 97.9 °F (36.6 °C)   TempSrc: Oral   SpO2: 99%   Weight: 73.2 kg (161 lb 6 oz)   Height: 5' 7" (1.702 m)     BMI: Body mass index is 25.28 kg/m².    Physical Exam   Constitutional: She is oriented to person, place, and time. She appears well-developed and well-nourished. No distress.   HENT:   Head: Normocephalic.   Mouth/Throat: No oropharyngeal exudate.   Eyes: EOM are normal. No scleral icterus.   Neck: Neck supple. No tracheal deviation present. No thyromegaly present.   Cardiovascular: Normal rate and regular rhythm.    Pulmonary/Chest: Effort normal and breath sounds normal. No respiratory distress. She has no wheezes. She has no rales.   Abdominal: Soft. She exhibits no distension and no mass. There is no tenderness. There is no rebound and no guarding.   Musculoskeletal: Normal range of motion. She exhibits no edema.   Lymphadenopathy:     She has no cervical adenopathy.   Neurological: She is alert and oriented to person, place, and time. No cranial nerve deficit.   Skin: Skin is warm and dry.   Psychiatric: She has a normal mood and affect.       Laboratory Data:  Abstract on 12/06/2016   Component Date Value Ref Range Status    EXT WBC 12/03/2016 4.1  3.8 - 10.8 1000/ul Final    EXT Hemoglobin 12/03/2016 12.9  11.7 - 15.5 g/dl Final    EXT Hematocrit 12/03/2016 38.5  35.0 - 45.0 % Final    EXT Platelets 12/03/2016 138* 140 - 400 1000/ul Final    EXT Glucose 12/03/2016 88  65 - 99 mg/dl Final    EXT BUN 12/03/2016 13  7 - 25 mg/dl Final    EXT Creatinine " 12/03/2016 0.90  0.50 - 0.99 mg/dl Final    EXT Sodium 12/03/2016 141  135 - 146 mmol/l Final    EXT Potassium 12/03/2016 4.2  3.5 - 5.3 mmol/l Final    EXT Chloride 12/03/2016 105  98 - 110 mmol/l Final    EXT CO2 12/03/2016 29  20 - 31 mmol/l Final    EXT Calcium 12/03/2016 9.7  8.6 - 10.4 mg/dl Final    EXT Protein total 12/03/2016 6.9  6.1 - 8.1 g/dl Final    EXT Albumin 12/03/2016 4.0  3.6 - 5.1 g/dl Final    EXT BilirubiN Total 12/03/2016 1.1  0.2 - 1.2 mg/dl Final    EXT Alkaline Phosphatase 12/03/2016 181* 33 - 130 u/l Final    EXT AST 12/03/2016 21  10 - 35 u/l Final    EXT ALT 12/03/2016 28  6 - 29 u/l Final   Office Visit on 11/14/2016   Component Date Value Ref Range Status    Blood Stool 11/14/2016 Negative  Negative Final     Acceptable 11/14/2016 Yes   Final    SOURCE: 11/14/2016 Cervical   Final    Slides: 11/14/2016 1   Final    LMP: 11/14/2016 NA   Final    Specimen adequacy: 11/14/2016 (NOTE)   Final    Comment:      Satisfactory for evaluation.          Endocervical cells absent.  Metaplastic cells indicative       of transformation zone cannot be reliably distinguished from       parabasal or atrophic cells.                      Interpretation 11/14/2016 NO EPITHELIAL ABNORMALITY SEE BELOW   Final    Comment: ----------------------------------------------------------------------       *NEGATIVE FOR INTRAEPITHELIAL LESION OR MALIGNANCY   ----------------------------------------------------------------------         Comments: 11/14/2016 (NOTE)   Final    Comment: Due to technical or specimen issues, imaging could not be  performed. A cytotechnologist has manually screened this slide.         Cytotechnologist: 11/14/2016 BRENDON Ca(ASCP)IAC   Final    LOCATION 11/14/2016 (NOTE)   Final    Comment: Specimens processed and interpreted at :[facility], [address] [place] [address], Phone: [phone], CLIA: [id number]      CPT  Codes: 11/14/2016 (NOTE)   Final    Comment: [id number]        UNLESS OTHERWISE INDICATED, COMPUTER AIDED AND CYTOTECHNOLOGIST     SCREENING PERFORMED.          The Pap test is a screening test with an inherent, but low       probability of error.  Your patient should be reminded to       consult you immediately if she experiences any suspicious       signs or symptoms, regardless of her Pap test result.       An alternate report format containing images or consolidated       prior Pap history is available as applicable.         HPV HIGH RISK IF ASC-US, SUREPATH 11/14/2016 CRITERIA NOT MET   Final    Comment:       UNLESS OTHERWISE INDICATED, ALL TESTING PERFORMED AT  [facility], [place].  [address] [place] [address]            :  CARLOS MENDEZ M.D.        [facility] NUMBER [id number]  [place]. [id number]                 Imaging:     MRI 11/1/18  COMPARISON:  04/26/2018.    FINDINGS:  The lung bases are clear.  There are numerous liver lesions the index lesions previously measured are not significantly changed.  One of the lesion within hepatic segment 8 measuring 2.1 cm, series 12, image 33, appears slightly larger compared to the prior exam where it was measuring 1.6 cm.  Numerous other lesions appear similar in size.  There are no definite new lesions.  There is mild intrahepatic biliary ducts prominence.  The common bile duct is prominent, unchanged from the prior study, no definite obstructive process seen.  It is unchanged.  The portal venous system is patent.  The gallbladder is present.  The stomach, pancreas, spleen, adrenal glands and bilateral kidneys appear normal.  The aorta tapers normally, no para-aortic lymphadenopathy.  The visualized osseous structures demonstrate no osseous lesions.      Impression       Numerous liver lesions consistent with metastatic disease.  The index lesions do not demonstrate significant change.  One of the lesion, a non index  lesion,  within the segment 8 appears slightly larger, no definite new lesions identified.    Biliary duct dilation, similar to the prior exam, no definite obstructive process seen.    RECIST SUMMARY:    Date of prior examination for comparison: 04/26/2018    Lesion 1: Hepatic segment 5.  2.1 cm  . Series 12 image 59.  Prior measurement 2.1 cm  .    Lesion 2: Hepatic segment 3.  1.3 cm. Series 12 image 64.  Prior measurement 1.5 cm.            Assessment:       1. Neuroendocrine cancer    2. Primary pancreatic neuroendocrine tumor    3. Secondary neuroendocrine tumor of liver           Plan:   Ms. Bruce is doing well from an oncologic standpoint and review of her MRI shows essentially stable disease.  She has been on CAPTEM for a number of years I do think it is reasonable to take a break from treatment.  We will plan to repeat an MRI in another 3 months and if at that time her known lesions grow then we can resume CAPTEM or perhaps discussed other types of therapy.  She had a negative octreotide scan in the past, however, I would like to get a gallium 68 PET-CT on her at this time.  Also, her bilirubin is noted to be mildly elevated will check labs on her today and she may need a repeat ERCP with stent change.  I will plan to see her back in another 3 months or sooner if needed.  All questions were answered and she is agreeable with this plan.    Cory Rodriguez DO, Located within [facility]  Hematology & Oncology, [facility]  [address] [place] [address], [address]  [place]  [address]  ph. [phone]; [phone]  fax. [phone]    25 minutes were spent in coordination of patient's care, record review and counseling.  More than 50% of the time was face-to-face.

## 2018-11-30 ENCOUNTER — HOSPITAL ENCOUNTER (OUTPATIENT)
Dept: RADIOLOGY | Facility: HOSPITAL | Age: 68
Discharge: HOME | End: 2018-11-30
Attending: INTERNAL MEDICINE
Payer: COMMERCIAL

## 2018-11-30 DIAGNOSIS — C7A.8 NEUROENDOCRINE CANCER: ICD-10-CM

## 2018-11-30 PROCEDURE — 78815 PET IMAGE W/CT SKULL-THIGH: CPT | Mod: 26,PI,, | Performed by: RADIOLOGY

## 2018-11-30 PROCEDURE — A9587 GALLIUM GA-68: HCPCS | Mod: TB

## 2018-11-30 PROCEDURE — 78815 PET IMAGE W/CT SKULL-THIGH: CPT | Mod: TC

## 2018-12-15 LAB
EXT 24 HR UR METANEPHRINE: ABNORMAL
EXT 24 HR UR NORMETANEPHRINE: ABNORMAL
EXT 24 HR UR NORMETANEPHRINE: ABNORMAL
EXT 25 HYDROXY VIT D2: ABNORMAL
EXT 25 HYDROXY VIT D3: ABNORMAL
EXT 5 HIAA 24 HR URINE: ABNORMAL
EXT 5 HIAA BLOOD: ABNORMAL
EXT ACTH: ABNORMAL
EXT AFP: ABNORMAL
EXT ALBUMIN: 4 G/DL (ref 3.6–5.1)
EXT ALKALINE PHOSPHATASE: 165 U/L (ref 33–130)
EXT ALT: 21 U/L (ref 6–29)
EXT AMYLASE: ABNORMAL
EXT ANTI ISLET CELL AB: ABNORMAL
EXT ANTI PARIETAL CELL AB: ABNORMAL
EXT ANTI THYROID AB: ABNORMAL
EXT AST: 18 U/L (ref 10–35)
EXT BILIRUBIN DIRECT: ABNORMAL
EXT BILIRUBIN TOTAL: 1 MG/DL (ref 0.2–1.2)
EXT BK VIRUS DNA QN PCR: ABNORMAL
EXT BUN: 14 MG/DL (ref 7–25)
EXT C PEPTIDE: ABNORMAL
EXT CA 125: ABNORMAL
EXT CA 19-9: ABNORMAL
EXT CA 27-29: ABNORMAL
EXT CALCITONIN: ABNORMAL
EXT CALCIUM: 9.8 MG/DL (ref 8.6–10.4)
EXT CEA: ABNORMAL
EXT CHLORIDE: 105 MMOL/L (ref 98–110)
EXT CHOLESTEROL: ABNORMAL
EXT CHROMOGRANIN A: ABNORMAL
EXT CO2: 33 MMOL/L (ref 20–32)
EXT CREATININE UA: ABNORMAL
EXT CREATININE: 0.98 MG/DL (ref 0.5–0.99)
EXT CYCLOSPORONE LEVEL: ABNORMAL
EXT DOPAMINE: ABNORMAL
EXT EBV DNA BY PCR: ABNORMAL
EXT EPINEPHRINE: ABNORMAL
EXT FOLATE: ABNORMAL
EXT FREE T3: ABNORMAL
EXT FREE T4: ABNORMAL
EXT FSH: ABNORMAL
EXT GASTRIN RELEASING PEPTIDE: ABNORMAL
EXT GASTRIN RELEASING PEPTIDE: ABNORMAL
EXT GASTRIN: ABNORMAL
EXT GGT: ABNORMAL
EXT GHRELIN: ABNORMAL
EXT GLUCAGON: ABNORMAL
EXT GLUCOSE: 88 MG/DL (ref 65–99)
EXT GROWTH HORMONE: ABNORMAL
EXT HCV RNA QUANT PCR: ABNORMAL
EXT HDL: ABNORMAL
EXT HEMATOCRIT: 39.8 % (ref 35–45)
EXT HEMOGLOBIN A1C: ABNORMAL
EXT HEMOGLOBIN: 13.7 G/DL (ref 11.7–15.5)
EXT HISTAMINE 24 HR URINE: ABNORMAL
EXT HISTAMINE: ABNORMAL
EXT IGF-1: ABNORMAL
EXT IMMUNKNOW (NON-STIMULATED): ABNORMAL
EXT IMMUNKNOW (STIMULATED): ABNORMAL
EXT INR: ABNORMAL
EXT INSULIN: ABNORMAL
EXT LANREOTIDE LEVEL: ABNORMAL
EXT LDH, TOTAL: ABNORMAL
EXT LDL CHOLESTEROL: ABNORMAL
EXT LIPASE: ABNORMAL
EXT MAGNESIUM: ABNORMAL
EXT METANEPHRINE FREE PLASMA: ABNORMAL
EXT MOTILIN: ABNORMAL
EXT NEUROKININ A CAMB: ABNORMAL
EXT NEUROKININ A ISI: ABNORMAL
EXT NEUROTENSIN: ABNORMAL
EXT NOREPINEPHRINE: ABNORMAL
EXT NORMETANEPHRINE: ABNORMAL
EXT NSE: ABNORMAL
EXT OCTREOTIDE LEVEL: ABNORMAL
EXT PANCREASTATIN CAMB: ABNORMAL
EXT PANCREASTATIN ISI: ABNORMAL
EXT PANCREATIC POLYPEPTIDE: ABNORMAL
EXT PHOSPHORUS: ABNORMAL
EXT PLATELETS: 162 1000/UL (ref 140–400)
EXT POTASSIUM: 4.8 MMOL/L (ref 3.5–5.3)
EXT PROGRAF LEVEL: ABNORMAL
EXT PROLACTIN: ABNORMAL
EXT PROTEIN TOTAL: 7.3 G/DL (ref 6.1–8.1)
EXT PROTEIN UA: ABNORMAL
EXT PT: ABNORMAL
EXT PTH, INTACT: ABNORMAL
EXT PTT: ABNORMAL
EXT RAPAMUNE LEVEL: ABNORMAL
EXT SEROTONIN: ABNORMAL
EXT SODIUM: 144 MMOL/L (ref 135–146)
EXT SOMATOSTATIN: ABNORMAL
EXT SUBSTANCE P: ABNORMAL
EXT TRIGLYCERIDES: ABNORMAL
EXT TRYPTASE: ABNORMAL
EXT TSH: ABNORMAL
EXT URIC ACID: ABNORMAL
EXT URINE AMYLASE U/HR: ABNORMAL
EXT URINE AMYLASE U/L: ABNORMAL
EXT VASOACTIVE INTESTINAL POLYPEPTIDE: ABNORMAL
EXT VITAMIN B12: ABNORMAL
EXT VMA 24 HR URINE: ABNORMAL
EXT WBC: 4.6 1000/UL (ref 3.8–10.8)
NEURON SPECIFIC ENOLASE: ABNORMAL

## 2019-01-22 ENCOUNTER — TELEPHONE (OUTPATIENT)
Dept: NEUROLOGY | Facility: HOSPITAL | Age: 69
End: 2019-01-22

## 2019-01-22 NOTE — TELEPHONE ENCOUNTER
----- Message from Ayala Marroquin sent at 1/22/2019 10:59 AM CST -----  Patient is requesting lab orders. Pt is also requesting a copy of the orders to be faxed to her too. Her fax number is [phone]. Pt can be reached at [phone]

## 2019-02-02 LAB
EXT 24 HR UR METANEPHRINE: ABNORMAL
EXT 24 HR UR NORMETANEPHRINE: ABNORMAL
EXT 24 HR UR NORMETANEPHRINE: ABNORMAL
EXT 25 HYDROXY VIT D2: ABNORMAL
EXT 25 HYDROXY VIT D3: ABNORMAL
EXT 5 HIAA 24 HR URINE: ABNORMAL
EXT 5 HIAA BLOOD: ABNORMAL
EXT ACTH: ABNORMAL
EXT AFP: ABNORMAL
EXT ALBUMIN: 3.9 G/DL (ref 3.6–5.1)
EXT ALKALINE PHOSPHATASE: 172 U/L (ref 33–130)
EXT ALT: 24 U/L (ref 6–29)
EXT AMYLASE: ABNORMAL
EXT ANTI ISLET CELL AB: ABNORMAL
EXT ANTI PARIETAL CELL AB: ABNORMAL
EXT ANTI THYROID AB: ABNORMAL
EXT AST: 16 U/L (ref 10–35)
EXT BILIRUBIN DIRECT: ABNORMAL MG/DL
EXT BILIRUBIN TOTAL: ABNORMAL
EXT BK VIRUS DNA QN PCR: ABNORMAL
EXT BUN: 12 MG/DL (ref 7–25)
EXT C PEPTIDE: ABNORMAL
EXT CA 125: ABNORMAL
EXT CA 19-9: ABNORMAL
EXT CA 27-29: ABNORMAL
EXT CALCITONIN: ABNORMAL
EXT CALCIUM: 9.9 MG/DL (ref 8.6–10.4)
EXT CEA: ABNORMAL
EXT CHLORIDE: 104 MMOL/L (ref 98–110)
EXT CHOLESTEROL: ABNORMAL
EXT CHROMOGRANIN A: ABNORMAL
EXT CO2: 29 MMOL/L (ref 20–32)
EXT CREATININE UA: ABNORMAL
EXT CREATININE: 0.93 MG/DL (ref 0.5–0.99)
EXT CYCLOSPORONE LEVEL: ABNORMAL
EXT DOPAMINE: ABNORMAL
EXT EBV DNA BY PCR: ABNORMAL
EXT EPINEPHRINE: ABNORMAL
EXT FOLATE: ABNORMAL
EXT FREE T3: ABNORMAL
EXT FREE T4: ABNORMAL
EXT FSH: ABNORMAL
EXT GASTRIN RELEASING PEPTIDE: ABNORMAL
EXT GASTRIN RELEASING PEPTIDE: ABNORMAL
EXT GASTRIN: ABNORMAL
EXT GGT: ABNORMAL
EXT GHRELIN: ABNORMAL
EXT GLUCAGON: ABNORMAL
EXT GLUCOSE: 86 MG/DL (ref 65–99)
EXT GROWTH HORMONE: ABNORMAL
EXT HCV RNA QUANT PCR: ABNORMAL
EXT HDL: ABNORMAL
EXT HEMATOCRIT: 37.4 % (ref 35–45)
EXT HEMOGLOBIN A1C: ABNORMAL
EXT HEMOGLOBIN: 12.9 G/DL (ref 11.7–15.5)
EXT HISTAMINE 24 HR URINE: ABNORMAL
EXT HISTAMINE: ABNORMAL
EXT IGF-1: ABNORMAL
EXT IMMUNKNOW (NON-STIMULATED): ABNORMAL
EXT IMMUNKNOW (STIMULATED): ABNORMAL
EXT INR: ABNORMAL
EXT INSULIN: ABNORMAL
EXT LANREOTIDE LEVEL: ABNORMAL
EXT LDH, TOTAL: ABNORMAL
EXT LDL CHOLESTEROL: ABNORMAL
EXT LIPASE: ABNORMAL
EXT MAGNESIUM: ABNORMAL
EXT METANEPHRINE FREE PLASMA: ABNORMAL
EXT MOTILIN: ABNORMAL
EXT NEUROKININ A CAMB: ABNORMAL
EXT NEUROKININ A ISI: ABNORMAL
EXT NEUROTENSIN: ABNORMAL
EXT NOREPINEPHRINE: ABNORMAL
EXT NORMETANEPHRINE: ABNORMAL
EXT NSE: ABNORMAL
EXT OCTREOTIDE LEVEL: ABNORMAL
EXT PANCREASTATIN CAMB: ABNORMAL
EXT PANCREASTATIN ISI: ABNORMAL
EXT PANCREATIC POLYPEPTIDE: ABNORMAL
EXT PHOSPHORUS: ABNORMAL
EXT PLATELETS: 141 1000/UL (ref 140–400)
EXT POTASSIUM: 4.5 MMOL/L (ref 3.5–5.3)
EXT PROGRAF LEVEL: ABNORMAL
EXT PROLACTIN: ABNORMAL
EXT PROTEIN TOTAL: 7 G/DL (ref 6.1–8.1)
EXT PROTEIN UA: ABNORMAL
EXT PT: ABNORMAL
EXT PTH, INTACT: ABNORMAL
EXT PTT: ABNORMAL
EXT RAPAMUNE LEVEL: ABNORMAL
EXT SEROTONIN: ABNORMAL
EXT SODIUM: 140 MMOL/L (ref 135–146)
EXT SOMATOSTATIN: ABNORMAL
EXT SUBSTANCE P: ABNORMAL
EXT TRIGLYCERIDES: ABNORMAL
EXT TRYPTASE: ABNORMAL
EXT TSH: ABNORMAL
EXT URIC ACID: ABNORMAL
EXT URINE AMYLASE U/HR: ABNORMAL
EXT URINE AMYLASE U/L: ABNORMAL
EXT VASOACTIVE INTESTINAL POLYPEPTIDE: ABNORMAL
EXT VITAMIN B12: ABNORMAL
EXT VMA 24 HR URINE: ABNORMAL
EXT WBC: 4.1 1000/UL (ref 3.8–10.8)
NEURON SPECIFIC ENOLASE: ABNORMAL

## 2019-02-12 ENCOUNTER — OFFICE VISIT (OUTPATIENT)
Dept: NEUROLOGY | Facility: HOSPITAL | Age: 69
End: 2019-02-12
Attending: INTERNAL MEDICINE
Payer: COMMERCIAL

## 2019-02-12 VITALS
WEIGHT: 162.06 LBS | DIASTOLIC BLOOD PRESSURE: 83 MMHG | HEART RATE: 61 BPM | TEMPERATURE: 100 F | BODY MASS INDEX: 25.44 KG/M2 | SYSTOLIC BLOOD PRESSURE: 125 MMHG | HEIGHT: 67 IN | OXYGEN SATURATION: 100 %

## 2019-02-12 DIAGNOSIS — D3A.8 PRIMARY PANCREATIC NEUROENDOCRINE TUMOR: Primary | ICD-10-CM

## 2019-02-12 DIAGNOSIS — C7B.8 SECONDARY NEUROENDOCRINE TUMOR OF LIVER: ICD-10-CM

## 2019-02-12 PROCEDURE — 3074F SYST BP LT 130 MM HG: CPT | Mod: CPTII,,, | Performed by: INTERNAL MEDICINE

## 2019-02-12 PROCEDURE — 3079F DIAST BP 80-89 MM HG: CPT | Mod: CPTII,,, | Performed by: INTERNAL MEDICINE

## 2019-02-12 PROCEDURE — 99214 PR OFFICE/OUTPT VISIT, EST, LEVL IV, 30-39 MIN: ICD-10-PCS | Mod: ,,, | Performed by: INTERNAL MEDICINE

## 2019-02-12 PROCEDURE — 1101F PT FALLS ASSESS-DOCD LE1/YR: CPT | Mod: CPTII,,, | Performed by: INTERNAL MEDICINE

## 2019-02-12 PROCEDURE — 1101F PR PT FALLS ASSESS DOC 0-1 FALLS W/OUT INJ PAST YR: ICD-10-PCS | Mod: CPTII,,, | Performed by: INTERNAL MEDICINE

## 2019-02-12 PROCEDURE — 99214 OFFICE O/P EST MOD 30 MIN: CPT | Performed by: INTERNAL MEDICINE

## 2019-02-12 PROCEDURE — 3079F PR MOST RECENT DIASTOLIC BLOOD PRESSURE 80-89 MM HG: ICD-10-PCS | Mod: CPTII,,, | Performed by: INTERNAL MEDICINE

## 2019-02-12 PROCEDURE — 3074F PR MOST RECENT SYSTOLIC BLOOD PRESSURE < 130 MM HG: ICD-10-PCS | Mod: CPTII,,, | Performed by: INTERNAL MEDICINE

## 2019-02-12 PROCEDURE — 99214 OFFICE O/P EST MOD 30 MIN: CPT | Mod: ,,, | Performed by: INTERNAL MEDICINE

## 2019-02-12 NOTE — PROGRESS NOTES
[facility]:  [facility]  A collaboration between [facility] and [facility]    PATIENT: Ching Bruce  MRN: [id number]  DATE: 2/12/2019      Diagnosis:   1. Primary pancreatic neuroendocrine tumor    2. Secondary neuroendocrine tumor of liver        Chief Complaint: Follow-up (3 month follow up)      Oncologic History:    Oncologic History Pancreatic neuroendocrine tumor with metasatic disease to liver diagnosed 12/12    Oncologic Treatment Capecitabine/Temozolomide (CAPTEM) 1/13 -11/2018     Pathology Ki-67 1%        Subjective:    Interval History: Ms. Bruce is a 68 y.o. female who returns for follow up for her pancreatic neuroendocrine tumor.  When I saw her last we discontinued CAPTEM in order to give her a break from chemotherapy.  She states that since this time she has been feeling well.  She has noted more energy and is more active.  She has no other new complaints.      ONCOLOGIC HISTORY:   A 68 y.o. year-old -American female who I had initially   seen on 12/18/2012. Her history dates back to September 2012 when she was noted  to have several weeks of feeling fatigued. She sought treatment with her   primary care doctor who did a CBC and found her to be severely anemic. She was   seen at [facility] and transfused 3 units of packed red blood cells  and no source of bleeding had been found. Records at that time had shown   hemoglobin of 5. She sought followup in September 2012 with Dr. Guillen who   performed an EGD and colonoscopy with an EGD showing an ulcerated and fungating   nonbleeding 2 cm mass, malignant in appearance. It did cause partial   obstructions. Biopsies were taken, which showed no evidence of malignancy at   that time. She had a CT of the abdomen and pelvis showing multiple metastatic   lesions and the liver biopsy was performed on 10/17/2012 showed pancreatic    neuroendocrine neoplasm in the left lobe of the liver aspirate. She went on to   have an ERCP along with sphincterotomy and biliary stent placement and   subsequent PET had shown numerous hypodense lesions in the liver. Octreotide   scan was performed, which showed multiple right and left hepatic metastases.   MRI of the abdomen was performed on 12/05/2012 showing innumerable lesions that   demonstrated peripheral to hyperintensity consistent with hypervascular   metastasis. She was seen by Dr. Humphrey, who thought she was not a candidate   for surgical resection during that time. Her pathology from her tumor came back  positive for chromogranin, synaptophysin and had a Ki-67 of less than 1%. She   was started on treatment with temozolomide and capecitabine with cycle   1 being on 01/22/2013.    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    Secondary malignant neoplasm of liver     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) 5/7/2013    Thrombocytopenia 11/12/2013    Thyroid disease     Unspecified essential hypertension 11/12/2013       Past Surgical HIstory:   Past Surgical History:   Procedure Laterality Date    ERCP N/A 8/24/2017    [facility]    ERCP N/A 10/27/2016    [facility]    ERCP N/A 12/18/2014    [facility]    ERCP Left 7/3/2014    [facility]    ERCP, stent exchange N/A 6/21/2018    Performed by Jake GLOVER  MD [facility]    ERCP- with stent replacement N/A 3/7/2016    [facility]    ERCP/STENT REPLACEMENT N/A 7/16/2015    [facility]    THYROIDECTOMY  2011       Family History:   Family History   Problem Relation Age of Onset    Cancer Maternal Grandmother     Diabetes Father     Breast cancer Neg Hx     Colon cancer Neg Hx     Ovarian cancer Neg Hx        Social History:  reports that  has never smoked. she has never used smokeless tobacco. She reports that she does not drink alcohol or use drugs.    Allergies:  Review of patient's allergies indicates:   Allergen Reactions    Epinephrine Other (See Comments)     Carcinoid patient    Sulfa (sulfonamide antibiotics) Other (See Comments)     Urticaria       Medications:  Current Outpatient Medications   Medication Sig Dispense Refill    alendronate (FOSAMAX) 70 MG tablet Take 70 mg by mouth every 7 days.   3    ferrous sulfate 325 mg (65 mg iron) Tab tablet Take 325 mg by mouth once daily.      indapamide (LOZOL) 2.5 MG Tab 2.5 mg once daily.       irbesartan (AVAPRO) 300 MG tablet 300 mg once daily.       levothyroxine (SYNTHROID) 150 MCG tablet Take 150 mcg by mouth once daily.  0    temozolomide (TEMODAR) 250 MG capsule Take 1 capsule (250 mg total) by mouth once daily. 5 capsule 4    capecitabine (XELODA) 500 MG Tab Take 2 tablets (1,000 mg total) by mouth 2 (two) times daily. 56 tablet 6    verapamil (CALAN-SR) 180 MG CR tablet Take 360 mg by mouth once daily.  1     No current facility-administered medications for this visit.        Review of Systems   Constitutional: Negative for chills, fever and unexpected weight change.   HENT: Negative for congestion, hearing loss and nosebleeds.    Eyes: Negative for visual disturbance.   Respiratory: Negative for cough and shortness of breath.    Cardiovascular: Negative for chest pain and palpitations.   Gastrointestinal:  "Negative for abdominal pain, blood in stool, constipation, diarrhea, nausea and vomiting.   Genitourinary: Negative for dysuria.   Musculoskeletal: Negative for back pain and gait problem.   Skin: Negative for color change and rash.   Neurological: Negative for dizziness, weakness and headaches.   Hematological: Negative for adenopathy. Does not bruise/bleed easily.   Psychiatric/Behavioral: Negative for confusion.       ECOG Performance Status: 0   Objective:      Vitals:   Vitals:    02/12/19 1501   BP: 125/83   Pulse: 61   Temp: 100 °F (37.8 °C)   TempSrc: Oral   SpO2: 100%   Weight: 73.5 kg (162 lb 0.6 oz)   Height: 5' 7" (1.702 m)     BMI: Body mass index is 25.38 kg/m².    Physical Exam   Constitutional: She is oriented to person, place, and time. She appears well-developed and well-nourished. No distress.   HENT:   Head: Normocephalic.   Mouth/Throat: No oropharyngeal exudate.   Eyes: EOM are normal. No scleral icterus.   Neck: Neck supple. No tracheal deviation present. No thyromegaly present.   Cardiovascular: Normal rate and regular rhythm.    Pulmonary/Chest: Effort normal and breath sounds normal. No respiratory distress. She has no wheezes. She has no rales.   Abdominal: Soft. She exhibits no distension and no mass. There is no tenderness. There is no rebound and no guarding.   Musculoskeletal: Normal range of motion. She exhibits no edema.   Lymphadenopathy:     She has no cervical adenopathy.   Neurological: She is alert and oriented to person, place, and time. No cranial nerve deficit.   Skin: Skin is warm and dry.   Psychiatric: She has a normal mood and affect.       Laboratory Data:  Abstract on 12/06/2016   Component Date Value Ref Range Status    EXT WBC 12/03/2016 4.1  3.8 - 10.8 1000/ul Final    EXT Hemoglobin 12/03/2016 12.9  11.7 - 15.5 g/dl Final    EXT Hematocrit 12/03/2016 38.5  35.0 - 45.0 % Final    EXT Platelets 12/03/2016 138* 140 - 400 1000/ul Final    EXT Glucose 12/03/2016 88  " 65 - 99 mg/dl Final    EXT BUN 12/03/2016 13  7 - 25 mg/dl Final    EXT Creatinine 12/03/2016 0.90  0.50 - 0.99 mg/dl Final    EXT Sodium 12/03/2016 141  135 - 146 mmol/l Final    EXT Potassium 12/03/2016 4.2  3.5 - 5.3 mmol/l Final    EXT Chloride 12/03/2016 105  98 - 110 mmol/l Final    EXT CO2 12/03/2016 29  20 - 31 mmol/l Final    EXT Calcium 12/03/2016 9.7  8.6 - 10.4 mg/dl Final    EXT Protein total 12/03/2016 6.9  6.1 - 8.1 g/dl Final    EXT Albumin 12/03/2016 4.0  3.6 - 5.1 g/dl Final    EXT BilirubiN Total 12/03/2016 1.1  0.2 - 1.2 mg/dl Final    EXT Alkaline Phosphatase 12/03/2016 181* 33 - 130 u/l Final    EXT AST 12/03/2016 21  10 - 35 u/l Final    EXT ALT 12/03/2016 28  6 - 29 u/l Final   Office Visit on 11/14/2016   Component Date Value Ref Range Status    Blood Stool 11/14/2016 Negative  Negative Final     Acceptable 11/14/2016 Yes   Final    SOURCE: 11/14/2016 Cervical   Final    Slides: 11/14/2016 1   Final    LMP: 11/14/2016 NA   Final    Specimen adequacy: 11/14/2016 (NOTE)   Final    Comment:      Satisfactory for evaluation.          Endocervical cells absent.  Metaplastic cells indicative       of transformation zone cannot be reliably distinguished from       parabasal or atrophic cells.                      Interpretation 11/14/2016 NO EPITHELIAL ABNORMALITY SEE BELOW   Final    Comment: ----------------------------------------------------------------------       *NEGATIVE FOR INTRAEPITHELIAL LESION OR MALIGNANCY   ----------------------------------------------------------------------         Comments: 11/14/2016 (NOTE)   Final    Comment: Due to technical or specimen issues, imaging could not be  performed. A cytotechnologist has manually screened this slide.         Cytotechnologist: 11/14/2016 BRENDON Ca(ASCP)[facility]   Final    LOCATION 11/14/2016 (NOTE)   Final    Comment: Specimens processed and interpreted at :[facility],  [address] [place] [address], Phone: [phone], CLIA: [id number]      CPT Codes: 11/14/2016 (NOTE)   Final    Comment: [id number]        UNLESS OTHERWISE INDICATED, COMPUTER AIDED AND CYTOTECHNOLOGIST     SCREENING PERFORMED.          The Pap test is a screening test with an inherent, but low       probability of error.  Your patient should be reminded to       consult you immediately if she experiences any suspicious       signs or symptoms, regardless of her Pap test result.       An alternate report format containing images or consolidated       prior Pap history is available as applicable.         HPV HIGH RISK IF ASC-US, SUREPATH 11/14/2016 CRITERIA NOT MET   Final    Comment:       UNLESS OTHERWISE INDICATED, ALL TESTING PERFORMED AT  [facility]  [address] [place] [address]            :  CARLOS MENDEZ M.D.        CLIA NUMBER [id number]  [place]. [id number]                 Imaging:     MRI 11/1/18  COMPARISON:  04/26/2018.    FINDINGS:  The lung bases are clear.  There are numerous liver lesions the index lesions previously measured are not significantly changed.  One of the lesion within hepatic segment 8 measuring 2.1 cm, series 12, image 33, appears slightly larger compared to the prior exam where it was measuring 1.6 cm.  Numerous other lesions appear similar in size.  There are no definite new lesions.  There is mild intrahepatic biliary ducts prominence.  The common bile duct is prominent, unchanged from the prior study, no definite obstructive process seen.  It is unchanged.  The portal venous system is patent.  The gallbladder is present.  The stomach, pancreas, spleen, adrenal glands and bilateral kidneys appear normal.  The aorta tapers normally, no para-aortic lymphadenopathy.  The visualized osseous structures demonstrate no osseous lesions.      Impression       Numerous liver lesions consistent with metastatic disease.  The  index lesions do not demonstrate significant change.  One of the lesion, a non index lesion,  within the segment 8 appears slightly larger, no definite new lesions identified.    Biliary duct dilation, similar to the prior exam, no definite obstructive process seen.    RECIST SUMMARY:    Date of prior examination for comparison: 04/26/2018    Lesion 1: Hepatic segment 5.  2.1 cm  . Series 12 image 59.  Prior measurement 2.1 cm  .    Lesion 2: Hepatic segment 3.  1.3 cm. Series 12 image 64.  Prior measurement 1.5 cm.       Gallium 68 PET-CT 11/30/2018  FINDINGS:  Patient was administered 5.53 millicuries of gallium 68 octreotide intravenously.    There are diffuse numerous liver metastases.    Index anterior right dome SUV max 29.64.    There is physiologic intracranial, head and neck activity.  Heart mediastinum are normal.  Spleen stomach pancreas and duodenal activity are normal.  There is a biliary stent.  There is physiologic GI and  activity.  No bone or lung lesions are seen.  Lungs are clear.      Impression       See above    Diffuse metastatic disease to the liver.    Index liver lesion SUV max 29.64.          Assessment:       1. Primary pancreatic neuroendocrine tumor    2. Secondary neuroendocrine tumor of liver           Plan:   Ms. Bruce continues to do well clinically and is asymptomatic from her disease.  We will plan to check an MRI of the abdomen now.  If she has any evidence of progressive disease we could choose to resume CAPTEM or potentially move on to PRRT which was not available in the [place] at the time we initiated treatment but is now FDA approved.  Additionally, we may offer liver directed therapy but will discuss her case in our neuroendocrine tumor board once we have her MRI.  Follow back up me in another 4 weeks.  All questions were answered and she is agreeable with this plan.    Cory Rodriguez DO, FACP  Hematology & Oncology, [facility]  [address] [place] [address]  [address]  [place]  [address]  ph. [phone]; [phone]  fax. [phone]    25 minutes were spent in coordination of patient's care, record review and counseling.  More than 50% of the time was face-to-face.

## 2019-03-12 ENCOUNTER — HOSPITAL ENCOUNTER (OUTPATIENT)
Dept: RADIOLOGY | Facility: HOSPITAL | Age: 69
Discharge: HOME | End: 2019-03-12
Attending: INTERNAL MEDICINE
Payer: COMMERCIAL

## 2019-03-12 DIAGNOSIS — C7A.8 NEUROENDOCRINE CANCER: ICD-10-CM

## 2019-03-12 DIAGNOSIS — D3A.8 PRIMARY PANCREATIC NEUROENDOCRINE TUMOR: ICD-10-CM

## 2019-03-12 PROCEDURE — 74183 MRI ABD W/O CNTR FLWD CNTR: CPT | Mod: TC

## 2019-03-12 PROCEDURE — 74183 MRI ABD W/O CNTR FLWD CNTR: CPT | Mod: 26,,, | Performed by: RADIOLOGY

## 2019-03-12 PROCEDURE — 25500020 PHARM REV CODE 255: Performed by: INTERNAL MEDICINE

## 2019-03-12 PROCEDURE — 74183 MRI ABDOMEN W WO CONTRAST: ICD-10-PCS | Mod: 26,,, | Performed by: RADIOLOGY

## 2019-03-12 PROCEDURE — A9585 GADOBUTROL INJECTION: HCPCS | Performed by: INTERNAL MEDICINE

## 2019-03-12 RX ORDER — GADOBUTROL 604.72 MG/ML
10 INJECTION INTRAVENOUS
Status: COMPLETED | OUTPATIENT
Start: 2019-03-12 | End: 2019-03-12

## 2019-03-12 RX ADMIN — GADOBUTROL 10 ML: 604.72 INJECTION INTRAVENOUS at 12:03

## 2019-03-16 LAB
EXT 24 HR UR METANEPHRINE: ABNORMAL
EXT 24 HR UR NORMETANEPHRINE: ABNORMAL
EXT 24 HR UR NORMETANEPHRINE: ABNORMAL
EXT 25 HYDROXY VIT D2: ABNORMAL
EXT 25 HYDROXY VIT D3: ABNORMAL
EXT 5 HIAA 24 HR URINE: ABNORMAL
EXT 5 HIAA BLOOD: ABNORMAL
EXT ACTH: ABNORMAL
EXT AFP: ABNORMAL
EXT ALBUMIN: 3.9 G/DL (ref 3.6–5.1)
EXT ALKALINE PHOSPHATASE: 183 U/L (ref 33–130)
EXT ALT: 24 U/L (ref 6–29)
EXT AMYLASE: ABNORMAL
EXT ANTI ISLET CELL AB: ABNORMAL
EXT ANTI PARIETAL CELL AB: ABNORMAL
EXT ANTI THYROID AB: ABNORMAL
EXT AST: 17 U/L (ref 10–35)
EXT BILIRUBIN DIRECT: ABNORMAL MG/DL
EXT BILIRUBIN TOTAL: 0.8 MG/DL (ref 0.2–1.2)
EXT BK VIRUS DNA QN PCR: ABNORMAL
EXT BUN: 14 MG/DL (ref 7–25)
EXT C PEPTIDE: ABNORMAL
EXT CA 125: ABNORMAL
EXT CA 19-9: ABNORMAL
EXT CA 27-29: ABNORMAL
EXT CALCITONIN: ABNORMAL
EXT CALCIUM: 9.4 MG/DL (ref 8.6–10.4)
EXT CEA: ABNORMAL
EXT CHLORIDE: 104 MMOL/L (ref 98–110)
EXT CHOLESTEROL: ABNORMAL
EXT CHROMOGRANIN A: ABNORMAL
EXT CO2: 28 MMOL/L (ref 20–32)
EXT CREATININE UA: ABNORMAL
EXT CREATININE: 0.86 MG/DL (ref 0.5–0.99)
EXT CYCLOSPORONE LEVEL: ABNORMAL
EXT DOPAMINE: ABNORMAL
EXT EBV DNA BY PCR: ABNORMAL
EXT EPINEPHRINE: ABNORMAL
EXT FOLATE: ABNORMAL
EXT FREE T3: ABNORMAL
EXT FREE T4: ABNORMAL
EXT FSH: ABNORMAL
EXT GASTRIN RELEASING PEPTIDE: ABNORMAL
EXT GASTRIN RELEASING PEPTIDE: ABNORMAL
EXT GASTRIN: ABNORMAL
EXT GGT: ABNORMAL
EXT GHRELIN: ABNORMAL
EXT GLUCAGON: ABNORMAL
EXT GLUCOSE: 89 MG/DL (ref 65–99)
EXT GROWTH HORMONE: ABNORMAL
EXT HCV RNA QUANT PCR: ABNORMAL
EXT HDL: ABNORMAL
EXT HEMATOCRIT: 41.3 % (ref 35–45)
EXT HEMOGLOBIN A1C: ABNORMAL
EXT HEMOGLOBIN: 13.9 G/DL (ref 11.7–15.5)
EXT HISTAMINE 24 HR URINE: ABNORMAL
EXT HISTAMINE: ABNORMAL
EXT IGF-1: ABNORMAL
EXT IMMUNKNOW (NON-STIMULATED): ABNORMAL
EXT IMMUNKNOW (STIMULATED): ABNORMAL
EXT INR: ABNORMAL
EXT INSULIN: ABNORMAL
EXT LANREOTIDE LEVEL: ABNORMAL
EXT LDH, TOTAL: ABNORMAL
EXT LDL CHOLESTEROL: ABNORMAL
EXT LIPASE: ABNORMAL
EXT MAGNESIUM: ABNORMAL
EXT METANEPHRINE FREE PLASMA: ABNORMAL
EXT MOTILIN: ABNORMAL
EXT NEUROKININ A CAMB: ABNORMAL
EXT NEUROKININ A ISI: ABNORMAL
EXT NEUROTENSIN: ABNORMAL
EXT NOREPINEPHRINE: ABNORMAL
EXT NORMETANEPHRINE: ABNORMAL
EXT NSE: ABNORMAL
EXT OCTREOTIDE LEVEL: ABNORMAL
EXT PANCREASTATIN CAMB: ABNORMAL
EXT PANCREASTATIN ISI: ABNORMAL
EXT PANCREATIC POLYPEPTIDE: ABNORMAL
EXT PHOSPHORUS: ABNORMAL
EXT PLATELETS: 148 1000/UL (ref 140–400)
EXT POTASSIUM: 4.2 MMOL/L (ref 3.5–5.3)
EXT PROGRAF LEVEL: ABNORMAL
EXT PROLACTIN: ABNORMAL
EXT PROTEIN TOTAL: 7 G/DL (ref 6.1–8.1)
EXT PROTEIN UA: ABNORMAL
EXT PT: ABNORMAL
EXT PTH, INTACT: ABNORMAL
EXT PTT: ABNORMAL
EXT RAPAMUNE LEVEL: ABNORMAL
EXT SEROTONIN: ABNORMAL
EXT SODIUM: 139 MMOL/L (ref 135–146)
EXT SOMATOSTATIN: ABNORMAL
EXT SUBSTANCE P: ABNORMAL
EXT TRIGLYCERIDES: ABNORMAL
EXT TRYPTASE: ABNORMAL
EXT TSH: ABNORMAL
EXT URIC ACID: ABNORMAL
EXT URINE AMYLASE U/HR: ABNORMAL
EXT URINE AMYLASE U/L: ABNORMAL
EXT VASOACTIVE INTESTINAL POLYPEPTIDE: ABNORMAL
EXT VITAMIN B12: ABNORMAL
EXT VMA 24 HR URINE: ABNORMAL
EXT WBC: 5.4 1000/UL (ref 3.8–10.8)
NEURON SPECIFIC ENOLASE: ABNORMAL

## 2019-03-26 ENCOUNTER — OFFICE VISIT (OUTPATIENT)
Dept: NEUROLOGY | Facility: HOSPITAL | Age: 69
End: 2019-03-26
Attending: INTERNAL MEDICINE
Payer: COMMERCIAL

## 2019-03-26 VITALS
WEIGHT: 162.69 LBS | HEIGHT: 67 IN | BODY MASS INDEX: 25.53 KG/M2 | DIASTOLIC BLOOD PRESSURE: 79 MMHG | RESPIRATION RATE: 16 BRPM | SYSTOLIC BLOOD PRESSURE: 134 MMHG | HEART RATE: 69 BPM | OXYGEN SATURATION: 100 % | TEMPERATURE: 99 F

## 2019-03-26 DIAGNOSIS — D3A.8 PRIMARY PANCREATIC NEUROENDOCRINE TUMOR: Primary | ICD-10-CM

## 2019-03-26 DIAGNOSIS — C7B.8 SECONDARY NEUROENDOCRINE TUMOR OF LIVER: ICD-10-CM

## 2019-03-26 PROCEDURE — 1101F PT FALLS ASSESS-DOCD LE1/YR: CPT | Mod: CPTII,,, | Performed by: INTERNAL MEDICINE

## 2019-03-26 PROCEDURE — 99215 OFFICE O/P EST HI 40 MIN: CPT | Performed by: INTERNAL MEDICINE

## 2019-03-26 PROCEDURE — 1101F PR PT FALLS ASSESS DOC 0-1 FALLS W/OUT INJ PAST YR: ICD-10-PCS | Mod: CPTII,,, | Performed by: INTERNAL MEDICINE

## 2019-03-26 PROCEDURE — 99215 PR OFFICE/OUTPT VISIT, EST, LEVL V, 40-54 MIN: ICD-10-PCS | Mod: ,,, | Performed by: INTERNAL MEDICINE

## 2019-03-26 PROCEDURE — 3078F PR MOST RECENT DIASTOLIC BLOOD PRESSURE < 80 MM HG: ICD-10-PCS | Mod: CPTII,,, | Performed by: INTERNAL MEDICINE

## 2019-03-26 PROCEDURE — 3078F DIAST BP <80 MM HG: CPT | Mod: CPTII,,, | Performed by: INTERNAL MEDICINE

## 2019-03-26 PROCEDURE — 99215 OFFICE O/P EST HI 40 MIN: CPT | Mod: ,,, | Performed by: INTERNAL MEDICINE

## 2019-03-26 PROCEDURE — 3075F PR MOST RECENT SYSTOLIC BLOOD PRESS GE 130-139MM HG: ICD-10-PCS | Mod: CPTII,,, | Performed by: INTERNAL MEDICINE

## 2019-03-26 PROCEDURE — 3075F SYST BP GE 130 - 139MM HG: CPT | Mod: CPTII,,, | Performed by: INTERNAL MEDICINE

## 2019-03-26 RX ORDER — FERROUS GLUCONATE 324(38)MG
324 TABLET ORAL
COMMUNITY
Start: 2019-03-06 | End: 2020-01-01

## 2019-03-26 NOTE — PATIENT INSTRUCTIONS
Continue with Monthly labs    Have pancreastatin done with your next lab draw    Return in 6 months

## 2019-03-26 NOTE — PROGRESS NOTES
NOLANETS:  [facility]  A collaboration between [facility] and [facility]    PATIENT: Ching Bruce  MRN: [id number]  DATE: 3/26/2019      Diagnosis:   1. Primary pancreatic neuroendocrine tumor    2. 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 Bruce is a 68 y.o. female who returns for follow up for her pancreatic neuroendocrine tumor.  She states that she has been feeling well.  She denies any abdominal pain, nausea, vomiting, diarrhea.  She is without complaints.      ONCOLOGIC HISTORY:   A 68 y.o. year-old -American female who I had initially   seen on 12/18/2012. Her history dates back to September 2012 when she was noted  to have several weeks of feeling fatigued. She sought treatment with her   primary care doctor who did a CBC and found her to be severely anemic. She was   seen at [facility] and transfused 3 units of packed red blood cells  and no source of bleeding had been found. Records at that time had shown   hemoglobin of 5. She sought followup in September 2012 with Dr. Guillen who   performed an EGD and colonoscopy with an EGD showing an ulcerated and fungating   nonbleeding 2 cm mass, malignant in appearance. It did cause partial   obstructions. Biopsies were taken, which showed no evidence of malignancy at   that time. She had a CT of the abdomen and pelvis showing multiple metastatic   lesions and the liver biopsy was performed on 10/17/2012 showed pancreatic   neuroendocrine neoplasm in the left lobe of the liver aspirate. She went on to   have an ERCP along with  sphincterotomy and biliary stent placement and   subsequent PET had shown numerous hypodense lesions in the liver. Octreotide   scan was performed, which showed multiple right and left hepatic metastases.   MRI of the abdomen was performed on 12/05/2012 showing innumerable lesions that   demonstrated peripheral to hyperintensity consistent with hypervascular   metastasis. She was seen by Dr. Humphrey, who thought she was not a candidate   for surgical resection during that time. Her pathology from her tumor came back  positive for chromogranin, synaptophysin and had a Ki-67 of less than 1%. She   was started on treatment with temozolomide and capecitabine with cycle   1 being on 01/22/2013.    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    Secondary malignant neoplasm of liver     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) 5/7/2013    Thrombocytopenia 11/12/2013    Thyroid disease     Unspecified essential hypertension 11/12/2013       Past Surgical HIstory:   Past Surgical History:   Procedure Laterality Date    ERCP N/A 8/24/2017    [facility]    ERCP N/A 10/27/2016    [facility] at [facility]    ERCP N/A 12/18/2014    [facility]    ERCP Left 7/3/2014    [facility]    ERCP, stent exchange N/A 6/21/2018    [facility]    ERCP- with stent replacement N/A 3/7/2016    [facility]    ERCP/STENT REPLACEMENT N/A 7/16/2015    [facility] at [facility]    THYROIDECTOMY  2011       Family History:   Family History   Problem Relation Age of Onset    Cancer Maternal Grandmother     Diabetes Father     Breast cancer Neg Hx     Colon cancer Neg Hx     Ovarian cancer Neg Hx        Social History:  reports that she has never smoked. She has never used smokeless tobacco. She reports that she does not drink alcohol or use drugs.    Allergies:  Review of patient's allergies indicates:   Allergen Reactions    Epinephrine Other (See Comments)     Carcinoid patient    Sulfa (sulfonamide antibiotics) Other (See Comments)     Urticaria       Medications:  Current Outpatient Medications   Medication Sig Dispense Refill    alendronate (FOSAMAX) 70 MG tablet Take 70 mg by mouth every 7 days.   3    ferrous gluconate (FERGON) 324 MG tablet Take 324 mg by mouth.      ferrous sulfate 325 mg (65 mg iron) Tab tablet Take 325 mg by mouth once daily.      indapamide (LOZOL) 2.5 MG Tab 2.5 mg once daily.       irbesartan (AVAPRO) 300 MG tablet 300 mg once daily.       levothyroxine (SYNTHROID) 150 MCG tablet Take 150 mcg by mouth once daily.  0    verapamil (CALAN-SR) 180 MG CR tablet Take 360 mg by mouth once daily.  1    capecitabine (XELODA) 500 MG Tab Take 2 tablets (1,000 mg total) by mouth 2 (two) times daily. 56 tablet 6    temozolomide (TEMODAR) 250 MG capsule Take 1 capsule (250 mg total) by mouth once daily. 5 capsule 4     No current facility-administered medications for this visit.        Review of Systems   Constitutional: Negative for chills, fever and unexpected weight change.   HENT: Negative for congestion, hearing loss and nosebleeds.    Eyes: Negative for visual disturbance.   Respiratory: Negative for cough and shortness of breath.    Cardiovascular: Negative for chest pain and palpitations.   Gastrointestinal: Negative for abdominal pain, blood in  "stool, constipation, diarrhea, nausea and vomiting.   Genitourinary: Negative for dysuria.   Musculoskeletal: Negative for back pain and gait problem.   Skin: Negative for color change and rash.   Neurological: Negative for dizziness, weakness and headaches.   Hematological: Negative for adenopathy. Does not bruise/bleed easily.   Psychiatric/Behavioral: Negative for confusion.       ECOG Performance Status: 0   Objective:      Vitals:   Vitals:    03/26/19 1703   BP: 134/79   Pulse: 69   Resp: 16   Temp: 98.5 °F (36.9 °C)   SpO2: 100%   Weight: 73.8 kg (162 lb 11.2 oz)   Height: 5' 7" (1.702 m)     BMI: Body mass index is 25.48 kg/m².    Physical Exam   Constitutional: She is oriented to person, place, and time. She appears well-developed and well-nourished. No distress.   HENT:   Head: Normocephalic.   Mouth/Throat: No oropharyngeal exudate.   Eyes: EOM are normal. No scleral icterus.   Neck: Neck supple. No tracheal deviation present. No thyromegaly present.   Cardiovascular: Normal rate and regular rhythm.    Pulmonary/Chest: Effort normal and breath sounds normal. No respiratory distress. She has no wheezes. She has no rales.   Abdominal: Soft. She exhibits no distension and no mass. There is no tenderness. There is no rebound and no guarding.   Musculoskeletal: Normal range of motion. She exhibits no edema.   Lymphadenopathy:     She has no cervical adenopathy.   Neurological: She is alert and oriented to person, place, and time. No cranial nerve deficit.   Skin: Skin is warm and dry.   Psychiatric: She has a normal mood and affect.       Laboratory Data:  Abstract on 12/06/2016   Component Date Value Ref Range Status    EXT WBC 12/03/2016 4.1  3.8 - 10.8 1000/ul Final    EXT Hemoglobin 12/03/2016 12.9  11.7 - 15.5 g/dl Final    EXT Hematocrit 12/03/2016 38.5  35.0 - 45.0 % Final    EXT Platelets 12/03/2016 138* 140 - 400 1000/ul Final    EXT Glucose 12/03/2016 88  65 - 99 mg/dl Final    EXT BUN " 12/03/2016 13  7 - 25 mg/dl Final    EXT Creatinine 12/03/2016 0.90  0.50 - 0.99 mg/dl Final    EXT Sodium 12/03/2016 141  135 - 146 mmol/l Final    EXT Potassium 12/03/2016 4.2  3.5 - 5.3 mmol/l Final    EXT Chloride 12/03/2016 105  98 - 110 mmol/l Final    EXT CO2 12/03/2016 29  20 - 31 mmol/l Final    EXT Calcium 12/03/2016 9.7  8.6 - 10.4 mg/dl Final    EXT Protein total 12/03/2016 6.9  6.1 - 8.1 g/dl Final    EXT Albumin 12/03/2016 4.0  3.6 - 5.1 g/dl Final    EXT BilirubiN Total 12/03/2016 1.1  0.2 - 1.2 mg/dl Final    EXT Alkaline Phosphatase 12/03/2016 181* 33 - 130 u/l Final    EXT AST 12/03/2016 21  10 - 35 u/l Final    EXT ALT 12/03/2016 28  6 - 29 u/l Final   Office Visit on 11/14/2016   Component Date Value Ref Range Status    Blood Stool 11/14/2016 Negative  Negative Final     Acceptable 11/14/2016 Yes   Final    SOURCE: 11/14/2016 Cervical   Final    Slides: 11/14/2016 1   Final    LMP: 11/14/2016 NA   Final    Specimen adequacy: 11/14/2016 (NOTE)   Final    Comment:      Satisfactory for evaluation.          Endocervical cells absent.  Metaplastic cells indicative       of transformation zone cannot be reliably distinguished from       parabasal or atrophic cells.                      Interpretation 11/14/2016 NO EPITHELIAL ABNORMALITY SEE BELOW   Final    Comment: ----------------------------------------------------------------------       *NEGATIVE FOR INTRAEPITHELIAL LESION OR MALIGNANCY   ----------------------------------------------------------------------         Comments: 11/14/2016 (NOTE)   Final    Comment: Due to technical or specimen issues, imaging could not be  performed. A cytotechnologist has manually screened this slide.         Cytotechnologist: 11/14/2016 [facility]   Final    LOCATION 11/14/2016 (NOTE)   Final    Comment: Specimens processed and interpreted at :[facility], [address] [place] [address]  [address], Phone: [phone], CLIA: [id number]      CPT Codes: 11/14/2016 (NOTE)   Final    Comment: [id number]        UNLESS OTHERWISE INDICATED, COMPUTER AIDED AND CYTOTECHNOLOGIST     SCREENING PERFORMED.          The Pap test is a screening test with an inherent, but low       probability of error.  Your patient should be reminded to       consult you immediately if she experiences any suspicious       signs or symptoms, regardless of her Pap test result.       An alternate report format containing images or consolidated       prior Pap history is available as applicable.         HPV HIGH RISK IF ASC-US, [facility] 11/14/2016 CRITERIA NOT MET   Final    Comment:       UNLESS OTHERWISE INDICATED, ALL TESTING PERFORMED AT  [facility]  [address] [place] [address]            :  CARLOS MENDEZ M.D.        CLIA NUMBER [id number]  [place]. [id number]                 Imaging:     MRI 11/1/18  COMPARISON:  04/26/2018.    FINDINGS:  The lung bases are clear.  There are numerous liver lesions the index lesions previously measured are not significantly changed.  One of the lesion within hepatic segment 8 measuring 2.1 cm, series 12, image 33, appears slightly larger compared to the prior exam where it was measuring 1.6 cm.  Numerous other lesions appear similar in size.  There are no definite new lesions.  There is mild intrahepatic biliary ducts prominence.  The common bile duct is prominent, unchanged from the prior study, no definite obstructive process seen.  It is unchanged.  The portal venous system is patent.  The gallbladder is present.  The stomach, pancreas, spleen, adrenal glands and bilateral kidneys appear normal.  The aorta tapers normally, no para-aortic lymphadenopathy.  The visualized osseous structures demonstrate no osseous lesions.      Impression       Numerous liver lesions consistent with metastatic disease.  The index lesions do not  demonstrate significant change.  One of the lesion, a non index lesion,  within the segment 8 appears slightly larger, no definite new lesions identified.    Biliary duct dilation, similar to the prior exam, no definite obstructive process seen.    RECIST SUMMARY:    Date of prior examination for comparison: 04/26/2018    Lesion 1: Hepatic segment 5.  2.1 cm  . Series 12 image 59.  Prior measurement 2.1 cm  .    Lesion 2: Hepatic segment 3.  1.3 cm. Series 12 image 64.  Prior measurement 1.5 cm.       Gallium 68 PET-CT 11/30/2018  FINDINGS:  Patient was administered 5.53 millicuries of gallium 68 octreotide intravenously.    There are diffuse numerous liver metastases.    Index anterior right dome SUV max 29.64.    There is physiologic intracranial, head and neck activity.  Heart mediastinum are normal.  Spleen stomach pancreas and duodenal activity are normal.  There is a biliary stent.  There is physiologic GI and  activity.  No bone or lung lesions are seen.  Lungs are clear.      Impression       See above    Diffuse metastatic disease to the liver.    Index liver lesion SUV max 29.64.     MRI  03/12/2019  COMPARISON:  11/01/2018    FINDINGS:  Numerous lesions throughout the lumbar who liver, consistent with metastatic disease, similar to the prior exam. RECIST measurements, as below.    Prior cholecystectomy.  Mild diffuse enlargement of the pancreatic duct.   small hemorrhagic cyst in the left kidney.  No splenic masses.  The adrenal glands are unremarkable.  The abdominal aorta tapers normally.  No lymphadenopathy.    Common bile duct dilatation with filling defect noted measuring 1 cm.  Mild intrahepatic duct dilatation.    No fluid collections.  No marrow replacement process.    RECIST SUMMARY:    Date of prior examination for comparison: 11/01/2018    Lesion 1: Hepatic segment 5.  2.1 cm. Series 11 image 31.  Prior measurement 2.1 cm.    Lesion 2: Hepatic segment 3.  1.3 cm. Series 11 image 58.  Prior  measurement 1.3 cm.      Impression       Numerous liver masses, consistent with metastatic disease, similar to the 11/01/2018 exam.  RECIST measurements, as below.    Dilated common bile duct with filling defect, suggesting choledocholithiasis.    RECIST SUMMARY:    Date of prior examination for comparison: 11/01/2018    Lesion 1: Hepatic segment 5.  2.1 cm. Series 11 image 31.  Prior measurement 2.1 cm.    Lesion 2: Hepatic segment 3.  1.3 cm. Series 11 image 58.  Prior measurement 1.3 cm.            Assessment:       1. Primary pancreatic neuroendocrine tumor    2. Secondary neuroendocrine tumor of liver           Plan:   Ms. Bruce is doing well clinically and review of her MRI shows overall stable disease.  She has been off chemo since 11/2018.  At this point we will continue to monitor off treatment and will repeat MRI in 6 months.  Will ask GI to look at MRI to see if she needs stent exchange.  Follow back up with me in 6 months report any new symptoms in the interim.  All questions were answered and she is agreeable with this plan.    Cory Rodriguez DO, [facility]  Hematology & Oncology, [facility]  [address]  [place]  [address]  ph. [phone]; [phone]  fax. [phone]    25 minutes were spent in coordination of patient's care, record review and counseling.  More than 50% of the time was face-to-face.

## 2019-03-28 ENCOUNTER — TELEPHONE (OUTPATIENT)
Dept: NEUROLOGY | Facility: HOSPITAL | Age: 69
End: 2019-03-28

## 2019-03-28 DIAGNOSIS — K83.1 BILIARY OBSTRUCTION: Primary | ICD-10-CM

## 2019-03-28 NOTE — TELEPHONE ENCOUNTER
----- Message from Jake Lieberman MD sent at 3/28/2019  8:58 AM CDT -----  Richie Murry,   Please set this patient up for an ERCP with me on a Thursday in April or May. See if you can find a Thursday where the advanced endoscopy doctors aren't scheduled to work in the unit on Thursday so I can use the ERCP room. Sometimes they skip Thursdays   Thanks!   Dr. Lieberman     ----- Message -----  From: Cory Rodriguez DO, FACP  Sent: 3/28/2019   8:56 AM  To: Jake Lieberman MD    That would be great.  Thanks!  Tima    ----- Message -----  From: Jake Lieberman MD  Sent: 3/28/2019   8:54 AM  To: Cory Rodriguez DO, FACP    Reviewed. She may be asymptomatic but it has been 9 months since her stent was changed. We should change it sometime in the next 1-2 months. Would you like me to schedule?     ----- Message -----  From: Cory Rodriguez DO, FACP  Sent: 3/26/2019   6:29 PM  To: Jake Lieberman MD, Iona Reveles, RN    Richie Covarrubias,  Can you please look at her MRI.  Looks like they read this as a bile duct blockage.  Do think she needs her stent exchanged now?  She's asymptomatic.    Tima

## 2019-03-28 NOTE — TELEPHONE ENCOUNTER
ERCP scheduled with pt on Thursday, May 2, 2019.  Pt instructed to eat light meals on Wednesday, May 1, 2019 with nothing to eat or drink after midnight.  Pt to be contacted by Endoscopy staff 2 days prior to procedure with arrival time.  Pt acknowledged understanding.

## 2019-04-02 ENCOUNTER — CONFERENCE (OUTPATIENT)
Dept: NEUROLOGY | Facility: HOSPITAL | Age: 69
End: 2019-04-02

## 2019-04-02 NOTE — TELEPHONE ENCOUNTER
[facility]      NEUROENDOCRINE TUMOR MULTIDISCIPLINARY TUMOR BOARD  _____________________________________________________________________    PRESENTER:   Cory Rodriguez DO    REASON FOR PRESENTATION:  Treatment Plan and Scan Review    ATTENDEES:   Surgery:              MD ELISE Mcbride MD T. Ramcharan, MD  Interventional Radiology - Colt Bonner MD  Pathology - Dinah Pierre MD  Oncology - Cory Rodriguez DO  Gastroenterology - Not present   Nursing  Research    PATIENT STATUS:  Established Patient    PATIENT SUMMARY:  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    Secondary malignant neoplasm of liver     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) 5/7/2013    Thrombocytopenia 11/12/2013    Thyroid disease     Unspecified essential hypertension 11/12/2013       Past Surgical History:   Procedure Laterality Date    ERCP N/A 8/24/2017    [facility]    ERCP N/A 10/27/2016    [facility] at [facility]    ERCP N/A 12/18/2014    [facility] at [facility]    ERCP Left 7/3/2014    [facility] at [facility]    ERCP, stent exchange N/A 6/21/2018    [facility] at [facility]    ERCP- with stent replacement N/A 3/7/2016    [facility] at [facility] END[facility]    ERCP/STENT REPLACEMENT N/A 7/16/2015    [facility] by [facility] at [facility] ENDO    THYROIDECTOMY  2011      ________________________________________________________________    DISCUSSION:  Scans reviewed- no progression      BOARD RECOMMENDATIONS:     Stable dz, repeat scans in 6 mos- MRI

## 2019-04-24 LAB
EXT 24 HR UR METANEPHRINE: ABNORMAL
EXT 24 HR UR NORMETANEPHRINE: ABNORMAL
EXT 24 HR UR NORMETANEPHRINE: ABNORMAL
EXT 25 HYDROXY VIT D2: ABNORMAL
EXT 25 HYDROXY VIT D3: ABNORMAL
EXT 5 HIAA 24 HR URINE: ABNORMAL
EXT 5 HIAA BLOOD: ABNORMAL
EXT ACTH: ABNORMAL
EXT AFP: ABNORMAL
EXT ALBUMIN: 4.1 G/DL (ref 3.6–5.1)
EXT ALKALINE PHOSPHATASE: 174 U/L (ref 33–130)
EXT ALT: 21 U/L (ref 6–29)
EXT AMYLASE: ABNORMAL
EXT ANTI ISLET CELL AB: ABNORMAL
EXT ANTI PARIETAL CELL AB: ABNORMAL
EXT ANTI THYROID AB: ABNORMAL
EXT AST: 15 U/L (ref 10–35)
EXT BILIRUBIN DIRECT: ABNORMAL
EXT BILIRUBIN TOTAL: 1.3 MG/DL (ref 0.2–1.2)
EXT BK VIRUS DNA QN PCR: ABNORMAL
EXT BUN: 12 MG/DL (ref 7–25)
EXT C PEPTIDE: ABNORMAL
EXT CA 125: ABNORMAL
EXT CA 19-9: ABNORMAL
EXT CA 27-29: ABNORMAL
EXT CALCITONIN: ABNORMAL
EXT CALCIUM: 9.6 MG/DL (ref 8.6–10.4)
EXT CEA: ABNORMAL
EXT CHLORIDE: 105 MMOL/L (ref 98–110)
EXT CHOLESTEROL: ABNORMAL
EXT CHROMOGRANIN A: ABNORMAL
EXT CO2: 26 MMOL/L (ref 20–32)
EXT CREATININE UA: ABNORMAL
EXT CREATININE: 1.01 MG/DL (ref 0.5–0.99)
EXT CYCLOSPORONE LEVEL: ABNORMAL
EXT DOPAMINE: ABNORMAL
EXT EBV DNA BY PCR: ABNORMAL
EXT EPINEPHRINE: ABNORMAL
EXT FOLATE: ABNORMAL
EXT FREE T3: ABNORMAL
EXT FREE T4: ABNORMAL
EXT FSH: ABNORMAL
EXT GASTRIN RELEASING PEPTIDE: ABNORMAL
EXT GASTRIN RELEASING PEPTIDE: ABNORMAL
EXT GASTRIN: ABNORMAL
EXT GGT: ABNORMAL
EXT GHRELIN: ABNORMAL
EXT GLUCAGON: ABNORMAL
EXT GLUCOSE: 92 MG/DL (ref 65–99)
EXT GROWTH HORMONE: ABNORMAL
EXT HCV RNA QUANT PCR: ABNORMAL
EXT HDL: ABNORMAL
EXT HEMATOCRIT: 40.7 % (ref 35–45)
EXT HEMOGLOBIN A1C: ABNORMAL %
EXT HEMOGLOBIN: 14.2 G/DL (ref 11.7–15.5)
EXT HISTAMINE 24 HR URINE: ABNORMAL
EXT HISTAMINE: ABNORMAL
EXT IGF-1: ABNORMAL
EXT IMMUNKNOW (NON-STIMULATED): ABNORMAL
EXT IMMUNKNOW (STIMULATED): ABNORMAL
EXT INR: ABNORMAL
EXT INSULIN: ABNORMAL
EXT LANREOTIDE LEVEL: ABNORMAL
EXT LDH, TOTAL: ABNORMAL
EXT LDL CHOLESTEROL: ABNORMAL
EXT LIPASE: ABNORMAL
EXT MAGNESIUM: ABNORMAL
EXT METANEPHRINE FREE PLASMA: ABNORMAL
EXT MOTILIN: ABNORMAL
EXT NEUROKININ A CAMB: ABNORMAL
EXT NEUROKININ A ISI: ABNORMAL
EXT NEUROTENSIN: ABNORMAL
EXT NOREPINEPHRINE: ABNORMAL
EXT NORMETANEPHRINE: ABNORMAL
EXT NSE: ABNORMAL
EXT OCTREOTIDE LEVEL: ABNORMAL
EXT PANCREASTATIN CAMB: ABNORMAL
EXT PANCREASTATIN ISI: 167 PG/ML (ref 10–135)
EXT PANCREATIC POLYPEPTIDE: ABNORMAL
EXT PHOSPHORUS: ABNORMAL
EXT PLATELETS: 179 1000/UL (ref 140–400)
EXT POTASSIUM: 4.6 MMOL/L (ref 3.5–5.3)
EXT PROGRAF LEVEL: ABNORMAL
EXT PROLACTIN: ABNORMAL
EXT PROTEIN TOTAL: 7.3 G/DL (ref 6.1–8.1)
EXT PROTEIN UA: ABNORMAL
EXT PT: ABNORMAL
EXT PTH, INTACT: ABNORMAL
EXT PTT: ABNORMAL
EXT RAPAMUNE LEVEL: ABNORMAL
EXT SEROTONIN: ABNORMAL
EXT SODIUM: 141 MMOL/L (ref 135–146)
EXT SOMATOSTATIN: ABNORMAL
EXT SUBSTANCE P: ABNORMAL
EXT TRIGLYCERIDES: ABNORMAL
EXT TRYPTASE: ABNORMAL
EXT TSH: ABNORMAL
EXT URIC ACID: ABNORMAL
EXT URINE AMYLASE U/HR: ABNORMAL
EXT URINE AMYLASE U/L: ABNORMAL
EXT VASOACTIVE INTESTINAL POLYPEPTIDE: ABNORMAL
EXT VITAMIN B12: ABNORMAL
EXT VMA 24 HR URINE: ABNORMAL
EXT WBC: 5 1000/UL (ref 3.8–10.8)
NEURON SPECIFIC ENOLASE: ABNORMAL

## 2019-05-21 ENCOUNTER — TELEPHONE (OUTPATIENT)
Dept: ENDOSCOPY | Facility: HOSPITAL | Age: 69
End: 2019-05-21

## 2019-05-21 LAB
EXT 24 HR UR METANEPHRINE: ABNORMAL
EXT 24 HR UR NORMETANEPHRINE: ABNORMAL
EXT 24 HR UR NORMETANEPHRINE: ABNORMAL
EXT 25 HYDROXY VIT D2: ABNORMAL
EXT 25 HYDROXY VIT D3: ABNORMAL
EXT 5 HIAA 24 HR URINE: ABNORMAL
EXT 5 HIAA BLOOD: ABNORMAL
EXT ACTH: ABNORMAL
EXT AFP: ABNORMAL
EXT ALBUMIN: 4.3 G/DL (ref 3.6–5.1)
EXT ALKALINE PHOSPHATASE: 186 U/L (ref 33–130)
EXT ALT: 21 U/L (ref 6–29)
EXT AMYLASE: ABNORMAL
EXT ANTI ISLET CELL AB: ABNORMAL
EXT ANTI PARIETAL CELL AB: ABNORMAL
EXT ANTI THYROID AB: ABNORMAL
EXT AST: 16 U/L (ref 10–35)
EXT BILIRUBIN DIRECT: ABNORMAL
EXT BILIRUBIN TOTAL: 0.9 MG/DL (ref 0.2–1.2)
EXT BK VIRUS DNA QN PCR: ABNORMAL
EXT BUN: 14 MG/DL (ref 7–25)
EXT C PEPTIDE: ABNORMAL
EXT CA 125: ABNORMAL
EXT CA 19-9: ABNORMAL
EXT CA 27-29: ABNORMAL
EXT CALCITONIN: ABNORMAL
EXT CALCIUM: 10.4 MG/DL (ref 8.6–10.4)
EXT CEA: ABNORMAL
EXT CHLORIDE: 102 MMOL/L (ref 98–110)
EXT CHOLESTEROL: ABNORMAL
EXT CHROMOGRANIN A: ABNORMAL
EXT CO2: 30 MMOL/L (ref 20–32)
EXT CREATININE UA: ABNORMAL
EXT CREATININE: 1.02 MG/DL (ref 0.5–0.99)
EXT CYCLOSPORONE LEVEL: ABNORMAL
EXT DOPAMINE: ABNORMAL
EXT EBV DNA BY PCR: ABNORMAL
EXT EPINEPHRINE: ABNORMAL
EXT FOLATE: ABNORMAL
EXT FREE T3: ABNORMAL
EXT FREE T4: ABNORMAL
EXT FSH: ABNORMAL
EXT GASTRIN RELEASING PEPTIDE: ABNORMAL
EXT GASTRIN RELEASING PEPTIDE: ABNORMAL
EXT GASTRIN: ABNORMAL
EXT GGT: ABNORMAL
EXT GHRELIN: ABNORMAL
EXT GLUCAGON: ABNORMAL
EXT GLUCOSE: 93 MG/DL (ref 65–99)
EXT GROWTH HORMONE: ABNORMAL
EXT HCV RNA QUANT PCR: ABNORMAL
EXT HDL: ABNORMAL
EXT HEMATOCRIT: 40.4 % (ref 35–45)
EXT HEMOGLOBIN A1C: ABNORMAL %
EXT HEMOGLOBIN: 13.6 G/DL (ref 11.7–15.5)
EXT HISTAMINE 24 HR URINE: ABNORMAL
EXT HISTAMINE: ABNORMAL
EXT IGF-1: ABNORMAL
EXT IMMUNKNOW (NON-STIMULATED): ABNORMAL
EXT IMMUNKNOW (STIMULATED): ABNORMAL
EXT INR: ABNORMAL
EXT INSULIN: ABNORMAL
EXT LANREOTIDE LEVEL: ABNORMAL
EXT LDH, TOTAL: ABNORMAL
EXT LDL CHOLESTEROL: ABNORMAL
EXT LIPASE: ABNORMAL
EXT MAGNESIUM: ABNORMAL
EXT METANEPHRINE FREE PLASMA: ABNORMAL
EXT MOTILIN: ABNORMAL
EXT NEUROKININ A CAMB: ABNORMAL
EXT NEUROKININ A ISI: ABNORMAL
EXT NEUROTENSIN: ABNORMAL
EXT NOREPINEPHRINE: ABNORMAL
EXT NORMETANEPHRINE: ABNORMAL
EXT NSE: ABNORMAL
EXT OCTREOTIDE LEVEL: ABNORMAL
EXT PANCREASTATIN CAMB: ABNORMAL
EXT PANCREASTATIN ISI: ABNORMAL
EXT PANCREATIC POLYPEPTIDE: ABNORMAL
EXT PHOSPHORUS: ABNORMAL
EXT PLATELETS: 160 1000/UL (ref 140–400)
EXT POTASSIUM: 4.6 MMOL/L (ref 3.5–5.3)
EXT PROGRAF LEVEL: ABNORMAL
EXT PROLACTIN: ABNORMAL
EXT PROTEIN TOTAL: 7.5 G/DL (ref 6.1–8.1)
EXT PROTEIN UA: ABNORMAL
EXT PT: ABNORMAL
EXT PTH, INTACT: ABNORMAL
EXT PTT: ABNORMAL
EXT RAPAMUNE LEVEL: ABNORMAL
EXT SEROTONIN: ABNORMAL
EXT SODIUM: 139 MMOL/L (ref 135–146)
EXT SOMATOSTATIN: ABNORMAL
EXT SUBSTANCE P: ABNORMAL
EXT TRIGLYCERIDES: ABNORMAL
EXT TRYPTASE: ABNORMAL
EXT TSH: ABNORMAL
EXT URIC ACID: ABNORMAL
EXT URINE AMYLASE U/HR: ABNORMAL
EXT URINE AMYLASE U/L: ABNORMAL
EXT VASOACTIVE INTESTINAL POLYPEPTIDE: ABNORMAL
EXT VITAMIN B12: ABNORMAL
EXT VMA 24 HR URINE: ABNORMAL
EXT WBC: 6 1000/UL (ref 3.8–10.8)
NEURON SPECIFIC ENOLASE: ABNORMAL

## 2019-05-21 NOTE — TELEPHONE ENCOUNTER
Spoke with patient about arrival time @ *. 1100    NPO status reviewed: Patient may eat until 7:00pm.  After 7pm, pt may have CLEAR liquids ONLY until completely NPO at Midnight.    Medications: Do not take Insulin or oral diabetic medications the day of the procedure.    Take as prescribed: heart, seizure and blood pressure medication in the morning with a sip of water (less than an ounce).  Take any breathing medications and bring inhalers to hospital with you.     Leave all valuables and jewelry at home. Wear comfortable clothes to procedure to change into hospital gown.   You cannot drive for 24 hours after your procedure because you will receive sedation for your procedure to make you comfortable.    A ride must be provided at discharge.

## 2019-05-23 ENCOUNTER — ANESTHESIA (OUTPATIENT)
Dept: ENDOSCOPY | Facility: HOSPITAL | Age: 69
End: 2019-05-23
Payer: COMMERCIAL

## 2019-05-23 ENCOUNTER — ANESTHESIA EVENT (OUTPATIENT)
Dept: ENDOSCOPY | Facility: HOSPITAL | Age: 69
End: 2019-05-23
Payer: COMMERCIAL

## 2019-05-23 ENCOUNTER — HOSPITAL ENCOUNTER (OUTPATIENT)
Facility: HOSPITAL | Age: 69
Discharge: HOME | End: 2019-05-23
Attending: INTERNAL MEDICINE | Admitting: INTERNAL MEDICINE
Payer: COMMERCIAL

## 2019-05-23 VITALS
DIASTOLIC BLOOD PRESSURE: 74 MMHG | TEMPERATURE: 98 F | RESPIRATION RATE: 18 BRPM | OXYGEN SATURATION: 98 % | SYSTOLIC BLOOD PRESSURE: 136 MMHG | HEART RATE: 74 BPM

## 2019-05-23 DIAGNOSIS — K80.50 GALL STONES, COMMON BILE DUCT: ICD-10-CM

## 2019-05-23 DIAGNOSIS — Z46.89 ENCOUNTER FOR REPLACEMENT OF BILIARY STENT: ICD-10-CM

## 2019-05-23 DIAGNOSIS — K83.1 STRICTURE OF BILE DUCT: ICD-10-CM

## 2019-05-23 PROCEDURE — 63600175 PHARM REV CODE 636 W HCPCS: Performed by: INTERNAL MEDICINE

## 2019-05-23 PROCEDURE — 43276 ERCP STENT EXCHANGE W/DILATE: CPT | Performed by: INTERNAL MEDICINE

## 2019-05-23 PROCEDURE — 43264 ERCP REMOVE DUCT CALCULI: CPT | Performed by: INTERNAL MEDICINE

## 2019-05-23 PROCEDURE — 37000009 HC ANESTHESIA EA ADD 15 MINS: Performed by: INTERNAL MEDICINE

## 2019-05-23 PROCEDURE — 43275 ERCP REMOVE FORGN BODY DUCT: CPT | Performed by: INTERNAL MEDICINE

## 2019-05-23 PROCEDURE — C1874 STENT, COATED/COV W/DEL SYS: HCPCS | Performed by: INTERNAL MEDICINE

## 2019-05-23 PROCEDURE — C1769 GUIDE WIRE: HCPCS | Performed by: INTERNAL MEDICINE

## 2019-05-23 PROCEDURE — 25000003 PHARM REV CODE 250: Performed by: INTERNAL MEDICINE

## 2019-05-23 PROCEDURE — 27202125 HC BALLOON, EXTRACTION (ANY): Performed by: INTERNAL MEDICINE

## 2019-05-23 PROCEDURE — 37000008 HC ANESTHESIA 1ST 15 MINUTES: Performed by: INTERNAL MEDICINE

## 2019-05-23 PROCEDURE — 63600175 PHARM REV CODE 636 W HCPCS: Performed by: NURSE ANESTHETIST, CERTIFIED REGISTERED

## 2019-05-23 PROCEDURE — 27201089 HC SNARE, DISP (ANY): Performed by: INTERNAL MEDICINE

## 2019-05-23 PROCEDURE — 25000003 PHARM REV CODE 250: Performed by: ANESTHESIOLOGY

## 2019-05-23 DEVICE — STENT SYSTEM RMV
Type: IMPLANTABLE DEVICE | Site: BILE DUCT | Status: FUNCTIONAL
Brand: WALLFLEX BILIARY

## 2019-05-23 RX ORDER — DEXAMETHASONE SODIUM PHOSPHATE 4 MG/ML
INJECTION, SOLUTION INTRA-ARTICULAR; INTRALESIONAL; INTRAMUSCULAR; INTRAVENOUS; SOFT TISSUE
Status: DISCONTINUED | OUTPATIENT
Start: 2019-05-23 | End: 2019-05-23

## 2019-05-23 RX ORDER — SUCCINYLCHOLINE CHLORIDE 20 MG/ML
INJECTION INTRAMUSCULAR; INTRAVENOUS
Status: DISCONTINUED | OUTPATIENT
Start: 2019-05-23 | End: 2019-05-23

## 2019-05-23 RX ORDER — LIDOCAINE HCL/PF 100 MG/5ML
SYRINGE (ML) INTRAVENOUS
Status: DISCONTINUED | OUTPATIENT
Start: 2019-05-23 | End: 2019-05-23

## 2019-05-23 RX ORDER — SODIUM CHLORIDE 0.9 % (FLUSH) 0.9 %
10 SYRINGE (ML) INJECTION
Status: CANCELLED | OUTPATIENT
Start: 2019-05-23

## 2019-05-23 RX ORDER — SODIUM CHLORIDE 0.9 % (FLUSH) 0.9 %
10 SYRINGE (ML) INJECTION
Status: DISCONTINUED | OUTPATIENT
Start: 2019-05-23 | End: 2019-05-23 | Stop reason: HOSPADM

## 2019-05-23 RX ORDER — HYDROMORPHONE HYDROCHLORIDE 2 MG/ML
0.5 INJECTION, SOLUTION INTRAMUSCULAR; INTRAVENOUS; SUBCUTANEOUS EVERY 5 MIN PRN
Status: CANCELLED | OUTPATIENT
Start: 2019-05-23

## 2019-05-23 RX ORDER — ONDANSETRON 2 MG/ML
INJECTION INTRAMUSCULAR; INTRAVENOUS
Status: DISCONTINUED | OUTPATIENT
Start: 2019-05-23 | End: 2019-05-23

## 2019-05-23 RX ORDER — DIPHENHYDRAMINE HYDROCHLORIDE 50 MG/ML
25 INJECTION INTRAMUSCULAR; INTRAVENOUS EVERY 6 HOURS PRN
Status: CANCELLED | OUTPATIENT
Start: 2019-05-23

## 2019-05-23 RX ORDER — PROPOFOL 10 MG/ML
VIAL (ML) INTRAVENOUS
Status: DISCONTINUED | OUTPATIENT
Start: 2019-05-23 | End: 2019-05-23

## 2019-05-23 RX ORDER — OXYCODONE HYDROCHLORIDE 5 MG/1
5 TABLET ORAL
Status: CANCELLED | OUTPATIENT
Start: 2019-05-23

## 2019-05-23 RX ORDER — SCOLOPAMINE TRANSDERMAL SYSTEM 1 MG/1
1 PATCH, EXTENDED RELEASE TRANSDERMAL ONCE
Status: DISCONTINUED | OUTPATIENT
Start: 2019-05-23 | End: 2019-05-23 | Stop reason: HOSPADM

## 2019-05-23 RX ORDER — FENTANYL CITRATE 50 UG/ML
INJECTION, SOLUTION INTRAMUSCULAR; INTRAVENOUS
Status: DISCONTINUED | OUTPATIENT
Start: 2019-05-23 | End: 2019-05-23

## 2019-05-23 RX ORDER — SODIUM CHLORIDE 9 MG/ML
INJECTION, SOLUTION INTRAVENOUS CONTINUOUS
Status: DISCONTINUED | OUTPATIENT
Start: 2019-05-23 | End: 2019-05-23 | Stop reason: HOSPADM

## 2019-05-23 RX ORDER — ONDANSETRON 2 MG/ML
8 INJECTION INTRAMUSCULAR; INTRAVENOUS ONCE
Status: COMPLETED | OUTPATIENT
Start: 2019-05-23 | End: 2019-05-23

## 2019-05-23 RX ORDER — ONDANSETRON 2 MG/ML
4 INJECTION INTRAMUSCULAR; INTRAVENOUS DAILY PRN
Status: CANCELLED | OUTPATIENT
Start: 2019-05-23

## 2019-05-23 RX ADMIN — FENTANYL CITRATE 100 MCG: 50 INJECTION, SOLUTION INTRAMUSCULAR; INTRAVENOUS at 03:05

## 2019-05-23 RX ADMIN — PROPOFOL 150 MG: 10 INJECTION, EMULSION INTRAVENOUS at 03:05

## 2019-05-23 RX ADMIN — ONDANSETRON 8 MG: 2 INJECTION, SOLUTION INTRAMUSCULAR; INTRAVENOUS at 03:05

## 2019-05-23 RX ADMIN — OCTREOTIDE ACETATE: 500 INJECTION, SOLUTION INTRAVENOUS; SUBCUTANEOUS at 11:05

## 2019-05-23 RX ADMIN — FENTANYL CITRATE 50 MCG: 50 INJECTION, SOLUTION INTRAMUSCULAR; INTRAVENOUS at 03:05

## 2019-05-23 RX ADMIN — SODIUM CHLORIDE: 0.9 INJECTION, SOLUTION INTRAVENOUS at 11:05

## 2019-05-23 RX ADMIN — LIDOCAINE HYDROCHLORIDE 100 MG: 20 INJECTION, SOLUTION INTRAVENOUS at 03:05

## 2019-05-23 RX ADMIN — ONDANSETRON 8 MG: 2 INJECTION INTRAMUSCULAR; INTRAVENOUS at 11:05

## 2019-05-23 RX ADMIN — SCOPALAMINE 1 PATCH: 1 PATCH, EXTENDED RELEASE TRANSDERMAL at 11:05

## 2019-05-23 RX ADMIN — DEXAMETHASONE SODIUM PHOSPHATE 8 MG: 4 INJECTION, SOLUTION INTRAMUSCULAR; INTRAVENOUS at 03:05

## 2019-05-23 RX ADMIN — SUCCINYLCHOLINE CHLORIDE 120 MG: 20 INJECTION, SOLUTION INTRAMUSCULAR; INTRAVENOUS at 03:05

## 2019-05-23 NOTE — [employer] PATIENT INSTRUCTIONS
Discharge Summary/Instructions after an Endoscopic Procedure  Patient Name: Ching Bruce  Patient MRN: [id number]  Patient YOB: 1950  Thursday, May 23, 2019  Jake Lieberman MD  RESTRICTIONS:  During your procedure today, you received medications for sedation.  These   medications may affect your judgment, balance and coordination.  Therefore,   for 24 hours, you have the following restrictions:   - DO NOT drive a car, operate machinery, make legal/financial decisions,   sign important papers or drink alcohol.    ACTIVITY:  Today: no heavy lifting, straining or running due to procedural   sedation/anesthesia.  The following day: return to full activity including work.  DIET:  Eat and drink normally unless instructed otherwise.     TREATMENT FOR COMMON SIDE EFFECTS:  - Mild abdominal pain, nausea, belching, bloating or excessive gas:  rest,   eat lightly and use a heating pad.  - Sore Throat: treat with throat lozenges and/or gargle with warm salt   water.  - Because air was used during the procedure, expelling large amounts of air   from your rectum or belching is normal.  - If a bowel prep was taken, you may not have a bowel movement for 1-3 days.    This is normal.  SYMPTOMS TO WATCH FOR AND REPORT TO YOUR PHYSICIAN:  1. Abdominal pain or bloating, other than gas cramps.  2. Chest pain.  3. Back pain.  4. Signs of infection such as: chills or fever occurring within 24 hours   after the procedure.  5. Rectal bleeding, which would show as bright red, maroon, or black stools.   (A tablespoon of blood from the rectum is not serious, especially if   hemorrhoids are present.)  6. Vomiting.  7. Weakness or dizziness.  GO DIRECTLY TO THE NEAREST EMERGENCY ROOM IF YOU HAVE ANY OF THE FOLLOWING:      Difficulty breathing              Chills and/or fever over 101 F   Persistent vomiting and/or vomiting blood   Severe abdominal pain   Severe chest pain   Black, tarry stools   Bleeding- more than one tablespoon   Any  other symptom or condition that you feel may need urgent attention  Your doctor recommends these additional instructions:  If any biopsies were taken, your doctors clinic will contact you in 1 to 2   weeks with any results.  Discharge to home   Condition stable   Resume previous diet   The signs and symptoms of potential delayed complications were discussed   with the patient. If signs or symptoms of these complications develop, call   the [facility] at [phone].   Return to normal activities tomorrow.  Written discharge instructions were   provided to the patient.   Repeat ERCP in 6 months to exchange stent.   - Discharge patient to home.  For questions, problems or results please call your physician - Jake Lieberman MD at Work:  [phone].  EMERGENCY PHONE NUMBER: [phone],  LAB RESULTS: [phone]  IF A COMPLICATION OR EMERGENCY SITUATION ARISES AND YOU ARE UNABLE TO REACH   YOUR PHYSICIAN - GO DIRECTLY TO THE EMERGENCY ROOM.  MD Jake Crowell MD  5/23/2019 5:12:25 PM  This report has been verified and signed electronically.  [employer]

## 2019-05-23 NOTE — ANESTHESIA POSTPROCEDURE EVALUATION
Anesthesia Post Evaluation    Patient: Ching Bruce    Procedure(s) Performed: Procedure(s) (LRB):  ERCP (ENDOSCOPIC RETROGRADE CHOLANGIOPANCREATOGRAPHY), stent exchange (N/A)    Final Anesthesia Type: general  Patient location during evaluation: PACU  Patient participation: Yes- Able to Participate  Level of consciousness: awake  Post-procedure vital signs: reviewed and stable  Pain management: adequate  Airway patency: patent  PONV status at discharge: No PONV  Anesthetic complications: no      Cardiovascular status: stable  Respiratory status: unassisted  Hydration status: euvolemic  Follow-up not needed.          Vitals Value Taken Time   BP  5/23/2019  4:46 PM   Temp  5/23/2019  4:46 PM   Pulse  5/23/2019  4:46 PM   Resp  5/23/2019  4:46 PM   SpO2  5/23/2019  4:46 PM         No case tracking events are documented in the log.      Pain/John Score: No data recorded

## 2019-05-23 NOTE — PLAN OF CARE
Patient sleeping, arousable. VSS. Denies any pain at this time. Dr.Clarke Quinones to release patient from PACU. Report to Shobha PATEL with time for questions, verbalized understanding. Patient discharged to [facility] via stretcher.

## 2019-05-23 NOTE — OR NURSING
Patient intubated. VSS. Bite block inserted.  Transferred to ERCP Bed. Matthew hugger applied. [id number]

## 2019-05-23 NOTE — ANESTHESIA RELEASE NOTE
Anesthesia Release from PACU Note    Patient: Ching Bruce    Procedure(s) Performed: Procedure(s) (LRB):  ERCP (ENDOSCOPIC RETROGRADE CHOLANGIOPANCREATOGRAPHY), stent exchange (N/A)    Anesthesia type: general    Post pain: Adequate analgesia    Post assessment: no apparent anesthetic complications    Last Vitals:   Visit Vitals  Breastfeeding? No       Post vital signs: stable    Level of consciousness: awake    Nausea/Vomiting: no nausea/no vomiting    Complications: none    Airway Patency: patent    Respiratory: unassisted, spontaneous ventilation    Cardiovascular: stable and blood pressure at baseline    Hydration: euvolemic

## 2019-05-23 NOTE — ANESTHESIA PREPROCEDURE EVALUATION
05/23/2019  Ching Bruce is a 68 y.o., female  Having stent replacement (q 6 months).  PMH - NET, HTN, thyroid supplement.    Pre-op Assessment    I have reviewed the Patient Summary Reports.     I have reviewed the Nursing Notes.   I have reviewed the Medications.     Review of Systems  Anesthesia Hx:  No problems with previous Anesthesia   Denies Personal Hx of Anesthesia complications.   Cardiovascular:   Hypertension    Hepatic/GI:   Liver Disease,        Physical Exam  General:  Well nourished    Airway/Jaw/Neck:  Airway Findings: Mouth Opening: Normal Tongue: Normal  General Airway Assessment: Adult  Mallampati: III  Improves to II with phonation.  TM Distance: Normal, at least 6 cm  Jaw/Neck Findings:  Neck ROM: Normal ROM      Dental:  Dental Findings: Upper Dentures   Chest/Lungs:  Chest/Lungs Findings: Clear to auscultation, Normal Respiratory Rate     Heart/Vascular:  Heart Findings: Rate: Normal  Rhythm: Regular Rhythm  Sounds: Normal        Mental Status:  Mental Status Findings:  Alert and Oriented         Anesthesia Plan  Type of Anesthesia, risks & benefits discussed:  Anesthesia Type:  general, MAC  Patient's Preference:   Intra-op Monitoring Plan:   Intra-op Monitoring Plan Comments:   Post Op Pain Control Plan:   Post Op Pain Control Plan Comments:   Induction:   IV  Beta Blocker:  Patient is not currently on a Beta-Blocker (No further documentation required).       Informed Consent: Patient understands risks and agrees with Anesthesia plan.  Questions answered.   ASA Score: 3     Day of Surgery Review of History & Physical: I have interviewed and examined the patient. I have reviewed the patient's H&P dated:            Ready For Surgery From Anesthesia Perspective.

## 2019-05-23 NOTE — TRANSFER OF CARE
Anesthesia Transfer of Care Note    Patient: Ching Bruce    Procedure(s) Performed: Procedure(s) (LRB):  ERCP (ENDOSCOPIC RETROGRADE CHOLANGIOPANCREATOGRAPHY), stent exchange (N/A)    Patient location: PACU    Anesthesia Type: general    Transport from OR: Transported from OR on 100% O2 by closed face mask with adequate spontaneous ventilation    Post pain: adequate analgesia    Post assessment: no apparent anesthetic complications    Post vital signs: stable    Level of consciousness: sedated    Nausea/Vomiting: no nausea/vomiting    Complications: none    Transfer of care protocol was followed      Last vitals:   Visit Vitals  Breastfeeding? No

## 2019-07-26 LAB
EXT 24 HR UR METANEPHRINE: ABNORMAL
EXT 24 HR UR NORMETANEPHRINE: ABNORMAL
EXT 24 HR UR NORMETANEPHRINE: ABNORMAL
EXT 25 HYDROXY VIT D2: ABNORMAL
EXT 25 HYDROXY VIT D3: ABNORMAL
EXT 5 HIAA 24 HR URINE: ABNORMAL
EXT 5 HIAA BLOOD: ABNORMAL
EXT ACTH: ABNORMAL
EXT AFP: ABNORMAL
EXT ALBUMIN: 4.3 G/DL (ref 3.6–5.1)
EXT ALKALINE PHOSPHATASE: 178 U/L (ref 33–130)
EXT ALT: 23 U/L (ref 6–29)
EXT AMYLASE: ABNORMAL
EXT ANTI ISLET CELL AB: ABNORMAL
EXT ANTI PARIETAL CELL AB: ABNORMAL
EXT ANTI THYROID AB: ABNORMAL
EXT AST: 18 U/L (ref 10–35)
EXT BILIRUBIN DIRECT: ABNORMAL MG/DL
EXT BILIRUBIN TOTAL: 0.8 MG/DL (ref 0.2–1.2)
EXT BK VIRUS DNA QN PCR: ABNORMAL
EXT BUN: 13 MG/DL (ref 7–25)
EXT C PEPTIDE: ABNORMAL
EXT CA 125: ABNORMAL
EXT CA 19-9: ABNORMAL
EXT CA 27-29: ABNORMAL
EXT CALCITONIN: ABNORMAL
EXT CALCIUM: 10.1 MG/DL (ref 8.6–10.4)
EXT CEA: ABNORMAL
EXT CHLORIDE: 102 MMOL/L (ref 98–110)
EXT CHOLESTEROL: ABNORMAL
EXT CHROMOGRANIN A: ABNORMAL
EXT CO2: 29 MMOL/L (ref 20–32)
EXT CREATININE UA: ABNORMAL
EXT CREATININE: 1.01 MG/DL (ref 0.5–0.99)
EXT CYCLOSPORONE LEVEL: ABNORMAL
EXT DOPAMINE: ABNORMAL
EXT EBV DNA BY PCR: ABNORMAL
EXT EPINEPHRINE: ABNORMAL
EXT FOLATE: ABNORMAL
EXT FREE T3: ABNORMAL
EXT FREE T4: ABNORMAL
EXT FSH: ABNORMAL
EXT GASTRIN RELEASING PEPTIDE: ABNORMAL
EXT GASTRIN RELEASING PEPTIDE: ABNORMAL
EXT GASTRIN: ABNORMAL
EXT GGT: 3 G/DL (ref 1.9–3.7)
EXT GHRELIN: ABNORMAL
EXT GLUCAGON: ABNORMAL
EXT GLUCOSE: 86 MG/DL (ref 65–99)
EXT GROWTH HORMONE: ABNORMAL
EXT HCV RNA QUANT PCR: ABNORMAL
EXT HDL: ABNORMAL
EXT HEMATOCRIT: 39.2 % (ref 35–45)
EXT HEMOGLOBIN A1C: ABNORMAL %
EXT HEMOGLOBIN: 13.4 G/DL (ref 11.7–15.5)
EXT HISTAMINE 24 HR URINE: ABNORMAL
EXT HISTAMINE: ABNORMAL
EXT IGF-1: ABNORMAL
EXT IMMUNKNOW (NON-STIMULATED): ABNORMAL
EXT IMMUNKNOW (STIMULATED): ABNORMAL
EXT INR: ABNORMAL
EXT INSULIN: ABNORMAL
EXT LANREOTIDE LEVEL: ABNORMAL
EXT LDH, TOTAL: ABNORMAL
EXT LDL CHOLESTEROL: ABNORMAL
EXT LIPASE: ABNORMAL
EXT MAGNESIUM: ABNORMAL
EXT METANEPHRINE FREE PLASMA: ABNORMAL
EXT MOTILIN: ABNORMAL
EXT NEUROKININ A CAMB: ABNORMAL
EXT NEUROKININ A ISI: ABNORMAL
EXT NEUROTENSIN: ABNORMAL
EXT NOREPINEPHRINE: ABNORMAL
EXT NORMETANEPHRINE: ABNORMAL
EXT NSE: ABNORMAL
EXT OCTREOTIDE LEVEL: ABNORMAL
EXT PANCREASTATIN CAMB: ABNORMAL
EXT PANCREASTATIN ISI: ABNORMAL
EXT PANCREATIC POLYPEPTIDE: ABNORMAL
EXT PHOSPHORUS: ABNORMAL
EXT PLATELETS: 161 1000/UL (ref 140–400)
EXT POTASSIUM: 4.8 MMOL/L (ref 3.5–5.3)
EXT PROGRAF LEVEL: ABNORMAL
EXT PROLACTIN: ABNORMAL
EXT PROTEIN TOTAL: 7.3 G/DL (ref 6.1–8.1)
EXT PROTEIN UA: ABNORMAL
EXT PT: ABNORMAL
EXT PTH, INTACT: ABNORMAL
EXT PTT: ABNORMAL
EXT RAPAMUNE LEVEL: ABNORMAL
EXT SEROTONIN: ABNORMAL
EXT SODIUM: 140 MMOL/L (ref 135–146)
EXT SOMATOSTATIN: ABNORMAL
EXT SUBSTANCE P: ABNORMAL
EXT TRIGLYCERIDES: ABNORMAL
EXT TRYPTASE: ABNORMAL
EXT TSH: ABNORMAL
EXT URIC ACID: ABNORMAL
EXT URINE AMYLASE U/HR: ABNORMAL
EXT URINE AMYLASE U/L: ABNORMAL
EXT VASOACTIVE INTESTINAL POLYPEPTIDE: ABNORMAL
EXT VITAMIN B12: ABNORMAL
EXT VMA 24 HR URINE: ABNORMAL
EXT WBC: 5 1000/UL (ref 3.8–10.8)
NEURON SPECIFIC ENOLASE: ABNORMAL

## 2019-08-09 ENCOUNTER — TELEPHONE (OUTPATIENT)
Dept: NEUROLOGY | Facility: HOSPITAL | Age: 69
End: 2019-08-09

## 2019-08-09 NOTE — TELEPHONE ENCOUNTER
----- Message from Meli Rivera sent at 8/8/2019  1:49 PM CDT -----  Contact: self [phone]  GI - Patient is calling to see how soon she can schedule her stent replacement surgery. Please call

## 2019-08-26 ENCOUNTER — TELEPHONE (OUTPATIENT)
Dept: NEUROLOGY | Facility: HOSPITAL | Age: 69
End: 2019-08-26

## 2019-08-26 NOTE — TELEPHONE ENCOUNTER
----- Message from Meli Rivera sent at 8/26/2019 11:55 AM CDT -----  Contact: self [phone]  RR - Patient is calling to get lab orders. Please call

## 2019-08-30 ENCOUNTER — TELEPHONE (OUTPATIENT)
Dept: NEUROLOGY | Facility: HOSPITAL | Age: 69
End: 2019-08-30

## 2019-08-30 NOTE — TELEPHONE ENCOUNTER
----- Message from Melanie Lindsay sent at 8/29/2019 10:50 AM CDT -----  Contact: Patient/ [phone]  ----Patient is requesting a callback in regards to scheduling an appointment for a stint replacement.    Please call.

## 2019-09-06 ENCOUNTER — TELEPHONE (OUTPATIENT)
Dept: NEUROLOGY | Facility: HOSPITAL | Age: 69
End: 2019-09-06

## 2019-09-06 NOTE — TELEPHONE ENCOUNTER
----- Message from Meli Rivera sent at 9/5/2019 10:26 AM CDT -----  Contact: self  [phone]  GI - Patient is calling to set her stint replacement appointment. Please call

## 2019-09-09 ENCOUNTER — TELEPHONE (OUTPATIENT)
Dept: NEUROLOGY | Facility: HOSPITAL | Age: 69
End: 2019-09-09

## 2019-09-09 NOTE — TELEPHONE ENCOUNTER
----- Message from Jake Lieberman MD sent at 2019  3:53 PM CDT -----  Contact: my chart  You may direct schedule for ERCP with me     ----- Message -----  From: Lovely Cruz LPN  Sent: 2019  11:06 AM  To: Jake Lieberman MD    Ok to direct schedule or do you want to see in clinic?  ----- Message -----  From: Connie Hager  Sent: 2019  11:57 AM  To: Mio Bruce Ching FISCHER, 68 yrs, None  MRN:   [id number]  ::   1950  Lang:   English  Last BMI:   None  Pt Fernando Status:   Fernando  Prim Cvg::   CIGNA/CIGNA OPEN ACCESS PLUS  Cvg Last Verified Date:   9/3/2019  Patient Types:   None  PCP:   Gaby Corea MD  Care Team:     Allergies:   Epinephrine (Reaction: Other (See Comments))  ,   Sulfa (Sulfonamide Antibiotics) (Reaction: Other (See Comments))  Patient Portal:   Active, Login > 90 days  FYI  None   More Detail >>  Appointment Request  Ching MAGANA Teo  Sent:  10:39 AM  To: [facility]     Ching Bruce  MRN: [id number] : 1950  Pt Home: [phone]    Entered: [phone]      Message     Appointment Request From: Ching Bruce    With Provider: Mio    Preferred Date Range: Any date 2019 or later    Preferred Times: Thursday Morning    Reason for visit: stent Replacement    Comments:  Need to schedule my stent replacement on this date if possible.

## 2019-09-16 ENCOUNTER — TELEPHONE (OUTPATIENT)
Dept: NEUROLOGY | Facility: HOSPITAL | Age: 69
End: 2019-09-16

## 2019-09-16 DIAGNOSIS — K83.1 BILIARY OBSTRUCTION: Primary | ICD-10-CM

## 2019-09-16 NOTE — TELEPHONE ENCOUNTER
Pt called to confirm ercp scheduled on my chart for 11/14/19.  ERCP scheduled with pt on Thursday, November 14, 2019.  Pt notified Endoscopy staff will contact with arrival time 2 days prior to procedure. Pt instructed nothing to eat after 7pm, clear liquids after.  Pt acknowledged understanding.

## 2019-09-22 LAB
ALBUMIN SERPL-MCNC: 3.9 G/DL (ref 3.6–5.1)
ALBUMIN/GLOB SERPL: 1.4 (CALC) (ref 1–2.5)
ALP SERPL-CCNC: 160 U/L (ref 33–130)
ALT SERPL-CCNC: 27 U/L (ref 6–29)
AST SERPL-CCNC: 22 U/L (ref 10–35)
BASOPHILS # BLD AUTO: 9 CELLS/UL (ref 0–200)
BASOPHILS NFR BLD AUTO: 0.2 %
BILIRUB SERPL-MCNC: 0.8 MG/DL (ref 0.2–1.2)
BUN SERPL-MCNC: 16 MG/DL (ref 7–25)
BUN/CREAT SERPL: 15 (CALC) (ref 6–22)
CALCIUM SERPL-MCNC: 9.4 MG/DL (ref 8.6–10.4)
CHLORIDE SERPL-SCNC: 105 MMOL/L (ref 98–110)
CO2 SERPL-SCNC: 27 MMOL/L (ref 20–32)
CREAT SERPL-MCNC: 1.06 MG/DL (ref 0.5–0.99)
EOSINOPHIL # BLD AUTO: 52 CELLS/UL (ref 15–500)
EOSINOPHIL NFR BLD AUTO: 1.1 %
ERYTHROCYTE [DISTWIDTH] IN BLOOD BY AUTOMATED COUNT: 12.5 % (ref 11–15)
GFRSERPLBLD MDRD-ARVRAT: 54 ML/MIN/1.73M2
GLOBULIN SER CALC-MCNC: 2.7 G/DL (CALC) (ref 1.9–3.7)
GLUCOSE SERPL-MCNC: 86 MG/DL (ref 65–99)
HCT VFR BLD AUTO: 38.1 % (ref 35–45)
HGB BLD-MCNC: 12.6 G/DL (ref 11.7–15.5)
LYMPHOCYTES # BLD AUTO: 1039 CELLS/UL (ref 850–3900)
LYMPHOCYTES NFR BLD AUTO: 22.1 %
MCH RBC QN AUTO: 31.9 PG (ref 27–33)
MCHC RBC AUTO-ENTMCNC: 33.1 G/DL (ref 32–36)
MCV RBC AUTO: 96.5 FL (ref 80–100)
MONOCYTES # BLD AUTO: 766 CELLS/UL (ref 200–950)
MONOCYTES NFR BLD AUTO: 16.3 %
NEUTROPHILS # BLD AUTO: 2834 CELLS/UL (ref 1500–7800)
NEUTROPHILS NFR BLD AUTO: 60.3 %
PLATELET # BLD AUTO: 152 THOUSAND/UL (ref 140–400)
PMV BLD REES-ECKER: 12 FL (ref 7.5–12.5)
POTASSIUM SERPL-SCNC: 4.3 MMOL/L (ref 3.5–5.3)
PROT SERPL-MCNC: 6.6 G/DL (ref 6.1–8.1)
RBC # BLD AUTO: 3.95 MILLION/UL (ref 3.8–5.1)
SODIUM SERPL-SCNC: 140 MMOL/L (ref 135–146)
WBC # BLD AUTO: 4.7 THOUSAND/UL (ref 3.8–10.8)

## 2019-09-30 ENCOUNTER — HOSPITAL ENCOUNTER (OUTPATIENT)
Dept: RADIOLOGY | Facility: HOSPITAL | Age: 69
Discharge: HOME | End: 2019-09-30
Attending: INTERNAL MEDICINE
Payer: COMMERCIAL

## 2019-09-30 DIAGNOSIS — C7B.8 SECONDARY NEUROENDOCRINE TUMOR OF LIVER: ICD-10-CM

## 2019-09-30 DIAGNOSIS — D3A.8 PRIMARY PANCREATIC NEUROENDOCRINE TUMOR: ICD-10-CM

## 2019-09-30 PROCEDURE — 74183 MRI ABD W/O CNTR FLWD CNTR: CPT | Mod: 26,,, | Performed by: RADIOLOGY

## 2019-09-30 PROCEDURE — 74183 MRI ABD W/O CNTR FLWD CNTR: CPT | Mod: TC

## 2019-09-30 PROCEDURE — A9585 GADOBUTROL INJECTION: HCPCS | Performed by: INTERNAL MEDICINE

## 2019-09-30 PROCEDURE — 74183 MRI ABDOMEN W WO CONTRAST: ICD-10-PCS | Mod: 26,,, | Performed by: RADIOLOGY

## 2019-09-30 PROCEDURE — 25500020 PHARM REV CODE 255: Performed by: INTERNAL MEDICINE

## 2019-09-30 RX ORDER — GADOBUTROL 604.72 MG/ML
10 INJECTION INTRAVENOUS
Status: COMPLETED | OUTPATIENT
Start: 2019-09-30 | End: 2019-09-30

## 2019-09-30 RX ADMIN — GADOBUTROL 10 ML: 604.72 INJECTION INTRAVENOUS at 12:09

## 2019-10-01 ENCOUNTER — OFFICE VISIT (OUTPATIENT)
Dept: NEUROLOGY | Facility: HOSPITAL | Age: 69
End: 2019-10-01
Attending: INTERNAL MEDICINE
Payer: COMMERCIAL

## 2019-10-01 VITALS
TEMPERATURE: 99 F | RESPIRATION RATE: 16 BRPM | SYSTOLIC BLOOD PRESSURE: 117 MMHG | DIASTOLIC BLOOD PRESSURE: 71 MMHG | HEIGHT: 67 IN | HEART RATE: 71 BPM | BODY MASS INDEX: 25.35 KG/M2 | WEIGHT: 161.5 LBS | OXYGEN SATURATION: 99 %

## 2019-10-01 DIAGNOSIS — C7B.8 SECONDARY NEUROENDOCRINE TUMOR OF LIVER: ICD-10-CM

## 2019-10-01 DIAGNOSIS — D3A.8 PRIMARY PANCREATIC NEUROENDOCRINE TUMOR: Primary | ICD-10-CM

## 2019-10-01 PROCEDURE — 99214 OFFICE O/P EST MOD 30 MIN: CPT | Performed by: INTERNAL MEDICINE

## 2019-10-01 PROCEDURE — 3074F PR MOST RECENT SYSTOLIC BLOOD PRESSURE < 130 MM HG: ICD-10-PCS | Mod: CPTII,,, | Performed by: INTERNAL MEDICINE

## 2019-10-01 PROCEDURE — 99214 PR OFFICE/OUTPT VISIT, EST, LEVL IV, 30-39 MIN: ICD-10-PCS | Mod: ,,, | Performed by: INTERNAL MEDICINE

## 2019-10-01 PROCEDURE — 99214 OFFICE O/P EST MOD 30 MIN: CPT | Mod: ,,, | Performed by: INTERNAL MEDICINE

## 2019-10-01 PROCEDURE — 3078F PR MOST RECENT DIASTOLIC BLOOD PRESSURE < 80 MM HG: ICD-10-PCS | Mod: CPTII,,, | Performed by: INTERNAL MEDICINE

## 2019-10-01 PROCEDURE — 1101F PR PT FALLS ASSESS DOC 0-1 FALLS W/OUT INJ PAST YR: ICD-10-PCS | Mod: CPTII,,, | Performed by: INTERNAL MEDICINE

## 2019-10-01 PROCEDURE — 3074F SYST BP LT 130 MM HG: CPT | Mod: CPTII,,, | Performed by: INTERNAL MEDICINE

## 2019-10-01 PROCEDURE — 3078F DIAST BP <80 MM HG: CPT | Mod: CPTII,,, | Performed by: INTERNAL MEDICINE

## 2019-10-01 PROCEDURE — 1101F PT FALLS ASSESS-DOCD LE1/YR: CPT | Mod: CPTII,,, | Performed by: INTERNAL MEDICINE

## 2019-10-01 NOTE — PROGRESS NOTES
NOLAN[facility]:  [facility]  A collaboration between [facility] and [facility]    PATIENT: Ching Bruce  MRN: [id number]  DATE: 10/1/2019      Diagnosis:   1. Primary pancreatic neuroendocrine tumor    2. Secondary neuroendocrine tumor of liver        Chief Complaint: Carcinoid Tumor (pancreatic)      Oncologic History:    Oncologic History Pancreatic neuroendocrine tumor with metasatic disease to liver diagnosed 12/12    Oncologic Treatment Capecitabine/Temozolomide (CAPTEM) 1/13 -11/2018     Pathology Ki-67 1%        Subjective:    Interval History: Ms. Bruce is a 68 y.o. female who returns for follow up for her pancreatic neuroendocrine tumor.  She states that she has been feeling well. She is excited about her long term after 22 years at her job next month.  She has no new complaints.    ONCOLOGIC HISTORY:   A 68 y.o. year-old -American female who I had initially   seen on 12/18/2012. Her history dates back to September 2012 when she was noted  to have several weeks of feeling fatigued. She sought treatment with her   primary care doctor who did a CBC and found her to be severely anemic. She was   seen at [facility] and transfused 3 units of packed red blood cells  and no source of bleeding had been found. Records at that time had shown   hemoglobin of 5. She sought followup in September 2012 with Dr. Guillen who   performed an EGD and colonoscopy with an EGD showing an ulcerated and fungating   nonbleeding 2 cm mass, malignant in appearance. It did cause partial   obstructions. Biopsies were taken, which showed no evidence of malignancy at   that time. She had a CT of the abdomen and pelvis showing multiple metastatic   lesions and the liver biopsy was performed on 10/17/2012 showed pancreatic   neuroendocrine neoplasm in the left lobe of the liver aspirate. She went on to   have an ERCP along  with sphincterotomy and biliary stent placement and   subsequent PET had shown numerous hypodense lesions in the liver. Octreotide   scan was performed, which showed multiple right and left hepatic metastases.   MRI of the abdomen was performed on 12/05/2012 showing innumerable lesions that   demonstrated peripheral to hyperintensity consistent with hypervascular   metastasis. She was seen by Dr. Humphrey, who thought she was not a candidate   for surgical resection during that time. Her pathology from her tumor came back  positive for chromogranin, synaptophysin and had a Ki-67 of less than 1%. She   was started on treatment with temozolomide and capecitabine with cycle   1 being on 01/22/2013.    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    Secondary malignant neoplasm of liver     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) 5/7/2013    Thrombocytopenia 11/12/2013    Thyroid disease     Unspecified essential hypertension 11/12/2013       Past Surgical HIstory:   Past Surgical History:   Procedure Laterality Date    ERCP N/A 6/21/2018    Procedure: ERCP, stent exchange;  Surgeon: Jake Lieberman MD;  Location: [facility] ENDO;  Service: Endoscopy;  Laterality: N/A;    ERCP N/A 5/23/2019    Procedure: ERCP (ENDOSCOPIC RETROGRADE CHOLANGIOPANCREATOGRAPHY), stent exchange;  Surgeon: Jake Lieberman MD;  Location: [facility] ENDO;  Service: Endoscopy;  Laterality: N/A;    THYROIDECTOMY  2011       Family History:   Family History   Problem Relation Age of Onset    Cancer  Maternal Grandmother     Diabetes Father     Breast cancer Neg Hx     Colon cancer Neg Hx     Ovarian cancer Neg Hx        Social History:  reports that she has never smoked. She has never used smokeless tobacco. She reports that she does not drink alcohol or use drugs.    Allergies:  Review of patient's allergies indicates:   Allergen Reactions    Epinephrine Other (See Comments)     Carcinoid patient    Sulfa (sulfonamide antibiotics) Other (See Comments)     Urticaria       Medications:  Current Outpatient Medications   Medication Sig Dispense Refill    alendronate (FOSAMAX) 70 MG tablet Take 70 mg by mouth every 7 days.   3    capecitabine (XELODA) 500 MG Tab Take 2 tablets (1,000 mg total) by mouth 2 (two) times daily. 56 tablet 6    ferrous gluconate (FERGON) 324 MG tablet Take 324 mg by mouth.      indapamide (LOZOL) 2.5 MG Tab 2.5 mg once daily.       irbesartan (AVAPRO) 300 MG tablet 300 mg once daily.       levothyroxine (SYNTHROID) 150 MCG tablet Take 150 mcg by mouth once daily.  0    temozolomide (TEMODAR) 250 MG capsule Take 1 capsule (250 mg total) by mouth once daily. 5 capsule 4    verapamil (CALAN-SR) 180 MG CR tablet Take 360 mg by mouth once daily.  1     No current facility-administered medications for this visit.        Review of Systems   Constitutional: Negative for chills, fever and unexpected weight change.   HENT: Negative for congestion, hearing loss and nosebleeds.    Eyes: Negative for visual disturbance.   Respiratory: Negative for cough and shortness of breath.    Cardiovascular: Negative for chest pain and palpitations.   Gastrointestinal: Negative for abdominal pain, blood in stool, constipation, diarrhea, nausea and vomiting.   Genitourinary: Negative for dysuria.   Musculoskeletal: Negative for back pain and gait problem.   Skin: Negative for color change and rash.   Neurological: Negative for dizziness, weakness and headaches.   Hematological: Negative for  "adenopathy. Does not bruise/bleed easily.   Psychiatric/Behavioral: Negative for confusion.       ECOG Performance Status: 0   Objective:      Vitals:   Vitals:    10/01/19 1619   BP: 117/71   Pulse: 71   Resp: 16   Temp: 99 °F (37.2 °C)   SpO2: 99%   Weight: 73.2 kg (161 lb 7.8 oz)   Height: 5' 7" (1.702 m)     BMI: Body mass index is 25.29 kg/m².    Physical Exam   Constitutional: She is oriented to person, place, and time. She appears well-developed and well-nourished. No distress.   HENT:   Head: Normocephalic.   Mouth/Throat: No oropharyngeal exudate.   Eyes: EOM are normal. No scleral icterus.   Neck: Neck supple. No tracheal deviation present. No thyromegaly present.   Cardiovascular: Normal rate and regular rhythm.    Pulmonary/Chest: Effort normal and breath sounds normal. No respiratory distress. She has no wheezes. She has no rales.   Abdominal: Soft. She exhibits no distension and no mass. There is no tenderness. There is no rebound and no guarding.   Musculoskeletal: Normal range of motion. She exhibits no edema.   Lymphadenopathy:     She has no cervical adenopathy.   Neurological: She is alert and oriented to person, place, and time. No cranial nerve deficit.   Skin: Skin is warm and dry.   Psychiatric: She has a normal mood and affect.       Laboratory Data:  Abstract on 12/06/2016   Component Date Value Ref Range Status    EXT WBC 12/03/2016 4.1  3.8 - 10.8 1000/ul Final    EXT Hemoglobin 12/03/2016 12.9  11.7 - 15.5 g/dl Final    EXT Hematocrit 12/03/2016 38.5  35.0 - 45.0 % Final    EXT Platelets 12/03/2016 138* 140 - 400 1000/ul Final    EXT Glucose 12/03/2016 88  65 - 99 mg/dl Final    EXT BUN 12/03/2016 13  7 - 25 mg/dl Final    EXT Creatinine 12/03/2016 0.90  0.50 - 0.99 mg/dl Final    EXT Sodium 12/03/2016 141  135 - 146 mmol/l Final    EXT Potassium 12/03/2016 4.2  3.5 - 5.3 mmol/l Final    EXT Chloride 12/03/2016 105  98 - 110 mmol/l Final    EXT CO2 12/03/2016 29  20 - 31 mmol/l " Final    EXT Calcium 12/03/2016 9.7  8.6 - 10.4 mg/dl Final    EXT Protein total 12/03/2016 6.9  6.1 - 8.1 g/dl Final    EXT Albumin 12/03/2016 4.0  3.6 - 5.1 g/dl Final    EXT BilirubiN Total 12/03/2016 1.1  0.2 - 1.2 mg/dl Final    EXT Alkaline Phosphatase 12/03/2016 181* 33 - 130 u/l Final    EXT AST 12/03/2016 21  10 - 35 u/l Final    EXT ALT 12/03/2016 28  6 - 29 u/l Final   Office Visit on 11/14/2016   Component Date Value Ref Range Status    Blood Stool 11/14/2016 Negative  Negative Final     Acceptable 11/14/2016 Yes   Final    SOURCE: 11/14/2016 Cervical   Final    Slides: 11/14/2016 1   Final    LMP: 11/14/2016 NA   Final    Specimen adequacy: 11/14/2016 (NOTE)   Final    Comment:      Satisfactory for evaluation.          Endocervical cells absent.  Metaplastic cells indicative       of transformation zone cannot be reliably distinguished from       parabasal or atrophic cells.                      Interpretation 11/14/2016 NO EPITHELIAL ABNORMALITY SEE BELOW   Final    Comment: ----------------------------------------------------------------------       *NEGATIVE FOR INTRAEPITHELIAL LESION OR MALIGNANCY   ----------------------------------------------------------------------         Comments: 11/14/2016 (NOTE)   Final    Comment: Due to technical or specimen issues, imaging could not be  performed. A cytotechnologist has manually screened this slide.         Cytotechnologist: 11/14/2016 BRENDON Ca(ASCP)IAC   Final    LOCATION 11/14/2016 (NOTE)   Final    Comment: Specimens processed and interpreted at :[facility], [address] [place] [address], Phone: [phone], CLIA: [id number]      CPT Codes: 11/14/2016 (NOTE)   Final    Comment: [id number]        UNLESS OTHERWISE INDICATED, COMPUTER AIDED AND CYTOTECHNOLOGIST     SCREENING PERFORMED.          The Pap test is a screening test with an inherent, but low       probability of error.  Your  patient should be reminded to       consult you immediately if she experiences any suspicious       signs or symptoms, regardless of her Pap test result.       An alternate report format containing images or consolidated       prior Pap history is available as applicable.         HPV HIGH RISK IF ASC-US, [facility] 11/14/2016 CRITERIA NOT MET   Final    Comment:       UNLESS OTHERWISE INDICATED, ALL TESTING PERFORMED AT  [facility]  [address] [place] [address]            :  CARLOS MENDEZ M.D.        IA NUMBER [id number]  [place]EDIT[place]. [id number]                 Imaging:   MRI 09/30/2019    COMPARISON:  MRI abdomen 03/12/2019, gallium 68 PET-CT scan 11/30/2018    FINDINGS:  Visualized lower chest is normal.    Innumerable arterially enhancing lesions throughout both lobes of the liver, mostly on the right.  Index lesions measure 2.2 cm in hepatic segment 8 on series 11, image 32, previously 2.1 cm, and 1.5 cm in hepatic segment 2/3 on series 11, image 65, previously 1.5 cm.  Nodular liver contour likely reflects metastatic disease, however, underlying cirrhosis could give a similar appearance.  Minimally intrahepatic bile ducts.  A common bile duct stent is in place.  The gallbladder is normal.  The spleen, pancreas, and adrenal glands are normal.  Subcentimeter T2 hyperintense foci in the right kidney are too small to definitively characterize.  1.5 cm T2 hyperintense focus in the upper pole of the left kidney may represent a cyst.  The renal pelvises are prominent, but no carmenza hydronephrosis.  Included loops of bowel are normal.    No mesenteric or retroperitoneal lymphadenopathy.  The aorta tapers normally.  The main portal vein is patent.  No free fluid.    No destructive osseous lesions.  Soft tissues are normal.      Impression       1. Innumerable hepatic metastatic lesions are similar to prior.  2. Unchanged mild intrahepatic biliary ductal  dilation.  A common bile duct stent remains in place.  3. Bilateral renal pelviectasis, nonspecific.  RECIST SUMMARY:    Date of prior examination for comparison: 03/21/2019    Lesion 1: Hepatic segment 8.  2.2 cm. Series 11, image 32.  Prior measurement 2.1 cm.    Lesion 2: Hepatic segment 2/3.  1.5 cm. Series 11, image 65.  Prior measurement 1.5 cm.              Assessment:       1. Primary pancreatic neuroendocrine tumor    2. Secondary neuroendocrine tumor of liver           Plan:   Ms. Bruce continues to do well from an oncologic standpoint and is asymptomatic from her disease.  Review of her MRI shows no detrimental changes. Will plan to continue surveillance and repeat an MRI in another 6 months and plan to see back at that time.  In the interim she is planning on having a stent exchanged next month.  She is to report any new symptoms.  All questions were answered and she is agreeable with this plan.    Cory Rodriguez DO, FACP  Hematology & Oncology, [facility]  [address], [address]  [facility] [place]  [address]  ph. [phone]; [phone]  fax. [phone]    25 minutes were spent in coordination of patient's care, record review and counseling.  More than 50% of the time was face-to-face.

## 2019-11-05 LAB
ALBUMIN SERPL-MCNC: 3.9 G/DL (ref 3.6–5.1)
ALBUMIN/GLOB SERPL: 1.2 (CALC) (ref 1–2.5)
ALP SERPL-CCNC: 195 U/L (ref 33–130)
ALT SERPL-CCNC: 30 U/L (ref 6–29)
AST SERPL-CCNC: 26 U/L (ref 10–35)
BASOPHILS # BLD AUTO: 9 CELLS/UL (ref 0–200)
BASOPHILS NFR BLD AUTO: 0.2 %
BILIRUB SERPL-MCNC: 1 MG/DL (ref 0.2–1.2)
BUN SERPL-MCNC: 13 MG/DL (ref 7–25)
BUN/CREAT SERPL: 12 (CALC) (ref 6–22)
CALCIUM SERPL-MCNC: 9.6 MG/DL (ref 8.6–10.4)
CHLORIDE SERPL-SCNC: 102 MMOL/L (ref 98–110)
CO2 SERPL-SCNC: 26 MMOL/L (ref 20–32)
CREAT SERPL-MCNC: 1.1 MG/DL (ref 0.5–0.99)
EOSINOPHIL # BLD AUTO: 22 CELLS/UL (ref 15–500)
EOSINOPHIL NFR BLD AUTO: 0.5 %
ERYTHROCYTE [DISTWIDTH] IN BLOOD BY AUTOMATED COUNT: 12.3 % (ref 11–15)
GFRSERPLBLD MDRD-ARVRAT: 52 ML/MIN/1.73M2
GLOBULIN SER CALC-MCNC: 3.2 G/DL (CALC) (ref 1.9–3.7)
GLUCOSE SERPL-MCNC: 85 MG/DL (ref 65–99)
HCT VFR BLD AUTO: 36.7 % (ref 35–45)
HGB BLD-MCNC: 12.3 G/DL (ref 11.7–15.5)
LYMPHOCYTES # BLD AUTO: 1184 CELLS/UL (ref 850–3900)
LYMPHOCYTES NFR BLD AUTO: 26.9 %
MCH RBC QN AUTO: 32.2 PG (ref 27–33)
MCHC RBC AUTO-ENTMCNC: 33.5 G/DL (ref 32–36)
MCV RBC AUTO: 96.1 FL (ref 80–100)
MONOCYTES # BLD AUTO: 972 CELLS/UL (ref 200–950)
MONOCYTES NFR BLD AUTO: 22.1 %
NEUTROPHILS # BLD AUTO: 2213 CELLS/UL (ref 1500–7800)
NEUTROPHILS NFR BLD AUTO: 50.3 %
PLATELET # BLD AUTO: 161 THOUSAND/UL (ref 140–400)
PMV BLD REES-ECKER: 11.4 FL (ref 7.5–12.5)
POTASSIUM SERPL-SCNC: 4.4 MMOL/L (ref 3.5–5.3)
PROT SERPL-MCNC: 7.1 G/DL (ref 6.1–8.1)
RBC # BLD AUTO: 3.82 MILLION/UL (ref 3.8–5.1)
SODIUM SERPL-SCNC: 137 MMOL/L (ref 135–146)
WBC # BLD AUTO: 4.4 THOUSAND/UL (ref 3.8–10.8)

## 2019-11-12 ENCOUNTER — TELEPHONE (OUTPATIENT)
Dept: ENDOSCOPY | Facility: HOSPITAL | Age: 69
End: 2019-11-12

## 2019-11-12 NOTE — TELEPHONE ENCOUNTER
Spoke with patient about arrival time @0800  NPO status reviewed: Patient must have nothing to eat after midnight.  Pt may have CLEAR liquids ONLY until completely NPO 3 hrs @ 0500  Medications: Do not take Insulin or oral diabetic medications the day of the procedure.  Take as prescribed: heart, seizure and blood pressure medication in the morning with a sip of water (less than an ounce).  Take any breathing medications and bring inhalers to hospital with you Leave all valuables and jewelry at home.     Wear comfortable clothes to procedure to change into hospital gown You cannot drive for 24 hours after your procedure because you will receive sedation for your procedure to make you comfortable.  A ride must be provided at discharge.

## 2019-11-14 ENCOUNTER — HOSPITAL ENCOUNTER (OUTPATIENT)
Facility: HOSPITAL | Age: 69
Discharge: HOME | End: 2019-11-14
Attending: INTERNAL MEDICINE | Admitting: INTERNAL MEDICINE
Payer: COMMERCIAL

## 2019-11-14 ENCOUNTER — ANESTHESIA EVENT (OUTPATIENT)
Dept: ENDOSCOPY | Facility: HOSPITAL | Age: 69
End: 2019-11-14
Payer: COMMERCIAL

## 2019-11-14 ENCOUNTER — ANESTHESIA (OUTPATIENT)
Dept: ENDOSCOPY | Facility: HOSPITAL | Age: 69
End: 2019-11-14
Payer: COMMERCIAL

## 2019-11-14 VITALS
SYSTOLIC BLOOD PRESSURE: 114 MMHG | HEART RATE: 55 BPM | HEIGHT: 67 IN | DIASTOLIC BLOOD PRESSURE: 57 MMHG | BODY MASS INDEX: 25.11 KG/M2 | OXYGEN SATURATION: 99 % | TEMPERATURE: 98 F | WEIGHT: 160 LBS | RESPIRATION RATE: 20 BRPM

## 2019-11-14 DIAGNOSIS — K83.1 BILIARY OBSTRUCTION: ICD-10-CM

## 2019-11-14 DIAGNOSIS — K80.51 CALCULUS OF BILE DUCT WITHOUT CHOLECYSTITIS WITH OBSTRUCTION: ICD-10-CM

## 2019-11-14 DIAGNOSIS — D3A.00 CARCINOID TUMOR: ICD-10-CM

## 2019-11-14 PROCEDURE — 25000003 PHARM REV CODE 250: Performed by: NURSE ANESTHETIST, CERTIFIED REGISTERED

## 2019-11-14 PROCEDURE — 27202125 HC BALLOON, EXTRACTION (ANY): Performed by: INTERNAL MEDICINE

## 2019-11-14 PROCEDURE — 43264 ERCP REMOVE DUCT CALCULI: CPT | Performed by: INTERNAL MEDICINE

## 2019-11-14 PROCEDURE — 63600175 PHARM REV CODE 636 W HCPCS: Performed by: NURSE ANESTHETIST, CERTIFIED REGISTERED

## 2019-11-14 PROCEDURE — C1769 GUIDE WIRE: HCPCS | Performed by: INTERNAL MEDICINE

## 2019-11-14 PROCEDURE — 43276 ERCP STENT EXCHANGE W/DILATE: CPT | Performed by: INTERNAL MEDICINE

## 2019-11-14 PROCEDURE — 25000003 PHARM REV CODE 250: Performed by: ANESTHESIOLOGY

## 2019-11-14 PROCEDURE — 37000008 HC ANESTHESIA 1ST 15 MINUTES: Performed by: INTERNAL MEDICINE

## 2019-11-14 PROCEDURE — 37000009 HC ANESTHESIA EA ADD 15 MINS: Performed by: INTERNAL MEDICINE

## 2019-11-14 PROCEDURE — 27201089 HC SNARE, DISP (ANY): Performed by: INTERNAL MEDICINE

## 2019-11-14 PROCEDURE — C1874 STENT, COATED/COV W/DEL SYS: HCPCS | Performed by: INTERNAL MEDICINE

## 2019-11-14 PROCEDURE — 25500020 PHARM REV CODE 255: Performed by: INTERNAL MEDICINE

## 2019-11-14 PROCEDURE — 63600175 PHARM REV CODE 636 W HCPCS: Performed by: INTERNAL MEDICINE

## 2019-11-14 RX ORDER — SCOLOPAMINE TRANSDERMAL SYSTEM 1 MG/1
1 PATCH, EXTENDED RELEASE TRANSDERMAL ONCE
Status: DISCONTINUED | OUTPATIENT
Start: 2019-11-14 | End: 2019-11-14 | Stop reason: HOSPADM

## 2019-11-14 RX ORDER — MIDAZOLAM HYDROCHLORIDE 1 MG/ML
INJECTION INTRAMUSCULAR; INTRAVENOUS
Status: DISCONTINUED | OUTPATIENT
Start: 2019-11-14 | End: 2019-11-14

## 2019-11-14 RX ORDER — FENTANYL CITRATE 50 UG/ML
INJECTION, SOLUTION INTRAMUSCULAR; INTRAVENOUS
Status: DISCONTINUED | OUTPATIENT
Start: 2019-11-14 | End: 2019-11-14

## 2019-11-14 RX ORDER — ONDANSETRON 8 MG/1
8 TABLET, ORALLY DISINTEGRATING ORAL ONCE
Status: COMPLETED | OUTPATIENT
Start: 2019-11-14 | End: 2019-11-14

## 2019-11-14 RX ORDER — PROPOFOL 10 MG/ML
VIAL (ML) INTRAVENOUS
Status: DISCONTINUED | OUTPATIENT
Start: 2019-11-14 | End: 2019-11-14

## 2019-11-14 RX ORDER — ONDANSETRON HYDROCHLORIDE 2 MG/ML
INJECTION, SOLUTION INTRAMUSCULAR; INTRAVENOUS
Status: DISCONTINUED | OUTPATIENT
Start: 2019-11-14 | End: 2019-11-14

## 2019-11-14 RX ORDER — SODIUM CHLORIDE 0.9 % (FLUSH) 0.9 %
10 SYRINGE (ML) INJECTION
Status: DISCONTINUED | OUTPATIENT
Start: 2019-11-14 | End: 2019-11-14 | Stop reason: HOSPADM

## 2019-11-14 RX ORDER — GLYCOPYRROLATE 0.2 MG/ML
INJECTION INTRAMUSCULAR; INTRAVENOUS
Status: DISCONTINUED | OUTPATIENT
Start: 2019-11-14 | End: 2019-11-14

## 2019-11-14 RX ORDER — PHENYLEPHRINE HYDROCHLORIDE 10 MG/ML
INJECTION INTRAVENOUS
Status: DISCONTINUED | OUTPATIENT
Start: 2019-11-14 | End: 2019-11-14

## 2019-11-14 RX ORDER — SODIUM CHLORIDE 9 MG/ML
INJECTION, SOLUTION INTRAVENOUS CONTINUOUS
Status: DISCONTINUED | OUTPATIENT
Start: 2019-11-14 | End: 2019-11-14 | Stop reason: HOSPADM

## 2019-11-14 RX ORDER — ROCURONIUM BROMIDE 10 MG/ML
INJECTION, SOLUTION INTRAVENOUS
Status: DISCONTINUED | OUTPATIENT
Start: 2019-11-14 | End: 2019-11-14

## 2019-11-14 RX ORDER — LIDOCAINE HCL/PF 100 MG/5ML
SYRINGE (ML) INTRAVENOUS
Status: DISCONTINUED | OUTPATIENT
Start: 2019-11-14 | End: 2019-11-14

## 2019-11-14 RX ORDER — SUCCINYLCHOLINE CHLORIDE 20 MG/ML
INJECTION INTRAMUSCULAR; INTRAVENOUS
Status: DISCONTINUED | OUTPATIENT
Start: 2019-11-14 | End: 2019-11-14

## 2019-11-14 RX ADMIN — ROCURONIUM BROMIDE 5 MG: 10 INJECTION, SOLUTION INTRAVENOUS at 09:11

## 2019-11-14 RX ADMIN — PROPOFOL 150 MG: 10 INJECTION, EMULSION INTRAVENOUS at 09:11

## 2019-11-14 RX ADMIN — ONDANSETRON 8 MG: 2 INJECTION, SOLUTION INTRAMUSCULAR; INTRAVENOUS at 09:11

## 2019-11-14 RX ADMIN — SUCCINYLCHOLINE CHLORIDE 100 MG: 20 INJECTION, SOLUTION INTRAMUSCULAR; INTRAVENOUS at 09:11

## 2019-11-14 RX ADMIN — PHENYLEPHRINE HYDROCHLORIDE 100 MCG: 10 INJECTION INTRAVENOUS at 09:11

## 2019-11-14 RX ADMIN — GLYCOPYRROLATE 0.2 MG: 0.2 INJECTION, SOLUTION INTRAMUSCULAR; INTRAVENOUS at 09:11

## 2019-11-14 RX ADMIN — SCOPALAMINE 1 PATCH: 1 PATCH, EXTENDED RELEASE TRANSDERMAL at 08:11

## 2019-11-14 RX ADMIN — IOHEXOL 2 ML: 300 INJECTION, SOLUTION INTRAVENOUS at 09:11

## 2019-11-14 RX ADMIN — FENTANYL CITRATE 100 MCG: 50 INJECTION, SOLUTION INTRAMUSCULAR; INTRAVENOUS at 09:11

## 2019-11-14 RX ADMIN — ONDANSETRON 8 MG: 8 TABLET, ORALLY DISINTEGRATING ORAL at 11:11

## 2019-11-14 RX ADMIN — SODIUM CHLORIDE: 0.9 INJECTION, SOLUTION INTRAVENOUS at 08:11

## 2019-11-14 RX ADMIN — MIDAZOLAM HYDROCHLORIDE 2 MG: 1 INJECTION, SOLUTION INTRAMUSCULAR; INTRAVENOUS at 09:11

## 2019-11-14 RX ADMIN — LIDOCAINE HYDROCHLORIDE 70 MG: 20 INJECTION, SOLUTION INTRAVENOUS at 09:11

## 2019-11-14 NOTE — [employer] PATIENT INSTRUCTIONS
Discharge Summary/Instructions after an Endoscopic Procedure  Patient Name: Ching Bruce  Patient MRN: [id number]  Patient YOB: 1950 Wednesday, November 13, 2019  Jake Lieberman MD  RESTRICTIONS:  During your procedure today, you received medications for sedation.  These   medications may affect your judgment, balance and coordination.  Therefore,   for 24 hours, you have the following restrictions:   - DO NOT drive a car, operate machinery, make legal/financial decisions,   sign important papers or drink alcohol.    ACTIVITY:  Today: no heavy lifting, straining or running due to procedural   sedation/anesthesia.  The following day: return to full activity including work.  DIET:  Eat and drink normally unless instructed otherwise.     TREATMENT FOR COMMON SIDE EFFECTS:  - Mild abdominal pain, nausea, belching, bloating or excessive gas:  rest,   eat lightly and use a heating pad.  - Sore Throat: treat with throat lozenges and/or gargle with warm salt   water.  - Because air was used during the procedure, expelling large amounts of air   from your rectum or belching is normal.  - If a bowel prep was taken, you may not have a bowel movement for 1-3 days.    This is normal.  SYMPTOMS TO WATCH FOR AND REPORT TO YOUR PHYSICIAN:  1. Abdominal pain or bloating, other than gas cramps.  2. Chest pain.  3. Back pain.  4. Signs of infection such as: chills or fever occurring within 24 hours   after the procedure.  5. Rectal bleeding, which would show as bright red, maroon, or black stools.   (A tablespoon of blood from the rectum is not serious, especially if   hemorrhoids are present.)  6. Vomiting.  7. Weakness or dizziness.  GO DIRECTLY TO THE NEAREST EMERGENCY ROOM IF YOU HAVE ANY OF THE FOLLOWING:      Difficulty breathing              Chills and/or fever over 101 F   Persistent vomiting and/or vomiting blood   Severe abdominal pain   Severe chest pain   Black, tarry stools   Bleeding- more than one  tablespoon   Any other symptom or condition that you feel may need urgent attention  Your doctor recommends these additional instructions:  If any biopsies were taken, your doctors clinic will contact you in 1 to 2   weeks with any results.  - Discharge patient to home.   - Discharge to home  Return for stent exchange in 6-9 months depending on labs and clinical   course.   Condition stable   Resume previous diet   The signs and symptoms of potential delayed complications were discussed   with the patient. If signs or symptoms of these complications develop, call   the [facility] at [phone].   Return to normal activities tomorrow.  Written discharge instructions were   provided to the patient.  For questions, problems or results please call your physician - Jake Lieberman MD at Work:  [phone].  EMERGENCY PHONE NUMBER: [phone],  LAB RESULTS: [phone]  IF A COMPLICATION OR EMERGENCY SITUATION ARISES AND YOU ARE UNABLE TO REACH   YOUR PHYSICIAN - GO DIRECTLY TO THE EMERGENCY ROOM.  MD Jake Crowell MD  11/14/2019 9:53:57 AM  This report has been verified and signed electronically.  PROVATION

## 2019-11-14 NOTE — H&P
[facility]    CC: stent exchange     HPI 65 y.o. female with pancreatic neuroendocrine tumor here for biliary stent exchange.  Currently, she is doing well with no complaints.    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  General appearance: alert, cooperative, no distress  HENT: Normocephalic, atraumatic, neck symmetrical, no nasal discharge   Lungs: clear to auscultation bilaterally, no dullness to percussion bilaterally  Heart: regular rate and rhythm without rub; no displacement of the PMI   Abdomen: soft, non-tender; bowel sounds normoactive; no organomegaly  Extremities: extremities symmetric; no clubbing, cyanosis, or edema  Neurologic: Alert and oriented X 3, normal strength, normal coordination and gait    Assessment:   Recurrent biliary obstruction      Plan:  ERCP with stent exchange today     Jake Lieberman MD   [address], [address]   [place] [address]   [phone]

## 2019-11-14 NOTE — ANESTHESIA PREPROCEDURE EVALUATION
11/14/2019  Ching Bruce is a 68 y.o., female presents for ERCP under MAC.    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    Secondary malignant neoplasm of liver     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) 5/7/2013    Thrombocytopenia 11/12/2013    Thyroid disease     Unspecified essential hypertension 11/12/2013     Past Surgical History:   Procedure Laterality Date    ERCP N/A 6/21/2018    Procedure: ERCP, stent exchange;  Surgeon: Jake Lieberman MD;  Location: [facility] ENDO;  Service: Endoscopy;  Laterality: N/A;    ERCP N/A 5/23/2019    Procedure: ERCP (ENDOSCOPIC RETROGRADE CHOLANGIOPANCREATOGRAPHY), stent exchange;  Surgeon: Jake Lieberman MD;  Location: [facility] ENDO;  Service: Endoscopy;  Laterality: N/A;    THYROIDECTOMY  2011         Anesthesia Evaluation    I have reviewed the Patient Summary Reports.    I have reviewed the Nursing Notes.   I have reviewed the Medications.     Review of Systems  Anesthesia Hx:  No problems with previous Anesthesia    Cardiovascular:   Hypertension    Pulmonary:  Pulmonary Normal    Renal/:   Chronic Renal Disease    Hepatic/GI:   Liver Disease,    Neurological:  Neurology Normal    Endocrine:  Endocrine Normal        Physical Exam  General:  Well nourished    Airway/Jaw/Neck:  Airway Findings: Mouth Opening: Normal Tongue: Normal  General Airway Assessment: Adult  Mallampati: II  TM Distance: Normal, at least 6 cm        Chest/Lungs:  Chest/Lungs Findings: Clear to auscultation, Normal Respiratory Rate     Heart/Vascular:  Heart Findings: Rate: Normal  Rhythm: Regular Rhythm  Sounds: Normal        Mental Status:  Mental Status Findings:  Cooperative, Alert and Oriented       Lab Results   Component Value Date    WBC 4.4 11/04/2019    HGB 12.3 11/04/2019    HCT 36.7 11/04/2019    MCV 96.1 11/04/2019     11/04/2019       BMP  Lab Results   Component Value Date     11/04/2019    K 4.4 11/04/2019     11/04/2019    CO2 26 11/04/2019    BUN 13 11/04/2019    CREATININE 1.10 (H) 11/04/2019    CALCIUM 9.6 11/04/2019    ANIONGAP 12 11/06/2018    ESTGFRAFRICA 60 11/04/2019    EGFRNONAA 52 (L) 11/04/2019         Anesthesia Plan  Type of Anesthesia, risks & benefits discussed:  Anesthesia Type:  MAC  Patient's Preference:   Intra-op Monitoring Plan: standard ASA monitors  Intra-op Monitoring Plan Comments:   Post Op Pain Control Plan: per primary service following discharge from PACU  Post Op Pain Control Plan Comments:   Induction:   IV  Beta Blocker:         Informed Consent: Patient understands risks and agrees with Anesthesia plan.  Questions answered. Anesthesia consent signed with patient.  ASA Score: 3     Day of Surgery Review of History & Physical:  There are no significant changes.          Ready For Surgery From Anesthesia Perspective.

## 2019-11-15 NOTE — ANESTHESIA POSTPROCEDURE EVALUATION
Anesthesia Post Evaluation    Patient: Ching Bruce    Procedure(s) Performed: Procedure(s) (LRB):  ERCP (ENDOSCOPIC RETROGRADE CHOLANGIOPANCREATOGRAPHY), stent exchange (N/A)    Final Anesthesia Type: general    Patient location during evaluation: PACU  Patient participation: Yes- Able to Participate  Level of consciousness: awake and alert, oriented and awake  Post-procedure vital signs: reviewed and stable  Pain management: adequate  Airway patency: patent    PONV status at discharge: No PONV  Anesthetic complications: no      Cardiovascular status: blood pressure returned to baseline  Respiratory status: unassisted and room air  Hydration status: euvolemic  Follow-up not needed.          Vitals Value Taken Time   /57 11/14/2019 11:35 AM   Temp 36.5 °C (97.7 °F) 11/14/2019 11:05 AM   Pulse 55 11/14/2019 11:35 AM   Resp 20 11/14/2019 11:35 AM   SpO2 99 % 11/14/2019 11:35 AM         Event Time     Out of Recovery 11:01:32          Pain/John Score: John Score: 10 (11/14/2019 11:35 AM)

## 2020-01-01 ENCOUNTER — TELEPHONE (OUTPATIENT)
Dept: NEUROLOGY | Facility: HOSPITAL | Age: 70
End: 2020-01-01

## 2020-01-01 ENCOUNTER — INFUSION (OUTPATIENT)
Dept: INFUSION THERAPY | Facility: HOSPITAL | Age: 70
End: 2020-01-01
Attending: INTERNAL MEDICINE
Payer: MEDICARE

## 2020-01-01 ENCOUNTER — LAB VISIT (OUTPATIENT)
Dept: FAMILY MEDICINE | Facility: CLINIC | Age: 70
End: 2020-01-01
Payer: MEDICARE

## 2020-01-01 ENCOUNTER — HOSPITAL ENCOUNTER (OUTPATIENT)
Dept: RADIOLOGY | Facility: HOSPITAL | Age: 70
Discharge: HOME | End: 2020-07-07
Attending: INTERNAL MEDICINE
Payer: MEDICARE

## 2020-01-01 ENCOUNTER — HOSPITAL ENCOUNTER (INPATIENT)
Facility: HOSPITAL | Age: 70
LOS: 2 days | DRG: 871 | End: 2020-12-11
Attending: EMERGENCY MEDICINE | Admitting: SURGERY
Payer: MEDICARE

## 2020-01-01 ENCOUNTER — OFFICE VISIT (OUTPATIENT)
Dept: NEUROLOGY | Facility: HOSPITAL | Age: 70
End: 2020-01-01
Attending: INTERNAL MEDICINE
Payer: MEDICARE

## 2020-01-01 ENCOUNTER — PATIENT MESSAGE (OUTPATIENT)
Dept: GASTROENTEROLOGY | Facility: CLINIC | Age: 70
End: 2020-01-01

## 2020-01-01 ENCOUNTER — LAB VISIT (OUTPATIENT)
Dept: LAB | Facility: HOSPITAL | Age: 70
End: 2020-01-01
Attending: INTERNAL MEDICINE
Payer: MEDICARE

## 2020-01-01 ENCOUNTER — ANESTHESIA (OUTPATIENT)
Dept: ENDOSCOPY | Facility: HOSPITAL | Age: 70
End: 2020-01-01
Payer: MEDICARE

## 2020-01-01 ENCOUNTER — TELEPHONE (OUTPATIENT)
Dept: ENDOSCOPY | Facility: HOSPITAL | Age: 70
End: 2020-01-01

## 2020-01-01 ENCOUNTER — HOSPITAL ENCOUNTER (OUTPATIENT)
Dept: RADIOLOGY | Facility: HOSPITAL | Age: 70
Discharge: HOME | End: 2020-07-20
Attending: INTERNAL MEDICINE
Payer: MEDICARE

## 2020-01-01 ENCOUNTER — ANESTHESIA EVENT (OUTPATIENT)
Dept: EMERGENCY MEDICINE | Facility: HOSPITAL | Age: 70
DRG: 641 | End: 2020-01-01
Payer: MEDICARE

## 2020-01-01 ENCOUNTER — ANESTHESIA (OUTPATIENT)
Dept: INTENSIVE CARE | Facility: HOSPITAL | Age: 70
DRG: 871 | End: 2020-01-01
Payer: MEDICARE

## 2020-01-01 ENCOUNTER — HOSPITAL ENCOUNTER (OUTPATIENT)
Dept: RADIOLOGY | Facility: HOSPITAL | Age: 70
Discharge: HOME | End: 2020-09-02
Attending: INTERNAL MEDICINE
Payer: MEDICARE

## 2020-01-01 ENCOUNTER — HOSPITAL ENCOUNTER (OUTPATIENT)
Facility: HOSPITAL | Age: 70
Discharge: HOME | End: 2020-07-23
Attending: INTERNAL MEDICINE | Admitting: INTERNAL MEDICINE
Payer: MEDICARE

## 2020-01-01 ENCOUNTER — HOSPITAL ENCOUNTER (INPATIENT)
Facility: HOSPITAL | Age: 70
LOS: 7 days | Discharge: LEFT BEFORE DISCHARGE | DRG: 643 | End: 2020-10-26
Attending: EMERGENCY MEDICINE | Admitting: EMERGENCY MEDICINE
Payer: MEDICARE

## 2020-01-01 ENCOUNTER — ANESTHESIA EVENT (OUTPATIENT)
Dept: SURGERY | Facility: HOSPITAL | Age: 70
DRG: 180 | End: 2020-01-01
Payer: MEDICARE

## 2020-01-01 ENCOUNTER — TELEPHONE (OUTPATIENT)
Dept: GASTROENTEROLOGY | Facility: CLINIC | Age: 70
End: 2020-01-01

## 2020-01-01 ENCOUNTER — ANESTHESIA EVENT (OUTPATIENT)
Dept: ENDOSCOPY | Facility: HOSPITAL | Age: 70
End: 2020-01-01
Payer: MEDICARE

## 2020-01-01 ENCOUNTER — HOSPITAL ENCOUNTER (OUTPATIENT)
Facility: HOSPITAL | Age: 70
Discharge: HOME | End: 2020-09-22
Attending: INTERNAL MEDICINE | Admitting: INTERNAL MEDICINE
Payer: MEDICARE

## 2020-01-01 ENCOUNTER — HOSPITAL ENCOUNTER (INPATIENT)
Facility: HOSPITAL | Age: 70
LOS: 8 days | Discharge: HOME HEALTH | DRG: 641 | End: 2020-10-07
Attending: EMERGENCY MEDICINE | Admitting: SURGERY
Payer: MEDICARE

## 2020-01-01 ENCOUNTER — HOSPITAL ENCOUNTER (OUTPATIENT)
Facility: HOSPITAL | Age: 70
LOS: 1 days | Discharge: HOME | End: 2020-11-24
Attending: EMERGENCY MEDICINE | Admitting: SURGERY
Payer: MEDICARE

## 2020-01-01 ENCOUNTER — DOCUMENTATION ONLY (OUTPATIENT)
Dept: HEMATOLOGY/ONCOLOGY | Facility: CLINIC | Age: 70
End: 2020-01-01

## 2020-01-01 ENCOUNTER — ANESTHESIA (OUTPATIENT)
Dept: EMERGENCY MEDICINE | Facility: HOSPITAL | Age: 70
DRG: 641 | End: 2020-01-01
Payer: MEDICARE

## 2020-01-01 ENCOUNTER — HOSPITAL ENCOUNTER (INPATIENT)
Facility: HOSPITAL | Age: 70
LOS: 8 days | Discharge: HOME | DRG: 442 | End: 2020-09-09
Attending: EMERGENCY MEDICINE | Admitting: SURGERY
Payer: MEDICARE

## 2020-01-01 ENCOUNTER — LAB VISIT (OUTPATIENT)
Dept: URGENT CARE | Facility: CLINIC | Age: 70
End: 2020-01-01
Payer: MEDICARE

## 2020-01-01 ENCOUNTER — EXTERNAL HOME HEALTH (OUTPATIENT)
Dept: HOME HEALTH SERVICES | Facility: HOSPITAL | Age: 70
End: 2020-01-01

## 2020-01-01 ENCOUNTER — ANESTHESIA EVENT (OUTPATIENT)
Dept: INTENSIVE CARE | Facility: HOSPITAL | Age: 70
DRG: 871 | End: 2020-01-01
Payer: MEDICARE

## 2020-01-01 ENCOUNTER — HOSPITAL ENCOUNTER (INPATIENT)
Facility: HOSPITAL | Age: 70
LOS: 11 days | Discharge: HOSPICE | DRG: 180 | End: 2020-11-06
Attending: EMERGENCY MEDICINE | Admitting: SURGERY
Payer: MEDICARE

## 2020-01-01 ENCOUNTER — TELEPHONE (OUTPATIENT)
Dept: INTERVENTIONAL RADIOLOGY/VASCULAR | Facility: HOSPITAL | Age: 70
End: 2020-01-01

## 2020-01-01 ENCOUNTER — TELEPHONE (OUTPATIENT)
Dept: PHARMACY | Facility: CLINIC | Age: 70
End: 2020-01-01

## 2020-01-01 ENCOUNTER — DOCUMENTATION ONLY (OUTPATIENT)
Dept: NEUROLOGY | Facility: HOSPITAL | Age: 70
End: 2020-01-01

## 2020-01-01 ENCOUNTER — ANESTHESIA (OUTPATIENT)
Dept: SURGERY | Facility: HOSPITAL | Age: 70
DRG: 180 | End: 2020-01-01
Payer: MEDICARE

## 2020-01-01 VITALS
HEIGHT: 67 IN | WEIGHT: 146.06 LBS | OXYGEN SATURATION: 100 % | DIASTOLIC BLOOD PRESSURE: 86 MMHG | BODY MASS INDEX: 22.92 KG/M2 | SYSTOLIC BLOOD PRESSURE: 163 MMHG | HEART RATE: 114 BPM

## 2020-01-01 VITALS
SYSTOLIC BLOOD PRESSURE: 117 MMHG | BODY MASS INDEX: 25.08 KG/M2 | HEART RATE: 77 BPM | TEMPERATURE: 97 F | WEIGHT: 159.81 LBS | OXYGEN SATURATION: 98 % | DIASTOLIC BLOOD PRESSURE: 60 MMHG | RESPIRATION RATE: 17 BRPM | HEIGHT: 67 IN

## 2020-01-01 VITALS
HEART RATE: 74 BPM | DIASTOLIC BLOOD PRESSURE: 64 MMHG | OXYGEN SATURATION: 100 % | BODY MASS INDEX: 23.7 KG/M2 | HEIGHT: 67 IN | RESPIRATION RATE: 20 BRPM | WEIGHT: 151 LBS | SYSTOLIC BLOOD PRESSURE: 122 MMHG | TEMPERATURE: 99 F

## 2020-01-01 VITALS
SYSTOLIC BLOOD PRESSURE: 98 MMHG | OXYGEN SATURATION: 100 % | DIASTOLIC BLOOD PRESSURE: 49 MMHG | TEMPERATURE: 98 F | RESPIRATION RATE: 18 BRPM | HEART RATE: 82 BPM

## 2020-01-01 VITALS
DIASTOLIC BLOOD PRESSURE: 56 MMHG | RESPIRATION RATE: 17 BRPM | SYSTOLIC BLOOD PRESSURE: 120 MMHG | OXYGEN SATURATION: 98 % | TEMPERATURE: 98 F | HEART RATE: 93 BPM

## 2020-01-01 VITALS — BODY MASS INDEX: 23.51 KG/M2 | WEIGHT: 149.81 LBS | OXYGEN SATURATION: 100 % | HEART RATE: 119 BPM | HEIGHT: 67 IN

## 2020-01-01 VITALS
TEMPERATURE: 98 F | SYSTOLIC BLOOD PRESSURE: 138 MMHG | DIASTOLIC BLOOD PRESSURE: 61 MMHG | RESPIRATION RATE: 18 BRPM | HEART RATE: 110 BPM | OXYGEN SATURATION: 99 %

## 2020-01-01 VITALS
HEART RATE: 101 BPM | WEIGHT: 132.5 LBS | SYSTOLIC BLOOD PRESSURE: 132 MMHG | DIASTOLIC BLOOD PRESSURE: 65 MMHG | OXYGEN SATURATION: 100 % | RESPIRATION RATE: 20 BRPM | HEIGHT: 67 IN | BODY MASS INDEX: 20.8 KG/M2 | TEMPERATURE: 98 F

## 2020-01-01 VITALS
TEMPERATURE: 97 F | BODY MASS INDEX: 20.5 KG/M2 | WEIGHT: 130.63 LBS | SYSTOLIC BLOOD PRESSURE: 109 MMHG | HEART RATE: 91 BPM | OXYGEN SATURATION: 99 % | DIASTOLIC BLOOD PRESSURE: 62 MMHG | HEIGHT: 67 IN

## 2020-01-01 VITALS
TEMPERATURE: 98 F | SYSTOLIC BLOOD PRESSURE: 114 MMHG | BODY MASS INDEX: 20.4 KG/M2 | DIASTOLIC BLOOD PRESSURE: 54 MMHG | HEART RATE: 83 BPM | OXYGEN SATURATION: 95 % | WEIGHT: 130 LBS | HEIGHT: 67 IN | RESPIRATION RATE: 19 BRPM

## 2020-01-01 VITALS
OXYGEN SATURATION: 98 % | DIASTOLIC BLOOD PRESSURE: 38 MMHG | SYSTOLIC BLOOD PRESSURE: 74 MMHG | DIASTOLIC BLOOD PRESSURE: 53 MMHG | HEIGHT: 67 IN | SYSTOLIC BLOOD PRESSURE: 104 MMHG | BODY MASS INDEX: 18.82 KG/M2 | RESPIRATION RATE: 32 BRPM | TEMPERATURE: 98 F | WEIGHT: 119.94 LBS | HEART RATE: 75 BPM | TEMPERATURE: 100 F | RESPIRATION RATE: 17 BRPM | OXYGEN SATURATION: 57 %

## 2020-01-01 VITALS
BODY MASS INDEX: 22.92 KG/M2 | HEART RATE: 116 BPM | HEIGHT: 67 IN | RESPIRATION RATE: 18 BRPM | SYSTOLIC BLOOD PRESSURE: 164 MMHG | WEIGHT: 146.06 LBS | OXYGEN SATURATION: 99 % | TEMPERATURE: 98 F | DIASTOLIC BLOOD PRESSURE: 78 MMHG

## 2020-01-01 VITALS
HEART RATE: 72 BPM | RESPIRATION RATE: 20 BRPM | BODY MASS INDEX: 19.1 KG/M2 | WEIGHT: 121.69 LBS | SYSTOLIC BLOOD PRESSURE: 121 MMHG | TEMPERATURE: 98 F | DIASTOLIC BLOOD PRESSURE: 58 MMHG | HEIGHT: 67 IN | OXYGEN SATURATION: 100 %

## 2020-01-01 VITALS
BODY MASS INDEX: 22.56 KG/M2 | HEIGHT: 67 IN | RESPIRATION RATE: 20 BRPM | WEIGHT: 143.75 LBS | OXYGEN SATURATION: 97 % | TEMPERATURE: 99 F | HEART RATE: 88 BPM | DIASTOLIC BLOOD PRESSURE: 56 MMHG | SYSTOLIC BLOOD PRESSURE: 113 MMHG

## 2020-01-01 VITALS
HEART RATE: 105 BPM | RESPIRATION RATE: 17 BRPM | SYSTOLIC BLOOD PRESSURE: 131 MMHG | DIASTOLIC BLOOD PRESSURE: 63 MMHG | OXYGEN SATURATION: 95 % | TEMPERATURE: 98 F

## 2020-01-01 VITALS
SYSTOLIC BLOOD PRESSURE: 119 MMHG | RESPIRATION RATE: 20 BRPM | WEIGHT: 147 LBS | HEART RATE: 92 BPM | BODY MASS INDEX: 23.07 KG/M2 | TEMPERATURE: 98 F | HEIGHT: 67 IN | OXYGEN SATURATION: 99 % | DIASTOLIC BLOOD PRESSURE: 59 MMHG

## 2020-01-01 DIAGNOSIS — G93.41 METABOLIC ENCEPHALOPATHY: ICD-10-CM

## 2020-01-01 DIAGNOSIS — E83.52 HYPERCALCEMIA: Primary | ICD-10-CM

## 2020-01-01 DIAGNOSIS — R17 INCREASED BILIRUBIN LEVEL: ICD-10-CM

## 2020-01-01 DIAGNOSIS — R17 JAUNDICE: ICD-10-CM

## 2020-01-01 DIAGNOSIS — D3A.8 PRIMARY PANCREATIC NEUROENDOCRINE TUMOR: ICD-10-CM

## 2020-01-01 DIAGNOSIS — D3A.8 PRIMARY PANCREATIC NEUROENDOCRINE TUMOR: Primary | ICD-10-CM

## 2020-01-01 DIAGNOSIS — C7B.8 SECONDARY NEUROENDOCRINE TUMOR OF LIVER: ICD-10-CM

## 2020-01-01 DIAGNOSIS — R53.1 WEAKNESS: ICD-10-CM

## 2020-01-01 DIAGNOSIS — E89.0 POSTOPERATIVE HYPOTHYROIDISM: ICD-10-CM

## 2020-01-01 DIAGNOSIS — R63.4 WEIGHT LOSS: Primary | ICD-10-CM

## 2020-01-01 DIAGNOSIS — R52 PAIN: ICD-10-CM

## 2020-01-01 DIAGNOSIS — E87.6 HYPOKALEMIA: ICD-10-CM

## 2020-01-01 DIAGNOSIS — R07.9 CHEST PAIN, UNSPECIFIED TYPE: ICD-10-CM

## 2020-01-01 DIAGNOSIS — Z71.89 COUNSELING REGARDING ADVANCE CARE PLANNING AND GOALS OF CARE: ICD-10-CM

## 2020-01-01 DIAGNOSIS — R10.84 GENERALIZED ABDOMINAL PAIN: ICD-10-CM

## 2020-01-01 DIAGNOSIS — K80.51 CALCULUS OF BILE DUCT WITHOUT CHOLECYSTITIS WITH OBSTRUCTION: ICD-10-CM

## 2020-01-01 DIAGNOSIS — C7A.8 OTHER MALIGNANT NEUROENDOCRINE TUMORS: Primary | ICD-10-CM

## 2020-01-01 DIAGNOSIS — E21.3 HYPERPARATHYROIDISM, UNSPECIFIED: ICD-10-CM

## 2020-01-01 DIAGNOSIS — C7B.8 SECONDARY NEUROENDOCRINE TUMOR OF LIVER: Primary | ICD-10-CM

## 2020-01-01 DIAGNOSIS — E83.52 HYPERCALCEMIA: ICD-10-CM

## 2020-01-01 DIAGNOSIS — K59.00 CONSTIPATION, UNSPECIFIED CONSTIPATION TYPE: ICD-10-CM

## 2020-01-01 DIAGNOSIS — R06.00 DYSPNEA AND RESPIRATORY ABNORMALITIES: ICD-10-CM

## 2020-01-01 DIAGNOSIS — R50.9 FEVER, UNSPECIFIED FEVER CAUSE: ICD-10-CM

## 2020-01-01 DIAGNOSIS — E46 MALNUTRITION COMPROMISING BODILY FUNCTION: ICD-10-CM

## 2020-01-01 DIAGNOSIS — K83.09 CHOLANGITIS: ICD-10-CM

## 2020-01-01 DIAGNOSIS — R18.8 OTHER ASCITES: ICD-10-CM

## 2020-01-01 DIAGNOSIS — R41.0 CONFUSION: ICD-10-CM

## 2020-01-01 DIAGNOSIS — Z01.812 PRE-PROCEDURE LAB EXAM: ICD-10-CM

## 2020-01-01 DIAGNOSIS — Z71.89 ADVANCED CARE PLANNING/COUNSELING DISCUSSION: ICD-10-CM

## 2020-01-01 DIAGNOSIS — E03.9 ADULT MYXEDEMA: ICD-10-CM

## 2020-01-01 DIAGNOSIS — D64.9 ANEMIA, UNSPECIFIED TYPE: ICD-10-CM

## 2020-01-01 DIAGNOSIS — I95.9 HYPOTENSION: ICD-10-CM

## 2020-01-01 DIAGNOSIS — K83.1 BILIARY OBSTRUCTION: ICD-10-CM

## 2020-01-01 DIAGNOSIS — Z71.89 GOALS OF CARE, COUNSELING/DISCUSSION: ICD-10-CM

## 2020-01-01 DIAGNOSIS — C7A.8 NEUROENDOCRINE CANCER: ICD-10-CM

## 2020-01-01 DIAGNOSIS — R19.00 ABDOMINAL SWELLING: ICD-10-CM

## 2020-01-01 DIAGNOSIS — R06.00 DYSPNEA, UNSPECIFIED TYPE: ICD-10-CM

## 2020-01-01 DIAGNOSIS — E89.0 POSTOPERATIVE HYPOTHYROIDISM: Primary | ICD-10-CM

## 2020-01-01 DIAGNOSIS — Z11.9 SCREENING EXAMINATION FOR UNSPECIFIED INFECTIOUS DISEASE: ICD-10-CM

## 2020-01-01 DIAGNOSIS — C7A.8 NEUROENDOCRINE CARCINOMA METASTATIC TO MULTIPLE SITES: ICD-10-CM

## 2020-01-01 DIAGNOSIS — D69.6 THROMBOCYTOPENIA: ICD-10-CM
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
## 2020-01-01 DIAGNOSIS — E89.0 HISTORY OF THYROIDECTOMY: ICD-10-CM

## 2020-01-01 DIAGNOSIS — D53.9 MACROCYTIC ANEMIA: ICD-10-CM

## 2020-01-01 DIAGNOSIS — Z51.5 PALLIATIVE CARE ENCOUNTER: ICD-10-CM

## 2020-01-01 DIAGNOSIS — K59.00 CONSTIPATION: ICD-10-CM

## 2020-01-01 DIAGNOSIS — R11.0 NAUSEA: ICD-10-CM

## 2020-01-01 DIAGNOSIS — C7A.8 OTHER MALIGNANT NEUROENDOCRINE TUMORS: ICD-10-CM

## 2020-01-01 DIAGNOSIS — R63.0 ANOREXIA: ICD-10-CM

## 2020-01-01 DIAGNOSIS — I95.9 HYPOTENSION, UNSPECIFIED HYPOTENSION TYPE: ICD-10-CM

## 2020-01-01 DIAGNOSIS — I50.30 HEART FAILURE WITH PRESERVED EJECTION FRACTION, UNSPECIFIED HF CHRONICITY: ICD-10-CM

## 2020-01-01 DIAGNOSIS — R11.2 NAUSEA AND VOMITING, INTRACTABILITY OF VOMITING NOT SPECIFIED, UNSPECIFIED VOMITING TYPE: ICD-10-CM

## 2020-01-01 DIAGNOSIS — R79.89 ELEVATED TROPONIN: ICD-10-CM

## 2020-01-01 DIAGNOSIS — E87.6 HYPOKALEMIA: Primary | ICD-10-CM

## 2020-01-01 DIAGNOSIS — Z09 CHEMOTHERAPY FOLLOW-UP EXAMINATION: ICD-10-CM

## 2020-01-01 DIAGNOSIS — K12.31 NEUTROPENIA ASSOCIATED WITH MUCOSITIS DUE TO ANTINEOPLASTIC THERAPY: ICD-10-CM

## 2020-01-01 DIAGNOSIS — R18.0 MALIGNANT ASCITES: ICD-10-CM

## 2020-01-01 DIAGNOSIS — D3A.00 CARCINOID TUMOR, UNSPECIFIED SITE, UNSPECIFIED WHETHER MALIGNANT: ICD-10-CM

## 2020-01-01 DIAGNOSIS — D50.9 IRON DEFICIENCY ANEMIA, UNSPECIFIED IRON DEFICIENCY ANEMIA TYPE: Primary | ICD-10-CM

## 2020-01-01 DIAGNOSIS — Z46.89 ENCOUNTER FOR REPLACEMENT OF BILIARY STENT: ICD-10-CM

## 2020-01-01 DIAGNOSIS — R30.0 DYSURIA: ICD-10-CM

## 2020-01-01 DIAGNOSIS — T45.1X5S NEUTROPENIA ASSOCIATED WITH MUCOSITIS DUE TO ANTINEOPLASTIC THERAPY: ICD-10-CM

## 2020-01-01 DIAGNOSIS — Z91.89 AT HIGH RISK FOR MALNUTRITION: ICD-10-CM

## 2020-01-01 DIAGNOSIS — R63.0 ANOREXIA: Primary | ICD-10-CM

## 2020-01-01 DIAGNOSIS — N17.1 ACUTE RENAL FAILURE WITH ACUTE CORTICAL NECROSIS: ICD-10-CM

## 2020-01-01 DIAGNOSIS — E03.9 ACQUIRED HYPOTHYROIDISM: ICD-10-CM

## 2020-01-01 DIAGNOSIS — R41.82 ALTERED MENTAL STATUS, UNSPECIFIED ALTERED MENTAL STATUS TYPE: ICD-10-CM

## 2020-01-01 DIAGNOSIS — R53.83 FATIGUE: ICD-10-CM

## 2020-01-01 DIAGNOSIS — J90 BILATERAL PLEURAL EFFUSION: ICD-10-CM

## 2020-01-01 DIAGNOSIS — K83.1 STRICTURE OF BILE DUCT: ICD-10-CM

## 2020-01-01 DIAGNOSIS — K80.50 GALL STONES, COMMON BILE DUCT: ICD-10-CM

## 2020-01-01 DIAGNOSIS — R06.89 DYSPNEA AND RESPIRATORY ABNORMALITIES: ICD-10-CM

## 2020-01-01 DIAGNOSIS — I10 ESSENTIAL HYPERTENSION: ICD-10-CM

## 2020-01-01 DIAGNOSIS — D70.1 NEUTROPENIA ASSOCIATED WITH MUCOSITIS DUE TO ANTINEOPLASTIC THERAPY: ICD-10-CM

## 2020-01-01 DIAGNOSIS — R53.81 PHYSICAL DEBILITY: ICD-10-CM

## 2020-01-01 DIAGNOSIS — R53.1 WEAK: ICD-10-CM

## 2020-01-01 DIAGNOSIS — R94.31 ABNORMAL ECG: ICD-10-CM

## 2020-01-01 DIAGNOSIS — R82.90 CLOUDY URINE: Primary | ICD-10-CM

## 2020-01-01 DIAGNOSIS — R40.0 SOMNOLENCE: ICD-10-CM

## 2020-01-01 DIAGNOSIS — E46 MALNUTRITION COMPROMISING BODILY FUNCTION: Primary | ICD-10-CM

## 2020-01-01 DIAGNOSIS — E44.0 MODERATE MALNUTRITION: ICD-10-CM

## 2020-01-01 DIAGNOSIS — R53.83 FATIGUE, UNSPECIFIED TYPE: ICD-10-CM

## 2020-01-01 DIAGNOSIS — C7B.8 NEUROENDOCRINE CARCINOMA METASTATIC TO MULTIPLE SITES: ICD-10-CM

## 2020-01-01 LAB
1,25(OH)2D3 SERPL-MCNC: 87 PG/ML (ref 20–79)
25(OH)D3+25(OH)D2 SERPL-MCNC: 12 NG/ML (ref 30–96)
25(OH)D3+25(OH)D2 SERPL-MCNC: 14 NG/ML (ref 30–96)
ABO + RH BLD: NORMAL
ACANTHOCYTES BLD QL SMEAR: PRESENT
ALBUMIN FLD-MCNC: 0.9 G/DL
ALBUMIN SERPL BCP-MCNC: 1.7 G/DL (ref 3.5–5.2)
ALBUMIN SERPL BCP-MCNC: 1.8 G/DL (ref 3.5–5.2)
ALBUMIN SERPL BCP-MCNC: 1.8 G/DL (ref 3.5–5.2)
ALBUMIN SERPL BCP-MCNC: 2 G/DL (ref 3.5–5.2)
ALBUMIN SERPL BCP-MCNC: 2 G/DL (ref 3.5–5.2)
ALBUMIN SERPL BCP-MCNC: 2.1 G/DL (ref 3.5–5.2)
ALBUMIN SERPL BCP-MCNC: 2.2 G/DL (ref 3.5–5.2)
ALBUMIN SERPL BCP-MCNC: 2.3 G/DL (ref 3.5–5.2)
ALBUMIN SERPL BCP-MCNC: 2.4 G/DL (ref 3.5–5.2)
ALBUMIN SERPL BCP-MCNC: 2.5 G/DL (ref 3.5–5.2)
ALBUMIN SERPL BCP-MCNC: 2.6 G/DL (ref 3.5–5.2)
ALBUMIN SERPL BCP-MCNC: 2.7 G/DL (ref 3.5–5.2)
ALBUMIN SERPL BCP-MCNC: 2.8 G/DL (ref 3.5–5.2)
ALBUMIN SERPL BCP-MCNC: 2.9 G/DL (ref 3.5–5.2)
ALBUMIN SERPL BCP-MCNC: 3 G/DL (ref 3.5–5.2)
ALBUMIN SERPL BCP-MCNC: 3.1 G/DL (ref 3.5–5.2)
ALBUMIN SERPL BCP-MCNC: 3.2 G/DL (ref 3.5–5.2)
ALBUMIN SERPL BCP-MCNC: 3.4 G/DL (ref 3.5–5.2)
ALBUMIN SERPL BCP-MCNC: 3.4 G/DL (ref 3.5–5.2)
ALBUMIN SERPL BCP-MCNC: 3.7 G/DL (ref 3.5–5.2)
ALBUMIN SERPL BCP-MCNC: 4.1 G/DL (ref 3.5–5.2)
ALBUMIN SERPL BCP-MCNC: 4.5 G/DL (ref 3.5–5.2)
ALBUMIN SERPL BCP-MCNC: 4.6 G/DL (ref 3.5–5.2)
ALBUMIN SERPL BCP-MCNC: 4.9 G/DL (ref 3.5–5.2)
ALBUMIN SERPL-MCNC: 3 G/DL (ref 3.6–5.1)
ALBUMIN SERPL-MCNC: 3 G/DL (ref 3.6–5.1)
ALBUMIN SERPL-MCNC: 3.5 G/DL (ref 3.6–5.1)
ALBUMIN SERPL-MCNC: 4.2 G/DL (ref 3.6–5.1)
ALBUMIN/GLOB SERPL: 0.9 (CALC) (ref 1–2.5)
ALBUMIN/GLOB SERPL: 0.9 (CALC) (ref 1–2.5)
ALBUMIN/GLOB SERPL: 1.2 (CALC) (ref 1–2.5)
ALBUMIN/GLOB SERPL: 1.3 (CALC) (ref 1–2.5)
ALLENS TEST: ABNORMAL
ALP SERPL-CCNC: 263 U/L (ref 37–153)
ALP SERPL-CCNC: 332 U/L (ref 55–135)
ALP SERPL-CCNC: 343 U/L (ref 55–135)
ALP SERPL-CCNC: 346 U/L (ref 37–153)
ALP SERPL-CCNC: 349 U/L (ref 55–135)
ALP SERPL-CCNC: 366 U/L (ref 55–135)
ALP SERPL-CCNC: 376 U/L (ref 55–135)
ALP SERPL-CCNC: 385 U/L (ref 55–135)
ALP SERPL-CCNC: 387 U/L (ref 55–135)
ALP SERPL-CCNC: 387 U/L (ref 55–135)
ALP SERPL-CCNC: 391 U/L (ref 55–135)
ALP SERPL-CCNC: 393 U/L (ref 55–135)
ALP SERPL-CCNC: 401 U/L (ref 55–135)
ALP SERPL-CCNC: 403 U/L (ref 55–135)
ALP SERPL-CCNC: 418 U/L (ref 55–135)
ALP SERPL-CCNC: 421 U/L (ref 55–135)
ALP SERPL-CCNC: 422 U/L (ref 55–135)
ALP SERPL-CCNC: 423 U/L (ref 55–135)
ALP SERPL-CCNC: 424 U/L (ref 55–135)
ALP SERPL-CCNC: 426 U/L (ref 55–135)
ALP SERPL-CCNC: 427 U/L (ref 55–135)
ALP SERPL-CCNC: 427 U/L (ref 55–135)
ALP SERPL-CCNC: 431 U/L (ref 55–135)
ALP SERPL-CCNC: 431 U/L (ref 55–135)
ALP SERPL-CCNC: 433 U/L (ref 55–135)
ALP SERPL-CCNC: 434 U/L (ref 55–135)
ALP SERPL-CCNC: 435 U/L (ref 55–135)
ALP SERPL-CCNC: 435 U/L (ref 55–135)
ALP SERPL-CCNC: 437 U/L (ref 55–135)
ALP SERPL-CCNC: 442 U/L (ref 55–135)
ALP SERPL-CCNC: 443 U/L (ref 55–135)
ALP SERPL-CCNC: 444 U/L (ref 55–135)
ALP SERPL-CCNC: 446 U/L (ref 55–135)
ALP SERPL-CCNC: 448 U/L (ref 55–135)
ALP SERPL-CCNC: 452 U/L (ref 55–135)
ALP SERPL-CCNC: 455 U/L (ref 55–135)
ALP SERPL-CCNC: 456 U/L (ref 55–135)
ALP SERPL-CCNC: 463 U/L (ref 55–135)
ALP SERPL-CCNC: 464 U/L (ref 55–135)
ALP SERPL-CCNC: 464 U/L (ref 55–135)
ALP SERPL-CCNC: 465 U/L (ref 55–135)
ALP SERPL-CCNC: 466 U/L (ref 55–135)
ALP SERPL-CCNC: 467 U/L (ref 55–135)
ALP SERPL-CCNC: 468 U/L (ref 55–135)
ALP SERPL-CCNC: 476 U/L (ref 55–135)
ALP SERPL-CCNC: 495 U/L (ref 55–135)
ALP SERPL-CCNC: 502 U/L (ref 55–135)
ALP SERPL-CCNC: 507 U/L (ref 55–135)
ALP SERPL-CCNC: 513 U/L (ref 55–135)
ALP SERPL-CCNC: 516 U/L (ref 55–135)
ALP SERPL-CCNC: 520 U/L (ref 55–135)
ALP SERPL-CCNC: 521 U/L (ref 55–135)
ALP SERPL-CCNC: 524 U/L (ref 55–135)
ALP SERPL-CCNC: 527 U/L (ref 55–135)
ALP SERPL-CCNC: 528 U/L (ref 37–153)
ALP SERPL-CCNC: 546 U/L (ref 55–135)
ALP SERPL-CCNC: 549 U/L (ref 37–153)
ALP SERPL-CCNC: 562 U/L (ref 55–135)
ALP SERPL-CCNC: 567 U/L (ref 55–135)
ALP SERPL-CCNC: 572 U/L (ref 55–135)
ALP SERPL-CCNC: 582 U/L (ref 55–135)
ALP SERPL-CCNC: 583 U/L (ref 55–135)
ALP SERPL-CCNC: 607 U/L (ref 55–135)
ALP SERPL-CCNC: 611 U/L (ref 55–135)
ALP SERPL-CCNC: 618 U/L (ref 55–135)
ALP SERPL-CCNC: 641 U/L (ref 55–135)
ALP SERPL-CCNC: 643 U/L (ref 55–135)
ALP SERPL-CCNC: 727 U/L (ref 55–135)
ALT SERPL W/O P-5'-P-CCNC: 10 U/L (ref 10–44)
ALT SERPL W/O P-5'-P-CCNC: 11 U/L (ref 10–44)
ALT SERPL W/O P-5'-P-CCNC: 12 U/L (ref 10–44)
ALT SERPL W/O P-5'-P-CCNC: 13 U/L (ref 10–44)
ALT SERPL W/O P-5'-P-CCNC: 14 U/L (ref 10–44)
ALT SERPL W/O P-5'-P-CCNC: 15 U/L (ref 10–44)
ALT SERPL W/O P-5'-P-CCNC: 15 U/L (ref 10–44)
ALT SERPL W/O P-5'-P-CCNC: 16 U/L (ref 10–44)
ALT SERPL W/O P-5'-P-CCNC: 17 U/L (ref 10–44)
ALT SERPL W/O P-5'-P-CCNC: 17 U/L (ref 10–44)
ALT SERPL W/O P-5'-P-CCNC: 18 U/L (ref 10–44)
ALT SERPL W/O P-5'-P-CCNC: 19 U/L (ref 10–44)
ALT SERPL W/O P-5'-P-CCNC: 20 U/L (ref 10–44)
ALT SERPL W/O P-5'-P-CCNC: 21 U/L (ref 10–44)
ALT SERPL W/O P-5'-P-CCNC: 25 U/L (ref 10–44)
ALT SERPL W/O P-5'-P-CCNC: 26 U/L (ref 10–44)
ALT SERPL W/O P-5'-P-CCNC: 29 U/L (ref 10–44)
ALT SERPL W/O P-5'-P-CCNC: 31 U/L (ref 10–44)
ALT SERPL W/O P-5'-P-CCNC: 7 U/L (ref 10–44)
ALT SERPL W/O P-5'-P-CCNC: 9 U/L (ref 10–44)
ALT SERPL-CCNC: 16 U/L (ref 6–29)
ALT SERPL-CCNC: 17 U/L (ref 6–29)
ALT SERPL-CCNC: 19 U/L (ref 6–29)
ALT SERPL-CCNC: 20 U/L (ref 6–29)
AMMONIA PLAS-SCNC: 35 UMOL/L (ref 10–50)
AMMONIA PLAS-SCNC: 44 UMOL/L (ref 10–50)
AMMONIA PLAS-SCNC: 54 UMOL/L (ref 10–50)
ANION GAP SERPL CALC-SCNC: 10 MMOL/L (ref 8–16)
ANION GAP SERPL CALC-SCNC: 11 MMOL/L (ref 8–16)
ANION GAP SERPL CALC-SCNC: 12 MMOL/L (ref 8–16)
ANION GAP SERPL CALC-SCNC: 13 MMOL/L (ref 8–16)
ANION GAP SERPL CALC-SCNC: 14 MMOL/L (ref 8–16)
ANION GAP SERPL CALC-SCNC: 15 MMOL/L (ref 8–16)
ANION GAP SERPL CALC-SCNC: 23 MMOL/L (ref 8–16)
ANION GAP SERPL CALC-SCNC: 24 MMOL/L (ref 8–16)
ANION GAP SERPL CALC-SCNC: 25 MMOL/L (ref 8–16)
ANION GAP SERPL CALC-SCNC: 5 MMOL/L (ref 8–16)
ANION GAP SERPL CALC-SCNC: 6 MMOL/L (ref 8–16)
ANION GAP SERPL CALC-SCNC: 7 MMOL/L (ref 8–16)
ANION GAP SERPL CALC-SCNC: 8 MMOL/L (ref 8–16)
ANION GAP SERPL CALC-SCNC: 9 MMOL/L (ref 8–16)
ANISOCYTOSIS BLD QL SMEAR: ABNORMAL
ANISOCYTOSIS BLD QL SMEAR: SLIGHT
AORTIC ROOT ANNULUS: 2.73 CM
AORTIC ROOT ANNULUS: 2.82 CM
APPEARANCE FLD: CLEAR
ASCENDING AORTA: 2.6 CM
ASCENDING AORTA: 2.79 CM
AST SERPL-CCNC: 15 U/L (ref 10–35)
AST SERPL-CCNC: 15 U/L (ref 10–40)
AST SERPL-CCNC: 16 U/L (ref 10–40)
AST SERPL-CCNC: 17 U/L (ref 10–35)
AST SERPL-CCNC: 17 U/L (ref 10–40)
AST SERPL-CCNC: 18 U/L (ref 10–40)
AST SERPL-CCNC: 19 U/L (ref 10–40)
AST SERPL-CCNC: 20 U/L (ref 10–40)
AST SERPL-CCNC: 21 U/L (ref 10–40)
AST SERPL-CCNC: 22 U/L (ref 10–40)
AST SERPL-CCNC: 23 U/L (ref 10–35)
AST SERPL-CCNC: 23 U/L (ref 10–40)
AST SERPL-CCNC: 23 U/L (ref 10–40)
AST SERPL-CCNC: 24 U/L (ref 10–40)
AST SERPL-CCNC: 25 U/L (ref 10–35)
AST SERPL-CCNC: 25 U/L (ref 10–40)
AST SERPL-CCNC: 25 U/L (ref 10–40)
AST SERPL-CCNC: 26 U/L (ref 10–40)
AST SERPL-CCNC: 27 U/L (ref 10–40)
AST SERPL-CCNC: 28 U/L (ref 10–40)
AST SERPL-CCNC: 29 U/L (ref 10–40)
AST SERPL-CCNC: 29 U/L (ref 10–40)
AST SERPL-CCNC: 33 U/L (ref 10–40)
AST SERPL-CCNC: 37 U/L (ref 10–40)
AST SERPL-CCNC: 43 U/L (ref 10–40)
AST SERPL-CCNC: 74 U/L (ref 10–40)
AST SERPL-CCNC: 82 U/L (ref 10–40)
AV INDEX (PROSTH): 0.74
AV INDEX (PROSTH): 0.84
AV MEAN GRADIENT: 10 MMHG
AV MEAN GRADIENT: 6 MMHG
AV PEAK GRADIENT: 11 MMHG
AV PEAK GRADIENT: 17 MMHG
AV VALVE AREA: 1.89 CM2
AV VALVE AREA: 2.04 CM2
AV VELOCITY RATIO: 0.75
AV VELOCITY RATIO: 0.78
BACTERIA #/AREA URNS HPF: ABNORMAL /HPF
BACTERIA #/AREA URNS HPF: ABNORMAL /HPF
BACTERIA BLD CULT: NORMAL
BACTERIA BLD CULT: NORMAL
BACTERIA SPEC AEROBE CULT: NO GROWTH
BACTERIA SPEC ANAEROBE CULT: NORMAL
BACTERIA UR CULT: NO GROWTH
BASO STIPL BLD QL SMEAR: ABNORMAL
BASOPHILS # BLD AUTO: 0 K/UL (ref 0–0.2)
BASOPHILS # BLD AUTO: 0 K/UL (ref 0–0.2)
BASOPHILS # BLD AUTO: 0.01 K/UL (ref 0–0.2)
BASOPHILS # BLD AUTO: 0.02 K/UL (ref 0–0.2)
BASOPHILS # BLD AUTO: 11 CELLS/UL (ref 0–200)
BASOPHILS # BLD AUTO: 21 CELLS/UL (ref 0–200)
BASOPHILS # BLD AUTO: 7 CELLS/UL (ref 0–200)
BASOPHILS # BLD AUTO: 9 CELLS/UL (ref 0–200)
BASOPHILS # BLD AUTO: ABNORMAL K/UL (ref 0–0.2)
BASOPHILS NFR BLD AUTO: 0.1 %
BASOPHILS NFR BLD AUTO: 0.1 %
BASOPHILS NFR BLD AUTO: 0.2 %
BASOPHILS NFR BLD AUTO: 0.4 %
BASOPHILS NFR BLD: 0 % (ref 0–1.9)
BASOPHILS NFR BLD: 0.1 % (ref 0–1.9)
BASOPHILS NFR BLD: 0.2 % (ref 0–1.9)
BASOPHILS NFR BLD: 0.3 % (ref 0–1.9)
BILIRUB DIRECT SERPL-MCNC: 0.4 MG/DL
BILIRUB INDIRECT SERPL-MCNC: 0.7 MG/DL (CALC) (ref 0.2–1.2)
BILIRUB SERPL-MCNC: 0.7 MG/DL (ref 0.1–1)
BILIRUB SERPL-MCNC: 0.7 MG/DL (ref 0.2–1.2)
BILIRUB SERPL-MCNC: 0.8 MG/DL (ref 0.1–1)
BILIRUB SERPL-MCNC: 0.8 MG/DL (ref 0.2–1.2)
BILIRUB SERPL-MCNC: 0.9 MG/DL (ref 0.1–1)
BILIRUB SERPL-MCNC: 1.1 MG/DL (ref 0.1–1)
BILIRUB SERPL-MCNC: 1.1 MG/DL (ref 0.1–1)
BILIRUB SERPL-MCNC: 1.1 MG/DL (ref 0.2–1.2)
BILIRUB SERPL-MCNC: 1.3 MG/DL (ref 0.1–1)
BILIRUB SERPL-MCNC: 1.3 MG/DL (ref 0.2–1.2)
BILIRUB SERPL-MCNC: 1.4 MG/DL (ref 0.1–1)
BILIRUB SERPL-MCNC: 1.5 MG/DL (ref 0.1–1)
BILIRUB SERPL-MCNC: 1.6 MG/DL (ref 0.1–1)
BILIRUB SERPL-MCNC: 1.6 MG/DL (ref 0.1–1)
BILIRUB SERPL-MCNC: 1.7 MG/DL (ref 0.1–1)
BILIRUB SERPL-MCNC: 1.8 MG/DL (ref 0.1–1)
BILIRUB SERPL-MCNC: 1.9 MG/DL (ref 0.1–1)
BILIRUB SERPL-MCNC: 2 MG/DL (ref 0.1–1)
BILIRUB SERPL-MCNC: 2.1 MG/DL (ref 0.1–1)
BILIRUB SERPL-MCNC: 2.2 MG/DL (ref 0.1–1)
BILIRUB SERPL-MCNC: 2.3 MG/DL (ref 0.1–1)
BILIRUB SERPL-MCNC: 2.4 MG/DL (ref 0.1–1)
BILIRUB SERPL-MCNC: 2.5 MG/DL (ref 0.1–1)
BILIRUB SERPL-MCNC: 2.5 MG/DL (ref 0.1–1)
BILIRUB SERPL-MCNC: 2.6 MG/DL (ref 0.1–1)
BILIRUB SERPL-MCNC: 2.8 MG/DL (ref 0.1–1)
BILIRUB SERPL-MCNC: 3 MG/DL (ref 0.1–1)
BILIRUB SERPL-MCNC: 3.2 MG/DL (ref 0.1–1)
BILIRUB SERPL-MCNC: 3.3 MG/DL (ref 0.1–1)
BILIRUB UR QL STRIP: ABNORMAL
BILIRUB UR QL STRIP: NEGATIVE
BLD GP AB SCN CELLS X3 SERPL QL: NORMAL
BLD PROD TYP BPU: NORMAL
BLOOD UNIT EXPIRATION DATE: NORMAL
BLOOD UNIT TYPE CODE: 5100
BLOOD UNIT TYPE: NORMAL
BNP SERPL-MCNC: 104 PG/ML (ref 0–99)
BNP SERPL-MCNC: 1596 PG/ML (ref 0–99)
BNP SERPL-MCNC: 322 PG/ML (ref 0–99)
BODY FLD TYPE: NORMAL
BODY FLUID SOURCE, LDH: NORMAL
BSA FOR ECHO PROCEDURE: 1.6 M2
BSA FOR ECHO PROCEDURE: 1.78 M2
BUN SERPL-MCNC: 11 MG/DL (ref 8–23)
BUN SERPL-MCNC: 12 MG/DL (ref 7–25)
BUN SERPL-MCNC: 12 MG/DL (ref 8–23)
BUN SERPL-MCNC: 13 MG/DL (ref 8–23)
BUN SERPL-MCNC: 14 MG/DL (ref 8–23)
BUN SERPL-MCNC: 15 MG/DL (ref 8–23)
BUN SERPL-MCNC: 16 MG/DL (ref 8–23)
BUN SERPL-MCNC: 17 MG/DL (ref 8–23)
BUN SERPL-MCNC: 18 MG/DL (ref 8–23)
BUN SERPL-MCNC: 19 MG/DL (ref 8–23)
BUN SERPL-MCNC: 20 MG/DL (ref 8–23)
BUN SERPL-MCNC: 20 MG/DL (ref 8–23)
BUN SERPL-MCNC: 21 MG/DL (ref 8–23)
BUN SERPL-MCNC: 23 MG/DL (ref 8–23)
BUN SERPL-MCNC: 23 MG/DL (ref 8–23)
BUN SERPL-MCNC: 24 MG/DL (ref 8–23)
BUN SERPL-MCNC: 25 MG/DL (ref 8–23)
BUN SERPL-MCNC: 26 MG/DL (ref 8–23)
BUN SERPL-MCNC: 26 MG/DL (ref 8–23)
BUN SERPL-MCNC: 27 MG/DL (ref 8–23)
BUN SERPL-MCNC: 28 MG/DL (ref 8–23)
BUN SERPL-MCNC: 29 MG/DL (ref 8–23)
BUN SERPL-MCNC: 30 MG/DL (ref 8–23)
BUN SERPL-MCNC: 30 MG/DL (ref 8–23)
BUN SERPL-MCNC: 31 MG/DL (ref 8–23)
BUN SERPL-MCNC: 32 MG/DL (ref 8–23)
BUN SERPL-MCNC: 33 MG/DL (ref 8–23)
BUN SERPL-MCNC: 33 MG/DL (ref 8–23)
BUN SERPL-MCNC: 39 MG/DL (ref 8–23)
BUN SERPL-MCNC: 8 MG/DL (ref 8–23)
BUN SERPL-MCNC: 82 MG/DL (ref 8–23)
BUN SERPL-MCNC: 83 MG/DL (ref 8–23)
BUN SERPL-MCNC: 9 MG/DL (ref 7–25)
BUN SERPL-MCNC: 9 MG/DL (ref 7–25)
BUN SERPL-MCNC: 9 MG/DL (ref 8–23)
BUN SERPL-MCNC: 9 MG/DL (ref 8–23)
BUN SERPL-MCNC: 95 MG/DL (ref 8–23)
BUN/CREAT SERPL: 11 (CALC) (ref 6–22)
BUN/CREAT SERPL: ABNORMAL (CALC) (ref 6–22)
BUN/CREAT SERPL: ABNORMAL (CALC) (ref 6–22)
BURR CELLS BLD QL SMEAR: ABNORMAL
BURR CELLS BLD QL SMEAR: ABNORMAL
CA-I BLDV-SCNC: 0.94 MMOL/L (ref 1.06–1.42)
CALCIUM 24H UR-MRATE: 26 MG/HR (ref 4–12)
CALCIUM SERPL-MCNC: 10.1 MG/DL (ref 8.6–10.4)
CALCIUM SERPL-MCNC: 10.1 MG/DL (ref 8.7–10.5)
CALCIUM SERPL-MCNC: 10.1 MG/DL (ref 8.7–10.5)
CALCIUM SERPL-MCNC: 10.2 MG/DL (ref 8.7–10.5)
CALCIUM SERPL-MCNC: 10.3 MG/DL (ref 8.7–10.5)
CALCIUM SERPL-MCNC: 10.4 MG/DL (ref 8.7–10.5)
CALCIUM SERPL-MCNC: 10.6 MG/DL (ref 8.7–10.5)
CALCIUM SERPL-MCNC: 10.6 MG/DL (ref 8.7–10.5)
CALCIUM SERPL-MCNC: 10.7 MG/DL (ref 8.7–10.5)
CALCIUM SERPL-MCNC: 10.7 MG/DL (ref 8.7–10.5)
CALCIUM SERPL-MCNC: 10.8 MG/DL (ref 8.7–10.5)
CALCIUM SERPL-MCNC: 10.9 MG/DL (ref 8.7–10.5)
CALCIUM SERPL-MCNC: 11 MG/DL (ref 8.7–10.5)
CALCIUM SERPL-MCNC: 11.1 MG/DL (ref 8.7–10.5)
CALCIUM SERPL-MCNC: 11.2 MG/DL (ref 8.7–10.5)
CALCIUM SERPL-MCNC: 11.4 MG/DL (ref 8.7–10.5)
CALCIUM SERPL-MCNC: 11.5 MG/DL (ref 8.7–10.5)
CALCIUM SERPL-MCNC: 11.6 MG/DL (ref 8.7–10.5)
CALCIUM SERPL-MCNC: 11.7 MG/DL (ref 8.7–10.5)
CALCIUM SERPL-MCNC: 11.7 MG/DL (ref 8.7–10.5)
CALCIUM SERPL-MCNC: 11.8 MG/DL (ref 8.7–10.5)
CALCIUM SERPL-MCNC: 11.9 MG/DL (ref 8.7–10.5)
CALCIUM SERPL-MCNC: 12.1 MG/DL (ref 8.7–10.5)
CALCIUM SERPL-MCNC: 12.2 MG/DL (ref 8.7–10.5)
CALCIUM SERPL-MCNC: 12.6 MG/DL (ref 8.7–10.5)
CALCIUM SERPL-MCNC: 12.6 MG/DL (ref 8.7–10.5)
CALCIUM SERPL-MCNC: 12.7 MG/DL (ref 8.7–10.5)
CALCIUM SERPL-MCNC: 12.8 MG/DL (ref 8.7–10.5)
CALCIUM SERPL-MCNC: 12.8 MG/DL (ref 8.7–10.5)
CALCIUM SERPL-MCNC: 13.1 MG/DL (ref 8.6–10.4)
CALCIUM SERPL-MCNC: 13.2 MG/DL (ref 8.7–10.5)
CALCIUM SERPL-MCNC: 13.3 MG/DL (ref 8.7–10.5)
CALCIUM SERPL-MCNC: 13.3 MG/DL (ref 8.7–10.5)
CALCIUM SERPL-MCNC: 13.4 MG/DL (ref 8.7–10.5)
CALCIUM SERPL-MCNC: 13.7 MG/DL (ref 8.7–10.5)
CALCIUM SERPL-MCNC: 14 MG/DL (ref 8.7–10.5)
CALCIUM SERPL-MCNC: 14.2 MG/DL (ref 8.7–10.5)
CALCIUM SERPL-MCNC: 14.4 MG/DL (ref 8.7–10.5)
CALCIUM SERPL-MCNC: 14.6 MG/DL (ref 8.7–10.5)
CALCIUM SERPL-MCNC: 14.7 MG/DL (ref 8.7–10.5)
CALCIUM SERPL-MCNC: 15.5 MG/DL (ref 8.7–10.5)
CALCIUM SERPL-MCNC: 15.6 MG/DL (ref 8.7–10.5)
CALCIUM SERPL-MCNC: 7 MG/DL (ref 8.7–10.5)
CALCIUM SERPL-MCNC: 7.1 MG/DL (ref 8.7–10.5)
CALCIUM SERPL-MCNC: 7.4 MG/DL (ref 8.7–10.5)
CALCIUM SERPL-MCNC: 8.8 MG/DL (ref 8.7–10.5)
CALCIUM SERPL-MCNC: 8.9 MG/DL (ref 8.7–10.5)
CALCIUM SERPL-MCNC: 9 MG/DL (ref 8.7–10.5)
CALCIUM SERPL-MCNC: 9.3 MG/DL (ref 8.7–10.5)
CALCIUM SERPL-MCNC: 9.5 MG/DL (ref 8.7–10.5)
CALCIUM SERPL-MCNC: 9.6 MG/DL (ref 8.7–10.5)
CALCIUM SERPL-MCNC: 9.6 MG/DL (ref 8.7–10.5)
CALCIUM SERPL-MCNC: 9.7 MG/DL (ref 8.6–10.4)
CALCIUM SERPL-MCNC: 9.8 MG/DL (ref 8.7–10.5)
CALCIUM SERPL-MCNC: 9.8 MG/DL (ref 8.7–10.5)
CALCIUM SERPL-MCNC: 9.9 MG/DL (ref 8.7–10.5)
CALCIUM UR-MCNC: 13.6 MG/DL (ref 0–15)
CALCIUM URINE (MG/SPEC): 622 MG/SPEC
CAOX CRY URNS QL MICRO: ABNORMAL
CHLORIDE SERPL-SCNC: 100 MMOL/L (ref 95–110)
CHLORIDE SERPL-SCNC: 101 MMOL/L (ref 95–110)
CHLORIDE SERPL-SCNC: 102 MMOL/L (ref 95–110)
CHLORIDE SERPL-SCNC: 103 MMOL/L (ref 95–110)
CHLORIDE SERPL-SCNC: 103 MMOL/L (ref 98–110)
CHLORIDE SERPL-SCNC: 103 MMOL/L (ref 98–110)
CHLORIDE SERPL-SCNC: 104 MMOL/L (ref 95–110)
CHLORIDE SERPL-SCNC: 104 MMOL/L (ref 98–110)
CHLORIDE SERPL-SCNC: 105 MMOL/L (ref 95–110)
CHLORIDE SERPL-SCNC: 106 MMOL/L (ref 95–110)
CHLORIDE SERPL-SCNC: 107 MMOL/L (ref 95–110)
CHLORIDE SERPL-SCNC: 108 MMOL/L (ref 95–110)
CHLORIDE SERPL-SCNC: 109 MMOL/L (ref 95–110)
CHLORIDE SERPL-SCNC: 110 MMOL/L (ref 95–110)
CHLORIDE SERPL-SCNC: 111 MMOL/L (ref 95–110)
CHLORIDE SERPL-SCNC: 112 MMOL/L (ref 95–110)
CHLORIDE SERPL-SCNC: 113 MMOL/L (ref 95–110)
CHLORIDE SERPL-SCNC: 114 MMOL/L (ref 95–110)
CHLORIDE SERPL-SCNC: 114 MMOL/L (ref 95–110)
CHLORIDE SERPL-SCNC: 115 MMOL/L (ref 95–110)
CHLORIDE SERPL-SCNC: 116 MMOL/L (ref 95–110)
CHLORIDE SERPL-SCNC: 116 MMOL/L (ref 95–110)
CHLORIDE SERPL-SCNC: 99 MMOL/L (ref 95–110)
CHLORIDE SERPL-SCNC: 99 MMOL/L (ref 95–110)
CLARITY UR REFRACT.AUTO: CLEAR
CLARITY UR: ABNORMAL
CLARITY UR: CLEAR
CO2 SERPL-SCNC: 13 MMOL/L (ref 23–29)
CO2 SERPL-SCNC: 14 MMOL/L (ref 23–29)
CO2 SERPL-SCNC: 17 MMOL/L (ref 23–29)
CO2 SERPL-SCNC: 18 MMOL/L (ref 23–29)
CO2 SERPL-SCNC: 20 MMOL/L (ref 23–29)
CO2 SERPL-SCNC: 20 MMOL/L (ref 23–29)
CO2 SERPL-SCNC: 21 MMOL/L (ref 23–29)
CO2 SERPL-SCNC: 21 MMOL/L (ref 23–29)
CO2 SERPL-SCNC: 22 MMOL/L (ref 23–29)
CO2 SERPL-SCNC: 23 MMOL/L (ref 23–29)
CO2 SERPL-SCNC: 24 MMOL/L (ref 23–29)
CO2 SERPL-SCNC: 25 MMOL/L (ref 23–29)
CO2 SERPL-SCNC: 26 MMOL/L (ref 23–29)
CO2 SERPL-SCNC: 27 MMOL/L (ref 20–32)
CO2 SERPL-SCNC: 27 MMOL/L (ref 23–29)
CO2 SERPL-SCNC: 28 MMOL/L (ref 23–29)
CO2 SERPL-SCNC: 29 MMOL/L (ref 20–32)
CO2 SERPL-SCNC: 29 MMOL/L (ref 23–29)
CO2 SERPL-SCNC: 30 MMOL/L (ref 20–32)
CO2 SERPL-SCNC: 30 MMOL/L (ref 23–29)
CO2 SERPL-SCNC: 31 MMOL/L (ref 23–29)
CO2 SERPL-SCNC: 32 MMOL/L (ref 23–29)
CO2 SERPL-SCNC: 33 MMOL/L (ref 23–29)
CO2 SERPL-SCNC: 33 MMOL/L (ref 23–29)
CO2 SERPL-SCNC: 9 MMOL/L (ref 23–29)
CODING SYSTEM: NORMAL
COLOR FLD: NORMAL
COLOR UR AUTO: YELLOW
COLOR UR: ABNORMAL
COLOR UR: YELLOW
CREAT SERPL-MCNC: 0.6 MG/DL (ref 0.5–1.4)
CREAT SERPL-MCNC: 0.68 MG/DL (ref 0.5–0.99)
CREAT SERPL-MCNC: 0.7 MG/DL (ref 0.5–1.4)
CREAT SERPL-MCNC: 0.76 MG/DL (ref 0.5–0.99)
CREAT SERPL-MCNC: 0.8 MG/DL (ref 0.5–1.4)
CREAT SERPL-MCNC: 0.9 MG/DL (ref 0.5–1.4)
CREAT SERPL-MCNC: 1 MG/DL (ref 0.5–1.4)
CREAT SERPL-MCNC: 1.05 MG/DL (ref 0.5–0.99)
CREAT SERPL-MCNC: 1.1 MG/DL (ref 0.5–1.4)
CREAT SERPL-MCNC: 1.2 MG/DL (ref 0.5–1.4)
CREAT SERPL-MCNC: 1.3 MG/DL (ref 0.5–1.4)
CREAT SERPL-MCNC: 1.4 MG/DL (ref 0.5–1.4)
CREAT SERPL-MCNC: 1.5 MG/DL (ref 0.5–1.4)
CREAT SERPL-MCNC: 1.6 MG/DL (ref 0.5–1.4)
CREAT SERPL-MCNC: 1.6 MG/DL (ref 0.5–1.4)
CREAT SERPL-MCNC: 1.7 MG/DL (ref 0.5–1.4)
CREAT SERPL-MCNC: 1.8 MG/DL (ref 0.5–1.4)
CREAT SERPL-MCNC: 1.9 MG/DL (ref 0.5–1.4)
CREAT SERPL-MCNC: 2.1 MG/DL (ref 0.5–1.4)
CREAT SERPL-MCNC: 2.4 MG/DL (ref 0.5–1.4)
CREAT SERPL-MCNC: 4.4 MG/DL (ref 0.5–1.4)
CREAT SERPL-MCNC: 4.8 MG/DL (ref 0.5–1.4)
CREAT SERPL-MCNC: 5.1 MG/DL (ref 0.5–1.4)
CRP SERPL-MCNC: 23.7 MG/L (ref 0–8.2)
CRP SERPL-MCNC: 26.7 MG/L (ref 0–8.2)
CRP SERPL-MCNC: 27.9 MG/L (ref 0–8.2)
CRP SERPL-MCNC: 39.8 MG/L (ref 0–8.2)
CRP SERPL-MCNC: 52.9 MG/L (ref 0–8.2)
CRP SERPL-MCNC: 66.1 MG/L (ref 0–8.2)
CTP QC/QA: YES
CTP QC/QA: YES
CV ECHO LV RWT: 0.27 CM
CV ECHO LV RWT: 0.34 CM
DACRYOCYTES BLD QL SMEAR: ABNORMAL
DELSYS: ABNORMAL
DELSYS: ABNORMAL
DIFFERENTIAL METHOD: ABNORMAL
DISPENSE STATUS: NORMAL
DOP CALC AO PEAK VEL: 1.64 M/S
DOP CALC AO PEAK VEL: 2.07 M/S
DOP CALC AO VTI: 27.05 CM
DOP CALC AO VTI: 32.83 CM
DOP CALC LVOT AREA: 2.4 CM2
DOP CALC LVOT AREA: 2.6 CM2
DOP CALC LVOT DIAMETER: 1.76 CM
DOP CALC LVOT DIAMETER: 1.81 CM
DOP CALC LVOT PEAK VEL: 1.23 M/S
DOP CALC LVOT PEAK VEL: 1.62 M/S
DOP CALC LVOT STROKE VOLUME: 51.18 CM3
DOP CALC LVOT STROKE VOLUME: 66.94 CM3
DOP CALCLVOT PEAK VEL VTI: 19.9 CM
DOP CALCLVOT PEAK VEL VTI: 27.53 CM
E WAVE DECELERATION TIME: 160.44 MSEC
E WAVE DECELERATION TIME: 183.9 MSEC
E/A RATIO: 0.67
E/A RATIO: 1.15
E/E' RATIO: 5.12 M/S
E/E' RATIO: 7.08 M/S
ECHO LV POSTERIOR WALL: 0.65 CM (ref 0.6–1.1)
ECHO LV POSTERIOR WALL: 0.8 CM (ref 0.6–1.1)
EOSINOPHIL # BLD AUTO: 0 K/UL (ref 0–0.5)
EOSINOPHIL # BLD AUTO: 0.2 K/UL (ref 0–0.5)
EOSINOPHIL # BLD AUTO: 21 CELLS/UL (ref 15–500)
EOSINOPHIL # BLD AUTO: 23 CELLS/UL (ref 15–500)
EOSINOPHIL # BLD AUTO: 42 CELLS/UL (ref 15–500)
EOSINOPHIL # BLD AUTO: 9 CELLS/UL (ref 15–500)
EOSINOPHIL # BLD AUTO: ABNORMAL K/UL (ref 0–0.5)
EOSINOPHIL NFR BLD AUTO: 0.1 %
EOSINOPHIL NFR BLD AUTO: 0.3 %
EOSINOPHIL NFR BLD AUTO: 0.4 %
EOSINOPHIL NFR BLD AUTO: 0.8 %
EOSINOPHIL NFR BLD: 0 % (ref 0–8)
EOSINOPHIL NFR BLD: 0.1 % (ref 0–8)
EOSINOPHIL NFR BLD: 0.2 % (ref 0–8)
EOSINOPHIL NFR BLD: 0.3 % (ref 0–8)
EOSINOPHIL NFR BLD: 0.4 % (ref 0–8)
EOSINOPHIL NFR BLD: 0.5 % (ref 0–8)
EOSINOPHIL NFR BLD: 1 % (ref 0–8)
EOSINOPHIL NFR BLD: 2.7 % (ref 0–8)
ERYTHROCYTE [DISTWIDTH] IN BLOOD BY AUTOMATED COUNT: 12.2 % (ref 11–15)
ERYTHROCYTE [DISTWIDTH] IN BLOOD BY AUTOMATED COUNT: 13.1 % (ref 11–15)
ERYTHROCYTE [DISTWIDTH] IN BLOOD BY AUTOMATED COUNT: 13.1 % (ref 11–15)
ERYTHROCYTE [DISTWIDTH] IN BLOOD BY AUTOMATED COUNT: 13.2 % (ref 11–15)
ERYTHROCYTE [DISTWIDTH] IN BLOOD BY AUTOMATED COUNT: 13.6 % (ref 11.5–14.5)
ERYTHROCYTE [DISTWIDTH] IN BLOOD BY AUTOMATED COUNT: 15.5 % (ref 11.5–14.5)
ERYTHROCYTE [DISTWIDTH] IN BLOOD BY AUTOMATED COUNT: 15.6 % (ref 11.5–14.5)
ERYTHROCYTE [DISTWIDTH] IN BLOOD BY AUTOMATED COUNT: 15.7 % (ref 11.5–14.5)
ERYTHROCYTE [DISTWIDTH] IN BLOOD BY AUTOMATED COUNT: 15.8 % (ref 11.5–14.5)
ERYTHROCYTE [DISTWIDTH] IN BLOOD BY AUTOMATED COUNT: 15.8 % (ref 11.5–14.5)
ERYTHROCYTE [DISTWIDTH] IN BLOOD BY AUTOMATED COUNT: 16.8 % (ref 11.5–14.5)
ERYTHROCYTE [DISTWIDTH] IN BLOOD BY AUTOMATED COUNT: 16.8 % (ref 11.5–14.5)
ERYTHROCYTE [DISTWIDTH] IN BLOOD BY AUTOMATED COUNT: 17.3 % (ref 11.5–14.5)
ERYTHROCYTE [DISTWIDTH] IN BLOOD BY AUTOMATED COUNT: 17.3 % (ref 11.5–14.5)
ERYTHROCYTE [DISTWIDTH] IN BLOOD BY AUTOMATED COUNT: 17.4 % (ref 11.5–14.5)
ERYTHROCYTE [DISTWIDTH] IN BLOOD BY AUTOMATED COUNT: 17.5 % (ref 11.5–14.5)
ERYTHROCYTE [DISTWIDTH] IN BLOOD BY AUTOMATED COUNT: 17.6 % (ref 11.5–14.5)
ERYTHROCYTE [DISTWIDTH] IN BLOOD BY AUTOMATED COUNT: 17.6 % (ref 11.5–14.5)
ERYTHROCYTE [DISTWIDTH] IN BLOOD BY AUTOMATED COUNT: 17.7 % (ref 11.5–14.5)
ERYTHROCYTE [DISTWIDTH] IN BLOOD BY AUTOMATED COUNT: 17.8 % (ref 11.5–14.5)
ERYTHROCYTE [DISTWIDTH] IN BLOOD BY AUTOMATED COUNT: 17.8 % (ref 11.5–14.5)
ERYTHROCYTE [DISTWIDTH] IN BLOOD BY AUTOMATED COUNT: 17.9 % (ref 11.5–14.5)
ERYTHROCYTE [DISTWIDTH] IN BLOOD BY AUTOMATED COUNT: 18 % (ref 11.5–14.5)
ERYTHROCYTE [DISTWIDTH] IN BLOOD BY AUTOMATED COUNT: 18 % (ref 11.5–14.5)
ERYTHROCYTE [DISTWIDTH] IN BLOOD BY AUTOMATED COUNT: 18.2 % (ref 11.5–14.5)
ERYTHROCYTE [DISTWIDTH] IN BLOOD BY AUTOMATED COUNT: 18.3 % (ref 11.5–14.5)
ERYTHROCYTE [DISTWIDTH] IN BLOOD BY AUTOMATED COUNT: 18.5 % (ref 11.5–14.5)
ERYTHROCYTE [DISTWIDTH] IN BLOOD BY AUTOMATED COUNT: 18.6 % (ref 11.5–14.5)
ERYTHROCYTE [DISTWIDTH] IN BLOOD BY AUTOMATED COUNT: 19.3 % (ref 11.5–14.5)
ERYTHROCYTE [DISTWIDTH] IN BLOOD BY AUTOMATED COUNT: 19.9 % (ref 11.5–14.5)
ERYTHROCYTE [DISTWIDTH] IN BLOOD BY AUTOMATED COUNT: 20 % (ref 11.5–14.5)
ERYTHROCYTE [DISTWIDTH] IN BLOOD BY AUTOMATED COUNT: 20.2 % (ref 11.5–14.5)
ERYTHROCYTE [DISTWIDTH] IN BLOOD BY AUTOMATED COUNT: 20.4 % (ref 11.5–14.5)
ERYTHROCYTE [DISTWIDTH] IN BLOOD BY AUTOMATED COUNT: 20.5 % (ref 11.5–14.5)
ERYTHROCYTE [DISTWIDTH] IN BLOOD BY AUTOMATED COUNT: 20.5 % (ref 11.5–14.5)
ERYTHROCYTE [DISTWIDTH] IN BLOOD BY AUTOMATED COUNT: 20.7 % (ref 11.5–14.5)
ERYTHROCYTE [DISTWIDTH] IN BLOOD BY AUTOMATED COUNT: 20.9 % (ref 11.5–14.5)
ERYTHROCYTE [DISTWIDTH] IN BLOOD BY AUTOMATED COUNT: 21.8 % (ref 11.5–14.5)
ERYTHROCYTE [DISTWIDTH] IN BLOOD BY AUTOMATED COUNT: 22.3 % (ref 11.5–14.5)
ERYTHROCYTE [DISTWIDTH] IN BLOOD BY AUTOMATED COUNT: 22.5 % (ref 11.5–14.5)
ERYTHROCYTE [SEDIMENTATION RATE] IN BLOOD BY WESTERGREN METHOD: 20 MM/H
ERYTHROCYTE [SEDIMENTATION RATE] IN BLOOD BY WESTERGREN METHOD: 32 MM/H
EST. GFR  (AFRICAN AMERICAN): 10 ML/MIN/1.73 M^2
EST. GFR  (AFRICAN AMERICAN): 11 ML/MIN/1.73 M^2
EST. GFR  (AFRICAN AMERICAN): 23 ML/MIN/1.73 M^2
EST. GFR  (AFRICAN AMERICAN): 27 ML/MIN/1.73 M^2
EST. GFR  (AFRICAN AMERICAN): 30 ML/MIN/1.73 M^2
EST. GFR  (AFRICAN AMERICAN): 33 ML/MIN/1.73 M^2
EST. GFR  (AFRICAN AMERICAN): 35 ML/MIN/1.73 M^2
EST. GFR  (AFRICAN AMERICAN): 38 ML/MIN/1.73 M^2
EST. GFR  (AFRICAN AMERICAN): 38 ML/MIN/1.73 M^2
EST. GFR  (AFRICAN AMERICAN): 41 ML/MIN/1.73 M^2
EST. GFR  (AFRICAN AMERICAN): 44 ML/MIN/1.73 M^2
EST. GFR  (AFRICAN AMERICAN): 48 ML/MIN/1.73 M^2
EST. GFR  (AFRICAN AMERICAN): 53 ML/MIN/1.73 M^2
EST. GFR  (AFRICAN AMERICAN): 59 ML/MIN/1.73 M^2
EST. GFR  (AFRICAN AMERICAN): 9 ML/MIN/1.73 M^2
EST. GFR  (AFRICAN AMERICAN): >60 ML/MIN/1.73 M^2
EST. GFR  (NON AFRICAN AMERICAN): 10 ML/MIN/1.73 M^2
EST. GFR  (NON AFRICAN AMERICAN): 20 ML/MIN/1.73 M^2
EST. GFR  (NON AFRICAN AMERICAN): 23 ML/MIN/1.73 M^2
EST. GFR  (NON AFRICAN AMERICAN): 26 ML/MIN/1.73 M^2
EST. GFR  (NON AFRICAN AMERICAN): 28 ML/MIN/1.73 M^2
EST. GFR  (NON AFRICAN AMERICAN): 30 ML/MIN/1.73 M^2
EST. GFR  (NON AFRICAN AMERICAN): 33 ML/MIN/1.73 M^2
EST. GFR  (NON AFRICAN AMERICAN): 33 ML/MIN/1.73 M^2
EST. GFR  (NON AFRICAN AMERICAN): 35 ML/MIN/1.73 M^2
EST. GFR  (NON AFRICAN AMERICAN): 38 ML/MIN/1.73 M^2
EST. GFR  (NON AFRICAN AMERICAN): 42 ML/MIN/1.73 M^2
EST. GFR  (NON AFRICAN AMERICAN): 46 ML/MIN/1.73 M^2
EST. GFR  (NON AFRICAN AMERICAN): 51 ML/MIN/1.73 M^2
EST. GFR  (NON AFRICAN AMERICAN): 57.6 ML/MIN/1.73 M^2
EST. GFR  (NON AFRICAN AMERICAN): 58 ML/MIN/1.73 M^2
EST. GFR  (NON AFRICAN AMERICAN): 8 ML/MIN/1.73 M^2
EST. GFR  (NON AFRICAN AMERICAN): 9 ML/MIN/1.73 M^2
EST. GFR  (NON AFRICAN AMERICAN): >60 ML/MIN/1.73 M^2
EXT 24 HR UR METANEPHRINE: ABNORMAL
EXT 24 HR UR NORMETANEPHRINE: ABNORMAL
EXT 24 HR UR NORMETANEPHRINE: ABNORMAL
EXT 25 HYDROXY VIT D2: ABNORMAL
EXT 25 HYDROXY VIT D3: ABNORMAL
EXT 5 HIAA 24 HR URINE: ABNORMAL
EXT 5 HIAA BLOOD: ABNORMAL
EXT ACTH: ABNORMAL
EXT AFP: ABNORMAL
EXT ALBUMIN: 4.2 G/DL (ref 3.6–5.1)
EXT ALKALINE PHOSPHATASE: 263 U/L (ref 37–153)
EXT ALT: 20 U/L (ref 6–29)
EXT AMYLASE: ABNORMAL
EXT ANTI ISLET CELL AB: ABNORMAL
EXT ANTI PARIETAL CELL AB: ABNORMAL
EXT ANTI THYROID AB: ABNORMAL
EXT AST: 15 U/L (ref 10–35)
EXT BILIRUBIN DIRECT: ABNORMAL
EXT BILIRUBIN TOTAL: 0.7 MG/DL (ref 0.2–1.2)
EXT BK VIRUS DNA QN PCR: ABNORMAL
EXT BUN: 12 MG/DL (ref 7–25)
EXT C PEPTIDE: ABNORMAL
EXT CA 125: ABNORMAL
EXT CA 19-9: ABNORMAL
EXT CA 27-29: ABNORMAL
EXT CALCITONIN: ABNORMAL
EXT CALCIUM: 10.1 MG/DL (ref 8.6–10.4)
EXT CEA: ABNORMAL
EXT CHLORIDE: 104 MMOL/L (ref 98–110)
EXT CHOLESTEROL: ABNORMAL
EXT CHROMOGRANIN A: ABNORMAL
EXT CO2: 30 MMOL/L (ref 20–32)
EXT CREATININE UA: ABNORMAL
EXT CREATININE: 1.05 MG/DL (ref 0.5–0.99)
EXT CYCLOSPORONE LEVEL: ABNORMAL
EXT DOPAMINE: ABNORMAL
EXT EBV DNA BY PCR: ABNORMAL
EXT EPINEPHRINE: ABNORMAL
EXT FOLATE: ABNORMAL
EXT FREE T3: ABNORMAL
EXT FREE T4: ABNORMAL
EXT FSH: ABNORMAL
EXT GASTRIN RELEASING PEPTIDE: ABNORMAL
EXT GASTRIN RELEASING PEPTIDE: ABNORMAL
EXT GASTRIN: ABNORMAL
EXT GGT: ABNORMAL
EXT GHRELIN: ABNORMAL
EXT GLUCAGON: ABNORMAL
EXT GLUCOSE: 77 MG/DL (ref 65–99)
EXT GROWTH HORMONE: ABNORMAL
EXT HCV RNA QUANT PCR: ABNORMAL
EXT HDL: ABNORMAL
EXT HEMATOCRIT: 41.5 % (ref 35–45)
EXT HEMOGLOBIN A1C: ABNORMAL
EXT HEMOGLOBIN: 13.7 G/DL (ref 11.7–15.5)
EXT HISTAMINE 24 HR URINE: ABNORMAL
EXT HISTAMINE: ABNORMAL
EXT IGF-1: ABNORMAL
EXT IMMUNKNOW (NON-STIMULATED): ABNORMAL
EXT IMMUNKNOW (STIMULATED): ABNORMAL
EXT INR: ABNORMAL
EXT INSULIN: ABNORMAL
EXT LANREOTIDE LEVEL: ABNORMAL
EXT LDH, TOTAL: ABNORMAL
EXT LDL CHOLESTEROL: ABNORMAL
EXT LIPASE: ABNORMAL
EXT MAGNESIUM: ABNORMAL
EXT METANEPHRINE FREE PLASMA: ABNORMAL
EXT MOTILIN: ABNORMAL
EXT NEUROKININ A CAMB: ABNORMAL
EXT NEUROKININ A ISI: ABNORMAL
EXT NEUROTENSIN: ABNORMAL
EXT NOREPINEPHRINE: ABNORMAL
EXT NORMETANEPHRINE: ABNORMAL
EXT NSE: ABNORMAL
EXT OCTREOTIDE LEVEL: ABNORMAL
EXT PANCREASTATIN CAMB: ABNORMAL
EXT PANCREASTATIN ISI: ABNORMAL
EXT PANCREATIC POLYPEPTIDE: ABNORMAL
EXT PHOSPHORUS: ABNORMAL
EXT PLATELETS: 159 1000/UL (ref 140–400)
EXT POTASSIUM: 4.6 MMOL/L (ref 3.5–5.3)
EXT PROGRAF LEVEL: ABNORMAL
EXT PROLACTIN: ABNORMAL
EXT PROTEIN TOTAL: 7.4 G/DL (ref 6.1–8.1)
EXT PROTEIN UA: ABNORMAL
EXT PT: ABNORMAL
EXT PTH, INTACT: ABNORMAL
EXT PTT: ABNORMAL
EXT RAPAMUNE LEVEL: ABNORMAL
EXT SEROTONIN: ABNORMAL
EXT SODIUM: 141 MMOL/L (ref 135–146)
EXT SOMATOSTATIN: ABNORMAL
EXT SUBSTANCE P: ABNORMAL
EXT TRIGLYCERIDES: ABNORMAL
EXT TRYPTASE: ABNORMAL
EXT TSH: ABNORMAL
EXT URIC ACID: ABNORMAL
EXT URINE AMYLASE U/HR: ABNORMAL
EXT URINE AMYLASE U/L: ABNORMAL
EXT VASOACTIVE INTESTINAL POLYPEPTIDE: ABNORMAL
EXT VITAMIN B12: ABNORMAL
EXT VMA 24 HR URINE: ABNORMAL
EXT WBC: 5.3 1000/UL (ref 3.8–10.8)
FINAL PATHOLOGIC DIAGNOSIS: NORMAL
FINAL PATHOLOGIC DIAGNOSIS: NORMAL
FIO2: 100
FIO2: 50
FOLATE SERPL-MCNC: 14.8 NG/ML (ref 4–24)
FRACTIONAL SHORTENING: 34 % (ref 28–44)
FRACTIONAL SHORTENING: 35 % (ref 28–44)
GFRSERPLBLD MDRD-ARVRAT: 54 ML/MIN/1.73M2
GFRSERPLBLD MDRD-ARVRAT: 80 ML/MIN/1.73M2
GFRSERPLBLD MDRD-ARVRAT: 89 ML/MIN/1.73M2
GIANT PLATELETS BLD QL SMEAR: PRESENT
GLOBULIN SER CALC-MCNC: 2.9 G/DL (CALC) (ref 1.9–3.7)
GLOBULIN SER CALC-MCNC: 3.2 G/DL (CALC) (ref 1.9–3.7)
GLOBULIN SER CALC-MCNC: 3.3 G/DL (CALC) (ref 1.9–3.7)
GLOBULIN SER CALC-MCNC: 3.4 G/DL (CALC) (ref 1.9–3.7)
GLUCOSE SERPL-MCNC: 101 MG/DL (ref 70–110)
GLUCOSE SERPL-MCNC: 101 MG/DL (ref 70–110)
GLUCOSE SERPL-MCNC: 102 MG/DL (ref 70–110)
GLUCOSE SERPL-MCNC: 104 MG/DL (ref 70–110)
GLUCOSE SERPL-MCNC: 105 MG/DL (ref 70–110)
GLUCOSE SERPL-MCNC: 106 MG/DL (ref 70–110)
GLUCOSE SERPL-MCNC: 108 MG/DL (ref 70–110)
GLUCOSE SERPL-MCNC: 109 MG/DL (ref 70–110)
GLUCOSE SERPL-MCNC: 110 MG/DL (ref 65–99)
GLUCOSE SERPL-MCNC: 110 MG/DL (ref 70–110)
GLUCOSE SERPL-MCNC: 110 MG/DL (ref 70–110)
GLUCOSE SERPL-MCNC: 111 MG/DL (ref 70–110)
GLUCOSE SERPL-MCNC: 112 MG/DL (ref 70–110)
GLUCOSE SERPL-MCNC: 112 MG/DL (ref 70–110)
GLUCOSE SERPL-MCNC: 113 MG/DL (ref 70–110)
GLUCOSE SERPL-MCNC: 113 MG/DL (ref 70–110)
GLUCOSE SERPL-MCNC: 114 MG/DL (ref 70–110)
GLUCOSE SERPL-MCNC: 116 MG/DL (ref 70–110)
GLUCOSE SERPL-MCNC: 118 MG/DL (ref 70–110)
GLUCOSE SERPL-MCNC: 119 MG/DL (ref 70–110)
GLUCOSE SERPL-MCNC: 121 MG/DL (ref 70–110)
GLUCOSE SERPL-MCNC: 121 MG/DL (ref 70–110)
GLUCOSE SERPL-MCNC: 122 MG/DL (ref 70–110)
GLUCOSE SERPL-MCNC: 122 MG/DL (ref 70–110)
GLUCOSE SERPL-MCNC: 123 MG/DL (ref 70–110)
GLUCOSE SERPL-MCNC: 124 MG/DL (ref 70–110)
GLUCOSE SERPL-MCNC: 125 MG/DL (ref 70–110)
GLUCOSE SERPL-MCNC: 125 MG/DL (ref 70–110)
GLUCOSE SERPL-MCNC: 128 MG/DL (ref 70–110)
GLUCOSE SERPL-MCNC: 128 MG/DL (ref 70–110)
GLUCOSE SERPL-MCNC: 131 MG/DL (ref 70–110)
GLUCOSE SERPL-MCNC: 131 MG/DL (ref 70–110)
GLUCOSE SERPL-MCNC: 132 MG/DL (ref 70–110)
GLUCOSE SERPL-MCNC: 134 MG/DL (ref 70–110)
GLUCOSE SERPL-MCNC: 136 MG/DL (ref 70–110)
GLUCOSE SERPL-MCNC: 143 MG/DL (ref 70–110)
GLUCOSE SERPL-MCNC: 148 MG/DL (ref 70–110)
GLUCOSE SERPL-MCNC: 177 MG/DL (ref 70–110)
GLUCOSE SERPL-MCNC: 310 MG/DL (ref 70–110)
GLUCOSE SERPL-MCNC: 330 MG/DL (ref 70–110)
GLUCOSE SERPL-MCNC: 48 MG/DL (ref 70–110)
GLUCOSE SERPL-MCNC: 53 MG/DL (ref 70–110)
GLUCOSE SERPL-MCNC: 61 MG/DL (ref 70–110)
GLUCOSE SERPL-MCNC: 63 MG/DL (ref 70–110)
GLUCOSE SERPL-MCNC: 65 MG/DL (ref 70–110)
GLUCOSE SERPL-MCNC: 66 MG/DL (ref 70–110)
GLUCOSE SERPL-MCNC: 67 MG/DL (ref 70–110)
GLUCOSE SERPL-MCNC: 68 MG/DL (ref 70–110)
GLUCOSE SERPL-MCNC: 71 MG/DL (ref 70–110)
GLUCOSE SERPL-MCNC: 75 MG/DL (ref 70–110)
GLUCOSE SERPL-MCNC: 77 MG/DL (ref 65–99)
GLUCOSE SERPL-MCNC: 77 MG/DL (ref 70–110)
GLUCOSE SERPL-MCNC: 77 MG/DL (ref 70–110)
GLUCOSE SERPL-MCNC: 80 MG/DL (ref 70–110)
GLUCOSE SERPL-MCNC: 80 MG/DL (ref 70–110)
GLUCOSE SERPL-MCNC: 81 MG/DL (ref 70–110)
GLUCOSE SERPL-MCNC: 81 MG/DL (ref 70–110)
GLUCOSE SERPL-MCNC: 82 MG/DL (ref 70–110)
GLUCOSE SERPL-MCNC: 82 MG/DL (ref 70–110)
GLUCOSE SERPL-MCNC: 84 MG/DL (ref 70–110)
GLUCOSE SERPL-MCNC: 85 MG/DL (ref 70–110)
GLUCOSE SERPL-MCNC: 86 MG/DL (ref 70–110)
GLUCOSE SERPL-MCNC: 88 MG/DL (ref 65–99)
GLUCOSE SERPL-MCNC: 88 MG/DL (ref 70–110)
GLUCOSE SERPL-MCNC: 90 MG/DL (ref 70–110)
GLUCOSE SERPL-MCNC: 91 MG/DL (ref 70–110)
GLUCOSE SERPL-MCNC: 92 MG/DL (ref 70–110)
GLUCOSE SERPL-MCNC: 93 MG/DL (ref 70–110)
GLUCOSE SERPL-MCNC: 95 MG/DL (ref 70–110)
GLUCOSE SERPL-MCNC: 95 MG/DL (ref 70–110)
GLUCOSE SERPL-MCNC: 97 MG/DL (ref 70–110)
GLUCOSE SERPL-MCNC: 99 MG/DL (ref 70–110)
GLUCOSE SERPL-MCNC: 99 MG/DL (ref 70–110)
GLUCOSE UR QL STRIP: NEGATIVE
GRAN CASTS #/AREA URNS LPF: 2 /LPF
GRAN CASTS #/AREA URNS LPF: 3 /LPF
GROSS: NORMAL
HCO3 UR-SCNC: 10 MMOL/L (ref 24–28)
HCO3 UR-SCNC: 10.1 MMOL/L (ref 24–28)
HCO3 UR-SCNC: 14.7 MMOL/L (ref 24–28)
HCT VFR BLD AUTO: 22.2 % (ref 37–48.5)
HCT VFR BLD AUTO: 22.5 % (ref 37–48.5)
HCT VFR BLD AUTO: 22.8 % (ref 37–48.5)
HCT VFR BLD AUTO: 23.2 % (ref 37–48.5)
HCT VFR BLD AUTO: 23.2 % (ref 37–48.5)
HCT VFR BLD AUTO: 23.4 % (ref 37–48.5)
HCT VFR BLD AUTO: 23.7 % (ref 37–48.5)
HCT VFR BLD AUTO: 23.9 % (ref 37–48.5)
HCT VFR BLD AUTO: 24.2 % (ref 37–48.5)
HCT VFR BLD AUTO: 24.2 % (ref 37–48.5)
HCT VFR BLD AUTO: 24.4 % (ref 37–48.5)
HCT VFR BLD AUTO: 24.5 % (ref 37–48.5)
HCT VFR BLD AUTO: 24.6 % (ref 37–48.5)
HCT VFR BLD AUTO: 25.3 % (ref 37–48.5)
HCT VFR BLD AUTO: 25.4 % (ref 37–48.5)
HCT VFR BLD AUTO: 25.4 % (ref 37–48.5)
HCT VFR BLD AUTO: 25.5 % (ref 37–48.5)
HCT VFR BLD AUTO: 25.7 % (ref 37–48.5)
HCT VFR BLD AUTO: 25.8 % (ref 37–48.5)
HCT VFR BLD AUTO: 25.9 % (ref 37–48.5)
HCT VFR BLD AUTO: 26 % (ref 37–48.5)
HCT VFR BLD AUTO: 26 % (ref 37–48.5)
HCT VFR BLD AUTO: 26.9 % (ref 37–48.5)
HCT VFR BLD AUTO: 26.9 % (ref 37–48.5)
HCT VFR BLD AUTO: 27.2 % (ref 37–48.5)
HCT VFR BLD AUTO: 27.2 % (ref 37–48.5)
HCT VFR BLD AUTO: 27.3 % (ref 37–48.5)
HCT VFR BLD AUTO: 27.3 % (ref 37–48.5)
HCT VFR BLD AUTO: 27.5 % (ref 37–48.5)
HCT VFR BLD AUTO: 27.6 % (ref 37–48.5)
HCT VFR BLD AUTO: 27.6 % (ref 37–48.5)
HCT VFR BLD AUTO: 27.7 % (ref 37–48.5)
HCT VFR BLD AUTO: 27.8 % (ref 37–48.5)
HCT VFR BLD AUTO: 28 % (ref 37–48.5)
HCT VFR BLD AUTO: 28.2 % (ref 37–48.5)
HCT VFR BLD AUTO: 28.4 % (ref 37–48.5)
HCT VFR BLD AUTO: 28.5 % (ref 37–48.5)
HCT VFR BLD AUTO: 28.6 % (ref 37–48.5)
HCT VFR BLD AUTO: 28.7 % (ref 37–48.5)
HCT VFR BLD AUTO: 29 % (ref 37–48.5)
HCT VFR BLD AUTO: 29 % (ref 37–48.5)
HCT VFR BLD AUTO: 31.5 % (ref 37–48.5)
HCT VFR BLD AUTO: 31.9 % (ref 37–48.5)
HCT VFR BLD AUTO: 32.2 % (ref 37–48.5)
HCT VFR BLD AUTO: 32.6 % (ref 37–48.5)
HCT VFR BLD AUTO: 33.6 % (ref 37–48.5)
HCT VFR BLD AUTO: 34.5 % (ref 35–45)
HCT VFR BLD AUTO: 34.6 % (ref 37–48.5)
HCT VFR BLD AUTO: 35.1 % (ref 35–45)
HCT VFR BLD AUTO: 36.3 % (ref 37–48.5)
HCT VFR BLD AUTO: 38.9 % (ref 35–45)
HCT VFR BLD AUTO: 41.5 % (ref 35–45)
HGB BLD-MCNC: 10.1 G/DL (ref 12–16)
HGB BLD-MCNC: 10.2 G/DL (ref 12–16)
HGB BLD-MCNC: 10.2 G/DL (ref 12–16)
HGB BLD-MCNC: 11 G/DL (ref 12–16)
HGB BLD-MCNC: 11.2 G/DL (ref 12–16)
HGB BLD-MCNC: 11.3 G/DL (ref 11.7–15.5)
HGB BLD-MCNC: 11.5 G/DL (ref 11.7–15.5)
HGB BLD-MCNC: 11.9 G/DL (ref 12–16)
HGB BLD-MCNC: 12.7 G/DL (ref 11.7–15.5)
HGB BLD-MCNC: 13.7 G/DL (ref 11.7–15.5)
HGB BLD-MCNC: 6.7 G/DL (ref 12–16)
HGB BLD-MCNC: 6.9 G/DL (ref 12–16)
HGB BLD-MCNC: 6.9 G/DL (ref 12–16)
HGB BLD-MCNC: 7.1 G/DL (ref 12–16)
HGB BLD-MCNC: 7.1 G/DL (ref 12–16)
HGB BLD-MCNC: 7.2 G/DL (ref 12–16)
HGB BLD-MCNC: 7.2 G/DL (ref 12–16)
HGB BLD-MCNC: 7.3 G/DL (ref 12–16)
HGB BLD-MCNC: 7.7 G/DL (ref 12–16)
HGB BLD-MCNC: 7.8 G/DL (ref 12–16)
HGB BLD-MCNC: 7.9 G/DL (ref 12–16)
HGB BLD-MCNC: 7.9 G/DL (ref 12–16)
HGB BLD-MCNC: 8 G/DL (ref 12–16)
HGB BLD-MCNC: 8 G/DL (ref 12–16)
HGB BLD-MCNC: 8.1 G/DL (ref 12–16)
HGB BLD-MCNC: 8.2 G/DL (ref 12–16)
HGB BLD-MCNC: 8.2 G/DL (ref 12–16)
HGB BLD-MCNC: 8.3 G/DL (ref 12–16)
HGB BLD-MCNC: 8.4 G/DL (ref 12–16)
HGB BLD-MCNC: 8.5 G/DL (ref 12–16)
HGB BLD-MCNC: 8.5 G/DL (ref 12–16)
HGB BLD-MCNC: 8.6 G/DL (ref 12–16)
HGB BLD-MCNC: 8.7 G/DL (ref 12–16)
HGB BLD-MCNC: 8.7 G/DL (ref 12–16)
HGB BLD-MCNC: 8.8 G/DL (ref 12–16)
HGB BLD-MCNC: 8.9 G/DL (ref 12–16)
HGB BLD-MCNC: 9 G/DL (ref 12–16)
HGB BLD-MCNC: 9.1 G/DL (ref 12–16)
HGB BLD-MCNC: 9.4 G/DL (ref 12–16)
HGB BLD-MCNC: 9.7 G/DL (ref 12–16)
HGB UR QL STRIP: ABNORMAL
HGB UR QL STRIP: NEGATIVE
HOWELL-JOLLY BOD BLD QL SMEAR: ABNORMAL
HYALINE CASTS #/AREA URNS LPF: 1 /LPF
HYALINE CASTS #/AREA URNS LPF: 1 /LPF
HYPOCHROMIA BLD QL SMEAR: ABNORMAL
IMM GRANULOCYTES # BLD AUTO: 0.02 K/UL (ref 0–0.04)
IMM GRANULOCYTES # BLD AUTO: 0.03 K/UL (ref 0–0.04)
IMM GRANULOCYTES # BLD AUTO: 0.04 K/UL (ref 0–0.04)
IMM GRANULOCYTES # BLD AUTO: 0.05 K/UL (ref 0–0.04)
IMM GRANULOCYTES # BLD AUTO: 0.06 K/UL (ref 0–0.04)
IMM GRANULOCYTES # BLD AUTO: 0.07 K/UL (ref 0–0.04)
IMM GRANULOCYTES # BLD AUTO: 0.07 K/UL (ref 0–0.04)
IMM GRANULOCYTES # BLD AUTO: 0.08 K/UL (ref 0–0.04)
IMM GRANULOCYTES # BLD AUTO: 0.09 K/UL (ref 0–0.04)
IMM GRANULOCYTES # BLD AUTO: 0.11 K/UL (ref 0–0.04)
IMM GRANULOCYTES # BLD AUTO: 0.19 K/UL (ref 0–0.04)
IMM GRANULOCYTES # BLD AUTO: ABNORMAL K/UL (ref 0–0.04)
IMM GRANULOCYTES NFR BLD AUTO: 0.3 % (ref 0–0.5)
IMM GRANULOCYTES NFR BLD AUTO: 0.3 % (ref 0–0.5)
IMM GRANULOCYTES NFR BLD AUTO: 0.4 % (ref 0–0.5)
IMM GRANULOCYTES NFR BLD AUTO: 0.5 % (ref 0–0.5)
IMM GRANULOCYTES NFR BLD AUTO: 0.6 % (ref 0–0.5)
IMM GRANULOCYTES NFR BLD AUTO: 0.7 % (ref 0–0.5)
IMM GRANULOCYTES NFR BLD AUTO: 0.8 % (ref 0–0.5)
IMM GRANULOCYTES NFR BLD AUTO: 0.9 % (ref 0–0.5)
IMM GRANULOCYTES NFR BLD AUTO: 1 % (ref 0–0.5)
IMM GRANULOCYTES NFR BLD AUTO: 1.1 % (ref 0–0.5)
IMM GRANULOCYTES NFR BLD AUTO: 1.4 % (ref 0–0.5)
IMM GRANULOCYTES NFR BLD AUTO: ABNORMAL % (ref 0–0.5)
INR PPP: 1.1 (ref 0.8–1.2)
INR PPP: 1.1 (ref 0.8–1.2)
INR PPP: 1.3 (ref 0.8–1.2)
INR PPP: 1.3 (ref 0.8–1.2)
INTERVENTRICULAR SEPTUM: 0.43 CM (ref 0.6–1.1)
INTERVENTRICULAR SEPTUM: 0.82 CM (ref 0.6–1.1)
IVRT: 34.25 MSEC
IVRT: 85.63 MSEC
KETONES UR QL STRIP: NEGATIVE
LA MAJOR: 5.18 CM
LA MAJOR: 5.84 CM
LA MINOR: 5.1 CM
LA MINOR: 5.71 CM
LA WIDTH: 3.75 CM
LA WIDTH: 3.88 CM
LACTATE SERPL-SCNC: 1 MMOL/L (ref 0.5–2.2)
LACTATE SERPL-SCNC: 1.2 MMOL/L (ref 0.5–2.2)
LACTATE SERPL-SCNC: 3.3 MMOL/L (ref 0.5–2.2)
LACTATE SERPL-SCNC: 3.7 MMOL/L (ref 0.5–2.2)
LACTATE SERPL-SCNC: 8.9 MMOL/L (ref 0.5–2.2)
LDH FLD L TO P-CCNC: 51 U/L
LDH SERPL L TO P-CCNC: 169 U/L (ref 110–260)
LEFT ATRIUM SIZE: 3.5 CM
LEFT ATRIUM SIZE: 4.11 CM
LEFT ATRIUM VOLUME INDEX MOD: 52.4 ML/M2
LEFT ATRIUM VOLUME INDEX: 32.2 ML/M2
LEFT ATRIUM VOLUME INDEX: 48.1 ML/M2
LEFT ATRIUM VOLUME MOD: 85.25 CM3
LEFT ATRIUM VOLUME: 57.34 CM3
LEFT ATRIUM VOLUME: 78.27 CM3
LEFT INTERNAL DIMENSION IN SYSTOLE: 3.08 CM (ref 2.1–4)
LEFT INTERNAL DIMENSION IN SYSTOLE: 3.17 CM (ref 2.1–4)
LEFT VENTRICLE DIASTOLIC VOLUME INDEX: 61.06 ML/M2
LEFT VENTRICLE DIASTOLIC VOLUME INDEX: 63.19 ML/M2
LEFT VENTRICLE DIASTOLIC VOLUME: 102.84 ML
LEFT VENTRICLE DIASTOLIC VOLUME: 108.63 ML
LEFT VENTRICLE MASS INDEX: 44 G/M2
LEFT VENTRICLE MASS INDEX: 77 G/M2
LEFT VENTRICLE SYSTOLIC VOLUME INDEX: 22.6 ML/M2
LEFT VENTRICLE SYSTOLIC VOLUME INDEX: 22.9 ML/M2
LEFT VENTRICLE SYSTOLIC VOLUME: 37.33 ML
LEFT VENTRICLE SYSTOLIC VOLUME: 40.16 ML
LEFT VENTRICULAR INTERNAL DIMENSION IN DIASTOLE: 4.71 CM (ref 3.5–6)
LEFT VENTRICULAR INTERNAL DIMENSION IN DIASTOLE: 4.82 CM (ref 3.5–6)
LEFT VENTRICULAR MASS: 124.69 G
LEFT VENTRICULAR MASS: 78.31 G
LEUKOCYTE ESTERASE UR QL STRIP: ABNORMAL
LEUKOCYTE ESTERASE UR QL STRIP: NEGATIVE
LIPASE SERPL-CCNC: 45 U/L (ref 4–60)
LV LATERAL E/E' RATIO: 4.57 M/S
LV LATERAL E/E' RATIO: 6.54 M/S
LV SEPTAL E/E' RATIO: 5.82 M/S
LV SEPTAL E/E' RATIO: 7.73 M/S
LYMPHOCYTES # BLD AUTO: 0.7 K/UL (ref 1–4.8)
LYMPHOCYTES # BLD AUTO: 0.8 K/UL (ref 1–4.8)
LYMPHOCYTES # BLD AUTO: 0.9 K/UL (ref 1–4.8)
LYMPHOCYTES # BLD AUTO: 1 K/UL (ref 1–4.8)
LYMPHOCYTES # BLD AUTO: 1.1 K/UL (ref 1–4.8)
LYMPHOCYTES # BLD AUTO: 1.2 K/UL (ref 1–4.8)
LYMPHOCYTES # BLD AUTO: 1.3 K/UL (ref 1–4.8)
LYMPHOCYTES # BLD AUTO: 1.4 K/UL (ref 1–4.8)
LYMPHOCYTES # BLD AUTO: 1.5 K/UL (ref 1–4.8)
LYMPHOCYTES # BLD AUTO: 1.6 K/UL (ref 1–4.8)
LYMPHOCYTES # BLD AUTO: 1.8 K/UL (ref 1–4.8)
LYMPHOCYTES # BLD AUTO: 1.9 K/UL (ref 1–4.8)
LYMPHOCYTES # BLD AUTO: 1009 CELLS/UL (ref 850–3900)
LYMPHOCYTES # BLD AUTO: 1085 CELLS/UL (ref 850–3900)
LYMPHOCYTES # BLD AUTO: 1283 CELLS/UL (ref 850–3900)
LYMPHOCYTES # BLD AUTO: 2.3 K/UL (ref 1–4.8)
LYMPHOCYTES # BLD AUTO: 783 CELLS/UL (ref 850–3900)
LYMPHOCYTES # BLD AUTO: ABNORMAL K/UL (ref 1–4.8)
LYMPHOCYTES NFR BLD AUTO: 15.5 %
LYMPHOCYTES NFR BLD AUTO: 17.7 %
LYMPHOCYTES NFR BLD AUTO: 24.2 %
LYMPHOCYTES NFR BLD AUTO: 9.1 %
LYMPHOCYTES NFR BLD: 10 % (ref 18–48)
LYMPHOCYTES NFR BLD: 10.9 % (ref 18–48)
LYMPHOCYTES NFR BLD: 11.1 % (ref 18–48)
LYMPHOCYTES NFR BLD: 11.1 % (ref 18–48)
LYMPHOCYTES NFR BLD: 11.2 % (ref 18–48)
LYMPHOCYTES NFR BLD: 11.4 % (ref 18–48)
LYMPHOCYTES NFR BLD: 12 % (ref 18–48)
LYMPHOCYTES NFR BLD: 12.4 % (ref 18–48)
LYMPHOCYTES NFR BLD: 12.5 % (ref 18–48)
LYMPHOCYTES NFR BLD: 13.1 % (ref 18–48)
LYMPHOCYTES NFR BLD: 13.2 % (ref 18–48)
LYMPHOCYTES NFR BLD: 13.2 % (ref 18–48)
LYMPHOCYTES NFR BLD: 13.4 % (ref 18–48)
LYMPHOCYTES NFR BLD: 13.5 % (ref 18–48)
LYMPHOCYTES NFR BLD: 13.6 % (ref 18–48)
LYMPHOCYTES NFR BLD: 13.9 % (ref 18–48)
LYMPHOCYTES NFR BLD: 14 % (ref 18–48)
LYMPHOCYTES NFR BLD: 14.1 % (ref 18–48)
LYMPHOCYTES NFR BLD: 14.1 % (ref 18–48)
LYMPHOCYTES NFR BLD: 14.5 % (ref 18–48)
LYMPHOCYTES NFR BLD: 14.9 % (ref 18–48)
LYMPHOCYTES NFR BLD: 15.1 % (ref 18–48)
LYMPHOCYTES NFR BLD: 15.2 % (ref 18–48)
LYMPHOCYTES NFR BLD: 15.4 % (ref 18–48)
LYMPHOCYTES NFR BLD: 16 % (ref 18–48)
LYMPHOCYTES NFR BLD: 16.2 % (ref 18–48)
LYMPHOCYTES NFR BLD: 16.5 % (ref 18–48)
LYMPHOCYTES NFR BLD: 17.7 % (ref 18–48)
LYMPHOCYTES NFR BLD: 17.9 % (ref 18–48)
LYMPHOCYTES NFR BLD: 18 % (ref 18–48)
LYMPHOCYTES NFR BLD: 18.3 % (ref 18–48)
LYMPHOCYTES NFR BLD: 18.5 % (ref 18–48)
LYMPHOCYTES NFR BLD: 18.5 % (ref 18–48)
LYMPHOCYTES NFR BLD: 18.6 % (ref 18–48)
LYMPHOCYTES NFR BLD: 19.7 % (ref 18–48)
LYMPHOCYTES NFR BLD: 19.7 % (ref 18–48)
LYMPHOCYTES NFR BLD: 20.8 % (ref 18–48)
LYMPHOCYTES NFR BLD: 23.5 % (ref 18–48)
LYMPHOCYTES NFR BLD: 24 % (ref 18–48)
LYMPHOCYTES NFR BLD: 25.9 % (ref 18–48)
LYMPHOCYTES NFR BLD: 28.1 % (ref 18–48)
LYMPHOCYTES NFR BLD: 6 % (ref 18–48)
LYMPHOCYTES NFR BLD: 7.6 % (ref 18–48)
LYMPHOCYTES NFR BLD: 8 % (ref 18–48)
LYMPHOCYTES NFR BLD: 8.9 % (ref 18–48)
LYMPHOCYTES NFR BLD: 9.8 % (ref 18–48)
LYMPHOCYTES NFR BLD: 9.9 % (ref 18–48)
LYMPHOCYTES NFR FLD MANUAL: 23 %
MAGNESIUM SERPL-MCNC: 1.5 MG/DL (ref 1.6–2.6)
MAGNESIUM SERPL-MCNC: 1.6 MG/DL (ref 1.6–2.6)
MAGNESIUM SERPL-MCNC: 1.7 MG/DL (ref 1.6–2.6)
MAGNESIUM SERPL-MCNC: 1.8 MG/DL (ref 1.6–2.6)
MAGNESIUM SERPL-MCNC: 1.9 MG/DL (ref 1.6–2.6)
MAGNESIUM SERPL-MCNC: 2 MG/DL (ref 1.6–2.6)
MAGNESIUM SERPL-MCNC: 2.1 MG/DL (ref 1.6–2.6)
MAGNESIUM SERPL-MCNC: 2.2 MG/DL (ref 1.6–2.6)
MAGNESIUM SERPL-MCNC: 2.2 MG/DL (ref 1.6–2.6)
MAGNESIUM SERPL-MCNC: 2.3 MG/DL (ref 1.6–2.6)
MCH RBC QN AUTO: 30.7 PG (ref 27–33)
MCH RBC QN AUTO: 30.8 PG (ref 27–33)
MCH RBC QN AUTO: 31.1 PG (ref 27–31)
MCH RBC QN AUTO: 31.1 PG (ref 27–31)
MCH RBC QN AUTO: 31.1 PG (ref 27–33)
MCH RBC QN AUTO: 31.2 PG (ref 27–33)
MCH RBC QN AUTO: 31.4 PG (ref 27–31)
MCH RBC QN AUTO: 31.5 PG (ref 27–31)
MCH RBC QN AUTO: 31.6 PG (ref 27–31)
MCH RBC QN AUTO: 31.6 PG (ref 27–31)
MCH RBC QN AUTO: 31.7 PG (ref 27–31)
MCH RBC QN AUTO: 31.8 PG (ref 27–31)
MCH RBC QN AUTO: 31.8 PG (ref 27–31)
MCH RBC QN AUTO: 31.9 PG (ref 27–31)
MCH RBC QN AUTO: 32 PG (ref 27–31)
MCH RBC QN AUTO: 32 PG (ref 27–31)
MCH RBC QN AUTO: 32.1 PG (ref 27–31)
MCH RBC QN AUTO: 32.1 PG (ref 27–31)
MCH RBC QN AUTO: 32.2 PG (ref 27–31)
MCH RBC QN AUTO: 32.3 PG (ref 27–31)
MCH RBC QN AUTO: 32.4 PG (ref 27–31)
MCH RBC QN AUTO: 32.5 PG (ref 27–31)
MCH RBC QN AUTO: 32.6 PG (ref 27–31)
MCH RBC QN AUTO: 32.9 PG (ref 27–31)
MCH RBC QN AUTO: 33 PG (ref 27–31)
MCH RBC QN AUTO: 33.3 PG (ref 27–31)
MCH RBC QN AUTO: 33.5 PG (ref 27–31)
MCH RBC QN AUTO: 33.9 PG (ref 27–31)
MCH RBC QN AUTO: 34.5 PG (ref 27–31)
MCH RBC QN AUTO: 34.5 PG (ref 27–31)
MCH RBC QN AUTO: 34.6 PG (ref 27–31)
MCH RBC QN AUTO: 35.2 PG (ref 27–31)
MCH RBC QN AUTO: 35.4 PG (ref 27–31)
MCH RBC QN AUTO: 35.5 PG (ref 27–31)
MCH RBC QN AUTO: 35.6 PG (ref 27–31)
MCH RBC QN AUTO: 35.6 PG (ref 27–31)
MCH RBC QN AUTO: 36 PG (ref 27–31)
MCH RBC QN AUTO: 36.1 PG (ref 27–31)
MCHC RBC AUTO-ENTMCNC: 29 G/DL (ref 32–36)
MCHC RBC AUTO-ENTMCNC: 29.4 G/DL (ref 32–36)
MCHC RBC AUTO-ENTMCNC: 29.6 G/DL (ref 32–36)
MCHC RBC AUTO-ENTMCNC: 29.8 G/DL (ref 32–36)
MCHC RBC AUTO-ENTMCNC: 29.8 G/DL (ref 32–36)
MCHC RBC AUTO-ENTMCNC: 30.2 G/DL (ref 32–36)
MCHC RBC AUTO-ENTMCNC: 30.3 G/DL (ref 32–36)
MCHC RBC AUTO-ENTMCNC: 30.3 G/DL (ref 32–36)
MCHC RBC AUTO-ENTMCNC: 30.5 G/DL (ref 32–36)
MCHC RBC AUTO-ENTMCNC: 30.6 G/DL (ref 32–36)
MCHC RBC AUTO-ENTMCNC: 30.6 G/DL (ref 32–36)
MCHC RBC AUTO-ENTMCNC: 30.7 G/DL (ref 32–36)
MCHC RBC AUTO-ENTMCNC: 30.8 G/DL (ref 32–36)
MCHC RBC AUTO-ENTMCNC: 30.9 G/DL (ref 32–36)
MCHC RBC AUTO-ENTMCNC: 31 G/DL (ref 32–36)
MCHC RBC AUTO-ENTMCNC: 31.1 G/DL (ref 32–36)
MCHC RBC AUTO-ENTMCNC: 31.3 G/DL (ref 32–36)
MCHC RBC AUTO-ENTMCNC: 31.4 G/DL (ref 32–36)
MCHC RBC AUTO-ENTMCNC: 31.5 G/DL (ref 32–36)
MCHC RBC AUTO-ENTMCNC: 31.6 G/DL (ref 32–36)
MCHC RBC AUTO-ENTMCNC: 31.6 G/DL (ref 32–36)
MCHC RBC AUTO-ENTMCNC: 31.7 G/DL (ref 32–36)
MCHC RBC AUTO-ENTMCNC: 31.8 G/DL (ref 32–36)
MCHC RBC AUTO-ENTMCNC: 31.9 G/DL (ref 32–36)
MCHC RBC AUTO-ENTMCNC: 31.9 G/DL (ref 32–36)
MCHC RBC AUTO-ENTMCNC: 32 G/DL (ref 32–36)
MCHC RBC AUTO-ENTMCNC: 32.2 G/DL (ref 32–36)
MCHC RBC AUTO-ENTMCNC: 32.3 G/DL (ref 32–36)
MCHC RBC AUTO-ENTMCNC: 32.4 G/DL (ref 32–36)
MCHC RBC AUTO-ENTMCNC: 32.4 G/DL (ref 32–36)
MCHC RBC AUTO-ENTMCNC: 32.6 G/DL (ref 32–36)
MCHC RBC AUTO-ENTMCNC: 32.8 G/DL (ref 32–36)
MCHC RBC AUTO-ENTMCNC: 33 G/DL (ref 32–36)
MCHC RBC AUTO-ENTMCNC: 33.3 G/DL (ref 32–36)
MCHC RBC AUTO-ENTMCNC: 33.3 G/DL (ref 32–36)
MCV RBC AUTO: 100 FL (ref 82–98)
MCV RBC AUTO: 101 FL (ref 82–98)
MCV RBC AUTO: 102 FL (ref 82–98)
MCV RBC AUTO: 103 FL (ref 82–98)
MCV RBC AUTO: 104 FL (ref 82–98)
MCV RBC AUTO: 105 FL (ref 82–98)
MCV RBC AUTO: 105 FL (ref 82–98)
MCV RBC AUTO: 106 FL (ref 82–98)
MCV RBC AUTO: 106 FL (ref 82–98)
MCV RBC AUTO: 108 FL (ref 82–98)
MCV RBC AUTO: 109 FL (ref 82–98)
MCV RBC AUTO: 110 FL (ref 82–98)
MCV RBC AUTO: 111 FL (ref 82–98)
MCV RBC AUTO: 113 FL (ref 82–98)
MCV RBC AUTO: 117 FL (ref 82–98)
MCV RBC AUTO: 92.5 FL (ref 80–100)
MCV RBC AUTO: 93 FL (ref 80–100)
MCV RBC AUTO: 95.6 FL (ref 80–100)
MCV RBC AUTO: 96.7 FL (ref 80–100)
MCV RBC AUTO: 98 FL (ref 82–98)
MCV RBC AUTO: 99 FL (ref 82–98)
MESOTHL CELL NFR FLD MANUAL: 66 %
MICROSCOPIC COMMENT: ABNORMAL
MICROSCOPIC COMMENT: ABNORMAL
MICROSCOPIC EXAM: NORMAL
MODE: ABNORMAL
MODE: ABNORMAL
MONOCYTES # BLD AUTO: 0.6 K/UL (ref 0.3–1)
MONOCYTES # BLD AUTO: 0.7 K/UL (ref 0.3–1)
MONOCYTES # BLD AUTO: 0.8 K/UL (ref 0.3–1)
MONOCYTES # BLD AUTO: 0.9 K/UL (ref 0.3–1)
MONOCYTES # BLD AUTO: 1 K/UL (ref 0.3–1)
MONOCYTES # BLD AUTO: 1.1 K/UL (ref 0.3–1)
MONOCYTES # BLD AUTO: 1.2 K/UL (ref 0.3–1)
MONOCYTES # BLD AUTO: 1.3 K/UL (ref 0.3–1)
MONOCYTES # BLD AUTO: 1.3 K/UL (ref 0.3–1)
MONOCYTES # BLD AUTO: 1.4 K/UL (ref 0.3–1)
MONOCYTES # BLD AUTO: 1.4 K/UL (ref 0.3–1)
MONOCYTES # BLD AUTO: 1002 CELLS/UL (ref 200–950)
MONOCYTES # BLD AUTO: 756 CELLS/UL (ref 200–950)
MONOCYTES # BLD AUTO: 821 CELLS/UL (ref 200–950)
MONOCYTES # BLD AUTO: 903 CELLS/UL (ref 200–950)
MONOCYTES # BLD AUTO: ABNORMAL K/UL (ref 0.3–1)
MONOCYTES NFR BLD AUTO: 10.5 %
MONOCYTES NFR BLD AUTO: 10.8 %
MONOCYTES NFR BLD AUTO: 14.4 %
MONOCYTES NFR BLD AUTO: 18.9 %
MONOCYTES NFR BLD: 0 % (ref 4–15)
MONOCYTES NFR BLD: 0 % (ref 4–15)
MONOCYTES NFR BLD: 10.7 % (ref 4–15)
MONOCYTES NFR BLD: 10.8 % (ref 4–15)
MONOCYTES NFR BLD: 10.9 % (ref 4–15)
MONOCYTES NFR BLD: 11 % (ref 4–15)
MONOCYTES NFR BLD: 11 % (ref 4–15)
MONOCYTES NFR BLD: 11.3 % (ref 4–15)
MONOCYTES NFR BLD: 11.3 % (ref 4–15)
MONOCYTES NFR BLD: 11.4 % (ref 4–15)
MONOCYTES NFR BLD: 11.7 % (ref 4–15)
MONOCYTES NFR BLD: 11.7 % (ref 4–15)
MONOCYTES NFR BLD: 11.9 % (ref 4–15)
MONOCYTES NFR BLD: 12 % (ref 4–15)
MONOCYTES NFR BLD: 12.1 % (ref 4–15)
MONOCYTES NFR BLD: 12.2 % (ref 4–15)
MONOCYTES NFR BLD: 12.3 % (ref 4–15)
MONOCYTES NFR BLD: 12.4 % (ref 4–15)
MONOCYTES NFR BLD: 12.4 % (ref 4–15)
MONOCYTES NFR BLD: 12.7 % (ref 4–15)
MONOCYTES NFR BLD: 12.7 % (ref 4–15)
MONOCYTES NFR BLD: 12.8 % (ref 4–15)
MONOCYTES NFR BLD: 12.8 % (ref 4–15)
MONOCYTES NFR BLD: 12.9 % (ref 4–15)
MONOCYTES NFR BLD: 13.1 % (ref 4–15)
MONOCYTES NFR BLD: 13.2 % (ref 4–15)
MONOCYTES NFR BLD: 13.5 % (ref 4–15)
MONOCYTES NFR BLD: 13.5 % (ref 4–15)
MONOCYTES NFR BLD: 13.6 % (ref 4–15)
MONOCYTES NFR BLD: 13.6 % (ref 4–15)
MONOCYTES NFR BLD: 13.7 % (ref 4–15)
MONOCYTES NFR BLD: 14 % (ref 4–15)
MONOCYTES NFR BLD: 14 % (ref 4–15)
MONOCYTES NFR BLD: 14.1 % (ref 4–15)
MONOCYTES NFR BLD: 14.2 % (ref 4–15)
MONOCYTES NFR BLD: 14.6 % (ref 4–15)
MONOCYTES NFR BLD: 14.6 % (ref 4–15)
MONOCYTES NFR BLD: 15 % (ref 4–15)
MONOCYTES NFR BLD: 15 % (ref 4–15)
MONOCYTES NFR BLD: 15.4 % (ref 4–15)
MONOCYTES NFR BLD: 16.3 % (ref 4–15)
MONOCYTES NFR BLD: 16.6 % (ref 4–15)
MONOCYTES NFR BLD: 6.7 % (ref 4–15)
MONOCYTES NFR BLD: 7 % (ref 4–15)
MONOCYTES NFR BLD: 8 % (ref 4–15)
MONOS+MACROS NFR FLD MANUAL: 5 %
MV A" WAVE DURATION": 8.85 MSEC
MV PEAK A VEL: 0.74 M/S
MV PEAK A VEL: 0.96 M/S
MV PEAK E VEL: 0.64 M/S
MV PEAK E VEL: 0.85 M/S
MV STENOSIS PRESSURE HALF TIME: 46.53 MS
MV VALVE AREA P 1/2 METHOD: 4.73 CM2
NEURON SPECIFIC ENOLASE: ABNORMAL
NEUTROPHILS # BLD AUTO: 11.1 K/UL (ref 1.8–7.7)
NEUTROPHILS # BLD AUTO: 2952 CELLS/UL (ref 1500–7800)
NEUTROPHILS # BLD AUTO: 3.2 K/UL (ref 1.8–7.7)
NEUTROPHILS # BLD AUTO: 3.5 K/UL (ref 1.8–7.7)
NEUTROPHILS # BLD AUTO: 3.5 K/UL (ref 1.8–7.7)
NEUTROPHILS # BLD AUTO: 3.7 K/UL (ref 1.8–7.7)
NEUTROPHILS # BLD AUTO: 3.8 K/UL (ref 1.8–7.7)
NEUTROPHILS # BLD AUTO: 3.8 K/UL (ref 1.8–7.7)
NEUTROPHILS # BLD AUTO: 3.9 K/UL (ref 1.8–7.7)
NEUTROPHILS # BLD AUTO: 3.9 K/UL (ref 1.8–7.7)
NEUTROPHILS # BLD AUTO: 3836 CELLS/UL (ref 1500–7800)
NEUTROPHILS # BLD AUTO: 4 K/UL (ref 1.8–7.7)
NEUTROPHILS # BLD AUTO: 4.1 K/UL (ref 1.8–7.7)
NEUTROPHILS # BLD AUTO: 4.2 K/UL (ref 1.8–7.7)
NEUTROPHILS # BLD AUTO: 4.3 K/UL (ref 1.8–7.7)
NEUTROPHILS # BLD AUTO: 4.5 K/UL (ref 1.8–7.7)
NEUTROPHILS # BLD AUTO: 4.5 K/UL (ref 1.8–7.7)
NEUTROPHILS # BLD AUTO: 4.6 K/UL (ref 1.8–7.7)
NEUTROPHILS # BLD AUTO: 4.7 K/UL (ref 1.8–7.7)
NEUTROPHILS # BLD AUTO: 4.7 K/UL (ref 1.8–7.7)
NEUTROPHILS # BLD AUTO: 4.8 K/UL (ref 1.8–7.7)
NEUTROPHILS # BLD AUTO: 4.9 K/UL (ref 1.8–7.7)
NEUTROPHILS # BLD AUTO: 5.1 K/UL (ref 1.8–7.7)
NEUTROPHILS # BLD AUTO: 5.2 K/UL (ref 1.8–7.7)
NEUTROPHILS # BLD AUTO: 5.5 K/UL (ref 1.8–7.7)
NEUTROPHILS # BLD AUTO: 5.6 K/UL (ref 1.8–7.7)
NEUTROPHILS # BLD AUTO: 5.7 K/UL (ref 1.8–7.7)
NEUTROPHILS # BLD AUTO: 5.7 K/UL (ref 1.8–7.7)
NEUTROPHILS # BLD AUTO: 5.9 K/UL (ref 1.8–7.7)
NEUTROPHILS # BLD AUTO: 5.9 K/UL (ref 1.8–7.7)
NEUTROPHILS # BLD AUTO: 5131 CELLS/UL (ref 1500–7800)
NEUTROPHILS # BLD AUTO: 6.3 K/UL (ref 1.8–7.7)
NEUTROPHILS # BLD AUTO: 6.3 K/UL (ref 1.8–7.7)
NEUTROPHILS # BLD AUTO: 6.4 K/UL (ref 1.8–7.7)
NEUTROPHILS # BLD AUTO: 6.4 K/UL (ref 1.8–7.7)
NEUTROPHILS # BLD AUTO: 6897 CELLS/UL (ref 1500–7800)
NEUTROPHILS # BLD AUTO: 7.2 K/UL (ref 1.8–7.7)
NEUTROPHILS # BLD AUTO: 7.2 K/UL (ref 1.8–7.7)
NEUTROPHILS # BLD AUTO: 7.9 K/UL (ref 1.8–7.7)
NEUTROPHILS # BLD AUTO: 8.2 K/UL (ref 1.8–7.7)
NEUTROPHILS # BLD AUTO: 8.6 K/UL (ref 1.8–7.7)
NEUTROPHILS # BLD AUTO: 8.7 K/UL (ref 1.8–7.7)
NEUTROPHILS NFR BLD AUTO: 55.7 %
NEUTROPHILS NFR BLD AUTO: 67.3 %
NEUTROPHILS NFR BLD AUTO: 73.3 %
NEUTROPHILS NFR BLD AUTO: 80.2 %
NEUTROPHILS NFR BLD: 55.5 % (ref 38–73)
NEUTROPHILS NFR BLD: 60.4 % (ref 38–73)
NEUTROPHILS NFR BLD: 62.3 % (ref 38–73)
NEUTROPHILS NFR BLD: 65.6 % (ref 38–73)
NEUTROPHILS NFR BLD: 65.6 % (ref 38–73)
NEUTROPHILS NFR BLD: 66.1 % (ref 38–73)
NEUTROPHILS NFR BLD: 66.2 % (ref 38–73)
NEUTROPHILS NFR BLD: 66.8 % (ref 38–73)
NEUTROPHILS NFR BLD: 66.8 % (ref 38–73)
NEUTROPHILS NFR BLD: 67 % (ref 38–73)
NEUTROPHILS NFR BLD: 67.1 % (ref 38–73)
NEUTROPHILS NFR BLD: 67.1 % (ref 38–73)
NEUTROPHILS NFR BLD: 68 % (ref 38–73)
NEUTROPHILS NFR BLD: 68.1 % (ref 38–73)
NEUTROPHILS NFR BLD: 68.2 % (ref 38–73)
NEUTROPHILS NFR BLD: 69.1 % (ref 38–73)
NEUTROPHILS NFR BLD: 69.3 % (ref 38–73)
NEUTROPHILS NFR BLD: 69.9 % (ref 38–73)
NEUTROPHILS NFR BLD: 70 % (ref 38–73)
NEUTROPHILS NFR BLD: 70.7 % (ref 38–73)
NEUTROPHILS NFR BLD: 71 % (ref 38–73)
NEUTROPHILS NFR BLD: 71.6 % (ref 38–73)
NEUTROPHILS NFR BLD: 71.6 % (ref 38–73)
NEUTROPHILS NFR BLD: 71.7 % (ref 38–73)
NEUTROPHILS NFR BLD: 71.9 % (ref 38–73)
NEUTROPHILS NFR BLD: 71.9 % (ref 38–73)
NEUTROPHILS NFR BLD: 72.1 % (ref 38–73)
NEUTROPHILS NFR BLD: 72.9 % (ref 38–73)
NEUTROPHILS NFR BLD: 72.9 % (ref 38–73)
NEUTROPHILS NFR BLD: 73 % (ref 38–73)
NEUTROPHILS NFR BLD: 73.4 % (ref 38–73)
NEUTROPHILS NFR BLD: 73.6 % (ref 38–73)
NEUTROPHILS NFR BLD: 73.8 % (ref 38–73)
NEUTROPHILS NFR BLD: 73.9 % (ref 38–73)
NEUTROPHILS NFR BLD: 74 % (ref 38–73)
NEUTROPHILS NFR BLD: 74.1 % (ref 38–73)
NEUTROPHILS NFR BLD: 74.3 % (ref 38–73)
NEUTROPHILS NFR BLD: 74.4 % (ref 38–73)
NEUTROPHILS NFR BLD: 74.8 % (ref 38–73)
NEUTROPHILS NFR BLD: 75.2 % (ref 38–73)
NEUTROPHILS NFR BLD: 75.3 % (ref 38–73)
NEUTROPHILS NFR BLD: 75.8 % (ref 38–73)
NEUTROPHILS NFR BLD: 76 % (ref 38–73)
NEUTROPHILS NFR BLD: 76.9 % (ref 38–73)
NEUTROPHILS NFR BLD: 77 % (ref 38–73)
NEUTROPHILS NFR BLD: 77.8 % (ref 38–73)
NEUTROPHILS NFR BLD: 84.1 % (ref 38–73)
NEUTROPHILS NFR BLD: 86 % (ref 38–73)
NEUTROPHILS NFR BLD: 92 % (ref 38–73)
NEUTROPHILS NFR FLD MANUAL: 6 %
NEUTS BAND NFR BLD MANUAL: 13 %
NITRITE UR QL STRIP: NEGATIVE
NITRITE UR QL STRIP: POSITIVE
NRBC BLD-RTO: 0 /100 WBC
NRBC BLD-RTO: 12 /100 WBC
NRBC BLD-RTO: 12 /100 WBC
NRBC BLD-RTO: 20 /100 WBC
NUM UNITS TRANS PACKED RBC: NORMAL
OVALOCYTES BLD QL SMEAR: ABNORMAL
PAPPENHEIMER BOD BLD QL SMEAR: PRESENT
PCO2 BLDA: 25.7 MMHG (ref 35–45)
PCO2 BLDA: 36.4 MMHG (ref 35–45)
PCO2 BLDA: 47.5 MMHG (ref 35–45)
PEEP: 5
PEEP: 5
PH SMN: 6.93 [PH] (ref 7.35–7.45)
PH SMN: 7.05 [PH] (ref 7.35–7.45)
PH SMN: 7.37 [PH] (ref 7.35–7.45)
PH UR STRIP: 5 [PH] (ref 5–8)
PH UR STRIP: 6 [PH] (ref 5–8)
PH UR STRIP: 6 [PH] (ref 5–8)
PH UR STRIP: 7 [PH] (ref 5–8)
PH UR STRIP: 8 [PH] (ref 5–8)
PHOSPHATE SERPL-MCNC: 1.4 MG/DL (ref 2.7–4.5)
PHOSPHATE SERPL-MCNC: 1.7 MG/DL (ref 2.7–4.5)
PHOSPHATE SERPL-MCNC: 10.1 MG/DL (ref 2.7–4.5)
PHOSPHATE SERPL-MCNC: 10.3 MG/DL (ref 2.7–4.5)
PHOSPHATE SERPL-MCNC: 2 MG/DL (ref 2.7–4.5)
PHOSPHATE SERPL-MCNC: 2 MG/DL (ref 2.7–4.5)
PHOSPHATE SERPL-MCNC: 2.1 MG/DL (ref 2.7–4.5)
PHOSPHATE SERPL-MCNC: 2.2 MG/DL (ref 2.7–4.5)
PHOSPHATE SERPL-MCNC: 2.2 MG/DL (ref 2.7–4.5)
PHOSPHATE SERPL-MCNC: 2.3 MG/DL (ref 2.7–4.5)
PHOSPHATE SERPL-MCNC: 2.4 MG/DL (ref 2.7–4.5)
PHOSPHATE SERPL-MCNC: 2.6 MG/DL (ref 2.7–4.5)
PHOSPHATE SERPL-MCNC: 2.6 MG/DL (ref 2.7–4.5)
PHOSPHATE SERPL-MCNC: 2.7 MG/DL (ref 2.7–4.5)
PHOSPHATE SERPL-MCNC: 2.7 MG/DL (ref 2.7–4.5)
PHOSPHATE SERPL-MCNC: 2.8 MG/DL (ref 2.7–4.5)
PHOSPHATE SERPL-MCNC: 2.9 MG/DL (ref 2.7–4.5)
PHOSPHATE SERPL-MCNC: 3 MG/DL (ref 2.7–4.5)
PHOSPHATE SERPL-MCNC: 3.1 MG/DL (ref 2.7–4.5)
PHOSPHATE SERPL-MCNC: 3.3 MG/DL (ref 2.7–4.5)
PHOSPHATE SERPL-MCNC: 3.4 MG/DL (ref 2.7–4.5)
PHOSPHATE SERPL-MCNC: 3.6 MG/DL (ref 2.7–4.5)
PHOSPHATE SERPL-MCNC: 3.7 MG/DL (ref 2.7–4.5)
PHOSPHATE SERPL-MCNC: 3.8 MG/DL (ref 2.7–4.5)
PHOSPHATE SERPL-MCNC: 3.9 MG/DL (ref 2.7–4.5)
PHOSPHATE SERPL-MCNC: 4.3 MG/DL (ref 2.7–4.5)
PHOSPHATE SERPL-MCNC: 4.3 MG/DL (ref 2.7–4.5)
PHOSPHATE SERPL-MCNC: 4.5 MG/DL (ref 2.7–4.5)
PHOSPHATE SERPL-MCNC: 5.2 MG/DL (ref 2.7–4.5)
PHOSPHATE SERPL-MCNC: 5.2 MG/DL (ref 2.7–4.5)
PHOSPHATE SERPL-MCNC: 9.2 MG/DL (ref 2.7–4.5)
PHOSPHATE SERPL-MCNC: >15 MG/DL (ref 2.7–4.5)
PISA MRMAX VEL: 0.03 M/S
PISA TR MAX VEL: 2.49 M/S
PISA TR MAX VEL: 2.53 M/S
PLATELET # BLD AUTO: 100 K/UL (ref 150–350)
PLATELET # BLD AUTO: 101 K/UL (ref 150–350)
PLATELET # BLD AUTO: 107 K/UL (ref 150–350)
PLATELET # BLD AUTO: 108 K/UL (ref 150–350)
PLATELET # BLD AUTO: 111 K/UL (ref 150–350)
PLATELET # BLD AUTO: 111 K/UL (ref 150–350)
PLATELET # BLD AUTO: 112 K/UL (ref 150–350)
PLATELET # BLD AUTO: 113 K/UL (ref 150–350)
PLATELET # BLD AUTO: 115 K/UL (ref 150–350)
PLATELET # BLD AUTO: 116 K/UL (ref 150–350)
PLATELET # BLD AUTO: 117 K/UL (ref 150–350)
PLATELET # BLD AUTO: 118 K/UL (ref 150–350)
PLATELET # BLD AUTO: 119 K/UL (ref 150–350)
PLATELET # BLD AUTO: 121 K/UL (ref 150–350)
PLATELET # BLD AUTO: 122 K/UL (ref 150–350)
PLATELET # BLD AUTO: 124 K/UL (ref 150–350)
PLATELET # BLD AUTO: 127 K/UL (ref 150–350)
PLATELET # BLD AUTO: 127 K/UL (ref 150–350)
PLATELET # BLD AUTO: 129 K/UL (ref 150–350)
PLATELET # BLD AUTO: 131 K/UL (ref 150–350)
PLATELET # BLD AUTO: 132 K/UL (ref 150–350)
PLATELET # BLD AUTO: 132 K/UL (ref 150–350)
PLATELET # BLD AUTO: 138 K/UL (ref 150–350)
PLATELET # BLD AUTO: 138 K/UL (ref 150–350)
PLATELET # BLD AUTO: 139 K/UL (ref 150–350)
PLATELET # BLD AUTO: 139 K/UL (ref 150–350)
PLATELET # BLD AUTO: 141 K/UL (ref 150–350)
PLATELET # BLD AUTO: 143 K/UL (ref 150–350)
PLATELET # BLD AUTO: 145 K/UL (ref 150–350)
PLATELET # BLD AUTO: 149 THOUSAND/UL (ref 140–400)
PLATELET # BLD AUTO: 153 K/UL (ref 150–350)
PLATELET # BLD AUTO: 159 THOUSAND/UL (ref 140–400)
PLATELET # BLD AUTO: 160 K/UL (ref 150–350)
PLATELET # BLD AUTO: 164 THOUSAND/UL (ref 140–400)
PLATELET # BLD AUTO: 169 K/UL (ref 150–350)
PLATELET # BLD AUTO: 170 THOUSAND/UL (ref 140–400)
PLATELET # BLD AUTO: 61 K/UL (ref 150–350)
PLATELET # BLD AUTO: 81 K/UL (ref 150–350)
PLATELET # BLD AUTO: 83 K/UL (ref 150–350)
PLATELET # BLD AUTO: 88 K/UL (ref 150–350)
PLATELET # BLD AUTO: 89 K/UL (ref 150–350)
PLATELET # BLD AUTO: 90 K/UL (ref 150–350)
PLATELET # BLD AUTO: 91 K/UL (ref 150–350)
PLATELET # BLD AUTO: 99 K/UL (ref 150–350)
PLATELET BLD QL SMEAR: ABNORMAL
PMV BLD AUTO: 10.5 FL (ref 9.2–12.9)
PMV BLD AUTO: 10.6 FL (ref 9.2–12.9)
PMV BLD AUTO: 10.8 FL (ref 9.2–12.9)
PMV BLD AUTO: 10.9 FL (ref 9.2–12.9)
PMV BLD AUTO: 11 FL (ref 9.2–12.9)
PMV BLD AUTO: 11 FL (ref 9.2–12.9)
PMV BLD AUTO: 11.1 FL (ref 9.2–12.9)
PMV BLD AUTO: 11.1 FL (ref 9.2–12.9)
PMV BLD AUTO: 11.2 FL (ref 9.2–12.9)
PMV BLD AUTO: 11.3 FL (ref 9.2–12.9)
PMV BLD AUTO: 11.3 FL (ref 9.2–12.9)
PMV BLD AUTO: 11.4 FL (ref 9.2–12.9)
PMV BLD AUTO: 11.5 FL (ref 9.2–12.9)
PMV BLD AUTO: 11.5 FL (ref 9.2–12.9)
PMV BLD AUTO: 11.6 FL (ref 9.2–12.9)
PMV BLD AUTO: 11.7 FL (ref 9.2–12.9)
PMV BLD AUTO: 11.8 FL (ref 9.2–12.9)
PMV BLD AUTO: 11.8 FL (ref 9.2–12.9)
PMV BLD AUTO: 11.9 FL (ref 9.2–12.9)
PMV BLD AUTO: 11.9 FL (ref 9.2–12.9)
PMV BLD AUTO: 12 FL (ref 9.2–12.9)
PMV BLD AUTO: 12.2 FL (ref 9.2–12.9)
PMV BLD AUTO: 12.3 FL (ref 9.2–12.9)
PMV BLD AUTO: 12.4 FL (ref 9.2–12.9)
PMV BLD AUTO: 12.6 FL (ref 9.2–12.9)
PMV BLD AUTO: 12.6 FL (ref 9.2–12.9)
PMV BLD AUTO: 12.9 FL (ref 9.2–12.9)
PMV BLD AUTO: 13.5 FL (ref 9.2–12.9)
PMV BLD REES-ECKER: 10.8 FL (ref 7.5–12.5)
PMV BLD REES-ECKER: 11.5 FL (ref 7.5–12.5)
PMV BLD REES-ECKER: 11.5 FL (ref 7.5–12.5)
PMV BLD REES-ECKER: 12.1 FL (ref 7.5–12.5)
PO2 BLDA: 218 MMHG (ref 80–100)
PO2 BLDA: 29 MMHG (ref 40–60)
PO2 BLDA: 89 MMHG (ref 80–100)
POC BE: -11 MMOL/L
POC BE: -20 MMOL/L
POC BE: -22 MMOL/L
POC SATURATED O2: 56 % (ref 95–100)
POC SATURATED O2: 92 % (ref 95–100)
POC SATURATED O2: 99 % (ref 95–100)
POC TCO2: 11 MMOL/L (ref 23–27)
POC TCO2: 11 MMOL/L (ref 23–27)
POC TCO2: 15 MMOL/L (ref 24–29)
POCT GLUCOSE: 100 MG/DL (ref 70–110)
POCT GLUCOSE: 101 MG/DL (ref 70–110)
POCT GLUCOSE: 102 MG/DL (ref 70–110)
POCT GLUCOSE: 102 MG/DL (ref 70–110)
POCT GLUCOSE: 104 MG/DL (ref 70–110)
POCT GLUCOSE: 105 MG/DL (ref 70–110)
POCT GLUCOSE: 106 MG/DL (ref 70–110)
POCT GLUCOSE: 109 MG/DL (ref 70–110)
POCT GLUCOSE: 109 MG/DL (ref 70–110)
POCT GLUCOSE: 110 MG/DL (ref 70–110)
POCT GLUCOSE: 110 MG/DL (ref 70–110)
POCT GLUCOSE: 111 MG/DL (ref 70–110)
POCT GLUCOSE: 113 MG/DL (ref 70–110)
POCT GLUCOSE: 113 MG/DL (ref 70–110)
POCT GLUCOSE: 115 MG/DL (ref 70–110)
POCT GLUCOSE: 116 MG/DL (ref 70–110)
POCT GLUCOSE: 117 MG/DL (ref 70–110)
POCT GLUCOSE: 119 MG/DL (ref 70–110)
POCT GLUCOSE: 121 MG/DL (ref 70–110)
POCT GLUCOSE: 122 MG/DL (ref 70–110)
POCT GLUCOSE: 125 MG/DL (ref 70–110)
POCT GLUCOSE: 127 MG/DL (ref 70–110)
POCT GLUCOSE: 141 MG/DL (ref 70–110)
POCT GLUCOSE: 141 MG/DL (ref 70–110)
POCT GLUCOSE: 149 MG/DL (ref 70–110)
POCT GLUCOSE: 155 MG/DL (ref 70–110)
POCT GLUCOSE: 158 MG/DL (ref 70–110)
POCT GLUCOSE: 162 MG/DL (ref 70–110)
POCT GLUCOSE: 165 MG/DL (ref 70–110)
POCT GLUCOSE: 165 MG/DL (ref 70–110)
POCT GLUCOSE: 171 MG/DL (ref 70–110)
POCT GLUCOSE: 78 MG/DL (ref 70–110)
POCT GLUCOSE: 85 MG/DL (ref 70–110)
POCT GLUCOSE: 85 MG/DL (ref 70–110)
POCT GLUCOSE: 86 MG/DL (ref 70–110)
POCT GLUCOSE: 89 MG/DL (ref 70–110)
POCT GLUCOSE: 90 MG/DL (ref 70–110)
POCT GLUCOSE: 95 MG/DL (ref 70–110)
POCT GLUCOSE: 96 MG/DL (ref 70–110)
POCT GLUCOSE: 97 MG/DL (ref 70–110)
POCT GLUCOSE: 98 MG/DL (ref 70–110)
POCT GLUCOSE: 98 MG/DL (ref 70–110)
POCT GLUCOSE: 99 MG/DL (ref 70–110)
POCT GLUCOSE: 99 MG/DL (ref 70–110)
POIKILOCYTOSIS BLD QL SMEAR: SLIGHT
POLYCHROMASIA BLD QL SMEAR: ABNORMAL
POTASSIUM SERPL-SCNC: 2.6 MMOL/L (ref 3.5–5.1)
POTASSIUM SERPL-SCNC: 2.6 MMOL/L (ref 3.5–5.1)
POTASSIUM SERPL-SCNC: 2.7 MMOL/L (ref 3.5–5.1)
POTASSIUM SERPL-SCNC: 2.7 MMOL/L (ref 3.5–5.1)
POTASSIUM SERPL-SCNC: 2.8 MMOL/L (ref 3.5–5.1)
POTASSIUM SERPL-SCNC: 2.9 MMOL/L (ref 3.5–5.1)
POTASSIUM SERPL-SCNC: 3 MMOL/L (ref 3.5–5.1)
POTASSIUM SERPL-SCNC: 3 MMOL/L (ref 3.5–5.1)
POTASSIUM SERPL-SCNC: 3.1 MMOL/L (ref 3.5–5.1)
POTASSIUM SERPL-SCNC: 3.2 MMOL/L (ref 3.5–5.1)
POTASSIUM SERPL-SCNC: 3.3 MMOL/L (ref 3.5–5.1)
POTASSIUM SERPL-SCNC: 3.4 MMOL/L (ref 3.5–5.1)
POTASSIUM SERPL-SCNC: 3.5 MMOL/L (ref 3.5–5.1)
POTASSIUM SERPL-SCNC: 3.6 MMOL/L (ref 3.5–5.1)
POTASSIUM SERPL-SCNC: 3.7 MMOL/L (ref 3.5–5.1)
POTASSIUM SERPL-SCNC: 3.8 MMOL/L (ref 3.5–5.1)
POTASSIUM SERPL-SCNC: 3.8 MMOL/L (ref 3.5–5.3)
POTASSIUM SERPL-SCNC: 3.9 MMOL/L (ref 3.5–5.1)
POTASSIUM SERPL-SCNC: 4 MMOL/L (ref 3.5–5.1)
POTASSIUM SERPL-SCNC: 4.1 MMOL/L (ref 3.5–5.1)
POTASSIUM SERPL-SCNC: 4.1 MMOL/L (ref 3.5–5.1)
POTASSIUM SERPL-SCNC: 4.2 MMOL/L (ref 3.5–5.1)
POTASSIUM SERPL-SCNC: 4.3 MMOL/L (ref 3.5–5.1)
POTASSIUM SERPL-SCNC: 4.4 MMOL/L (ref 3.5–5.1)
POTASSIUM SERPL-SCNC: 4.4 MMOL/L (ref 3.5–5.3)
POTASSIUM SERPL-SCNC: 4.5 MMOL/L (ref 3.5–5.1)
POTASSIUM SERPL-SCNC: 4.6 MMOL/L (ref 3.5–5.3)
POTASSIUM SERPL-SCNC: 4.7 MMOL/L (ref 3.5–5.1)
POTASSIUM SERPL-SCNC: 4.8 MMOL/L (ref 3.5–5.1)
POTASSIUM SERPL-SCNC: 5 MMOL/L (ref 3.5–5.1)
POTASSIUM SERPL-SCNC: 5.1 MMOL/L (ref 3.5–5.1)
POTASSIUM SERPL-SCNC: 7.4 MMOL/L (ref 3.5–5.1)
PREALB SERPL-MCNC: 11 MG/DL (ref 20–43)
PREALB SERPL-MCNC: 8 MG/DL (ref 20–43)
PREALB SERPL-MCNC: 9 MG/DL (ref 20–43)
PROCALCITONIN SERPL IA-MCNC: 3.43 NG/ML
PROT SERPL-MCNC: 5.7 G/DL (ref 6–8.4)
PROT SERPL-MCNC: 5.9 G/DL (ref 6–8.4)
PROT SERPL-MCNC: 6 G/DL (ref 6–8.4)
PROT SERPL-MCNC: 6.1 G/DL (ref 6–8.4)
PROT SERPL-MCNC: 6.2 G/DL (ref 6–8.4)
PROT SERPL-MCNC: 6.3 G/DL (ref 6.1–8.1)
PROT SERPL-MCNC: 6.3 G/DL (ref 6–8.4)
PROT SERPL-MCNC: 6.4 G/DL (ref 6.1–8.1)
PROT SERPL-MCNC: 6.4 G/DL (ref 6.1–8.1)
PROT SERPL-MCNC: 6.4 G/DL (ref 6–8.4)
PROT SERPL-MCNC: 6.5 G/DL (ref 6–8.4)
PROT SERPL-MCNC: 6.6 G/DL (ref 6–8.4)
PROT SERPL-MCNC: 6.7 G/DL (ref 6–8.4)
PROT SERPL-MCNC: 6.7 G/DL (ref 6–8.4)
PROT SERPL-MCNC: 6.8 G/DL (ref 6–8.4)
PROT SERPL-MCNC: 6.9 G/DL (ref 6–8.4)
PROT SERPL-MCNC: 7 G/DL (ref 6–8.4)
PROT SERPL-MCNC: 7.1 G/DL (ref 6–8.4)
PROT SERPL-MCNC: 7.2 G/DL (ref 6–8.4)
PROT SERPL-MCNC: 7.2 G/DL (ref 6–8.4)
PROT SERPL-MCNC: 7.3 G/DL (ref 6–8.4)
PROT SERPL-MCNC: 7.4 G/DL (ref 6.1–8.1)
PROT SERPL-MCNC: 7.4 G/DL (ref 6–8.4)
PROT SERPL-MCNC: 7.4 G/DL (ref 6–8.4)
PROT SERPL-MCNC: 7.5 G/DL (ref 6–8.4)
PROT SERPL-MCNC: 7.6 G/DL (ref 6–8.4)
PROT SERPL-MCNC: 7.6 G/DL (ref 6–8.4)
PROT SERPL-MCNC: 8.1 G/DL (ref 6–8.4)
PROT UR QL STRIP: ABNORMAL
PROT UR QL STRIP: ABNORMAL
PROT UR QL STRIP: NEGATIVE
PROTHROMBIN TIME: 11.5 SEC (ref 9–12.5)
PROTHROMBIN TIME: 11.9 SEC (ref 9–12.5)
PROTHROMBIN TIME: 13.9 SEC (ref 9–12.5)
PROTHROMBIN TIME: 13.9 SEC (ref 9–12.5)
PTH RELATED PROT SERPL-SCNC: 101 PMOL/L
PTH RELATED PROT SERPL-SCNC: 35 PMOL/L
PTH RELATED PROT SERPL-SCNC: 40 PMOL/L
PTH RELATED PROT SERPL-SCNC: 43 PMOL/L
PTH RELATED PROT SERPL-SCNC: 74 PMOL/L
PTH RELATED PROT SERPL-SCNC: 77 PMOL/L
PTH RELATED PROT SERPL-SCNC: 79 PMOL/L
PTH RELATED PROT SERPL-SCNC: 79 PMOL/L
PTH RELATED PROT SERPL-SCNC: 81 PMOL/L
PTH RELATED PROT SERPL-SCNC: 84 PMOL/L
PTH RELATED PROT SERPL-SCNC: 86 PMOL/L
PTH RELATED PROT SERPL-SCNC: 88 PMOL/L
PTH RELATED PROT SERPL-SCNC: 88 PMOL/L
PTH-INTACT SERPL-MCNC: 116.9 PG/ML (ref 9–77)
PTH-INTACT SERPL-MCNC: 145.2 PG/ML (ref 9–77)
PTH-INTACT SERPL-MCNC: 156 PG/ML (ref 9–77)
PTH-INTACT SERPL-MCNC: 192.8 PG/ML (ref 9–77)
PTH-INTACT SERPL-MCNC: 197.7 PG/ML (ref 9–77)
PTH-INTACT SERPL-MCNC: 209 PG/ML (ref 9–77)
PTH-INTACT SERPL-MCNC: 219.5 PG/ML (ref 9–77)
PTH-INTACT SERPL-MCNC: 357.6 PG/ML (ref 9–77)
PTH-INTACT SERPL-MCNC: 368.9 PG/ML (ref 9–77)
PTH-INTACT SERPL-MCNC: 443.2 PG/ML (ref 9–77)
PTH-INTACT SERPL-MCNC: 561.1 PG/ML (ref 9–77)
PULM VEIN S/D RATIO: 1
PULM VEIN S/D RATIO: 1.33
PV PEAK D VEL: 0.4 M/S
PV PEAK D VEL: 0.43 M/S
PV PEAK S VEL: 0.4 M/S
PV PEAK S VEL: 0.57 M/S
PV PEAK VELOCITY: 0.83 CM/S
PV PEAK VELOCITY: 1.01 CM/S
RA MAJOR: 4.43 CM
RA MAJOR: 5.56 CM
RA PRESSURE: 3 MMHG
RA PRESSURE: 8 MMHG
RA WIDTH: 3.52 CM
RA WIDTH: 4.19 CM
RBC # BLD AUTO: 2.06 M/UL (ref 4–5.4)
RBC # BLD AUTO: 2.16 M/UL (ref 4–5.4)
RBC # BLD AUTO: 2.17 M/UL (ref 4–5.4)
RBC # BLD AUTO: 2.21 M/UL (ref 4–5.4)
RBC # BLD AUTO: 2.22 M/UL (ref 4–5.4)
RBC # BLD AUTO: 2.23 M/UL (ref 4–5.4)
RBC # BLD AUTO: 2.26 M/UL (ref 4–5.4)
RBC # BLD AUTO: 2.28 M/UL (ref 4–5.4)
RBC # BLD AUTO: 2.3 M/UL (ref 4–5.4)
RBC # BLD AUTO: 2.31 M/UL (ref 4–5.4)
RBC # BLD AUTO: 2.33 M/UL (ref 4–5.4)
RBC # BLD AUTO: 2.34 M/UL (ref 4–5.4)
RBC # BLD AUTO: 2.35 M/UL (ref 4–5.4)
RBC # BLD AUTO: 2.36 M/UL (ref 4–5.4)
RBC # BLD AUTO: 2.37 M/UL (ref 4–5.4)
RBC # BLD AUTO: 2.39 M/UL (ref 4–5.4)
RBC # BLD AUTO: 2.42 M/UL (ref 4–5.4)
RBC # BLD AUTO: 2.42 M/UL (ref 4–5.4)
RBC # BLD AUTO: 2.45 M/UL (ref 4–5.4)
RBC # BLD AUTO: 2.47 M/UL (ref 4–5.4)
RBC # BLD AUTO: 2.49 M/UL (ref 4–5.4)
RBC # BLD AUTO: 2.5 M/UL (ref 4–5.4)
RBC # BLD AUTO: 2.51 M/UL (ref 4–5.4)
RBC # BLD AUTO: 2.53 M/UL (ref 4–5.4)
RBC # BLD AUTO: 2.54 M/UL (ref 4–5.4)
RBC # BLD AUTO: 2.6 M/UL (ref 4–5.4)
RBC # BLD AUTO: 2.61 M/UL (ref 4–5.4)
RBC # BLD AUTO: 2.63 M/UL (ref 4–5.4)
RBC # BLD AUTO: 2.64 M/UL (ref 4–5.4)
RBC # BLD AUTO: 2.64 M/UL (ref 4–5.4)
RBC # BLD AUTO: 2.68 M/UL (ref 4–5.4)
RBC # BLD AUTO: 2.68 M/UL (ref 4–5.4)
RBC # BLD AUTO: 2.72 M/UL (ref 4–5.4)
RBC # BLD AUTO: 2.73 M/UL (ref 4–5.4)
RBC # BLD AUTO: 2.75 M/UL (ref 4–5.4)
RBC # BLD AUTO: 2.76 M/UL (ref 4–5.4)
RBC # BLD AUTO: 2.78 M/UL (ref 4–5.4)
RBC # BLD AUTO: 2.79 M/UL (ref 4–5.4)
RBC # BLD AUTO: 2.8 M/UL (ref 4–5.4)
RBC # BLD AUTO: 2.83 M/UL (ref 4–5.4)
RBC # BLD AUTO: 2.86 M/UL (ref 4–5.4)
RBC # BLD AUTO: 2.93 M/UL (ref 4–5.4)
RBC # BLD AUTO: 3.12 M/UL (ref 4–5.4)
RBC # BLD AUTO: 3.16 M/UL (ref 4–5.4)
RBC # BLD AUTO: 3.2 M/UL (ref 4–5.4)
RBC # BLD AUTO: 3.22 M/UL (ref 4–5.4)
RBC # BLD AUTO: 3.45 M/UL (ref 4–5.4)
RBC # BLD AUTO: 3.63 MILLION/UL (ref 3.8–5.1)
RBC # BLD AUTO: 3.72 M/UL (ref 4–5.4)
RBC # BLD AUTO: 3.73 MILLION/UL (ref 3.8–5.1)
RBC # BLD AUTO: 4.07 MILLION/UL (ref 3.8–5.1)
RBC # BLD AUTO: 4.46 MILLION/UL (ref 3.8–5.1)
RBC #/AREA URNS HPF: 3 /HPF (ref 0–4)
RBC #/AREA URNS HPF: 6 /HPF (ref 0–4)
RIGHT VENTRICULAR END-DIASTOLIC DIMENSION: 2.69 CM
RIGHT VENTRICULAR END-DIASTOLIC DIMENSION: 2.89 CM
RV TISSUE DOPPLER FREE WALL SYSTOLIC VELOCITY 1 (APICAL 4 CHAMBER VIEW): 9.6 CM/S
SAMPLE: ABNORMAL
SARS-COV-2 RDRP RESP QL NAA+PROBE: NEGATIVE
SARS-COV-2 RNA RESP QL NAA+PROBE: NOT DETECTED
SARS-COV-2 RNA RESP QL NAA+PROBE: NOT DETECTED
SCHISTOCYTES BLD QL SMEAR: ABNORMAL
SCHISTOCYTES BLD QL SMEAR: PRESENT
SCHISTOCYTES BLD QL SMEAR: PRESENT
SITE: ABNORMAL
SODIUM SERPL-SCNC: 133 MMOL/L (ref 136–145)
SODIUM SERPL-SCNC: 134 MMOL/L (ref 136–145)
SODIUM SERPL-SCNC: 134 MMOL/L (ref 136–145)
SODIUM SERPL-SCNC: 135 MMOL/L (ref 136–145)
SODIUM SERPL-SCNC: 135 MMOL/L (ref 136–145)
SODIUM SERPL-SCNC: 136 MMOL/L (ref 136–145)
SODIUM SERPL-SCNC: 137 MMOL/L (ref 135–146)
SODIUM SERPL-SCNC: 137 MMOL/L (ref 136–145)
SODIUM SERPL-SCNC: 138 MMOL/L (ref 136–145)
SODIUM SERPL-SCNC: 139 MMOL/L (ref 136–145)
SODIUM SERPL-SCNC: 140 MMOL/L (ref 136–145)
SODIUM SERPL-SCNC: 141 MMOL/L (ref 135–146)
SODIUM SERPL-SCNC: 141 MMOL/L (ref 135–146)
SODIUM SERPL-SCNC: 141 MMOL/L (ref 136–145)
SODIUM SERPL-SCNC: 142 MMOL/L (ref 136–145)
SODIUM SERPL-SCNC: 143 MMOL/L (ref 136–145)
SODIUM SERPL-SCNC: 144 MMOL/L (ref 136–145)
SODIUM SERPL-SCNC: 145 MMOL/L (ref 136–145)
SODIUM SERPL-SCNC: 146 MMOL/L (ref 136–145)
SODIUM SERPL-SCNC: 147 MMOL/L (ref 136–145)
SODIUM SERPL-SCNC: 148 MMOL/L (ref 136–145)
SP GR UR STRIP: 1 (ref 1–1.03)
SP GR UR STRIP: 1.01 (ref 1–1.03)
SP GR UR STRIP: 1.02 (ref 1–1.03)
SPECIMEN SOURCE: NORMAL
SPHEROCYTES BLD QL SMEAR: ABNORMAL
STJ: 2.66 CM
STJ: 2.73 CM
T3FREE SERPL-MCNC: 1.2 PG/ML (ref 2.3–4.2)
T3FREE SERPL-MCNC: 1.6 PG/ML (ref 2.3–4.2)
T3FREE SERPL-MCNC: 1.7 PG/ML (ref 2.3–4.2)
T3FREE SERPL-MCNC: 1.7 PG/ML (ref 2.3–4.2)
T4 FREE SERPL-MCNC: 0.89 NG/DL (ref 0.71–1.51)
T4 FREE SERPL-MCNC: 0.94 NG/DL (ref 0.71–1.51)
T4 FREE SERPL-MCNC: 0.98 NG/DL (ref 0.71–1.51)
T4 FREE SERPL-MCNC: 1.17 NG/DL (ref 0.71–1.51)
T4 FREE SERPL-MCNC: 1.33 NG/DL (ref 0.71–1.51)
T4 FREE SERPL-MCNC: 1.33 NG/DL (ref 0.71–1.51)
T4 FREE SERPL-MCNC: 1.5 NG/DL (ref 0.71–1.51)
T4 FREE SERPL-MCNC: 1.68 NG/DL (ref 0.71–1.51)
TARGETS BLD QL SMEAR: ABNORMAL
TDI LATERAL: 0.13 M/S
TDI LATERAL: 0.14 M/S
TDI SEPTAL: 0.11 M/S
TDI SEPTAL: 0.11 M/S
TDI: 0.12 M/S
TDI: 0.13 M/S
TR MAX PG: 25 MMHG
TR MAX PG: 26 MMHG
TROPONIN I SERPL DL<=0.01 NG/ML-MCNC: 0.01 NG/ML (ref 0–0.03)
TROPONIN I SERPL DL<=0.01 NG/ML-MCNC: 0.02 NG/ML (ref 0–0.03)
TROPONIN I SERPL DL<=0.01 NG/ML-MCNC: 0.11 NG/ML (ref 0–0.03)
TROPONIN I SERPL DL<=0.01 NG/ML-MCNC: 0.13 NG/ML (ref 0–0.03)
TROPONIN I SERPL DL<=0.01 NG/ML-MCNC: 0.16 NG/ML (ref 0–0.03)
TSH SERPL DL<=0.005 MIU/L-ACNC: 0.28 UIU/ML (ref 0.4–4)
TSH SERPL DL<=0.005 MIU/L-ACNC: 0.91 UIU/ML (ref 0.4–4)
TSH SERPL DL<=0.005 MIU/L-ACNC: 14.21 UIU/ML (ref 0.4–4)
TSH SERPL DL<=0.005 MIU/L-ACNC: 4 UIU/ML (ref 0.4–4)
TSH SERPL DL<=0.005 MIU/L-ACNC: 4.09 UIU/ML (ref 0.4–4)
TSH SERPL DL<=0.005 MIU/L-ACNC: 4.85 UIU/ML (ref 0.4–4)
TV REST PULMONARY ARTERY PRESSURE: 29 MMHG
TV REST PULMONARY ARTERY PRESSURE: 33 MMHG
URINE COLLECTION DURATION: 24 HR
URINE VOLUME: 4575 ML
URN SPEC COLLECT METH UR: ABNORMAL
URN SPEC COLLECT METH UR: NORMAL
URN SPEC COLLECT METH UR: NORMAL
UROBILINOGEN UR STRIP-ACNC: ABNORMAL EU/DL
UROBILINOGEN UR STRIP-ACNC: NEGATIVE EU/DL
VIT B12 SERPL-MCNC: 277 PG/ML (ref 210–950)
VT: 350
VT: 350
WBC # BLD AUTO: 11.21 K/UL (ref 3.9–12.7)
WBC # BLD AUTO: 11.33 K/UL (ref 3.9–12.7)
WBC # BLD AUTO: 11.48 K/UL (ref 3.9–12.7)
WBC # BLD AUTO: 11.5 K/UL (ref 3.9–12.7)
WBC # BLD AUTO: 13.2 K/UL (ref 3.9–12.7)
WBC # BLD AUTO: 15.7 K/UL (ref 3.9–12.7)
WBC # BLD AUTO: 17.26 K/UL (ref 3.9–12.7)
WBC # BLD AUTO: 4.71 K/UL (ref 3.9–12.7)
WBC # BLD AUTO: 4.86 K/UL (ref 3.9–12.7)
WBC # BLD AUTO: 5.13 K/UL (ref 3.9–12.7)
WBC # BLD AUTO: 5.25 K/UL (ref 3.9–12.7)
WBC # BLD AUTO: 5.3 THOUSAND/UL (ref 3.8–10.8)
WBC # BLD AUTO: 5.44 K/UL (ref 3.9–12.7)
WBC # BLD AUTO: 5.52 K/UL (ref 3.9–12.7)
WBC # BLD AUTO: 5.54 K/UL (ref 3.9–12.7)
WBC # BLD AUTO: 5.63 K/UL (ref 3.9–12.7)
WBC # BLD AUTO: 5.64 K/UL (ref 3.9–12.7)
WBC # BLD AUTO: 5.7 THOUSAND/UL (ref 3.8–10.8)
WBC # BLD AUTO: 5.73 K/UL (ref 3.9–12.7)
WBC # BLD AUTO: 5.76 K/UL (ref 3.9–12.7)
WBC # BLD AUTO: 5.78 K/UL (ref 3.9–12.7)
WBC # BLD AUTO: 5.89 K/UL (ref 3.9–12.7)
WBC # BLD AUTO: 5.96 K/UL (ref 3.9–12.7)
WBC # BLD AUTO: 5.98 K/UL (ref 3.9–12.7)
WBC # BLD AUTO: 5.99 K/UL (ref 3.9–12.7)
WBC # BLD AUTO: 6.05 K/UL (ref 3.9–12.7)
WBC # BLD AUTO: 6.18 K/UL (ref 3.9–12.7)
WBC # BLD AUTO: 6.19 K/UL (ref 3.9–12.7)
WBC # BLD AUTO: 6.31 K/UL (ref 3.9–12.7)
WBC # BLD AUTO: 6.32 K/UL (ref 3.9–12.7)
WBC # BLD AUTO: 6.36 K/UL (ref 3.9–12.7)
WBC # BLD AUTO: 6.49 K/UL (ref 3.9–12.7)
WBC # BLD AUTO: 6.53 K/UL (ref 3.9–12.7)
WBC # BLD AUTO: 6.56 K/UL (ref 3.9–12.7)
WBC # BLD AUTO: 6.65 K/UL (ref 3.9–12.7)
WBC # BLD AUTO: 6.81 K/UL (ref 3.9–12.7)
WBC # BLD AUTO: 6.95 K/UL (ref 3.9–12.7)
WBC # BLD AUTO: 6.95 K/UL (ref 3.9–12.7)
WBC # BLD AUTO: 6.97 K/UL (ref 3.9–12.7)
WBC # BLD AUTO: 7 THOUSAND/UL (ref 3.8–10.8)
WBC # BLD AUTO: 7.31 K/UL (ref 3.9–12.7)
WBC # BLD AUTO: 7.39 K/UL (ref 3.9–12.7)
WBC # BLD AUTO: 7.45 K/UL (ref 3.9–12.7)
WBC # BLD AUTO: 7.57 K/UL (ref 3.9–12.7)
WBC # BLD AUTO: 7.89 K/UL (ref 3.9–12.7)
WBC # BLD AUTO: 7.99 K/UL (ref 3.9–12.7)
WBC # BLD AUTO: 8.07 K/UL (ref 3.9–12.7)
WBC # BLD AUTO: 8.24 K/UL (ref 3.9–12.7)
WBC # BLD AUTO: 8.46 K/UL (ref 3.9–12.7)
WBC # BLD AUTO: 8.51 K/UL (ref 3.9–12.7)
WBC # BLD AUTO: 8.52 K/UL (ref 3.9–12.7)
WBC # BLD AUTO: 8.6 THOUSAND/UL (ref 3.8–10.8)
WBC # BLD AUTO: 8.85 K/UL (ref 3.9–12.7)
WBC # BLD AUTO: 8.9 K/UL (ref 3.9–12.7)
WBC # BLD AUTO: 8.95 K/UL (ref 3.9–12.7)
WBC # BLD AUTO: 9.53 K/UL (ref 3.9–12.7)
WBC # BLD AUTO: 9.68 K/UL (ref 3.9–12.7)
WBC # FLD: 227 /CU MM
WBC #/AREA URNS HPF: 4 /HPF (ref 0–5)
WBC #/AREA URNS HPF: 40 /HPF (ref 0–5)
YEAST URNS QL MICRO: ABNORMAL

## 2020-01-01 PROCEDURE — 81003 URINALYSIS AUTO W/O SCOPE: CPT

## 2020-01-01 PROCEDURE — 96372 THER/PROPH/DIAG INJ SC/IM: CPT

## 2020-01-01 PROCEDURE — 83615 LACTATE (LD) (LDH) ENZYME: CPT

## 2020-01-01 PROCEDURE — 88342 IMHCHEM/IMCYTCHM 1ST ANTB: CPT | Mod: 26,59,, | Performed by: PATHOLOGY

## 2020-01-01 PROCEDURE — 36415 COLL VENOUS BLD VENIPUNCTURE: CPT

## 2020-01-01 PROCEDURE — 83735 ASSAY OF MAGNESIUM: CPT

## 2020-01-01 PROCEDURE — 99291 PR CRITICAL CARE, E/M 30-74 MINUTES: ICD-10-PCS | Mod: ,,, | Performed by: EMERGENCY MEDICINE

## 2020-01-01 PROCEDURE — 63600175 PHARM REV CODE 636 W HCPCS: Performed by: SURGERY

## 2020-01-01 PROCEDURE — 63600175 PHARM REV CODE 636 W HCPCS: Performed by: STUDENT IN AN ORGANIZED HEALTH CARE EDUCATION/TRAINING PROGRAM

## 2020-01-01 PROCEDURE — 80053 COMPREHEN METABOLIC PANEL: CPT

## 2020-01-01 PROCEDURE — 25000003 PHARM REV CODE 250: Performed by: STUDENT IN AN ORGANIZED HEALTH CARE EDUCATION/TRAINING PROGRAM

## 2020-01-01 PROCEDURE — 94799 UNLISTED PULMONARY SVC/PX: CPT

## 2020-01-01 PROCEDURE — 96360 HYDRATION IV INFUSION INIT: CPT

## 2020-01-01 PROCEDURE — 85025 COMPLETE CBC W/AUTO DIFF WBC: CPT

## 2020-01-01 PROCEDURE — 99239 HOSP IP/OBS DSCHRG MGMT >30: CPT | Mod: GC,,, | Performed by: HOSPITALIST

## 2020-01-01 PROCEDURE — 94003 VENT MGMT INPAT SUBQ DAY: CPT

## 2020-01-01 PROCEDURE — 25000003 PHARM REV CODE 250: Performed by: SURGERY

## 2020-01-01 PROCEDURE — 84443 ASSAY THYROID STIM HORMONE: CPT

## 2020-01-01 PROCEDURE — 85007 BL SMEAR W/DIFF WBC COUNT: CPT

## 2020-01-01 PROCEDURE — 97535 SELF CARE MNGMENT TRAINING: CPT | Mod: CO

## 2020-01-01 PROCEDURE — 97116 GAIT TRAINING THERAPY: CPT | Mod: CQ

## 2020-01-01 PROCEDURE — 63600175 PHARM REV CODE 636 W HCPCS: Performed by: NURSE ANESTHETIST, CERTIFIED REGISTERED

## 2020-01-01 PROCEDURE — 94761 N-INVAS EAR/PLS OXIMETRY MLT: CPT

## 2020-01-01 PROCEDURE — 88307 TISSUE EXAM BY PATHOLOGIST: CPT | Mod: 26,,, | Performed by: PATHOLOGY

## 2020-01-01 PROCEDURE — 99900035 HC TECH TIME PER 15 MIN (STAT)

## 2020-01-01 PROCEDURE — P9047 ALBUMIN (HUMAN), 25%, 50ML: HCPCS | Mod: JG | Performed by: STUDENT IN AN ORGANIZED HEALTH CARE EDUCATION/TRAINING PROGRAM

## 2020-01-01 PROCEDURE — A9585 GADOBUTROL INJECTION: HCPCS | Performed by: SURGERY

## 2020-01-01 PROCEDURE — 25000003 PHARM REV CODE 250: Performed by: HOSPITALIST

## 2020-01-01 PROCEDURE — 83735 ASSAY OF MAGNESIUM: CPT | Mod: 91

## 2020-01-01 PROCEDURE — 99215 PR OFFICE/OUTPT VISIT, EST, LEVL V, 40-54 MIN: ICD-10-PCS | Mod: GV,,, | Performed by: NURSE PRACTITIONER

## 2020-01-01 PROCEDURE — 25500020 PHARM REV CODE 255: Performed by: INTERNAL MEDICINE

## 2020-01-01 PROCEDURE — U0002 COVID-19 LAB TEST NON-CDC: HCPCS

## 2020-01-01 PROCEDURE — 36556 INSERT NON-TUNNEL CV CATH: CPT

## 2020-01-01 PROCEDURE — 25000003 PHARM REV CODE 250: Performed by: INTERNAL MEDICINE

## 2020-01-01 PROCEDURE — S0028 INJECTION, FAMOTIDINE, 20 MG: HCPCS | Performed by: STUDENT IN AN ORGANIZED HEALTH CARE EDUCATION/TRAINING PROGRAM

## 2020-01-01 PROCEDURE — 88112 CYTOPATH CELL ENHANCE TECH: CPT | Performed by: PATHOLOGY

## 2020-01-01 PROCEDURE — 63600175 PHARM REV CODE 636 W HCPCS: Mod: JG | Performed by: STUDENT IN AN ORGANIZED HEALTH CARE EDUCATION/TRAINING PROGRAM

## 2020-01-01 PROCEDURE — 11000001 HC ACUTE MED/SURG PRIVATE ROOM

## 2020-01-01 PROCEDURE — 51702 INSERT TEMP BLADDER CATH: CPT

## 2020-01-01 PROCEDURE — 99291 CRITICAL CARE FIRST HOUR: CPT | Mod: 25

## 2020-01-01 PROCEDURE — 85027 COMPLETE CBC AUTOMATED: CPT

## 2020-01-01 PROCEDURE — 3077F PR MOST RECENT SYSTOLIC BLOOD PRESSURE >= 140 MM HG: ICD-10-PCS | Mod: CPTII,,, | Performed by: INTERNAL MEDICINE

## 2020-01-01 PROCEDURE — A9585 GADOBUTROL INJECTION: HCPCS | Performed by: INTERNAL MEDICINE

## 2020-01-01 PROCEDURE — 88305 TISSUE EXAM BY PATHOLOGIST: ICD-10-PCS | Mod: 26,,, | Performed by: PATHOLOGY

## 2020-01-01 PROCEDURE — 93010 EKG 12-LEAD: ICD-10-PCS | Mod: ,,, | Performed by: INTERNAL MEDICINE

## 2020-01-01 PROCEDURE — 27000221 HC OXYGEN, UP TO 24 HOURS

## 2020-01-01 PROCEDURE — 12000002 HC ACUTE/MED SURGE SEMI-PRIVATE ROOM

## 2020-01-01 PROCEDURE — 97166 OT EVAL MOD COMPLEX 45 MIN: CPT

## 2020-01-01 PROCEDURE — 84100 ASSAY OF PHOSPHORUS: CPT

## 2020-01-01 PROCEDURE — 99223 PR INITIAL HOSPITAL CARE,LEVL III: ICD-10-PCS | Mod: ,,, | Performed by: NURSE PRACTITIONER

## 2020-01-01 PROCEDURE — 93005 ELECTROCARDIOGRAM TRACING: CPT

## 2020-01-01 PROCEDURE — G0378 HOSPITAL OBSERVATION PER HR: HCPCS | Mod: CS

## 2020-01-01 PROCEDURE — 1101F PT FALLS ASSESS-DOCD LE1/YR: CPT | Mod: CPTII,,, | Performed by: INTERNAL MEDICINE

## 2020-01-01 PROCEDURE — 96366 THER/PROPH/DIAG IV INF ADDON: CPT

## 2020-01-01 PROCEDURE — A4216 STERILE WATER/SALINE, 10 ML: HCPCS | Performed by: SURGERY

## 2020-01-01 PROCEDURE — 96375 TX/PRO/DX INJ NEW DRUG ADDON: CPT

## 2020-01-01 PROCEDURE — 99233 PR SUBSEQUENT HOSPITAL CARE,LEVL III: ICD-10-PCS | Mod: GC,,, | Performed by: HOSPITALIST

## 2020-01-01 PROCEDURE — 20000000 HC ICU ROOM

## 2020-01-01 PROCEDURE — 96365 THER/PROPH/DIAG IV INF INIT: CPT

## 2020-01-01 PROCEDURE — 99497 ADVNCD CARE PLAN 30 MIN: CPT | Mod: 25,,, | Performed by: CLINICAL NURSE SPECIALIST

## 2020-01-01 PROCEDURE — 99233 SBSQ HOSP IP/OBS HIGH 50: CPT | Mod: ,,, | Performed by: HOSPITALIST

## 2020-01-01 PROCEDURE — 99223 1ST HOSP IP/OBS HIGH 75: CPT | Mod: ,,, | Performed by: HOSPITALIST

## 2020-01-01 PROCEDURE — 97530 THERAPEUTIC ACTIVITIES: CPT

## 2020-01-01 PROCEDURE — 86850 RBC ANTIBODY SCREEN: CPT

## 2020-01-01 PROCEDURE — 88307 TISSUE EXAM BY PATHOLOGIST: CPT | Performed by: PATHOLOGY

## 2020-01-01 PROCEDURE — 63600175 PHARM REV CODE 636 W HCPCS: Performed by: EMERGENCY MEDICINE

## 2020-01-01 PROCEDURE — A4216 STERILE WATER/SALINE, 10 ML: HCPCS | Performed by: STUDENT IN AN ORGANIZED HEALTH CARE EDUCATION/TRAINING PROGRAM

## 2020-01-01 PROCEDURE — 96374 THER/PROPH/DIAG INJ IV PUSH: CPT

## 2020-01-01 PROCEDURE — 87070 CULTURE OTHR SPECIMN AEROBIC: CPT

## 2020-01-01 PROCEDURE — 82397 CHEMILUMINESCENT ASSAY: CPT

## 2020-01-01 PROCEDURE — P9016 RBC LEUKOCYTES REDUCED: HCPCS

## 2020-01-01 PROCEDURE — 63600175 PHARM REV CODE 636 W HCPCS: Performed by: INTERNAL MEDICINE

## 2020-01-01 PROCEDURE — 99285 EMERGENCY DEPT VISIT HI MDM: CPT | Mod: 25

## 2020-01-01 PROCEDURE — 25000003 PHARM REV CODE 250: Performed by: NURSE ANESTHETIST, CERTIFIED REGISTERED

## 2020-01-01 PROCEDURE — 3008F PR BODY MASS INDEX (BMI) DOCUMENTED: ICD-10-PCS | Mod: CPTII,,, | Performed by: INTERNAL MEDICINE

## 2020-01-01 PROCEDURE — 97116 GAIT TRAINING THERAPY: CPT

## 2020-01-01 PROCEDURE — 88360 TUMOR IMMUNOHISTOCHEM/MANUAL: CPT | Performed by: PATHOLOGY

## 2020-01-01 PROCEDURE — 99233 SBSQ HOSP IP/OBS HIGH 50: CPT | Mod: GC,,, | Performed by: HOSPITALIST

## 2020-01-01 PROCEDURE — 37000009 HC ANESTHESIA EA ADD 15 MINS: Performed by: SURGERY

## 2020-01-01 PROCEDURE — 97802 MEDICAL NUTRITION INDIV IN: CPT

## 2020-01-01 PROCEDURE — 99215 PR OFFICE/OUTPT VISIT, EST, LEVL V, 40-54 MIN: ICD-10-PCS | Mod: 95,,, | Performed by: INTERNAL MEDICINE

## 2020-01-01 PROCEDURE — 96361 HYDRATE IV INFUSION ADD-ON: CPT

## 2020-01-01 PROCEDURE — C1751 CATH, INF, PER/CENT/MIDLINE: HCPCS

## 2020-01-01 PROCEDURE — 97530 THERAPEUTIC ACTIVITIES: CPT | Mod: CO

## 2020-01-01 PROCEDURE — 88305 TISSUE EXAM BY PATHOLOGIST: CPT | Mod: 26,,, | Performed by: PATHOLOGY

## 2020-01-01 PROCEDURE — 80053 COMPREHEN METABOLIC PANEL: CPT | Mod: 91

## 2020-01-01 PROCEDURE — C1769 GUIDE WIRE: HCPCS | Performed by: INTERNAL MEDICINE

## 2020-01-01 PROCEDURE — 80069 RENAL FUNCTION PANEL: CPT

## 2020-01-01 PROCEDURE — 25000003 PHARM REV CODE 250: Performed by: EMERGENCY MEDICINE

## 2020-01-01 PROCEDURE — 99497 PR ADVNCD CARE PLAN 30 MIN: ICD-10-PCS | Mod: 25,,, | Performed by: CLINICAL NURSE SPECIALIST

## 2020-01-01 PROCEDURE — 1126F PR PAIN SEVERITY QUANTIFIED, NO PAIN PRESENT: ICD-10-PCS | Mod: ,,, | Performed by: INTERNAL MEDICINE

## 2020-01-01 PROCEDURE — 88305 TISSUE EXAM BY PATHOLOGIST: CPT | Performed by: PATHOLOGY

## 2020-01-01 PROCEDURE — 3288F PR FALLS RISK ASSESSMENT DOCUMENTED: ICD-10-PCS | Mod: CPTII,,, | Performed by: INTERNAL MEDICINE

## 2020-01-01 PROCEDURE — 43275 ERCP REMOVE FORGN BODY DUCT: CPT | Mod: ,,, | Performed by: INTERNAL MEDICINE

## 2020-01-01 PROCEDURE — 88360 TUMOR IMMUNOHISTOCHEM/MANUAL: CPT | Mod: 26,,, | Performed by: PATHOLOGY

## 2020-01-01 PROCEDURE — 36569 INSJ PICC 5 YR+ W/O IMAGING: CPT

## 2020-01-01 PROCEDURE — 99214 PR OFFICE/OUTPT VISIT, EST, LEVL IV, 30-39 MIN: ICD-10-PCS | Mod: 95,,, | Performed by: INTERNAL MEDICINE

## 2020-01-01 PROCEDURE — 87040 BLOOD CULTURE FOR BACTERIA: CPT

## 2020-01-01 PROCEDURE — 3077F SYST BP >= 140 MM HG: CPT | Mod: CPTII,,, | Performed by: INTERNAL MEDICINE

## 2020-01-01 PROCEDURE — 84134 ASSAY OF PREALBUMIN: CPT

## 2020-01-01 PROCEDURE — U0002 COVID-19 LAB TEST NON-CDC: HCPCS | Performed by: EMERGENCY MEDICINE

## 2020-01-01 PROCEDURE — 83970 ASSAY OF PARATHORMONE: CPT | Mod: 91

## 2020-01-01 PROCEDURE — 43264 ERCP REMOVE DUCT CALCULI: CPT | Performed by: INTERNAL MEDICINE

## 2020-01-01 PROCEDURE — 96367 TX/PROPH/DG ADDL SEQ IV INF: CPT

## 2020-01-01 PROCEDURE — 3008F BODY MASS INDEX DOCD: CPT | Mod: CPTII,,, | Performed by: INTERNAL MEDICINE

## 2020-01-01 PROCEDURE — 80048 BASIC METABOLIC PNL TOTAL CA: CPT

## 2020-01-01 PROCEDURE — 1159F MED LIST DOCD IN RCRD: CPT | Mod: ,,, | Performed by: INTERNAL MEDICINE

## 2020-01-01 PROCEDURE — 25500020 PHARM REV CODE 255: Performed by: SURGERY

## 2020-01-01 PROCEDURE — P9047 ALBUMIN (HUMAN), 25%, 50ML: HCPCS | Mod: JG | Performed by: SURGERY

## 2020-01-01 PROCEDURE — 1159F MED LIST DOCD IN RCRD: CPT | Mod: 95,,, | Performed by: INTERNAL MEDICINE

## 2020-01-01 PROCEDURE — 99233 SBSQ HOSP IP/OBS HIGH 50: CPT | Mod: ,,, | Performed by: CLINICAL NURSE SPECIALIST

## 2020-01-01 PROCEDURE — 84481 FREE ASSAY (FT-3): CPT

## 2020-01-01 PROCEDURE — C9113 INJ PANTOPRAZOLE SODIUM, VIA: HCPCS | Performed by: SURGERY

## 2020-01-01 PROCEDURE — 83605 ASSAY OF LACTIC ACID: CPT

## 2020-01-01 PROCEDURE — 43275 PR ERCP W/REMOVAL FOREIGN BODY/STENT FROM BILIARY/PANCREATIC DUCT: ICD-10-PCS | Mod: ,,, | Performed by: INTERNAL MEDICINE

## 2020-01-01 PROCEDURE — 84100 ASSAY OF PHOSPHORUS: CPT | Mod: 91

## 2020-01-01 PROCEDURE — G0378 HOSPITAL OBSERVATION PER HR: HCPCS

## 2020-01-01 PROCEDURE — 1101F PR PT FALLS ASSESS DOC 0-1 FALLS W/OUT INJ PAST YR: ICD-10-PCS | Mod: CPTII,95,, | Performed by: INTERNAL MEDICINE

## 2020-01-01 PROCEDURE — 83970 ASSAY OF PARATHORMONE: CPT

## 2020-01-01 PROCEDURE — 1101F PR PT FALLS ASSESS DOC 0-1 FALLS W/OUT INJ PAST YR: ICD-10-PCS | Mod: CPTII,,, | Performed by: INTERNAL MEDICINE

## 2020-01-01 PROCEDURE — B4185 PARENTERAL SOL 10 GM LIPIDS: HCPCS | Performed by: STUDENT IN AN ORGANIZED HEALTH CARE EDUCATION/TRAINING PROGRAM

## 2020-01-01 PROCEDURE — 93010 EKG 12-LEAD: ICD-10-PCS | Mod: 76,,, | Performed by: INTERNAL MEDICINE

## 2020-01-01 PROCEDURE — 97535 SELF CARE MNGMENT TRAINING: CPT

## 2020-01-01 PROCEDURE — 37799 UNLISTED PX VASCULAR SURGERY: CPT

## 2020-01-01 PROCEDURE — 85610 PROTHROMBIN TIME: CPT

## 2020-01-01 PROCEDURE — 81000 URINALYSIS NONAUTO W/SCOPE: CPT

## 2020-01-01 PROCEDURE — 82330 ASSAY OF CALCIUM: CPT

## 2020-01-01 PROCEDURE — 99223 PR INITIAL HOSPITAL CARE,LEVL III: ICD-10-PCS | Mod: ,,, | Performed by: HOSPITALIST

## 2020-01-01 PROCEDURE — 21400001 HC TELEMETRY ROOM

## 2020-01-01 PROCEDURE — 63600175 PHARM REV CODE 636 W HCPCS

## 2020-01-01 PROCEDURE — 96368 THER/DIAG CONCURRENT INF: CPT

## 2020-01-01 PROCEDURE — 96372 THER/PROPH/DIAG INJ SC/IM: CPT | Mod: 59

## 2020-01-01 PROCEDURE — C9290 INJ, BUPIVACAINE LIPOSOME: HCPCS | Performed by: SURGERY

## 2020-01-01 PROCEDURE — 97165 OT EVAL LOW COMPLEX 30 MIN: CPT

## 2020-01-01 PROCEDURE — 88112 CYTOPATH CELL ENHANCE TECH: CPT | Mod: 26,,, | Performed by: PATHOLOGY

## 2020-01-01 PROCEDURE — 84439 ASSAY OF FREE THYROXINE: CPT

## 2020-01-01 PROCEDURE — 99233 PR SUBSEQUENT HOSPITAL CARE,LEVL III: ICD-10-PCS | Mod: ,,, | Performed by: CLINICAL NURSE SPECIALIST

## 2020-01-01 PROCEDURE — 97803 MED NUTRITION INDIV SUBSEQ: CPT

## 2020-01-01 PROCEDURE — 97110 THERAPEUTIC EXERCISES: CPT

## 2020-01-01 PROCEDURE — 97116 GAIT TRAINING THERAPY: CPT | Performed by: PHYSICAL THERAPIST

## 2020-01-01 PROCEDURE — 94002 VENT MGMT INPAT INIT DAY: CPT

## 2020-01-01 PROCEDURE — 25000003 PHARM REV CODE 250

## 2020-01-01 PROCEDURE — 96376 TX/PRO/DX INJ SAME DRUG ADON: CPT

## 2020-01-01 PROCEDURE — 1126F AMNT PAIN NOTED NONE PRSNT: CPT | Mod: ,,, | Performed by: INTERNAL MEDICINE

## 2020-01-01 PROCEDURE — 71000033 HC RECOVERY, INTIAL HOUR: Performed by: SURGERY

## 2020-01-01 PROCEDURE — 82040 ASSAY OF SERUM ALBUMIN: CPT

## 2020-01-01 PROCEDURE — 27202125 HC BALLOON, EXTRACTION (ANY): Performed by: INTERNAL MEDICINE

## 2020-01-01 PROCEDURE — 99223 PR INITIAL HOSPITAL CARE,LEVL III: ICD-10-PCS | Mod: ,,, | Performed by: CLINICAL NURSE SPECIALIST

## 2020-01-01 PROCEDURE — 82397 CHEMILUMINESCENT ASSAY: CPT | Mod: 91

## 2020-01-01 PROCEDURE — 83880 ASSAY OF NATRIURETIC PEPTIDE: CPT

## 2020-01-01 PROCEDURE — 99231 SBSQ HOSP IP/OBS SF/LOW 25: CPT | Mod: ,,, | Performed by: INTERNAL MEDICINE

## 2020-01-01 PROCEDURE — P9045 ALBUMIN (HUMAN), 5%, 250 ML: HCPCS | Mod: JG

## 2020-01-01 PROCEDURE — 99233 PR SUBSEQUENT HOSPITAL CARE,LEVL III: ICD-10-PCS | Mod: ,,, | Performed by: HOSPITALIST

## 2020-01-01 PROCEDURE — 96374 THER/PROPH/DIAG INJ IV PUSH: CPT | Mod: 59

## 2020-01-01 PROCEDURE — 36430 TRANSFUSION BLD/BLD COMPNT: CPT

## 2020-01-01 PROCEDURE — 82140 ASSAY OF AMMONIA: CPT

## 2020-01-01 PROCEDURE — 99239 PR HOSPITAL DISCHARGE DAY,>30 MIN: ICD-10-PCS | Mod: GC,,, | Performed by: HOSPITALIST

## 2020-01-01 PROCEDURE — 97162 PT EVAL MOD COMPLEX 30 MIN: CPT | Performed by: PHYSICAL THERAPIST

## 2020-01-01 PROCEDURE — 63600175 PHARM REV CODE 636 W HCPCS: Mod: JG

## 2020-01-01 PROCEDURE — 99203 PR OFFICE/OUTPT VISIT, NEW, LEVL III, 30-44 MIN: ICD-10-PCS | Mod: 25,,, | Performed by: OTOLARYNGOLOGY

## 2020-01-01 PROCEDURE — 1101F PT FALLS ASSESS-DOCD LE1/YR: CPT | Mod: CPTII,95,, | Performed by: INTERNAL MEDICINE

## 2020-01-01 PROCEDURE — 97110 THERAPEUTIC EXERCISES: CPT | Mod: CO

## 2020-01-01 PROCEDURE — 88333 PATH CONSLTJ SURG CYTO XM 1: CPT | Mod: 26,,, | Performed by: PATHOLOGY

## 2020-01-01 PROCEDURE — 88360 TUMOR IMMUNOHISTOCHEM/MANUAL: CPT | Mod: 59 | Performed by: PATHOLOGY

## 2020-01-01 PROCEDURE — 63600175 PHARM REV CODE 636 W HCPCS: Mod: JG | Performed by: SURGERY

## 2020-01-01 PROCEDURE — U0003 INFECTIOUS AGENT DETECTION BY NUCLEIC ACID (DNA OR RNA); SEVERE ACUTE RESPIRATORY SYNDROME CORONAVIRUS 2 (SARS-COV-2) (CORONAVIRUS DISEASE [COVID-19]), AMPLIFIED PROBE TECHNIQUE, MAKING USE OF HIGH THROUGHPUT TECHNOLOGIES AS DESCRIBED BY CMS-2020-01-R: HCPCS

## 2020-01-01 PROCEDURE — S0171 BUMETANIDE 0.5 MG: HCPCS | Performed by: STUDENT IN AN ORGANIZED HEALTH CARE EDUCATION/TRAINING PROGRAM

## 2020-01-01 PROCEDURE — 1159F PR MEDICATION LIST DOCUMENTED IN MEDICAL RECORD: ICD-10-PCS | Mod: 95,,, | Performed by: INTERNAL MEDICINE

## 2020-01-01 PROCEDURE — C1750 CATH, HEMODIALYSIS,LONG-TERM: HCPCS | Performed by: SURGERY

## 2020-01-01 PROCEDURE — 88341 PR IHC OR ICC EACH ADD'L SINGLE ANTIBODY  STAINPR: ICD-10-PCS | Mod: 26,59,, | Performed by: PATHOLOGY

## 2020-01-01 PROCEDURE — 3079F DIAST BP 80-89 MM HG: CPT | Mod: CPTII,,, | Performed by: INTERNAL MEDICINE

## 2020-01-01 PROCEDURE — 31575 DIAGNOSTIC LARYNGOSCOPY: CPT | Mod: ,,, | Performed by: OTOLARYNGOLOGY

## 2020-01-01 PROCEDURE — 86140 C-REACTIVE PROTEIN: CPT

## 2020-01-01 PROCEDURE — 84439 ASSAY OF FREE THYROXINE: CPT | Mod: 91

## 2020-01-01 PROCEDURE — 83690 ASSAY OF LIPASE: CPT

## 2020-01-01 PROCEDURE — 82306 VITAMIN D 25 HYDROXY: CPT

## 2020-01-01 PROCEDURE — G0008 ADMIN INFLUENZA VIRUS VAC: HCPCS | Performed by: SURGERY

## 2020-01-01 PROCEDURE — 1159F PR MEDICATION LIST DOCUMENTED IN MEDICAL RECORD: ICD-10-PCS | Mod: ,,, | Performed by: INTERNAL MEDICINE

## 2020-01-01 PROCEDURE — 99000 PR SPECIMEN HANDLING,DR OFF->LAB: ICD-10-PCS | Mod: S$GLB,,, | Performed by: PHYSICIAN ASSISTANT

## 2020-01-01 PROCEDURE — 97530 THERAPEUTIC ACTIVITIES: CPT | Performed by: PHYSICAL THERAPIST

## 2020-01-01 PROCEDURE — 25000003 PHARM REV CODE 250: Performed by: NURSE PRACTITIONER

## 2020-01-01 PROCEDURE — 99498 PR ADVNCD CARE PLAN ADDL 30 MIN: ICD-10-PCS | Mod: ,,, | Performed by: NURSE PRACTITIONER

## 2020-01-01 PROCEDURE — 3078F PR MOST RECENT DIASTOLIC BLOOD PRESSURE < 80 MM HG: ICD-10-PCS | Mod: CPTII,,, | Performed by: INTERNAL MEDICINE

## 2020-01-01 PROCEDURE — 84484 ASSAY OF TROPONIN QUANT: CPT

## 2020-01-01 PROCEDURE — 87040 BLOOD CULTURE FOR BACTERIA: CPT | Mod: 59

## 2020-01-01 PROCEDURE — 99215 OFFICE O/P EST HI 40 MIN: CPT | Performed by: INTERNAL MEDICINE

## 2020-01-01 PROCEDURE — 99222 PR INITIAL HOSPITAL CARE,LEVL II: ICD-10-PCS | Mod: ,,, | Performed by: INTERNAL MEDICINE

## 2020-01-01 PROCEDURE — 87086 URINE CULTURE/COLONY COUNT: CPT

## 2020-01-01 PROCEDURE — 92610 EVALUATE SWALLOWING FUNCTION: CPT

## 2020-01-01 PROCEDURE — 99222 1ST HOSP IP/OBS MODERATE 55: CPT | Mod: ,,, | Performed by: INTERNAL MEDICINE

## 2020-01-01 PROCEDURE — 88333 PR  INTRAOPERATIVE CYTO PATH CONSULT, INITIAL SITE: ICD-10-PCS | Mod: 26,,, | Performed by: PATHOLOGY

## 2020-01-01 PROCEDURE — 82607 VITAMIN B-12: CPT

## 2020-01-01 PROCEDURE — 3078F DIAST BP <80 MM HG: CPT | Mod: CPTII,,, | Performed by: INTERNAL MEDICINE

## 2020-01-01 PROCEDURE — 99214 OFFICE O/P EST MOD 30 MIN: CPT | Mod: 95,,, | Performed by: INTERNAL MEDICINE

## 2020-01-01 PROCEDURE — 36000707: Performed by: SURGERY

## 2020-01-01 PROCEDURE — A9587 GALLIUM GA-68: HCPCS

## 2020-01-01 PROCEDURE — 99213 OFFICE O/P EST LOW 20 MIN: CPT | Mod: 95,,, | Performed by: INTERNAL MEDICINE

## 2020-01-01 PROCEDURE — 80048 BASIC METABOLIC PNL TOTAL CA: CPT | Mod: 91

## 2020-01-01 PROCEDURE — 99215 OFFICE O/P EST HI 40 MIN: CPT | Mod: 95,,, | Performed by: INTERNAL MEDICINE

## 2020-01-01 PROCEDURE — 88360 PR  TUMOR IMMUNOHISTOCHEM/MANUAL: ICD-10-PCS | Mod: 26,,, | Performed by: PATHOLOGY

## 2020-01-01 PROCEDURE — 36556 INSERT NON-TUNNEL CV CATH: CPT | Performed by: STUDENT IN AN ORGANIZED HEALTH CARE EDUCATION/TRAINING PROGRAM

## 2020-01-01 PROCEDURE — 99214 PR OFFICE/OUTPT VISIT, EST, LEVL IV, 30-39 MIN: ICD-10-PCS | Mod: ,,, | Performed by: INTERNAL MEDICINE

## 2020-01-01 PROCEDURE — 87075 CULTR BACTERIA EXCEPT BLOOD: CPT

## 2020-01-01 PROCEDURE — 82962 GLUCOSE BLOOD TEST: CPT

## 2020-01-01 PROCEDURE — 99223 1ST HOSP IP/OBS HIGH 75: CPT | Mod: ,,, | Performed by: CLINICAL NURSE SPECIALIST

## 2020-01-01 PROCEDURE — 97530 THERAPEUTIC ACTIVITIES: CPT | Mod: CQ

## 2020-01-01 PROCEDURE — 37000009 HC ANESTHESIA EA ADD 15 MINS: Performed by: INTERNAL MEDICINE

## 2020-01-01 PROCEDURE — 93010 ELECTROCARDIOGRAM REPORT: CPT | Mod: 76,,, | Performed by: INTERNAL MEDICINE

## 2020-01-01 PROCEDURE — 88333 PATH CONSLTJ SURG CYTO XM 1: CPT | Performed by: PATHOLOGY

## 2020-01-01 PROCEDURE — 97161 PT EVAL LOW COMPLEX 20 MIN: CPT

## 2020-01-01 PROCEDURE — C1751 CATH, INF, PER/CENT/MIDLINE: HCPCS | Performed by: STUDENT IN AN ORGANIZED HEALTH CARE EDUCATION/TRAINING PROGRAM

## 2020-01-01 PROCEDURE — 97110 THERAPEUTIC EXERCISES: CPT | Performed by: PHYSICAL THERAPIST

## 2020-01-01 PROCEDURE — 99232 SBSQ HOSP IP/OBS MODERATE 35: CPT | Mod: ,,, | Performed by: INTERNAL MEDICINE

## 2020-01-01 PROCEDURE — 85048 AUTOMATED LEUKOCYTE COUNT: CPT

## 2020-01-01 PROCEDURE — 84484 ASSAY OF TROPONIN QUANT: CPT | Mod: 91

## 2020-01-01 PROCEDURE — 86920 COMPATIBILITY TEST SPIN: CPT

## 2020-01-01 PROCEDURE — 93010 ELECTROCARDIOGRAM REPORT: CPT | Mod: ,,, | Performed by: INTERNAL MEDICINE

## 2020-01-01 PROCEDURE — 99215 OFFICE O/P EST HI 40 MIN: CPT | Mod: ,,, | Performed by: INTERNAL MEDICINE

## 2020-01-01 PROCEDURE — 78815 PET IMAGE W/CT SKULL-THIGH: CPT | Mod: TC

## 2020-01-01 PROCEDURE — 99000 SPECIMEN HANDLING OFFICE-LAB: CPT | Mod: S$GLB,,, | Performed by: PHYSICIAN ASSISTANT

## 2020-01-01 PROCEDURE — 74328 X-RAY BILE DUCT ENDOSCOPY: CPT | Mod: 26,,, | Performed by: INTERNAL MEDICINE

## 2020-01-01 PROCEDURE — 82746 ASSAY OF FOLIC ACID SERUM: CPT

## 2020-01-01 PROCEDURE — 99232 PR SUBSEQUENT HOSPITAL CARE,LEVL II: ICD-10-PCS | Mod: ,,, | Performed by: INTERNAL MEDICINE

## 2020-01-01 PROCEDURE — 97110 THERAPEUTIC EXERCISES: CPT | Mod: CQ

## 2020-01-01 PROCEDURE — 74328 PR  X-RAY FOR BILE DUCT ENDOSCOPY: ICD-10-PCS | Mod: 26,,, | Performed by: INTERNAL MEDICINE

## 2020-01-01 PROCEDURE — 78815 NM PET 68GA DOTATATE WHOLE BODY: ICD-10-PCS | Mod: 26,PS,, | Performed by: RADIOLOGY

## 2020-01-01 PROCEDURE — 25000242 PHARM REV CODE 250 ALT 637 W/ HCPCS: Performed by: STUDENT IN AN ORGANIZED HEALTH CARE EDUCATION/TRAINING PROGRAM

## 2020-01-01 PROCEDURE — 99223 PR INITIAL HOSPITAL CARE,LEVL III: ICD-10-PCS | Mod: ,,, | Performed by: INTERNAL MEDICINE

## 2020-01-01 PROCEDURE — 94640 AIRWAY INHALATION TREATMENT: CPT

## 2020-01-01 PROCEDURE — 90471 IMMUNIZATION ADMIN: CPT | Performed by: SURGERY

## 2020-01-01 PROCEDURE — 74183 MRI ABD W/O CNTR FLWD CNTR: CPT | Mod: 26,,, | Performed by: RADIOLOGY

## 2020-01-01 PROCEDURE — 99214 OFFICE O/P EST MOD 30 MIN: CPT | Mod: ,,, | Performed by: INTERNAL MEDICINE

## 2020-01-01 PROCEDURE — 43275 ERCP REMOVE FORGN BODY DUCT: CPT | Performed by: INTERNAL MEDICINE

## 2020-01-01 PROCEDURE — 63600175 PHARM REV CODE 636 W HCPCS: Performed by: HOSPITALIST

## 2020-01-01 PROCEDURE — 82340 ASSAY OF CALCIUM IN URINE: CPT

## 2020-01-01 PROCEDURE — 99231 PR SUBSEQUENT HOSPITAL CARE,LEVL I: ICD-10-PCS | Mod: ,,, | Performed by: INTERNAL MEDICINE

## 2020-01-01 PROCEDURE — 97162 PT EVAL MOD COMPLEX 30 MIN: CPT

## 2020-01-01 PROCEDURE — A9698 NON-RAD CONTRAST MATERIALNOC: HCPCS | Performed by: SURGERY

## 2020-01-01 PROCEDURE — 88341 IMHCHEM/IMCYTCHM EA ADD ANTB: CPT | Mod: 26,59,, | Performed by: PATHOLOGY

## 2020-01-01 PROCEDURE — 3288F FALL RISK ASSESSMENT DOCD: CPT | Mod: CPTII,,, | Performed by: INTERNAL MEDICINE

## 2020-01-01 PROCEDURE — 99497 ADVNCD CARE PLAN 30 MIN: CPT | Mod: 25,,, | Performed by: NURSE PRACTITIONER

## 2020-01-01 PROCEDURE — 1100F PTFALLS ASSESS-DOCD GE2>/YR: CPT | Mod: CPTII,,, | Performed by: INTERNAL MEDICINE

## 2020-01-01 PROCEDURE — 92526 ORAL FUNCTION THERAPY: CPT

## 2020-01-01 PROCEDURE — 99215 PR OFFICE/OUTPT VISIT, EST, LEVL V, 40-54 MIN: ICD-10-PCS | Mod: ,,, | Performed by: INTERNAL MEDICINE

## 2020-01-01 PROCEDURE — 84145 PROCALCITONIN (PCT): CPT

## 2020-01-01 PROCEDURE — 90694 VACC AIIV4 NO PRSRV 0.5ML IM: CPT | Performed by: SURGERY

## 2020-01-01 PROCEDURE — 37000008 HC ANESTHESIA 1ST 15 MINUTES: Performed by: SURGERY

## 2020-01-01 PROCEDURE — 31575 PR LARYNGOSCOPY, FLEXIBLE; DIAGNOSTIC: ICD-10-PCS | Mod: ,,, | Performed by: OTOLARYNGOLOGY

## 2020-01-01 PROCEDURE — 99498 ADVNCD CARE PLAN ADDL 30 MIN: CPT | Mod: ,,, | Performed by: NURSE PRACTITIONER

## 2020-01-01 PROCEDURE — 76937 US GUIDE VASCULAR ACCESS: CPT | Performed by: ANESTHESIOLOGY

## 2020-01-01 PROCEDURE — 88112 PR  CYTOPATH, CELL ENHANCE TECH: ICD-10-PCS | Mod: 26,,, | Performed by: PATHOLOGY

## 2020-01-01 PROCEDURE — 99214 OFFICE O/P EST MOD 30 MIN: CPT | Performed by: INTERNAL MEDICINE

## 2020-01-01 PROCEDURE — 3079F PR MOST RECENT DIASTOLIC BLOOD PRESSURE 80-89 MM HG: ICD-10-PCS | Mod: CPTII,,, | Performed by: INTERNAL MEDICINE

## 2020-01-01 PROCEDURE — 99215 OFFICE O/P EST HI 40 MIN: CPT | Mod: GV,,, | Performed by: NURSE PRACTITIONER

## 2020-01-01 PROCEDURE — 99211 OFF/OP EST MAY X REQ PHY/QHP: CPT | Mod: 27

## 2020-01-01 PROCEDURE — 63600175 PHARM REV CODE 636 W HCPCS: Performed by: RADIOLOGY

## 2020-01-01 PROCEDURE — 89051 BODY FLUID CELL COUNT: CPT

## 2020-01-01 PROCEDURE — 1100F PR PT FALLS ASSESS DOC 2+ FALLS/FALL W/INJURY/YR: ICD-10-PCS | Mod: CPTII,,, | Performed by: INTERNAL MEDICINE

## 2020-01-01 PROCEDURE — 90472 IMMUNIZATION ADMIN EACH ADD: CPT | Performed by: SURGERY

## 2020-01-01 PROCEDURE — 3074F PR MOST RECENT SYSTOLIC BLOOD PRESSURE < 130 MM HG: ICD-10-PCS | Mod: CPTII,,, | Performed by: INTERNAL MEDICINE

## 2020-01-01 PROCEDURE — 37000008 HC ANESTHESIA 1ST 15 MINUTES: Performed by: INTERNAL MEDICINE

## 2020-01-01 PROCEDURE — 36000706: Performed by: SURGERY

## 2020-01-01 PROCEDURE — 99223 1ST HOSP IP/OBS HIGH 75: CPT | Mod: ,,, | Performed by: NURSE PRACTITIONER

## 2020-01-01 PROCEDURE — A4217 STERILE WATER/SALINE, 500 ML: HCPCS | Performed by: SURGERY

## 2020-01-01 PROCEDURE — 78815 PET IMAGE W/CT SKULL-THIGH: CPT | Mod: 26,PS,, | Performed by: RADIOLOGY

## 2020-01-01 PROCEDURE — 74183 MRI ABDOMEN W WO CONTRAST: ICD-10-PCS | Mod: 26,,, | Performed by: RADIOLOGY

## 2020-01-01 PROCEDURE — 99203 OFFICE O/P NEW LOW 30 MIN: CPT | Mod: 25,,, | Performed by: OTOLARYNGOLOGY

## 2020-01-01 PROCEDURE — 84155 ASSAY OF PROTEIN SERUM: CPT

## 2020-01-01 PROCEDURE — 99223 1ST HOSP IP/OBS HIGH 75: CPT | Mod: ,,, | Performed by: INTERNAL MEDICINE

## 2020-01-01 PROCEDURE — 88342 CHG IMMUNOCYTOCHEMISTRY: ICD-10-PCS | Mod: 26,59,, | Performed by: PATHOLOGY

## 2020-01-01 PROCEDURE — 74183 MRI ABD W/O CNTR FLWD CNTR: CPT | Mod: TC

## 2020-01-01 PROCEDURE — 82042 OTHER SOURCE ALBUMIN QUAN EA: CPT

## 2020-01-01 PROCEDURE — 82803 BLOOD GASES ANY COMBINATION: CPT

## 2020-01-01 PROCEDURE — 3074F SYST BP LT 130 MM HG: CPT | Mod: CPTII,,, | Performed by: INTERNAL MEDICINE

## 2020-01-01 PROCEDURE — 82652 VIT D 1 25-DIHYDROXY: CPT

## 2020-01-01 PROCEDURE — 88307 PR  SURG PATH,LEVEL V: ICD-10-PCS | Mod: 26,,, | Performed by: PATHOLOGY

## 2020-01-01 PROCEDURE — 99291 CRITICAL CARE FIRST HOUR: CPT | Mod: ,,, | Performed by: EMERGENCY MEDICINE

## 2020-01-01 PROCEDURE — 99213 PR OFFICE/OUTPT VISIT, EST, LEVL III, 20-29 MIN: ICD-10-PCS | Mod: 95,,, | Performed by: INTERNAL MEDICINE

## 2020-01-01 PROCEDURE — 99497 PR ADVNCD CARE PLAN 30 MIN: ICD-10-PCS | Mod: 25,,, | Performed by: NURSE PRACTITIONER

## 2020-01-01 DEVICE — IMPLANTABLE DEVICE: Type: IMPLANTABLE DEVICE | Site: ABDOMEN | Status: FUNCTIONAL

## 2020-01-01 RX ORDER — CINACALCET 30 MG/1
30 TABLET, FILM COATED ORAL 2 TIMES DAILY WITH MEALS
Status: DISCONTINUED | OUTPATIENT
Start: 2020-01-01 | End: 2020-01-01

## 2020-01-01 RX ORDER — SODIUM CHLORIDE 9 MG/ML
INJECTION, SOLUTION INTRAVENOUS CONTINUOUS
Status: DISCONTINUED | OUTPATIENT
Start: 2020-01-01 | End: 2020-01-01

## 2020-01-01 RX ORDER — POTASSIUM CHLORIDE 7.45 MG/ML
10 INJECTION INTRAVENOUS
Status: COMPLETED | OUTPATIENT
Start: 2020-01-01 | End: 2020-01-01

## 2020-01-01 RX ORDER — PROPOFOL 10 MG/ML
0-50 INJECTION, EMULSION INTRAVENOUS CONTINUOUS
Status: DISCONTINUED | OUTPATIENT
Start: 2020-01-01 | End: 2020-01-01 | Stop reason: HOSPADM

## 2020-01-01 RX ORDER — POTASSIUM CHLORIDE 20 MEQ/1
40 TABLET, EXTENDED RELEASE ORAL 2 TIMES DAILY
Status: COMPLETED | OUTPATIENT
Start: 2020-01-01 | End: 2020-01-01

## 2020-01-01 RX ORDER — POTASSIUM CHLORIDE 29.8 MG/ML
40 INJECTION INTRAVENOUS ONCE
Status: COMPLETED | OUTPATIENT
Start: 2020-01-01 | End: 2020-01-01

## 2020-01-01 RX ORDER — URSODIOL 300 MG/1
300 CAPSULE ORAL 2 TIMES DAILY
Status: DISCONTINUED | OUTPATIENT
Start: 2020-01-01 | End: 2020-01-01 | Stop reason: HOSPADM

## 2020-01-01 RX ORDER — ASPIRIN 325 MG
325 TABLET, DELAYED RELEASE (ENTERIC COATED) ORAL
Status: COMPLETED | OUTPATIENT
Start: 2020-01-01 | End: 2020-01-01

## 2020-01-01 RX ORDER — TALC
6 POWDER (GRAM) TOPICAL NIGHTLY PRN
Status: DISCONTINUED | OUTPATIENT
Start: 2020-01-01 | End: 2020-01-01 | Stop reason: HOSPADM

## 2020-01-01 RX ORDER — ZOLEDRONIC ACID 4 MG/100ML
4 SOLUTION INTRAVENOUS ONCE
Status: COMPLETED | OUTPATIENT
Start: 2020-01-01 | End: 2020-01-01

## 2020-01-01 RX ORDER — HEPARIN 100 UNIT/ML
500 SYRINGE INTRAVENOUS
Status: CANCELLED | OUTPATIENT
Start: 2020-01-01

## 2020-01-01 RX ORDER — QUETIAPINE FUMARATE 25 MG/1
25 TABLET, FILM COATED ORAL NIGHTLY
Status: DISCONTINUED | OUTPATIENT
Start: 2020-01-01 | End: 2020-01-01 | Stop reason: HOSPADM

## 2020-01-01 RX ORDER — SYRING-NEEDL,DISP,INSUL,0.3 ML 29 G X1/2"
296 SYRINGE, EMPTY DISPOSABLE MISCELLANEOUS ONCE
Status: COMPLETED | OUTPATIENT
Start: 2020-01-01 | End: 2020-01-01

## 2020-01-01 RX ORDER — QUETIAPINE FUMARATE 25 MG/1
50 TABLET, FILM COATED ORAL ONCE AS NEEDED
Status: COMPLETED | OUTPATIENT
Start: 2020-01-01 | End: 2020-01-01

## 2020-01-01 RX ORDER — ENOXAPARIN SODIUM 100 MG/ML
40 INJECTION SUBCUTANEOUS EVERY 24 HOURS
Status: DISCONTINUED | OUTPATIENT
Start: 2020-01-01 | End: 2020-01-01 | Stop reason: HOSPADM

## 2020-01-01 RX ORDER — GABAPENTIN 300 MG/1
300 CAPSULE ORAL 3 TIMES DAILY
Status: DISCONTINUED | OUTPATIENT
Start: 2020-01-01 | End: 2020-01-01 | Stop reason: HOSPADM

## 2020-01-01 RX ORDER — BISACODYL 10 MG
10 SUPPOSITORY, RECTAL RECTAL DAILY PRN
Status: DISCONTINUED | OUTPATIENT
Start: 2020-01-01 | End: 2020-01-01 | Stop reason: HOSPADM

## 2020-01-01 RX ORDER — SODIUM CHLORIDE 0.9 % (FLUSH) 0.9 %
10 SYRINGE (ML) INJECTION
Status: DISCONTINUED | OUTPATIENT
Start: 2020-01-01 | End: 2020-01-01 | Stop reason: HOSPADM

## 2020-01-01 RX ORDER — CALCITONIN SALMON 200 [USP'U]/ML
4 INJECTION, SOLUTION INTRAMUSCULAR; SUBCUTANEOUS ONCE
Status: DISCONTINUED | OUTPATIENT
Start: 2020-01-01 | End: 2020-01-01

## 2020-01-01 RX ORDER — FUROSEMIDE 10 MG/ML
80 INJECTION INTRAMUSCULAR; INTRAVENOUS ONCE
Status: COMPLETED | OUTPATIENT
Start: 2020-01-01 | End: 2020-01-01

## 2020-01-01 RX ORDER — MAGNESIUM SULFATE HEPTAHYDRATE 40 MG/ML
2 INJECTION, SOLUTION INTRAVENOUS ONCE
Status: COMPLETED | OUTPATIENT
Start: 2020-01-01 | End: 2020-01-01

## 2020-01-01 RX ORDER — ENOXAPARIN SODIUM 100 MG/ML
40 INJECTION SUBCUTANEOUS EVERY 24 HOURS
Status: COMPLETED | OUTPATIENT
Start: 2020-01-01 | End: 2020-01-01

## 2020-01-01 RX ORDER — SPIRONOLACTONE 50 MG/1
50 TABLET, FILM COATED ORAL DAILY
Qty: 30 TABLET | Refills: 11 | Status: SHIPPED | OUTPATIENT
Start: 2020-01-01 | End: 2020-01-01

## 2020-01-01 RX ORDER — LANREOTIDE ACETATE 120 MG/.5ML
120 INJECTION SUBCUTANEOUS ONCE
Status: CANCELLED | OUTPATIENT
Start: 2020-01-01

## 2020-01-01 RX ORDER — OLMESARTAN MEDOXOMIL 40 MG/1
40 TABLET ORAL DAILY
Status: ON HOLD | COMMUNITY
Start: 2020-01-01 | End: 2020-01-01 | Stop reason: HOSPADM

## 2020-01-01 RX ORDER — LACTULOSE 10 G/15ML
200 SOLUTION ORAL; RECTAL ONCE
Status: COMPLETED | OUTPATIENT
Start: 2020-01-01 | End: 2020-01-01

## 2020-01-01 RX ORDER — ALBUMIN HUMAN 50 G/1000ML
SOLUTION INTRAVENOUS
Status: COMPLETED
Start: 2020-01-01 | End: 2020-01-01

## 2020-01-01 RX ORDER — FUROSEMIDE 20 MG/1
20 TABLET ORAL EVERY 12 HOURS
Qty: 28 TABLET | Refills: 0 | Status: SHIPPED | OUTPATIENT
Start: 2020-01-01 | End: 2020-01-01 | Stop reason: SDUPTHER

## 2020-01-01 RX ORDER — FUROSEMIDE 20 MG/1
20 TABLET ORAL EVERY 8 HOURS
Qty: 42 TABLET | Refills: 0 | Status: SHIPPED | OUTPATIENT
Start: 2020-01-01 | End: 2020-01-01 | Stop reason: SDUPTHER

## 2020-01-01 RX ORDER — LEVOTHYROXINE SODIUM 175 UG/1
175 TABLET ORAL
Qty: 30 TABLET | Refills: 11 | Status: ON HOLD | OUTPATIENT
Start: 2020-01-01 | End: 2020-01-01 | Stop reason: HOSPADM

## 2020-01-01 RX ORDER — CALCITONIN SALMON 200 [IU]/.09ML
1 SPRAY, METERED NASAL DAILY
Status: DISCONTINUED | OUTPATIENT
Start: 2020-01-01 | End: 2020-01-01

## 2020-01-01 RX ORDER — ONDANSETRON 8 MG/1
8 TABLET, ORALLY DISINTEGRATING ORAL EVERY 8 HOURS PRN
Status: DISCONTINUED | OUTPATIENT
Start: 2020-01-01 | End: 2020-01-01 | Stop reason: HOSPADM

## 2020-01-01 RX ORDER — SPIRONOLACTONE 25 MG/1
50 TABLET ORAL DAILY
Status: DISCONTINUED | OUTPATIENT
Start: 2020-01-01 | End: 2020-01-01 | Stop reason: HOSPADM

## 2020-01-01 RX ORDER — FAMOTIDINE 10 MG/ML
20 INJECTION INTRAVENOUS 2 TIMES DAILY
Status: DISCONTINUED | OUTPATIENT
Start: 2020-01-01 | End: 2020-01-01

## 2020-01-01 RX ORDER — FUROSEMIDE 10 MG/ML
20 INJECTION INTRAMUSCULAR; INTRAVENOUS
Status: COMPLETED | OUTPATIENT
Start: 2020-01-01 | End: 2020-01-01

## 2020-01-01 RX ORDER — ONDANSETRON 2 MG/ML
8 INJECTION INTRAMUSCULAR; INTRAVENOUS ONCE
Status: COMPLETED | OUTPATIENT
Start: 2020-01-01 | End: 2020-01-01

## 2020-01-01 RX ORDER — CALCITONIN SALMON 200 [USP'U]/ML
4 INJECTION, SOLUTION INTRAMUSCULAR; SUBCUTANEOUS EVERY 12 HOURS
Status: DISCONTINUED | OUTPATIENT
Start: 2020-01-01 | End: 2020-01-01

## 2020-01-01 RX ORDER — SPIRONOLACTONE 50 MG/1
50 TABLET, FILM COATED ORAL DAILY
Qty: 30 TABLET | Refills: 12 | Status: SHIPPED | OUTPATIENT
Start: 2020-01-01 | End: 2020-01-01 | Stop reason: SDUPTHER

## 2020-01-01 RX ORDER — CINACALCET 30 MG/1
90 TABLET, FILM COATED ORAL 2 TIMES DAILY WITH MEALS
Status: DISCONTINUED | OUTPATIENT
Start: 2020-01-01 | End: 2020-01-01 | Stop reason: HOSPADM

## 2020-01-01 RX ORDER — POTASSIUM CHLORIDE 7.45 MG/ML
10 INJECTION INTRAVENOUS
Status: DISCONTINUED | OUTPATIENT
Start: 2020-01-01 | End: 2020-01-01

## 2020-01-01 RX ORDER — FUROSEMIDE 10 MG/ML
60 INJECTION INTRAMUSCULAR; INTRAVENOUS 2 TIMES DAILY
Status: DISCONTINUED | OUTPATIENT
Start: 2020-01-01 | End: 2020-01-01 | Stop reason: HOSPADM

## 2020-01-01 RX ORDER — FUROSEMIDE 10 MG/ML
40 INJECTION INTRAMUSCULAR; INTRAVENOUS ONCE
Status: COMPLETED | OUTPATIENT
Start: 2020-01-01 | End: 2020-01-01

## 2020-01-01 RX ORDER — SODIUM CHLORIDE 9 MG/ML
INJECTION, SOLUTION INTRAVENOUS CONTINUOUS
Status: DISCONTINUED | OUTPATIENT
Start: 2020-01-01 | End: 2020-01-01 | Stop reason: HOSPADM

## 2020-01-01 RX ORDER — LIDOCAINE HYDROCHLORIDE 20 MG/ML
INJECTION INTRAVENOUS
Status: DISCONTINUED | OUTPATIENT
Start: 2020-01-01 | End: 2020-01-01

## 2020-01-01 RX ORDER — FUROSEMIDE 10 MG/ML
20 INJECTION INTRAMUSCULAR; INTRAVENOUS EVERY 8 HOURS
Status: DISCONTINUED | OUTPATIENT
Start: 2020-01-01 | End: 2020-01-01

## 2020-01-01 RX ORDER — SODIUM CHLORIDE 450 MG/100ML
INJECTION, SOLUTION INTRAVENOUS CONTINUOUS
Status: DISCONTINUED | OUTPATIENT
Start: 2020-01-01 | End: 2020-01-01 | Stop reason: HOSPADM

## 2020-01-01 RX ORDER — ALBUMIN HUMAN 250 G/1000ML
12.5 SOLUTION INTRAVENOUS ONCE
Status: COMPLETED | OUTPATIENT
Start: 2020-01-01 | End: 2020-01-01

## 2020-01-01 RX ORDER — NOREPINEPHRINE BITARTRATE/D5W 4MG/250ML
0-3 PLASTIC BAG, INJECTION (ML) INTRAVENOUS CONTINUOUS
Status: DISCONTINUED | OUTPATIENT
Start: 2020-01-01 | End: 2020-01-01

## 2020-01-01 RX ORDER — POTASSIUM CHLORIDE 1.5 G/1.58G
40 POWDER, FOR SOLUTION ORAL
Status: DISCONTINUED | OUTPATIENT
Start: 2020-01-01 | End: 2020-01-01

## 2020-01-01 RX ORDER — PHENYLEPHRINE HYDROCHLORIDE 10 MG/ML
100 INJECTION INTRAVENOUS ONCE
Status: DISCONTINUED | OUTPATIENT
Start: 2020-01-01 | End: 2020-01-01 | Stop reason: HOSPADM

## 2020-01-01 RX ORDER — LACTULOSE 10 G/15ML
10 SOLUTION ORAL EVERY 6 HOURS
Status: DISCONTINUED | OUTPATIENT
Start: 2020-01-01 | End: 2020-01-01

## 2020-01-01 RX ORDER — ATROPINE SULFATE 0.1 MG/ML
INJECTION INTRAVENOUS
Status: COMPLETED
Start: 2020-01-01 | End: 2020-01-01

## 2020-01-01 RX ORDER — OXYCODONE HYDROCHLORIDE 5 MG/1
5 TABLET ORAL EVERY 4 HOURS PRN
Status: DISCONTINUED | OUTPATIENT
Start: 2020-01-01 | End: 2020-01-01 | Stop reason: HOSPADM

## 2020-01-01 RX ORDER — SODIUM CHLORIDE 0.9 % (FLUSH) 0.9 %
10 SYRINGE (ML) INJECTION
Status: CANCELLED | OUTPATIENT
Start: 2020-01-01

## 2020-01-01 RX ORDER — ONDANSETRON 8 MG/1
1 TABLET, ORALLY DISINTEGRATING ORAL EVERY 8 HOURS PRN
Status: ON HOLD | COMMUNITY
Start: 2020-01-01 | End: 2020-01-01 | Stop reason: HOSPADM

## 2020-01-01 RX ORDER — PROPOFOL 10 MG/ML
VIAL (ML) INTRAVENOUS CONTINUOUS PRN
Status: DISCONTINUED | OUTPATIENT
Start: 2020-01-01 | End: 2020-01-01

## 2020-01-01 RX ORDER — TRAMADOL HYDROCHLORIDE 50 MG/1
50 TABLET ORAL EVERY 6 HOURS PRN
Status: DISCONTINUED | OUTPATIENT
Start: 2020-01-01 | End: 2020-01-01 | Stop reason: HOSPADM

## 2020-01-01 RX ORDER — SODIUM,POTASSIUM PHOSPHATES 280-250MG
2 POWDER IN PACKET (EA) ORAL EVERY 4 HOURS
Status: DISCONTINUED | OUTPATIENT
Start: 2020-01-01 | End: 2020-01-01

## 2020-01-01 RX ORDER — LIDOCAINE HYDROCHLORIDE 10 MG/ML
1 INJECTION INFILTRATION; PERINEURAL ONCE
Status: COMPLETED | OUTPATIENT
Start: 2020-01-01 | End: 2020-01-01

## 2020-01-01 RX ORDER — FAMOTIDINE 10 MG/ML
20 INJECTION INTRAVENOUS DAILY
Status: DISCONTINUED | OUTPATIENT
Start: 2020-01-01 | End: 2020-01-01 | Stop reason: HOSPADM

## 2020-01-01 RX ORDER — LIDOCAINE HYDROCHLORIDE 10 MG/ML
1 INJECTION, SOLUTION EPIDURAL; INFILTRATION; INTRACAUDAL; PERINEURAL ONCE
Status: DISCONTINUED | OUTPATIENT
Start: 2020-01-01 | End: 2020-01-01 | Stop reason: HOSPADM

## 2020-01-01 RX ORDER — FUROSEMIDE 10 MG/ML
60 INJECTION INTRAMUSCULAR; INTRAVENOUS ONCE
Status: COMPLETED | OUTPATIENT
Start: 2020-01-01 | End: 2020-01-01

## 2020-01-01 RX ORDER — POLYETHYLENE GLYCOL 3350 17 G/17G
17 POWDER, FOR SOLUTION ORAL DAILY PRN
Status: DISCONTINUED | OUTPATIENT
Start: 2020-01-01 | End: 2020-01-01 | Stop reason: HOSPADM

## 2020-01-01 RX ORDER — ALBUTEROL SULFATE 2.5 MG/.5ML
2.5 SOLUTION RESPIRATORY (INHALATION)
Status: DISCONTINUED | OUTPATIENT
Start: 2020-01-01 | End: 2020-01-01 | Stop reason: HOSPADM

## 2020-01-01 RX ORDER — IBUPROFEN 200 MG
24 TABLET ORAL
Status: DISCONTINUED | OUTPATIENT
Start: 2020-01-01 | End: 2020-01-01 | Stop reason: HOSPADM

## 2020-01-01 RX ORDER — MIDAZOLAM HYDROCHLORIDE 5 MG/ML
INJECTION INTRAMUSCULAR; INTRAVENOUS CODE/TRAUMA/SEDATION MEDICATION
Status: COMPLETED | OUTPATIENT
Start: 2020-01-01 | End: 2020-01-01

## 2020-01-01 RX ORDER — FERROUS GLUCONATE 324(37.5)
324 TABLET ORAL DAILY
Status: DISCONTINUED | OUTPATIENT
Start: 2020-01-01 | End: 2020-01-01 | Stop reason: HOSPADM

## 2020-01-01 RX ORDER — CEFEPIME HYDROCHLORIDE 1 G/50ML
1 INJECTION, SOLUTION INTRAVENOUS
Status: DISCONTINUED | OUTPATIENT
Start: 2020-01-01 | End: 2020-01-01 | Stop reason: HOSPADM

## 2020-01-01 RX ORDER — PANTOPRAZOLE SODIUM 40 MG/10ML
40 INJECTION, POWDER, LYOPHILIZED, FOR SOLUTION INTRAVENOUS DAILY
Status: DISCONTINUED | OUTPATIENT
Start: 2020-01-01 | End: 2020-01-01

## 2020-01-01 RX ORDER — DEXTROSE MONOHYDRATE, SODIUM CHLORIDE, AND POTASSIUM CHLORIDE 50; 1.49; 9 G/1000ML; G/1000ML; G/1000ML
INJECTION, SOLUTION INTRAVENOUS CONTINUOUS
Status: DISCONTINUED | OUTPATIENT
Start: 2020-01-01 | End: 2020-01-01

## 2020-01-01 RX ORDER — DEXTROSE MONOHYDRATE, SODIUM CHLORIDE, AND POTASSIUM CHLORIDE 50; 1.49; 4.5 G/1000ML; G/1000ML; G/1000ML
INJECTION, SOLUTION INTRAVENOUS CONTINUOUS
Status: DISCONTINUED | OUTPATIENT
Start: 2020-01-01 | End: 2020-01-01

## 2020-01-01 RX ORDER — SODIUM CHLORIDE 9 MG/ML
INJECTION, SOLUTION INTRAVENOUS CONTINUOUS
Status: ACTIVE | OUTPATIENT
Start: 2020-01-01 | End: 2020-01-01

## 2020-01-01 RX ORDER — ZOLEDRONIC ACID 0.04 MG/ML
4 INJECTION, SOLUTION INTRAVENOUS ONCE
Status: CANCELLED | OUTPATIENT
Start: 2020-01-01

## 2020-01-01 RX ORDER — SODIUM CHLORIDE 9 MG/ML
INJECTION, SOLUTION INTRAVENOUS ONCE
Status: CANCELLED
Start: 2020-01-01

## 2020-01-01 RX ORDER — POTASSIUM CHLORIDE 20 MEQ/1
20 TABLET, EXTENDED RELEASE ORAL ONCE
Status: COMPLETED | OUTPATIENT
Start: 2020-01-01 | End: 2020-01-01

## 2020-01-01 RX ORDER — AMOXICILLIN 250 MG
1 CAPSULE ORAL 2 TIMES DAILY
Status: DISCONTINUED | OUTPATIENT
Start: 2020-01-01 | End: 2020-01-01 | Stop reason: HOSPADM

## 2020-01-01 RX ORDER — ZOLEDRONIC ACID 4 MG/5ML
4 INJECTION INTRAVENOUS ONCE
Status: DISCONTINUED | OUTPATIENT
Start: 2020-01-01 | End: 2020-01-01

## 2020-01-01 RX ORDER — ENOXAPARIN SODIUM 100 MG/ML
40 INJECTION SUBCUTANEOUS EVERY 24 HOURS
Status: CANCELLED | OUTPATIENT
Start: 2020-01-01

## 2020-01-01 RX ORDER — ACETAMINOPHEN 325 MG/1
650 TABLET ORAL EVERY 4 HOURS PRN
Status: DISCONTINUED | OUTPATIENT
Start: 2020-01-01 | End: 2020-01-01 | Stop reason: HOSPADM

## 2020-01-01 RX ORDER — FUROSEMIDE 40 MG/1
40 TABLET ORAL DAILY
Qty: 30 TABLET | Refills: 11 | Status: ON HOLD | OUTPATIENT
Start: 2020-01-01 | End: 2020-01-01 | Stop reason: HOSPADM

## 2020-01-01 RX ORDER — SODIUM CHLORIDE 0.9 % (FLUSH) 0.9 %
10 SYRINGE (ML) INJECTION EVERY 6 HOURS
Status: DISCONTINUED | OUTPATIENT
Start: 2020-01-01 | End: 2020-01-01 | Stop reason: HOSPADM

## 2020-01-01 RX ORDER — CALCITONIN SALMON 200 [USP'U]/ML
4 INJECTION, SOLUTION INTRAMUSCULAR; SUBCUTANEOUS DAILY
Status: DISCONTINUED | OUTPATIENT
Start: 2020-01-01 | End: 2020-01-01 | Stop reason: SDUPTHER

## 2020-01-01 RX ORDER — ATROPINE SULFATE 0.1 MG/ML
0.5 INJECTION INTRAVENOUS
Status: DISCONTINUED | OUTPATIENT
Start: 2020-01-01 | End: 2020-01-01 | Stop reason: HOSPADM

## 2020-01-01 RX ORDER — ACETAMINOPHEN 325 MG/1
650 TABLET ORAL
Status: CANCELLED | OUTPATIENT
Start: 2020-01-01

## 2020-01-01 RX ORDER — FUROSEMIDE 20 MG/1
20 TABLET ORAL 2 TIMES DAILY
Qty: 60 TABLET | Refills: 11 | Status: ON HOLD | OUTPATIENT
Start: 2020-01-01 | End: 2020-01-01 | Stop reason: HOSPADM

## 2020-01-01 RX ORDER — SODIUM,POTASSIUM PHOSPHATES 280-250MG
1 POWDER IN PACKET (EA) ORAL 3 TIMES DAILY
Qty: 90 PACKET | Refills: 0 | Status: ON HOLD | OUTPATIENT
Start: 2020-01-01 | End: 2020-01-01 | Stop reason: HOSPADM

## 2020-01-01 RX ORDER — ALBUMIN HUMAN 50 G/1000ML
25 SOLUTION INTRAVENOUS ONCE
Status: COMPLETED | OUTPATIENT
Start: 2020-01-01 | End: 2020-01-01

## 2020-01-01 RX ORDER — LANREOTIDE ACETATE 120 MG/.5ML
120 INJECTION SUBCUTANEOUS
Status: CANCELLED | OUTPATIENT
Start: 2020-01-01

## 2020-01-01 RX ORDER — ENOXAPARIN SODIUM 100 MG/ML
30 INJECTION SUBCUTANEOUS EVERY 24 HOURS
Status: DISCONTINUED | OUTPATIENT
Start: 2020-01-01 | End: 2020-01-01 | Stop reason: HOSPADM

## 2020-01-01 RX ORDER — DRONABINOL 2.5 MG/1
2.5 CAPSULE ORAL
Status: DISCONTINUED | OUTPATIENT
Start: 2020-01-01 | End: 2020-01-01

## 2020-01-01 RX ORDER — URSODIOL 300 MG/1
300 CAPSULE ORAL 2 TIMES DAILY
Qty: 60 CAPSULE | Refills: 11 | Status: ON HOLD | OUTPATIENT
Start: 2020-01-01 | End: 2020-01-01 | Stop reason: HOSPADM

## 2020-01-01 RX ORDER — FAMOTIDINE 20 MG/1
20 TABLET, FILM COATED ORAL 2 TIMES DAILY
Status: DISCONTINUED | OUTPATIENT
Start: 2020-01-01 | End: 2020-01-01 | Stop reason: HOSPADM

## 2020-01-01 RX ORDER — FUROSEMIDE 10 MG/ML
40 INJECTION INTRAMUSCULAR; INTRAVENOUS
Status: COMPLETED | OUTPATIENT
Start: 2020-01-01 | End: 2020-01-01

## 2020-01-01 RX ORDER — ACETAMINOPHEN 325 MG/1
650 TABLET ORAL EVERY 8 HOURS PRN
Status: DISCONTINUED | OUTPATIENT
Start: 2020-01-01 | End: 2020-01-01 | Stop reason: HOSPADM

## 2020-01-01 RX ORDER — BUPIVACAINE HYDROCHLORIDE 2.5 MG/ML
INJECTION, SOLUTION EPIDURAL; INFILTRATION; INTRACAUDAL
Status: DISCONTINUED | OUTPATIENT
Start: 2020-01-01 | End: 2020-01-01 | Stop reason: HOSPADM

## 2020-01-01 RX ORDER — LEVOTHYROXINE SODIUM ANHYDROUS 100 UG/5ML
200 INJECTION, POWDER, LYOPHILIZED, FOR SOLUTION INTRAVENOUS DAILY
Status: DISCONTINUED | OUTPATIENT
Start: 2020-01-01 | End: 2020-01-01

## 2020-01-01 RX ORDER — FERROUS GLUCONATE 324(38)MG
1 TABLET ORAL DAILY
Status: ON HOLD | COMMUNITY
Start: 2020-01-01 | End: 2020-01-01 | Stop reason: HOSPADM

## 2020-01-01 RX ORDER — GLUCAGON 1 MG
1 KIT INJECTION
Status: DISCONTINUED | OUTPATIENT
Start: 2020-01-01 | End: 2020-01-01 | Stop reason: HOSPADM

## 2020-01-01 RX ORDER — SODIUM BICARBONATE 1 MEQ/ML
50 SYRINGE (ML) INTRAVENOUS ONCE
Status: COMPLETED | OUTPATIENT
Start: 2020-01-01 | End: 2020-01-01

## 2020-01-01 RX ORDER — ARGININE/LYSINE/0.9 % SOD CHL 25-25MG/ML
1000 PLASTIC BAG, INJECTION (ML) INTRAVENOUS
Status: CANCELLED | OUTPATIENT
Start: 2020-01-01

## 2020-01-01 RX ORDER — SPIRONOLACTONE 50 MG/1
50 TABLET, FILM COATED ORAL EVERY 8 HOURS
Qty: 42 TABLET | Refills: 0 | Status: ON HOLD | OUTPATIENT
Start: 2020-01-01 | End: 2020-01-01 | Stop reason: HOSPADM

## 2020-01-01 RX ORDER — SODIUM,POTASSIUM PHOSPHATES 280-250MG
1 POWDER IN PACKET (EA) ORAL 3 TIMES DAILY
Qty: 90 PACKET | Refills: 0 | Status: SHIPPED | OUTPATIENT
Start: 2020-01-01 | End: 2020-01-01

## 2020-01-01 RX ORDER — FUROSEMIDE 40 MG/1
40 TABLET ORAL 2 TIMES DAILY
Status: DISCONTINUED | OUTPATIENT
Start: 2020-01-01 | End: 2020-01-01 | Stop reason: HOSPADM

## 2020-01-01 RX ORDER — IBUPROFEN 200 MG
16 TABLET ORAL
Status: DISCONTINUED | OUTPATIENT
Start: 2020-01-01 | End: 2020-01-01 | Stop reason: HOSPADM

## 2020-01-01 RX ORDER — SODIUM,POTASSIUM PHOSPHATES 280-250MG
2 POWDER IN PACKET (EA) ORAL
Status: COMPLETED | OUTPATIENT
Start: 2020-01-01 | End: 2020-01-01

## 2020-01-01 RX ORDER — CINACALCET 30 MG/1
60 TABLET, FILM COATED ORAL 2 TIMES DAILY WITH MEALS
Qty: 120 TABLET | Refills: 11 | Status: ON HOLD
Start: 2020-01-01 | End: 2020-01-01 | Stop reason: HOSPADM

## 2020-01-01 RX ORDER — SODIUM,POTASSIUM PHOSPHATES 280-250MG
2 POWDER IN PACKET (EA) ORAL EVERY 4 HOURS
Status: DISPENSED | OUTPATIENT
Start: 2020-01-01 | End: 2020-01-01

## 2020-01-01 RX ORDER — SYRING-NEEDL,DISP,INSUL,0.3 ML 29 G X1/2"
296 SYRINGE, EMPTY DISPOSABLE MISCELLANEOUS
Status: COMPLETED | OUTPATIENT
Start: 2020-01-01 | End: 2020-01-01

## 2020-01-01 RX ORDER — CINACALCET 30 MG/1
30 TABLET, FILM COATED ORAL 2 TIMES DAILY WITH MEALS
Status: DISCONTINUED | OUTPATIENT
Start: 2020-01-01 | End: 2020-01-01 | Stop reason: HOSPADM

## 2020-01-01 RX ORDER — LIDOCAINE HYDROCHLORIDE 10 MG/ML
INJECTION INFILTRATION; PERINEURAL
Status: COMPLETED
Start: 2020-01-01 | End: 2020-01-01

## 2020-01-01 RX ORDER — LEVOTHYROXINE SODIUM 75 UG/1
150 TABLET ORAL
Status: DISCONTINUED | OUTPATIENT
Start: 2020-01-01 | End: 2020-01-01

## 2020-01-01 RX ORDER — CINACALCET 30 MG/1
60 TABLET, FILM COATED ORAL 2 TIMES DAILY WITH MEALS
Status: DISCONTINUED | OUTPATIENT
Start: 2020-01-01 | End: 2020-01-01

## 2020-01-01 RX ORDER — MAGNESIUM SULFATE 1 G/100ML
1 INJECTION INTRAVENOUS ONCE
Status: COMPLETED | OUTPATIENT
Start: 2020-01-01 | End: 2020-01-01

## 2020-01-01 RX ORDER — FUROSEMIDE 10 MG/ML
60 INJECTION INTRAMUSCULAR; INTRAVENOUS 2 TIMES DAILY
Status: DISCONTINUED | OUTPATIENT
Start: 2020-01-01 | End: 2020-01-01

## 2020-01-01 RX ORDER — MIDODRINE HYDROCHLORIDE 5 MG/1
5 TABLET ORAL EVERY 8 HOURS
Status: DISCONTINUED | OUTPATIENT
Start: 2020-01-01 | End: 2020-01-01 | Stop reason: HOSPADM

## 2020-01-01 RX ORDER — ERGOCALCIFEROL 1.25 MG/1
50000 CAPSULE ORAL
Status: DISCONTINUED | OUTPATIENT
Start: 2020-01-01 | End: 2020-01-01

## 2020-01-01 RX ORDER — SPIRONOLACTONE 50 MG/1
50 TABLET, FILM COATED ORAL DAILY
Status: DISCONTINUED | OUTPATIENT
Start: 2020-01-01 | End: 2020-01-01 | Stop reason: HOSPADM

## 2020-01-01 RX ORDER — ALBUMIN HUMAN 250 G/1000ML
50 SOLUTION INTRAVENOUS ONCE
Status: COMPLETED | OUTPATIENT
Start: 2020-01-01 | End: 2020-01-01

## 2020-01-01 RX ORDER — POTASSIUM CHLORIDE 1.5 G/1.58G
40 POWDER, FOR SOLUTION ORAL 2 TIMES DAILY
Status: DISPENSED | OUTPATIENT
Start: 2020-01-01 | End: 2020-01-01

## 2020-01-01 RX ORDER — POTASSIUM CHLORIDE 29.8 MG/ML
40 INJECTION INTRAVENOUS EVERY 4 HOURS
Status: COMPLETED | OUTPATIENT
Start: 2020-01-01 | End: 2020-01-01

## 2020-01-01 RX ORDER — HYDROCODONE BITARTRATE AND ACETAMINOPHEN 500; 5 MG/1; MG/1
TABLET ORAL
Status: DISCONTINUED | OUTPATIENT
Start: 2020-01-01 | End: 2020-01-01 | Stop reason: HOSPADM

## 2020-01-01 RX ORDER — PSEUDOEPHEDRINE/ACETAMINOPHEN 30MG-500MG
100 TABLET ORAL
Status: COMPLETED | OUTPATIENT
Start: 2020-01-01 | End: 2020-01-01

## 2020-01-01 RX ORDER — CALCITONIN SALMON 200 [USP'U]/ML
4 INJECTION, SOLUTION INTRAMUSCULAR; SUBCUTANEOUS ONCE
Status: COMPLETED | OUTPATIENT
Start: 2020-01-01 | End: 2020-01-01

## 2020-01-01 RX ORDER — FUROSEMIDE 20 MG/1
20 TABLET ORAL EVERY 8 HOURS
Qty: 90 TABLET | Refills: 12 | Status: SHIPPED | OUTPATIENT
Start: 2020-01-01 | End: 2020-01-01 | Stop reason: SDUPTHER

## 2020-01-01 RX ORDER — DRONABINOL 2.5 MG/1
2.5 CAPSULE ORAL
Status: DISCONTINUED | OUTPATIENT
Start: 2020-01-01 | End: 2020-01-01 | Stop reason: HOSPADM

## 2020-01-01 RX ORDER — FUROSEMIDE 20 MG/1
20 TABLET ORAL EVERY 12 HOURS
Qty: 60 TABLET | Refills: 0 | Status: ON HOLD | OUTPATIENT
Start: 2020-01-01 | End: 2020-01-01 | Stop reason: HOSPADM

## 2020-01-01 RX ORDER — SODIUM CHLORIDE, SODIUM LACTATE, POTASSIUM CHLORIDE, CALCIUM CHLORIDE 600; 310; 30; 20 MG/100ML; MG/100ML; MG/100ML; MG/100ML
INJECTION, SOLUTION INTRAVENOUS CONTINUOUS
Status: DISCONTINUED | OUTPATIENT
Start: 2020-01-01 | End: 2020-01-01

## 2020-01-01 RX ORDER — LORAZEPAM 2 MG/ML
INJECTION INTRAMUSCULAR
Status: COMPLETED
Start: 2020-01-01 | End: 2020-01-01

## 2020-01-01 RX ORDER — ACETAMINOPHEN 325 MG/1
650 TABLET ORAL EVERY 6 HOURS PRN
Status: DISCONTINUED | OUTPATIENT
Start: 2020-01-01 | End: 2020-01-01 | Stop reason: HOSPADM

## 2020-01-01 RX ORDER — QUETIAPINE FUMARATE 25 MG/1
50 TABLET, FILM COATED ORAL ONCE
Status: COMPLETED | OUTPATIENT
Start: 2020-01-01 | End: 2020-01-01

## 2020-01-01 RX ORDER — ONDANSETRON 2 MG/ML
4 INJECTION INTRAMUSCULAR; INTRAVENOUS EVERY 6 HOURS PRN
Status: DISCONTINUED | OUTPATIENT
Start: 2020-01-01 | End: 2020-01-01 | Stop reason: HOSPADM

## 2020-01-01 RX ORDER — SPIRONOLACTONE 50 MG/1
50 TABLET, FILM COATED ORAL DAILY
Qty: 30 TABLET | Refills: 11 | Status: ON HOLD | OUTPATIENT
Start: 2020-01-01 | End: 2020-01-01 | Stop reason: HOSPADM

## 2020-01-01 RX ORDER — CALCITONIN SALMON 200 [USP'U]/ML
4 INJECTION, SOLUTION INTRAMUSCULAR; SUBCUTANEOUS 2 TIMES DAILY
Status: COMPLETED | OUTPATIENT
Start: 2020-01-01 | End: 2020-01-01

## 2020-01-01 RX ORDER — POTASSIUM CHLORIDE 7.45 MG/ML
10 INJECTION INTRAVENOUS
Status: DISPENSED | OUTPATIENT
Start: 2020-01-01 | End: 2020-01-01

## 2020-01-01 RX ORDER — POTASSIUM CHLORIDE 1.5 G/1.58G
40 POWDER, FOR SOLUTION ORAL
Status: DISPENSED | OUTPATIENT
Start: 2020-01-01 | End: 2020-01-01

## 2020-01-01 RX ORDER — FUROSEMIDE 10 MG/ML
60 INJECTION INTRAMUSCULAR; INTRAVENOUS ONCE
Status: DISCONTINUED | OUTPATIENT
Start: 2020-01-01 | End: 2020-01-01

## 2020-01-01 RX ORDER — ENOXAPARIN SODIUM 100 MG/ML
40 INJECTION SUBCUTANEOUS
Status: DISCONTINUED | OUTPATIENT
Start: 2020-01-01 | End: 2020-01-01

## 2020-01-01 RX ORDER — FENTANYL CITRATE 50 UG/ML
25 INJECTION, SOLUTION INTRAMUSCULAR; INTRAVENOUS EVERY 30 MIN PRN
Status: DISCONTINUED | OUTPATIENT
Start: 2020-01-01 | End: 2020-01-01 | Stop reason: HOSPADM

## 2020-01-01 RX ORDER — SPIRONOLACTONE 25 MG/1
50 TABLET ORAL ONCE
Status: COMPLETED | OUTPATIENT
Start: 2020-01-01 | End: 2020-01-01

## 2020-01-01 RX ORDER — FUROSEMIDE 10 MG/ML
20 INJECTION INTRAMUSCULAR; INTRAVENOUS EVERY 6 HOURS
Status: DISCONTINUED | OUTPATIENT
Start: 2020-01-01 | End: 2020-01-01

## 2020-01-01 RX ORDER — DIPHENHYDRAMINE HYDROCHLORIDE 50 MG/ML
50 INJECTION INTRAMUSCULAR; INTRAVENOUS
Status: CANCELLED | OUTPATIENT
Start: 2020-01-01

## 2020-01-01 RX ORDER — METOPROLOL TARTRATE 1 MG/ML
5 INJECTION, SOLUTION INTRAVENOUS ONCE
Status: COMPLETED | OUTPATIENT
Start: 2020-01-01 | End: 2020-01-01

## 2020-01-01 RX ORDER — POTASSIUM CHLORIDE 29.8 MG/ML
40 INJECTION INTRAVENOUS ONCE
Status: DISCONTINUED | OUTPATIENT
Start: 2020-01-01 | End: 2020-01-01

## 2020-01-01 RX ORDER — POTASSIUM CHLORIDE 14.9 MG/ML
20 INJECTION INTRAVENOUS ONCE
Status: COMPLETED | OUTPATIENT
Start: 2020-01-01 | End: 2020-01-01

## 2020-01-01 RX ORDER — METOCLOPRAMIDE HYDROCHLORIDE 5 MG/ML
5 INJECTION INTRAMUSCULAR; INTRAVENOUS EVERY 6 HOURS PRN
Status: DISCONTINUED | OUTPATIENT
Start: 2020-01-01 | End: 2020-01-01

## 2020-01-01 RX ORDER — LEVOTHYROXINE SODIUM 200 UG/1
200 TABLET ORAL
Qty: 30 TABLET | Refills: 0 | Status: ON HOLD | OUTPATIENT
Start: 2020-01-01 | End: 2020-01-01 | Stop reason: HOSPADM

## 2020-01-01 RX ORDER — FUROSEMIDE 10 MG/ML
60 INJECTION INTRAMUSCULAR; INTRAVENOUS 3 TIMES DAILY
Status: DISCONTINUED | OUTPATIENT
Start: 2020-01-01 | End: 2020-01-01

## 2020-01-01 RX ORDER — ENOXAPARIN SODIUM 100 MG/ML
40 INJECTION SUBCUTANEOUS EVERY 24 HOURS
Status: DISCONTINUED | OUTPATIENT
Start: 2020-01-01 | End: 2020-01-01

## 2020-01-01 RX ORDER — DOCUSATE SODIUM 50 MG AND SENNOSIDES 8.6 MG 8.6; 5 MG/1; MG/1
1 TABLET, FILM COATED ORAL DAILY
Status: ON HOLD | COMMUNITY
Start: 2020-01-01 | End: 2020-01-01 | Stop reason: HOSPADM

## 2020-01-01 RX ORDER — IPRATROPIUM BROMIDE AND ALBUTEROL SULFATE 2.5; .5 MG/3ML; MG/3ML
3 SOLUTION RESPIRATORY (INHALATION) EVERY 6 HOURS PRN
Status: DISCONTINUED | OUTPATIENT
Start: 2020-01-01 | End: 2020-01-01 | Stop reason: HOSPADM

## 2020-01-01 RX ORDER — TALC
6 POWDER (GRAM) TOPICAL NIGHTLY PRN
Status: DISCONTINUED | OUTPATIENT
Start: 2020-01-01 | End: 2020-01-01

## 2020-01-01 RX ORDER — DOPAMINE HCL IN DEXTROSE 5 % 400MG/.25L
2 INFUSION BOTTLE (ML) INTRAVENOUS CONTINUOUS
Status: DISCONTINUED | OUTPATIENT
Start: 2020-01-01 | End: 2020-01-01

## 2020-01-01 RX ORDER — CYANOCOBALAMIN 1000 UG/ML
1000 INJECTION, SOLUTION INTRAMUSCULAR; SUBCUTANEOUS DAILY
Status: COMPLETED | OUTPATIENT
Start: 2020-01-01 | End: 2020-01-01

## 2020-01-01 RX ORDER — ONDANSETRON 8 MG/1
8 TABLET, ORALLY DISINTEGRATING ORAL ONCE
Qty: 1 TABLET | Refills: 0 | Status: SHIPPED | OUTPATIENT
Start: 2020-01-01 | End: 2020-01-01

## 2020-01-01 RX ORDER — POTASSIUM CHLORIDE 20 MEQ/1
40 TABLET, EXTENDED RELEASE ORAL ONCE
Status: COMPLETED | OUTPATIENT
Start: 2020-01-01 | End: 2020-01-01

## 2020-01-01 RX ORDER — PANTOPRAZOLE SODIUM 40 MG/1
40 TABLET, DELAYED RELEASE ORAL DAILY
Status: DISCONTINUED | OUTPATIENT
Start: 2020-01-01 | End: 2020-01-01 | Stop reason: HOSPADM

## 2020-01-01 RX ORDER — CYPROHEPTADINE HYDROCHLORIDE 4 MG/1
TABLET ORAL
Qty: 120 TABLET | Refills: 3 | Status: ON HOLD | OUTPATIENT
Start: 2020-01-01 | End: 2020-01-01 | Stop reason: HOSPADM

## 2020-01-01 RX ORDER — LEVOTHYROXINE SODIUM 100 UG/1
200 TABLET ORAL
Status: DISCONTINUED | OUTPATIENT
Start: 2020-01-01 | End: 2020-01-01

## 2020-01-01 RX ORDER — FENTANYL CITRATE 50 UG/ML
INJECTION, SOLUTION INTRAMUSCULAR; INTRAVENOUS CODE/TRAUMA/SEDATION MEDICATION
Status: COMPLETED | OUTPATIENT
Start: 2020-01-01 | End: 2020-01-01

## 2020-01-01 RX ORDER — FUROSEMIDE 40 MG/1
40 TABLET ORAL DAILY
Status: DISCONTINUED | OUTPATIENT
Start: 2020-01-01 | End: 2020-01-01 | Stop reason: HOSPADM

## 2020-01-01 RX ORDER — CALCITONIN SALMON 200 [USP'U]/ML
4 INJECTION, SOLUTION INTRAMUSCULAR; SUBCUTANEOUS EVERY 12 HOURS
Status: COMPLETED | OUTPATIENT
Start: 2020-01-01 | End: 2020-01-01

## 2020-01-01 RX ORDER — SODIUM CHLORIDE 9 MG/ML
1000 INJECTION, SOLUTION INTRAVENOUS
Status: COMPLETED | OUTPATIENT
Start: 2020-01-01 | End: 2020-01-01

## 2020-01-01 RX ORDER — ENOXAPARIN SODIUM 100 MG/ML
40 INJECTION SUBCUTANEOUS
Status: DISCONTINUED | OUTPATIENT
Start: 2020-01-01 | End: 2020-01-01 | Stop reason: HOSPADM

## 2020-01-01 RX ORDER — SODIUM CHLORIDE 9 MG/ML
INJECTION, SOLUTION INTRAVENOUS ONCE
Status: CANCELLED | OUTPATIENT
Start: 2020-01-01

## 2020-01-01 RX ORDER — SODIUM CHLORIDE 9 MG/ML
INJECTION, SOLUTION INTRAVENOUS ONCE
Status: COMPLETED | OUTPATIENT
Start: 2020-01-01 | End: 2020-01-01

## 2020-01-01 RX ORDER — LIDOCAINE HYDROCHLORIDE 10 MG/ML
1 INJECTION, SOLUTION EPIDURAL; INFILTRATION; INTRACAUDAL; PERINEURAL ONCE
Status: DISCONTINUED | OUTPATIENT
Start: 2020-01-01 | End: 2020-01-01

## 2020-01-01 RX ORDER — CHLORHEXIDINE GLUCONATE ORAL RINSE 1.2 MG/ML
15 SOLUTION DENTAL 2 TIMES DAILY
Status: DISCONTINUED | OUTPATIENT
Start: 2020-01-01 | End: 2020-01-01 | Stop reason: HOSPADM

## 2020-01-01 RX ORDER — SODIUM CHLORIDE 9 MG/ML
INJECTION, SOLUTION INTRAVENOUS CONTINUOUS PRN
Status: DISCONTINUED | OUTPATIENT
Start: 2020-01-01 | End: 2020-01-01

## 2020-01-01 RX ORDER — ZOLEDRONIC ACID 4 MG/5ML
5 INJECTION INTRAVENOUS ONCE
Status: DISCONTINUED | OUTPATIENT
Start: 2020-01-01 | End: 2020-01-01

## 2020-01-01 RX ORDER — CINACALCET 30 MG/1
60 TABLET, FILM COATED ORAL 2 TIMES DAILY WITH MEALS
Status: DISCONTINUED | OUTPATIENT
Start: 2020-01-01 | End: 2020-01-01 | Stop reason: HOSPADM

## 2020-01-01 RX ORDER — PHENYLEPHRINE HCL IN 0.9% NACL 1 MG/10 ML
SYRINGE (ML) INTRAVENOUS
Status: COMPLETED
Start: 2020-01-01 | End: 2020-01-01

## 2020-01-01 RX ORDER — SPIRONOLACTONE 25 MG/1
100 TABLET ORAL EVERY 8 HOURS
Status: DISCONTINUED | OUTPATIENT
Start: 2020-01-01 | End: 2020-01-01 | Stop reason: HOSPADM

## 2020-01-01 RX ORDER — POTASSIUM CHLORIDE 20 MEQ/1
40 TABLET, EXTENDED RELEASE ORAL 2 TIMES DAILY
Qty: 8 TABLET | Refills: 0 | Status: SHIPPED | OUTPATIENT
Start: 2020-01-01 | End: 2020-01-01

## 2020-01-01 RX ORDER — FUROSEMIDE 10 MG/ML
20 INJECTION INTRAMUSCULAR; INTRAVENOUS ONCE
Status: COMPLETED | OUTPATIENT
Start: 2020-01-01 | End: 2020-01-01

## 2020-01-01 RX ORDER — POTASSIUM CHLORIDE 20 MEQ/1
40 TABLET, EXTENDED RELEASE ORAL ONCE
Status: DISCONTINUED | OUTPATIENT
Start: 2020-01-01 | End: 2020-01-01

## 2020-01-01 RX ORDER — DRONABINOL 2.5 MG/1
2.5 CAPSULE ORAL
Qty: 60 CAPSULE | Refills: 3 | Status: ON HOLD | OUTPATIENT
Start: 2020-01-01 | End: 2020-01-01 | Stop reason: HOSPADM

## 2020-01-01 RX ORDER — LIDOCAINE HYDROCHLORIDE 10 MG/ML
INJECTION, SOLUTION EPIDURAL; INFILTRATION; INTRACAUDAL; PERINEURAL
Status: DISCONTINUED | OUTPATIENT
Start: 2020-01-01 | End: 2020-01-01 | Stop reason: HOSPADM

## 2020-01-01 RX ORDER — ALBUMIN HUMAN 250 G/1000ML
25 SOLUTION INTRAVENOUS ONCE
Status: COMPLETED | OUTPATIENT
Start: 2020-01-01 | End: 2020-01-01

## 2020-01-01 RX ORDER — ENOXAPARIN SODIUM 100 MG/ML
30 INJECTION SUBCUTANEOUS
Status: DISCONTINUED | OUTPATIENT
Start: 2020-01-01 | End: 2020-01-01

## 2020-01-01 RX ORDER — GABAPENTIN 300 MG/1
300 CAPSULE ORAL 3 TIMES DAILY
Qty: 90 CAPSULE | Refills: 11 | Status: ON HOLD | OUTPATIENT
Start: 2020-01-01 | End: 2020-01-01 | Stop reason: HOSPADM

## 2020-01-01 RX ORDER — LINEZOLID 2 MG/ML
600 INJECTION, SOLUTION INTRAVENOUS
Status: DISCONTINUED | OUTPATIENT
Start: 2020-01-01 | End: 2020-01-01 | Stop reason: HOSPADM

## 2020-01-01 RX ORDER — ALBUMIN HUMAN 250 G/1000ML
12.5 SOLUTION INTRAVENOUS ONCE
Status: DISCONTINUED | OUTPATIENT
Start: 2020-01-01 | End: 2020-01-01 | Stop reason: HOSPADM

## 2020-01-01 RX ORDER — PROPOFOL 10 MG/ML
VIAL (ML) INTRAVENOUS
Status: DISCONTINUED | OUTPATIENT
Start: 2020-01-01 | End: 2020-01-01

## 2020-01-01 RX ORDER — METOPROLOL TARTRATE 1 MG/ML
INJECTION, SOLUTION INTRAVENOUS
Status: COMPLETED
Start: 2020-01-01 | End: 2020-01-01

## 2020-01-01 RX ORDER — CINACALCET 90 MG/1
90 TABLET, FILM COATED ORAL 2 TIMES DAILY WITH MEALS
Qty: 60 TABLET | Refills: 11 | Status: ON HOLD | OUTPATIENT
Start: 2020-01-01 | End: 2020-01-01 | Stop reason: HOSPADM

## 2020-01-01 RX ORDER — LEVOTHYROXINE SODIUM 175 UG/1
175 TABLET ORAL
Qty: 30 TABLET | Refills: 3 | Status: ON HOLD
Start: 2020-01-01 | End: 2020-01-01 | Stop reason: HOSPADM

## 2020-01-01 RX ORDER — ZOLEDRONIC ACID 4 MG/100ML
4 SOLUTION INTRAVENOUS
Status: COMPLETED | OUTPATIENT
Start: 2020-01-01 | End: 2020-01-01

## 2020-01-01 RX ORDER — FUROSEMIDE 10 MG/ML
60 INJECTION INTRAMUSCULAR; INTRAVENOUS EVERY 24 HOURS
Status: DISCONTINUED | OUTPATIENT
Start: 2020-01-01 | End: 2020-01-01

## 2020-01-01 RX ORDER — SODIUM BICARBONATE 1 MEQ/ML
SYRINGE (ML) INTRAVENOUS
Status: COMPLETED
Start: 2020-01-01 | End: 2020-01-01

## 2020-01-01 RX ORDER — FUROSEMIDE 20 MG/1
20 TABLET ORAL 2 TIMES DAILY
Status: DISCONTINUED | OUTPATIENT
Start: 2020-01-01 | End: 2020-01-01

## 2020-01-01 RX ORDER — DEXTROSE 50 % IN WATER (D50W) INTRAVENOUS SYRINGE
25
Status: COMPLETED | OUTPATIENT
Start: 2020-01-01 | End: 2020-01-01

## 2020-01-01 RX ORDER — ZOLEDRONIC ACID 0.04 MG/ML
4 INJECTION, SOLUTION INTRAVENOUS ONCE
Status: COMPLETED | OUTPATIENT
Start: 2020-01-01 | End: 2020-01-01

## 2020-01-01 RX ORDER — INSULIN ASPART 100 [IU]/ML
0-5 INJECTION, SOLUTION INTRAVENOUS; SUBCUTANEOUS
Status: DISCONTINUED | OUTPATIENT
Start: 2020-01-01 | End: 2020-01-01 | Stop reason: HOSPADM

## 2020-01-01 RX ORDER — FUROSEMIDE 10 MG/ML
60 INJECTION INTRAMUSCULAR; INTRAVENOUS ONCE
Status: CANCELLED | OUTPATIENT
Start: 2020-01-01 | End: 2020-01-01

## 2020-01-01 RX ORDER — FERROUS GLUCONATE 324(38)MG
324 TABLET ORAL DAILY
Status: ON HOLD | COMMUNITY
End: 2020-01-01

## 2020-01-01 RX ORDER — POLYETHYLENE GLYCOL 3350 17 G/17G
17 POWDER, FOR SOLUTION ORAL DAILY
Status: DISCONTINUED | OUTPATIENT
Start: 2020-01-01 | End: 2020-01-01 | Stop reason: HOSPADM

## 2020-01-01 RX ORDER — FERROUS SULFATE 325(65) MG
325 TABLET ORAL DAILY
Status: ON HOLD | COMMUNITY
End: 2020-01-01

## 2020-01-01 RX ORDER — FUROSEMIDE 20 MG/1
20 TABLET ORAL 2 TIMES DAILY
Status: DISCONTINUED | OUTPATIENT
Start: 2020-01-01 | End: 2020-01-01 | Stop reason: HOSPADM

## 2020-01-01 RX ORDER — POTASSIUM CHLORIDE 1.5 G/1.58G
40 POWDER, FOR SOLUTION ORAL ONCE
Status: COMPLETED | OUTPATIENT
Start: 2020-01-01 | End: 2020-01-01

## 2020-01-01 RX ORDER — CALCITONIN SALMON 200 [USP'U]/ML
4 INJECTION, SOLUTION INTRAMUSCULAR; SUBCUTANEOUS DAILY
Status: COMPLETED | OUTPATIENT
Start: 2020-01-01 | End: 2020-01-01

## 2020-01-01 RX ORDER — LANREOTIDE ACETATE 120 MG/.5ML
120 INJECTION SUBCUTANEOUS
Status: CANCELLED
Start: 2020-01-01

## 2020-01-01 RX ORDER — SYRING-NEEDL,DISP,INSUL,0.3 ML 29 G X1/2"
296 SYRINGE, EMPTY DISPOSABLE MISCELLANEOUS ONCE
Status: DISCONTINUED | OUTPATIENT
Start: 2020-01-01 | End: 2020-01-01

## 2020-01-01 RX ORDER — CINACALCET 30 MG/1
30 TABLET, FILM COATED ORAL 2 TIMES DAILY WITH MEALS
Qty: 60 TABLET | Refills: 11 | Status: ON HOLD | OUTPATIENT
Start: 2020-01-01 | End: 2020-01-01 | Stop reason: SDUPTHER

## 2020-01-01 RX ORDER — VALACYCLOVIR HYDROCHLORIDE 500 MG/1
1000 TABLET, FILM COATED ORAL 2 TIMES DAILY
Status: DISCONTINUED | OUTPATIENT
Start: 2020-01-01 | End: 2020-01-01

## 2020-01-01 RX ORDER — MUPIROCIN 20 MG/G
OINTMENT TOPICAL 2 TIMES DAILY
Status: DISCONTINUED | OUTPATIENT
Start: 2020-01-01 | End: 2020-01-01 | Stop reason: HOSPADM

## 2020-01-01 RX ORDER — SODIUM,POTASSIUM PHOSPHATES 280-250MG
1 POWDER IN PACKET (EA) ORAL
Status: DISCONTINUED | OUTPATIENT
Start: 2020-01-01 | End: 2020-01-01 | Stop reason: HOSPADM

## 2020-01-01 RX ORDER — MIDODRINE HYDROCHLORIDE 5 MG/1
5 TABLET ORAL EVERY 8 HOURS
Qty: 90 TABLET | Refills: 11 | Status: ON HOLD | OUTPATIENT
Start: 2020-01-01 | End: 2020-01-01 | Stop reason: HOSPADM

## 2020-01-01 RX ORDER — ALBUMIN HUMAN 250 G/1000ML
12.5 SOLUTION INTRAVENOUS CONTINUOUS
Status: DISCONTINUED | OUTPATIENT
Start: 2020-01-01 | End: 2020-01-01

## 2020-01-01 RX ORDER — VALACYCLOVIR HYDROCHLORIDE 500 MG/1
1000 TABLET, FILM COATED ORAL 3 TIMES DAILY
Status: DISCONTINUED | OUTPATIENT
Start: 2020-01-01 | End: 2020-01-01

## 2020-01-01 RX ORDER — GADOBUTROL 604.72 MG/ML
10 INJECTION INTRAVENOUS
Status: COMPLETED | OUTPATIENT
Start: 2020-01-01 | End: 2020-01-01

## 2020-01-01 RX ORDER — FUROSEMIDE 10 MG/ML
40 INJECTION INTRAMUSCULAR; INTRAVENOUS DAILY
Status: DISCONTINUED | OUTPATIENT
Start: 2020-01-01 | End: 2020-01-01

## 2020-01-01 RX ORDER — VALACYCLOVIR HYDROCHLORIDE 500 MG/1
1000 TABLET, FILM COATED ORAL DAILY
Status: DISCONTINUED | OUTPATIENT
Start: 2020-01-01 | End: 2020-01-01 | Stop reason: HOSPADM

## 2020-01-01 RX ORDER — SODIUM CHLORIDE FOR INHALATION 3 %
4 VIAL, NEBULIZER (ML) INHALATION
Status: DISCONTINUED | OUTPATIENT
Start: 2020-01-01 | End: 2020-01-01 | Stop reason: HOSPADM

## 2020-01-01 RX ORDER — FUROSEMIDE 10 MG/ML
40 INJECTION INTRAMUSCULAR; INTRAVENOUS
Status: DISCONTINUED | OUTPATIENT
Start: 2020-01-01 | End: 2020-01-01

## 2020-01-01 RX ORDER — ALENDRONATE SODIUM 70 MG/1
70 TABLET ORAL
Qty: 4 TABLET | Refills: 0 | Status: ON HOLD | OUTPATIENT
Start: 2020-01-01 | End: 2020-01-01

## 2020-01-01 RX ORDER — BISACODYL 5 MG
5 TABLET, DELAYED RELEASE (ENTERIC COATED) ORAL 2 TIMES DAILY
Status: DISCONTINUED | OUTPATIENT
Start: 2020-01-01 | End: 2020-01-01 | Stop reason: HOSPADM

## 2020-01-01 RX ORDER — MIDAZOLAM HYDROCHLORIDE 1 MG/ML
INJECTION INTRAMUSCULAR; INTRAVENOUS CODE/TRAUMA/SEDATION MEDICATION
Status: COMPLETED | OUTPATIENT
Start: 2020-01-01 | End: 2020-01-01

## 2020-01-01 RX ORDER — ALBUMIN HUMAN 250 G/1000ML
12.5 SOLUTION INTRAVENOUS 2 TIMES DAILY PRN
Status: DISCONTINUED | OUTPATIENT
Start: 2020-01-01 | End: 2020-01-01 | Stop reason: HOSPADM

## 2020-01-01 RX ADMIN — MAGNESIUM SULFATE HEPTAHYDRATE: 500 INJECTION, SOLUTION INTRAMUSCULAR; INTRAVENOUS at 10:10

## 2020-01-01 RX ADMIN — MIDODRINE HYDROCHLORIDE 5 MG: 5 TABLET ORAL at 05:11

## 2020-01-01 RX ADMIN — Medication 10 ML: at 05:09

## 2020-01-01 RX ADMIN — MIDODRINE HYDROCHLORIDE 5 MG: 5 TABLET ORAL at 01:11

## 2020-01-01 RX ADMIN — DOCUSATE SODIUM 50 MG AND SENNOSIDES 8.6 MG 1 TABLET: 8.6; 5 TABLET, FILM COATED ORAL at 10:10

## 2020-01-01 RX ADMIN — SODIUM CHLORIDE, SODIUM LACTATE, POTASSIUM CHLORIDE, AND CALCIUM CHLORIDE: .6; .31; .03; .02 INJECTION, SOLUTION INTRAVENOUS at 09:10

## 2020-01-01 RX ADMIN — SPIRONOLACTONE 100 MG: 25 TABLET ORAL at 05:09

## 2020-01-01 RX ADMIN — MIDODRINE HYDROCHLORIDE 5 MG: 5 TABLET ORAL at 06:11

## 2020-01-01 RX ADMIN — VALACYCLOVIR HYDROCHLORIDE 1000 MG: 500 TABLET, FILM COATED ORAL at 04:11

## 2020-01-01 RX ADMIN — LACTULOSE 200 G: 10 SOLUTION ORAL at 08:09

## 2020-01-01 RX ADMIN — LORAZEPAM: 2 INJECTION INTRAMUSCULAR; INTRAVENOUS at 05:12

## 2020-01-01 RX ADMIN — OCTREOTIDE ACETATE 250 MCG/HR: 1000 INJECTION, SOLUTION INTRAVENOUS; SUBCUTANEOUS at 06:10

## 2020-01-01 RX ADMIN — FENTANYL CITRATE 25 MCG: 50 INJECTION INTRAMUSCULAR; INTRAVENOUS at 10:12

## 2020-01-01 RX ADMIN — QUETIAPINE FUMARATE 50 MG: 25 TABLET ORAL at 02:10

## 2020-01-01 RX ADMIN — FAMOTIDINE 20 MG: 20 TABLET ORAL at 08:10

## 2020-01-01 RX ADMIN — SODIUM CHLORIDE 1000 ML: 0.9 INJECTION, SOLUTION INTRAVENOUS at 02:11

## 2020-01-01 RX ADMIN — URSODIOL 300 MG: 300 CAPSULE ORAL at 08:10

## 2020-01-01 RX ADMIN — VASOPRESSIN 0.04 UNITS/MIN: 20 INJECTION INTRAVENOUS at 05:12

## 2020-01-01 RX ADMIN — CYANOCOBALAMIN 1000 MCG: 1000 INJECTION, SOLUTION INTRAMUSCULAR; SUBCUTANEOUS at 08:10

## 2020-01-01 RX ADMIN — ZOLEDRONIC ACID 3 MG: 4 INJECTION, SOLUTION, CONCENTRATE INTRAVENOUS at 02:11

## 2020-01-01 RX ADMIN — SODIUM CHLORIDE 1000 ML: 0.9 INJECTION, SOLUTION INTRAVENOUS at 03:08

## 2020-01-01 RX ADMIN — FUROSEMIDE 20 MG: 20 INJECTION, SOLUTION INTRAMUSCULAR; INTRAVENOUS at 05:09

## 2020-01-01 RX ADMIN — MAGNESIUM SULFATE 2 G: 2 INJECTION INTRAVENOUS at 12:10

## 2020-01-01 RX ADMIN — URSODIOL 300 MG: 300 CAPSULE ORAL at 09:11

## 2020-01-01 RX ADMIN — ENOXAPARIN SODIUM 40 MG: 40 INJECTION SUBCUTANEOUS at 04:10

## 2020-01-01 RX ADMIN — LEVOTHYROXINE SODIUM 175 MCG: 100 TABLET ORAL at 06:10

## 2020-01-01 RX ADMIN — LEVOTHYROXINE SODIUM 200 MCG: 75 TABLET ORAL at 05:10

## 2020-01-01 RX ADMIN — ALBUMIN HUMAN 25 G: 50 SOLUTION INTRAVENOUS at 06:12

## 2020-01-01 RX ADMIN — FUROSEMIDE 20 MG: 20 INJECTION, SOLUTION INTRAMUSCULAR; INTRAVENOUS at 10:09

## 2020-01-01 RX ADMIN — CINACALCET HYDROCHLORIDE 90 MG: 30 TABLET, FILM COATED ORAL at 05:11

## 2020-01-01 RX ADMIN — CYANOCOBALAMIN 1000 MCG: 1000 INJECTION, SOLUTION INTRAMUSCULAR; SUBCUTANEOUS at 09:10

## 2020-01-01 RX ADMIN — IOTHALAMATE MEGLUMINE 10 ML: 430 INJECTION INTRAVASCULAR at 10:09

## 2020-01-01 RX ADMIN — CINACALCET HYDROCHLORIDE 30 MG: 30 TABLET, FILM COATED ORAL at 05:10

## 2020-01-01 RX ADMIN — MAGNESIUM CITRATE 296 ML: 1.75 LIQUID ORAL at 05:09

## 2020-01-01 RX ADMIN — FUROSEMIDE 60 MG: 10 INJECTION, SOLUTION INTRAVENOUS at 09:10

## 2020-01-01 RX ADMIN — CINACALCET HYDROCHLORIDE 30 MG: 30 TABLET, FILM COATED ORAL at 09:10

## 2020-01-01 RX ADMIN — DRONABINOL 2.5 MG: 2.5 CAPSULE ORAL at 05:11

## 2020-01-01 RX ADMIN — DOCUSATE SODIUM AND SENNOSIDES 1 TABLET: 8.6; 5 TABLET, FILM COATED ORAL at 09:10

## 2020-01-01 RX ADMIN — VALACYCLOVIR HYDROCHLORIDE 1000 MG: 500 TABLET, FILM COATED ORAL at 01:11

## 2020-01-01 RX ADMIN — CINACALCET HYDROCHLORIDE 90 MG: 30 TABLET, FILM COATED ORAL at 05:10

## 2020-01-01 RX ADMIN — POTASSIUM CHLORIDE 10 MEQ: 7.46 INJECTION, SOLUTION INTRAVENOUS at 10:11

## 2020-01-01 RX ADMIN — CINACALCET 60 MG: 30 TABLET, FILM COATED ORAL at 06:10

## 2020-01-01 RX ADMIN — Medication 10 ML: at 11:09

## 2020-01-01 RX ADMIN — SPIRONOLACTONE 100 MG: 25 TABLET ORAL at 03:09

## 2020-01-01 RX ADMIN — Medication 10 ML: at 12:09

## 2020-01-01 RX ADMIN — DRONABINOL 2.5 MG: 2.5 CAPSULE ORAL at 05:10

## 2020-01-01 RX ADMIN — DRONABINOL 2.5 MG: 2.5 CAPSULE ORAL at 06:10

## 2020-01-01 RX ADMIN — ALBUMIN (HUMAN) 12.5 G: 12.5 SOLUTION INTRAVENOUS at 08:11

## 2020-01-01 RX ADMIN — SPIRONOLACTONE 100 MG: 25 TABLET ORAL at 01:09

## 2020-01-01 RX ADMIN — SODIUM CHLORIDE: 450 INJECTION, SOLUTION INTRAVENOUS at 05:10

## 2020-01-01 RX ADMIN — ALBUMIN (HUMAN) 25 G: 12.5 SOLUTION INTRAVENOUS at 07:10

## 2020-01-01 RX ADMIN — URSODIOL 300 MG: 300 CAPSULE ORAL at 10:10

## 2020-01-01 RX ADMIN — SODIUM CHLORIDE 1000 ML: 0.9 INJECTION, SOLUTION INTRAVENOUS at 01:09

## 2020-01-01 RX ADMIN — POTASSIUM CHLORIDE 10 MEQ: 7.46 INJECTION, SOLUTION INTRAVENOUS at 09:10

## 2020-01-01 RX ADMIN — Medication 10 ML: at 11:12

## 2020-01-01 RX ADMIN — POTASSIUM CHLORIDE 10 MEQ: 7.46 INJECTION, SOLUTION INTRAVENOUS at 12:11

## 2020-01-01 RX ADMIN — SODIUM CHLORIDE: 450 INJECTION, SOLUTION INTRAVENOUS at 04:10

## 2020-01-01 RX ADMIN — FERROUS GLUCONATE TAB 324 MG (37.5 MG ELEMENTAL IRON) 324 MG: 324 (37.5 FE) TAB at 08:11

## 2020-01-01 RX ADMIN — SODIUM CHLORIDE 75 ML/HR: 0.9 INJECTION, SOLUTION INTRAVENOUS at 02:12

## 2020-01-01 RX ADMIN — LACTULOSE 10 G: 20 SOLUTION ORAL at 11:09

## 2020-01-01 RX ADMIN — SODIUM CHLORIDE 1000 ML: 0.9 INJECTION, SOLUTION INTRAVENOUS at 08:09

## 2020-01-01 RX ADMIN — POTASSIUM CHLORIDE 10 MEQ: 7.46 INJECTION, SOLUTION INTRAVENOUS at 05:10

## 2020-01-01 RX ADMIN — CALCITONIN SALMON 266 UNITS: 200 INJECTION, SOLUTION INTRAMUSCULAR; SUBCUTANEOUS at 12:10

## 2020-01-01 RX ADMIN — FAMOTIDINE 20 MG: 10 INJECTION, SOLUTION INTRAVENOUS at 09:09

## 2020-01-01 RX ADMIN — BISACODYL 10 MG: 10 SUPPOSITORY RECTAL at 06:09

## 2020-01-01 RX ADMIN — SODIUM BICARBONATE 50 MEQ: 84 INJECTION INTRAVENOUS at 06:12

## 2020-01-01 RX ADMIN — URSODIOL 300 MG: 300 CAPSULE ORAL at 08:11

## 2020-01-01 RX ADMIN — PIPERACILLIN AND TAZOBACTAM 4.5 G: 4; .5 INJECTION, POWDER, LYOPHILIZED, FOR SOLUTION INTRAVENOUS; PARENTERAL at 02:12

## 2020-01-01 RX ADMIN — DRONABINOL 2.5 MG: 2.5 CAPSULE ORAL at 03:10

## 2020-01-01 RX ADMIN — ZOLEDRONIC ACID 4 MG: 0.04 INJECTION, SOLUTION INTRAVENOUS at 02:12

## 2020-01-01 RX ADMIN — POTASSIUM CHLORIDE 10 MEQ: 7.46 INJECTION, SOLUTION INTRAVENOUS at 04:09

## 2020-01-01 RX ADMIN — FAMOTIDINE 20 MG: 10 INJECTION, SOLUTION INTRAVENOUS at 09:10

## 2020-01-01 RX ADMIN — FENTANYL CITRATE 25 MCG: 50 INJECTION INTRAMUSCULAR; INTRAVENOUS at 03:10

## 2020-01-01 RX ADMIN — CINACALCET 60 MG: 30 TABLET, FILM COATED ORAL at 08:10

## 2020-01-01 RX ADMIN — ALBUMIN (HUMAN) 12.5 G: 0.25 INJECTION, SOLUTION INTRAVENOUS at 11:09

## 2020-01-01 RX ADMIN — SODIUM CHLORIDE: 0.9 INJECTION, SOLUTION INTRAVENOUS at 06:10

## 2020-01-01 RX ADMIN — SPIRONOLACTONE 50 MG: 25 TABLET ORAL at 08:11

## 2020-01-01 RX ADMIN — POTASSIUM CHLORIDE 40 MEQ: 1500 TABLET, EXTENDED RELEASE ORAL at 08:09

## 2020-01-01 RX ADMIN — AMIODARONE HYDROCHLORIDE 1 MG/MIN: 1.8 INJECTION, SOLUTION INTRAVENOUS at 11:12

## 2020-01-01 RX ADMIN — FUROSEMIDE 40 MG: 40 TABLET ORAL at 12:10

## 2020-01-01 RX ADMIN — CEFTRIAXONE 2 G: 2 INJECTION, SOLUTION INTRAVENOUS at 10:12

## 2020-01-01 RX ADMIN — FUROSEMIDE 60 MG: 10 INJECTION, SOLUTION INTRAVENOUS at 06:10

## 2020-01-01 RX ADMIN — SPIRONOLACTONE 100 MG: 25 TABLET ORAL at 02:09

## 2020-01-01 RX ADMIN — POTASSIUM CHLORIDE 10 MEQ: 7.46 INJECTION, SOLUTION INTRAVENOUS at 07:09

## 2020-01-01 RX ADMIN — DEXTROSE, SODIUM CHLORIDE, AND POTASSIUM CHLORIDE: 5; .45; .15 INJECTION INTRAVENOUS at 05:10

## 2020-01-01 RX ADMIN — ALBUMIN (HUMAN) 12.5 G: 0.25 INJECTION, SOLUTION INTRAVENOUS at 06:09

## 2020-01-01 RX ADMIN — ENOXAPARIN SODIUM 40 MG: 40 INJECTION SUBCUTANEOUS at 04:11

## 2020-01-01 RX ADMIN — POTASSIUM BICARBONATE 50 MEQ: 978 TABLET, EFFERVESCENT ORAL at 04:10

## 2020-01-01 RX ADMIN — POTASSIUM CHLORIDE 40 MEQ: 29.8 INJECTION, SOLUTION INTRAVENOUS at 10:09

## 2020-01-01 RX ADMIN — FUROSEMIDE 20 MG: 20 INJECTION, SOLUTION INTRAMUSCULAR; INTRAVENOUS at 02:09

## 2020-01-01 RX ADMIN — POLYETHYLENE GLYCOL 3350 17 G: 17 POWDER, FOR SOLUTION ORAL at 08:09

## 2020-01-01 RX ADMIN — ENOXAPARIN SODIUM 40 MG: 40 INJECTION SUBCUTANEOUS at 06:10

## 2020-01-01 RX ADMIN — POTASSIUM PHOSPHATE, MONOBASIC AND POTASSIUM PHOSPHATE, DIBASIC 30 MMOL: 224; 236 INJECTION, SOLUTION, CONCENTRATE INTRAVENOUS at 10:10

## 2020-01-01 RX ADMIN — SODIUM CHLORIDE: 0.9 INJECTION, SOLUTION INTRAVENOUS at 08:10

## 2020-01-01 RX ADMIN — MELATONIN TAB 3 MG 6 MG: 3 TAB at 09:10

## 2020-01-01 RX ADMIN — POTASSIUM PHOSPHATE, MONOBASIC AND POTASSIUM PHOSPHATE, DIBASIC 20 MMOL: 224; 236 INJECTION, SOLUTION, CONCENTRATE INTRAVENOUS at 11:11

## 2020-01-01 RX ADMIN — SPIRONOLACTONE 100 MG: 25 TABLET ORAL at 08:09

## 2020-01-01 RX ADMIN — OCTREOTIDE ACETATE 250 MCG/HR: 1000 INJECTION, SOLUTION INTRAVENOUS; SUBCUTANEOUS at 01:11

## 2020-01-01 RX ADMIN — FUROSEMIDE 20 MG: 20 INJECTION, SOLUTION INTRAMUSCULAR; INTRAVENOUS at 06:09

## 2020-01-01 RX ADMIN — BISACODYL 5 MG: 5 TABLET, COATED ORAL at 08:10

## 2020-01-01 RX ADMIN — POTASSIUM CHLORIDE 10 MEQ: 7.46 INJECTION, SOLUTION INTRAVENOUS at 10:09

## 2020-01-01 RX ADMIN — SPIRONOLACTONE 50 MG: 25 TABLET ORAL at 09:10

## 2020-01-01 RX ADMIN — DOCUSATE SODIUM 50 MG AND SENNOSIDES 8.6 MG 1 TABLET: 8.6; 5 TABLET, FILM COATED ORAL at 09:10

## 2020-01-01 RX ADMIN — LEVOTHYROXINE SODIUM 175 MCG: 25 TABLET ORAL at 05:11

## 2020-01-01 RX ADMIN — POTASSIUM & SODIUM PHOSPHATES POWDER PACK 280-160-250 MG 1 PACKET: 280-160-250 PACK at 08:11

## 2020-01-01 RX ADMIN — FUROSEMIDE 40 MG: 10 INJECTION, SOLUTION INTRAVENOUS at 08:09

## 2020-01-01 RX ADMIN — LACTULOSE 10 G: 20 SOLUTION ORAL at 05:10

## 2020-01-01 RX ADMIN — POTASSIUM CHLORIDE 40 MEQ: 1500 TABLET, EXTENDED RELEASE ORAL at 10:09

## 2020-01-01 RX ADMIN — SODIUM CHLORIDE: 0.9 INJECTION, SOLUTION INTRAVENOUS at 04:10

## 2020-01-01 RX ADMIN — POTASSIUM CHLORIDE, DEXTROSE MONOHYDRATE AND SODIUM CHLORIDE: 150; 5; 900 INJECTION, SOLUTION INTRAVENOUS at 05:09

## 2020-01-01 RX ADMIN — CALCITONIN SALMON 1 SPRAY: 200 SPRAY, METERED NASAL at 08:09

## 2020-01-01 RX ADMIN — FUROSEMIDE 20 MG: 20 INJECTION, SOLUTION INTRAMUSCULAR; INTRAVENOUS at 12:11

## 2020-01-01 RX ADMIN — POTASSIUM & SODIUM PHOSPHATES POWDER PACK 280-160-250 MG 2 PACKET: 280-160-250 PACK at 04:10

## 2020-01-01 RX ADMIN — SPIRONOLACTONE 100 MG: 25 TABLET ORAL at 06:09

## 2020-01-01 RX ADMIN — PANTOPRAZOLE SODIUM 40 MG: 40 TABLET, DELAYED RELEASE ORAL at 08:09

## 2020-01-01 RX ADMIN — CALCITONIN SALMON 236 UNITS: 200 INJECTION, SOLUTION INTRAMUSCULAR; SUBCUTANEOUS at 11:10

## 2020-01-01 RX ADMIN — POTASSIUM CHLORIDE 10 MEQ: 7.46 INJECTION, SOLUTION INTRAVENOUS at 05:11

## 2020-01-01 RX ADMIN — ENOXAPARIN SODIUM 30 MG: 30 INJECTION SUBCUTANEOUS at 05:11

## 2020-01-01 RX ADMIN — FUROSEMIDE 60 MG: 10 INJECTION, SOLUTION INTRAMUSCULAR; INTRAVENOUS at 07:10

## 2020-01-01 RX ADMIN — SODIUM CHLORIDE: 0.9 INJECTION, SOLUTION INTRAVENOUS at 11:10

## 2020-01-01 RX ADMIN — URSODIOL 300 MG: 300 CAPSULE ORAL at 09:10

## 2020-01-01 RX ADMIN — FUROSEMIDE 60 MG: 10 INJECTION, SOLUTION INTRAVENOUS at 10:10

## 2020-01-01 RX ADMIN — LIDOCAINE HYDROCHLORIDE 1 ML: 10 INJECTION INFILTRATION; PERINEURAL at 06:12

## 2020-01-01 RX ADMIN — POTASSIUM CHLORIDE 10 MEQ: 7.46 INJECTION, SOLUTION INTRAVENOUS at 12:10

## 2020-01-01 RX ADMIN — POTASSIUM CHLORIDE 10 MEQ: 7.46 INJECTION, SOLUTION INTRAVENOUS at 01:10

## 2020-01-01 RX ADMIN — ALBUMIN (HUMAN) 12.5 G: 0.25 INJECTION, SOLUTION INTRAVENOUS at 08:09

## 2020-01-01 RX ADMIN — SODIUM CHLORIDE: 0.9 INJECTION, SOLUTION INTRAVENOUS at 11:11

## 2020-01-01 RX ADMIN — DOCUSATE SODIUM AND SENNOSIDES 1 TABLET: 8.6; 5 TABLET, FILM COATED ORAL at 08:10

## 2020-01-01 RX ADMIN — POTASSIUM PHOSPHATE, MONOBASIC AND POTASSIUM PHOSPHATE, DIBASIC 20 MMOL: 224; 236 INJECTION, SOLUTION, CONCENTRATE INTRAVENOUS at 06:11

## 2020-01-01 RX ADMIN — FUROSEMIDE 60 MG: 10 INJECTION, SOLUTION INTRAMUSCULAR; INTRAVENOUS at 09:10

## 2020-01-01 RX ADMIN — SODIUM CHLORIDE 1000 ML: 0.9 INJECTION, SOLUTION INTRAVENOUS at 02:10

## 2020-01-01 RX ADMIN — Medication 10 ML: at 05:12

## 2020-01-01 RX ADMIN — MUPIROCIN: 20 OINTMENT TOPICAL at 08:12

## 2020-01-01 RX ADMIN — POTASSIUM CHLORIDE 40 MEQ: 29.8 INJECTION, SOLUTION INTRAVENOUS at 05:09

## 2020-01-01 RX ADMIN — DRONABINOL 2.5 MG: 2.5 CAPSULE ORAL at 07:10

## 2020-01-01 RX ADMIN — DOCUSATE SODIUM 50 MG AND SENNOSIDES 8.6 MG 1 TABLET: 8.6; 5 TABLET, FILM COATED ORAL at 08:11

## 2020-01-01 RX ADMIN — ALBUMIN (HUMAN) 12.5 G: 12.5 SOLUTION INTRAVENOUS at 09:11

## 2020-01-01 RX ADMIN — LEVOTHYROXINE SODIUM 200 MCG: 100 TABLET ORAL at 06:10

## 2020-01-01 RX ADMIN — GABAPENTIN 300 MG: 300 CAPSULE ORAL at 03:11

## 2020-01-01 RX ADMIN — LEVOTHYROXINE SODIUM 250 MCG: 100 TABLET ORAL at 05:09

## 2020-01-01 RX ADMIN — OCTREOTIDE ACETATE 500 MCG/HR: 1000 INJECTION, SOLUTION INTRAVENOUS; SUBCUTANEOUS at 11:10

## 2020-01-01 RX ADMIN — OCTREOTIDE ACETATE 125 MCG/HR: 1000 INJECTION, SOLUTION INTRAVENOUS; SUBCUTANEOUS at 09:09

## 2020-01-01 RX ADMIN — CINACALCET HYDROCHLORIDE 30 MG: 30 TABLET, FILM COATED ORAL at 06:10

## 2020-01-01 RX ADMIN — CINACALCET 60 MG: 30 TABLET, FILM COATED ORAL at 05:10

## 2020-01-01 RX ADMIN — FUROSEMIDE 60 MG: 10 INJECTION, SOLUTION INTRAVENOUS at 09:11

## 2020-01-01 RX ADMIN — ALBUMIN (HUMAN) 12.5 G: 0.25 INJECTION, SOLUTION INTRAVENOUS at 12:09

## 2020-01-01 RX ADMIN — GABAPENTIN 300 MG: 300 CAPSULE ORAL at 02:11

## 2020-01-01 RX ADMIN — LACTULOSE 10 G: 20 SOLUTION ORAL at 06:09

## 2020-01-01 RX ADMIN — FUROSEMIDE 60 MG: 10 INJECTION, SOLUTION INTRAVENOUS at 06:11

## 2020-01-01 RX ADMIN — DRONABINOL 2.5 MG: 2.5 CAPSULE ORAL at 06:11

## 2020-01-01 RX ADMIN — ALBUMIN (HUMAN) 12.5 G: 12.5 SOLUTION INTRAVENOUS at 06:11

## 2020-01-01 RX ADMIN — POTASSIUM CHLORIDE 10 MEQ: 7.46 INJECTION, SOLUTION INTRAVENOUS at 01:11

## 2020-01-01 RX ADMIN — Medication 0.1 MCG/KG/MIN: at 09:12

## 2020-01-01 RX ADMIN — SODIUM CHLORIDE: 0.9 INJECTION, SOLUTION INTRAVENOUS at 04:11

## 2020-01-01 RX ADMIN — SODIUM CHLORIDE: 0.9 INJECTION, SOLUTION INTRAVENOUS at 07:10

## 2020-01-01 RX ADMIN — METOPROLOL TARTRATE 5 MG: 1 INJECTION, SOLUTION INTRAVENOUS at 11:12

## 2020-01-01 RX ADMIN — POTASSIUM CHLORIDE 10 MEQ: 7.46 INJECTION, SOLUTION INTRAVENOUS at 02:11

## 2020-01-01 RX ADMIN — ALBUTEROL SULFATE 2.5 MG: 2.5 SOLUTION RESPIRATORY (INHALATION) at 07:12

## 2020-01-01 RX ADMIN — ENOXAPARIN SODIUM 40 MG: 40 INJECTION SUBCUTANEOUS at 05:10

## 2020-01-01 RX ADMIN — VASOPRESSIN 0.04 UNITS/MIN: 20 INJECTION INTRAVENOUS at 11:12

## 2020-01-01 RX ADMIN — FAMOTIDINE 20 MG: 20 TABLET ORAL at 09:10

## 2020-01-01 RX ADMIN — ALBUMIN (HUMAN) 50 G: 12.5 SOLUTION INTRAVENOUS at 05:10

## 2020-01-01 RX ADMIN — QUETIAPINE FUMARATE 50 MG: 25 TABLET ORAL at 10:10

## 2020-01-01 RX ADMIN — PHYTONADIONE 10 MG: 10 INJECTION, EMULSION INTRAMUSCULAR; INTRAVENOUS; SUBCUTANEOUS at 09:09

## 2020-01-01 RX ADMIN — SODIUM CHLORIDE: 450 INJECTION, SOLUTION INTRAVENOUS at 01:10

## 2020-01-01 RX ADMIN — ALBUMIN (HUMAN) 12.5 G: 0.25 INJECTION, SOLUTION INTRAVENOUS at 10:09

## 2020-01-01 RX ADMIN — BISACODYL 5 MG: 5 TABLET, COATED ORAL at 09:10

## 2020-01-01 RX ADMIN — FUROSEMIDE 40 MG: 40 TABLET ORAL at 09:10

## 2020-01-01 RX ADMIN — POTASSIUM CHLORIDE 10 MEQ: 7.46 INJECTION, SOLUTION INTRAVENOUS at 06:10

## 2020-01-01 RX ADMIN — MAGNESIUM SULFATE HEPTAHYDRATE 2 G: 40 INJECTION, SOLUTION INTRAVENOUS at 08:10

## 2020-01-01 RX ADMIN — CALCIUM GLUCONATE 1000 MG: 98 INJECTION, SOLUTION INTRAVENOUS at 07:12

## 2020-01-01 RX ADMIN — POTASSIUM CHLORIDE 40 MEQ: 29.8 INJECTION, SOLUTION INTRAVENOUS at 02:10

## 2020-01-01 RX ADMIN — GABAPENTIN 300 MG: 300 CAPSULE ORAL at 08:11

## 2020-01-01 RX ADMIN — GABAPENTIN 300 MG: 300 CAPSULE ORAL at 09:11

## 2020-01-01 RX ADMIN — A/SINGAPORE/GP1908/2015 IVR-180 (AN A/MICHIGAN/45/2015 (H1N1)PDM09-LIKE VIRUS, A/HONG KONG/4801/2014, NYMC X-263B (H3N2) (AN A/HONG KONG/4801/2014-LIKE VIRUS), AND B/BRISBANE/60/2008, WILD TYPE (A B/BRISBANE/60/2008-LIKE VIRUS) 0.5 ML: 15; 15; 15 INJECTION, SUSPENSION INTRAMUSCULAR at 11:10

## 2020-01-01 RX ADMIN — AMIODARONE HYDROCHLORIDE 150 MG: 1.5 INJECTION, SOLUTION INTRAVENOUS at 11:12

## 2020-01-01 RX ADMIN — POTASSIUM CHLORIDE 10 MEQ: 7.46 INJECTION, SOLUTION INTRAVENOUS at 06:11

## 2020-01-01 RX ADMIN — ONDANSETRON 8 MG: 8 TABLET, ORALLY DISINTEGRATING ORAL at 04:10

## 2020-01-01 RX ADMIN — ENOXAPARIN SODIUM 30 MG: 40 INJECTION SUBCUTANEOUS at 06:11

## 2020-01-01 RX ADMIN — POTASSIUM CHLORIDE 10 MEQ: 7.46 INJECTION, SOLUTION INTRAVENOUS at 11:11

## 2020-01-01 RX ADMIN — ALBUMIN (HUMAN) 12.5 G: 0.25 INJECTION, SOLUTION INTRAVENOUS at 05:09

## 2020-01-01 RX ADMIN — SODIUM CHLORIDE 500 ML: 0.9 INJECTION, SOLUTION INTRAVENOUS at 04:09

## 2020-01-01 RX ADMIN — Medication 50 MEQ: at 06:12

## 2020-01-01 RX ADMIN — OCTREOTIDE ACETATE 500 MCG: 500 INJECTION, SOLUTION INTRAVENOUS; SUBCUTANEOUS at 09:09

## 2020-01-01 RX ADMIN — POTASSIUM CHLORIDE 10 MEQ: 7.46 INJECTION, SOLUTION INTRAVENOUS at 10:10

## 2020-01-01 RX ADMIN — ENOXAPARIN SODIUM 30 MG: 40 INJECTION SUBCUTANEOUS at 05:11

## 2020-01-01 RX ADMIN — DRONABINOL 2.5 MG: 2.5 CAPSULE ORAL at 04:11

## 2020-01-01 RX ADMIN — NOREPINEPHRINE BITARTRATE 3 MCG/KG/MIN: 1 INJECTION, SOLUTION, CONCENTRATE INTRAVENOUS at 03:12

## 2020-01-01 RX ADMIN — MANNITOL: 250 INJECTION, SOLUTION INTRAVENOUS at 06:12

## 2020-01-01 RX ADMIN — LINEZOLID 600 MG: 600 INJECTION, SOLUTION INTRAVENOUS at 12:12

## 2020-01-01 RX ADMIN — CALCITONIN SALMON 200 UNITS: 200 INJECTION, SOLUTION INTRAMUSCULAR; SUBCUTANEOUS at 10:11

## 2020-01-01 RX ADMIN — ENOXAPARIN SODIUM 40 MG: 40 INJECTION SUBCUTANEOUS at 04:09

## 2020-01-01 RX ADMIN — OCTREOTIDE ACETATE 500 MCG/HR: 1000 INJECTION, SOLUTION INTRAVENOUS; SUBCUTANEOUS at 03:10

## 2020-01-01 RX ADMIN — Medication 10 ML: at 02:09

## 2020-01-01 RX ADMIN — ALBUMIN (HUMAN) 12.5 G: 12.5 SOLUTION INTRAVENOUS at 12:12

## 2020-01-01 RX ADMIN — MUPIROCIN: 20 OINTMENT TOPICAL at 11:12

## 2020-01-01 RX ADMIN — Medication 10 ML: at 06:09

## 2020-01-01 RX ADMIN — QUETIAPINE FUMARATE 25 MG: 25 TABLET ORAL at 08:10

## 2020-01-01 RX ADMIN — SODIUM CHLORIDE 1000 ML: 0.9 INJECTION, SOLUTION INTRAVENOUS at 09:12

## 2020-01-01 RX ADMIN — MAGNESIUM CITRATE 296 ML: 1.75 LIQUID ORAL at 04:09

## 2020-01-01 RX ADMIN — POTASSIUM & SODIUM PHOSPHATES POWDER PACK 280-160-250 MG 1 PACKET: 280-160-250 PACK at 10:11

## 2020-01-01 RX ADMIN — OCTREOTIDE ACETATE 250 MCG/HR: 1000 INJECTION, SOLUTION INTRAVENOUS; SUBCUTANEOUS at 12:09

## 2020-01-01 RX ADMIN — CALCITONIN SALMON 244 UNITS: 200 INJECTION, SOLUTION INTRAMUSCULAR; SUBCUTANEOUS at 08:09

## 2020-01-01 RX ADMIN — ALBUMIN (HUMAN) 12.5 G: 0.25 INJECTION, SOLUTION INTRAVENOUS at 09:09

## 2020-01-01 RX ADMIN — PROPOFOL 80 MG: 10 INJECTION, EMULSION INTRAVENOUS at 09:09

## 2020-01-01 RX ADMIN — DRONABINOL 2.5 MG: 2.5 CAPSULE ORAL at 04:10

## 2020-01-01 RX ADMIN — OCTREOTIDE ACETATE 250 MCG/HR: 500 INJECTION, SOLUTION INTRAVENOUS; SUBCUTANEOUS at 01:09

## 2020-01-01 RX ADMIN — DRONABINOL 2.5 MG: 2.5 CAPSULE ORAL at 03:11

## 2020-01-01 RX ADMIN — FUROSEMIDE 40 MG: 40 TABLET ORAL at 08:09

## 2020-01-01 RX ADMIN — SPIRONOLACTONE 50 MG: 50 TABLET, FILM COATED ORAL at 09:10

## 2020-01-01 RX ADMIN — FAMOTIDINE 20 MG: 10 INJECTION, SOLUTION INTRAVENOUS at 08:10

## 2020-01-01 RX ADMIN — Medication 10 ML: at 01:09

## 2020-01-01 RX ADMIN — LACTULOSE 10 G: 20 SOLUTION ORAL at 12:10

## 2020-01-01 RX ADMIN — CALCITONIN SALMON 236 UNITS: 200 INJECTION, SOLUTION INTRAMUSCULAR; SUBCUTANEOUS at 06:10

## 2020-01-01 RX ADMIN — SODIUM CHLORIDE 500 ML: 0.9 INJECTION, SOLUTION INTRAVENOUS at 10:11

## 2020-01-01 RX ADMIN — DEXTROSE, SODIUM CHLORIDE, AND POTASSIUM CHLORIDE: 5; .45; .15 INJECTION INTRAVENOUS at 07:10

## 2020-01-01 RX ADMIN — OCTREOTIDE ACETATE 250 MCG/HR: 1000 INJECTION, SOLUTION INTRAVENOUS; SUBCUTANEOUS at 04:10

## 2020-01-01 RX ADMIN — SODIUM CHLORIDE: 0.9 INJECTION, SOLUTION INTRAVENOUS at 10:11

## 2020-01-01 RX ADMIN — PHENYLEPHRINE HYDROCHLORIDE 0.5 MCG/KG/MIN: 10 INJECTION INTRAVENOUS at 06:12

## 2020-01-01 RX ADMIN — CALCITONIN SALMON 266 UNITS: 200 INJECTION, SOLUTION INTRAMUSCULAR; SUBCUTANEOUS at 09:10

## 2020-01-01 RX ADMIN — DEXTROSE, SODIUM CHLORIDE, AND POTASSIUM CHLORIDE: 5; .45; .15 INJECTION INTRAVENOUS at 12:10

## 2020-01-01 RX ADMIN — CINACALCET HYDROCHLORIDE 90 MG: 30 TABLET, FILM COATED ORAL at 09:11

## 2020-01-01 RX ADMIN — POTASSIUM CHLORIDE 10 MEQ: 7.46 INJECTION, SOLUTION INTRAVENOUS at 07:11

## 2020-01-01 RX ADMIN — LEVOTHYROXINE SODIUM 175 MCG: 25 TABLET ORAL at 06:11

## 2020-01-01 RX ADMIN — ALBUMIN (HUMAN) 12.5 G: 12.5 SOLUTION INTRAVENOUS at 05:11

## 2020-01-01 RX ADMIN — URSODIOL 300 MG: 300 CAPSULE ORAL at 01:11

## 2020-01-01 RX ADMIN — Medication 6 MG: at 10:10

## 2020-01-01 RX ADMIN — METOROPROLOL TARTRATE 5 MG: 5 INJECTION, SOLUTION INTRAVENOUS at 11:12

## 2020-01-01 RX ADMIN — FUROSEMIDE 60 MG: 10 INJECTION, SOLUTION INTRAVENOUS at 10:11

## 2020-01-01 RX ADMIN — CHLORHEXIDINE GLUCONATE 15 ML: 1.2 RINSE ORAL at 08:12

## 2020-01-01 RX ADMIN — MAGNESIUM SULFATE IN WATER 2 G: 40 INJECTION, SOLUTION INTRAVENOUS at 11:10

## 2020-01-01 RX ADMIN — SODIUM CHLORIDE: 0.9 INJECTION, SOLUTION INTRAVENOUS at 05:11

## 2020-01-01 RX ADMIN — POTASSIUM CHLORIDE 40 MEQ: 1.5 POWDER, FOR SOLUTION ORAL at 09:10

## 2020-01-01 RX ADMIN — FUROSEMIDE 20 MG: 20 INJECTION, SOLUTION INTRAMUSCULAR; INTRAVENOUS at 09:09

## 2020-01-01 RX ADMIN — SODIUM CHLORIDE: 0.9 INJECTION, SOLUTION INTRAVENOUS at 05:10

## 2020-01-01 RX ADMIN — POTASSIUM & SODIUM PHOSPHATES POWDER PACK 280-160-250 MG 1 PACKET: 280-160-250 PACK at 12:11

## 2020-01-01 RX ADMIN — DEXTROSE 2 G: 50 INJECTION, SOLUTION INTRAVENOUS at 11:11

## 2020-01-01 RX ADMIN — SODIUM CHLORIDE: 0.9 INJECTION, SOLUTION INTRAVENOUS at 03:10

## 2020-01-01 RX ADMIN — MIDAZOLAM HYDROCHLORIDE 1 MG: 5 INJECTION, SOLUTION INTRAMUSCULAR; INTRAVENOUS at 10:07

## 2020-01-01 RX ADMIN — DOCUSATE SODIUM 50 MG AND SENNOSIDES 8.6 MG 1 TABLET: 8.6; 5 TABLET, FILM COATED ORAL at 01:11

## 2020-01-01 RX ADMIN — CALCITONIN SALMON 266 UNITS: 200 INJECTION, SOLUTION INTRAMUSCULAR; SUBCUTANEOUS at 02:09

## 2020-01-01 RX ADMIN — POTASSIUM CHLORIDE 10 MEQ: 7.46 INJECTION, SOLUTION INTRAVENOUS at 08:09

## 2020-01-01 RX ADMIN — PROPOFOL 30 MCG/KG/MIN: 10 INJECTION, EMULSION INTRAVENOUS at 11:11

## 2020-01-01 RX ADMIN — QUETIAPINE FUMARATE 25 MG: 25 TABLET ORAL at 09:10

## 2020-01-01 RX ADMIN — CINACALCET HYDROCHLORIDE 30 MG: 30 TABLET, FILM COATED ORAL at 04:10

## 2020-01-01 RX ADMIN — ALBUMIN (HUMAN) 12.5 G: 0.25 INJECTION, SOLUTION INTRAVENOUS at 02:09

## 2020-01-01 RX ADMIN — CINACALCET HYDROCHLORIDE 30 MG: 30 TABLET, FILM COATED ORAL at 08:10

## 2020-01-01 RX ADMIN — OCTREOTIDE ACETATE 250 MCG/HR: 1000 INJECTION, SOLUTION INTRAVENOUS; SUBCUTANEOUS at 09:10

## 2020-01-01 RX ADMIN — POLYETHYLENE GLYCOL 3350 17 G: 17 POWDER, FOR SOLUTION ORAL at 09:10

## 2020-01-01 RX ADMIN — FERROUS GLUCONATE TAB 324 MG (37.5 MG ELEMENTAL IRON) 324 MG: 324 (37.5 FE) TAB at 08:10

## 2020-01-01 RX ADMIN — OCTREOTIDE ACETATE 250 MCG/HR: 1000 INJECTION, SOLUTION INTRAVENOUS; SUBCUTANEOUS at 04:09

## 2020-01-01 RX ADMIN — AMIODARONE HYDROCHLORIDE 0.5 MG/MIN: 1.8 INJECTION, SOLUTION INTRAVENOUS at 05:12

## 2020-01-01 RX ADMIN — POTASSIUM CHLORIDE 10 MEQ: 7.46 INJECTION, SOLUTION INTRAVENOUS at 09:11

## 2020-01-01 RX ADMIN — FUROSEMIDE 40 MG: 10 INJECTION, SOLUTION INTRAMUSCULAR; INTRAVENOUS at 09:10

## 2020-01-01 RX ADMIN — CINACALCET HYDROCHLORIDE 90 MG: 30 TABLET, FILM COATED ORAL at 04:11

## 2020-01-01 RX ADMIN — FUROSEMIDE 80 MG: 10 INJECTION, SOLUTION INTRAVENOUS at 08:09

## 2020-01-01 RX ADMIN — LEVOTHYROXINE SODIUM 175 MCG: 25 TABLET ORAL at 06:12

## 2020-01-01 RX ADMIN — LEVOTHYROXINE SODIUM 175 MCG: 100 TABLET ORAL at 06:11

## 2020-01-01 RX ADMIN — FUROSEMIDE 60 MG: 10 INJECTION, SOLUTION INTRAVENOUS at 05:10

## 2020-01-01 RX ADMIN — LEVOTHYROXINE SODIUM 150 MCG: 75 TABLET ORAL at 05:09

## 2020-01-01 RX ADMIN — SPIRONOLACTONE 50 MG: 25 TABLET ORAL at 09:11

## 2020-01-01 RX ADMIN — MIDODRINE HYDROCHLORIDE 5 MG: 5 TABLET ORAL at 09:11

## 2020-01-01 RX ADMIN — ZOLEDRONIC ACID 4 MG: 4 INJECTION, SOLUTION, CONCENTRATE INTRAVENOUS at 01:10

## 2020-01-01 RX ADMIN — DOCUSATE SODIUM 50 MG AND SENNOSIDES 8.6 MG 1 TABLET: 8.6; 5 TABLET, FILM COATED ORAL at 08:10

## 2020-01-01 RX ADMIN — SPIRONOLACTONE 50 MG: 25 TABLET ORAL at 08:10

## 2020-01-01 RX ADMIN — CINACALCET HYDROCHLORIDE 90 MG: 30 TABLET, FILM COATED ORAL at 08:10

## 2020-01-01 RX ADMIN — IOHEXOL 75 ML: 350 INJECTION, SOLUTION INTRAVENOUS at 11:11

## 2020-01-01 RX ADMIN — LIDOCAINE HYDROCHLORIDE 1 ML: 10 INJECTION, SOLUTION INFILTRATION; PERINEURAL at 06:12

## 2020-01-01 RX ADMIN — ALBUMIN (HUMAN) 12.5 G: 0.25 INJECTION, SOLUTION INTRAVENOUS at 03:09

## 2020-01-01 RX ADMIN — FUROSEMIDE 60 MG: 10 INJECTION, SOLUTION INTRAMUSCULAR; INTRAVENOUS at 08:10

## 2020-01-01 RX ADMIN — SODIUM CHLORIDE 1000 ML: 0.9 INJECTION, SOLUTION INTRAVENOUS at 12:10

## 2020-01-01 RX ADMIN — MAGNESIUM SULFATE HEPTAHYDRATE 2 G: 40 INJECTION, SOLUTION INTRAVENOUS at 11:11

## 2020-01-01 RX ADMIN — CEFEPIME HYDROCHLORIDE 1 G: 1 INJECTION, SOLUTION INTRAVENOUS at 04:12

## 2020-01-01 RX ADMIN — OCTREOTIDE ACETATE 250 MCG/HR: 1000 INJECTION, SOLUTION INTRAVENOUS; SUBCUTANEOUS at 11:11

## 2020-01-01 RX ADMIN — SPIRONOLACTONE 100 MG: 25 TABLET ORAL at 09:09

## 2020-01-01 RX ADMIN — CALCITONIN SALMON 266 UNITS: 200 INJECTION, SOLUTION INTRAMUSCULAR; SUBCUTANEOUS at 11:10

## 2020-01-01 RX ADMIN — AZITHROMYCIN MONOHYDRATE 500 MG: 500 INJECTION, POWDER, LYOPHILIZED, FOR SOLUTION INTRAVENOUS at 10:12

## 2020-01-01 RX ADMIN — ENOXAPARIN SODIUM 40 MG: 40 INJECTION SUBCUTANEOUS at 05:09

## 2020-01-01 RX ADMIN — MIDAZOLAM HYDROCHLORIDE 0.5 MG: 1 INJECTION, SOLUTION INTRAMUSCULAR; INTRAVENOUS at 03:10

## 2020-01-01 RX ADMIN — POTASSIUM PHOSPHATE, MONOBASIC AND POTASSIUM PHOSPHATE, DIBASIC 30 MMOL: 224; 236 INJECTION, SOLUTION, CONCENTRATE INTRAVENOUS at 08:10

## 2020-01-01 RX ADMIN — CALCITONIN SALMON 200 UNITS: 200 INJECTION, SOLUTION INTRAMUSCULAR; SUBCUTANEOUS at 08:11

## 2020-01-01 RX ADMIN — ALBUMIN (HUMAN) 50 G: 12.5 SOLUTION INTRAVENOUS at 12:09

## 2020-01-01 RX ADMIN — POTASSIUM & SODIUM PHOSPHATES POWDER PACK 280-160-250 MG 1 PACKET: 280-160-250 PACK at 06:11

## 2020-01-01 RX ADMIN — POTASSIUM CHLORIDE 10 MEQ: 7.46 INJECTION, SOLUTION INTRAVENOUS at 03:10

## 2020-01-01 RX ADMIN — MAGNESIUM SULFATE IN WATER 2 G: 40 INJECTION, SOLUTION INTRAVENOUS at 12:09

## 2020-01-01 RX ADMIN — SODIUM CHLORIDE: 0.9 INJECTION, SOLUTION INTRAVENOUS at 01:10

## 2020-01-01 RX ADMIN — CINACALCET HYDROCHLORIDE 90 MG: 30 TABLET, FILM COATED ORAL at 09:10

## 2020-01-01 RX ADMIN — GABAPENTIN 300 MG: 300 CAPSULE ORAL at 01:11

## 2020-01-01 RX ADMIN — LEVOTHYROXINE SODIUM 200 MCG: 75 TABLET ORAL at 06:10

## 2020-01-01 RX ADMIN — POTASSIUM & SODIUM PHOSPHATES POWDER PACK 280-160-250 MG 1 PACKET: 280-160-250 PACK at 09:11

## 2020-01-01 RX ADMIN — SODIUM PHOSPHATE, MONOBASIC, MONOHYDRATE 39.99 MMOL: 276; 142 INJECTION, SOLUTION INTRAVENOUS at 09:10

## 2020-01-01 RX ADMIN — FUROSEMIDE 40 MG: 10 INJECTION, SOLUTION INTRAMUSCULAR; INTRAVENOUS at 04:10

## 2020-01-01 RX ADMIN — PANTOPRAZOLE SODIUM 40 MG: 40 INJECTION, POWDER, LYOPHILIZED, FOR SOLUTION INTRAVENOUS at 08:09

## 2020-01-01 RX ADMIN — CINACALCET HYDROCHLORIDE 60 MG: 30 TABLET, FILM COATED ORAL at 09:10

## 2020-01-01 RX ADMIN — OXYCODONE HYDROCHLORIDE 5 MG: 5 TABLET ORAL at 01:11

## 2020-01-01 RX ADMIN — NOREPINEPHRINE BITARTRATE 3 MCG/KG/MIN: 1 INJECTION, SOLUTION, CONCENTRATE INTRAVENOUS at 11:12

## 2020-01-01 RX ADMIN — GADOBUTROL 10 ML: 604.72 INJECTION INTRAVENOUS at 11:10

## 2020-01-01 RX ADMIN — LEVOTHYROXINE SODIUM ANHYDROUS 200 MCG: 100 INJECTION, POWDER, LYOPHILIZED, FOR SOLUTION INTRAVENOUS at 08:09

## 2020-01-01 RX ADMIN — FUROSEMIDE 20 MG: 20 INJECTION, SOLUTION INTRAMUSCULAR; INTRAVENOUS at 01:09

## 2020-01-01 RX ADMIN — IOHEXOL 100 ML: 350 INJECTION, SOLUTION INTRAVENOUS at 02:10

## 2020-01-01 RX ADMIN — MIDODRINE HYDROCHLORIDE 5 MG: 5 TABLET ORAL at 02:11

## 2020-01-01 RX ADMIN — FENTANYL CITRATE 25 MCG: 50 INJECTION INTRAMUSCULAR; INTRAVENOUS at 11:12

## 2020-01-01 RX ADMIN — POTASSIUM & SODIUM PHOSPHATES POWDER PACK 280-160-250 MG 2 PACKET: 280-160-250 PACK at 12:10

## 2020-01-01 RX ADMIN — OXYCODONE HYDROCHLORIDE 5 MG: 5 TABLET ORAL at 07:11

## 2020-01-01 RX ADMIN — ALBUMIN (HUMAN) 12.5 G: 0.25 INJECTION, SOLUTION INTRAVENOUS at 01:09

## 2020-01-01 RX ADMIN — CINACALCET HYDROCHLORIDE 90 MG: 30 TABLET, FILM COATED ORAL at 08:11

## 2020-01-01 RX ADMIN — LACTULOSE 10 G: 20 SOLUTION ORAL at 12:09

## 2020-01-01 RX ADMIN — SODIUM CHLORIDE: 450 INJECTION, SOLUTION INTRAVENOUS at 12:10

## 2020-01-01 RX ADMIN — SODIUM CHLORIDE: 0.9 INJECTION, SOLUTION INTRAVENOUS at 12:11

## 2020-01-01 RX ADMIN — SODIUM CHLORIDE 1000 ML: 0.9 INJECTION, SOLUTION INTRAVENOUS at 07:09

## 2020-01-01 RX ADMIN — AMIODARONE HYDROCHLORIDE 0.5 MG/MIN: 1.8 INJECTION, SOLUTION INTRAVENOUS at 08:12

## 2020-01-01 RX ADMIN — OCTREOTIDE ACETATE 500 MCG/HR: 1000 INJECTION, SOLUTION INTRAVENOUS; SUBCUTANEOUS at 01:10

## 2020-01-01 RX ADMIN — ALBUMIN (HUMAN) 25 G: 12.5 SOLUTION INTRAVENOUS at 06:12

## 2020-01-01 RX ADMIN — POTASSIUM PHOSPHATE, MONOBASIC AND POTASSIUM PHOSPHATE, DIBASIC 15 MMOL: 224; 236 INJECTION, SOLUTION, CONCENTRATE INTRAVENOUS at 01:11

## 2020-01-01 RX ADMIN — SOYBEAN OIL 250 ML: 20 INJECTION, SOLUTION INTRAVENOUS at 10:09

## 2020-01-01 RX ADMIN — SODIUM PHOSPHATE, DIBASIC AND SODIUM PHOSPHATE, MONOBASIC 1 ENEMA: 7; 19 ENEMA RECTAL at 05:10

## 2020-01-01 RX ADMIN — MAGNESIUM SULFATE 1 G: 1 INJECTION INTRAVENOUS at 06:11

## 2020-01-01 RX ADMIN — ALBUMIN (HUMAN) 12.5 G: 12.5 SOLUTION INTRAVENOUS at 08:12

## 2020-01-01 RX ADMIN — SODIUM CHLORIDE: 450 INJECTION, SOLUTION INTRAVENOUS at 09:10

## 2020-01-01 RX ADMIN — ZOLEDRONIC ACID 4 MG: 4 INJECTION, SOLUTION, CONCENTRATE INTRAVENOUS at 12:10

## 2020-01-01 RX ADMIN — FENTANYL CITRATE 50 MCG: 50 INJECTION, SOLUTION INTRAMUSCULAR; INTRAVENOUS at 10:07

## 2020-01-01 RX ADMIN — PHYTONADIONE 1 MG: 2 INJECTION, EMULSION INTRAMUSCULAR; INTRAVENOUS; SUBCUTANEOUS at 12:11

## 2020-01-01 RX ADMIN — ALBUMIN (HUMAN) 12.5 G: 0.25 INJECTION, SOLUTION INTRAVENOUS at 04:09

## 2020-01-01 RX ADMIN — FUROSEMIDE 20 MG: 20 TABLET ORAL at 05:11

## 2020-01-01 RX ADMIN — POTASSIUM CHLORIDE 10 MEQ: 7.46 INJECTION, SOLUTION INTRAVENOUS at 08:11

## 2020-01-01 RX ADMIN — LINEZOLID 600 MG: 600 INJECTION, SOLUTION INTRAVENOUS at 02:12

## 2020-01-01 RX ADMIN — POTASSIUM PHOSPHATE, MONOBASIC AND POTASSIUM PHOSPHATE, DIBASIC 30 MMOL: 224; 236 INJECTION, SOLUTION INTRAVENOUS at 08:09

## 2020-01-01 RX ADMIN — SODIUM CHLORIDE: 450 INJECTION, SOLUTION INTRAVENOUS at 06:10

## 2020-01-01 RX ADMIN — FUROSEMIDE 20 MG: 20 INJECTION, SOLUTION INTRAMUSCULAR; INTRAVENOUS at 10:11

## 2020-01-01 RX ADMIN — SPIRONOLACTONE 50 MG: 25 TABLET ORAL at 01:11

## 2020-01-01 RX ADMIN — PHYTONADIONE 10 MG: 10 INJECTION, EMULSION INTRAMUSCULAR; INTRAVENOUS; SUBCUTANEOUS at 09:10

## 2020-01-01 RX ADMIN — DOPAMINE HYDROCHLORIDE 2 MCG/KG/MIN: 160 INJECTION, SOLUTION INTRAVENOUS at 09:12

## 2020-01-01 RX ADMIN — POTASSIUM CHLORIDE 40 MEQ: 29.8 INJECTION, SOLUTION INTRAVENOUS at 01:09

## 2020-01-01 RX ADMIN — CEFTRIAXONE SODIUM 1 G: 1 INJECTION, POWDER, FOR SOLUTION INTRAMUSCULAR; INTRAVENOUS at 12:11

## 2020-01-01 RX ADMIN — GADOBUTROL 10 ML: 604.72 INJECTION INTRAVENOUS at 02:07

## 2020-01-01 RX ADMIN — SODIUM CHLORIDE 1000 ML: 0.9 INJECTION, SOLUTION INTRAVENOUS at 02:12

## 2020-01-01 RX ADMIN — POTASSIUM CHLORIDE 20 MEQ: 14.9 INJECTION, SOLUTION INTRAVENOUS at 06:10

## 2020-01-01 RX ADMIN — ONDANSETRON 4 MG: 2 INJECTION INTRAMUSCULAR; INTRAVENOUS at 08:12

## 2020-01-01 RX ADMIN — CEFTRIAXONE SODIUM 1 G: 1 INJECTION, POWDER, FOR SOLUTION INTRAMUSCULAR; INTRAVENOUS at 02:11

## 2020-01-01 RX ADMIN — Medication 1 MG: at 07:12

## 2020-01-01 RX ADMIN — NOREPINEPHRINE BITARTRATE 0.26 MCG/KG/MIN: 1 INJECTION, SOLUTION, CONCENTRATE INTRAVENOUS at 12:12

## 2020-01-01 RX ADMIN — ZOLEDRONIC ACID 4 MG: 0.04 INJECTION, SOLUTION INTRAVENOUS at 01:08

## 2020-01-01 RX ADMIN — LEVOTHYROXINE SODIUM 200 MCG: 100 TABLET ORAL at 08:10

## 2020-01-01 RX ADMIN — ALBUMIN (HUMAN) 12.5 G: 12.5 SOLUTION INTRAVENOUS at 10:12

## 2020-01-01 RX ADMIN — DOCUSATE SODIUM 50 MG AND SENNOSIDES 8.6 MG 1 TABLET: 8.6; 5 TABLET, FILM COATED ORAL at 09:11

## 2020-01-01 RX ADMIN — CINACALCET HYDROCHLORIDE 90 MG: 30 TABLET, FILM COATED ORAL at 07:11

## 2020-01-01 RX ADMIN — LEVOTHYROXINE SODIUM 150 MCG: 75 TABLET ORAL at 06:09

## 2020-01-01 RX ADMIN — FUROSEMIDE 20 MG: 20 TABLET ORAL at 08:11

## 2020-01-01 RX ADMIN — CINACALCET HYDROCHLORIDE 90 MG: 30 TABLET, FILM COATED ORAL at 01:11

## 2020-01-01 RX ADMIN — FUROSEMIDE 20 MG: 20 INJECTION, SOLUTION INTRAMUSCULAR; INTRAVENOUS at 03:09

## 2020-01-01 RX ADMIN — BISACODYL 5 MG: 5 TABLET, COATED ORAL at 08:09

## 2020-01-01 RX ADMIN — POTASSIUM PHOSPHATE, MONOBASIC AND POTASSIUM PHOSPHATE, DIBASIC 30 MMOL: 224; 236 INJECTION, SOLUTION, CONCENTRATE INTRAVENOUS at 03:10

## 2020-01-01 RX ADMIN — OCTREOTIDE ACETATE 500 MCG/HR: 1000 INJECTION, SOLUTION INTRAVENOUS; SUBCUTANEOUS at 05:10

## 2020-01-01 RX ADMIN — POTASSIUM & SODIUM PHOSPHATES POWDER PACK 280-160-250 MG 2 PACKET: 280-160-250 PACK at 09:10

## 2020-01-01 RX ADMIN — ALBUMIN (HUMAN) 12.5 G: 0.25 INJECTION, SOLUTION INTRAVENOUS at 07:09

## 2020-01-01 RX ADMIN — ALBUMIN (HUMAN) 50 G: 12.5 SOLUTION INTRAVENOUS at 02:11

## 2020-01-01 RX ADMIN — SODIUM BICARBONATE: 84 INJECTION, SOLUTION INTRAVENOUS at 07:12

## 2020-01-01 RX ADMIN — OCTREOTIDE ACETATE 250 MCG/HR: 1000 INJECTION, SOLUTION INTRAVENOUS; SUBCUTANEOUS at 02:11

## 2020-01-01 RX ADMIN — OCTREOTIDE ACETATE 250 MCG/HR: 1000 INJECTION, SOLUTION INTRAVENOUS; SUBCUTANEOUS at 02:09

## 2020-01-01 RX ADMIN — CALCIUM GLUCONATE 1000 MG: 98 INJECTION, SOLUTION INTRAVENOUS at 06:12

## 2020-01-01 RX ADMIN — SPIRONOLACTONE 100 MG: 25 TABLET ORAL at 10:09

## 2020-01-01 RX ADMIN — FUROSEMIDE 60 MG: 10 INJECTION, SOLUTION INTRAVENOUS at 08:10

## 2020-01-01 RX ADMIN — CALCITONIN SALMON 1 SPRAY: 200 SPRAY, METERED NASAL at 10:09

## 2020-01-01 RX ADMIN — POTASSIUM CHLORIDE 10 MEQ: 7.46 INJECTION, SOLUTION INTRAVENOUS at 04:10

## 2020-01-01 RX ADMIN — DEXTROSE, SODIUM CHLORIDE, AND POTASSIUM CHLORIDE: 5; .45; .15 INJECTION INTRAVENOUS at 03:10

## 2020-01-01 RX ADMIN — POTASSIUM CHLORIDE 40 MEQ: 29.8 INJECTION, SOLUTION INTRAVENOUS at 09:10

## 2020-01-01 RX ADMIN — FOLIC ACID: 5 INJECTION, SOLUTION INTRAMUSCULAR; INTRAVENOUS; SUBCUTANEOUS at 05:09

## 2020-01-01 RX ADMIN — SODIUM CHLORIDE 75 ML/HR: 0.9 INJECTION, SOLUTION INTRAVENOUS at 12:12

## 2020-01-01 RX ADMIN — NOREPINEPHRINE BITARTRATE 3 MCG/KG/MIN: 1 INJECTION, SOLUTION, CONCENTRATE INTRAVENOUS at 08:12

## 2020-01-01 RX ADMIN — MAGNESIUM SULFATE IN WATER 2 G: 40 INJECTION, SOLUTION INTRAVENOUS at 08:10

## 2020-01-01 RX ADMIN — DEXTROSE, SODIUM CHLORIDE, AND POTASSIUM CHLORIDE: 5; .45; .15 INJECTION INTRAVENOUS at 10:10

## 2020-01-01 RX ADMIN — FAMOTIDINE 20 MG: 10 INJECTION, SOLUTION INTRAVENOUS at 08:09

## 2020-01-01 RX ADMIN — POLYETHYLENE GLYCOL (3350) 17 G: 17 POWDER, FOR SOLUTION ORAL at 08:11

## 2020-01-01 RX ADMIN — SODIUM CHLORIDE: 0.9 INJECTION, SOLUTION INTRAVENOUS at 01:08

## 2020-01-01 RX ADMIN — Medication 100 ML: at 04:09

## 2020-01-01 RX ADMIN — CEFTRIAXONE SODIUM 1 G: 1 INJECTION, POWDER, FOR SOLUTION INTRAMUSCULAR; INTRAVENOUS at 03:11

## 2020-01-01 RX ADMIN — POTASSIUM PHOSPHATE, MONOBASIC AND POTASSIUM PHOSPHATE, DIBASIC 30 MMOL: 224; 236 INJECTION, SOLUTION, CONCENTRATE INTRAVENOUS at 01:10

## 2020-01-01 RX ADMIN — CEFTRIAXONE SODIUM 1 G: 1 INJECTION, POWDER, FOR SOLUTION INTRAMUSCULAR; INTRAVENOUS at 01:11

## 2020-01-01 RX ADMIN — FERROUS GLUCONATE TAB 324 MG (37.5 MG ELEMENTAL IRON) 324 MG: 324 (37.5 FE) TAB at 01:11

## 2020-01-01 RX ADMIN — FUROSEMIDE 40 MG: 10 INJECTION, SOLUTION INTRAVENOUS at 05:09

## 2020-01-01 RX ADMIN — FUROSEMIDE 20 MG: 10 INJECTION, SOLUTION INTRAMUSCULAR; INTRAVENOUS at 11:11

## 2020-01-01 RX ADMIN — ALBUMIN (HUMAN) 12.5 G: 12.5 SOLUTION INTRAVENOUS at 05:12

## 2020-01-01 RX ADMIN — ERGOCALCIFEROL 50000 UNITS: 1.25 CAPSULE ORAL at 05:09

## 2020-01-01 RX ADMIN — CYANOCOBALAMIN 1000 MCG: 1000 INJECTION, SOLUTION INTRAMUSCULAR; SUBCUTANEOUS at 06:09

## 2020-01-01 RX ADMIN — SODIUM PHOSPHATE, MONOBASIC, MONOHYDRATE 39.99 MMOL: 276; 142 INJECTION, SOLUTION INTRAVENOUS at 05:09

## 2020-01-01 RX ADMIN — CINACALCET HYDROCHLORIDE 60 MG: 30 TABLET, FILM COATED ORAL at 04:10

## 2020-01-01 RX ADMIN — MAGNESIUM SULFATE IN WATER 2 G: 40 INJECTION, SOLUTION INTRAVENOUS at 08:11

## 2020-01-01 RX ADMIN — POTASSIUM CHLORIDE 10 MEQ: 7.46 INJECTION, SOLUTION INTRAVENOUS at 02:10

## 2020-01-01 RX ADMIN — SODIUM CHLORIDE: 0.9 INJECTION, SOLUTION INTRAVENOUS at 03:09

## 2020-01-01 RX ADMIN — DEXTROSE, SODIUM CHLORIDE, AND POTASSIUM CHLORIDE: 5; .45; .15 INJECTION INTRAVENOUS at 11:10

## 2020-01-01 RX ADMIN — BISACODYL 5 MG: 5 TABLET, COATED ORAL at 09:09

## 2020-01-01 RX ADMIN — SODIUM CHLORIDE: 0.9 INJECTION, SOLUTION INTRAVENOUS at 09:09

## 2020-01-01 RX ADMIN — SODIUM CHLORIDE: 0.9 INJECTION, SOLUTION INTRAVENOUS at 04:09

## 2020-01-01 RX ADMIN — SODIUM PHOSPHATE, MONOBASIC, MONOHYDRATE 39.99 MMOL: 276; 142 INJECTION, SOLUTION INTRAVENOUS at 10:10

## 2020-01-01 RX ADMIN — POTASSIUM CHLORIDE 10 MEQ: 7.46 INJECTION, SOLUTION INTRAVENOUS at 11:10

## 2020-01-01 RX ADMIN — URSODIOL 300 MG: 300 CAPSULE ORAL at 01:10

## 2020-01-01 RX ADMIN — CINACALCET HYDROCHLORIDE 60 MG: 30 TABLET, FILM COATED ORAL at 05:10

## 2020-01-01 RX ADMIN — PHYTONADIONE 10 MG: 10 INJECTION, EMULSION INTRAMUSCULAR; INTRAVENOUS; SUBCUTANEOUS at 08:10

## 2020-01-01 RX ADMIN — OCTREOTIDE ACETATE 500 MCG/HR: 1000 INJECTION, SOLUTION INTRAVENOUS; SUBCUTANEOUS at 09:12

## 2020-01-01 RX ADMIN — POTASSIUM & SODIUM PHOSPHATES POWDER PACK 280-160-250 MG 1 PACKET: 280-160-250 PACK at 11:11

## 2020-01-01 RX ADMIN — SODIUM PHOSPHATE, DIBASIC AND SODIUM PHOSPHATE, MONOBASIC 1 ENEMA: 7; 19 ENEMA RECTAL at 01:10

## 2020-01-01 RX ADMIN — LINEZOLID 600 MG: 600 INJECTION, SOLUTION INTRAVENOUS at 11:12

## 2020-01-01 RX ADMIN — FUROSEMIDE 40 MG: 10 INJECTION, SOLUTION INTRAVENOUS at 05:10

## 2020-01-01 RX ADMIN — SODIUM PHOSPHATE, MONOBASIC, MONOHYDRATE 39.99 MMOL: 276; 142 INJECTION, SOLUTION INTRAVENOUS at 06:10

## 2020-01-01 RX ADMIN — SPIRONOLACTONE 50 MG: 50 TABLET, FILM COATED ORAL at 08:10

## 2020-01-01 RX ADMIN — NOREPINEPHRINE BITARTRATE 3 MCG/KG/MIN: 1 INJECTION, SOLUTION, CONCENTRATE INTRAVENOUS at 05:12

## 2020-01-01 RX ADMIN — OCTREOTIDE ACETATE 250 MCG/HR: 1000 INJECTION, SOLUTION INTRAVENOUS; SUBCUTANEOUS at 05:11

## 2020-01-01 RX ADMIN — Medication 0.2 MCG/KG/MIN: at 02:12

## 2020-01-01 RX ADMIN — CALCITONIN SALMON 200 UNITS: 200 INJECTION, SOLUTION INTRAMUSCULAR; SUBCUTANEOUS at 11:11

## 2020-01-01 RX ADMIN — IOHEXOL 1000 ML: 9 SOLUTION ORAL at 02:10

## 2020-01-01 RX ADMIN — SODIUM CHLORIDE, SODIUM LACTATE, POTASSIUM CHLORIDE, AND CALCIUM CHLORIDE 1000 ML: .6; .31; .03; .02 INJECTION, SOLUTION INTRAVENOUS at 12:10

## 2020-01-01 RX ADMIN — POTASSIUM CHLORIDE 10 MEQ: 7.46 INJECTION, SOLUTION INTRAVENOUS at 09:09

## 2020-01-01 RX ADMIN — ZOLEDRONIC ACID 4 MG: 4 SOLUTION INTRAVENOUS at 03:08

## 2020-01-01 RX ADMIN — OCTREOTIDE ACETATE 250 MCG/HR: 1000 INJECTION, SOLUTION INTRAVENOUS; SUBCUTANEOUS at 03:11

## 2020-01-01 RX ADMIN — ALBUMIN (HUMAN) 12.5 G: 12.5 SOLUTION INTRAVENOUS at 05:10

## 2020-01-01 RX ADMIN — POTASSIUM CHLORIDE 40 MEQ: 1.5 FOR SOLUTION ORAL at 08:09

## 2020-01-01 RX ADMIN — ENOXAPARIN SODIUM 40 MG: 40 INJECTION SUBCUTANEOUS at 05:11

## 2020-01-01 RX ADMIN — OCTREOTIDE ACETATE 500 MCG/HR: 1000 INJECTION, SOLUTION INTRAVENOUS; SUBCUTANEOUS at 12:10

## 2020-01-01 RX ADMIN — LEVOTHYROXINE SODIUM 200 MCG: 75 TABLET ORAL at 06:09

## 2020-01-01 RX ADMIN — MAGNESIUM SULFATE 1 G: 1 INJECTION INTRAVENOUS at 08:12

## 2020-01-01 RX ADMIN — SPIRONOLACTONE 50 MG: 25 TABLET ORAL at 08:09

## 2020-01-01 RX ADMIN — OCTREOTIDE ACETATE 125 MCG/HR: 1000 INJECTION, SOLUTION INTRAVENOUS; SUBCUTANEOUS at 07:09

## 2020-01-01 RX ADMIN — POTASSIUM & SODIUM PHOSPHATES POWDER PACK 280-160-250 MG 1 PACKET: 280-160-250 PACK at 03:11

## 2020-01-01 RX ADMIN — POTASSIUM & SODIUM PHOSPHATES POWDER PACK 280-160-250 MG 1 PACKET: 280-160-250 PACK at 05:11

## 2020-01-01 RX ADMIN — POTASSIUM CHLORIDE 20 MEQ: 1500 TABLET, EXTENDED RELEASE ORAL at 08:10

## 2020-01-01 RX ADMIN — Medication 10 ML: at 10:09

## 2020-01-01 RX ADMIN — MAGNESIUM SULFATE IN WATER 2 G: 40 INJECTION, SOLUTION INTRAVENOUS at 08:09

## 2020-01-01 RX ADMIN — POTASSIUM CHLORIDE 40 MEQ: 1.5 FOR SOLUTION ORAL at 09:09

## 2020-01-01 RX ADMIN — POTASSIUM CHLORIDE 10 MEQ: 7.46 INJECTION, SOLUTION INTRAVENOUS at 03:11

## 2020-01-01 RX ADMIN — FUROSEMIDE 80 MG: 10 INJECTION, SOLUTION INTRAVENOUS at 07:10

## 2020-01-01 RX ADMIN — ZOLEDRONIC ACID 4 MG: 4 INJECTION, SOLUTION, CONCENTRATE INTRAVENOUS at 12:09

## 2020-01-01 RX ADMIN — SODIUM CHLORIDE 4 MG: 9 INJECTION, SOLUTION INTRAVENOUS at 12:10

## 2020-01-01 RX ADMIN — LINEZOLID 600 MG: 600 INJECTION, SOLUTION INTRAVENOUS at 05:12

## 2020-01-01 RX ADMIN — Medication 6 MG: at 11:09

## 2020-01-01 RX ADMIN — FUROSEMIDE 40 MG: 10 INJECTION, SOLUTION INTRAMUSCULAR; INTRAVENOUS at 11:09

## 2020-01-01 RX ADMIN — PROPOFOL 150 MCG/KG/MIN: 10 INJECTION, EMULSION INTRAVENOUS at 09:09

## 2020-01-01 RX ADMIN — POTASSIUM CHLORIDE, DEXTROSE MONOHYDRATE AND SODIUM CHLORIDE: 150; 5; 900 INJECTION, SOLUTION INTRAVENOUS at 03:09

## 2020-01-01 RX ADMIN — POLYETHYLENE GLYCOL (3350) 17 G: 17 POWDER, FOR SOLUTION ORAL at 09:10

## 2020-01-01 RX ADMIN — FUROSEMIDE 40 MG: 10 INJECTION, SOLUTION INTRAMUSCULAR; INTRAVENOUS at 08:10

## 2020-01-01 RX ADMIN — LIDOCAINE HYDROCHLORIDE 100 MG: 20 INJECTION, SOLUTION INTRAVENOUS at 09:09

## 2020-01-01 RX ADMIN — ZOLEDRONIC ACID 4 MG: 0.04 INJECTION, SOLUTION INTRAVENOUS at 01:09

## 2020-01-01 RX ADMIN — VANCOMYCIN HYDROCHLORIDE 1250 MG: 1.25 INJECTION, POWDER, LYOPHILIZED, FOR SOLUTION INTRAVENOUS at 06:12

## 2020-01-01 RX ADMIN — CINACALCET 60 MG: 30 TABLET, FILM COATED ORAL at 04:10

## 2020-01-01 RX ADMIN — METOCLOPRAMIDE 5 MG: 5 INJECTION, SOLUTION INTRAMUSCULAR; INTRAVENOUS at 12:11

## 2020-01-01 RX ADMIN — IOHEXOL 75 ML: 350 INJECTION, SOLUTION INTRAVENOUS at 04:10

## 2020-01-01 RX ADMIN — CALCITONIN SALMON 236 UNITS: 200 INJECTION, SOLUTION INTRAMUSCULAR; SUBCUTANEOUS at 10:10

## 2020-01-01 RX ADMIN — SODIUM CHLORIDE: 450 INJECTION, SOLUTION INTRAVENOUS at 08:10

## 2020-01-01 RX ADMIN — DEXTROSE 200 MG: 50 INJECTION, SOLUTION INTRAVENOUS at 10:12

## 2020-01-01 RX ADMIN — POTASSIUM & SODIUM PHOSPHATES POWDER PACK 280-160-250 MG 1 PACKET: 280-160-250 PACK at 04:11

## 2020-01-01 RX ADMIN — ASPIRIN 325 MG: 325 TABLET, COATED ORAL at 09:12

## 2020-01-01 RX ADMIN — MAGNESIUM SULFATE IN WATER 2 G: 40 INJECTION, SOLUTION INTRAVENOUS at 05:10

## 2020-01-01 RX ADMIN — FAMOTIDINE 20 MG: 10 INJECTION, SOLUTION INTRAVENOUS at 08:12

## 2020-01-01 RX ADMIN — DEXTROSE MONOHYDRATE 25 G: 500 INJECTION PARENTERAL at 09:12

## 2020-01-01 RX ADMIN — SODIUM CHLORIDE: 0.9 INJECTION, SOLUTION INTRAVENOUS at 12:09

## 2020-01-01 RX ADMIN — POLYETHYLENE GLYCOL (3350) 17 G: 17 POWDER, FOR SOLUTION ORAL at 08:10

## 2020-01-01 RX ADMIN — ZOLEDRONIC ACID 4 MG: 4 INJECTION, SOLUTION, CONCENTRATE INTRAVENOUS at 02:09

## 2020-01-01 RX ADMIN — POTASSIUM PHOSPHATE, MONOBASIC AND POTASSIUM PHOSPHATE, DIBASIC 30 MMOL: 224; 236 INJECTION, SOLUTION, CONCENTRATE INTRAVENOUS at 09:10

## 2020-01-01 RX ADMIN — CINACALCET HYDROCHLORIDE 90 MG: 30 TABLET, FILM COATED ORAL at 06:11

## 2020-01-01 RX ADMIN — ASCORBIC ACID, VITAMIN A PALMITATE, CHOLECALCIFEROL, THIAMINE HYDROCHLORIDE, RIBOFLAVIN-5 PHOSPHATE SODIUM, PYRIDOXINE HYDROCHLORIDE, NIACINAMIDE, DEXPANTHENOL, ALPHA-TOCOPHEROL ACETATE, VITAMIN K1, FOLIC ACID, BIOTIN, CYANOCOBALAMIN: 200; 3300; 200; 6; 3.6; 6; 40; 15; 10; 150; 600; 60; 5 INJECTION, SOLUTION INTRAVENOUS at 05:09

## 2020-01-01 RX ADMIN — POTASSIUM CHLORIDE 10 MEQ: 7.46 INJECTION, SOLUTION INTRAVENOUS at 08:10

## 2020-01-01 RX ADMIN — ALBUMIN (HUMAN) 12.5 G: 12.5 SOLUTION INTRAVENOUS at 08:10

## 2020-01-01 RX ADMIN — POTASSIUM CHLORIDE 40 MEQ: 1500 TABLET, EXTENDED RELEASE ORAL at 02:10

## 2020-01-01 RX ADMIN — POTASSIUM CHLORIDE 40 MEQ: 1.5 FOR SOLUTION ORAL at 08:10

## 2020-01-01 RX ADMIN — SODIUM CHLORIDE AND POTASSIUM CHLORIDE: .9; .15 SOLUTION INTRAVENOUS at 05:10

## 2020-01-01 RX ADMIN — POTASSIUM PHOSPHATE, MONOBASIC AND POTASSIUM PHOSPHATE, DIBASIC 20 MMOL: 224; 236 INJECTION, SOLUTION, CONCENTRATE INTRAVENOUS at 03:11

## 2020-01-01 RX ADMIN — FERROUS GLUCONATE TAB 324 MG (37.5 MG ELEMENTAL IRON) 324 MG: 324 (37.5 FE) TAB at 09:10

## 2020-01-01 RX ADMIN — FUROSEMIDE 60 MG: 10 INJECTION, SOLUTION INTRAMUSCULAR; INTRAVENOUS at 03:10

## 2020-01-01 RX ADMIN — VALACYCLOVIR HYDROCHLORIDE 1000 MG: 500 TABLET, FILM COATED ORAL at 08:11

## 2020-01-01 RX ADMIN — ONDANSETRON 8 MG: 2 INJECTION INTRAMUSCULAR; INTRAVENOUS at 09:09

## 2020-03-05 NOTE — TELEPHONE ENCOUNTER
----- Message from Meli Rivera sent at 3/5/2020 10:36 AM CST -----  Contact: self [phone]  RR - Patient is calling to get lab orders faxed to [facility]. Please call

## 2020-03-25 NOTE — TELEPHONE ENCOUNTER
Spoke to patient about f/u.  Let her know we would like her to wait until mid May or June for f/u.  She will call back for appts.

## 2020-03-25 NOTE — TELEPHONE ENCOUNTER
----- Message from Meli Rivera sent at 3/25/2020 11:55 AM CDT -----  Contact: self [phone]  RR - Patient is calling to reschedule her appointments. Please call

## 2020-06-10 NOTE — TELEPHONE ENCOUNTER
----- Message from Roverto Sanders sent at 6/10/2020 10:57 AM CDT -----  Contact: patient  Type:  Needs Medical Advice    Who Called:  patient  Would the patient rather a call back or a response via [facility]?  Call back  Best Call Back Number:  [phone]  Additional Information:  She would to reschedule her missed appointment

## 2020-06-30 NOTE — TELEPHONE ENCOUNTER
----- Message from Melanie Lindsay sent at 6/30/2020 12:30 PM CDT -----  Contact: [phone]  RR----Pt Ching Dillon calling regarding Orders  being sent over to [facility]     Please call and advise

## 2020-07-15 NOTE — PROGRESS NOTES
[facility]OLAN[facility]:  [facility]  A collaboration between [facility] and [facility]    PATIENT: Ching Bruce  MRN: [id number]  DATE: 7/15/2020      Diagnosis:   1. Primary pancreatic neuroendocrine tumor    2. Secondary neuroendocrine tumor of liver        Chief Complaint: Results (MRI)      Oncologic History:    Oncologic History Pancreatic neuroendocrine tumor with metasatic disease to liver diagnosed 12/12    Oncologic Treatment Capecitabine/Temozolomide (CAPTEM) 1/13 -11/2018     Pathology Ki-67 1%        Subjective:    Interval History: Ms. Bruce is a 69 y.o. female who returns for follow up for her pancreatic neuroendocrine tumor.  I last saw her in 10/2019.  She states that she generally has been feeling well but over the last few months he has noted some decreased appetite and weight loss along with fatigue.  Recently she has started eating more and feeling better and has gained a few lb but still not back to her baseline weight.  She denies any pain.  She is otherwise active.  She has no other new complaints.        ONCOLOGIC HISTORY:   A 69 y.o. year-old -American female who I had initially   seen on 12/18/2012. Her history dates back to September 2012 when she was noted  to have several weeks of feeling fatigued. She sought treatment with her   primary care doctor who did a CBC and found her to be severely anemic. She was   seen at [facility] and transfused 3 units of packed red blood cells  and no source of bleeding had been found. Records at that time had shown   hemoglobin of 5. She sought followup in September 2012 with Dr. Guillen who   performed an EGD and colonoscopy with an EGD showing an ulcerated and fungating   nonbleeding 2 cm mass, malignant in appearance. It did cause partial   obstructions. Biopsies were taken, which showed no evidence of malignancy at   that time. She had a CT of  the abdomen and pelvis showing multiple metastatic   lesions and the liver biopsy was performed on 10/17/2012 showed pancreatic   neuroendocrine neoplasm in the left lobe of the liver aspirate. She went on to   have an ERCP along with sphincterotomy and biliary stent placement and   subsequent PET had shown numerous hypodense lesions in the liver. Octreotide   scan was performed, which showed multiple right and left hepatic metastases.   MRI of the abdomen was performed on 12/05/2012 showing innumerable lesions that   demonstrated peripheral to hyperintensity consistent with hypervascular   metastasis. She was seen by Dr. Humphrey, who thought she was not a candidate   for surgical resection during that time. Her pathology from her tumor came back  positive for chromogranin, synaptophysin and had a Ki-67 of less than 1%. She   was started on treatment with temozolomide and capecitabine with cycle   1 being on 01/22/2013.    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    Secondary malignant neoplasm of liver     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) 5/7/2013    Thrombocytopenia 11/12/2013    Thyroid disease     Unspecified essential hypertension 11/12/2013       Past Surgical HIstory:   Past Surgical History:   Procedure Laterality Date    ERCP N/A 6/21/2018    Procedure: ERCP, stent exchange;  Surgeon: Jake Lieberman MD;  Location: [facility];  Service: Endoscopy;  Laterality: N/A;    ERCP N/A 5/23/2019    Procedure: ERCP (ENDOSCOPIC RETROGRADE  CHOLANGIOPANCREATOGRAPHY), stent exchange;  Surgeon: Jake Lieberman MD;  Location: [facility] ENDO;  Service: Endoscopy;  Laterality: N/A;    ERCP N/A 11/14/2019    Procedure: ERCP (ENDOSCOPIC RETROGRADE CHOLANGIOPANCREATOGRAPHY), stent exchange;  Surgeon: Jake Lieberman MD;  Location: [facility] ENDO;  Service: Endoscopy;  Laterality: N/A;    THYROIDECTOMY  2011       Family History:   Family History   Problem Relation Age of Onset    Cancer Maternal Grandmother     Diabetes Father     Breast cancer Neg Hx     Colon cancer Neg Hx     Ovarian cancer Neg Hx        Social History:  reports that she has never smoked. She has never used smokeless tobacco. She reports that she does not drink alcohol or use drugs.    Allergies:  Review of patient's allergies indicates:   Allergen Reactions    Epinephrine Other (See Comments)     Carcinoid patient    Sulfa (sulfonamide antibiotics) Other (See Comments)     Urticaria       Medications:  Current Outpatient Medications   Medication Sig Dispense Refill    alendronate (FOSAMAX) 70 MG tablet Take 70 mg by mouth every 7 days.   3    capecitabine (XELODA) 500 MG Tab Take 2 tablets (1,000 mg total) by mouth 2 (two) times daily. 56 tablet 6    indapamide (LOZOL) 2.5 MG Tab 2.5 mg once daily.       levothyroxine (SYNTHROID) 150 MCG tablet Take 150 mcg by mouth once daily.  0    olmesartan (BENICAR) 40 MG tablet       temozolomide (TEMODAR) 250 MG capsule Take 1 capsule (250 mg total) by mouth once daily. 5 capsule 4    verapamil (CALAN-SR) 180 MG CR tablet Take 360 mg by mouth once daily.  1    irbesartan (AVAPRO) 300 MG tablet 300 mg once daily.        No current facility-administered medications for this visit.        Review of Systems   Constitutional: Negative for chills, fever and unexpected weight change.   HENT: Negative for congestion, hearing loss and nosebleeds.    Eyes: Negative for visual disturbance.   Respiratory: Negative for cough and shortness of breath.   "  Cardiovascular: Negative for chest pain and palpitations.   Gastrointestinal: Negative for abdominal pain, blood in stool, constipation, diarrhea, nausea and vomiting.   Genitourinary: Negative for dysuria.   Musculoskeletal: Negative for back pain and gait problem.   Skin: Negative for color change and rash.   Neurological: Negative for dizziness, weakness and headaches.   Hematological: Negative for adenopathy. Does not bruise/bleed easily.   Psychiatric/Behavioral: Negative for confusion.       ECOG Performance Status: 0   Objective:      Vitals:   Vitals:    07/15/20 1350   Pulse: (!) 119   SpO2: 100%   Weight: 67.9 kg (149 lb 12.8 oz)   Height: 5' 7" (1.702 m)     BMI: Body mass index is 23.46 kg/m².    Physical Exam   Constitutional: She is oriented to person, place, and time. She appears well-developed and well-nourished. No distress.   HENT:   Head: Normocephalic.   Mouth/Throat: No oropharyngeal exudate.   Eyes: EOM are normal. No scleral icterus.   Neck: Neck supple. No tracheal deviation present. No thyromegaly present.   Cardiovascular: Normal rate and regular rhythm.    Pulmonary/Chest: Effort normal and breath sounds normal. No respiratory distress. She has no wheezes. She has no rales.   Abdominal: Soft. She exhibits no distension and no mass. There is no tenderness. There is no rebound and no guarding.   Musculoskeletal: Normal range of motion. She exhibits no edema.   Lymphadenopathy:     She has no cervical adenopathy.   Neurological: She is alert and oriented to person, place, and time. No cranial nerve deficit.   Skin: Skin is warm and dry.   Psychiatric: She has a normal mood and affect.       Laboratory Data:  Abstract on 12/06/2016   Component Date Value Ref Range Status    EXT WBC 12/03/2016 4.1  3.8 - 10.8 1000/ul Final    EXT Hemoglobin 12/03/2016 12.9  11.7 - 15.5 g/dl Final    EXT Hematocrit 12/03/2016 38.5  35.0 - 45.0 % Final    EXT Platelets 12/03/2016 138* 140 - 400 1000/ul " Final    EXT Glucose 12/03/2016 88  65 - 99 mg/dl Final    EXT BUN 12/03/2016 13  7 - 25 mg/dl Final    EXT Creatinine 12/03/2016 0.90  0.50 - 0.99 mg/dl Final    EXT Sodium 12/03/2016 141  135 - 146 mmol/l Final    EXT Potassium 12/03/2016 4.2  3.5 - 5.3 mmol/l Final    EXT Chloride 12/03/2016 105  98 - 110 mmol/l Final    EXT CO2 12/03/2016 29  20 - 31 mmol/l Final    EXT Calcium 12/03/2016 9.7  8.6 - 10.4 mg/dl Final    EXT Protein total 12/03/2016 6.9  6.1 - 8.1 g/dl Final    EXT Albumin 12/03/2016 4.0  3.6 - 5.1 g/dl Final    EXT BilirubiN Total 12/03/2016 1.1  0.2 - 1.2 mg/dl Final    EXT Alkaline Phosphatase 12/03/2016 181* 33 - 130 u/l Final    EXT AST 12/03/2016 21  10 - 35 u/l Final    EXT ALT 12/03/2016 28  6 - 29 u/l Final   Office Visit on 11/14/2016   Component Date Value Ref Range Status    Blood Stool 11/14/2016 Negative  Negative Final     Acceptable 11/14/2016 Yes   Final    SOURCE: 11/14/2016 Cervical   Final    Slides: 11/14/2016 1   Final    LMP: 11/14/2016 NA   Final    Specimen adequacy: 11/14/2016 (NOTE)   Final    Comment:      Satisfactory for evaluation.          Endocervical cells absent.  Metaplastic cells indicative       of transformation zone cannot be reliably distinguished from       parabasal or atrophic cells.                      Interpretation 11/14/2016 NO EPITHELIAL ABNORMALITY SEE BELOW   Final    Comment: ----------------------------------------------------------------------       *NEGATIVE FOR INTRAEPITHELIAL LESION OR MALIGNANCY   ----------------------------------------------------------------------         Comments: 11/14/2016 (NOTE)   Final    Comment: Due to technical or specimen issues, imaging could not be  performed. A cytotechnologist has manually screened this slide.         Cytotechnologist: 11/14/2016 [facility]   Final    LOCATION 11/14/2016 (NOTE)   Final    Comment: Specimens processed and interpreted at  :[facility], [address] [place] [address], Phone: [phone], CLIA: [id number]      CPT Codes: 11/14/2016 (NOTE)   Final    Comment: [id number]        UNLESS OTHERWISE INDICATED, COMPUTER AIDED AND CYTOTECHNOLOGIST     SCREENING PERFORMED.          The Pap test is a screening test with an inherent, but low       probability of error.  Your patient should be reminded to       consult you immediately if she experiences any suspicious       signs or symptoms, regardless of her Pap test result.       An alternate report format containing images or consolidated       prior Pap history is available as applicable.         HPV HIGH RISK IF ASC-US, [facility] 11/14/2016 CRITERIA NOT MET   Final    Comment:       UNLESS OTHERWISE INDICATED, ALL TESTING PERFORMED AT  [facility]  [address] [place] [address]            :  CARLOS MENDEZ M.D.        CLIA NUMBER [id number]  CAP ACCREDITATION NO. [id number]                 Imaging:   MRI 07/07/2020  COMPARISON:  09/30/2019     FINDINGS:  Compared with the prior study there is considerable interval worsening of extensive neoplastic disease throughout the liver with innumerable confluent lesions along the periphery of the liver in both the left and right hepatic lobes.  Previously measured lesion in the left hepatic lobe is now obscured by innumerable confluent lesions.  Superior right hepatic lobe lesion that previously measured 2 cm now is at least 6 cm.  There is hypertrophy/enlargement of the left hepatic lobe.  Moderate splenomegaly has developed in the interim.     There is a metallic wall stent identified within the distal common bile duct.  No definite central biliary ductal dilatation.  No definite pancreatic ductal dilatation or discrete mass is seen within the pancreas but there is nonspecific fullness of the pancreatic head.     The kidneys and adrenal glands are remarkable for small bilateral  renal cysts.  The retroperitoneal vessels enhance normally.     Miscellaneous: There is no ascites.     MRCP: Not performed     Impression:     Significant interval worsening of extensive neoplastic/metastatic disease throughout the liver.     Interval development of splenomegaly               Assessment:       1. Primary pancreatic neuroendocrine tumor    2. Secondary neuroendocrine tumor of liver           Plan:   Ms. Bruce's MRI unfortunately shows progressive disease within her liver.  Previously her Ki-67 was 1% and based on the type of progression it would indicate that her tumor has transformed into something much more aggressive.  At this point I have recommended a liver biopsy to assess.  Additionally, I would recommend functional imaging to include either gallium 68 PET-CT or a copper 64 PET-CT.  Based on this we will discuss options for treatment moving forward.  Follow back up with me after biopsy.  I will plan to also check a pre-albumin and pancreastatin and she may need to meet with our dietitian if she continues to lose weight.  Report any new symptoms in the interim.  All questions were answered and she is agreeable with this plan.    Cory Rodriguez DO, FACP  Hematology & Oncology, [facility]  [address], [address]  [place]  [address]  ph. [phone]; [phone]  fax. [phone]    25 minutes were spent in coordination of patient's care, record review and counseling.  More than 50% of the time was face-to-face.

## 2020-07-15 NOTE — PATIENT INSTRUCTIONS
Have gallium scan next week.     Someone will call you to schedule liver biopsy    We will follow up after liver biopsy.

## 2020-07-17 NOTE — TELEPHONE ENCOUNTER
----- Message from Radha Boone sent at 7/16/2020  3:34 PM CDT -----  Please enter order for procedure.  ----- Message -----  From: Iona Reveles RN  Sent: 7/15/2020   2:33 PM CDT  To: [place] Scheduling    Per tumor board, patient needs liver biopsy.  Please call to schedule.     No contrast allergy, no blood thinners.

## 2020-07-17 NOTE — TELEPHONE ENCOUNTER
Patient called to see what other tests she could have on the same date because she just found out the more tests she had the same day, she only has to pay on copay. I told her sorry, there was only the Ga68 and the biopsy and those couldn't be done the same day.

## 2020-07-17 NOTE — TELEPHONE ENCOUNTER
----- Message from Iona Reveles RN sent at 7/15/2020  2:33 PM CDT -----  Per tumor board, patient needs liver biopsy.  Please call to schedule.     No contrast allergy, no blood thinners.

## 2020-07-17 NOTE — TELEPHONE ENCOUNTER
----- Message from Melanie Lindsay sent at 7/16/2020  4:52 PM CDT -----  Contact: [phone]  RR---Pt is requesting a callback in regards to a follow-up on a Liver Biopsy test.    Please call and advise

## 2020-07-23 NOTE — PROCEDURES
Interventional Radiology Post-Procedure Note    Pre Op Diagnosis: Liver mets  Post Op Diagnosis: Same    Procedure: US-guided coax core needle bx    Procedure performed by: Ha    Written Informed Consent Obtained: Yes  Specimen Sent: Yes  Estimated Blood Loss: Minimal    Findings:   20-ga cores x7 taken from representative tumor in the left lobe of the liver. Care taken to traverse normal liver before approaching tumor. Adequacy of specimen confirmed. Tract embolized with Gel-Foam sponge slurry.    No immediate complications. Patient tolerated procedure well. Please see full dictated procedure report for additional details and recommendations.      [facility]  Pager [phone]

## 2020-07-23 NOTE — NURSING
Procedure complete. Pt tolerated well. Specimens sent to lab per pathologist. Vital signs stable, pt in no distress

## 2020-07-23 NOTE — NURSING
Patient back from procedure to recovery. AAOx4. Report received from Stefanie. Band-Aid to abdomen clean, dry, intact. Will continue to monitor.

## 2020-07-23 NOTE — H&P
Interventional Radiology Pre-Procedure History & Physical      Chief Complaint/Reason for Referral: Met NET    History of Present Illness:  Ching Bruce is a 69 y.o. 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 Progression on recent scans. Discussed at NET board. Bx of liver lesion recommended. Here today for this procedure.    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    Secondary malignant neoplasm of liver     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) 5/7/2013    Thrombocytopenia 11/12/2013    Thyroid disease     Unspecified essential hypertension 11/12/2013     Past Surgical History:   Procedure Laterality Date    ERCP N/A 6/21/2018    Procedure: ERCP, stent exchange;  Surgeon: Jake Lieberman MD;  Location: [facility];  Service: Endoscopy;  Laterality: N/A;    ERCP N/A 5/23/2019    Procedure: ERCP (ENDOSCOPIC RETROGRADE CHOLANGIOPANCREATOGRAPHY), stent exchange;  Surgeon: Jake Lieberman MD;  Location: [facility];  Service: Endoscopy;  Laterality: N/A;    ERCP N/A 11/14/2019    Procedure: ERCP (ENDOSCOPIC RETROGRADE CHOLANGIOPANCREATOGRAPHY), stent exchange;  Surgeon: Jake Lieberman MD;  Location: [facility];  Service: Endoscopy;  Laterality: N/A;    THYROIDECTOMY  2011       Allergies:   Review of patient's allergies indicates:   Allergen Reactions    Epinephrine Other (See Comments)     Carcinoid patient    Sulfa (sulfonamide antibiotics) Other (See Comments)     Urticaria        Home Meds:   Prior to Admission medications    Medication Sig Start Date End Date Taking?  Authorizing Provider   irbesartan (AVAPRO) 300 MG tablet 300 mg once daily.  12/12/16  Yes Historical Provider, MD   levothyroxine (SYNTHROID) 150 MCG tablet Take 150 mcg by mouth once daily. 5/22/15  Yes Historical Provider, MD   olmesartan (BENICAR) 40 MG tablet  4/30/20  Yes Historical Provider, MD   verapamil (CALAN-SR) 180 MG CR tablet Take 360 mg by mouth once daily. 5/21/18  Yes Historical Provider, MD   alendronate (FOSAMAX) 70 MG tablet Take 70 mg by mouth every 7 days.  3/8/16   Historical Provider, MD   capecitabine (XELODA) 500 MG Tab Take 2 tablets (1,000 mg total) by mouth 2 (two) times daily. 10/25/18   Cory Rodriguez DO, FACP   indapamide (LOZOL) 2.5 MG Tab 2.5 mg once daily.  8/26/15   Historical Provider, MD   temozolomide (TEMODAR) 250 MG capsule Take 1 capsule (250 mg total) by mouth once daily. 10/25/18   Cory Rodriguez DO, FACP       Anticoagulation/Antiplatelet Meds: no anticoagulation    Review of Systems:   Hematological: no known coagulopathies  Respiratory: no shortness of breath  Cardiovascular: no chest pain  Gastrointestinal: no abdominal pain  Genitourinary: no dysuria  Musculoskeletal: negative  Neurological: no TIA or stroke symptoms     Physical Exam:  Temp: 98.5 °F (36.9 °C) (07/23/20 0851)  Pulse: 92 (07/23/20 0851)  Resp: 16 (07/23/20 0851)  BP: (!) 144/83 (07/23/20 0851)  SpO2: 100 % (07/23/20 0851)    General: NAD  HEENT: Normocephalic, sclera anicteric, oropharynx clear  Heart: RRR  Lungs: Symmetric excursions, breathing unlabored  Abd: NTND, soft  Extremities: CORBETT  Neuro: Gross nonfocal    Laboratory:  Lab Results   Component Value Date    INR 1.1 07/23/2020       Lab Results   Component Value Date    WBC 8.07 07/23/2020    HGB 11.9 (L) 07/23/2020    HCT 36.3 (L) 07/23/2020    MCV 98 07/23/2020     07/23/2020      Lab Results   Component Value Date     07/23/2020     07/23/2020    K 3.7 07/23/2020     07/23/2020    CO2 25 07/23/2020    BUN 11  07/23/2020    CREATININE 0.7 07/23/2020    CALCIUM 10.4 07/23/2020    MG 2.0 07/04/2014    ALT 26 07/23/2020    AST 33 07/23/2020    ALBUMIN 2.8 (L) 07/23/2020    BILITOT 0.7 07/23/2020     Imaging:  MRI abd 7/7/2020 and 9/30/2019, Ga-68 PET/CT 7/20/20 reviewed.    Assessment/Plan:  69 y.o. female with progression of hepatic metastatic disease. Will undergo liver bx today.    Sedation:  Sedation history: have not been any systemic reactions  ASA: 3 / Mallampati: 2  Sedation plan: Moderate (Versed, fentanyl)     Risks (including, but not limited to, pain, bleeding, infection, damage to nearby structures, liver injury, inability to obtain sufficient material for a diagnosis, and the need for additional procedures), benefits, and alternatives were discussed with the patient. All questions were answered to the best of my abilities. The patient wishes to proceed. Written informed consent was obtained.      Colt Bonner MD  [facility]  Pager [phone]

## 2020-07-23 NOTE — NURSING
Rec'd report per Ravindra.  Pt. Resting quietly, denied any  Present discomforts.  Will cont. To recover.

## 2020-07-26 NOTE — DISCHARGE SUMMARY
Interventional Radiology Discharge Summary      Hospital Course: No complications.    Admit Date: 7/23/2020  Discharge Date: 7/23/2020    Instructions Given to Patient: Yes  Diet: Resume prior diet  Activity: Activity as tolerated    Description of Condition on Discharge: Stable  Vital Signs (Most Recent): Temp: 98.5 °F (36.9 °C) (07/23/20 0851)  Pulse: 74 (07/23/20 1415)  Resp: 20 (07/23/20 1415)  BP: 122/64 (07/23/20 1415)  SpO2: 100 % (07/23/20 1415)    Discharge Disposition: Home    Discharge Diagnosis: NET s/p liver mass bx today     Follow-up: With referring provider      Colt Bonner MD  [facility]  Pager [phone]

## 2020-08-03 NOTE — PATIENT INSTRUCTIONS
Lanie will call you to set up PRRT     Lanreotide injection  What is this medicine?  LANREOTIDE (tobi REE oh tide) is used to reduce blood levels of growth hormone in patients with a condition called acromegaly. It also works to slow or stop tumor growth in patients with gastroenteropancreatic neuroendocrine tumor (GEP-NET).  How should I use this medicine?  This medicine is for injection under the skin. It is given by a health care professional in a hospital or clinic setting.  Contact your pediatrician or health care professional regarding the use of this medicine in children. Special care may be needed.  What side effects may I notice from receiving this medicine?  Side effects that you should report to your doctor or health care professional as soon as possible:  · allergic reactions like skin rash, itching or hives, swelling of the face, lips, or tongue  · changes in blood sugar  · changes in heart rate  · severe stomach pain  Side effects that usually do not require medical attention (report to your doctor or health care professional if they continue or are bothersome):  · diarrhea or constipation  · gas or stomach pain  · nausea, vomiting  · pain, redness, swelling and irritation at site where injected  What may interact with this medicine?  · bromocriptine  · cyclosporine  · medicines for diabetes, including insulin  · medicines for heart disease or hypertension  · quinidine  What if I miss a dose?  It is important not to miss your dose. Call your doctor or health care professional if you are unable to keep an appointment.  Where should I keep my medicine?  This drug is given in a hospital or clinic and will not be stored at home.  What should I tell my health care provider before I take this medicine?  They need to know if you have any of these conditions:  · diabetes  · gallbladder disease  · heart disease  · kidney disease  · liver disease  · an unusual or allergic reaction to lanreotide, other medicines,  latex, foods, dyes, or preservatives  · pregnant or trying to get pregnant  · breast-feeding  What should I watch for while using this medicine?  Visit your doctor or health care professional for regular checks on your progress. Your condition will be monitored carefully while you are receiving this medicine.  This medicine may cause increases or decreases in blood sugar. Signs of high blood sugar include frequent urination, unusual thirst, flushed or dry skin, difficulty breathing, drowsiness, stomach ache, nausea, vomiting or dry mouth. Signs of low blood sugar include chills, cool, pale skin or cold sweats, drowsiness, extreme hunger, fast heartbeat, headache, nausea, nervousness or anxiety, shakiness, trembling, unsteadiness, tiredness, or weakness. Contact your doctor or health care professional right away if you experience any of these symptoms.  NOTE:This sheet is a summary. It may not cover all possible information. If you have questions about this medicine, talk to your doctor, pharmacist, or health care provider. Copyright© 2017 [employer]

## 2020-08-03 NOTE — TELEPHONE ENCOUNTER
Attempted to call patient to have let he know of zometa and fluid needs, phone busy, will try again later. Attempted cell number, VM left with phone number to return call.

## 2020-08-03 NOTE — PROGRESS NOTES
NOLANETS:  [facility]  A collaboration between [facility] and [facility]    PATIENT: Ching Bruce  MRN: [id number]  DATE: 8/3/2020      Diagnosis:   1. Primary pancreatic neuroendocrine tumor    2. 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 Bruce is a 69 y.o. female who returns for follow up for her pancreatic neuroendocrine tumor. Since I had seen her last she underwent a liver biopsy.  She states that she is having ongoing abdominal distension.  Denies pain.  She complains of bloating and a decreased appetite.  No other new complaints.          ONCOLOGIC HISTORY:   A 69 y.o. year-old -American female who I had initially   seen on 12/18/2012. Her history dates back to September 2012 when she was noted  to have several weeks of feeling fatigued. She sought treatment with her   primary care doctor who did a CBC and found her to be severely anemic. She was   seen at [facility] and transfused 3 units of packed red blood cells  and no source of bleeding had been found. Records at that time had shown   hemoglobin of 5. She sought followup in September 2012 with Dr. Guillen who   performed an EGD and colonoscopy with an EGD showing an ulcerated and fungating   nonbleeding 2 cm mass, malignant in appearance. It did cause partial   obstructions. Biopsies were taken, which showed no evidence of  malignancy at   that time. She had a CT of the abdomen and pelvis showing multiple metastatic   lesions and the liver biopsy was performed on 10/17/2012 showed pancreatic   neuroendocrine neoplasm in the left lobe of the liver aspirate. She went on to   have an ERCP along with sphincterotomy and biliary stent placement and   subsequent PET had shown numerous hypodense lesions in the liver. Octreotide   scan was performed, which showed multiple right and left hepatic metastases.   MRI of the abdomen was performed on 12/05/2012 showing innumerable lesions that   demonstrated peripheral to hyperintensity consistent with hypervascular   metastasis. She was seen by Dr. Humphrey, who thought she was not a candidate   for surgical resection during that time. Her pathology from her tumor came back  positive for chromogranin, synaptophysin and had a Ki-67 of less than 1%. She   was started on treatment with temozolomide and capecitabine with cycle   1 being on 01/22/2013. This was discontinued in 11/2018 due to prolonged stability of disease.  In 7/2020 she had progressive disease within the liver.  A biopsy showed a grade 2 NET with Ki-67 of 15%.  Gallium 68 PET/CT showed uptake within the liver.    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    Secondary malignant neoplasm of liver     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) 5/7/2013    Thrombocytopenia 11/12/2013    Thyroid disease     Unspecified essential hypertension 11/12/2013       Past Surgical HIstory:   Past  Surgical History:   Procedure Laterality Date    ERCP N/A 6/21/2018    Procedure: ERCP, stent exchange;  Surgeon: Jake Lieberman MD;  Location: [facility] ENDO;  Service: Endoscopy;  Laterality: N/A;    ERCP N/A 5/23/2019    Procedure: ERCP (ENDOSCOPIC RETROGRADE CHOLANGIOPANCREATOGRAPHY), stent exchange;  Surgeon: Jake Lieberman MD;  Location: [facility] ENDO;  Service: Endoscopy;  Laterality: N/A;    ERCP N/A 11/14/2019    Procedure: ERCP (ENDOSCOPIC RETROGRADE CHOLANGIOPANCREATOGRAPHY), stent exchange;  Surgeon: Jake Lieberman MD;  Location: [facility] ENDO;  Service: Endoscopy;  Laterality: N/A;    THYROIDECTOMY  2011       Family History:   Family History   Problem Relation Age of Onset    Cancer Maternal Grandmother     Diabetes Father     Breast cancer Neg Hx     Colon cancer Neg Hx     Ovarian cancer Neg Hx        Social History:  reports that she has never smoked. She has never used smokeless tobacco. She reports that she does not drink alcohol or use drugs.    Allergies:  Review of patient's allergies indicates:   Allergen Reactions    Epinephrine Other (See Comments)     Carcinoid patient    Sulfa (sulfonamide antibiotics) Other (See Comments)     Urticaria       Medications:  Current Outpatient Medications   Medication Sig Dispense Refill    alendronate (FOSAMAX) 70 MG tablet Take 70 mg by mouth every 7 days.   3    capecitabine (XELODA) 500 MG Tab Take 2 tablets (1,000 mg total) by mouth 2 (two) times daily. 56 tablet 6    indapamide (LOZOL) 2.5 MG Tab 2.5 mg once daily.       irbesartan (AVAPRO) 300 MG tablet 300 mg once daily.       levothyroxine (SYNTHROID) 150 MCG tablet Take 150 mcg by mouth once daily.  0    olmesartan (BENICAR) 40 MG tablet       temozolomide (TEMODAR) 250 MG capsule Take 1 capsule (250 mg total) by mouth once daily. 5 capsule 4    verapamil (CALAN-SR) 180 MG CR tablet Take 360 mg by mouth once daily.  1     No current facility-administered medications for this visit.   "      Review of Systems   Constitutional: Negative for chills, fever and unexpected weight change.   HENT: Negative for congestion, hearing loss and nosebleeds.    Eyes: Negative for visual disturbance.   Respiratory: Negative for cough and shortness of breath.    Cardiovascular: Negative for chest pain and palpitations.   Gastrointestinal: Negative for abdominal pain, blood in stool, constipation, diarrhea, nausea and vomiting.   Genitourinary: Negative for dysuria.   Musculoskeletal: Negative for back pain and gait problem.   Skin: Negative for color change and rash.   Neurological: Negative for dizziness, weakness and headaches.   Hematological: Negative for adenopathy. Does not bruise/bleed easily.   Psychiatric/Behavioral: Negative for confusion.       ECOG Performance Status: 0   Objective:      Vitals:   Vitals:    08/03/20 1054   BP: (!) 163/86   BP Location: Left arm   Patient Position: Sitting   BP Method: Medium (Automatic)   Pulse: (!) 114   SpO2: 100%   Weight: 66.3 kg (146 lb 0.9 oz)   Height: 5' 7" (1.702 m)     BMI: Body mass index is 22.88 kg/m².    Physical Exam   Constitutional: She is oriented to person, place, and time. She appears well-developed and well-nourished. No distress.   HENT:   Head: Normocephalic.   Mouth/Throat: No oropharyngeal exudate.   Eyes: EOM are normal. No scleral icterus.   Neck: Neck supple. No tracheal deviation present. No thyromegaly present.   Cardiovascular: Normal rate and regular rhythm.    Pulmonary/Chest: Effort normal and breath sounds normal. No respiratory distress. She has no wheezes. She has no rales.   Abdominal: Soft. She exhibits no distension and no mass. There is no tenderness. There is no rebound and no guarding.   Musculoskeletal: Normal range of motion. She exhibits no edema.   Lymphadenopathy:     She has no cervical adenopathy.   Neurological: She is alert and oriented to person, place, and time. No cranial nerve deficit.   Skin: Skin is warm and " dry.   Psychiatric: She has a normal mood and affect.       Laboratory Data:  Abstract on 12/06/2016   Component Date Value Ref Range Status    EXT WBC 12/03/2016 4.1  3.8 - 10.8 1000/ul Final    EXT Hemoglobin 12/03/2016 12.9  11.7 - 15.5 g/dl Final    EXT Hematocrit 12/03/2016 38.5  35.0 - 45.0 % Final    EXT Platelets 12/03/2016 138* 140 - 400 1000/ul Final    EXT Glucose 12/03/2016 88  65 - 99 mg/dl Final    EXT BUN 12/03/2016 13  7 - 25 mg/dl Final    EXT Creatinine 12/03/2016 0.90  0.50 - 0.99 mg/dl Final    EXT Sodium 12/03/2016 141  135 - 146 mmol/l Final    EXT Potassium 12/03/2016 4.2  3.5 - 5.3 mmol/l Final    EXT Chloride 12/03/2016 105  98 - 110 mmol/l Final    EXT CO2 12/03/2016 29  20 - 31 mmol/l Final    EXT Calcium 12/03/2016 9.7  8.6 - 10.4 mg/dl Final    EXT Protein total 12/03/2016 6.9  6.1 - 8.1 g/dl Final    EXT Albumin 12/03/2016 4.0  3.6 - 5.1 g/dl Final    EXT BilirubiN Total 12/03/2016 1.1  0.2 - 1.2 mg/dl Final    EXT Alkaline Phosphatase 12/03/2016 181* 33 - 130 u/l Final    EXT AST 12/03/2016 21  10 - 35 u/l Final    EXT ALT 12/03/2016 28  6 - 29 u/l Final   Office Visit on 11/14/2016   Component Date Value Ref Range Status    Blood Stool 11/14/2016 Negative  Negative Final     Acceptable 11/14/2016 Yes   Final    SOURCE: 11/14/2016 Cervical   Final    Slides: 11/14/2016 1   Final    LMP: 11/14/2016 NA   Final    Specimen adequacy: 11/14/2016 (NOTE)   Final    Comment:      Satisfactory for evaluation.          Endocervical cells absent.  Metaplastic cells indicative       of transformation zone cannot be reliably distinguished from       parabasal or atrophic cells.                      Interpretation 11/14/2016 NO EPITHELIAL ABNORMALITY SEE BELOW   Final    Comment: ----------------------------------------------------------------------       *NEGATIVE FOR INTRAEPITHELIAL LESION OR MALIGNANCY    ----------------------------------------------------------------------         Comments: 11/14/2016 (NOTE)   Final    Comment: Due to technical or specimen issues, imaging could not be  performed. A cytotechnologist has manually screened this slide.         Cytotechnologist: 11/14/2016 BRENDON Ca(ASCP)IAC   Final    LOCATION 11/14/2016 (NOTE)   Final    Comment: Specimens processed and interpreted at :[facility], [address] [place] [address], Phone: [phone], CLIA: [id number]      CPT Codes: 11/14/2016 (NOTE)   Final    Comment: [id number]        UNLESS OTHERWISE INDICATED, COMPUTER AIDED AND CYTOTECHNOLOGIST     SCREENING PERFORMED.          The Pap test is a screening test with an inherent, but low       probability of error.  Your patient should be reminded to       consult you immediately if she experiences any suspicious       signs or symptoms, regardless of her Pap test result.       An alternate report format containing images or consolidated       prior Pap history is available as applicable.         HPV HIGH RISK IF ASC-US, [facility]PATH 11/14/2016 CRITERIA NOT MET   Final    Comment:       UNLESS OTHERWISE INDICATED, ALL TESTING PERFORMED AT  [facility]  [address] [place] [address]            :  CARLOS MENDEZ M.D.        CLIA NUMBER [id number]  [place]. [id number]                 Imaging:   MRI 07/07/2020  COMPARISON:  09/30/2019     FINDINGS:  Compared with the prior study there is considerable interval worsening of extensive neoplastic disease throughout the liver with innumerable confluent lesions along the periphery of the liver in both the left and right hepatic lobes.  Previously measured lesion in the left hepatic lobe is now obscured by innumerable confluent lesions.  Superior right hepatic lobe lesion that previously measured 2 cm now is at least 6 cm.  There is hypertrophy/enlargement of the  left hepatic lobe.  Moderate splenomegaly has developed in the interim.     There is a metallic wall stent identified within the distal common bile duct.  No definite central biliary ductal dilatation.  No definite pancreatic ductal dilatation or discrete mass is seen within the pancreas but there is nonspecific fullness of the pancreatic head.     The kidneys and adrenal glands are remarkable for small bilateral renal cysts.  The retroperitoneal vessels enhance normally.     Miscellaneous: There is no ascites.     MRCP: Not performed     Impression:     Significant interval worsening of extensive neoplastic/metastatic disease throughout the liver.     Interval development of splenomegaly        Gallium 68 PET/CT 7/20/2020  COMPARISON:  [place] PET-CT 11/30/2018, MRI abdomen 07/07/2020, neck ultrasound 03/05/2013     FINDINGS:  Quality of the study: Adequate.     Small focus of radiotracer uptake within the right posterior thyroid resection bed and tracheoesophageal groove.  SUV max of 2.9.     New focal radiotracer uptake within a 0.6 cm right internal mammary lymph node with maximum SUV of 5.8.     Numerous diffuse foci of increased radiotracer uptake throughout the liver.  Overall number of hepatic lesions is likely similar compared to prior exam however lesions demonstrate decreased intensity of radiotracer uptake.  Index right hepatic dome lesion with maximum SUV 19 (previously 29.64).     Physiologic distribution of the tracer is seen within the pituitary, salivary, stomach, pancreas uncinate process, spleen, adrenal glands,  tract, and bowel.     Incidental CT findings: Small volume ascites.  Common bile duct stent in place with expected pneumobilia.     Impression:     Numerous diffuse tracer avid hepatic lesions which demonstrate decreased intensity of uptake compared to prior exam.     New radiotracer uptake within a small right internal mammary lymph node concerning for metastatic disease.     Small  focus of radiotracer uptake within the right posterior thyroid resection bed likely correlating with a 0.7 cm low-density nodule.  Similar sized nodule was described on prior ultrasound 03/05/2013.  Differential considerations include parathyroid adenoma versus residual thyroid tissue.     I, Jake Horowitz MD, attest that I reviewed and interpreted the images.            Assessment:       1. Primary pancreatic neuroendocrine tumor    2. Secondary neuroendocrine tumor of liver           Plan:     I have reviewed the findings of her liver biopsy with her today indicating an intermediate grade neuroendocrine tumor with Ki-67 of 15%.  Review of her gallium 68 PET-CT does show uptake within the liver which is concordant with the findings of her MRI and I have discussed with Radiology.  I have discussed treatment options and based on the findings of her gallium 68 PET-CT do believe that she would be a candidate for PRRT with Vivian-177 in combination with lanreotide.  I have discussed the rationale, alternatives and potential side effects of this treatment with her.  I have given her written information on this.  She is agreeable to proceed and we will plan to get her set up now.  I will have her meet with our dietitian today and also check labs including a CBC, CMP and pre-albumin.  If her abdominal distension worsens will plan to check an ultrasound.  See me in 4 weeks.  Report any new symptoms in the interim.  All questions were answered and she is agreeable with this plan.      Cory Rodriguez DO, FACP  Hematology & Oncology, [facility]  [address] [place] [address], [address]  RAJIV Hernández  [address]  ph. [phone]; [phone]  fax. [phone]    25 minutes were spent in coordination of patient's care, record review and counseling.  More than 50% of the time was face-to-face.

## 2020-08-04 NOTE — TELEPHONE ENCOUNTER
Attempted to call patient again, LVM with phone number for return call on both home and cell number.

## 2020-08-04 NOTE — PROGRESS NOTES
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    Secondary malignant neoplasm of liver     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) 5/7/2013    Thrombocytopenia 11/12/2013    Thyroid disease     Unspecified essential hypertension 11/12/2013       Previous Surgical History:  Past Surgical History:   Procedure Laterality Date    ERCP N/A 6/21/2018    Procedure: ERCP, stent exchange;  Surgeon: Jake Lieberman MD;  Location: [facility] ENDO;  Service: Endoscopy;  Laterality: N/A;    ERCP N/A 5/23/2019    Procedure: ERCP (ENDOSCOPIC RETROGRADE CHOLANGIOPANCREATOGRAPHY), stent exchange;  Surgeon: Jake Lieberman MD;  Location: [facility] ENDO;  Service: Endoscopy;  Laterality: N/A;    ERCP N/A 11/14/2019    Procedure: ERCP (ENDOSCOPIC RETROGRADE CHOLANGIOPANCREATOGRAPHY), stent exchange;  Surgeon: Jake Lieberman MD;  Location: [facility] ENDO;  Service: Endoscopy;  Laterality: N/A;    THYROIDECTOMY  2011          Nutrition Assessment    Ching Bruce is a 69 y.o. female referred for a Nutrition Consultation related to weight loss. She is here for follow up for her pancreatic neuroendocrine tumor. She reports dec oral intake due to early satiety, abdominal distension and bloating. She reports avoiding sugar due to fear of it "feeding her cancer."     Anthropometrics  Weight: 8/3/20: 66.3 kg (146 lb 0.9 oz)  Height: 8/3/20: 5' 7" (1.702 m)    Estimated body " "mass index is 22.88 kg/m²     BMI Weight Status   <16 Severe Thinness   16-16.9 Moderate Thinness   17.0-18.4 Mild Thinness   Below 18.5 Underweight   18.6-24.9 Normal/Healthy   25.0-29.9 Overweight   30.0-34.9 Obesity Class I   35.0-39.9 Obesity Class II   >40 Extreme Obesity   Class III     Ideal Weight Range for Your Height: 5'7" = 121 - 158 lbs    Weight History:  Wt Readings from Last 12 Encounters:   08/03/20 66.3 kg (146 lb 0.9 oz)   07/23/20 68.5 kg (151 lb)   07/15/20 67.9 kg (149 lb 12.8 oz)   11/14/19 72.6 kg (160 lb)   10/01/19 73.2 kg (161 lb 7.8 oz)   03/26/19 73.8 kg (162 lb 11.2 oz)   02/12/19 73.5 kg (162 lb 0.6 oz)   01/08/19 72.6 kg (160 lb)   11/06/18 73.2 kg (161 lb 6 oz)   08/07/18 72.9 kg (160 lb 11.5 oz)   06/21/18 70.8 kg (156 lb)   05/01/18 73.8 kg (162 lb 11.2 oz)       Weight Change when compared to Current Weight:   Wt Readings from Last 1 Encounters:   08/03/20 66.3 kg (146 lb 0.9 oz)        Weight Pounds %Change Significant Severe   1 Week - - - 1-2% >2%   1 Month 67.9kg 3.2 lbs 2% 5% >5%   3 Months - - - 7.5% >7.5%   6 Months - - - 10% >10%   1 Year - - - 20% >20%     Noted: Patient has experienced a weight loss of 2% for time frame of 1 month which is not significant. However, she has experienced a 14lbs weight loss in < 1 year.     Malnutrition Assessment:  (Moderate) Protein-Calorie Malnutrition  Malnutrition in the context of Chronic Illness/Injury  Energy Intake: <50% of estimated energy requirement for > 1 month  Body Fat Depletion: mild depletion of orbitals, triceps and thoracic and lumbar region   Muscle Mass Depletion: mild depletion of temples, clavicle region and interosseous muscle   Weight Loss: 2% x 1 month (Not Significant)  Fluid Accumulation: none       Nutrition  Prescription:   Energy Needs: 1940 (1211 MSJ x 1.6 PAL)  Protein Needs: 65 to 80 (1.0-1.2 gm Protein/kg)  Fluid Needs: 1940 (1 mL/calorie)      Gastrointestinal Habits:   GI Symptoms: abdominal pain, nausea " or bloating     Frequency: a bowel movement every other day  Consistency: brown formed Small   [place] Stool Scale: Type 3: Normal. Like a sausage but with cracks on its surface  Flushing: No      Nutrition History  Appetite: poor    Meal Patterns: 2-3 meals daily  Breakfast: PB on toast, Coffee with Stevia, Green Tea  Lunch: Premier Protein Shake   Dinner: Red Beans, Rice; Shrimp Stew- 1c,   Snacks: Jello, Watermelon,   Beverage Intake: mainly drinks water, mainly drinks regular beverages, drinks carbonated drinks, drinks juices, drinks caffeine, using sugar substitutes as sweetener    Food Intolerance: none    Meal preparation/shopping: self   Dining out: Regular, 2-3 times per week    Dentition: normal dentition for age                  Difficulty chewing or swallowing? No    Physical Activity and Exercise: Physical ability to complete tasks for meal preparation, Physical ability to self-feed, Cognitive ability to complete tasks for meal preparation, Remembers to eat, recalls eating and Type of physical activity-not active activities of daily living only, walking and long periods of laying on sofa.     ECOG Performance Status: (2) Ambulatory and capable of self care, unable to carry out work activity, up and about > 50% or waking hours    Medication:  Medication reviewed for food interactions. Yes -     Limit intake of grapefruit juice can increase the blood levels and effects of verapamil.   Discussed use of MVI with TE while taking Xeloda, Fosamax, and Synthroid.       alendronate (FOSAMAX) 70 MG tablet, Take 70 mg by mouth every 7 days. , Disp: , Rfl: 3    capecitabine (XELODA) 500 MG Tab, Take 2 tablets (1,000 mg total) by mouth 2 (two) times daily., Disp: 56 tablet, Rfl: 6    cyproheptadine (PERIACTIN) 4 mg tablet, Take 2 mg (1/2 tablet) 4 times per day for one week, then 4 mg (one tablet)  4 times per day after that, Disp: 120 tablet, Rfl: 3    indapamide (LOZOL) 2.5 MG Tab, 2.5 mg once daily. , Disp: ,  Rfl:     irbesartan (AVAPRO) 300 MG tablet, 300 mg once daily. , Disp: , Rfl:     levothyroxine (SYNTHROID) 150 MCG tablet, Take 150 mcg by mouth once daily., Disp: , Rfl: 0    olmesartan (BENICAR) 40 MG tablet, , Disp: , Rfl:     temozolomide (TEMODAR) 250 MG capsule, Take 1 capsule (250 mg total) by mouth once daily., Disp: 5 capsule, Rfl: 4    verapamil (CALAN-SR) 180 MG CR tablet, Take 360 mg by mouth once daily., Disp: , Rfl: 1    Vitamin/Supplements/Herbs: none    Allergies:  Review of patient's allergies indicates:   Allergen Reactions    Epinephrine Other (See Comments)     Carcinoid patient    Sulfa (sulfonamide antibiotics) Other (See Comments)     Urticaria       Labs:   Results for SLOAN GERMAIN (MRN [id number]) as of 8/4/2020 08:15   Ref. Range 7/23/2020 08:53 8/3/2020 13:58   WBC Latest Ref Range: 3.90 - 12.70 K/uL 8.07 9.33   RBC Latest Ref Range: 4.00 - 5.40 M/uL 3.72 (L) 4.04   Hemoglobin Latest Ref Range: 12.0 - 16.0 g/dL 11.9 (L) 12.8   Hematocrit Latest Ref Range: 37.0 - 48.5 % 36.3 (L) 40.9   MCV Latest Ref Range: 82 - 98 fL 98 101 (H)   MCH Latest Ref Range: 27.0 - 31.0 pg 32.0 (H) 31.7 (H)   MCHC Latest Ref Range: 32.0 - 36.0 g/dL 32.8 31.3 (L)   RDW Latest Ref Range: 11.5 - 14.5 % 13.6 14.2   Platelets Latest Ref Range: 150 - 350 K/uL 160 144 (L)   MPV Latest Ref Range: 9.2 - 12.9 fL 11.7 11.3   Gran% Latest Ref Range: 38.0 - 73.0 % 77.8 (H)    Gran # (ANC) Latest Ref Range: 1.8 - 7.7 K/uL 6.3 6.9   Lymph% Latest Ref Range: 18.0 - 48.0 % 8.9 (L)    Lymph # Latest Ref Range: 1.0 - 4.8 K/uL 0.7 (L)    Mono% Latest Ref Range: 4.0 - 15.0 % 12.4    Mono # Latest Ref Range: 0.3 - 1.0 K/uL 1.0    Eosinophil% Latest Ref Range: 0.0 - 8.0 % 0.1    Eos # Latest Ref Range: 0.0 - 0.5 K/uL 0.0    Basophil% Latest Ref Range: 0.0 - 1.9 % 0.1    Baso # Latest Ref Range: 0.00 - 0.20 K/uL 0.01    nRBC Latest Ref Range: 0 /100 WBC 0    Differential Method Unknown Automated    Immature Grans (Abs) Latest  Ref Range: 0.00 - 0.04 K/uL 0.06 (H) 0.04   Immature Granulocytes Latest Ref Range: 0.0 - 0.5 % 0.7 (H)    Protime Latest Ref Range: 9.0 - 12.5 sec 11.5    INR Latest Ref Range: 0.8 - 1.2  1.1      Results for SLOAN GERMAIN (MRN [id number]) as of 8/4/2020 08:15   Ref. Range 7/23/2020 08:53 8/3/2020 13:58   Sodium Latest Ref Range: 136 - 145 mmol/L 137 140   Potassium Latest Ref Range: 3.5 - 5.1 mmol/L 3.7 3.8   Chloride Latest Ref Range: 95 - 110 mmol/L 104 102   CO2 Latest Ref Range: 23 - 29 mmol/L 25 30 (H)   Anion Gap Latest Ref Range: 8 - 16 mmol/L 8 8   BUN, Bld Latest Ref Range: 8 - 23 mg/dL 11 13   Creatinine Latest Ref Range: 0.5 - 1.4 mg/dL 0.7 0.8   eGFR if non African American Latest Ref Range: >60 mL/min/1.73 m^2 >60 >60   eGFR if  Latest Ref Range: >60 mL/min/1.73 m^2 >60 >60   Glucose Latest Ref Range: 70 - 110 mg/dL 105 100   Calcium Latest Ref Range: 8.7 - 10.5 mg/dL 10.4 13.6 (HH)   Alkaline Phosphatase Latest Ref Range: 55 - 135 U/L 468 (H) 488 (H)   PROTEIN TOTAL Latest Ref Range: 6.0 - 8.4 g/dL 7.3 7.5   Albumin Latest Ref Range: 3.5 - 5.2 g/dL 2.8 (L) 3.1 (L)   Prealbumin Latest Ref Range: 20 - 43 mg/dL  12 (L)   BILIRUBIN TOTAL Latest Ref Range: 0.1 - 1.0 mg/dL 0.7 0.8   AST Latest Ref Range: 10 - 40 U/L 33 26   ALT Latest Ref Range: 10 - 44 U/L 26 24          Nutrition Diagnosis    Nutrition Problem  Food- and nutrition-related knowledge deficit      Related to (etiology):   Undernutrition- insufficient calorie intake   Avoiding food with sugar           Signs and Symptoms (as evidenced by):   Anxiety due to cancer diagnosis   New onset of GI issues such as bloating, early satiety, loss of appetite, and fear of eating sugar.    Decreased PAB of 12.         Nutrition Intervention    General/healthful diet- Eat a variety of fruits and veggies up to 6 servings per day.    Texture-modified diet- blended or tender soft foods for easier digestion; Eat 6 small meals per day.    Energy-modified diet- Consume 2000 calories per day  Protein-modified diet- Consume 65 to 80 gm pro per day  Fluid-modified diet- Drink fluids after meals- Consume 32 oz of water per day.   Medical food supplement: Commercial beverage- Continue with Premier Protein up to 2 per day   Purpose of nutrition education- To provide education on calories, sugar and it's effect on cancer, and cause of weight loss      Nutrition Diagnosis Status:   Initial      Recommendations:  1. Start Appetite stimulant- Periactin Ordered  2. Continue with Premier Protein. Drink 1-2 per day. Recipes provided for adding to shakes in presence of PAB 12  3. Eat 6 small meals per day. Drink fluids after meals. Add protein source in every small meal   4. Do not avoid foods that contain sugar. Focus on high quality nutrition rich food such as fruits, veggies, whole grains, beans.   5. Discussed use of MVI with TE while taking Xeloda, Fosamax, Synthroid,     Written Information Provided and Explained:  1.  none       Nutrition Monitoring and Evaluation    Monitor: energy intake, weight, labs PAB, symptom management  and adherence to nutrition recommendations       Goals:   1. Behavior: Ability to recall nutrition goals, Restrictive eating and Refusal to eat/chew  2. Adequate intake of calories and protein to promote weight gain       Nutrition Education:     Discussed with: patient and friend    Anticipated Barriers: emotional/psychosocial    Knowledge/Beliefs/Attitudes: Readiness to change nutrition-related behaviors and Unscientific beliefs and attitudes;     Actions Taken: instruct/provide written information, provide supplemental feedings and other: Recipes for smoothies    Patient and/or family comprehend instructions: yes    Outcome: Verbalizes understanding    Comprehension: fair     Motivation to change: moderate      Nutrition Communication, Consultation Time, and Follow Up:      Communication:   1. Discussed in clinic with Cory CEJA  DO Michael, [facility]     Consultation Time: 45 minutes.      Follow Up: RD contact information provided for questions. Patient encouraged to call for follow up appointment if further nutrition intervention warranted. , RD added to Care Team.

## 2020-08-04 NOTE — NURSING
Pt tolerated IV fluids and Zometa infusion well. No adverse reaction noted. Pt education reinforced.

## 2020-08-12 NOTE — TELEPHONE ENCOUNTER
----- Message from Meli Rivera sent at 8/11/2020  2:58 PM CDT -----  Contact: self [phone]  RR - Patient is calling because she got a approval letter from [facility] and she need to speak with you. Please call

## 2020-08-14 NOTE — TELEPHONE ENCOUNTER
"Discussed scheduling PRRT at our facility.  Reviewed PRRT process.    Instructed patient on the followin.  You will see your physician and have lab work within 1-2 weeks of the procedure.   2.  Arrive at scheduled time on the 2nd floor - [address] for check in.  3.  This is an Outpatient procedure. Plan to be here for at least 6 hours.   4.  The Nuclear Medicine MD will discuss procedure, answer questions and sign consents prior to procedure.  5.  Hydrate 3 days prior to and 3 days after procedure. This helps get rid of radiation.  6.  No visitors allowed in room during procedure.  7.  We will start 2 IV's for the treatment, one in each arm.  8.  You will receive pre medications, then an Amino Acid solution, then the Vivian 177 infusion.  The Amino Acid solution is to protect your kidneys, it could cause nausea.  If you have nausea, we will slow down the Amino Acid solution and give you additional anti nausea medication.  9.  You will need to have lab work done at 4 weeks and 7 weeks post treatment.  10.  You will be instructed on Radiation Safety precautions upon discharge.    -Monique made for MD, labs and Vivian 177 treatment.     -Email notification sent to [employer] call team.    Last SSA injection- New Start for Lanreotide    PRRT scheduled to start-20    All questions answered. Patient emailed "Your guide to Lutathera Treatment and "Recommendations for the patient to be treated with Vivian- [id number](Lutathera)" as requested.  "

## 2020-08-17 NOTE — TELEPHONE ENCOUNTER
Spoke with patient and the authorization she has is for her Zometa the PRRT has not been submitted yet. Patient verbalized understanding.

## 2020-08-20 NOTE — TELEPHONE ENCOUNTER
PRRT authorization and referral status in pending status. Patient notified and rescheduled to start PRRT on 9/2/20.

## 2020-08-28 NOTE — TELEPHONE ENCOUNTER
Called to inform patient insurance approval obtained from insurance for PRRT. Pt reports she has become progressively weak over the past week requiring assistance to get out of chair. Spends most of time out of bed, up in recliner, but requires assistance to ambulate and transfer, when previously able to ambulate at home by self. Also reports she is still having  poor appetite. Denies abd pain, new symptoms, denies fever, cough,n/v/d.  Reports some dyspnea with increased activity walking to bathroom.     Reviewed PRRT schedule and instructions. All questions addressed.

## 2020-08-31 NOTE — PLAN OF CARE
Patient arrived to unit in W/C for Zometa infusion and IVF accompanied by family friend. Pt reports dizziness, weakness, nausea, and dark urine with pain during urination.Plan of care reviewed, patient agreeable to plan. Labs reviewed, Calcium 13.1 noted. Zometa given over 30 minutes without issue. NS 1000 ml infused over 1.5 hours. Patient tolerated infusions well.VSS.Gave pt urine specimen collection cup and biohazard bag for collection/ storage in fridge at home. Pt stated she would prefer to collect at home. Pt and friend verbalized understanding to return specimen within 24 hours. Discharge instructions reviewed, patient instructed to return for Radiation treatment in [place] on Wednesday. Family friend confirmed her appts for the remainder of the week.Patient left off unit in W/C accompanied by family friend. Patient in NAD at time of discharge.

## 2020-08-31 NOTE — TELEPHONE ENCOUNTER
Spoke to patient, going to infusion for zometa and IVF, complains of dark, urine, slightly uncomfortable when urinates. Order for U/A

## 2020-08-31 NOTE — TELEPHONE ENCOUNTER
----- Message from Cory Rodriguez DO, FACP sent at 8/31/2020  8:49 AM CDT -----  She still has increased Ca.  Will need IVF and zoledronic acid again.

## 2020-09-01 PROBLEM — R10.84 GENERALIZED ABDOMINAL PAIN: Status: ACTIVE | Noted: 2020-01-01

## 2020-09-01 PROBLEM — R41.0 CONFUSION: Status: ACTIVE | Noted: 2020-01-01

## 2020-09-01 PROBLEM — C7A.8 NEUROENDOCRINE CANCER: Status: ACTIVE | Noted: 2020-01-01
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
## 2020-09-01 PROBLEM — R19.00 ABDOMINAL SWELLING: Status: ACTIVE | Noted: 2020-01-01

## 2020-09-01 NOTE — PROGRESS NOTES
NOLANETS:  [facility]  A collaboration between [facility] and [facility]    PATIENT: Ching Bruce  MRN: [id number]  DATE: 9/1/2020      Diagnosis:   1. Primary pancreatic neuroendocrine tumor    2. Secondary neuroendocrine tumor of liver    3. Hypercalcemia    4. Abdominal swelling    5. Generalized abdominal pain    6. Confusion    7. Fall, initial encounter        Chief Complaint: Follow-up      Oncologic History:    Oncologic History Pancreatic neuroendocrine tumor with metasatic disease to liver diagnosed 10/12  Progressive disease in liver 7/2020    Oncologic Treatment Capecitabine/Temozolomide (CAPTEM) 1/13 -11/2018     Pathology 2012:  Pancreatic/liver aspirate- pancreatic endocrine neoplasm,  Ki-67 1%  7/2020: Liver biopsy - well differentiated neuroendocrine tumor, grade 2, Ki-67 15%, Mitotic rate <2/2mm2      The patient location is:  home  The chief complaint leading to consultation is:  Follow-up pancreatic neuroendocrine tumor    Visit type: audiovisual    Face to Face time with patient:  10 min  40 minutes of total time spent on the encounter, which includes face to face time and non-face to face time preparing to see the patient (eg, review of tests), Obtaining and/or reviewing separately obtained history, Documenting clinical information in the electronic or other health record, Independently interpreting results (not separately reported) and communicating results to the patient/family/caregiver, or Care coordination (not separately reported).         Each patient to whom he or she provides medical services by telemedicine is:  (1) informed of the relationship between the physician and patient and the respective role of any other health care provider with respect to management of the patient; and (2) notified that he or she may decline to receive medical services by telemedicine and may withdraw  from such care at any time.    Notes:         Subjective:    Interval History: Ms. Bruce is a 69 y.o. female who returns for follow up for her pancreatic neuroendocrine tumor.  Since I had seen her last she states that she has had abdominal pain and swelling that began after her biopsy.  She states that her abdomen feels tight.  She complains of bilateral leg swelling along with a decreased appetite, activity level and.  The confusion.  She fell recently.  She was found to be hypercalcemic and was given IV fluids and zoledronic acid yesterday.  She also complains of shortness of breath but denies chest pain.  She states that her urine has darkened and is malodorous.  She has not had a bowel movement in 1 week.  There was no improvement after IV fluids yesterday.        ONCOLOGIC HISTORY:   A 69 y.o. year-old -American female who I had initially   seen on 12/18/2012. Her history dates back to September 2012 when she was noted  to have several weeks of feeling fatigued. She sought treatment with her   primary care doctor who did a CBC and found her to be severely anemic. She was   seen at [facility] and transfused 3 units of packed red blood cells  and no source of bleeding had been found. Records at that time had shown   hemoglobin of 5. She sought followup in September 2012 with Dr. Guillen who   performed an EGD and colonoscopy with an EGD showing an ulcerated and fungating   nonbleeding 2 cm mass, malignant in appearance. It did cause partial   obstructions. Biopsies were taken, which showed no evidence of malignancy at   that time. She had a CT of the abdomen and pelvis showing multiple metastatic   lesions and the liver biopsy was performed on 10/17/2012 showed pancreatic   neuroendocrine neoplasm in the left lobe of the liver aspirate. She went on to   have an ERCP along with sphincterotomy and biliary stent placement and   subsequent PET had shown numerous hypodense lesions in the liver.  Octreotide   scan was performed, which showed multiple right and left hepatic metastases.   MRI of the abdomen was performed on 12/05/2012 showing innumerable lesions that   demonstrated peripheral to hyperintensity consistent with hypervascular   metastasis. She was seen by Dr. Humphrey, who thought she was not a candidate   for surgical resection during that time. Her pathology from her tumor came back  positive for chromogranin, synaptophysin and had a Ki-67 of less than 1%. She   was started on treatment with temozolomide and capecitabine with cycle   1 being on 01/22/2013. This was discontinued in 11/2018 due to prolonged stability of disease.  In 7/2020 she had progressive disease within the liver.  A biopsy showed a grade 2 NET with Ki-67 of 15%.  Gallium 68 PET/CT showed uptake within the liver.    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    Secondary malignant neoplasm of liver     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) 5/7/2013    Thrombocytopenia 11/12/2013    Thyroid disease     Unspecified essential hypertension 11/12/2013       Past Surgical HIstory:   Past Surgical History:   Procedure Laterality Date    ERCP N/A 6/21/2018    Procedure: ERCP, stent exchange;  Surgeon: Jake Lieberman MD;  Location: [facility];  Service: Endoscopy;  Laterality: N/A;    ERCP N/A 5/23/2019    Procedure: ERCP (ENDOSCOPIC RETROGRADE  CHOLANGIOPANCREATOGRAPHY), stent exchange;  Surgeon: Jake Lieberman MD;  Location: [facility] ENDO;  Service: Endoscopy;  Laterality: N/A;    ERCP N/A 11/14/2019    Procedure: ERCP (ENDOSCOPIC RETROGRADE CHOLANGIOPANCREATOGRAPHY), stent exchange;  Surgeon: Jake Lieberman MD;  Location: [facility] ENDO;  Service: Endoscopy;  Laterality: N/A;    THYROIDECTOMY  2011       Family History:   Family History   Problem Relation Age of Onset    Cancer Maternal Grandmother     Diabetes Father     Breast cancer Neg Hx     Colon cancer Neg Hx     Ovarian cancer Neg Hx        Social History:  reports that she has never smoked. She has never used smokeless tobacco. She reports that she does not drink alcohol or use drugs.    Allergies:  Review of patient's allergies indicates:   Allergen Reactions    Epinephrine Other (See Comments)     Carcinoid patient    Sulfa (sulfonamide antibiotics) Other (See Comments)     Urticaria       Medications:  Current Outpatient Medications   Medication Sig Dispense Refill    alendronate (FOSAMAX) 70 MG tablet Take 70 mg by mouth every 7 days.   3    capecitabine (XELODA) 500 MG Tab Take 2 tablets (1,000 mg total) by mouth 2 (two) times daily. 56 tablet 6    cyproheptadine (PERIACTIN) 4 mg tablet Take 2 mg (1/2 tablet) 4 times per day for one week, then 4 mg (one tablet)  4 times per day after that 120 tablet 3    indapamide (LOZOL) 2.5 MG Tab 2.5 mg once daily.       irbesartan (AVAPRO) 300 MG tablet 300 mg once daily.       levothyroxine (SYNTHROID) 150 MCG tablet Take 150 mcg by mouth once daily.  0    olmesartan (BENICAR) 40 MG tablet       temozolomide (TEMODAR) 250 MG capsule Take 1 capsule (250 mg total) by mouth once daily. 5 capsule 4    verapamil (CALAN-SR) 180 MG CR tablet Take 360 mg by mouth once daily.  1     No current facility-administered medications for this visit.          Review of Systems   Constitutional: Positive for malaise/fatigue. Negative for chills, fever  and weight loss.   HENT: Negative for congestion.    Eyes: Negative for blurred vision.   Respiratory: Positive for shortness of breath. Negative for cough.    Cardiovascular: Positive for leg swelling. Negative for chest pain.   Gastrointestinal: Positive for abdominal pain and constipation. Negative for diarrhea, nausea and vomiting.   Genitourinary: Positive for dysuria.   Musculoskeletal: Negative for myalgias.   Skin: Negative for rash.   Neurological: Positive for weakness. Negative for focal weakness and headaches.        Confusion     Endo/Heme/Allergies: Does not bruise/bleed easily.         ECOG Performance Status: 0   Objective:      Vitals:   There were no vitals filed for this visit.  BMI: There is no height or weight on file to calculate BMI.      Physical Exam   Constitutional: She is oriented to person, place, and time. She appears unhealthy.   Pulmonary/Chest: No respiratory distress.   Abdominal: She exhibits distension.   Neurological: She is oriented to person, place, and time.         Laboratory Data:  Abstract on 12/06/2016   Component Date Value Ref Range Status    EXT WBC 12/03/2016 4.1  3.8 - 10.8 1000/ul Final    EXT Hemoglobin 12/03/2016 12.9  11.7 - 15.5 g/dl Final    EXT Hematocrit 12/03/2016 38.5  35.0 - 45.0 % Final    EXT Platelets 12/03/2016 138* 140 - 400 1000/ul Final    EXT Glucose 12/03/2016 88  65 - 99 mg/dl Final    EXT BUN 12/03/2016 13  7 - 25 mg/dl Final    EXT Creatinine 12/03/2016 0.90  0.50 - 0.99 mg/dl Final    EXT Sodium 12/03/2016 141  135 - 146 mmol/l Final    EXT Potassium 12/03/2016 4.2  3.5 - 5.3 mmol/l Final    EXT Chloride 12/03/2016 105  98 - 110 mmol/l Final    EXT CO2 12/03/2016 29  20 - 31 mmol/l Final    EXT Calcium 12/03/2016 9.7  8.6 - 10.4 mg/dl Final    EXT Protein total 12/03/2016 6.9  6.1 - 8.1 g/dl Final    EXT Albumin 12/03/2016 4.0  3.6 - 5.1 g/dl Final    EXT BilirubiN Total 12/03/2016 1.1  0.2 - 1.2 mg/dl Final    EXT Alkaline  Phosphatase 12/03/2016 181* 33 - 130 u/l Final    EXT AST 12/03/2016 21  10 - 35 u/l Final    EXT ALT 12/03/2016 28  6 - 29 u/l Final   Office Visit on 11/14/2016   Component Date Value Ref Range Status    Blood Stool 11/14/2016 Negative  Negative Final     Acceptable 11/14/2016 Yes   Final    SOURCE: 11/14/2016 Cervical   Final    Slides: 11/14/2016 1   Final    LMP: 11/14/2016 NA   Final    Specimen adequacy: 11/14/2016 (NOTE)   Final    Comment:      Satisfactory for evaluation.          Endocervical cells absent.  Metaplastic cells indicative       of transformation zone cannot be reliably distinguished from       parabasal or atrophic cells.                      Interpretation 11/14/2016 NO EPITHELIAL ABNORMALITY SEE BELOW   Final    Comment: ----------------------------------------------------------------------       *NEGATIVE FOR INTRAEPITHELIAL LESION OR MALIGNANCY   ----------------------------------------------------------------------         Comments: 11/14/2016 (NOTE)   Final    Comment: Due to technical or specimen issues, imaging could not be  performed. A cytotechnologist has manually screened this slide.         Cytotechnologist: 11/14/2016 BRENDON Ca(ASCP)[facility]   Final    LOCATION 11/14/2016 (NOTE)   Final    Comment: Specimens processed and interpreted at :[facility], [address] [place] [address], Phone: [phone], CLIA: [id number]      CPT Codes: 11/14/2016 (NOTE)   Final    Comment: [id number]        UNLESS OTHERWISE INDICATED, COMPUTER AIDED AND CYTOTECHNOLOGIST     SCREENING PERFORMED.          The Pap test is a screening test with an inherent, but low       probability of error.  Your patient should be reminded to       consult you immediately if she experiences any suspicious       signs or symptoms, regardless of her Pap test result.       An alternate report format containing images or consolidated       prior Pap history is  available as applicable.         HPV HIGH RISK IF ASC-US, [facility] 11/14/2016 CRITERIA NOT MET   Final    Comment:       UNLESS OTHERWISE INDICATED, ALL TESTING PERFORMED AT  [facility]  [address] [place] [address]            :  CARLOS MENDEZ M.D.        IA NUMBER [id number]  [place]EDIT[place]. [id number]                 Imaging:   MRI 07/07/2020  COMPARISON:  09/30/2019     FINDINGS:  Compared with the prior study there is considerable interval worsening of extensive neoplastic disease throughout the liver with innumerable confluent lesions along the periphery of the liver in both the left and right hepatic lobes.  Previously measured lesion in the left hepatic lobe is now obscured by innumerable confluent lesions.  Superior right hepatic lobe lesion that previously measured 2 cm now is at least 6 cm.  There is hypertrophy/enlargement of the left hepatic lobe.  Moderate splenomegaly has developed in the interim.     There is a metallic wall stent identified within the distal common bile duct.  No definite central biliary ductal dilatation.  No definite pancreatic ductal dilatation or discrete mass is seen within the pancreas but there is nonspecific fullness of the pancreatic head.     The kidneys and adrenal glands are remarkable for small bilateral renal cysts.  The retroperitoneal vessels enhance normally.     Miscellaneous: There is no ascites.     MRCP: Not performed     Impression:     Significant interval worsening of extensive neoplastic/metastatic disease throughout the liver.     Interval development of splenomegaly        Gallium 68 PET/CT 7/20/2020  COMPARISON:  Dotatate PET-CT 11/30/2018, MRI abdomen 07/07/2020, neck ultrasound 03/05/2013     FINDINGS:  Quality of the study: Adequate.     Small focus of radiotracer uptake within the right posterior thyroid resection bed and tracheoesophageal groove.  SUV max of 2.9.     New focal radiotracer  uptake within a 0.6 cm right internal mammary lymph node with maximum SUV of 5.8.     Numerous diffuse foci of increased radiotracer uptake throughout the liver.  Overall number of hepatic lesions is likely similar compared to prior exam however lesions demonstrate decreased intensity of radiotracer uptake.  Index right hepatic dome lesion with maximum SUV 19 (previously 29.64).     Physiologic distribution of the tracer is seen within the pituitary, salivary, stomach, pancreas uncinate process, spleen, adrenal glands,  tract, and bowel.     Incidental CT findings: Small volume ascites.  Common bile duct stent in place with expected pneumobilia.     Impression:     Numerous diffuse tracer avid hepatic lesions which demonstrate decreased intensity of uptake compared to prior exam.     New radiotracer uptake within a small right internal mammary lymph node concerning for metastatic disease.     Small focus of radiotracer uptake within the right posterior thyroid resection bed likely correlating with a 0.7 cm low-density nodule.  Similar sized nodule was described on prior ultrasound 03/05/2013.  Differential considerations include parathyroid adenoma versus residual thyroid tissue.     I, Jake Horowitz MD, attest that I reviewed and interpreted the images.            Assessment:       1. Primary pancreatic neuroendocrine tumor    2. Secondary neuroendocrine tumor of liver    3. Hypercalcemia    4. Abdominal swelling    5. Generalized abdominal pain    6. Confusion    7. Fall, initial encounter           Plan:     Mrs. Bruce has had a severe decline since I had seen her last.  Labs done earlier this week and revealed findings consistent with hypercalcemia and yesterday she was given zoledronic acid and IV fluids.  While some of her symptoms can be attributed to hypercalcemia it appears there may be more going on given her abdominal pain and swelling.  At this point I would recommend she be evaluated in the emergency  room and will likely need a CT the abdomen along with repeat imaging and likely will need admission.  She is agreeable to follow-up there.  She was slated to begin treatment with PRRT tomorrow, however, because of these new symptoms we will hold treatment.      Cory Rodriguez DO, [facility]  Hematology & Oncology, [facility]\"" [facility]  [address]  [place]  [address]  ph. [phone]; [phone]  fax. [phone]

## 2020-09-02 NOTE — ED NOTES
Spoke with Dr. Khan about pt IV attempts and medication delays because of having only the 1 IV at this time. Dr. khan stated he will place orders for pt to possibly receive a picc line. Will alert charge nurse and on coming nurse.

## 2020-09-02 NOTE — NURSING
Procedure complete. Patient tolerated well. No complications. 1500 ml of cloudy yellow fluid removed from LLQ. Band aid applied to puncture site. No bleeding or hematoma noted. Specimen sent to lab. VSS. Patient is not complaining of any pain currently.

## 2020-09-02 NOTE — PROGRESS NOTES
Progress Note  General Surgery   Admit Date: 9/1/2020   LOS: 1 day   SUBJECTIVE:     No acute overnight events. Patient reports feeling better this AM. Mentation improved. BM this AM with good UOP. Decreased abdominal distention. No longer requiring O2.     Review of Systems   Constitutional: Negative for chills and fever.   HENT: Negative for sore throat.    Eyes: Negative for blurred vision and double vision.   Respiratory: Negative for cough and shortness of breath.    Cardiovascular: Negative for chest pain.   Gastrointestinal: Positive for abdominal pain. Negative for constipation, diarrhea, heartburn, nausea and vomiting.   Genitourinary: Negative for dysuria and urgency.   Musculoskeletal: Negative for back pain and falls.   Skin: Negative for itching and rash.   Neurological: Negative for headaches.   Endo/Heme/Allergies: Does not bruise/bleed easily.   Psychiatric/Behavioral: The patient is not nervous/anxious.        OBJECTIVE:   Vital Signs (Most Recent)  Temp: 98.5 °F (36.9 °C) (09/02/20 0100)  Pulse: 92 (09/02/20 0300)  Resp: 19 (09/02/20 0300)  BP: (!) 108/58 (09/02/20 0300)  SpO2: 97 % (09/02/20 0300)    I & O (Last 24H):No intake or output data in the 24 hours ending 09/02/20 0519  Wt Readings from Last 3 Encounters:   09/01/20 66.2 kg (146 lb)   08/04/20 66.3 kg (146 lb 0.9 oz)   08/03/20 66.3 kg (146 lb 0.9 oz)       Current Diet Order   Procedures    Diet full liquid        Physical Exam   Constitutional: She is oriented to person, place, and time and well-developed, well-nourished, and in no distress.   HENT:   Head: Normocephalic and atraumatic.   Neck: Normal range of motion. Neck supple.   Cardiovascular: Normal rate, regular rhythm and normal heart sounds.   Pulmonary/Chest: Effort normal.   Minimal crackles at the bases bilaterally.    Abdominal: Soft. Bowel sounds are normal. She exhibits distension. There is no abdominal tenderness. There is no rebound.   Neurological: She is alert and  oriented to person, place, and time.   Skin: Skin is warm and dry.   Psychiatric: Affect normal.     Laboratory Data:  CBC  Recent Labs   Lab 08/29/20  0857 09/01/20 1919 09/02/20  0421   WBC 8.6 7.99 6.31   RBC 3.73* 3.45* 3.20*   HGB 11.5* 11.0* 10.2*   HCT 34.5* 34.6* 32.2*    153 139*   MCV 92.5 100* 101*   MCH 30.8 31.9* 31.9*   MCHC 33.3 31.8* 31.7*     CMP  Recent Labs   Lab 08/29/20  0857 09/01/20 1919 09/02/20  0421   CALCIUM 13.1* 13.4* 12.1*   PROT 6.3 6.6 6.0    139 141   K 3.8 3.4* 3.2*   CO2 27 27 29    103 106   BUN 9 9 8   CREATININE 0.68 0.7 0.7   ALKPHOS  --  727* 607*   ALT 16 25 19   AST 23 37 28   BILITOT 1.3* 1.3* 1.1*     UA  Recent Labs   Lab 09/01/20  2131   COLORU Yellow   SPECGRAV 1.010   PHUR 6.0   PROTEINUA Negative     MICRO  Microbiology Results (last 7 days)     Procedure Component Value Units Date/Time    Blood culture #1 [[id number]] Collected: 09/01/20 1911    Order Status: Sent Specimen: Blood from Peripheral, Wrist, Right Updated: 09/02/20 0021    Blood culture #2 [[id number]] Collected: 09/01/20 1942    Order Status: Sent Specimen: Blood from Peripheral, Antecubital, Left Updated: 09/02/20 0021        Diagnostic Results:  Imaging Results          CT Abdomen Pelvis  Without Contrast (Final result)  Result time 09/01/20 21:34:22    Final result by Shaun Manning MD (09/01/20 21:34:22)                 Impression:      New ascites since the prior MRI and CT of the abdomen.    Apparent new micro nodular densities throughout both lung bases suspicious for hematogenous metastasis.    Small bilateral effusions and compressive atelectasis in both lung bases.    Stable biliary stent with hepatic bili a and multiple areas of low density throughout the liver compatible with both metastatic disease and vascular shunting.    No evidence of bowel obstruction or  tract obstruction.    Mild subcutaneous 3rd spacing.      Electronically signed by: Shaun  Kerri  Date:    09/01/2020  Time:    21:34             Narrative:    EXAMINATION:  CT ABDOMEN PELVIS WITHOUT CONTRAST    CLINICAL HISTORY:  Abdominal distension;    TECHNIQUE:  Low dose axial images, sagittal and coronal reformations were obtained from the lung bases to the pubic symphysis.    COMPARISON:  Previous MRI of the abdomen, 07/07/2020, CT of the abdomen and pelvis, 07/02/2014    FINDINGS:  There are bilateral effusions with compressive atelectasis in the lung bases.  Multiple small nodular densities on lung windows are seen in both lung bases too numerous to count ranging in size from 5 mm down to 3 mm none of which appear calcified.    No distinct pericardial effusion is present.    The liver is heterogeneous and nodular with areas of low density in the periphery of the left and right lobe suggesting some degree of vascular shunting but likely representing areas of metastatic disease seen on previous MRI.  Biliary air is noted with a expandable stent appearing to be within the common bile duct placed on 11/14/2019 ERCP.    New moderate ascites is present.  There is no evidence of GI tract obstruction or dilatation.    The kidneys demonstrate no hydronephrosis, calcified stone or hydroureter.  Retroperitoneal phleboliths are suggested in the region of the gonadal veins.    Within the pelvis, the previously seen soft tissue mass to the left of midline posteriorly is again noted.    No discrete new osseous lytic or blastic changes evident.                               X-Ray Chest AP Portable (Final result)  Result time 09/01/20 20:54:10    Final result by Lucina Eid MD (09/01/20 20:54:10)                 Impression:      Bilateral pleural effusion left greater than right with associated compressive atelectasis and/or lower lobe consolidation.      Electronically signed by: Lucina Eid  Date:    09/01/2020  Time:    20:54             Narrative:    EXAMINATION:  AP PORTABLE CHEST    CLINICAL  HISTORY:  shortness of breath;    TECHNIQUE:  AP portable chest radiograph was submitted.    COMPARISON:  None.    FINDINGS:  AP portable chest radiograph demonstrates a cardiac silhouette within normal limits.  There is mild nonspecific prominence of the interstitium.  Bibasilar densities are present.  There is obscuration of the left hemidiaphragm and blunting of the left costophrenic angle.  No pneumothorax is detected.  Surgical clips are seen at the neck.  There are multiple overlying cardiac leads.                                ASSESSMENT/PLAN:   Ching Bruce is a 69 y.o. female with a primary pancreatic neuroendocrine tumor with mets to neuroendocrine tumor of the liver (Dx 2012), s/p treatment with Capecitabine/Temozolomide (CAPTEM)   presenting to the ED after clinic visit with Dr. Rodriguez. Patient found to have generalized abdominal pain with hypercalcemia in clinic and given zoledronic acid and IVF. Patient then presented to the ED with SOB, constipation and continued Hypercalcemia.     Plan:  -FLD; Protein supplementation  -IVF; Lasix 20q8, K+, Mg+  -Bowel regimen  -Will see if effusions/ascites improves with diuresis, otherwise will pursue thora/paracentesis  -Octreotide ggt   -PT/OT  -Lovenox    Jese Li MD   [employer] [facility], PGY-3

## 2020-09-02 NOTE — ED TRIAGE NOTES
Pt arrived to ED with daughter at BS. Pt had to be rolled in via wheelchair because of weakness. Pt states she was told by Dr. Rodriguez because of her SOB and distended abdomen. Pt states her abdomen started getting distended after her liver biopsy. Pt states she gets very winded when she moves around. Pt states she has abdominal discomfort not really pain but she feels uncomfortable. LBM 8/25 and normal reported by pt. Denies pain. Will cont to monitor.

## 2020-09-02 NOTE — PT/OT/SLP EVAL
"Physical Therapy Evaluation and Treatment    Patient Name:  Ching Bruce   MRN:  [id number]    Recommendations:     Discharge Recommendations:  (TBD)   Discharge Equipment Recommendations: (TBD)   Barriers to discharge:  decreased endurance    Assessment:     Ching Bruce is a 69 y.o. female admitted with a medical diagnosis of The primary encounter diagnosis was Hypercalcemia. Diagnoses of Other ascites and Neuroendocrine cancer were also pertinent to this visit.  She presents with the following impairments/functional limitations:  weakness, impaired endurance, impaired self care skills, impaired functional mobilty, gait instability, impaired balance, decreased coordination, decreased upper extremity function, decreased lower extremity function, pain, decreased ROM, edema. Pt ambulated 20 ft with RW and min-mod A, presenting with flexed knees with continued ambulation, instability, and posterior leaning.    Rehab Prognosis: Good; patient would benefit from acute skilled PT services to address these deficits and reach maximum level of function.    Recent Surgery: * No surgery found *      Plan:     During this hospitalization, patient to be seen 5 x/week to address the identified rehab impairments via gait training, therapeutic activities, therapeutic exercises, neuromuscular re-education and progress toward the following goals:    Plan of Care Expires:  10/02/20    Subjective     Chief Complaint: c/o swelling in abdomen  Patient/Family Comments/goals: Pt reported functional mobility "gradually going down" within past 2 weeks  Pain/Comfort:  Pain Rating 1: 0/10    Patients cultural, spiritual, Jehovah's witness conflicts given the current situation: no    Living Environment:  Pt lives with daughter in 2SH with no KATELIN with bedroom/bathroom downstairs. Pt has t/s combo.  Prior to admission, patients level of function was mod I, decreasing within past 2 weeks, requiring assistance with walking, showering, dressing. Prior to " 2 weeks ago, pt was independent, driving.  Equipment used at home: rollator, wheelchair.  DME owned (not currently used): none.  Upon discharge, patient will have assistance from daughter.    Objective:     Communicated with nurse prior to session.  Patient found HOB elevated with bed alarm, telemetry, oxygen, peripheral IV  upon PT entry to room.    General Precautions: Standard, fall   Orthopedic Precautions:N/A   Braces: N/A     Exams:  Cognitive Exam:  Patient is oriented to Person, Place, Time and Situation  Gross Motor Coordination:  WFL  Postural Exam:  Patient presented with the following abnormalities:    -       Rounded shoulders  -       Forward head  Sensation:    -       Intact  Skin Integrity/Edema:      -       Skin integrity: Visible skin intact  -       Edema: mild edema in BLE, severe edema in abdomen  RLE ROM: WFL except hip flexion unable to be assessed 2/2 edema in abdomen-- Leaning posteriorly during knee extension  RLE Strength: WFL  LLE ROM: WFL except hip flexion unable to be assessed 2/2 edema in abdomen- leaning posteriorly during knee ext  LLE Strength: WFL    Functional Mobility:  Bed Mobility:     Rolling Right: minimum assistance and verbal cues for hand placement on rail  Supine to Sit: minimum assistance  Sit to Supine: moderate assistance and LE assist  Transfers:     Sit to Stand:  contact guard assistance and minimum assistance with rolling walker  Gait: Pt ambulated 20 ft with RW and min-mod A, presenting with flexed knees, instability, and posterior leaning.    Therapeutic Activities and Exercises:  Pt sat EOB for eval. Hip flexion ROM not formally assessed this date 2/2 severe edema in abdomen preventing full ROM.  Pt ambulated 20 ft with RW and min-mod A- with continued ambulation pt with flexed knees and instability  Pt returned to bed with HOB elevated.     AM-PAC 6 CLICK MOBILITY  Total Score:15     Patient left HOB elevated with all lines intact, call button in reach, bed  alarm on and nurse notified.    GOALS:   Multidisciplinary Problems       Physical Therapy Goals          Problem: Physical Therapy Goal    Goal Priority Disciplines Outcome Goal Variances Interventions   Physical Therapy Goal     PT, PT/OT Ongoing, Progressing     Description: Goals to be met by: 10/2/20     Patient will increase functional independence with mobility by performin. Supine to sit with Modified [place]  2. Sit to supine with Modified [place]  3. Rolling with Modified [place].  4. Sit to stand transfer with Modified [place]  5. Gait  x 40 feet with Modified [place] using LRAD.   6. 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    Secondary malignant neoplasm of liver     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) 2013    Thrombocytopenia 2013    Thyroid disease     Unspecified essential hypertension 2013       Past Surgical History:   Procedure Laterality Date    ERCP N/A 2018    Procedure: ERCP, stent exchange;  Surgeon: Jake Lieberman MD;  Location: [facility] ENDO;  Service: Endoscopy;  Laterality: N/A;    ERCP N/A 2019    Procedure: ERCP (ENDOSCOPIC RETROGRADE CHOLANGIOPANCREATOGRAPHY), stent exchange;  Surgeon: Jake Lieberman MD;  Location: [facility] ENDO;  Service: Endoscopy;  Laterality: N/A;    ERCP N/A 2019     Procedure: ERCP (ENDOSCOPIC RETROGRADE CHOLANGIOPANCREATOGRAPHY), stent exchange;  Surgeon: Jake Lieberman MD;  Location: [facility];  Service: Endoscopy;  Laterality: N/A;    THYROIDECTOMY  2011       Time Tracking:     PT Received On: 09/02/20  PT Start Time: 0853     PT Stop Time: 0929  PT Total Time (min): 36 min     Billable Minutes: Evaluation 10, Gait Training 14 and Therapeutic Activity 12      Cristina Stephen, TANK  09/02/2020

## 2020-09-02 NOTE — PLAN OF CARE
Problem: Physical Therapy Goal  Goal: Physical Therapy Goal  Description: Goals to be met by: 10/2/20     Patient will increase functional independence with mobility by performin. Supine to sit with Modified [place]  2. Sit to supine with Modified [place]  3. Rolling with Modified [place].  4. Sit to stand transfer with Modified [place]  5. Gait  x 40 feet with Modified [place] using LRAD.   6. Lower extremity exercise program x10 reps per handout, with independence    Outcome: Ongoing, Progressing  PT eval completed, note to follow. Pt ambulated 20 ft with RW and min-mod A, presenting with flexed knees, instability, and posterior leaning.

## 2020-09-02 NOTE — PROCEDURES
"Ching Bruce is a 69 y.o. female patient.    Temp: 98 °F (36.7 °C) (09/02/20 1133)  Pulse: 99 (09/02/20 1133)  Resp: 20 (09/02/20 1133)  BP: 139/79 (09/02/20 1133)  SpO2: 95 % (09/02/20 1133)  Weight: 67.3 kg (148 lb 5.9 oz) (09/02/20 0900)  Height: 5' 7" (170.2 cm) (09/02/20 0900)    PICC  Date/Time: 9/2/2020 1:19 PM  Performed by: Roverto Pardo RN  Consent Done: Yes  Time out: Immediately prior to procedure a time out was called to verify the correct patient, procedure, equipment, support staff and site/side marked as required  Indications: med administration and vascular access  Anesthesia: local infiltration  Local anesthetic: lidocaine 1% without epinephrine  Anesthetic Total (mL): 3  Preparation: skin prepped with ChloraPrep  Skin prep agent dried: skin prep agent completely dried prior to procedure  Sterile barriers: all five maximum sterile barriers used - cap, mask, sterile gown, sterile gloves, and large sterile sheet  Hand hygiene: hand hygiene performed prior to central venous catheter insertion  Location details: left brachial  Catheter type: double lumen  Catheter size: 5 Fr  Catheter Length: 41cm    Ultrasound guidance: yes  Vessel Caliber: large, compressibility normal  Needle advanced into vessel with real time Ultrasound guidance.  Guidewire confirmed in vessel.  Sterile sheath used.  Number of attempts: 1  Post-procedure: blood return through all ports, chlorhexidine patch and sterile dressing applied            Roverto Pardo  9/2/2020    "

## 2020-09-02 NOTE — NURSING
Procedure complete. Patient tolerated well. 700 ml of cloudy yellow fluid removed from left side of chest. Band aid applied to puncture site. No bleeding or hematoma noted. Proceeding to paracentesis.

## 2020-09-02 NOTE — PROGRESS NOTES
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 Rodriguez. Patient found to have generalized abdominal pain with hypercalcemia in clinic and given zoledronic acid and IVF. Patient then presented to the ED with SOB, constipation and continued Hypercalcemia.     Admit Date: 9/1/2020   Discharge Date: TBD   Length of Stay Days: 1    Surgery/Procedure: none    NET Physician:  Cory Rodriguez DO, FACP  and Joshua Valerio MD  Local Treating Physician:Gaby Corea MD      Assessment  Diet: NPO for testing  Oral Supplement: Boost Plus with meals when able  Nutrition Support - none   Appetite - none  Nausea - No  Vomiting- No  BM- No bowel movements  Abdomen- nontender and distended  Incision- none  Drain- none   Urine-  voiding without difficulty  Activity-  change activity as tolerated   Pain-none  Respiratory- encourage cough/deep breath/IS     IV Access- unable to get perph IV, requested PICC line   Edema: 2+ bilat lower extrem    Vital Signs (Most Recent):   Temp:  [97.9 °F (36.6 °C)-98.5 °F (36.9 °C)]   Pulse:  []   Resp:  [18-28]   BP: (108-147)/(58-81)   SpO2:  [95 %-100 %]           Vital Signs Range (Last 24H):  Temp: 98 °F (36.7 °C) (09/02/20 1133)  Pulse: 99 (09/02/20 1133)  Resp: 20 (09/02/20 1133)  BP: 139/79 (09/02/20 1133)  SpO2: 95 % (09/02/20 1133)    Labs:   Recent Labs   Lab 08/29/20  0857 09/01/20  1919 09/02/20  0421   WBC 8.6 7.99 6.31   HGB 11.5* 11.0* 10.2*   HCT 34.5* 34.6* 32.2*    153 139*   MCV 92.5 100* 101*   RDW 13.2 15.5* 15.5*    139 141   K 3.8 3.4* 3.2*    103 106   CO2 27 27 29   BUN 9 9 8   CREATININE 0.68 0.7 0.7   * 104 81   PROT 6.3 6.6 6.0   ALBUMIN 3.0* 2.6* 2.2*   BILITOT 1.3* 1.3* 1.1*   AST 23 37 28   ALKPHOS  --  727*  607*   ALT 16 25 19      No results for input(s): PREALBUMIN, CRP, TRIG in the last 168 hours.  Recent Labs   Lab 08/29/20  0857 09/01/20  1919 09/02/20  0421   CALCIUM 13.1* 13.4* 12.1*   MG  --  1.8 2.1   PHOS  --   --  2.0*       Urinalysis  Recent Labs   Lab 08/31/20 2000 09/01/20  2131   COLORU Reyna Yellow   SPECGRAV 1.020 1.010   PHUR 5.0 6.0   PROTEINUA Negative Negative   NITRITE Negative Negative   LEUKOCYTESUR 1+* Negative   UROBILINOGEN 4.0-6.0* Negative   HYALINECASTS 2*  --        Scheduled Meds   albumin human 25%  50 g Intravenous Once    calcitonin (salmon)  1 spray Alternating Nostrils Daily    enoxaparin  40 mg Subcutaneous Q24H    furosemide (LASIX) IV  20 mg Intravenous Q8H    levothyroxine  150 mcg Oral Before breakfast       Continuous Infusion   dextrose 5 % and 0.9 % NaCl with KCl 20 mEq 175 mL/hr at 09/02/20 0544    octreotide (SANDOSTATIN) infusion         PRN Meds  sodium chloride 0.9%     Assessment/Plan:  -going to IR today for paracentesis and thorocentesis  -octreotide drip  -diuretics -lasix 20 mg q8 hr  -albumin replacement   -9/1- blood cultures pending     Discharge Planning   Appointments- Cory Rodriguez DO, FACP  TBD  Home Health needs- TBD  Therapy needs- TBD  Nutritional needs - Boost Plus  Home support system- daughters  Barriers- none    Reviewed follow up plan.  Patient verbalized understanding.    ___________________________________________  TIMMY KhanN, RN, ONN-CG  Nurse Navigator [facility]    Tracie Weil Cavalier, GILLIANN, LDN, CNSC  Registered Dietitian [facility]      Face to Face Time: 15 minutes

## 2020-09-02 NOTE — ED NOTES
Pt has multiple Potassium chloride IVPB due in which a second IV line was attempted twice by myself and twice by another RN with all failed attempts. Charge nurse notified. Awaiting further orders.

## 2020-09-02 NOTE — PROCEDURES
Interventional Radiology Post-Procedure Note    Pre Op Diagnosis: Left pleural effusion, ascites  Post Op Diagnosis: Same    Procedure: Left thoracentesis, paracentesis    Procedure performed by: Ha    Written Informed Consent Obtained: Yes  Specimen Sent: Yes  Estimated Blood Loss: Minimal    Findings:   Small to moderate left pleural effusion. 700 mL cloudy yellow fluid removed from left chest.    Small ascites. 1500 mL cloudy yellow fluid removed from the LLQ. Specimen sent.    No immediate complications. Patient tolerated procedure well. Please see full dictated procedure report for additional details and recommendations.      Colt Bonner MD  [facility] IR  Pager [phone]

## 2020-09-02 NOTE — ED NOTES
"Patient arrived to unit from ED BED 03 via stretcher.   Patient in room EDOU 23.  Transferred into bed with x 1 assist.   Telephone report given by JORGE Alva.  Charge nurse advised of patient arrival.   VS currently stable.   Tele monitor applied.   Patient oriented to room, unit, and call bell.   Bed in lowest position, call light in reach.  Encouraged to notify of all needs.   Will continue to monitor.  /77   Pulse 95   Temp 97.9 °F (36.6 °C) (Oral)   Resp 20   Ht 5' 7" (1.702 m)   Wt 66.2 kg (146 lb)   SpO2 98%   BMI 22.87 kg/m²     "

## 2020-09-02 NOTE — NURSING
Patient is an admit from ED.  Patient is awake and alert.  Patient is sitting up at the edge of bed.  Requesting for a bath before starting all infusions.  Will apply Telemetry monitoring box, and O2 per nasal cannula when she is done.  Daughter Rocio is at bedside.  Instructed to call for any assistance and patient verbalized understanding.  Safety is maintained with bed low, wheels locked and side rails up.  Call light within reach.  Will continue to monitor.

## 2020-09-02 NOTE — CONSULTS
Interventional Radiology Consult/Pre-Procedure Note      Chief Complaint/Reason for Consult: Pleural effusion, ascites    History of Present Illness:  Ching Bruce is a 69 y.o. female with the PMH listed below who presents with a small to moderate left pleural effusion and and ascites. Endorses SOB and uncomfortable abd fullness. IR consulted for left thoracentesis and paracentesis.    Admission H&P reviewed.    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    Secondary malignant neoplasm of liver     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) 5/7/2013    Thrombocytopenia 11/12/2013    Thyroid disease     Unspecified essential hypertension 11/12/2013     Past Surgical History:   Procedure Laterality Date    ERCP N/A 6/21/2018    Procedure: ERCP, stent exchange;  Surgeon: Jake Lieberman MD;  Location: [facility] ENDO;  Service: Endoscopy;  Laterality: N/A;    ERCP N/A 5/23/2019    Procedure: ERCP (ENDOSCOPIC RETROGRADE CHOLANGIOPANCREATOGRAPHY), stent exchange;  Surgeon: Jake Lieberman MD;  Location: [facility] ENDO;  Service: Endoscopy;  Laterality: N/A;    ERCP N/A 11/14/2019    Procedure: ERCP (ENDOSCOPIC RETROGRADE CHOLANGIOPANCREATOGRAPHY), stent exchange;  Surgeon: Jake Lieberman MD;  Location: [facility] ENDO;  Service: Endoscopy;  Laterality: N/A;    THYROIDECTOMY  2011       Allergies:   Review of patient's allergies indicates:   Allergen Reactions    Epinephrine Anaphylaxis  and Other (See Comments)     Can cause Carcinoid Crisis    Sulfa (sulfonamide antibiotics) Other (See Comments)     Urticaria       Scheduled Meds:    calcitonin (salmon)  1 spray Alternating Nostrils Daily    enoxaparin  40 mg Subcutaneous Q24H    furosemide (LASIX) IV  20 mg Intravenous Q8H    levothyroxine  150 mcg Oral Before breakfast    sodium chloride 0.9%  10 mL Intravenous Q6H     Continuous Infusions:    dextrose 5 % and 0.9 % NaCl with KCl 20 mEq 175 mL/hr at 09/02/20 1544    octreotide (SANDOSTATIN) infusion 250 mcg/hr (09/02/20 1236)     PRN Meds:sodium chloride 0.9%, Flushing PICC Protocol **AND** sodium chloride 0.9% **AND** sodium chloride 0.9%    Anticoagulation/Antiplatelet Meds: Lovenox    Review of Systems:   As documented in admission H&P.    Physical Exam:  Temp: 98 °F (36.7 °C) (09/02/20 1133)  Pulse: 98 (09/02/20 1550)  Resp: 20 (09/02/20 1550)  BP: (!) 127/58 (09/02/20 1550)  SpO2: 99 % (09/02/20 1550)     General: NAD  HEENT: Normocephalic, sclera anicteric, oropharynx clear  Heart: RRR  Lungs: Symmetric excursions, breathing unlabored  Abd: Moderately distended, soft  Extremities: CORBETT  Neuro: Gross nonfocal    Labs:  No results for input(s): INR in the last 168 hours.    Invalid input(s):  PT,  PTT    Recent Labs   Lab 09/02/20  0421   WBC 6.31   HGB 10.2*   HCT 32.2*   *   *      Recent Labs   Lab 09/02/20  0421 09/02/20  1440   GLU 81 128*    140   K 3.2* 4.5    105   CO2 29 25   BUN 8 9   CREATININE 0.7 0.8   CALCIUM 12.1* 12.2*   MG 2.1  --    ALT 19 21   AST 28 24   ALBUMIN 2.2* 2.6*   BILITOT 1.1* 1.1*       Imaging:  CT AP 9/1/20 reviewed.    Assessment/Plan:   Small to moderate left pleural effusion and and ascites. Will undergo left thoracentesis and paracentesis today.    Sedation: None    Risks (including, but not limited to, pain, bleeding, infection, damage to nearby structures, failure, and the need for additional procedures), benefits, and  alternatives were discussed with the patient. All questions were answered to the best of my abilities. The patient wishes to proceed. Written informed consent was obtained.      Colt Bonner MD  [facility]  Pager [phone]

## 2020-09-02 NOTE — ED PROVIDER NOTES
Encounter Date: 9/1/2020       History     Chief Complaint   Patient presents with    Abdominal Pain     sent to ED by  for abd distention.      Being followed by NE team with hypercalcemia.  Virtual visit with Dr. Rodriguez prompting ED referral.  Daughter reports lethargy and difficulty walking due to muscular weakness progressive over last several days.    The history is provided by the patient and a relative.   Abdominal Pain  The current episode started several weeks ago. The onset of the illness was gradual. The problem has been gradually worsening. The abdominal pain is generalized. The abdominal pain is relieved by nothing. The other symptoms of the illness include fatigue and shortness of breath. The other symptoms of the illness do not include fever, vomiting, dysuria or hematochezia.   Risk factors for an acute abdominal problem include a history of abdominal surgery.     Review of patient's allergies indicates:   Allergen Reactions    Epinephrine Anaphylaxis and Other (See Comments)     Can cause Carcinoid Crisis    Sulfa (sulfonamide antibiotics) Other (See Comments)     Urticaria     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    Secondary malignant neoplasm of liver     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) 5/7/2013    Thrombocytopenia 11/12/2013    Thyroid disease     Unspecified essential hypertension 11/12/2013     Past Surgical History:    Procedure Laterality Date    ERCP N/A 6/21/2018    Procedure: ERCP, stent exchange;  Surgeon: Jake Lieberman MD;  Location: [facility];  Service: Endoscopy;  Laterality: N/A;    ERCP N/A 5/23/2019    Procedure: ERCP (ENDOSCOPIC RETROGRADE CHOLANGIOPANCREATOGRAPHY), stent exchange;  Surgeon: Jake Lieberman MD;  Location: [facility];  Service: Endoscopy;  Laterality: N/A;    ERCP N/A 11/14/2019    Procedure: ERCP (ENDOSCOPIC RETROGRADE CHOLANGIOPANCREATOGRAPHY), stent exchange;  Surgeon: Jake Lieberman MD;  Location: [facility];  Service: Endoscopy;  Laterality: N/A;    THYROIDECTOMY  2011     Family History   Problem Relation Age of Onset    Cancer Maternal Grandmother     Diabetes Father     Breast cancer Neg Hx     Colon cancer Neg Hx     Ovarian cancer Neg Hx      Social History     Tobacco Use    Smoking status: Never Smoker    Smokeless tobacco: Never Used   Substance Use Topics    Alcohol use: No    Drug use: No     Review of Systems   Constitutional: Positive for fatigue. Negative for fever.   Respiratory: Positive for shortness of breath.    Cardiovascular: Negative for chest pain and leg swelling.   Gastrointestinal: Positive for abdominal pain. Negative for hematochezia and vomiting.   Genitourinary: Negative for dysuria.   Musculoskeletal: Positive for gait problem.   Neurological: Positive for weakness.   Psychiatric/Behavioral: Positive for confusion.   All other systems reviewed and are negative.      Physical Exam     Initial Vitals [09/01/20 1910]   BP Pulse Resp Temp SpO2   (!) 147/66 106 20 97.9 °F (36.6 °C) 97 %      MAP       --         Physical Exam    Nursing note and vitals reviewed.  Constitutional: She appears well-developed and well-nourished. She is not diaphoretic.   Mild distress     HENT:   Head: Normocephalic and atraumatic.   Eyes: Conjunctivae and EOM are normal.   Neck: Normal range of motion. Neck supple.   Cardiovascular: Regular rhythm and intact distal  pulses.   tachycardia   Pulmonary/Chest: She exhibits no tenderness.   Mild tachypnea   Abdominal: Soft. She exhibits distension. There is no abdominal tenderness.   Musculoskeletal: Normal range of motion. No tenderness.   Neurological: She is alert and oriented to person, place, and time. She has normal strength. GCS score is 15. GCS eye subscore is 4. GCS verbal subscore is 5. GCS motor subscore is 6.   Skin: Skin is warm and dry.         ED Course   Procedures  Labs Reviewed   CBC W/ AUTO DIFFERENTIAL - Abnormal; Notable for the following components:       Result Value    RBC 3.45 (*)     Hemoglobin 11.0 (*)     Hematocrit 34.6 (*)     Mean Corpuscular Volume 100 (*)     Mean Corpuscular Hemoglobin 31.9 (*)     Mean Corpuscular Hemoglobin Conc 31.8 (*)     RDW 15.5 (*)     Immature Granulocytes 0.8 (*)     Immature Grans (Abs) 0.06 (*)     Lymph # 0.8 (*)     Mono # 1.2 (*)     Gran% 74.0 (*)     Lymph% 9.8 (*)     All other components within normal limits   COMPREHENSIVE METABOLIC PANEL - Abnormal; Notable for the following components:    Potassium 3.4 (*)     Calcium 13.4 (*)     Albumin 2.6 (*)     Total Bilirubin 1.3 (*)     Alkaline Phosphatase 727 (*)     All other components within normal limits    Narrative:      Ca-  critical result(s) called and verbal readback obtained from HUY Campuzano RN by YAAKOV 09/01/2020 20:13   TSH - Abnormal; Notable for the following components:    TSH 14.213 (*)     All other components within normal limits   B-TYPE NATRIURETIC PEPTIDE - Abnormal; Notable for the following components:     (*)     All other components within normal limits   CULTURE, BLOOD   CULTURE, BLOOD   URINALYSIS, REFLEX TO URINE CULTURE    Narrative:     Specimen Source->Urine   LACTIC ACID, PLASMA   TROPONIN I   AMMONIA   MAGNESIUM   SARS-COV-2 RNA AMPLIFICATION, QUAL   T4, FREE   POCT GLUCOSE MONITORING CONTINUOUS          Imaging Results          CT Abdomen Pelvis  Without Contrast (Final  result)  Result time 09/01/20 21:34:22    Final result by Shaun Manning MD (09/01/20 21:34:22)                 Impression:      New ascites since the prior MRI and CT of the abdomen.    Apparent new micro nodular densities throughout both lung bases suspicious for hematogenous metastasis.    Small bilateral effusions and compressive atelectasis in both lung bases.    Stable biliary stent with hepatic bili a and multiple areas of low density throughout the liver compatible with both metastatic disease and vascular shunting.    No evidence of bowel obstruction or  tract obstruction.    Mild subcutaneous 3rd spacing.      Electronically signed by: Shaun Manning  Date:    09/01/2020  Time:    21:34             Narrative:    EXAMINATION:  CT ABDOMEN PELVIS WITHOUT CONTRAST    CLINICAL HISTORY:  Abdominal distension;    TECHNIQUE:  Low dose axial images, sagittal and coronal reformations were obtained from the lung bases to the pubic symphysis.    COMPARISON:  Previous MRI of the abdomen, 07/07/2020, CT of the abdomen and pelvis, 07/02/2014    FINDINGS:  There are bilateral effusions with compressive atelectasis in the lung bases.  Multiple small nodular densities on lung windows are seen in both lung bases too numerous to count ranging in size from 5 mm down to 3 mm none of which appear calcified.    No distinct pericardial effusion is present.    The liver is heterogeneous and nodular with areas of low density in the periphery of the left and right lobe suggesting some degree of vascular shunting but likely representing areas of metastatic disease seen on previous MRI.  Biliary air is noted with a expandable stent appearing to be within the common bile duct placed on 11/14/2019 ERCP.    New moderate ascites is present.  There is no evidence of GI tract obstruction or dilatation.    The kidneys demonstrate no hydronephrosis, calcified stone or hydroureter.  Retroperitoneal phleboliths are suggested in the  region of the gonadal veins.    Within the pelvis, the previously seen soft tissue mass to the left of midline posteriorly is again noted.    No discrete new osseous lytic or blastic changes evident.                               X-Ray Chest AP Portable (Final result)  Result time 09/01/20 20:54:10    Final result by Lucina Eid MD (09/01/20 20:54:10)                 Impression:      Bilateral pleural effusion left greater than right with associated compressive atelectasis and/or lower lobe consolidation.      Electronically signed by: Lucina Eid  Date:    09/01/2020  Time:    20:54             Narrative:    EXAMINATION:  AP PORTABLE CHEST    CLINICAL HISTORY:  shortness of breath;    TECHNIQUE:  AP portable chest radiograph was submitted.    COMPARISON:  None.    FINDINGS:  AP portable chest radiograph demonstrates a cardiac silhouette within normal limits.  There is mild nonspecific prominence of the interstitium.  Bibasilar densities are present.  There is obscuration of the left hemidiaphragm and blunting of the left costophrenic angle.  No pneumothorax is detected.  Surgical clips are seen at the neck.  There are multiple overlying cardiac leads.                                 Medical Decision Making:   Differential Diagnosis:   Differential Diagnosis includes, but is not limited to:  CVA/TIA, seizure, status epilepticus, post-ictal state, meningitis/encephalitis, sepsis, MI/ACS, arrhythmia, syncope, intracranial mass/hemorrhage, head trauma, anaphylaxis, substance abuse, alcohol intoxication/withdrawal, medication reaction, intentional medication overdose, neuroleptic malignant syndrome, serotonin syndrome, CO poisoning, hypoxia/hypercapnea, hepatic encephalopathy, metabolic disturbance, thyroid disease, hypoglycemia.    Independently Interpreted Test(s):   I have ordered and independently interpreted X-rays - see prior notes.  I have ordered and independently interpreted EKG Reading(s) - see  prior notes  Clinical Tests:   Lab Tests: Ordered and Reviewed  Radiological Study: Ordered and Reviewed  Medical Tests: Ordered and Reviewed  ED Management:  Symptomatic hypercalcemia noted.  Discussed with Dr. Baig, recommend lasix in ED, will admit to his service.  Other:   I have discussed this case with another health care provider.                                 Clinical Impression:       ICD-10-CM ICD-9-CM   1. Hypercalcemia  E83.52 275.42   2. Other ascites  R18.8 789.59   3. Neuroendocrine cancer  C7A.8 209.20         Disposition:   Disposition: Admitted  Condition: Fair     ED Disposition Condition    Admit                           Guy J. Lefort, MD  09/01/20 [id number]

## 2020-09-02 NOTE — PROGRESS NOTES
Upon initial cxr, left picc tip was flipped what looked to be in azygos vein, unable to pulsatile flush out or reposition tip with manipulation.

## 2020-09-02 NOTE — PLAN OF CARE
Patient is awake and alert.  Patient is weak and requires maximum assistance.  Went to IR for paracentesis and thoracentesis this pm.  Regular diet given.  Patient is instructed on 900ml of fluids q day.  Patient verbalized understanding.  Denies pain.  Patient continues to receive Sandostatin.  Receiving Sodium Phosphate at this time.  Safety is maintained with bed low, wheels locked and side rails up.  Bed alarm on.  Will continue to monitor.

## 2020-09-02 NOTE — ED NOTES
Assisted pt onto bedpan to urinate. Bed sheet and underpad changed. Daughter at bedside. Call bell in reach.

## 2020-09-02 NOTE — H&P
[facility] Neuroendocrine Surgery/General Surgery  History & Physical    SUBJECTIVE:     Chief Complaint:   Abdominal pain / fatigue     History of Present Illness:  Ms. Bruce is a 69 y.o. female who presents to ED after clinic visit with Dr. Rodriguez seen for pancreatic neuroendocrine tumor.  She has had increased abdominal pain and swelling that began after her biopsy.  She states that her abdomen feels tight.  She complains of bilateral leg swelling along with a decreased appetite, activity level and.  The confusion.  She fell recently.  She was found to be hypercalcemic and was given IV fluids and zoledronic acid yesterday.  She also complains of shortness of breath but denies chest pain.  She states that her urine has darkened and is malodorous.  She has not had a bowel movement in 1 week.  There was no improvement after IV fluids yesterday.    Vital signs within normal limits. Hypercalcemic 13. CT scan with BL pleural effusions and moderate ascites.     Allergies:  Review of patient's allergies indicates:   Allergen Reactions    Epinephrine Anaphylaxis and Other (See Comments)     Can cause Carcinoid Crisis    Sulfa (sulfonamide antibiotics) Other (See Comments)     Urticaria       Home Medications:  No current facility-administered medications on file prior to encounter.      Current Outpatient Medications on File Prior to Encounter   Medication Sig    alendronate (FOSAMAX) 70 MG tablet Take 70 mg by mouth every 7 days.     capecitabine (XELODA) 500 MG Tab Take 2 tablets (1,000 mg total) by mouth 2 (two) times daily.    cyproheptadine (PERIACTIN) 4 mg tablet Take 2 mg (1/2 tablet) 4 times per day for one week, then 4 mg (one tablet)  4 times per day after that    indapamide (LOZOL) 2.5 MG Tab 2.5 mg once daily.     irbesartan (AVAPRO) 300 MG tablet 300 mg once daily.     levothyroxine (SYNTHROID) 150 MCG tablet Take 150 mcg by mouth once daily.    olmesartan (BENICAR) 40 MG tablet     temozolomide  (TEMODAR) 250 MG capsule Take 1 capsule (250 mg total) by mouth once daily.    verapamil (CALAN-SR) 180 MG CR tablet Take 360 mg by mouth once daily.       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    Secondary malignant neoplasm of liver     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) 5/7/2013    Thrombocytopenia 11/12/2013    Thyroid disease     Unspecified essential hypertension 11/12/2013     Past Surgical History:   Procedure Laterality Date    ERCP N/A 6/21/2018    Procedure: ERCP, stent exchange;  Surgeon: Jake Lieberman MD;  Location: [facility];  Service: Endoscopy;  Laterality: N/A;    ERCP N/A 5/23/2019    Procedure: ERCP (ENDOSCOPIC RETROGRADE CHOLANGIOPANCREATOGRAPHY), stent exchange;  Surgeon: Jake Lieberman MD;  Location: [facility];  Service: Endoscopy;  Laterality: N/A;    ERCP N/A 11/14/2019    Procedure: ERCP (ENDOSCOPIC RETROGRADE CHOLANGIOPANCREATOGRAPHY), stent exchange;  Surgeon: Jake Lieberman MD;  Location: [facility];  Service: Endoscopy;  Laterality: N/A;    THYROIDECTOMY  2011     Family History   Problem Relation Age of Onset    Cancer Maternal Grandmother     Diabetes Father     Breast cancer Neg Hx     Colon cancer Neg Hx     Ovarian cancer Neg Hx      Social History     Tobacco Use    Smoking status: Never Smoker    Smokeless tobacco: Never Used   Substance Use Topics    Alcohol use: No    Drug use: No        Review of  Systems:  Constitutional: no fever or chills  Eyes: no visual changes  ENT: no nasal congestion or sore throat  Respiratory: no cough  +shortness of breath  Cardiovascular: no chest pain or palpitations  Gastrointestinal: no nausea or vomiting, no abdominal pain + change in bowel habits + constipation, +abdominal distention  Genitourinary: no hematuria or dysuria  Integument/Breast: no rash or pruritis  Hematologic/Lymphatic: no easy bruising or lymphadenopathy  Musculoskeletal: no arthralgias or myalgias  Neurological: no seizures or tremors  Behavioral/Psych: no auditory or visual hallucinations  Endocrine: no heat or cold intolerance    OBJECTIVE:     Vital Signs:  Temp: 97.9 °F (36.6 °C) (09/01/20 1910)  Pulse: 106 (09/01/20 1910)  Resp: 20 (09/01/20 1910)  BP: (!) 147/66 (09/01/20 1910)  SpO2: 97 % (09/01/20 1910)    Physical Exam:  Constitutional: She is oriented to person, place, and time. She appears unhealthy. Thin.   Pulmonary/Chest: No respiratory distress.   Abdominal: She exhibits distension. Non-tender   Neurological: She is oriented to person, place, and time.     Laboratory:  Labs Reviewed   CBC W/ AUTO DIFFERENTIAL - Abnormal; Notable for the following components:       Result Value    RBC 3.45 (*)     Hemoglobin 11.0 (*)     Hematocrit 34.6 (*)     Mean Corpuscular Volume 100 (*)     Mean Corpuscular Hemoglobin 31.9 (*)     Mean Corpuscular Hemoglobin Conc 31.8 (*)     RDW 15.5 (*)     Immature Granulocytes 0.8 (*)     Immature Grans (Abs) 0.06 (*)     Lymph # 0.8 (*)     Mono # 1.2 (*)     Gran% 74.0 (*)     Lymph% 9.8 (*)     All other components within normal limits   COMPREHENSIVE METABOLIC PANEL - Abnormal; Notable for the following components:    Potassium 3.4 (*)     Calcium 13.4 (*)     Albumin 2.6 (*)     Total Bilirubin 1.3 (*)     Alkaline Phosphatase 727 (*)     All other components within normal limits    Narrative:      Ca-  critical result(s) called and verbal readback obtained from  HUY Campuzano RN by YAAKOV 09/01/2020 20:13   TSH - Abnormal; Notable for the following components:    TSH 14.213 (*)     All other components within normal limits   B-TYPE NATRIURETIC PEPTIDE - Abnormal; Notable for the following components:     (*)     All other components within normal limits   CULTURE, BLOOD   CULTURE, BLOOD   URINALYSIS, REFLEX TO URINE CULTURE    Narrative:     Specimen Source->Urine   LACTIC ACID, PLASMA   TROPONIN I   AMMONIA   MAGNESIUM   SARS-COV-2 RNA AMPLIFICATION, QUAL   T4, FREE   POCT GLUCOSE MONITORING CONTINUOUS       Diagnostic Results:  Imaging Results          CT Abdomen Pelvis  Without Contrast (Final result)  Result time 09/01/20 21:34:22    Final result by Shaun Manning MD (09/01/20 21:34:22)                 Impression:      New ascites since the prior MRI and CT of the abdomen.    Apparent new micro nodular densities throughout both lung bases suspicious for hematogenous metastasis.    Small bilateral effusions and compressive atelectasis in both lung bases.    Stable biliary stent with hepatic bili a and multiple areas of low density throughout the liver compatible with both metastatic disease and vascular shunting.    No evidence of bowel obstruction or  tract obstruction.    Mild subcutaneous 3rd spacing.      Electronically signed by: Shaun Manning  Date:    09/01/2020  Time:    21:34             Narrative:    EXAMINATION:  CT ABDOMEN PELVIS WITHOUT CONTRAST    CLINICAL HISTORY:  Abdominal distension;    TECHNIQUE:  Low dose axial images, sagittal and coronal reformations were obtained from the lung bases to the pubic symphysis.    COMPARISON:  Previous MRI of the abdomen, 07/07/2020, CT of the abdomen and pelvis, 07/02/2014    FINDINGS:  There are bilateral effusions with compressive atelectasis in the lung bases.  Multiple small nodular densities on lung windows are seen in both lung bases too numerous to count ranging in size from 5  mm down to 3 mm none of which appear calcified.    No distinct pericardial effusion is present.    The liver is heterogeneous and nodular with areas of low density in the periphery of the left and right lobe suggesting some degree of vascular shunting but likely representing areas of metastatic disease seen on previous MRI.  Biliary air is noted with a expandable stent appearing to be within the common bile duct placed on 11/14/2019 ERCP.    New moderate ascites is present.  There is no evidence of GI tract obstruction or dilatation.    The kidneys demonstrate no hydronephrosis, calcified stone or hydroureter.  Retroperitoneal phleboliths are suggested in the region of the gonadal veins.    Within the pelvis, the previously seen soft tissue mass to the left of midline posteriorly is again noted.    No discrete new osseous lytic or blastic changes evident.                               X-Ray Chest AP Portable (Final result)  Result time 09/01/20 20:54:10    Final result by Lucina Eid MD (09/01/20 20:54:10)                 Impression:      Bilateral pleural effusion left greater than right with associated compressive atelectasis and/or lower lobe consolidation.      Electronically signed by: Lucina Eid  Date:    09/01/2020  Time:    20:54             Narrative:    EXAMINATION:  AP PORTABLE CHEST    CLINICAL HISTORY:  shortness of breath;    TECHNIQUE:  AP portable chest radiograph was submitted.    COMPARISON:  None.    FINDINGS:  AP portable chest radiograph demonstrates a cardiac silhouette within normal limits.  There is mild nonspecific prominence of the interstitium.  Bibasilar densities are present.  There is obscuration of the left hemidiaphragm and blunting of the left costophrenic angle.  No pneumothorax is detected.  Surgical clips are seen at the neck.  There are multiple overlying cardiac leads.                                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

## 2020-09-02 NOTE — PT/OT/SLP PROGRESS
Occupational Therapy      Patient Name:  Ching Bruce   MRN:  [id number]    Patient not seen today secondary to Other (Comment).  Multiple attempts made--pt w/xray, getting PICC, then to IR.  Will follow-up .    Demarcus Moura, OT  9/2/2020

## 2020-09-02 NOTE — PROCEDURES
"Ching Bruce is a 69 y.o. female patient.    Temp: 98 °F (36.7 °C) (09/02/20 1133)  Pulse: 99 (09/02/20 1133)  Resp: 20 (09/02/20 1133)  BP: 139/79 (09/02/20 1133)  SpO2: 95 % (09/02/20 1133)  Weight: 67.3 kg (148 lb 5.9 oz) (09/02/20 0900)  Height: 5' 7" (170.2 cm) (09/02/20 0900)    PICC  Date/Time: 9/2/2020 2:08 PM  Performed by: Roverto Pardo RN  Consent Done: Yes  Time out: Immediately prior to procedure a time out was called to verify the correct patient, procedure, equipment, support staff and site/side marked as required  Indications: med administration and vascular access  Anesthesia: local infiltration  Local anesthetic: lidocaine 1% without epinephrine  Anesthetic Total (mL): 5  Preparation: skin prepped with ChloraPrep  Skin prep agent dried: skin prep agent completely dried prior to procedure  Sterile barriers: all five maximum sterile barriers used - cap, mask, sterile gown, sterile gloves, and large sterile sheet  Hand hygiene: hand hygiene performed prior to central venous catheter insertion  Location details: right brachial  Catheter type: double lumen  Catheter size: 5 Fr  Catheter Length: 35cm    Ultrasound guidance: yes  Vessel Caliber: large, compressibility normal  Needle advanced into vessel with real time Ultrasound guidance.  Guidewire confirmed in vessel.  Sterile sheath used.  Number of attempts: 1  Post-procedure: blood return through all ports, chlorhexidine patch and sterile dressing applied            Roverto Pardo  9/2/2020    "

## 2020-09-03 NOTE — PLAN OF CARE
[place] met with pt and daughter Rocio  [phone] - Ms. Chan lives in [place]   pt lives with other daughter Isabelle Parrish [phone]     no hh, pt has a cane and RW   dx:  Pancreatic NET -   abdominal ascites and pleural effusion  - underwent thoracentesis and paracentesis yesterday      d/c needs tbd    pt's daughters will transport to home at d/c         09/03/20 1346   Discharge Assessment   Assessment Type Discharge Planning Assessment   Confirmed/corrected address and phone number on facesheet? Yes   Assessment information obtained from? Patient   Expected Length of Stay (days) 3   Communicated expected length of stay with patient/caregiver yes   Prior to hospitilization cognitive status: Alert/Oriented   Prior to hospitalization functional status: Assistive Equipment   Current cognitive status: Alert/Oriented   Current Functional Status: Assistive Equipment   Lives With child(shilpa), adult   Able to Return to Prior Arrangements yes   Is patient able to care for self after discharge? Yes   Who are your caregiver(s) and their phone number(s)? daughter Isabelle Parrish [phone];  daughter Rocio  [phone]   Patient's perception of discharge disposition home or selfcare   Readmission Within the Last 30 Days no previous admission in last 30 days   Patient currently being followed by outpatient case management? Yes   If yes, name of outpatient case management following: [facility] outpatient case management   Patient currently receives any other outside agency services? Yes   Equipment Currently Used at Home walker, rolling;cane, straight   Do you have any problems affording any of your prescribed medications? Yes   If yes, what medications? $200 for appetite stimulant -- pt is no longer taking this medication   Is the patient taking medications as prescribed? yes   Does the patient have transportation home? Yes   Transportation Anticipated family or friend will provide   Does the patient receive services at the [facility]? No   Discharge Plan A Home;Home with family   DME Needed Upon Discharge    (tbd)   Patient/Family in Agreement with Plan yes

## 2020-09-03 NOTE — PLAN OF CARE
Patient is awake and alert.  Denies pain.  Patient is receiving Albumin and Sandostatin.  Daughter visited.  Safety maintained.  Will continue to monitor.

## 2020-09-03 NOTE — PLAN OF CARE
Patient is AAO X 4. Denies pain at present. Remains on continuous Sandostatin infusion 5 ml per hour. All due medicines given as prescribed and documented. Safety measures maintained. Slept well during night. On tele monitor showing NSR. Will continue to monitor patient.

## 2020-09-03 NOTE — PT/OT/SLP PROGRESS
Physical Therapy Treatment    Patient Name:  Ching Bruce   MRN:  [id number]    Recommendations:     Discharge Recommendations:  Pt requesting HH PT/OT. May require more assist than HH.   Discharge Equipment Recommendations: RW and bath bench  Barriers to discharge:  decreased endurance    Assessment:     Ching Bruce is a 69 y.o. female admitted with a medical diagnosis of The primary encounter diagnosis was Hypercalcemia. Diagnoses of Other ascites and Neuroendocrine cancer were also pertinent to this visit.  She presents with the following impairments/functional limitations:  weakness, impaired endurance, impaired self care skills, impaired functional mobilty, gait instability, impaired balance, decreased coordination, decreased upper extremity function, decreased lower extremity function, edema. Pt transferred from bed to BSC to void successfully. Pt able to self-care for hygiene, but required assist after. Pt ambulated 10 ft from BSC to bedside chair with CGA and RW. Pt presented with slow processing for command following this date.    Rehab Prognosis: Good; patient would benefit from acute skilled PT services to address these deficits and reach maximum level of function.    Recent Surgery: * No surgery found *      Plan:     During this hospitalization, patient to be seen 5 x/week to address the identified rehab impairments via gait training, therapeutic activities, therapeutic exercises, neuromuscular re-education and progress toward the following goals:    Plan of Care Expires:  10/02/20    Subjective     Chief Complaint: None  Patient/Family Comments/goals: Pt reported wanting to void in BSC and wanted her brief changed.  Pain/Comfort:  Pain Rating 1: 0/10      Objective:     Communicated with nurse Hudson prior to session.  Patient found HOB elevated with bed alarm, peripheral IV, telemetry upon PT entry to room.     General Precautions: Standard, fall   Orthopedic Precautions:N/A   Braces: N/A      Functional Mobility:  Bed Mobility:     Rolling Left:  contact guard assistance  Supine to Sit: contact guard assistance  Transfers:     Sit to Stand:  contact guard assistance and minimum assistance with rolling walker  Toilet Transfer: contact guard assistance and minimum assistance with  rolling walker  using  Step Transfer  Gait: Pt ambulated 10 ft from [facility] to bedside chair with CGA and RW. Flexed posture over RW, further gait limited by pt's fatigue, shortness of breath, requesting to sit and rest.      AM-PAC 6 CLICK MOBILITY  Turning over in bed (including adjusting bedclothes, sheets and blankets)?: 3  Sitting down on and standing up from a chair with arms (e.g., wheelchair, bedside commode, etc.): 3  Moving from lying on back to sitting on the side of the bed?: 3  Moving to and from a bed to a chair (including a wheelchair)?: 3  Need to walk in hospital room?: 3  Climbing 3-5 steps with a railing?: 2  Basic Mobility Total Score: 17       Therapeutic Activities and Exercises:  Pt transferred to [facility] to successfully void. Pt stood for PT/OT to change brief. Pt required seated rest break. Pt ambulated 10 ft to bedside chair.     Patient left up in chair with all lines intact, call button in reach, chair alarm on and nurse notified..    GOALS:   Multidisciplinary Problems       Physical Therapy Goals          Problem: Physical Therapy Goal    Goal Priority Disciplines Outcome Goal Variances Interventions   Physical Therapy Goal     PT, PT/OT Ongoing, Progressing     Description: Goals to be met by: 10/2/20     Patient will increase functional independence with mobility by performin. Supine to sit with Modified Daniels  2. Sit to supine with Modified Daniels  3. Rolling with Modified Daniels.  4. Sit to stand transfer with Modified Daniels  5. Gait  x 40 feet with Modified Daniels using LRAD.   6. Lower extremity exercise program x10 reps per handout, with independence                          Time Tracking:     PT Received On: 09/03/20  PT Start Time: 0916     PT Stop Time: 0944  PT Total Time (min): 28 min     Billable Minutes: Gait Training 14 and Therapeutic Activity 14    Treatment Type: Treatment  PT/PTA: PT     PTA Visit Number: 0     Cristina Stephen, [facility]  09/03/2020

## 2020-09-03 NOTE — PLAN OF CARE
Problem: Occupational Therapy Goal  Goal: Occupational Therapy Goal  Description: Goals to be met by: 10/3/20     Patient will increase functional independence with ADLs by performing:    LE Dressing with Supervision.  Grooming while standing with Supervision.  Toileting from toilet with Supervision for hygiene and clothing management.   Supine to sit with Supervision.  Step transfer with Supervision  Toilet transfer to toilet with Supervision.  Increased functional strength to WFL for self care skills and functional mobility.  Upper extremity exercise program x10 reps per handout, with independence.    Outcome: Ongoing, Progressing       Pt would benefit from cont OT services in order to maximize functional independence. Pt demonstrating impaired ADLs and functional mobility as well as delayed command processing. Pt reports increased assist at home from family and will have 24/7 care. Pt requesting home with family and HH. Needs CARLITA, TTLINK at d.c

## 2020-09-03 NOTE — PT/OT/SLP EVAL
Occupational Therapy   Evaluation    Name: Ching Bruce  MRN: [id number]  Admitting Diagnosis:  <principal problem not specified>      Recommendations:     Discharge Recommendations: (pt requesting HHOT/PT; pt may require more assist than HH)  Discharge Equipment Recommendations:  bath bench, walker, rolling  Barriers to discharge:  None    Assessment:     Ching Bruce is a 69 y.o. female with a medical diagnosis of <principal problem not specified>.  She presents with deconditioning. Performance deficits affecting function: gait instability, weakness, decreased upper extremity function, impaired endurance, impaired balance, impaired self care skills, impaired functional mobilty, decreased lower extremity function, impaired skin, edema, decreased safety awareness, impaired cognition.        Pt would benefit from cont OT services in order to maximize functional independence. Pt demonstrating impaired ADLs and functional mobility as well as delayed command processing. Pt reports increased assist at home from family and will have 24/7 care. Pt requesting home with family and HH. Needs RW, TTB at d.c    Rehab Prognosis: Good; patient would benefit from acute skilled OT services to address these deficits and reach maximum level of function.       Plan:     Patient to be seen 5 x/week to address the above listed problems via self-care/home management, therapeutic activities, therapeutic exercises  · Plan of Care Expires: 10/03/20  · Plan of Care Reviewed with: patient    Subjective     Chief Complaint: decreased endurance; weakness   Patient/Family Comments/goals: return home     Occupational Profile:  Living Environment: with dghtr in 2 story home, no steps to enter, bed and t/s combo downstairs   Previous level of function: pt reports increased assist required in the past 2 weeks and she has required assist for ADLs and mobility; prior was mod I   Equipment Used at Home:  bedside commode, rollator  Assistance upon  Discharge: pt reports she will have 24/7 care at home     Pain/Comfort:  · Pain Rating 1: 0/10    Patients cultural, spiritual, Zoroastrianism conflicts given the current situation:      Objective:     Communicated with: nsg prior to session.   General Precautions: Standard, fall   Orthopedic Precautions:N/A   Braces: N/A     Occupational Performance:      Functional Mobility/Transfers:  · Patient completed Sit <> Stand Transfer with contact guard assistance and minimum assistance  with  rolling walker   · Patient completed Bed <> Chair Transfer using Step Transfer technique with contact guard assistance with rolling walker  · Patient completed Toilet Transfer Step Transfer technique with contact guard assistance with  rolling walker and bedside commode  · Functional Mobility: CGA with RW; forward flexed posture over RW     Activities of Daily Living:  · Toileting: moderate assistance clothing management; required assist to complete hygiene     Cognitive/Visual Perceptual:  WFL   Pt however, demonstrating impaired processing/delayed processing     Physical Exam:  Balance:    -       SBA sitting balance; CGA/Min A standing   Postural examination/scapula alignment:    -       Rounded shoulders  -       Forward head  Skin integrity: Visible skin intact  Dominant hand:    -       right  Upper Extremity Range of Motion:    BUE WFL   Upper Extremity Strength:   BUE grossly 4-/5    Strength:   good     AMPAC 6 Click ADL:  AMPAC Total Score: 16    Treatment & Education:  Pt found on BSC with PT present   Pt required assist for stands from BSC x 3 CGA/Min A with RW  Pt requires mod A for toileting for completion of perianal hygiene   Functional mobility in room with CGA with RW   Education:    Patient left up in chair with all lines intact, call button in reach, chair alarm on and nsg notified    GOALS:   Multidisciplinary Problems     Occupational Therapy Goals        Problem: Occupational Therapy Goal    Goal Priority  Disciplines Outcome Interventions   Occupational Therapy Goal     OT, PT/OT Ongoing, Progressing    Description: Goals to be met by: 10/3/20     Patient will increase functional independence with ADLs by performing:    LE Dressing with Supervision.  Grooming while standing with Supervision.  Toileting from toilet with Supervision for hygiene and clothing management.   Supine to sit with Supervision.  Step transfer with Supervision  Toilet transfer to toilet with Supervision.  Increased functional strength to WFL for self care skills and functional mobility.  Upper extremity exercise program x10 reps per handout, with independence.                     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    Secondary malignant neoplasm of liver     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) 5/7/2013    Thrombocytopenia 11/12/2013    Thyroid disease     Unspecified essential hypertension 11/12/2013       Past Surgical History:   Procedure Laterality Date    ERCP N/A 6/21/2018    Procedure: ERCP, stent exchange;  Surgeon: Jake Lieberman MD;  Location: [facility] ENDO;  Service: Endoscopy;  Laterality: N/A;    ERCP N/A 5/23/2019    Procedure: ERCP (ENDOSCOPIC RETROGRADE CHOLANGIOPANCREATOGRAPHY), stent exchange;  Surgeon: Jake Lieberman MD;  Location: [facility] ENDO;  Service: Endoscopy;  Laterality: N/A;    ERCP N/A 11/14/2019    Procedure: ERCP (ENDOSCOPIC RETROGRADE  CHOLANGIOPANCREATOGRAPHY), stent exchange;  Surgeon: Jake Lieberman MD;  Location: [facility];  Service: Endoscopy;  Laterality: N/A;    THYROIDECTOMY  2011       Time Tracking:     OT Date of Treatment: 09/03/20  OT Start Time: 0927  OT Stop Time: 0945  OT Total Time (min): 18 min    Billable Minutes:Evaluation 18    Gail Hernández OT  9/3/2020

## 2020-09-03 NOTE — PROGRESS NOTES
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 Rodriguez. Patient found to have generalized abdominal pain with hypercalcemia in clinic and given zoledronic acid and IVF. Patient then presented to the ED with SOB, constipation and continued Hypercalcemia.     Admit Date: 9/1/2020   Discharge Date: TBD   Length of Stay Days: 2    Surgery/Procedure: none    NET Physician:  oCry Rodriguez DO, FACP  and Joshua Valerio MD  Local Treating Physician:Gaby Corea MD      Assessment  Diet: NPO for testing  Oral Supplement: Boost Plus with meals when able  Nutrition Support - Total Parenteral Nutrition  Appetite - none  Nausea - No   Vomiting- No  BM- No bowel movements  Abdomen- nontender and distended  Incision- none  Drain- none   Urine-  voiding without difficulty  Activity-  change activity as tolerated   Pain-none  Respiratory- encourage cough/deep breath/IS     IV Access- unable to get perph IV, requested PICC line   Edema: 2+ bilat lower extrem    Vital Signs (Most Recent):   Temp:  [97.7 °F (36.5 °C)-99 °F (37.2 °C)]   Pulse:  []   Resp:  [16-20]   BP: ()/(54-86)   SpO2:  [94 %-100 %]           Vital Signs Range (Last 24H):  Temp: 98.4 °F (36.9 °C) (09/03/20 0737)  Pulse: 83 (09/03/20 0800)  Resp: 16 (09/03/20 0737)  BP: (!) 108/55 (09/03/20 0737)  SpO2: 96 % (09/03/20 0824)    Labs:   Recent Labs   Lab 09/01/20  1919 09/02/20  0421 09/02/20  1440 09/03/20  0530   WBC 7.99 6.31  --  5.13   HGB 11.0* 10.2*  --  8.8*   HCT 34.6* 32.2*  --  28.6*    139*  --  139*   * 101*  --  102*   RDW 15.5* 15.5*  --  15.6*    141 140 142   K 3.4* 3.2* 4.5 3.6    106 105 106   CO2 27 29 25 29   BUN 9 8 9 12   CREATININE 0.7 0.7 0.8 0.8    81 128* 106   PROT  6.6 6.0 7.0 5.7*   ALBUMIN 2.6* 2.2* 2.6* 2.2*   BILITOT 1.3* 1.1* 1.1* 0.8   AST 37 28 24 16   ALKPHOS 727* 607* 641* 464*   ALT 25 19 21 13      No results for input(s): PREALBUMIN, CRP, TRIG in the last 168 hours.  Recent Labs   Lab 09/01/20  1919 09/02/20  0421 09/02/20  1440 09/03/20  0530   CALCIUM 13.4* 12.1* 12.2* 10.6*   MG 1.8 2.1  --   --    PHOS  --  2.0*  --   --        Urinalysis  Recent Labs   Lab 08/31/20 2000 09/01/20  2131   COLORU Reyna Yellow   SPECGRAV 1.020 1.010   PHUR 5.0 6.0   PROTEINUA Negative Negative   NITRITE Negative Negative   LEUKOCYTESUR 1+* Negative   UROBILINOGEN 4.0-6.0* Negative   HYALINECASTS 2*  --        Scheduled Meds   calcitonin (salmon)  1 spray Alternating Nostrils Daily    enoxaparin  40 mg Subcutaneous Q24H    furosemide (LASIX) IV  20 mg Intravenous Q8H    levothyroxine  150 mcg Oral Before breakfast    sodium chloride 0.9%  10 mL Intravenous Q6H    spironolactone  100 mg Oral Q8H       Continuous Infusion   albumin human 25% 12.5 g (09/03/20 0850)    octreotide infusion (50 mcg/mL) 125 mcg/hr (09/03/20 0845)       PRN Meds  sodium chloride 0.9%, Flushing PICC Protocol **AND** sodium chloride 0.9% **AND** sodium chloride 0.9%     Assessment/Plan:  - paracentesis (1500) and thoracentesis (700) (one side) yesterday  -octreotide drip  -diuretics -lasix & spirolactone  -H/H trending down - 8/28  -9/1- blood cultures - no growth to date  -echo today,will consult cardiology  -feeling much better     Discharge Planning   Appointments- Cory Rodriguez DO, FACP  TBD  Home Health needs- TBD  Therapy needs- TBD  Nutritional needs - Boost Plus  Home support system- daughters  Barriers- none    Reviewed follow up plan.  Patient verbalized understanding.    ___________________________________________  Bibi Benito, TIMMYN, RN, ONN-CG  Nurse Navigator [facility]    Tracie Weil Cavalier, GILLIANN, LDN, CNSC  Registered Dietitian [facility]end[facility]  Program      Face to Face Time: 15 minutes

## 2020-09-03 NOTE — PLAN OF CARE
Problem: Physical Therapy Goal  Goal: Physical Therapy Goal  Description: Goals to be met by: 10/2/20     Patient will increase functional independence with mobility by performin. Supine to sit with Modified Simpson  2. Sit to supine with Modified Simpson  3. Rolling with Modified Simpson.  4. Sit to stand transfer with Modified Simpson  5. Gait  x 40 feet with Modified Simpson using LRAD.   6. Lower extremity exercise program x10 reps per handout, with independence    Outcome: Ongoing, Progressing  Pt transferred from bed to BSC to void successfully. Pt able to self-care for hygiene, but required assist after. Pt ambulated 10 ft from C to bedside chair with CGA and RW.

## 2020-09-03 NOTE — PROGRESS NOTES
Progress Note  General Surgery   Admit Date: 9/1/2020   LOS: 2 days   SUBJECTIVE:     No acute overnight events Patient with 700 via thoracentesis and 1500 via paracentesis yesterday. Good UOP. Patient with diarrhea with Po intake. Sating well on room air.           Review of Systems   Constitutional: Negative for chills and fever.   HENT: Negative for sore throat.    Eyes: Negative for blurred vision and double vision.   Respiratory: Negative for cough and shortness of breath.    Cardiovascular: Negative for chest pain.   Gastrointestinal: Positive for abdominal pain. Negative for constipation, diarrhea, heartburn, nausea and vomiting.   Genitourinary: Negative for dysuria and urgency.   Musculoskeletal: Negative for back pain and falls.   Skin: Negative for itching and rash.   Neurological: Negative for headaches.   Endo/Heme/Allergies: Does not bruise/bleed easily.   Psychiatric/Behavioral: The patient is not nervous/anxious.        OBJECTIVE:   Vital Signs (Most Recent)  Temp: 99 °F (37.2 °C) (09/03/20 0406)  Pulse: 84 (09/03/20 0406)  Resp: 18 (09/03/20 0406)  BP: (!) 95/54 (09/03/20 0406)  SpO2: 95 % (09/03/20 0358)    I & O (Last 24H):    Intake/Output Summary (Last 24 hours) at 9/3/2020 0656  Last data filed at 9/3/2020 0500  Gross per 24 hour   Intake 2776.67 ml   Output 850 ml   Net 1926.67 ml     Wt Readings from Last 3 Encounters:   09/02/20 67.3 kg (148 lb 5.9 oz)   08/04/20 66.3 kg (146 lb 0.9 oz)   08/03/20 66.3 kg (146 lb 0.9 oz)       Current Diet Order   Procedures    Diet Cardiac Fluid - 800mL     Order Specific Question:   Fluid restriction:     Answer:   Fluid - 800mL        Physical Exam   Constitutional: She is oriented to person, place, and time and well-developed, well-nourished, and in no distress.   HENT:   Head: Normocephalic and atraumatic.   Neck: Normal range of motion. Neck supple.   Cardiovascular: Normal rate, regular rhythm and normal heart sounds.   Pulmonary/Chest: Effort  normal.   Minimal crackles at the bases bilaterally.    Abdominal: Soft. Bowel sounds are normal. She exhibits distension. There is no abdominal tenderness. There is no rebound.   Neurological: She is alert and oriented to person, place, and time.   Skin: Skin is warm and dry.   Psychiatric: Affect normal.     Laboratory Data:  CBC  Recent Labs   Lab 09/01/20 1919 09/02/20 0421 09/03/20  0530   WBC 7.99 6.31 5.13   RBC 3.45* 3.20* 2.80*   HGB 11.0* 10.2* 8.8*   HCT 34.6* 32.2* 28.6*    139* 139*   * 101* 102*   MCH 31.9* 31.9* 31.4*   MCHC 31.8* 31.7* 30.8*     CMP  Recent Labs   Lab 09/02/20 0421 09/02/20  1440 09/03/20  0530   CALCIUM 12.1* 12.2* 10.6*   PROT 6.0 7.0 5.7*    140 142   K 3.2* 4.5 3.6   CO2 29 25 29    105 106   BUN 8 9 12   CREATININE 0.7 0.8 0.8   ALKPHOS 607* 641* 464*   ALT 19 21 13   AST 28 24 16   BILITOT 1.1* 1.1* 0.8     MICRO  Microbiology Results (last 7 days)     Procedure Component Value Units Date/Time    Blood culture #2 [[id number]] Collected: 09/01/20 1942    Order Status: Completed Specimen: Blood from Peripheral, Antecubital, Left Updated: 09/03/20 0613     Blood Culture, Routine No Growth to date      No Growth to date    Narrative:      Blood Culture #2    Blood culture #1 [[id number]] Collected: 09/01/20 1911    Order Status: Completed Specimen: Blood from Peripheral, Wrist, Right Updated: 09/03/20 0613     Blood Culture, Routine No Growth to date      No Growth to date    Narrative:      Blood Culture #1    (rule out SBP) Aerobic culture [[id number]] Collected: 09/02/20 1651    Order Status: Sent Specimen: Body Fluid from Lung, Left Updated: 09/02/20 2241    (rule out SBP) Culture, Anaerobic [[id number]] Collected: 09/02/20 1651    Order Status: Sent Specimen: Body Fluid from Lung, Left Updated: 09/02/20 2243        Diagnostic Results:  Imaging Results          CT Abdomen Pelvis  Without Contrast (Final result)  Result time 09/01/20 21:34:22    Final  result by Shaun Manning MD (09/01/20 21:34:22)                 Impression:      New ascites since the prior MRI and CT of the abdomen.    Apparent new micro nodular densities throughout both lung bases suspicious for hematogenous metastasis.    Small bilateral effusions and compressive atelectasis in both lung bases.    Stable biliary stent with hepatic bili a and multiple areas of low density throughout the liver compatible with both metastatic disease and vascular shunting.    No evidence of bowel obstruction or  tract obstruction.    Mild subcutaneous 3rd spacing.      Electronically signed by: Shaun Manning  Date:    09/01/2020  Time:    21:34             Narrative:    EXAMINATION:  CT ABDOMEN PELVIS WITHOUT CONTRAST    CLINICAL HISTORY:  Abdominal distension;    TECHNIQUE:  Low dose axial images, sagittal and coronal reformations were obtained from the lung bases to the pubic symphysis.    COMPARISON:  Previous MRI of the abdomen, 07/07/2020, CT of the abdomen and pelvis, 07/02/2014    FINDINGS:  There are bilateral effusions with compressive atelectasis in the lung bases.  Multiple small nodular densities on lung windows are seen in both lung bases too numerous to count ranging in size from 5 mm down to 3 mm none of which appear calcified.    No distinct pericardial effusion is present.    The liver is heterogeneous and nodular with areas of low density in the periphery of the left and right lobe suggesting some degree of vascular shunting but likely representing areas of metastatic disease seen on previous MRI.  Biliary air is noted with a expandable stent appearing to be within the common bile duct placed on 11/14/2019 ERCP.    New moderate ascites is present.  There is no evidence of GI tract obstruction or dilatation.    The kidneys demonstrate no hydronephrosis, calcified stone or hydroureter.  Retroperitoneal phleboliths are suggested in the region of the gonadal veins.    Within the pelvis,  the previously seen soft tissue mass to the left of midline posteriorly is again noted.    No discrete new osseous lytic or blastic changes evident.                               X-Ray Chest AP Portable (Final result)  Result time 09/01/20 20:54:10    Final result by Lucina Eid MD (09/01/20 20:54:10)                 Impression:      Bilateral pleural effusion left greater than right with associated compressive atelectasis and/or lower lobe consolidation.      Electronically signed by: Lucina Eid  Date:    09/01/2020  Time:    20:54             Narrative:    EXAMINATION:  AP PORTABLE CHEST    CLINICAL HISTORY:  shortness of breath;    TECHNIQUE:  AP portable chest radiograph was submitted.    COMPARISON:  None.    FINDINGS:  AP portable chest radiograph demonstrates a cardiac silhouette within normal limits.  There is mild nonspecific prominence of the interstitium.  Bibasilar densities are present.  There is obscuration of the left hemidiaphragm and blunting of the left costophrenic angle.  No pneumothorax is detected.  Surgical clips are seen at the neck.  There are multiple overlying cardiac leads.                                ASSESSMENT/PLAN:   Ching Bruce is a 69 y.o. 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 Rodriguez. Patient presented with hypercalcemia now improving. S/p Thoracentesis and paracentesis.     Plan:  -Advance diet as tolerated  -Lasix 20q8 and spironolactone 100mg   -Continue calcitonin spray   -Octreotide ggt   -Will see if effusions/ascites improves with diuresis, otherwise will pursue repeat thora/paracentesis  -PT/OT  -Lovenox    Jese Li MD   [employer] [facility], PGY-3

## 2020-09-03 NOTE — CONSULTS
Reviewed notes, labs, and orders of the past 24 hours.  Care plan reviewed.  Will continue to monitor.

## 2020-09-04 PROBLEM — R06.89 DYSPNEA AND RESPIRATORY ABNORMALITIES: Status: ACTIVE | Noted: 2020-01-01

## 2020-09-04 PROBLEM — E46 MALNUTRITION COMPROMISING BODILY FUNCTION: Status: ACTIVE | Noted: 2020-01-01

## 2020-09-04 PROBLEM — R06.00 DYSPNEA AND RESPIRATORY ABNORMALITIES: Status: ACTIVE | Noted: 2020-01-01

## 2020-09-04 PROBLEM — J90 BILATERAL PLEURAL EFFUSION: Status: ACTIVE | Noted: 2020-01-01

## 2020-09-04 NOTE — CONSULTS
Cardiology        SUBJECTIVE:     History of Present Illness:  69 year old female with PMH of neuroendocrine tumor of the pancreas who is admitted for abdominal pain and swelling, cardiology was consulted for the concern of heart failure. She reported abdominal distension and occasional SOB, denied any dyspnea on exertion or chest pain. She reported family history of enlarged heart (father), denied any history of smoking or drinking alcohol.       Review of patient's allergies indicates:   Allergen Reactions    Epinephrine Anaphylaxis and Other (See Comments)     Can cause Carcinoid Crisis    Sulfa (sulfonamide antibiotics) Other (See Comments)     Urticaria       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    Secondary malignant neoplasm of liver     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) 5/7/2013    Thrombocytopenia 11/12/2013    Thyroid disease     Unspecified essential hypertension 11/12/2013     Past Surgical History:   Procedure Laterality Date    ERCP N/A 6/21/2018    Procedure: ERCP, stent exchange;  Surgeon: Jake Lieberman MD;  Location: [facility] ENDO;  Service: Endoscopy;  Laterality: N/A;    ERCP N/A 5/23/2019    Procedure: ERCP (ENDOSCOPIC RETROGRADE CHOLANGIOPANCREATOGRAPHY), stent exchange;  Surgeon: Jake Lieberman MD;  Location: [facility] ENDO;  Service: Endoscopy;  Laterality: N/A;    ERCP N/A 11/14/2019    Procedure: ERCP (ENDOSCOPIC RETROGRADE  CHOLANGIOPANCREATOGRAPHY), stent exchange;  Surgeon: Jake Lieberman MD;  Location: [facility];  Service: Endoscopy;  Laterality: N/A;    THYROIDECTOMY  2011     Family History   Problem Relation Age of Onset    Cancer Maternal Grandmother     Diabetes Father     Breast cancer Neg Hx     Colon cancer Neg Hx     Ovarian cancer Neg Hx      Social History     Tobacco Use    Smoking status: Never Smoker    Smokeless tobacco: Never Used   Substance Use Topics    Alcohol use: No    Drug use: No        Home meds:  No current facility-administered medications on file prior to encounter.      Current Outpatient Medications on File Prior to Encounter   Medication Sig Dispense Refill    ferrous sulfate (FEOSOL) 325 mg (65 mg iron) Tab tablet Take 325 mg by mouth once daily.      levothyroxine (SYNTHROID) 150 MCG tablet Take 150 mcg by mouth once daily.  0    olmesartan (BENICAR) 40 MG tablet Take 40 mg by mouth once daily.       verapamil (CALAN-SR) 180 MG CR tablet Take 360 mg by mouth once daily.  1    alendronate (FOSAMAX) 70 MG tablet Take 70 mg by mouth every 7 days.   3    capecitabine (XELODA) 500 MG Tab Take 2 tablets (1,000 mg total) by mouth 2 (two) times daily. 56 tablet 6    cyproheptadine (PERIACTIN) 4 mg tablet Take 2 mg (1/2 tablet) 4 times per day for one week, then 4 mg (one tablet)  4 times per day after that 120 tablet 3    indapamide (LOZOL) 2.5 MG Tab 2.5 mg once daily.       irbesartan (AVAPRO) 300 MG tablet 300 mg once daily.       temozolomide (TEMODAR) 250 MG capsule Take 1 capsule (250 mg total) by mouth once daily. 5 capsule 4       Current meds:  Scheduled Meds:   calcitonin (salmon)  1 spray Alternating Nostrils Daily    enoxaparin  40 mg Subcutaneous Q24H    furosemide (LASIX) IV  20 mg Intravenous Q8H    levothyroxine  150 mcg Oral Before breakfast    pantoprazole  40 mg Intravenous Daily    sodium chloride 0.9%  10 mL Intravenous Q6H    spironolactone  100 mg Oral Q8H      Continuous Infusions:   albumin human 25% 12.5 g (09/04/20 0346)    octreotide infusion (50 mcg/mL) 125 mcg/hr (09/03/20 0845)     PRN Meds:.sodium chloride 0.9%, Flushing PICC Protocol **AND** sodium chloride 0.9% **AND** sodium chloride 0.9%      OBJECTIVE:     Vital Signs (Most Recent)  Temp: 98.6 °F (37 °C) (09/04/20 0734)  Pulse: 84 (09/04/20 0809)  Resp: 19 (09/04/20 0734)  BP: 126/60 (09/04/20 0734)  SpO2: 97 % (09/04/20 0804)    Vital Signs Range (Last 24H):  Temp:  [98.2 °F (36.8 °C)-99.1 °F (37.3 °C)]   Pulse:  [82-98]   Resp:  [13-19]   BP: (111-132)/(56-87)   SpO2:  [95 %-97 %]     Physical Exam:  GEN: AAx3, not in acute distress   Neck: elevated JVP  Heart: normal S1 and S2, may be S4? No murmurs  Lungs: GBAE, no wheezes  Abdomen: soft distended     Laboratory:  LABS  CBC  Recent Labs   Lab 09/02/20 0421 09/03/20 0530 09/04/20 0615   WBC 6.31 5.13 4.86   RBC 3.20* 2.80* 2.86*   HGB 10.2* 8.8* 8.9*   HCT 32.2* 28.6* 29.0*   * 139* 132*   * 102* 101*   MCH 31.9* 31.4* 31.1*   MCHC 31.7* 30.8* 30.7*     BMP  Recent Labs   Lab 09/02/20  1440 09/03/20 0530 09/04/20 0615    142 142   K 4.5 3.6 3.4*   CO2 25 29 31*    106 103   BUN 9 12 15   CREATININE 0.8 0.8 0.9   * 106 85       Recent Labs   Lab 09/01/20  1919 09/02/20 0421 09/02/20  1440 09/03/20 0530 09/04/20  0615   CALCIUM 13.4* 12.1* 12.2* 10.6* 10.3   MG 1.8 2.1  --   --   --    PHOS  --  2.0*  --   --   --        LFT  Recent Labs   Lab 09/02/20  1440 09/03/20  0530 09/04/20  0615   PROT 7.0 5.7* 6.0   ALBUMIN 2.6* 2.2* 2.9*   BILITOT 1.1* 0.8 0.9   AST 24 16 16   ALKPHOS 641* 464* 427*   ALT 21 13 11       COAGS  Recent Labs   Lab 09/04/20  0615   INR 1.1     CE  Recent Labs   Lab 09/01/20 1919   TROPONINI 0.016     BNP  Recent Labs   Lab 09/01/20 1919   *     Lipid panel:  No results found for: CHOL  No results found for: HDL  No results found for: LDLCALC  No results found for: TRIG  No results  found for: CHOLHDL    ASSESSMENT/PLAN:     69 year old female with PMH of neuroendocrine tumor of the pancreas who is admitted for abdominal pain and swelling, cardiology was consulted for the concern of heart failure.  Physical exam concerning for hypervolemia and fluid overload.     Recommendations:  - aggressive IV diuresis   - keep K>4 and Mg>2  - telemetry         Ala Mohsen  [facility] Cardiology PGY6  [phone]

## 2020-09-04 NOTE — PT/OT/SLP PROGRESS
Physical Therapy Treatment    Patient Name:  Ching Bruce   MRN:  [id number]    Recommendations:     Discharge Recommendations:  home health PT, home health OT   Discharge Equipment Recommendations: walker, rolling, bath bench   Barriers to discharge: Decreased caregiver support and decreased functional mobility    Assessment:     Ching Bruce is a 69 y.o. female admitted with a medical diagnosis of Hypercalcemia.  She presents with the following impairments/functional limitations:  weakness, impaired functional mobilty, gait instability, impaired balance, decreased upper extremity function, decreased lower extremity function, decreased ROM, impaired skin.  Pt would continue to benefit from P.T. To address impairments listed above.  .    Rehab Prognosis: Fair+; patient would benefit from acute skilled PT services to address these deficits and reach maximum level of function.    Recent Surgery: * No surgery found *      Plan:     During this hospitalization, patient to be seen 5 x/week to address the identified rehab impairments via gait training, therapeutic activities, therapeutic exercises, neuromuscular re-education and progress toward the following goals:    · Plan of Care Expires:  10/02/20    Subjective       Patient/Family Comments/goals: Pt agreed to tx.  Pain/Comfort:  · Pain Rating 1: 0/10  · Pain Rating Post-Intervention 1: 0/10      Objective:     Communicated with RN (Frederikc) prior to session.  Patient found up in chair with peripheral IV, telemetry(chair alarm) upon PT entry to room.     General Precautions: Standard, fall   Orthopedic Precautions:N/A   Braces:       Functional Mobility:  · Transfers:     · Sit to Stand:  contact guard assistance and close with vc's for hand placement with rolling walker and 2x  · Gait: 70ft x 2 with Rw and CGA.  Pt ambulates with decreased nhi, little guarded, and requires vc's for upright posture. Standing rest break between bouts secondary to decreased strength  and endurance.  No LOB.  · Balance: sitting good-, standing fair, gait fair      AM-PAC 6 CLICK MOBILITY  Turning over in bed (including adjusting bedclothes, sheets and blankets)?: 3  Sitting down on and standing up from a chair with arms (e.g., wheelchair, bedside commode, etc.): 3  Moving from lying on back to sitting on the side of the bed?: 3  Moving to and from a bed to a chair (including a wheelchair)?: 3  Need to walk in hospital room?: 3  Climbing 3-5 steps with a railing?: 2  Basic Mobility Total Score: 17       Therapeutic Activities and Exercises:   Seated BLE theres:  LAQsx 15 reps, and reclined in chair with LEs elevated - AP x 20, heelslides, hip ABD/ADD with assist at heels x 15 reps.  Static standing with Rw x 45 sec with CGA/SBA.      Patient left up in chair with all lines intact, call button in reach, chair alarm on and RN notified..    GOALS:   Multidisciplinary Problems     Physical Therapy Goals        Problem: Physical Therapy Goal    Goal Priority Disciplines Outcome Goal Variances Interventions   Physical Therapy Goal     PT, PT/OT Ongoing, Progressing     Description: Goals to be met by: 10/2/20     Patient will increase functional independence with mobility by performin. Supine to sit with Modified [place]  2. Sit to supine with Modified [place]  3. Rolling with Modified [place].  4. Sit to stand transfer with Modified [place]  5. Gait  x 40 feet with Modified [place] using LRAD.   6. Lower extremity exercise program x10 reps per handout, with independence                     Time Tracking:     PT Received On: 20  PT Start Time: 1146     PT Stop Time: 1211  PT Total Time (min): 25 min     Billable Minutes: Gait Training 14 and Therapeutic Exercise 12    Treatment Type: Treatment  PT/PTA: PTA     PTA Visit Number: 1     Nayana Vasquez PTA  2020

## 2020-09-04 NOTE — PLAN OF CARE
Patient is AAO X 4. Vital signs stable. Remains on continuous Albumin 10 ml per hour and on Sandostatin 2.5 ml per hour. Used commode during night with assistance from 1 person. On tele monitor shows NSR. Voided freely. Safety measures maintained. Will continue to monitor patient.

## 2020-09-04 NOTE — PLAN OF CARE
Pt. AAO x4.  No complaints of pain.  Anticipated DC postponed to tmr.  Due to MD wanting pt to be be diuresed.  Worked w/ PT/OT; tolerated well.  Strict fluid intake maintained.  Albumin and braden infusing per MAR.  Will cont. To monitor.

## 2020-09-04 NOTE — PLAN OF CARE
Problem: Occupational Therapy Goal  Goal: Occupational Therapy Goal  Description: Goals to be met by: 10/3/20     Patient will increase functional independence with ADLs by performing:    LE Dressing with Supervision.  Grooming while standing with Supervision.  Toileting from toilet with Supervision for hygiene and clothing management.   Supine to sit with Supervision.  Step transfer with Supervision  Toilet transfer to toilet with Supervision.  Increased functional strength to WFL for self care skills and functional mobility.  Upper extremity exercise program x10 reps per handout, with independence.    Outcome: Ongoing, Progressing   Pt Awake and alert /c sister in room upon visit. Bed mob Viktoria; SBA sit <> stand. Static standing balance at sink for G/H tasks. V/C to correct flexed stance. Pt insisted on not having chair alarm; nurse notified. Recommend  OT/PT.  YENNIFER Jeff  9/4/2020

## 2020-09-04 NOTE — PROGRESS NOTES
Progress Note  General Surgery   Admit Date: 9/1/2020   LOS: 3 days   SUBJECTIVE:     No acute overnight events. IR consulted for possible repeat Thoracentesis/paracentesis, however no drainable collection. Patient tolerating Po intake, good uop. Passing Flatus. Patient have BM yesterday.  Ambulating with assistance from PT/OT.     Review of Systems   Constitutional: Negative for chills and fever.   HENT: Negative for sore throat.    Eyes: Negative for blurred vision and double vision.   Respiratory: Negative for cough and shortness of breath.    Cardiovascular: Negative for chest pain.   Gastrointestinal: Positive for abdominal pain. Negative for constipation, diarrhea, heartburn, nausea and vomiting.   Genitourinary: Negative for dysuria and urgency.   Musculoskeletal: Negative for back pain and falls.   Skin: Negative for itching and rash.   Neurological: Negative for headaches.   Endo/Heme/Allergies: Does not bruise/bleed easily.   Psychiatric/Behavioral: The patient is not nervous/anxious.        OBJECTIVE:   Vital Signs (Most Recent)  Temp: 98.6 °F (37 °C) (09/04/20 0734)  Pulse: 93 (09/04/20 0734)  Resp: 19 (09/04/20 0734)  BP: 126/60 (09/04/20 0734)  SpO2: 95 % (09/04/20 0734)    I & O (Last 24H):    Intake/Output Summary (Last 24 hours) at 9/4/2020 0738  Last data filed at 9/4/2020 0600  Gross per 24 hour   Intake 412.3 ml   Output 350 ml   Net 62.3 ml     Wt Readings from Last 3 Encounters:   09/04/20 72.8 kg (160 lb 7.9 oz)   08/04/20 66.3 kg (146 lb 0.9 oz)   08/03/20 66.3 kg (146 lb 0.9 oz)       Current Diet Order   Procedures    Diet Cardiac Fluid - 800mL     Order Specific Question:   Fluid restriction:     Answer:   Fluid - 800mL        Physical Exam   Constitutional: She is oriented to person, place, and time and well-developed, well-nourished, and in no distress.   HENT:   Head: Normocephalic and atraumatic.   Neck: Normal range of motion. Neck supple.   Cardiovascular: Normal rate, regular  rhythm and normal heart sounds.   Pulmonary/Chest: Effort normal.   Minimal crackles at the bases bilaterally.    Abdominal: Soft. Bowel sounds are normal. She exhibits distension. There is no abdominal tenderness. There is no rebound.   Distention decreased from Day Prior.    Neurological: She is alert and oriented to person, place, and time.   Skin: Skin is warm and dry.   Psychiatric: Affect normal.     Laboratory Data:  CBC  Recent Labs   Lab 09/02/20  0421 09/03/20  0530 09/04/20  0615   WBC 6.31 5.13 4.86   RBC 3.20* 2.80* 2.86*   HGB 10.2* 8.8* 8.9*   HCT 32.2* 28.6* 29.0*   * 139* 132*   * 102* 101*   MCH 31.9* 31.4* 31.1*   MCHC 31.7* 30.8* 30.7*     CMP  Recent Labs   Lab 09/02/20  1440 09/03/20  0530 09/04/20  0615   CALCIUM 12.2* 10.6* 10.3   PROT 7.0 5.7* 6.0    142 142   K 4.5 3.6 3.4*   CO2 25 29 31*    106 103   BUN 9 12 15   CREATININE 0.8 0.8 0.9   ALKPHOS 641* 464* 427*   ALT 21 13 11   AST 24 16 16   BILITOT 1.1* 0.8 0.9     MICRO  Microbiology Results (last 7 days)     Procedure Component Value Units Date/Time    (rule out SBP) Culture, Anaerobic [[id number]] Collected: 09/02/20 1651    Order Status: Completed Specimen: Body Fluid from Lung, Left Updated: 09/04/20 0731     Anaerobic Culture Culture in progress    Narrative:      To rule out SBP order labs: Aerobic culture [[id number]],  Culture, Anaerobic [GHM108], Gram stain [SBA893], Albumin  [IGK089], Protein [KAF675], LDH [WWU043], WBC \T\ Dff  [CNB9945].    (rule out SBP) Aerobic culture [[id number]] Collected: 09/02/20 1651    Order Status: Completed Specimen: Body Fluid from Lung, Left Updated: 09/04/20 0729     Aerobic Bacterial Culture No growth    Narrative:      To rule out SBP order labs: Aerobic culture [[id number]],  Culture, Anaerobic [XFY370], Gram stain [UHG154], Albumin  [IUU723], Protein [EFM106], LDH [BNY268], WBC \T\ Dff  [FDI7426].    Blood culture #1 [[id number]] Collected: 09/01/20 1911    Order Status:  Completed Specimen: Blood from Peripheral, Wrist, Right Updated: 09/04/20 0613     Blood Culture, Routine No Growth to date      No Growth to date      No Growth to date    Narrative:      Blood Culture #1    Blood culture #2 [[id number]] Collected: 09/01/20 1942    Order Status: Completed Specimen: Blood from Peripheral, Antecubital, Left Updated: 09/04/20 0613     Blood Culture, Routine No Growth to date      No Growth to date      No Growth to date    Narrative:      Blood Culture #2        Diagnostic Results:  Imaging Results          CT Abdomen Pelvis  Without Contrast (Final result)  Result time 09/01/20 21:34:22    Final result by Shaun Manning MD (09/01/20 21:34:22)                 Impression:      New ascites since the prior MRI and CT of the abdomen.    Apparent new micro nodular densities throughout both lung bases suspicious for hematogenous metastasis.    Small bilateral effusions and compressive atelectasis in both lung bases.    Stable biliary stent with hepatic bili a and multiple areas of low density throughout the liver compatible with both metastatic disease and vascular shunting.    No evidence of bowel obstruction or  tract obstruction.    Mild subcutaneous 3rd spacing.      Electronically signed by: Shaun Manning  Date:    09/01/2020  Time:    21:34             Narrative:    EXAMINATION:  CT ABDOMEN PELVIS WITHOUT CONTRAST    CLINICAL HISTORY:  Abdominal distension;    TECHNIQUE:  Low dose axial images, sagittal and coronal reformations were obtained from the lung bases to the pubic symphysis.    COMPARISON:  Previous MRI of the abdomen, 07/07/2020, CT of the abdomen and pelvis, 07/02/2014    FINDINGS:  There are bilateral effusions with compressive atelectasis in the lung bases.  Multiple small nodular densities on lung windows are seen in both lung bases too numerous to count ranging in size from 5 mm down to 3 mm none of which appear calcified.    No distinct pericardial effusion is  present.    The liver is heterogeneous and nodular with areas of low density in the periphery of the left and right lobe suggesting some degree of vascular shunting but likely representing areas of metastatic disease seen on previous MRI.  Biliary air is noted with a expandable stent appearing to be within the common bile duct placed on 11/14/2019 ERCP.    New moderate ascites is present.  There is no evidence of GI tract obstruction or dilatation.    The kidneys demonstrate no hydronephrosis, calcified stone or hydroureter.  Retroperitoneal phleboliths are suggested in the region of the gonadal veins.    Within the pelvis, the previously seen soft tissue mass to the left of midline posteriorly is again noted.    No discrete new osseous lytic or blastic changes evident.                               X-Ray Chest AP Portable (Final result)  Result time 09/01/20 20:54:10    Final result by Lucina Eid MD (09/01/20 20:54:10)                 Impression:      Bilateral pleural effusion left greater than right with associated compressive atelectasis and/or lower lobe consolidation.      Electronically signed by: Lucina Eid  Date:    09/01/2020  Time:    20:54             Narrative:    EXAMINATION:  AP PORTABLE CHEST    CLINICAL HISTORY:  shortness of breath;    TECHNIQUE:  AP portable chest radiograph was submitted.    COMPARISON:  None.    FINDINGS:  AP portable chest radiograph demonstrates a cardiac silhouette within normal limits.  There is mild nonspecific prominence of the interstitium.  Bibasilar densities are present.  There is obscuration of the left hemidiaphragm and blunting of the left costophrenic angle.  No pneumothorax is detected.  Surgical clips are seen at the neck.  There are multiple overlying cardiac leads.                                ASSESSMENT/PLAN:   Ching Bruce is a 69 y.o. 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 Rodriguez. Patient presented with hypercalcemia now improving. S/p Thoracentesis and paracentesis. 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 Patient remains hemodynamically stable and is clinically improving.     Plan:  -Advance diet as tolerated  -KCL 40meq x 1 now   -Continue Lasix 20q8 and spironolactone 100mg   -Continue calcitonin spray   -Octreotide ggt   -PT/OT  -Lovenox        Jese Li MD   [employer] [facility], PGY-3

## 2020-09-04 NOTE — PROGRESS NOTES
Pharmacist Intervention IV to PO Note    Ching Bruce is a 69 y.o. female being treated with IV medication pantoprazole    Patient Data:    Vital Signs (Most Recent):  Temp: 98.6 °F (37 °C) (09/04/20 0734)  Pulse: 84 (09/04/20 0809)  Resp: 19 (09/04/20 0734)  BP: 126/60 (09/04/20 0734)  SpO2: 97 % (09/04/20 0804) Vital Signs (72h Range):  Temp:  [97.7 °F (36.5 °C)-99.1 °F (37.3 °C)]   Pulse:  []   Resp:  [13-28]   BP: ()/(54-87)   SpO2:  [94 %-100 %]      CBC:  Recent Labs   Lab 09/02/20  0421 09/03/20  0530 09/04/20  0615   WBC 6.31 5.13 4.86   RBC 3.20* 2.80* 2.86*   HGB 10.2* 8.8* 8.9*   HCT 32.2* 28.6* 29.0*   * 139* 132*   * 102* 101*   MCH 31.9* 31.4* 31.1*   MCHC 31.7* 30.8* 30.7*     CMP:     Recent Labs   Lab 09/02/20  1440 09/03/20  0530 09/04/20  0615   * 106 85   CALCIUM 12.2* 10.6* 10.3   ALBUMIN 2.6* 2.2* 2.9*   PROT 7.0 5.7* 6.0    142 142   K 4.5 3.6 3.4*   CO2 25 29 31*    106 103   BUN 9 12 15   CREATININE 0.8 0.8 0.9   ALKPHOS 641* 464* 427*   ALT 21 13 11   AST 24 16 16   BILITOT 1.1* 0.8 0.9       Dietary Orders:  Diet Orders            Diet Cardiac Fluid - 800mL: Cardiac starting at 09/02 1736    Dietary nutrition supplements [facility]; Boost Plus - Strawberry starting at 09/02 0745            Based on the following criteria, this patient qualifies for intravenous to oral conversion:  [x] The patients gastrointestinal tract is functioning (tolerating medications via oral or enteral route for 24 hours and tolerating food or enteral feeds for 24 hours).  [x] The patient is hemodynamically stable for 24 hours (heart rate <100 beats per minute, systolic blood pressure >99 mm Hg, and respiratory rate <20 breaths per minute).  [x] The patient shows clinical improvement (afebrile for at least 24 hours and white blood cell count downtrending or normalized). Additionally, the patient must be non-neutropenic (absolute neutrophil count >500  cells/mm3).  [x] For antimicrobials, the patient has received IV therapy for at least 24 hours.    IV medication pantoprazole will be changed to oral medication pantoprazole    Pharmacist's Name: Ching Bowie  Pharmacist's Extension: [phone]

## 2020-09-04 NOTE — PT/OT/SLP PROGRESS
Occupational Therapy   Treatment    Name: Ching Bruce  MRN: [id number]  Admitting Diagnosis:  Hypercalcemia       Recommendations:     Discharge Recommendations: home health OT, home with home health(spoke /c OTR re: D/C rec update)  Discharge Equipment Recommendations:  bath bench, walker, rolling  Barriers to discharge:  None    Assessment:   Pt awake and alert /c sister in room upon visit. Mod I  Bed mob; SBA sit <> stand. Static standing balance at sink for G/H tasks /c RW. V/C to correct flexed stance. Pt insisted on not having chair alarm; nurse notified.     Ching Bruce is a 69 y.o. female with a medical diagnosis of Hypercalcemia. Performance deficits affecting function are weakness, impaired endurance, impaired self care skills, impaired functional mobilty, gait instability, impaired balance, impaired cognition, decreased upper extremity function, decreased lower extremity function, decreased safety awareness.     Rehab Prognosis:  Good; patient would benefit from acute skilled OT services to address these deficits and reach maximum level of function.       Plan:     Patient to be seen 5 x/week to address the above listed problems via self-care/home management, therapeutic activities, therapeutic exercises  · Plan of Care Expires: 10/03/20  · Plan of Care Reviewed with: patient, family    Subjective     Pain/Comfort:  · Pain Rating 1: 0/10    Objective:     Communicated with: nurse Connelly, prior to session.  Patient found HOB elevated with bed alarm, peripheral IV, telemetry upon OT entry to room.    General Precautions: Standard, fall   Orthopedic Precautions:N/A   Braces: N/A     Occupational Performance:     Bed Mobility:    · Patient completed Rolling/Turning to Left with  modified independence  · Patient completed Supine to Sit with modified independence     Functional Mobility/Transfers:  · Patient completed Sit <> Stand Transfer with stand by assistance  with  rolling walker   · Patient completed  Bed <> Chair Transfer using Step Transfer technique with contact guard assistance with rolling walker    Activities of Daily Living:  · Grooming: contact guard assistance V/C for correcting posture      [facility] 6 Click ADL: 19    Treatment & Education:  Pt awake and alert /c sister in room. Pt stated she just got BTB after toileting; agreed to get OOB for G/H tasks. Bed Mob and transfers stated above. V/C needed for pt to correct flexed stance over sink for G/H tasks. Edema noted in legs. Pt and sister insisted on not having chair alarm on since sister will be in room; nurse was notified.      Patient left up in chair with all lines intact, call button in reach, chair alarm on and not on chairEducation:      GOALS:   Multidisciplinary Problems     Occupational Therapy Goals        Problem: Occupational Therapy Goal    Goal Priority Disciplines Outcome Interventions   Occupational Therapy Goal     OT, PT/OT Ongoing, Progressing    Description: Goals to be met by: 10/3/20     Patient will increase functional independence with ADLs by performing:    LE Dressing with Supervision.  Grooming while standing with Supervision.  Toileting from toilet with Supervision for hygiene and clothing management.   Supine to sit with Supervision.  Step transfer with Supervision  Toilet transfer to toilet with Supervision.  Increased functional strength to WFL for self care skills and functional mobility.  Upper extremity exercise program x10 reps per handout, with independence.                     Time Tracking:     OT Date of Treatment: 09/04/20  OT Start Time: 1000  OT Stop Time: 1024  OT Total Time (min): 24 min    Billable Minutes:Self Care/Home Management 24    YENNIFER Jeff  9/4/2020

## 2020-09-04 NOTE — CONSULTS
Consult acknowledged. Recent CT reviewed. No significant effusion on the right and left chest was drained yesterday. No indication for repeat thoracentesis at this time.    Thank you for the opportunity to assist in the care of this patient.      Andrew Marsala MD [facility]  Pager [phone]

## 2020-09-04 NOTE — PLAN OF CARE
Problem: Adult Inpatient Plan of Care  Goal: Plan of Care Review  Outcome: Ongoing, Progressing   VIRTUAL NURSE:  Labs, notes, orders, and careplan reviewed.

## 2020-09-04 NOTE — PHYSICIAN QUERY
PT Name: Ching Bruce  MR #: [id number]    Physician Query Form - Heart  Condition Clarification     CDS/: April Chaudhary               Contact information: [email or web address]  This form is a permanent document in the medical record.     Query Date: September 4, 2020    By submitting this query, we are merely seeking further clarification of documentation. Please utilize your independent clinical judgment when addressing the question(s) below.    The medical record contains the following   Indicators     Supporting Clinical Findings Location in Medical Record   x BNP BNP= 104 Labs, Cardiac Profile 09/01    EF     x Radiology findings Cardiac silhouette within normal limits.  Bilateral pleural effusion left greater than right with associated compressive atelectasis and/or lower lobe consolidation.    Prominence of the central pulmonary vasculature which may be related to the poor depth of inspiration.  Mild pulmonary edema cannot be excluded.  Layering bilateral pleural effusions with associated bibasilar atelectasis.      Cardiomediastinal silhouette is stable.  There is basilar atelectasis.      Right lung and cardiomediastinal silhouette demonstrate no significant change in the short interval.   CXR 09/01        CXR 09/02 1421          CXR 09/02 1451      CXR 09/02       x Echo Results · Left ventricular systolic function. The estimated ejection fraction is 60%.  · Grade I (mild) left ventricular diastolic dysfunction consistent with impaired relaxation.  · Moderate left atrial enlargement.  · Normal right ventricular systolic function.  · Normal central venous pressure (3 mmHg).  · The estimated PA systolic pressure is 29 mmHg.    TTE reviewed with no significant systolic or diastolic dysfunction and normal measurements for RV function with engorged liver with dilation of the hepatic ducts although clinical picture would suggest right heart failure with elevated JVP, hepatojugular reflux, and  "possible right sided S4.  SAAG of 2 suggest portal HTN.      TTE Complete 09/03              Cardiology Consult Attestation 09/04    "Ascites" documented      "SOB" or "WESTFALL" documented      "Hypoxia" documented     x Heart Failure documented Cardiology was consulted for the concern of heart failure.      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     Cardiology Consult and Attestation 09/04    General Surgery PN 09/04    "Edema" documented     x Diuretics/Meds Currently on lasix 20IV TID without significant response, would switch to Lasix 40 IV BID and titrate up until she is net negative 1-2 liters per day.  Would continue diuresis until we see a bump in BUN or creatinine.    Aggressive IV diuresis    Continue Lasix 20q8 and spironolactone 100mg    Cardiology Consult Attestation 09/04        Cardiology Consult 09/04    General Surgery PN 09/04      Treatment:     x Other:  Physical exam concerning for hypervolemia and fluid overload.      Cardiology Consult 09/04   Heart failure (HF) can be acute, chronic or both. It is generally further specificed as systolic, diastolic, or combined. Lastly, it is important to identify an underlying etiology if known or suspected.     Common clues to acute exacerbation:  Rapidly progressive symptoms (w/in 2 weeks of presentation), using IV diuretics to treat, using supplemental O2, pulmonary edema on Xray, MI w/in 4 weeks, and/or BNP >500    Systolic Heart Failure: is defined as chart documentation of a left ventricular ejection fraction (LVEF) less than 40%     Diastolic Heart Failure: is defined as a left ventricular ejection fraction (LVEF) greater than 40%   +      Evidence of diastolic dysfunction on echocardiography OR    Right heart catheterization wedge pressure above 12 mm Hg OR    Left heart catheterization left ventricular end diastolic pressure 18 mm Hg or above.    References: *American Heart Association    The clinical " guidelines noted below are only system guidelines, and do not replace the providers clinical judgment.     Provider, please specify the diagnosis associated with above clinical findings  [   ] Acute Diastolic Heart Failure - New diagnosis.  EF > 40%  and acute HF symptoms documented   [   ] Acute on Chronic Diastolic Heart Failure -    Pre-existing diastoic HF diagnosis.  EF > 40%  and acute HF symptoms documented       [   ] Chronic Diastolic Heart Failure - Pre-existing diastolic HF diagnosis.  EF > 40%  without  acute HF symptoms documented   [   ] Other Type of Heart Failure (please specify type):   [   ] Heart Failure Ruled Out   [ xxxx  ] Other (please specify):clinuically undetermined: 22604   [  ] Clinically Undetermined                           Please document in your progress notes daily for the duration of treatment until resolved and include in your discharge summary.

## 2020-09-05 NOTE — NURSING
Notified Dr. BRENNA Prasad patient called for assistance to the bed side commode and did not wait. Found patient  In a sitting position between the bed and the chair. PICC line was coming out, almost all the way. Both charge nurses Brennan and myself in room, bed alarm alarming we were able to get the patient up to the bedside commode. Orders given to get a new PICC line placed and get a chest x- ray to check placement. Charge nurse Caitie notified.

## 2020-09-05 NOTE — PLAN OF CARE
Albumin and Sandostatin infusing as ordered. Lasix and Spirolactone given as ordered. Assisted to the bedside commode. Call light in reach, remains free from falls, bed alarm in use.

## 2020-09-05 NOTE — PROCEDURES
"Ching Bruce is a 69 y.o. female patient.    Temp: 98.2 °F (36.8 °C) (09/05/20 0725)  Pulse: 91 (09/05/20 0800)  Resp: 20 (09/05/20 0725)  BP: (!) 111/58 (09/05/20 0725)  SpO2: 95 % (09/05/20 0500)  Weight: 72 kg (158 lb 11.7 oz) (09/05/20 0459)  Height: 5' 7" (170.2 cm) (09/03/20 1107)    PICC  Date/Time: 9/5/2020 9:35 AM  Performed by: Enrique Andrew RN  Consent Done: Yes  Time out: Immediately prior to procedure a time out was called to verify the correct patient, procedure, equipment, support staff and site/side marked as required  Indications: med administration  Anesthesia: local infiltration  Local anesthetic: lidocaine 1% without epinephrine  Anesthetic Total (mL): 1  Preparation: skin prepped with ChloraPrep  Skin prep agent dried: skin prep agent completely dried prior to procedure  Sterile barriers: all five maximum sterile barriers used - cap, mask, sterile gown, sterile gloves, and large sterile sheet  Hand hygiene: hand hygiene performed prior to central venous catheter insertion  Location details: right basilic  Catheter type: double lumen  Catheter size: 5 Fr  Catheter Length: 35cm    Ultrasound guidance: yes  Vessel Caliber: large and patent, compressibility normal  Needle advanced into vessel with real time Ultrasound guidance.  Guidewire confirmed in vessel.  Sterile sheath used.  Number of attempts: 1  Post-procedure: blood return through all ports, chlorhexidine patch and sterile dressing applied  Estimated blood loss (mL): 0            Enrique Andrew  9/5/2020  "

## 2020-09-05 NOTE — PROGRESS NOTES
Progress Note  General Surgery   Admit Date: 9/1/2020   LOS: 4 days   SUBJECTIVE:     No acute overnight events. Patient tolerating Po intake and good uop. Gisele diet well. Passing Flatus. Patient had BM yesterday.  Ambulating with assistance from PT/OT. Swelling and distention decreased from day prior.     Review of Systems   Constitutional: Negative for chills and fever.   HENT: Negative for sore throat.    Eyes: Negative for blurred vision and double vision.   Respiratory: Negative for cough and shortness of breath.    Cardiovascular: Negative for chest pain.   Gastrointestinal: Positive for abdominal pain. Negative for constipation, diarrhea, heartburn, nausea and vomiting.   Genitourinary: Negative for dysuria and urgency.   Musculoskeletal: Negative for back pain and falls.   Skin: Negative for itching and rash.   Neurological: Negative for headaches.   Endo/Heme/Allergies: Does not bruise/bleed easily.   Psychiatric/Behavioral: The patient is not nervous/anxious.        OBJECTIVE:   Vital Signs (Most Recent)  Temp: 98 °F (36.7 °C) (09/05/20 0459)  Pulse: 87 (09/05/20 0459)  Resp: (!) 22 (09/05/20 0459)  BP: (!) 118/56 (09/05/20 0459)  SpO2: 95 % (09/05/20 0500)    I & O (Last 24H):    Intake/Output Summary (Last 24 hours) at 9/5/2020 0538  Last data filed at 9/5/2020 0531  Gross per 24 hour   Intake 805.05 ml   Output 1650 ml   Net -844.95 ml     Wt Readings from Last 3 Encounters:   09/05/20 72 kg (158 lb 11.7 oz)   08/04/20 66.3 kg (146 lb 0.9 oz)   08/03/20 66.3 kg (146 lb 0.9 oz)       Current Diet Order   Procedures    Diet Cardiac Fluid - 800mL     Order Specific Question:   Fluid restriction:     Answer:   Fluid - 800mL    Diet Adult Regular        Physical Exam   Constitutional: She is oriented to person, place, and time and well-developed, well-nourished, and in no distress.   HENT:   Head: Normocephalic and atraumatic.   Neck: Normal range of motion. Neck supple.   Cardiovascular: Normal rate,  regular rhythm and normal heart sounds.   Pulmonary/Chest: Effort normal.   Minimal crackles at the bases bilaterally.    Abdominal: Soft. Bowel sounds are normal. She exhibits distension. There is no abdominal tenderness. There is no rebound.   Distention decreased from yesterday. BS present abd remains soft.     Neurological: She is alert and oriented to person, place, and time.   Skin: Skin is warm and dry.   Psychiatric: Affect normal.     Laboratory Data:  CBC  Recent Labs   Lab 09/02/20  0421 09/03/20  0530 09/04/20  0615   WBC 6.31 5.13 4.86   RBC 3.20* 2.80* 2.86*   HGB 10.2* 8.8* 8.9*   HCT 32.2* 28.6* 29.0*   * 139* 132*   * 102* 101*   MCH 31.9* 31.4* 31.1*   MCHC 31.7* 30.8* 30.7*     CMP  Recent Labs   Lab 09/02/20  1440 09/03/20  0530 09/04/20  0615   CALCIUM 12.2* 10.6* 10.3   PROT 7.0 5.7* 6.0    142 142   K 4.5 3.6 3.4*   CO2 25 29 31*    106 103   BUN 9 12 15   CREATININE 0.8 0.8 0.9   ALKPHOS 641* 464* 427*   ALT 21 13 11   AST 24 16 16   BILITOT 1.1* 0.8 0.9     MICRO  Microbiology Results (last 7 days)     Procedure Component Value Units Date/Time    (rule out SBP) Culture, Anaerobic [[id number]] Collected: 09/02/20 1651    Order Status: Completed Specimen: Body Fluid from Lung, Left Updated: 09/04/20 0731     Anaerobic Culture Culture in progress    Narrative:      To rule out SBP order labs: Aerobic culture [[id number]],  Culture, Anaerobic [PSA802], Gram stain [LMK631], Albumin  [OWQ409], Protein [YYR259], LDH [GQB731], WBC \T\ Dff  [QRV7628].    (rule out SBP) Aerobic culture [[id number]] Collected: 09/02/20 1651    Order Status: Completed Specimen: Body Fluid from Lung, Left Updated: 09/04/20 0729     Aerobic Bacterial Culture No growth    Narrative:      To rule out SBP order labs: Aerobic culture [PPZ010],  Culture, Anaerobic [KKA001], Gram stain [YXF246], Albumin  [EMS158], Protein [DGX022], LDH [PTV565], WBC \T\ Dff  [HJM5121].    Blood culture #1 [[id number]]  Collected: 09/01/20 1911    Order Status: Completed Specimen: Blood from Peripheral, Wrist, Right Updated: 09/04/20 0613     Blood Culture, Routine No Growth to date      No Growth to date      No Growth to date    Narrative:      Blood Culture #1    Blood culture #2 [[id number]] Collected: 09/01/20 1942    Order Status: Completed Specimen: Blood from Peripheral, Antecubital, Left Updated: 09/04/20 0613     Blood Culture, Routine No Growth to date      No Growth to date      No Growth to date    Narrative:      Blood Culture #2        Diagnostic Results:  Imaging Results          CT Abdomen Pelvis  Without Contrast (Final result)  Result time 09/01/20 21:34:22    Final result by Shaun Manning MD (09/01/20 21:34:22)                 Impression:      New ascites since the prior MRI and CT of the abdomen.    Apparent new micro nodular densities throughout both lung bases suspicious for hematogenous metastasis.    Small bilateral effusions and compressive atelectasis in both lung bases.    Stable biliary stent with hepatic bili a and multiple areas of low density throughout the liver compatible with both metastatic disease and vascular shunting.    No evidence of bowel obstruction or  tract obstruction.    Mild subcutaneous 3rd spacing.      Electronically signed by: Shaun Manning  Date:    09/01/2020  Time:    21:34             Narrative:    EXAMINATION:  CT ABDOMEN PELVIS WITHOUT CONTRAST    CLINICAL HISTORY:  Abdominal distension;    TECHNIQUE:  Low dose axial images, sagittal and coronal reformations were obtained from the lung bases to the pubic symphysis.    COMPARISON:  Previous MRI of the abdomen, 07/07/2020, CT of the abdomen and pelvis, 07/02/2014    FINDINGS:  There are bilateral effusions with compressive atelectasis in the lung bases.  Multiple small nodular densities on lung windows are seen in both lung bases too numerous to count ranging in size from 5 mm down to 3 mm none of which appear  calcified.    No distinct pericardial effusion is present.    The liver is heterogeneous and nodular with areas of low density in the periphery of the left and right lobe suggesting some degree of vascular shunting but likely representing areas of metastatic disease seen on previous MRI.  Biliary air is noted with a expandable stent appearing to be within the common bile duct placed on 11/14/2019 ERCP.    New moderate ascites is present.  There is no evidence of GI tract obstruction or dilatation.    The kidneys demonstrate no hydronephrosis, calcified stone or hydroureter.  Retroperitoneal phleboliths are suggested in the region of the gonadal veins.    Within the pelvis, the previously seen soft tissue mass to the left of midline posteriorly is again noted.    No discrete new osseous lytic or blastic changes evident.                               X-Ray Chest AP Portable (Final result)  Result time 09/01/20 20:54:10    Final result by Lucina Eid MD (09/01/20 20:54:10)                 Impression:      Bilateral pleural effusion left greater than right with associated compressive atelectasis and/or lower lobe consolidation.      Electronically signed by: Lucina Eid  Date:    09/01/2020  Time:    20:54             Narrative:    EXAMINATION:  AP PORTABLE CHEST    CLINICAL HISTORY:  shortness of breath;    TECHNIQUE:  AP portable chest radiograph was submitted.    COMPARISON:  None.    FINDINGS:  AP portable chest radiograph demonstrates a cardiac silhouette within normal limits.  There is mild nonspecific prominence of the interstitium.  Bibasilar densities are present.  There is obscuration of the left hemidiaphragm and blunting of the left costophrenic angle.  No pneumothorax is detected.  Surgical clips are seen at the neck.  There are multiple overlying cardiac leads.                                ASSESSMENT/PLAN:   Ching Bruce is a 69 y.o. 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 Rodriguez. Patient presented with hypercalcemia now improving. S/p Thoracentesis and paracentesis. 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 SAAG > 1.7 liekly 2/2 to portal HTN. Patient remains hemodynamically stable and is clinically improving.     Plan:  -Advance diet as tolerated  -Continue Lasix 20q8 and spironolactone 100mg   -Continue calcitonin spray   -Octreotide ggt   -PT/OT  -Lovenox  -Will need follow up with Dr. Rodriguez on discharge         Jese Li MD   [employer] [facility], PGY-3

## 2020-09-05 NOTE — PLAN OF CARE
Progress notes reviewed. Morning rounds completed. Introduced self as VN for this shift. Educated pt on VN's role in patient's care.  Plan of care reviewed with patient. Opportunity given for pt's questions. No questions or concerns expressed at this time. Safety precautions explained, instructed to call for assistance. Patient verbalizes understanding.  VN to continue to monitor.    Picc line nurse at bedside placing new PICC

## 2020-09-06 PROBLEM — E89.0 POSTOPERATIVE HYPOTHYROIDISM: Status: ACTIVE | Noted: 2020-01-01

## 2020-09-06 NOTE — PLAN OF CARE
Problem: Adult Inpatient Plan of Care  Goal: Plan of Care Review  Outcome: Ongoing, Progressing  Ms. Bruce is resting and medications were given per orders. PICC line in place. TPN,SERENITY,Lipids, and Albumin infusing. [place] [facility] Cardiac monitoring continued. Safety maintained.

## 2020-09-06 NOTE — PROGRESS NOTES
Progress Note  General Surgery   Admit Date: 9/1/2020   LOS: 5 days   SUBJECTIVE:     No acute overnight events. Patient tolerating Po intake and good uop. Gisele diet and TPN well. Passing Flatus. Last BM 9/6.     Review of Systems   Constitutional: Negative for chills and fever.   HENT: Negative for sore throat.    Eyes: Negative for blurred vision and double vision.   Respiratory: Negative for cough and shortness of breath.    Cardiovascular: Negative for chest pain.   Gastrointestinal: Positive for abdominal pain. Negative for constipation, diarrhea, heartburn, nausea and vomiting.   Genitourinary: Negative for dysuria and urgency.   Musculoskeletal: Negative for back pain and falls.   Skin: Negative for itching and rash.   Neurological: Negative for headaches.   Endo/Heme/Allergies: Does not bruise/bleed easily.   Psychiatric/Behavioral: The patient is not nervous/anxious.        OBJECTIVE:   Vital Signs (Most Recent)  Temp: 98.4 °F (36.9 °C) (09/06/20 0433)  Pulse: 91 (09/06/20 0534)  Resp: 17 (09/06/20 0433)  BP: (!) 118/58 (09/06/20 0537)  SpO2: 96 % (09/05/20 2331)    I & O (Last 24H):    Intake/Output Summary (Last 24 hours) at 9/6/2020 0720  Last data filed at 9/6/2020 0500  Gross per 24 hour   Intake 1253.94 ml   Output 2175 ml   Net -921.06 ml     Wt Readings from Last 3 Encounters:   09/06/20 69.4 kg (153 lb)   08/04/20 66.3 kg (146 lb 0.9 oz)   08/03/20 66.3 kg (146 lb 0.9 oz)       Current Diet Order   Procedures    Diet Cardiac Fluid - 800mL     Order Specific Question:   Fluid restriction:     Answer:   Fluid - 800mL    Diet Adult Regular        Physical Exam   Constitutional: She is oriented to person, place, and time and well-developed, well-nourished, and in no distress.   HENT:   Head: Normocephalic and atraumatic.   Neck: Normal range of motion. Neck supple.   Cardiovascular: Normal rate, regular rhythm and normal heart sounds.   Pulmonary/Chest: Effort normal.   Minimal crackles at the bases  bilaterally.    Abdominal: Soft. Bowel sounds are normal. She exhibits distension. There is no abdominal tenderness. There is no rebound.   Distention slightly increased today. BS present abd remains soft.     Neurological: She is alert and oriented to person, place, and time.   Skin: Skin is warm and dry.   Psychiatric: Affect normal.     Laboratory Data:  CBC  Recent Labs   Lab 09/04/20 0615 09/05/20 0328 09/06/20  0530   WBC 4.86 4.71 6.19   RBC 2.86* 2.68* 2.73*   HGB 8.9* 8.5* 8.8*   HCT 29.0* 26.9* 27.3*   * 132* 138*   * 100* 100*   MCH 31.1* 31.7* 32.2*   MCHC 30.7* 31.6* 32.2     CMP  Recent Labs   Lab 09/04/20 0615 09/05/20 0328 09/06/20  0530   CALCIUM 10.3 10.1 10.4   PROT 6.0 6.2 6.8    143 141   K 3.4* 3.3* 4.3   CO2 31* 33* 33*    102 101   BUN 15 15 19   CREATININE 0.9 1.0 1.0   ALKPHOS 427* 434* 495*   ALT 11 12 14   AST 16 17 21   BILITOT 0.9 0.9 0.9     MICRO  Microbiology Results (last 7 days)     Procedure Component Value Units Date/Time    Blood culture #2 [[id number]] Collected: 09/01/20 1942    Order Status: Completed Specimen: Blood from Peripheral, Antecubital, Left Updated: 09/06/20 0612     Blood Culture, Routine No Growth to date      No Growth to date      No Growth to date      No Growth to date      No Growth to date    Narrative:      Blood Culture #2    Blood culture #1 [[id number]] Collected: 09/01/20 1911    Order Status: Completed Specimen: Blood from Peripheral, Wrist, Right Updated: 09/06/20 0612     Blood Culture, Routine No Growth to date      No Growth to date      No Growth to date      No Growth to date      No Growth to date    Narrative:      Blood Culture #1    (rule out SBP) Aerobic culture [[id number]] Collected: 09/02/20 1651    Order Status: Completed Specimen: Body Fluid from Lung, Left Updated: 09/05/20 1026     Aerobic Bacterial Culture No growth    Narrative:      To rule out SBP order labs: Aerobic culture [WCY829],  Culture,  Anaerobic [OVQ520], Gram stain [FTK847], Albumin  [LTK087], Protein [BCP679], LDH [PDF838], WBC \T\ Dff  [BLM0014].    (rule out SBP) Culture, Anaerobic [[id number]] Collected: 09/02/20 1651    Order Status: Completed Specimen: Body Fluid from Lung, Left Updated: 09/04/20 0731     Anaerobic Culture Culture in progress    Narrative:      To rule out SBP order labs: Aerobic culture [KKT119],  Culture, Anaerobic [RJR081], Gram stain [CIG722], Albumin  [FEZ592], Protein [LAX982], LDH [KNV797], WBC \T\ Dff  [SAZ6670].        Diagnostic Results:  Imaging Results          CT Abdomen Pelvis  Without Contrast (Final result)  Result time 09/01/20 21:34:22    Final result by Shaun Manning MD (09/01/20 21:34:22)                 Impression:      New ascites since the prior MRI and CT of the abdomen.    Apparent new micro nodular densities throughout both lung bases suspicious for hematogenous metastasis.    Small bilateral effusions and compressive atelectasis in both lung bases.    Stable biliary stent with hepatic bili a and multiple areas of low density throughout the liver compatible with both metastatic disease and vascular shunting.    No evidence of bowel obstruction or  tract obstruction.    Mild subcutaneous 3rd spacing.      Electronically signed by: Shaun Manning  Date:    09/01/2020  Time:    21:34             Narrative:    EXAMINATION:  CT ABDOMEN PELVIS WITHOUT CONTRAST    CLINICAL HISTORY:  Abdominal distension;    TECHNIQUE:  Low dose axial images, sagittal and coronal reformations were obtained from the lung bases to the pubic symphysis.    COMPARISON:  Previous MRI of the abdomen, 07/07/2020, CT of the abdomen and pelvis, 07/02/2014    FINDINGS:  There are bilateral effusions with compressive atelectasis in the lung bases.  Multiple small nodular densities on lung windows are seen in both lung bases too numerous to count ranging in size from 5 mm down to 3 mm none of which appear calcified.    No  distinct pericardial effusion is present.    The liver is heterogeneous and nodular with areas of low density in the periphery of the left and right lobe suggesting some degree of vascular shunting but likely representing areas of metastatic disease seen on previous MRI.  Biliary air is noted with a expandable stent appearing to be within the common bile duct placed on 11/14/2019 ERCP.    New moderate ascites is present.  There is no evidence of GI tract obstruction or dilatation.    The kidneys demonstrate no hydronephrosis, calcified stone or hydroureter.  Retroperitoneal phleboliths are suggested in the region of the gonadal veins.    Within the pelvis, the previously seen soft tissue mass to the left of midline posteriorly is again noted.    No discrete new osseous lytic or blastic changes evident.                               X-Ray Chest AP Portable (Final result)  Result time 09/01/20 20:54:10    Final result by Lucina Eid MD (09/01/20 20:54:10)                 Impression:      Bilateral pleural effusion left greater than right with associated compressive atelectasis and/or lower lobe consolidation.      Electronically signed by: Lucina Eid  Date:    09/01/2020  Time:    20:54             Narrative:    EXAMINATION:  AP PORTABLE CHEST    CLINICAL HISTORY:  shortness of breath;    TECHNIQUE:  AP portable chest radiograph was submitted.    COMPARISON:  None.    FINDINGS:  AP portable chest radiograph demonstrates a cardiac silhouette within normal limits.  There is mild nonspecific prominence of the interstitium.  Bibasilar densities are present.  There is obscuration of the left hemidiaphragm and blunting of the left costophrenic angle.  No pneumothorax is detected.  Surgical clips are seen at the neck.  There are multiple overlying cardiac leads.                                ASSESSMENT/PLAN:   Ching Bruce is a 69 y.o. female with a primary pancreatic neuroendocrine tumor with met to the  liver (Dx 2012), s/p treatment with Capecitabine/Temozolomide (CAPTEM)   presenting to the ED after clinic visit with Dr. Rodriguez. Patient presented with hypercalcemia now improving. S/p Thoracentesis and paracentesis. 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 SAAG > 1.7 liekly 2/2 to portal HTN. Patient remains hemodynamically stable and is clinically improving.     Plan:  -Advance diet as tolerated  -Continue TPN   -Continue Levothyroxine 200mg  IV daily   -Continue Lasix 20q8 and spironolactone 100mg   -Continue calcitonin spray   -Octreotide ggt   -PT/OT  -Lovenox  -Will need follow up with Dr. Rodriguez on discharge         Jese Li MD   [employer] [facility], PGY-3

## 2020-09-06 NOTE — PLAN OF CARE
Pt alert and oriented x4. Up to bedside commode frequently to urinate. Abdomen distended and shiny. No c/o pain, nausea or vomiting. Tolerating a cardiac diet w/ fluid restrictions. TPN, albumin and braden continuously infusing to PICC per MAR. Safety maintained w/ bed locked, call light w/in reach.

## 2020-09-07 NOTE — PT/OT/SLP PROGRESS
Physical Therapy      Patient Name:  Ching Bruce   MRN:  [id number]    1253 - Patient declined tx today stating she recently walked in the hallway with her sister and did leg exercises sitting in the chair.  Pt requested to sleep at this time.  Will follow-up next tx date.    Nayana Vasquez, PTA

## 2020-09-07 NOTE — PLAN OF CARE
Pt is AAOx3 with no complaints of pain or n/v. Pt has been up walking in the halls and up in the chair for several hours. Pt tolerating diet well.

## 2020-09-07 NOTE — PLAN OF CARE
Problem: Adult Inpatient Plan of Care  Goal: Plan of Care Review  Outcome: Ongoing, Progressing  Ms. Teo is resting and medications were given per orders. Ambulated to [facility]. Albumin and SERENITY infusing. TPN d/c'ed per orders. No complaints of SOB/NV/PAIN. Abd distention noted. Safety maintained.

## 2020-09-07 NOTE — PROGRESS NOTES
Progress Note  General Surgery   Admit Date: 9/1/2020   LOS: 6 days      SUBJECTIVE:     No acute overnight events. Patient feeling well this AM. Patient tolerating diet. and good uop. Passing Flatus and BM yesterday. Pain well controlled. Abdominal distention worsened from yesterday. Ambulating with PT.    OBJECTIVE:   Vital Signs (Most Recent)  Temp: 98.5 °F (36.9 °C) (09/07/20 0451)  Pulse: 91 (09/07/20 0451)  Resp: 18 (09/07/20 0451)  BP: (!) 115/59 (09/07/20 0451)  SpO2: 97 % (09/07/20 0445)    I & O (Last 24H):    Intake/Output Summary (Last 24 hours) at 9/7/2020 0706  Last data filed at 9/7/2020 0403  Gross per 24 hour   Intake 1594.13 ml   Output 1900 ml   Net -305.87 ml     Wt Readings from Last 3 Encounters:   09/07/20 65.2 kg (143 lb 11.8 oz)   08/04/20 66.3 kg (146 lb 0.9 oz)   08/03/20 66.3 kg (146 lb 0.9 oz)       Current Diet Order   Procedures    Diet Cardiac Fluid - 800mL     Order Specific Question:   Fluid restriction:     Answer:   Fluid - 800mL    Diet Adult Regular        Physical Exam   Constitutional: She is oriented to person, place, and time and well-developed, well-nourished, and in no distress.   HENT:   Head: Normocephalic and atraumatic.   Neck: Normal range of motion. Neck supple.   Cardiovascular: Normal rate, regular rhythm and normal heart sounds.   Pulmonary/Chest: Effort normal.   Minimal crackles at the bases bilaterally.    Abdominal: Soft. Bowel sounds are normal. She exhibits distension. There is no abdominal tenderness. There is no rebound.   Abdominal distention Increased from day prior. BS present. Soft. No guarding.      Neurological: She is alert and oriented to person, place, and time.   Skin: Skin is warm and dry.   Psychiatric: Affect normal.     Laboratory Data:  CBC  Recent Labs   Lab 09/05/20  0328 09/06/20  0530 09/07/20  0543   WBC 4.71 6.19 5.64   RBC 2.68* 2.73* 2.75*   HGB 8.5* 8.8* 8.7*   HCT 26.9* 27.3* 27.3*   * 138* 113*   * 100* 99*   MCH  31.7* 32.2* 31.6*   MCHC 31.6* 32.2 31.9*     CMP  Recent Labs   Lab 09/05/20  0328 09/06/20  0530 09/07/20  0543   CALCIUM 10.1 10.4 10.7*   PROT 6.2 6.8 7.0    141 140   K 3.3* 4.3 3.7   CO2 33* 33* 32*    101 99   BUN 15 19 20   CREATININE 1.0 1.0 1.1   ALKPHOS 434* 495* 527*   ALT 12 14 14   AST 17 21 21   BILITOT 0.9 0.9 1.4*     MICRO  Microbiology Results (last 7 days)     Procedure Component Value Units Date/Time    Blood culture #2 [[id number]] Collected: 09/01/20 1942    Order Status: Completed Specimen: Blood from Peripheral, Antecubital, Left Updated: 09/07/20 0612     Blood Culture, Routine No growth after 5 days.    Narrative:      Blood Culture #2    Blood culture #1 [[id number]] Collected: 09/01/20 1911    Order Status: Completed Specimen: Blood from Peripheral, Wrist, Right Updated: 09/07/20 0612     Blood Culture, Routine No growth after 5 days.    Narrative:      Blood Culture #1    (rule out SBP) Aerobic culture [[id number]] Collected: 09/02/20 1651    Order Status: Completed Specimen: Body Fluid from Lung, Left Updated: 09/06/20 1417     Aerobic Bacterial Culture No growth    Narrative:      To rule out SBP order labs: Aerobic culture [REM395],  Culture, Anaerobic [BAB325], Gram stain [GOU376], Albumin  [ZPH710], Protein [RFQ050], LDH [UFX714], WBC \T\ Dff  [UYP3233].    (rule out SBP) Culture, Anaerobic [[id number]] Collected: 09/02/20 1651    Order Status: Completed Specimen: Body Fluid from Lung, Left Updated: 09/04/20 0731     Anaerobic Culture Culture in progress    Narrative:      To rule out SBP order labs: Aerobic culture [PGF331],  Culture, Anaerobic [UCE015], Gram stain [LIZ969], Albumin  [QGO784], Protein [TDK662], LDH [BVP099], WBC \T\ Dff  [HZW5066].        Diagnostic Results:  Imaging Results          CT Abdomen Pelvis  Without Contrast (Final result)  Result time 09/01/20 21:34:22    Final result by Shaun Manning MD (09/01/20 21:34:22)                 Impression:       New ascites since the prior MRI and CT of the abdomen.    Apparent new micro nodular densities throughout both lung bases suspicious for hematogenous metastasis.    Small bilateral effusions and compressive atelectasis in both lung bases.    Stable biliary stent with hepatic bili a and multiple areas of low density throughout the liver compatible with both metastatic disease and vascular shunting.    No evidence of bowel obstruction or  tract obstruction.    Mild subcutaneous 3rd spacing.      Electronically signed by: Shaun Manning  Date:    09/01/2020  Time:    21:34             Narrative:    EXAMINATION:  CT ABDOMEN PELVIS WITHOUT CONTRAST    CLINICAL HISTORY:  Abdominal distension;    TECHNIQUE:  Low dose axial images, sagittal and coronal reformations were obtained from the lung bases to the pubic symphysis.    COMPARISON:  Previous MRI of the abdomen, 07/07/2020, CT of the abdomen and pelvis, 07/02/2014    FINDINGS:  There are bilateral effusions with compressive atelectasis in the lung bases.  Multiple small nodular densities on lung windows are seen in both lung bases too numerous to count ranging in size from 5 mm down to 3 mm none of which appear calcified.    No distinct pericardial effusion is present.    The liver is heterogeneous and nodular with areas of low density in the periphery of the left and right lobe suggesting some degree of vascular shunting but likely representing areas of metastatic disease seen on previous MRI.  Biliary air is noted with a expandable stent appearing to be within the common bile duct placed on 11/14/2019 ERCP.    New moderate ascites is present.  There is no evidence of GI tract obstruction or dilatation.    The kidneys demonstrate no hydronephrosis, calcified stone or hydroureter.  Retroperitoneal phleboliths are suggested in the region of the gonadal veins.    Within the pelvis, the previously seen soft tissue mass to the left of midline posteriorly is again  noted.    No discrete new osseous lytic or blastic changes evident.                               X-Ray Chest AP Portable (Final result)  Result time 09/01/20 20:54:10    Final result by Lucina Eid MD (09/01/20 20:54:10)                 Impression:      Bilateral pleural effusion left greater than right with associated compressive atelectasis and/or lower lobe consolidation.      Electronically signed by: Lucina Eid  Date:    09/01/2020  Time:    20:54             Narrative:    EXAMINATION:  AP PORTABLE CHEST    CLINICAL HISTORY:  shortness of breath;    TECHNIQUE:  AP portable chest radiograph was submitted.    COMPARISON:  None.    FINDINGS:  AP portable chest radiograph demonstrates a cardiac silhouette within normal limits.  There is mild nonspecific prominence of the interstitium.  Bibasilar densities are present.  There is obscuration of the left hemidiaphragm and blunting of the left costophrenic angle.  No pneumothorax is detected.  Surgical clips are seen at the neck.  There are multiple overlying cardiac leads.                                ASSESSMENT/PLAN:   Ching Bruce is a 69 y.o. 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 Rodriguez. Patient presented with hypercalcemia now improving. S/p Thoracentesis and paracentesis. 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 SAAG > 1.7 liekly 2/2 to portal HTN. Subclinical hypothyroidism improved with TSH in acceptable range. Abdominal distention is worsening. Patient remains hemodynamically stable and is clinically improving.     Plan:  -Advance diet as tolerated with fluid restriction   -Awaiting IR to revaluate for repeat Paracentesis  -Currently on Levothyroxine 200mg  IV daily and will transition back to PO Home med  -Continue Lasix 20q8 and spironolactone  100mg   -Continue calcitonin spray   -Octreotide ggt   -PT/OT  -Hold Lovenox tomorrow AM for possible IR intervention   -Will need follow up with Dr. Rodriguez on discharge         Jese Li MD   [employer] [facility], PGY-3

## 2020-09-08 NOTE — PT/OT/SLP PROGRESS
Occupational Therapy   Treatment    Name: Ching Bruce  MRN: [id number]  Admitting Diagnosis:  Hypercalcemia       Recommendations:     Discharge Recommendations: home health PT, home health OT  Discharge Equipment Recommendations:  walker, rolling, bath bench  Barriers to discharge:  None    Assessment:    LOB ambulating /s RW CGA; pt able to correct self. Increased standing balance and endurance; manipulating foot pedal during G/H tasks. Pt. ambulated in hallway managing IV pole.     Ching Bruce is a 69 y.o. female with a medical diagnosis of Hypercalcemia. Performance deficits affecting function are weakness, impaired functional mobilty, gait instability, impaired balance, decreased upper extremity function, decreased lower extremity function, decreased ROM, impaired skin.     Rehab Prognosis:  Good; patient would benefit from acute skilled OT services to address these deficits and reach maximum level of function.       Plan:     Patient to be seen 5 x/week to address the above listed problems via self-care/home management, therapeutic activities, therapeutic exercises  · Plan of Care Expires: 10/03/20  · Plan of Care Reviewed with: patient    Subjective     Pain/Comfort:  · Pain Rating 1: 0/10    Objective:     Communicated with: nurse Seymour, prior to session.  Patient found HOB elevated with telemetry, peripheral IV, bed alarm upon OT entry to room.    General Precautions: Standard, fall   Orthopedic Precautions:N/A   Braces: N/A     Occupational Performance:     Bed Mobility:    · Patient completed Rolling/Turning to Right with modified independence and with side rail  · Patient completed Supine to Sit with modified independence     Functional Mobility/Transfers:  · Patient completed Sit <> Stand Transfer with stand by assistance  with  no assistive device   · Patient completed Bed <> Chair Transfer using Step Transfer technique with contact guard assistance with no assistive device  · Patient completed  Toilet Transfer Step Transfer technique with contact guard assistance with  no AD    Activities of Daily Living:  · Grooming: stand by assistance static standing balance  · Toileting: supervision stated had a BM      [facility] ADL[facility] 21    Treatment & Education:  Pt awake and alert upon visit. Bed mob stated above. Pt stated she does not use RW at home. CGA ambulating bed > sink. LOB turning right; pt able to correct self. Improved stance, static standing balance and endurance at sink during G/H task; manipulated foot pedal during G/H tasks. Requested to use commode. CGA /s AD ambulating in hallway; pt managed IV pole. Demo'd good stance. Pt made multiple turns stating no dizzyness or LOB. Pt does not want chair alarm on chairr; nurse notified.       Patient left up in chair with all lines intact, call button in reach, chair alarm off and nurseDawn, notifiedEducation:      GOALS:   Multidisciplinary Problems     Occupational Therapy Goals        Problem: Occupational Therapy Goal    Goal Priority Disciplines Outcome Interventions   Occupational Therapy Goal     OT, PT/OT Ongoing, Progressing    Description: Goals to be met by: 10/3/20     Patient will increase functional independence with ADLs by performing:    LE Dressing with Supervision.  Grooming while standing with Supervision.  Toileting from toilet with Supervision for hygiene and clothing management.   Supine to sit with Supervision.  Step transfer with Supervision  Toilet transfer to toilet with Supervision.  Increased functional strength to WFL for self care skills and functional mobility.  Upper extremity exercise program x10 reps per handout, with independence.                     Time Tracking:     OT Date of Treatment: 09/08/20  OT Start Time: 0851  OT Stop Time: 0919  OT Total Time (min): 28 min    Billable Minutes:Self Care/Home Management 14  Therapeutic Activity 14    YENNIFER Jeff  9/8/2020

## 2020-09-08 NOTE — PROCEDURES
Interventional Radiology Post-Procedure Note    Pre Op Diagnosis: Ascites  Post Op Diagnosis: Same    Procedure: Paracentesis     Procedure performed by: Ha     Written Informed Consent Obtained: Yes  Specimen Sent: NA  Estimated Blood Loss: Minimal     Findings:   Small ascites. 950 mL cloudy yellow fluid removed from the LLQ.     No immediate complications. Patient tolerated procedure well. Please see full dictated procedure report for additional details and recommendations.        Colt Bonner MD  [facility]  Pager [phone]

## 2020-09-08 NOTE — PLAN OF CARE
Problem: Adult Inpatient Plan of Care  Goal: Plan of Care Review  Description: Chart and Care plan reviewed.    Outcome: Ongoing, Progressing

## 2020-09-08 NOTE — PLAN OF CARE
Problem: Occupational Therapy Goal  Goal: Occupational Therapy Goal  Description: Goals to be met by: 10/3/20     Patient will increase functional independence with ADLs by performing:    LE Dressing with Supervision.  Grooming while standing with Supervision.  Toileting from toilet with Supervision for hygiene and clothing management.   Supine to sit with Supervision.  Step transfer with Supervision  Toilet transfer to toilet with Supervision.  Increased functional strength to WFL for self care skills and functional mobility.  Upper extremity exercise program x10 reps per handout, with independence.    Outcome: Ongoing, Progressing   LOB ambulating /s RW CGA; pt able to correct self. Increased standing balance and endurance; manipulating foot pedal during G/H tasks. Pt. ambulated in hallway managing IV pole. Recommend  OT/PT.  YENNIFER Jeff  9/8/2020

## 2020-09-08 NOTE — PLAN OF CARE
Pt awake and alert throughout shift. Up in chair throughout shift. No complaints of pain throughout shift. Pt down to IR for paracentesis with 950 cc of fluid pulled off. Cardiac diet, 800 cc fluid restriction. Albumin infusing per orders. Safety maintained. Bed locked and in lowest position. Call light and personal items within reach. Advised pt to call for assistance, pt verbalized understanding.       Problem: Fall Injury Risk  Goal: Absence of Fall and Fall-Related Injury  Outcome: Ongoing, Progressing     Problem: Adult Inpatient Plan of Care  Goal: Plan of Care Review  Description: Chart and Care plan reviewed.    Outcome: Ongoing, Progressing  Goal: Patient-Specific Goal (Individualization)  Outcome: Ongoing, Progressing  Goal: Absence of Hospital-Acquired Illness or Injury  Outcome: Ongoing, Progressing  Goal: Optimal Comfort and Wellbeing  Outcome: Ongoing, Progressing  Goal: Readiness for Transition of Care  Outcome: Ongoing, Progressing  Goal: Rounds/Family Conference  Outcome: Ongoing, Progressing

## 2020-09-08 NOTE — NURSING
Procedure complete. Patient tolerated well. No complications. 950 ml of cloudy yellow fluid removed from LLQ. Band aid applied to puncture site. No bleeding or hematoma noted. VSS. Report called to JORGE Seymour. Ultrasound staff at bedside to bring patient to ultrasound.

## 2020-09-08 NOTE — PLAN OF CARE
Problem: Physical Therapy Goal  Goal: Physical Therapy Goal  Description: Goals to be met by: 10/2/20     Patient will increase functional independence with mobility by performin. Supine to sit with Modified [place]  2. Sit to supine with Modified [place]  3. Rolling with Modified [place].  4. Sit to stand transfer with Modified [place] met 2020  5. Gait  x 200 feet with Modified [place] using LRAD.   6. Lower extremity exercise program x10 reps per handout, with independence    Outcome: Ongoing, Progressing   Pt ambulated 240 ft without AD and supervision, presenting with narrow THUY.

## 2020-09-08 NOTE — PLAN OF CARE
Pt vitals were maintained, pt denied any pain , nausea and or vomiting. Pt has been npo since  midnight pending a US of liver  in the am, and possible paracentesis. Iv Cornell and albumin continued. Pt ambulates well to bedside commode. WCTM

## 2020-09-08 NOTE — CONSULTS
Interventional Radiology Consult/Pre-Procedure Note      Chief Complaint/Reason for Consult: Ascites    History of Present Illness:  Ching Bruce is a 69 y.o. female with the PMH listed below who presents with recurrent ascites. IR consulted for repeat paracentesis.    Admission H&P reviewed.    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    Secondary malignant neoplasm of liver     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) 5/7/2013    Thrombocytopenia 11/12/2013    Thyroid disease     Unspecified essential hypertension 11/12/2013     Past Surgical History:   Procedure Laterality Date    ERCP N/A 6/21/2018    Procedure: ERCP, stent exchange;  Surgeon: Jake Lieberman MD;  Location: [facility];  Service: Endoscopy;  Laterality: N/A;    ERCP N/A 5/23/2019    Procedure: ERCP (ENDOSCOPIC RETROGRADE CHOLANGIOPANCREATOGRAPHY), stent exchange;  Surgeon: Jake Lieberman MD;  Location: [facility] ENDO;  Service: Endoscopy;  Laterality: N/A;    ERCP N/A 11/14/2019    Procedure: ERCP (ENDOSCOPIC RETROGRADE CHOLANGIOPANCREATOGRAPHY), stent exchange;  Surgeon: Jake Lieberman MD;  Location: [facility];  Service: Endoscopy;  Laterality: N/A;    THYROIDECTOMY  2011       Allergies:   Review of patient's allergies indicates:   Allergen Reactions    Epinephrine Anaphylaxis and Other (See Comments)     Can cause Carcinoid Crisis    Sulfa (sulfonamide antibiotics) Other (See Comments)      Urticaria       Scheduled Meds:    calcitonin (salmon)  1 spray Alternating Nostrils Daily    enoxaparin  40 mg Subcutaneous Q24H    furosemide  40 mg Oral BID    levothyroxine  250 mcg Oral Before breakfast    pantoprazole  40 mg Oral Daily    sodium chloride 0.9%  10 mL Intravenous Q6H    sodium chloride 0.9%  10 mL Intravenous Q6H    spironolactone  100 mg Oral Q8H     Continuous Infusions:    albumin human 25% 12.5 g (09/08/20 1509)     PRN Meds:polyethylene glycol, sodium chloride 0.9%, [place] PICC Protocol **AND** sodium chloride 0.9% **AND** sodium chloride 0.9%, [place] PICC Protocol **AND** sodium chloride 0.9% **AND** sodium chloride 0.9%    Anticoagulation/Antiplatelet Meds: Lovenox    Review of Systems:   As documented in admission H&P.    Physical Exam:  Temp: 98.7 °F (37.1 °C) (09/08/20 1439)  Pulse: 89 (09/08/20 1542)  Resp: 19 (09/08/20 1439)  BP: 133/62 (09/08/20 1439)  SpO2: 99 % (09/08/20 1439)     General: NAD  HEENT: Normocephalic, sclera anicteric, oropharynx clear  Heart: RRR  Lungs: Symmetric excursions, breathing unlabored  Abd: Moderately distended, soft  Extremities: [place]  Neuro: Gross nonfocal    Labs:  Recent Labs   Lab 09/04/20  0615   INR 1.1       Recent Labs   Lab 09/08/20  0535   WBC 6.36   HGB 8.9*   HCT 27.6*   MCV 99*   *      Recent Labs   Lab 09/08/20  0535   GLU 99      K 4.1      CO2 30*   BUN 21   CREATININE 1.2   CALCIUM 11.0*   MG 2.1   ALT 17   AST 27   ALBUMIN 4.6   BILITOT 1.8*     Imaging:  Paracentesis 9/2/20, CT AP 9/1/20 reviewed.     Assessment/Plan:   Ascites. Will undergo repeat paracentesis today.     Sedation: None     Risks (including, but not limited to, pain, bleeding, infection, damage to nearby structures, failure, and the need for additional procedures), benefits, and alternatives were discussed with the patient. All questions were answered to the best of my abilities. The patient wishes to proceed. Written informed consent  was obtained.        Colt Bonner MD  [facility]  Pager [phone]

## 2020-09-08 NOTE — PROGRESS NOTES
Progress Note  General Surgery   Admit Date: 9/1/2020   LOS: 7 days      SUBJECTIVE:     No acute overnight events. Patient tolerating diet and good uop. Passing Flatus. No BM yesterday. Pain well controlled. Continues to have persistent ascites. Ambulating with PT.    OBJECTIVE:   Vital Signs (Most Recent)  Temp: 98.5 °F (36.9 °C) (09/07/20 2336)  Pulse: 88 (09/08/20 0400)  Resp: 17 (09/07/20 2336)  BP: 126/61 (09/07/20 2336)  SpO2: (!) 92 % (09/08/20 0415)    I & O (Last 24H):    Intake/Output Summary (Last 24 hours) at 9/8/2020 0523  Last data filed at 9/7/2020 1759  Gross per 24 hour   Intake 174.79 ml   Output 1250 ml   Net -1075.21 ml     Wt Readings from Last 3 Encounters:   09/07/20 65.2 kg (143 lb 11.8 oz)   08/04/20 66.3 kg (146 lb 0.9 oz)   08/03/20 66.3 kg (146 lb 0.9 oz)       Current Diet Order   Procedures    Diet Cardiac Fluid - 800mL     Order Specific Question:   Fluid restriction:     Answer:   Fluid - 800mL    Diet Adult Regular        Physical Exam   Constitutional: She is oriented to person, place, and time and well-developed, well-nourished, and in no distress.   HENT:   Head: Normocephalic and atraumatic.   Neck: Normal range of motion. Neck supple.   Cardiovascular: Normal rate, regular rhythm and normal heart sounds.   Pulmonary/Chest: Effort normal.   Minimal crackles at the bases bilaterally.    Abdominal: Soft. Bowel sounds are normal. She exhibits distension. There is no abdominal tenderness. There is no rebound.   Abdominal distention Increased from day prior. BS present. Soft. No guarding.      Neurological: She is alert and oriented to person, place, and time.   Skin: Skin is warm and dry.   Psychiatric: Affect normal.     Laboratory Data:  CBC  Recent Labs   Lab 09/05/20  0328 09/06/20  0530 09/07/20  0543   WBC 4.71 6.19 5.64   RBC 2.68* 2.73* 2.75*   HGB 8.5* 8.8* 8.7*   HCT 26.9* 27.3* 27.3*   * 138* 113*   * 100* 99*   MCH 31.7* 32.2* 31.6*   MCHC 31.6* 32.2  31.9*     CMP  Recent Labs   Lab 09/05/20  0328 09/06/20  0530 09/07/20  0543   CALCIUM 10.1 10.4 10.7*   PROT 6.2 6.8 7.0    141 140   K 3.3* 4.3 3.7   CO2 33* 33* 32*    101 99   BUN 15 19 20   CREATININE 1.0 1.0 1.1   ALKPHOS 434* 495* 527*   ALT 12 14 14   AST 17 21 21   BILITOT 0.9 0.9 1.4*     MICRO  Microbiology Results (last 7 days)     Procedure Component Value Units Date/Time    (rule out SBP) Culture, Anaerobic [[id number]] Collected: 09/02/20 1651    Order Status: Completed Specimen: Body Fluid from Lung, Left Updated: 09/07/20 1020     Anaerobic Culture Culture in progress    Narrative:      To rule out SBP order labs: Aerobic culture [[id number]],  Culture, Anaerobic [[id number]], Gram stain [[id number]], Albumin  [FTG939], Protein [AFV192], LDH [VNX167], WBC \T\ Dff  [UNB5601].    Blood culture #2 [[id number]] Collected: 09/01/20 1942    Order Status: Completed Specimen: Blood from Peripheral, Antecubital, Left Updated: 09/07/20 0612     Blood Culture, Routine No growth after 5 days.    Narrative:      Blood Culture #2    Blood culture #1 [[id number]] Collected: 09/01/20 1911    Order Status: Completed Specimen: Blood from Peripheral, Wrist, Right Updated: 09/07/20 0612     Blood Culture, Routine No growth after 5 days.    Narrative:      Blood Culture #1    (rule out SBP) Aerobic culture [[id number]] Collected: 09/02/20 1651    Order Status: Completed Specimen: Body Fluid from Lung, Left Updated: 09/06/20 1417     Aerobic Bacterial Culture No growth    Narrative:      To rule out SBP order labs: Aerobic culture [[id number]],  Culture, Anaerobic [[id number]], Gram stain [[id number]], Albumin  [ZPL201], Protein [BEN577], LDH [KJP475], WBC \T\ Dff  [SHT1033].        Diagnostic Results:  Imaging Results          CT Abdomen Pelvis  Without Contrast (Final result)  Result time 09/01/20 21:34:22    Final result by Shaun Manning MD (09/01/20 21:34:22)                 Impression:      New ascites since the prior MRI  and CT of the abdomen.    Apparent new micro nodular densities throughout both lung bases suspicious for hematogenous metastasis.    Small bilateral effusions and compressive atelectasis in both lung bases.    Stable biliary stent with hepatic bili a and multiple areas of low density throughout the liver compatible with both metastatic disease and vascular shunting.    No evidence of bowel obstruction or  tract obstruction.    Mild subcutaneous 3rd spacing.      Electronically signed by: Shaun Manning  Date:    09/01/2020  Time:    21:34             Narrative:    EXAMINATION:  CT ABDOMEN PELVIS WITHOUT CONTRAST    CLINICAL HISTORY:  Abdominal distension;    TECHNIQUE:  Low dose axial images, sagittal and coronal reformations were obtained from the lung bases to the pubic symphysis.    COMPARISON:  Previous MRI of the abdomen, 07/07/2020, CT of the abdomen and pelvis, 07/02/2014    FINDINGS:  There are bilateral effusions with compressive atelectasis in the lung bases.  Multiple small nodular densities on lung windows are seen in both lung bases too numerous to count ranging in size from 5 mm down to 3 mm none of which appear calcified.    No distinct pericardial effusion is present.    The liver is heterogeneous and nodular with areas of low density in the periphery of the left and right lobe suggesting some degree of vascular shunting but likely representing areas of metastatic disease seen on previous MRI.  Biliary air is noted with a expandable stent appearing to be within the common bile duct placed on 11/14/2019 ERCP.    New moderate ascites is present.  There is no evidence of GI tract obstruction or dilatation.    The kidneys demonstrate no hydronephrosis, calcified stone or hydroureter.  Retroperitoneal phleboliths are suggested in the region of the gonadal veins.    Within the pelvis, the previously seen soft tissue mass to the left of midline posteriorly is again noted.    No discrete new osseous lytic  or blastic changes evident.                               X-Ray Chest AP Portable (Final result)  Result time 09/01/20 20:54:10    Final result by Lucina Eid MD (09/01/20 20:54:10)                 Impression:      Bilateral pleural effusion left greater than right with associated compressive atelectasis and/or lower lobe consolidation.      Electronically signed by: Lucina Eid  Date:    09/01/2020  Time:    20:54             Narrative:    EXAMINATION:  AP PORTABLE CHEST    CLINICAL HISTORY:  shortness of breath;    TECHNIQUE:  AP portable chest radiograph was submitted.    COMPARISON:  None.    FINDINGS:  AP portable chest radiograph demonstrates a cardiac silhouette within normal limits.  There is mild nonspecific prominence of the interstitium.  Bibasilar densities are present.  There is obscuration of the left hemidiaphragm and blunting of the left costophrenic angle.  No pneumothorax is detected.  Surgical clips are seen at the neck.  There are multiple overlying cardiac leads.                                ASSESSMENT/PLAN:   Ching Bruce is a 69 y.o. 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 Rodriguez. Patient presented with hypercalcemia now improving. S/p Thoracentesis and paracentesis. 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 SAAG > 1.7 liekly 2/2 to portal HTN. Subclinical hypothyroidism improved with TSH in acceptable range. Continues to have persistent ascites.  Patient remains hemodynamically stable and is clinically improving.     Plan:  -NPO this AM   -Advance diet as tolerated with fluid restriction   -Awaiting IR to revaluate for repeat Paracentesis  -Continue Levothyroxine 250mcg PO daily   -Continue Lasix 20q8 and spironolactone 100mg   -Continue calcitonin spray   -Octreotide ggt    -PT/OT    Jese Li MD   [employer] [facility], PGY-3

## 2020-09-09 NOTE — NURSING
Pt IV and tele are d/c'd. AVS is given to pt. Daughter is at the bedside. Pt is awaiting VN at this time.

## 2020-09-09 NOTE — PLAN OF CARE
Problem: Occupational Therapy Goal  Goal: Occupational Therapy Goal  Description: Goals to be met by: 10/3/20     Patient will increase functional independence with ADLs by performing:    LE Dressing with Supervision.  Grooming while standing with Supervision.  Toileting from toilet with Supervision for hygiene and clothing management.   Supine to sit with Supervision.  Step transfer with Supervision  Toilet transfer to toilet with Supervision.  Increased functional strength to WFL for self care skills and functional mobility.  Upper extremity exercise program x10 reps per handout, with independence.    Outcome: Ongoing, Progressing   Pt continues to improve; eager to perform for therapy. Decreased nhi during gait /s RW; stated she feels safer walking slower. Therapist managing IV pole during gait. Demos minimal arm swing and somewhat guarded posture. Educated and demo'd UE AROM w/ & w/o resistance band. Pt given handout on therex. Recommend  OT/PT.   YENNIFER Jeff  9/9/2020

## 2020-09-09 NOTE — DISCHARGE INSTRUCTIONS
Please follow up with Primary Care doctor in 1 week to re-evaluate your treatment plan and follow up your labs that you will complete here at [facility] this Friday on 9/11/20. You do not need to be fasting. Please see Dr. Rodriguez within 2 weeks.

## 2020-09-09 NOTE — PLAN OF CARE
TN met with pt and daughter Rocio prior to d/c   pt has transportation to home as well as help at home per daughters   Discharge rounds on patient. Discussed followup appointments, blue discharge folder, discharge nurse will go over home medications and reasons for medications and final discharge instructions. All patient/caregiver questions answered. Patient verbalized understanding.    Future Appointments   Date Time Provider Department Center   9/11/2020 10:20 AM LAB, [facility]   9/15/2020  3:20 PM Cory Rodriguez DO, FACP [facility] TUMOR [facility]   9/30/2020  8:30 AM APPOINTMENT LABANNA [facility] LAB [facility]   10/27/2020  2:20 PM Cory Rodriguez DO, FACP [facility] TUMOR [facility]   10/28/2020  7:00 AM [facility] NM4 OMARI-177 [facility] NUCLEAR [facility]   10/28/2020  7:00 AM CHAIR 10 Replaced by [facility] CHEMO [facility]   11/25/2020  8:30 AM APPOINTMENT LABANNA [facility] LAB [facility]   12/22/2020 10:30 AM ELISE Mcknight MD [facility] TUMOR [facility]   12/23/2020  7:00 AM [facility] NM4 OMARI-177 [facility] NUCLEAR [facility]   12/23/2020  7:00 AM CHAIR 10 Replaced by [facility] CHEMO [facility]   1/20/2021  8:30 AM APPOINTMENT LABANNA [facility] LAB [facility]   2/17/2021  7:00 AM [facility] NM4 OMARI-177 [facility] NUCLEAR [facility]   2/18/2021  8:00 AM CHAIR 10 Replaced by [facility] CHEMO [facility]   3/17/2021  8:30 AM APPOINTMENT LABANNA [facility] LAB [facility]        09/09/20 1748   Final Note   Assessment Type Final Discharge Note   Anticipated Discharge Disposition Home   What phone number can be called within the next 1-3 days to see how you are doing after discharge? [phone]   Hospital Follow Up  Appt(s) scheduled? Yes   Discharge plans and expectations educations in teach back method with documentation complete? Yes   Right Care Referral Info   Post Acute Recommendation No Care   Referral Type no care

## 2020-09-09 NOTE — PT/OT/SLP PROGRESS
Occupational Therapy   Treatment    Name: Ching Bruce  MRN: [id number]  Admitting Diagnosis:  Hypercalcemia       Recommendations:     Discharge Recommendations: home health PT, home health OT  Discharge Equipment Recommendations:  walker, rolling, bath bench  Barriers to discharge:  None    Assessment:     Pt continues to improve; eager to perform for therapy. Decreased nhi during gait /s RW; stated she feels safer walking slower. Therapist managing IV pole during gait. Demos minimal arm swing and somewhat guarded posture. Educated and demo'd UE AROM w/ & w/o resistance band. Pt given handout on therex. Recommend  OT/PT.      Ching Bruce is a 69 y.o. female with a medical diagnosis of Hypercalcemia. Performance deficits affecting function are weakness, impaired endurance, impaired functional mobilty, gait instability, impaired balance, decreased coordination, decreased upper extremity function, decreased lower extremity function, decreased ROM, impaired skin.     Rehab Prognosis:  Good; patient would benefit from acute skilled OT services to address these deficits and reach maximum level of function.       Plan:     Patient to be seen 5 x/week to address the above listed problems via self-care/home management, therapeutic activities, therapeutic exercises  Plan of Care Expires: 10/03/20  Plan of Care Reviewed with: patient    Subjective     Pain/Comfort:  Pain Rating 1: 0/10    Objective:     Communicated with: nurse Rizvi, prior to session.  Patient found HOB elevated with peripheral IV, telemetry upon OT entry to room.    General Precautions: Standard, fall   Orthopedic Precautions:N/A   Braces: N/A     Occupational Performance:     Bed Mobility:    Patient completed Rolling/Turning to Right with modified independence and with side rail  Patient completed Supine to Sit with modified independence     Functional Mobility/Transfers:  Patient completed Sit <> Stand Transfer with stand by assistance  with   no assistive device   Patient completed Bed <> Chair Transfer using Step Transfer technique with stand by assistance with no assistive device    Activities of Daily Living:  Grooming: supervision static standing balance at sink      [facility] 6 Click ADL: 21    Treatment & Education:  Pt stated to nurse wanting to take a walk, eager to get OOB. Bed mob stated above. V/C to correct posture standing at sink. Pt completed G/H tasks at sink manipulating foot pedal. No LOB this date showing improvement with ambulation. Pt ambulated in hallway /s AD or managing IV pole. Educated pt on BUE AROM. Demo'd horizontal shoulder abduction, elbow extension, shoulder flexion, and shoulder diagonals /c resistance band. Handout given to pt.      Patient left up in chair with all lines intact and call button in reach    GOALS:   Multidisciplinary Problems       Occupational Therapy Goals          Problem: Occupational Therapy Goal    Goal Priority Disciplines Outcome Interventions   Occupational Therapy Goal     OT, PT/OT Ongoing, Progressing    Description: Goals to be met by: 10/3/20     Patient will increase functional independence with ADLs by performing:    LE Dressing with Supervision.  Grooming while standing with Supervision.  Toileting from toilet with Supervision for hygiene and clothing management.   Supine to sit with Supervision.  Step transfer with Supervision  Toilet transfer to toilet with Supervision.  Increased functional strength to WFL for self care skills and functional mobility.  Upper extremity exercise program x10 reps per handout, with independence.                         Time Tracking:     OT Date of Treatment: 09/09/20  OT Start Time: 0907  OT Stop Time: 0941  OT Total Time (min): 34 min    Billable Minutes:Self Care/Home Management 17  Therapeutic Activity 17    YENNIFER Jeff  9/9/2020

## 2020-09-09 NOTE — PLAN OF CARE
Introduced as VN and will be reviewing discharge instructions.  Educated patient and daughter on reason for admission, home medication list, and discharge instructions including when to return to ED and the following doctor appointments.  Education per flowsheet.  Opportunity given for questions and questions answered.  Nurse  notified of   completion of discharge education.Dr Li discontinued periactin from discharge medications/AVS and d/c po chemo, Temadar from AVS and discharge medications. Nurse and patient and daughter educated and notified. Patient waiting on wheelchair for discharge

## 2020-09-09 NOTE — CONSULTS
Food & Nutrition  Education    Diet Education: low calcium and low sodium with 1L fluid restriction  Time Spent: 15 minutes  Learners: pt      Nutrition Education provided with handouts:   Calcium Content of Foods, Low Sodium Nutrition Therapy    Comments:  Discussed foods high in calcium to avoid. Provided her with a list of foods with calcium and told her to avoid the high food items and use the moderate foods sparingly. Also discussed low Na diet. Discussed foods high in sodium to avoid and cooking without salt. Reviewed her fluid restriction. Pt voiced understanding. Provided her with handouts.    All questions and concerns answered. Dietitian's contact information provided.       Follow-Up: 9/15/20    Please Re-consult as needed        Thanks!  Nicole Grace, RD,LDN

## 2020-09-09 NOTE — PLAN OF CARE
Problem: Adult Inpatient Plan of Care  Goal: Chart Reviewed  Description: Chart and Care plan reviewed.    Outcome: Ongoing, Progressing

## 2020-09-09 NOTE — PROGRESS NOTES
Follow-up With  Details  Why  Contact Info   [facility]  On 9/11/2020  10:20 am   [address] [place] [id number] [phone]   Cory Rodriguez DO, FACP  On 9/15/2020  3:20 pm   [address]  [address]  [place] [address]  [phone]

## 2020-09-09 NOTE — PLAN OF CARE
Pt vitals were maintained, pt denied any pain, pt received an paracentesis and about  950ml was removed. Pt stomach still distended and firm. Pt was educated about staying with in fluid restriction orders. Pt braden and albumin continued. Pt gait is unsteady 1x asst to bedside commode.  WCTM

## 2020-09-09 NOTE — PT/OT/SLP PROGRESS
Physical Therapy      Patient Name:  Ching Bruce   MRN:  [id number]    Patient not seen today secondary to (Pt deferred PT this date d/t planned d/c home today, waiting for daughter). Will follow-up.    Cristina Stephen, SPT   9/9/2020

## 2020-09-09 NOTE — DISCHARGE SUMMARY
[facility]  General Surgery  Discharge Summary      Patient Name: Ching Bruce  MRN: [id number]  Admission Date: 9/1/2020  Hospital Length of Stay: 8 days  Discharge Date and Time:  09/09/2020 2:41 PM  Attending Physician: Joshua Valerio MD   Discharging Provider: Jese Li MD  Primary Care Provider: Gaby Corea MD     HPI:  Ms. Bruce is a 69 y.o. female who presents to ED after clinic visit with Dr. Rodriguez seen for pancreatic neuroendocrine tumor.  She has had increased abdominal pain and swelling that began after her biopsy.  She states that her abdomen feels tight.  She complains of bilateral leg swelling along with a decreased appetite, activity level and.  The confusion.  She fell recently.  She was found to be hypercalcemic and was given IV fluids and zoledronic acid yesterday.  She also complains of shortness of breath but denies chest pain.  She states that her urine has darkened and is malodorous.  She has not had a bowel movement in 1 week.  There was no improvement after IV fluids yesterday. Vital signs within normal limits. Hypercalcemic 13. CT scan with BL pleural effusions and moderate ascites.     Hospital Course:   Patient presented with hypercalcemia upwards of 13 following Zoledronic acid administered in clinic prior to arrival.  Patient was admitted for significant ascites as well as shortness of breath secondary to 3rd spacing of fluids.  Patient given protein supplementation, started on bowel regimen and Lasix 20 mg q.8 hours, placed on octreotide drip and anticoagulated with Lovenox.  Patient had improved urinary output following diuresis and slight decrease in her effusions/ascites, however fluid persisted.  Interventional radiology was then consulted and a thoracentesis and paracentesis was performed.  Approximately 700 cc removed via thoracentesis and 1500 cc removed via paracentesis.  Lab studies performed on fluid analysis of paracentesis which was  consistent with a SAAG of 1.7 suggestive of portal hypertension.  A robotic culture from abdomen revealed no growth and anaerobic culture currently pending.  Patient also had transthoracic echo performed which showed heart failure with preserved ejection fraction with an ejection fraction of 60% and grade 1 diastolic dysfunction.  Pulmonary edema resolved however ascites continue to persist following intervention.  Patient was placed on spironolactone and Lasix and 100:40 ratio in attempt to maintain normokalemia, however patient continued to require additional potassium to maintain normokalemia.  In addition, patient had a TSH of 14.213 and improved after 2 IV doses of 200 mcg  levothyroxine and subsequently checked again 3 days later with TSH found be 4.088.  Patient then placed on 250 mcg p.o. thereafter.  Patient at baseline has poor nutrition and had a pre-albumin of 9 and subsequently checked several days later with improvement to 11 after p.o. supplements.  Patient started on TPN and albumin drip for poor nutrition/hypoalbuminemia, however patient had worsening of ascites, but improvement in urinary output and respiratory status.  Patient subsequently went for a repeat paracentesis due to reaccumulation of fluid in abdomen despite diuresis and 950 cc of cloudy yellow fluid was removed with no complications.  Patient's hypercalcemia continue to improve with IV fluid rehydration as well as diuresis.  Patient was given calcitonin via nasal spray however patient's calcium continued to rise slowly towards 11 while admitted and patient was given additional dose of Zoledronic acid.  Patient will have repeat labs completed in 2 days and have close outpatient follow-up with PCP for laboratory review and reassessment by hematologist/oncologist Dr. Rodriguez.      Consults:   Consults (From admission, onward)        Status Ordering Provider     Inpatient consult to Cardiology-[facility]  Once     Provider:  (Not yet assigned)     Completed CRISSY RIVAS     Inpatient consult to Interventional Radiology  Once     Provider:  (Not yet assigned)    Completed CRISSY RIVAS     Inpatient consult to Interventional Radiology  Once     Provider:  Colt Bonner II, MD    Completed HUSSEIN LUNA     Inpatient consult to Interventional Radiology  Once     Provider:  (Not yet assigned)    Completed CRISSY RIVAS     Inpatient consult to PICC team ([facility])  Once     Provider:  (Not yet assigned)    Completed HUSSEIN LUNA     Inpatient consult to Registered Dietitian/Nutritionist  Once     Provider:  (Not yet assigned)    Acknowledged HUSSEIN LUNA     IP consult to case management  Once     Provider:  (Not yet assigned)    Acknowledged HUSSEIN LUNA          Significant Diagnostic Studies:     EXAMINATION:  US LIVER WITH DOPPLER     CLINICAL HISTORY:  evblaute venous flow;  Hypercalcemia     TECHNIQUE:  Duplex scan of the liver was performed using B-mode/gray scale imaging and Doppler spectral analysis and color flow.     COMPARISON:  CT abdomen pelvis without contrast dated 09/01/2020     FINDINGS:  Liver measures at the upper limits normal in size 17.4 cm with heterogeneous echotexture in this patient with known liver masses.  There is intraluminal debris within the gallbladder with mild pericholecystic fluid.  Common bile duct stent noted.  No intrahepatic biliary dilatation.  Spleen is enlarged measuring 15.6 cm.  Small volume abdominal ascites.     Hepatic arterial system is patent.  Velocity (in cm/sec) at the MHA of 231, LHA a of 76.3 and RHA of 192.     There is biphasic flow with more downstream reversal flow involving the main portal vein.  Normal antegrade flow in the left portal vein.  Reversed flow within right posterior portal venous branch.     The middle, left, and right hepatic veins appear patent. The IVC appears patent. Splenic artery vasculature is patent at the  hilum.     Impression:     Upper limits of normal sized liver with heterogeneous echotexture in this patient with known liver masses.     Biphasic flow with more downstream reversal of flow involving the main portal vein.  Reversed flow also noted within the right posterior portal venous branch.     Patent hepatic arterial flow.  Elevated velocity of 231 cm/sec within the MHA with otherwise normal waveform.     Intraluminal debris within the gallbladder with mild pericholecystic fluid.     Mild volume ascites.    EXAMINATION:    US ELASTOGRAPHY LIVER     CLINICAL HISTORY:  Portal HTN; Hypercalcemia     TECHNIQUE:  Quantitative Ultrasound Assessment of the Liver (QUAL) elastography was performed on the [employer].     COMPARISON:  None.     FINDINGS:  Median elastography: 8.33 m/sec, 202 kPa.     IQR/median: 17 %, indicating an acceptable range     The patient is noted to have multiple hepatic masses which could invalidate elastography measurements.     Impression:     Markedly elevated elastography measurements which and are of uncertain clinical significance given this patient's multiple hepatic masses.     Pending Diagnostic Studies:     Procedure Component Value Units Date/Time    IR Paracentesis with Imaging [[id number]] Resulted: 09/08/20 1413    Order Status: Sent Lab Status: In process Updated: 09/08/20 1415    PTH-related peptide [[id number]] Collected: 09/04/20 0620    Order Status: Sent Lab Status: In process Updated: 09/04/20 0942    Specimen: Blood         Final Active Diagnoses:    Diagnosis Date Noted POA    PRINCIPAL PROBLEM:  Hypercalcemia [E83.52] 05/07/2013 Yes    Postoperative hypothyroidism [E89.0] 09/06/2020 Yes    Bilateral pleural effusion [J90] 09/04/2020 Yes    Dyspnea and respiratory abnormalities [R06.00, R06.89] 09/04/2020 Yes    Malnutrition compromising bodily function [E46] 09/04/2020 Yes    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    Specialties: Hematology and Oncology, Radiation Oncology  Why: Hospital Follow up   Contact information:  [address]  [address]  [place] [address]  [phone]             Gaby Corea MD. Schedule an appointment as soon as possible for a visit on 9/14/2020.    Specialty: Internal Medicine  Why: Lab follow up   Contact information:  [address] [place] [address]  [phone]             [facility].    Specialty: Lab  Contact information:  [address] [place] [address] [phone]               Patient Instructions:      WALKER FOR HOME USE     Order Specific Question Answer Comments   Type of Walker: Adult (5'4"-6'6")    With wheels? Yes    Height: 5' 7" (1.702 m)    Weight: 72.8 kg (160 lb 7.9 oz)    Length of need (1-99 months): 99    Does patient have medical equipment at home? walker, rolling    Does patient have medical equipment at home? cane, straight    Please check all that apply: Patient is unable to safely ambulate without equipment.    Please check all that apply: Walker will be used for gait training.    Please check all that apply: Patient needs help to get in and out of chair.      BATH/SHOWER CHAIR FOR HOME USE     Order Specific Question Answer Comments   Height: 5' 7" (1.702 m)    Weight: 72.8 kg (160 lb 7.9 oz)    Does patient have medical equipment at home? walker, rolling    Does patient have medical equipment at home? cane, straight    Length of need (1-99 months): 99    Type: With back      CBC auto differential   Standing Status: Future Standing Exp. Date: 11/08/21     Comprehensive " metabolic panel   Standing Status: Future Standing Exp. Date: 11/08/21     TSH   Standing Status: Future Standing Exp. Date: 11/08/21     T4, free   Standing Status: Future Standing Exp. Date: 11/08/21     Diet Adult Regular     Diet Adult Regular     Notify your health care provider if you experience any of the following:  temperature >100.4     Notify your health care provider if you experience any of the following:  persistent nausea and vomiting or diarrhea     Notify your health care provider if you experience any of the following:  severe uncontrolled pain     Notify your health care provider if you experience any of the following:  redness, tenderness, or signs of infection (pain, swelling, redness, odor or green/yellow discharge around incision site)     Notify your health care provider if you experience any of the following:  difficulty breathing or increased cough     Notify your health care provider if you experience any of the following:  severe persistent headache     Notify your health care provider if you experience any of the following:  worsening rash     Notify your health care provider if you experience any of the following:  increased confusion or weakness     Notify your health care provider if you experience any of the following:  persistent dizziness, light-headedness, or visual disturbances     Notify your health care provider if you experience any of the following:  temperature >100.4     Notify your health care provider if you experience any of the following:  persistent nausea and vomiting or diarrhea     Notify your health care provider if you experience any of the following:  severe uncontrolled pain     Notify your health care provider if you experience any of the following:  redness, tenderness, or signs of infection (pain, swelling, redness, odor or green/yellow discharge around incision site)     Notify your health care provider if you experience any of the following:  difficulty  breathing or increased cough     Notify your health care provider if you experience any of the following:  severe persistent headache     Notify your health care provider if you experience any of the following:  worsening rash     Notify your health care provider if you experience any of the following:  persistent dizziness, light-headedness, or visual disturbances     Notify your health care provider if you experience any of the following:  increased confusion or weakness     Activity as tolerated     Activity as tolerated     Medications:   Ching Bruce   Home Medication Instructions JANET:[id number]    Printed on:09/09/20 4119   Medication Information                      alendronate (FOSAMAX) 70 MG tablet  Take 1 tablet (70 mg total) by mouth every 7 days.             capecitabine (XELODA) 500 MG Tab  Take 2 tablets (1,000 mg total) by mouth 2 (two) times daily.             cyproheptadine (PERIACTIN) 4 mg tablet  Take 2 mg (1/2 tablet) 4 times per day for one week, then 4 mg (one tablet)  4 times per day after that             ferrous sulfate (FEOSOL) 325 mg (65 mg iron) Tab tablet  Take 325 mg by mouth once daily.             furosemide (LASIX) 20 MG tablet  Take 1 tablet (20 mg total) by mouth every 12 (twelve) hours. for 14 days             iron-vit c-vit b12-folic acid (IRON-C PLUS) tablet  Take 1 tablet by mouth once daily.             levothyroxine (SYNTHROID) 200 MCG tablet  Take 1 tablet (200 mcg total) by mouth before breakfast.             spironolactone (ALDACTONE) 50 MG tablet  Take 1 tablet (50 mg total) by mouth every 8 (eight) hours. for 14 days             temozolomide (TEMODAR) 250 MG capsule  Take 1 capsule (250 mg total) by mouth once daily.               Jese Li MD  LS FM, PGY-3   [facility]

## 2020-09-09 NOTE — NURSING
Spoke with pt. Pt received oral chemo at this time. MD Alvarado stated if pt is not getting chem through the line to take the picc out.

## 2020-09-09 NOTE — PROGRESS NOTES
Progress Note  General Surgery   Admit Date: 9/1/2020   LOS: 8 days      SUBJECTIVE:     No acute overnight events.  Afebrile, vital signs stable  States feeling better after paracentesis  No respiratory issues  Has been ambulating, tolerating a diet, having BMs      OBJECTIVE:   Vital Signs (Most Recent)  Temp: 99.3 °F (37.4 °C) (09/09/20 0553)  Pulse: 84 (09/09/20 0553)  Resp: 17 (09/09/20 0553)  BP: 135/62 (09/09/20 0553)  SpO2: 96 % (09/09/20 0331)    I & O (Last 24H):    Intake/Output Summary (Last 24 hours) at 9/9/2020 0628  Last data filed at 9/9/2020 0600  Gross per 24 hour   Intake 287.71 ml   Output 2050 ml   Net -1762.29 ml     Wt Readings from Last 3 Encounters:   09/07/20 65.2 kg (143 lb 11.8 oz)   08/04/20 66.3 kg (146 lb 0.9 oz)   08/03/20 66.3 kg (146 lb 0.9 oz)       Current Diet Order   Procedures    Diet Adult Regular    Diet Cardiac [facility]; Fluid - 800mL     Order Specific Question:   Indicate patient location for additional diet options:     Answer:   [facility]     Order Specific Question:   Fluid restriction:     Answer:   Fluid - 800mL      PE  General: awake, alert  Resp: no increased respiratory effort   Abdomen: less ascites, less distended, soft, depressible  Extremities: edema improved    Laboratory Data:  CBC  Recent Labs   Lab 09/07/20  0543 09/08/20  0535 09/09/20  0545   WBC 5.64 6.36 5.52   RBC 2.75* 2.79* 2.61*   HGB 8.7* 8.9* 8.2*   HCT 27.3* 27.6* 25.9*   * 124* 121*   MCV 99* 99* 99*   MCH 31.6* 31.9* 31.4*   MCHC 31.9* 32.2 31.7*     CMP  Recent Labs   Lab 09/06/20  0530 09/07/20  0543 09/08/20  0535   CALCIUM 10.4 10.7* 11.0*   PROT 6.8 7.0 7.5    140 143   K 4.3 3.7 4.1   CO2 33* 32* 30*    99 101   BUN 19 20 21   CREATININE 1.0 1.1 1.2   ALKPHOS 495* 527* 582*   ALT 14 14 17   AST 21 21 27   BILITOT 0.9 1.4* 1.8*     MICRO  Microbiology Results (last 7 days)     Procedure Component Value Units Date/Time    (rule out SBP) Culture,  Anaerobic [[id number]] Collected: 09/02/20 1651    Order Status: Completed Specimen: Body Fluid from Lung, Left Updated: 09/07/20 1020     Anaerobic Culture Culture in progress    Narrative:      To rule out SBP order labs: Aerobic culture [[id number]],  Culture, Anaerobic [GBP298], Gram stain [JON106], Albumin  [CRI022], Protein [PNS218], LDH [JVW463], WBC \T\ Dff  [[id number]].    Blood culture #2 [[id number]] Collected: 09/01/20 1942    Order Status: Completed Specimen: Blood from Peripheral, Antecubital, Left Updated: 09/07/20 0612     Blood Culture, Routine No growth after 5 days.    Narrative:      Blood Culture #2    Blood culture #1 [[id number]] Collected: 09/01/20 1911    Order Status: Completed Specimen: Blood from Peripheral, Wrist, Right Updated: 09/07/20 0612     Blood Culture, Routine No growth after 5 days.    Narrative:      Blood Culture #1    (rule out SBP) Aerobic culture [[id number]] Collected: 09/02/20 1651    Order Status: Completed Specimen: Body Fluid from Lung, Left Updated: 09/06/20 1417     Aerobic Bacterial Culture No growth    Narrative:      To rule out SBP order labs: Aerobic culture [[id number]],  Culture, Anaerobic [MAS742], Gram stain [VQW378], Albumin  [ORA045], Protein [SVL202], LDH [CKI560], WBC \T\ Dff  [[id number]].        Diagnostic Results:  Imaging Results          CT Abdomen Pelvis  Without Contrast (Final result)  Result time 09/01/20 21:34:22    Final result by Shaun Manning MD (09/01/20 21:34:22)                 Impression:      New ascites since the prior MRI and CT of the abdomen.    Apparent new micro nodular densities throughout both lung bases suspicious for hematogenous metastasis.    Small bilateral effusions and compressive atelectasis in both lung bases.    Stable biliary stent with hepatic bili a and multiple areas of low density throughout the liver compatible with both metastatic disease and vascular shunting.    No evidence of bowel obstruction or  tract  obstruction.    Mild subcutaneous 3rd spacing.      Electronically signed by: Shaun Manning  Date:    09/01/2020  Time:    21:34             Narrative:    EXAMINATION:  CT ABDOMEN PELVIS WITHOUT CONTRAST    CLINICAL HISTORY:  Abdominal distension;    TECHNIQUE:  Low dose axial images, sagittal and coronal reformations were obtained from the lung bases to the pubic symphysis.    COMPARISON:  Previous MRI of the abdomen, 07/07/2020, CT of the abdomen and pelvis, 07/02/2014    FINDINGS:  There are bilateral effusions with compressive atelectasis in the lung bases.  Multiple small nodular densities on lung windows are seen in both lung bases too numerous to count ranging in size from 5 mm down to 3 mm none of which appear calcified.    No distinct pericardial effusion is present.    The liver is heterogeneous and nodular with areas of low density in the periphery of the left and right lobe suggesting some degree of vascular shunting but likely representing areas of metastatic disease seen on previous MRI.  Biliary air is noted with a expandable stent appearing to be within the common bile duct placed on 11/14/2019 ERCP.    New moderate ascites is present.  There is no evidence of GI tract obstruction or dilatation.    The kidneys demonstrate no hydronephrosis, calcified stone or hydroureter.  Retroperitoneal phleboliths are suggested in the region of the gonadal veins.    Within the pelvis, the previously seen soft tissue mass to the left of midline posteriorly is again noted.    No discrete new osseous lytic or blastic changes evident.                               X-Ray Chest AP Portable (Final result)  Result time 09/01/20 20:54:10    Final result by Lucina Eid MD (09/01/20 20:54:10)                 Impression:      Bilateral pleural effusion left greater than right with associated compressive atelectasis and/or lower lobe consolidation.      Electronically signed by: Lucina  Stanton  Date:    09/01/2020  Time:    20:54             Narrative:    EXAMINATION:  AP PORTABLE CHEST    CLINICAL HISTORY:  shortness of breath;    TECHNIQUE:  AP portable chest radiograph was submitted.    COMPARISON:  None.    FINDINGS:  AP portable chest radiograph demonstrates a cardiac silhouette within normal limits.  There is mild nonspecific prominence of the interstitium.  Bibasilar densities are present.  There is obscuration of the left hemidiaphragm and blunting of the left costophrenic angle.  No pneumothorax is detected.  Surgical clips are seen at the neck.  There are multiple overlying cardiac leads.                                ASSESSMENT/PLAN:   Ching Bruce is a 69 y.o. 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 Rodriguez. Patient presented with hypercalcemia now improving. S/p Thoracentesis and paracentesis. 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 SAAG > 1.7 liekly 2/2 to portal HTN. Subclinical hypothyroidism improved with TSH in acceptable range. Continues to have persistent ascites.  Patient remains hemodynamically stable and is clinically improving.     Plan:  -Advance diet as tolerated with fluid restriction   - will follow TSH level  -Continue Levothyroxine 250mcg PO daily   -Continue Lasix 20q8 and spironolactone 100mg   -Continue calcitonin spray   -Octreotide ggt   -PT/OT  - DVT ppx with SCDs lovenox  -IS  - discharge planning    Priyanka Alvarado MD  General Surgery PGY-2

## 2020-09-09 NOTE — PLAN OF CARE
dx:  pancreatic NET    admitted for ascites - has undergone paracentesis and thoracentesis    pt independent prior to admit  advance diet, fluid restriction   + BM   TN will continue to monitor pt to determine d/c needs.       pt lives with  daughter Isabelle Parrish [id number]      no hh, pt has a cane and RW        09/09/20 1304   Discharge Reassessment   Assessment Type Discharge Planning Reassessment

## 2020-09-11 NOTE — PHYSICIAN QUERY
PT Name: Ching Bruce  MR #: [id number]     Etiology of Condition Clarification      CDS/: April Chaudhary               Contact information: [email or web address]  This form is a permanent document in the medical record.     Query Date: September 11, 2020    By submitting this query, we are merely seeking further clarification of documentation.  Please utilize your independent clinical judgment when addressing the question(s) below.     The Medical Record contains the following:    Clinical Information Location in Medical Record   Being followed by NE team with hypercalcemia  Daughter reports lethargy and difficulty walking due to muscular weakness progressive over last several days.  Abdomina pain.  Fatigue and shortness of breath.    Hypercalcemia   Other ascites  Neuroendocrine cancer   ED Provider Note 09/01   At the admission she was found to have elevated calcium level Will admit her will do the workup for abdominal ascites and pleural effusion.  Will continue saline infusion and diuresis to control the hypercalcemia   H&P Attestation 09/01, filed 09/02   Presents to ED after clinic visit with Dr. Rodriguez seen for pancreatic neuroendocrine tumor.   She was found to be hypercalcemic and was given IV fluids and zoledronic acid yesterday.        Hypercalcemic 13. CT scan with BL pleural effusions and moderate ascites.    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 Rodriguez. Patient found to have generalized abdominal pain with hypercalcemia in clinic and given zoledronic acid and IVF. Patient then presented to the ED with SOB, constipation and continued Hypercalcemia.    General Surgery PN 09/02   Presented with hypercalcemia now  improving. S/p Thoracentesis and paracentesis.    General Surgery PN 09/03   Presented with hypercalcemia upwards of 13 following Zoledronic acid administered in clinic prior to arrival.  Patient was admitted for significant ascites as well as shortness of breath secondary to 3rd spacing of fluids    Final Active Diagnoses:  Principal Problem:  Hypercalcemia  Postoperative hypothyroidism  Bilateral pleural effusion  Dyspnea and respiratory abnormalities  Adult myxedema  Other ascites  Primary pancreatic neuroendocrine tumor  Secondary neuroendocrine tumor of liver   Discharge Summary 09/09    Calcium= 13.4 - 12.1 - 12.2 - 10.6 - 10.3 - 10.1 - 10.4 - 10.7 - 11.0 - 10.8     Labs, Chem Profile 09/01 - 09/09     Please document your best medical opinion regarding the etiology of ___Hypercalcemia___?       [xxx ] Primary pancreatic neuroendocrine tumor   [   ] Other etiology (please specify):___________________   [  ] Clinically Undetermined             Please document in your progress notes daily for the duration of treatment, until resolved, and include in your discharge summary.

## 2020-09-14 NOTE — PROGRESS NOTES
Follow up lab review- Bili trending up.  Ca still elevated.discussed with Dr. Mcknight, stent in placed -11/2019. Will request ERCP asap.  Msg sent to [facility] requesting ERCP.  Miriam to contact pt.  Monique sched with Dr. Rodriguez tomorrow.    Neuroendocrine Labs Latest Ref Rng & Units 9/11/2020 9/9/2020   TSH 0.400 - 4.000 uIU/mL 4.851 (H) 4.004 (H)   EXT TSH 0.40 - 4.50 miu/l     EXT 25 HYDROXY VIT D2 30 - 100 ng/ml     WBC 3.90 - 12.70 K/uL 6.32 5.52   EXT WBC 3.8 - 10.8 1000/uL     RBC 4.00 - 5.40 M/uL 3.12 (L) 2.61 (L)   HGB 12.0 - 16.0 g/dL 9.7 (L) 8.2 (L)   EXT HGB 11.7 - 15.5 g/dL     HCT 37.0 - 48.5 % 31.5 (L) 25.9 (L)   EXT HCT 35.0 - 45.0 %     MCV 82 - 98 fL 101 (H) 99 (H)   MCH 27.0 - 31.0 pg 31.1 (H) 31.4 (H)   MCHC 32.0 - 36.0 g/dL 30.8 (L) 31.7 (L)   RDW 11.5 - 14.5 % 15.8 (H) 15.6 (H)   PLATLETS 150 - 350 K/uL 143 (L) 121 (L)     Neuroendocrine Labs Latest Ref Rng & Units 9/11/2020 9/9/2020   GLUCOSE 70 - 110 mg/dL 104 122 (H)   EXT GLUCOSE 65 - 99 mg/dL     BUN 8 - 23 mg/dL 23 23   EXT BUN 7 - 25 mg/dL     CREATININE 0.5 - 1.4 mg/dL 1.3 1.3   EXT CREATININE 0.50 - 0.99 mg/dL      - 145 mmol/L 143 142   EXT  - 146 mmol/L     K 3.5 - 5.1 mmol/L 4.3 3.6   EXT K 3.5 - 5.3 mmol/L     CHLORIDE 95 - 110 mmol/L 102 99   EXT CHLORIDE 98 - 110 mmol/L     CO2 23 - 29 mmol/L 30 (H) 30 (H)   EXT CO2 20 - 32 mmol/L     CALCIUM 8.7 - 10.5 mg/dL 11.0 (H) 10.8 (H)   EXT CALCIUM 8.6 - 10.4 mg/dL     PROTEIN, TOTAL 6.0 - 8.4 g/dL 8.1 7.6   EXT PROTEIN, TOTAL 6.1 - 8.1 g/dL     PHOSPHORUS 2.7 - 4.5 mg/dL     ALBUMIN 3.5 - 5.2 g/dL 4.5 4.9   EXT ALBUMIN 3.6 - 5.1 g/dL     TOTAL BILIRUBIN 0.1 - 1.0 mg/dL 2.6 (H) 2.1 (H)   EXT TOTAL BILIRUBIN 0.2 - 1.2 mg/dL     DIRECT BILIRUBIN < OR = 0.2 mg/dL     ALK PHOSPHATASE 55 - 135 U/L 583 (H) 524 (H)   EXT ALK PHOSPHATASE 37 - 153 U/L     EXT GGT 1.9 - 3.7 g/dL     SGOT (AST) 10 - 40 U/L 25 24   EXT SGOT (AST) 10 - 35 U/L     SGPT (ALT) 10 - 44 U/L 19 17

## 2020-09-15 PROBLEM — R17 INCREASED BILIRUBIN LEVEL: Status: ACTIVE | Noted: 2020-01-01

## 2020-09-15 NOTE — Clinical Note
Follow-up in 2 weeks with CBC, CMP, Mag, free T4.  Also check these labs tomorrow or Thursday.  She may need further zoledronic acid.  Hold PRRT.

## 2020-09-15 NOTE — TELEPHONE ENCOUNTER
----- Message from Iona Reveles RN sent at 9/14/2020  3:43 PM CDT -----  This patient had a stent placed November 2019 by Dr. Lieberman.  It is overdue for changing and since he does not change them any longer can you please help us assist in getting it changed?  Her bili is rising quickly, it is 2.6 as of Friday. Thanks.

## 2020-09-15 NOTE — PROGRESS NOTES
[facility]:  [facility]  A collaboration between [facility] and [facility]    PATIENT: Ching Bruce  MRN: [id number]  DATE: 9/15/2020      Diagnosis:   1. Primary pancreatic neuroendocrine tumor    2. Secondary neuroendocrine tumor of liver    3. Hypercalcemia    4. Increased bilirubin level        Chief Complaint: Follow-up      Oncologic History:    Oncologic History Pancreatic neuroendocrine tumor with metasatic disease to liver diagnosed 10/12  Progressive disease in liver 7/2020    Oncologic Treatment Capecitabine/Temozolomide (CAPTEM) 1/13 -11/2018     Pathology 2012:  Pancreatic/liver aspirate- pancreatic endocrine neoplasm,  Ki-67 1%  7/2020: Liver biopsy - well differentiated neuroendocrine tumor, grade 2, Ki-67 15%, Mitotic rate <2/2mm2      The patient location is:  home  The chief complaint leading to consultation is:  Follow-up pancreatic neuroendocrine tumor    Visit type: audiovisual    Face to Face time with patient:  10 min  25 minutes of total time spent on the encounter, which includes face to face time and non-face to face time preparing to see the patient (eg, review of tests), Obtaining and/or reviewing separately obtained history, Documenting clinical information in the electronic or other health record, Independently interpreting results (not separately reported) and communicating results to the patient/family/caregiver, or Care coordination (not separately reported).         Each patient to whom he or she provides medical services by telemedicine is:  (1) informed of the relationship between the physician and patient and the respective role of any other health care provider with respect to management of the patient; and (2) notified that he or she may decline to receive medical services by telemedicine and may withdraw from such care at any time.    Notes:         Subjective:    Interval  History: Ms. Bruce is a 69 y.o. female who returns for follow up for her pancreatic neuroendocrine tumor.  When I saw her last we had sent her for evaluation in the ER and was subsequently hospitalized.  She was found to have bilateral pleural effusions and ascites which were tapped.  Cardiac workup was negative and her site he has was thought to be secondary to portal hypertension.  She states that since her discharge she has been feeling much better.  Her shortness of breath has resolved.  She still feels fatigued but is trying to ambulate more and trying to increase her p.o. intake.  She denies pain.  She has no other new complaints.      ONCOLOGIC HISTORY:   A 69 y.o. year-old -American female who I had initially   seen on 12/18/2012. Her history dates back to September 2012 when she was noted  to have several weeks of feeling fatigued. She sought treatment with her   primary care doctor who did a CBC and found her to be severely anemic. She was   seen at [facility] and transfused 3 units of packed red blood cells  and no source of bleeding had been found. Records at that time had shown   hemoglobin of 5. She sought followup in September 2012 with Dr. Guillen who   performed an EGD and colonoscopy with an EGD showing an ulcerated and fungating   nonbleeding 2 cm mass, malignant in appearance. It did cause partial   obstructions. Biopsies were taken, which showed no evidence of malignancy at   that time. She had a CT of the abdomen and pelvis showing multiple metastatic   lesions and the liver biopsy was performed on 10/17/2012 showed pancreatic   neuroendocrine neoplasm in the left lobe of the liver aspirate. She went on to   have an ERCP along with sphincterotomy and biliary stent placement and   subsequent PET had shown numerous hypodense lesions in the liver. Octreotide   scan was performed, which showed multiple right and left hepatic metastases.   MRI of the abdomen was performed on  12/05/2012 showing innumerable lesions that   demonstrated peripheral to hyperintensity consistent with hypervascular   metastasis. She was seen by Dr. Humphrey, who thought she was not a candidate   for surgical resection during that time. Her pathology from her tumor came back  positive for chromogranin, synaptophysin and had a Ki-67 of less than 1%. She   was started on treatment with temozolomide and capecitabine with cycle   1 being on 01/22/2013. This was discontinued in 11/2018 due to prolonged stability of disease.  In 7/2020 she had progressive disease within the liver.  A biopsy showed a grade 2 NET with Ki-67 of 15%.  Gallium 68 PET/CT showed uptake within the liver.    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    Secondary malignant neoplasm of liver     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) 5/7/2013    Thrombocytopenia 11/12/2013    Thyroid disease     Unspecified essential hypertension 11/12/2013       Past Surgical HIstory:   Past Surgical History:   Procedure Laterality Date    ERCP N/A 6/21/2018    Procedure: ERCP, stent exchange;  Surgeon: Jake Lieberman MD;  Location[facility] ENDO;  Service: Endoscopy;  Laterality: N/A;    ERCP N/A 5/23/2019    Procedure: ERCP (ENDOSCOPIC RETROGRADE CHOLANGIOPANCREATOGRAPHY), stent exchange;  Surgeon: Jake Lieberman MD;  Location: [facility] ENDO;  Service:  Endoscopy;  Laterality: N/A;    ERCP N/A 11/14/2019    Procedure: ERCP (ENDOSCOPIC RETROGRADE CHOLANGIOPANCREATOGRAPHY), stent exchange;  Surgeon: Jake Lieberman MD;  Location[facility];  Service: Endoscopy;  Laterality: N/A;    THYROIDECTOMY  2011       Family History:   Family History   Problem Relation Age of Onset    Cancer Maternal Grandmother     Diabetes Father     Breast cancer Neg Hx     Colon cancer Neg Hx     Ovarian cancer Neg Hx        Social History:  reports that she has never smoked. She has never used smokeless tobacco. She reports that she does not drink alcohol or use drugs.    Allergies:  Review of patient's allergies indicates:   Allergen Reactions    Epinephrine Other (See Comments)     Carcinoid patient    Sulfa (sulfonamide antibiotics) Other (See Comments)     Urticaria       Medications:  Current Outpatient Medications   Medication Sig Dispense Refill    alendronate (FOSAMAX) 70 MG tablet Take 1 tablet (70 mg total) by mouth every 7 days. 4 tablet 0    capecitabine (XELODA) 500 MG Tab Take 2 tablets (1,000 mg total) by mouth 2 (two) times daily. 56 tablet 6    ferrous sulfate (FEOSOL) 325 mg (65 mg iron) Tab tablet Take 325 mg by mouth once daily.      furosemide (LASIX) 20 MG tablet Take 1 tablet (20 mg total) by mouth every 12 (twelve) hours. for 14 days 28 tablet 0    iron-vit c-vit b12-folic acid (IRON-C PLUS) tablet Take 1 tablet by mouth once daily. 30 tablet 0    levothyroxine (SYNTHROID) 200 MCG tablet Take 1 tablet (200 mcg total) by mouth before breakfast. 30 tablet 0    spironolactone (ALDACTONE) 50 MG tablet Take 1 tablet (50 mg total) by mouth every 8 (eight) hours. for 14 days 42 tablet 0     No current facility-administered medications for this visit.          Review of Systems   Constitutional: Positive for malaise/fatigue. Negative for chills, fever and weight loss.   HENT: Negative for congestion.    Eyes: Negative for blurred vision.   Respiratory:  Negative for cough and shortness of breath.    Cardiovascular: Positive for leg swelling. Negative for chest pain.   Gastrointestinal: Negative for abdominal pain, constipation, diarrhea, nausea and vomiting.   Genitourinary: Negative for dysuria.   Musculoskeletal: Negative for myalgias.   Skin: Negative for rash.   Neurological: Positive for weakness. Negative for focal weakness and headaches.             Endo/Heme/Allergies: Does not bruise/bleed easily.         ECOG Performance Status: 0   Objective:      Vitals:   There were no vitals filed for this visit.  BMI: There is no height or weight on file to calculate BMI.      Physical Exam   Constitutional: She is oriented to person, place, and time. She appears unhealthy.   Pulmonary/Chest: No respiratory distress.   Neurological: She is oriented to person, place, and time.         Laboratory Data:  Abstract on 12/06/2016   Component Date Value Ref Range Status    EXT WBC 12/03/2016 4.1  3.8 - 10.8 1000/ul Final    EXT Hemoglobin 12/03/2016 12.9  11.7 - 15.5 g/dl Final    EXT Hematocrit 12/03/2016 38.5  35.0 - 45.0 % Final    EXT Platelets 12/03/2016 138* 140 - 400 1000/ul Final    EXT Glucose 12/03/2016 88  65 - 99 mg/dl Final    EXT BUN 12/03/2016 13  7 - 25 mg/dl Final    EXT Creatinine 12/03/2016 0.90  0.50 - 0.99 mg/dl Final    EXT Sodium 12/03/2016 141  135 - 146 mmol/l Final    EXT Potassium 12/03/2016 4.2  3.5 - 5.3 mmol/l Final    EXT Chloride 12/03/2016 105  98 - 110 mmol/l Final    EXT CO2 12/03/2016 29  20 - 31 mmol/l Final    EXT Calcium 12/03/2016 9.7  8.6 - 10.4 mg/dl Final    EXT Protein total 12/03/2016 6.9  6.1 - 8.1 g/dl Final    EXT Albumin 12/03/2016 4.0  3.6 - 5.1 g/dl Final    EXT BilirubiN Total 12/03/2016 1.1  0.2 - 1.2 mg/dl Final    EXT Alkaline Phosphatase 12/03/2016 181* 33 - 130 u/l Final    EXT AST 12/03/2016 21  10 - 35 u/l Final    EXT ALT 12/03/2016 28  6 - 29 u/l Final   Office Visit on 11/14/2016   Component  Date Value Ref Range Status    Blood Stool 11/14/2016 Negative  Negative Final     Acceptable 11/14/2016 Yes   Final    SOURCE: 11/14/2016 Cervical   Final    Slides: 11/14/2016 1   Final    LMP: 11/14/2016 NA   Final    Specimen adequacy: 11/14/2016 (NOTE)   Final    Comment:      Satisfactory for evaluation.          Endocervical cells absent.  Metaplastic cells indicative       of transformation zone cannot be reliably distinguished from       parabasal or atrophic cells.                      Interpretation 11/14/2016 NO EPITHELIAL ABNORMALITY SEE BELOW   Final    Comment: ----------------------------------------------------------------------       *NEGATIVE FOR INTRAEPITHELIAL LESION OR MALIGNANCY   ----------------------------------------------------------------------         Comments: 11/14/2016 (NOTE)   Final    Comment: Due to technical or specimen issues, imaging could not be  performed. A cytotechnologist has manually screened this slide.         Cytotechnologist: 11/14/2016 BRENDON Ca(ASCP)[facility]   Final    LOCATION 11/14/2016 (NOTE)   Final    Comment: Specimens processed and interpreted at :[facility], [address] [place] [address], Phone: [phone], CLIA: [id number]      CPT Codes: 11/14/2016 (NOTE)   Final    Comment: [id number]        UNLESS OTHERWISE INDICATED, COMPUTER AIDED AND CYTOTECHNOLOGIST     SCREENING PERFORMED.          The Pap test is a screening test with an inherent, but low       probability of error.  Your patient should be reminded to       consult you immediately if she experiences any suspicious       signs or symptoms, regardless of her Pap test result.       An alternate report format containing images or consolidated       prior Pap history is available as applicable.         HPV HIGH RISK IF ASC-US, [facility] 11/14/2016 CRITERIA NOT MET   Final    Comment:       UNLESS OTHERWISE INDICATED, ALL TESTING PERFORMED  AT  [facility]  [address] [place] [address]            :  CARLOS MENDEZ M.D.        IA NUMBER [id number]  CAP ACCREDITATION NO. [id number]                 Imaging:   MRI 07/07/2020  COMPARISON:  09/30/2019     FINDINGS:  Compared with the prior study there is considerable interval worsening of extensive neoplastic disease throughout the liver with innumerable confluent lesions along the periphery of the liver in both the left and right hepatic lobes.  Previously measured lesion in the left hepatic lobe is now obscured by innumerable confluent lesions.  Superior right hepatic lobe lesion that previously measured 2 cm now is at least 6 cm.  There is hypertrophy/enlargement of the left hepatic lobe.  Moderate splenomegaly has developed in the interim.     There is a metallic wall stent identified within the distal common bile duct.  No definite central biliary ductal dilatation.  No definite pancreatic ductal dilatation or discrete mass is seen within the pancreas but there is nonspecific fullness of the pancreatic head.     The kidneys and adrenal glands are remarkable for small bilateral renal cysts.  The retroperitoneal vessels enhance normally.     Miscellaneous: There is no ascites.     MRCP: Not performed     Impression:     Significant interval worsening of extensive neoplastic/metastatic disease throughout the liver.     Interval development of splenomegaly        Gallium 68 PET/CT 7/20/2020  COMPARISON:  Dotatate PET-CT 11/30/2018, MRI abdomen 07/07/2020, neck ultrasound 03/05/2013     FINDINGS:  Quality of the study: Adequate.     Small focus of radiotracer uptake within the right posterior thyroid resection bed and tracheoesophageal groove.  SUV max of 2.9.     New focal radiotracer uptake within a 0.6 cm right internal mammary lymph node with maximum SUV of 5.8.     Numerous diffuse foci of increased radiotracer uptake throughout the liver.  Overall  number of hepatic lesions is likely similar compared to prior exam however lesions demonstrate decreased intensity of radiotracer uptake.  Index right hepatic dome lesion with maximum SUV 19 (previously 29.64).     Physiologic distribution of the tracer is seen within the pituitary, salivary, stomach, pancreas uncinate process, spleen, adrenal glands,  tract, and bowel.     Incidental CT findings: Small volume ascites.  Common bile duct stent in place with expected pneumobilia.     Impression:     Numerous diffuse tracer avid hepatic lesions which demonstrate decreased intensity of uptake compared to prior exam.     New radiotracer uptake within a small right internal mammary lymph node concerning for metastatic disease.     Small focus of radiotracer uptake within the right posterior thyroid resection bed likely correlating with a 0.7 cm low-density nodule.  Similar sized nodule was described on prior ultrasound 03/05/2013.  Differential considerations include parathyroid adenoma versus residual thyroid tissue.     I, Jake Horowitz MD, attest that I reviewed and interpreted the images.            Assessment:       1. Primary pancreatic neuroendocrine tumor    2. Secondary neuroendocrine tumor of liver    3. Hypercalcemia    4. Increased bilirubin level           Plan:     Mrs. Bruce continues to recover from her hospitalization.  She underwent a thoracentesis and paracentesis with improvement of her symptoms.  It is noted that bilirubin has increased and she previously had a stent placed and will likely need this changed.  We have put in a referral for Gastroenterology.  She does has some ongoing hypercalcemia and will plan to repeat labs this week and give another dose of zoledronic acid if needed.  We will plan to hold off on PRRT for now and let her regain her strength more before treatment.  I will plan to see her back in another 2 weeks and she is to report any new symptoms in the interim.  All questions were  answered and she is agreeable with this plan.    Cory Rodriguez DO, FACP  Hematology & Oncology, [facility]"" [facility]  [address]  [place]  [address]  ph. [phone]; [phone]  fax. [phone]

## 2020-09-15 NOTE — TELEPHONE ENCOUNTER
Called patient to schedule ERCP to remove stent, but she stated she has appointment with Dr Rodriguez today and will get back with us to schedule.

## 2020-09-15 NOTE — TELEPHONE ENCOUNTER
----- Message from Melanie Lindsay sent at 9/15/2020  2:22 PM CDT -----  Contact: [phone]  ----Pt is requesting a callback

## 2020-09-16 NOTE — TELEPHONE ENCOUNTER
----- Message from Melanie Lindsay sent at 9/16/2020 11:32 AM CDT -----  Contact: [phone]  RR-----Pt Ching Dillon calling regarding needing to speak with the Office in regards to scheduling Surgery.      Please call and advise

## 2020-09-16 NOTE — TELEPHONE ENCOUNTER
----- Message from Iona Reveles RN sent at 9/16/2020 12:08 PM CDT -----  Regarding: Stent change  I sent a message on this patient on Monday. She was a patient of Dr. Lieberman and had an ERCP with stent placement in Nov. 2019 with recommendation for change of stent in 6-9 months. We were just told he no longer does stent changed. Her bili is rising quickly, up to 2.6 on Friday, repeat will be on Thursday, can we get her seen ASAP to have stent changed out before we have to send her back to the hospital?

## 2020-09-16 NOTE — TELEPHONE ENCOUNTER
Spoke to patient. Will have lasix sent to local pharmacy if she still needs it.  I will have her scheduled for labs closer to home. New message for ERCP and stent change sent to [facility].

## 2020-09-17 NOTE — TELEPHONE ENCOUNTER
Per Dr. Morris, pt informed zometa infusion required on today.  Pt instructed to go to [facility] at 1pm.  Pt given infusion address.  Pt repeated time correctly.

## 2020-09-17 NOTE — TELEPHONE ENCOUNTER
[facility], calcium-12.5.  Results repeated correctly.  Dr. Rodriguez to be notified.

## 2020-09-17 NOTE — PLAN OF CARE
Patient sent to infusion unit for Zometa infusion. Plan of care reviewed, patient agreeable to plan. Patient tolerated infusion well. VSS. Discharge instructions reviewed. Patient was escorted off unit via w/c by family member. Patient in NAD at time of discharge.

## 2020-09-18 NOTE — TELEPHONE ENCOUNTER
Spoke with patient about arrival time @ 0830.   Covid test = pnd, 09/19/20    NPO status reviewed: Patient may eat until 7:00pm.  After 7pm, pt may have CLEAR liquids ONLY until completely NPO at Midnight.    Medications: Do not take Insulin or oral diabetic medications the day of the procedure.    Take as prescribed: heart, seizure and blood pressure medication in the morning with a sip of water (less than an ounce).  Take any breathing medications and bring inhalers to hospital with you.     Leave all valuables and jewelry at home. Wear comfortable clothes to procedure to change into hospital gown.   You cannot drive for 24 hours after your procedure because you will receive sedation for your procedure to make you comfortable.    A ride must be provided at discharge.

## 2020-09-22 PROBLEM — R17 JAUNDICE: Status: ACTIVE | Noted: 2020-01-01

## 2020-09-22 NOTE — [employer] PATIENT INSTRUCTIONS
Discharge Summary/Instructions after an Endoscopic Procedure  Patient Name: Ching Bruce  Patient MRN: [id number]  Patient YOB: 1950 Tuesday, September 22, 2020  Abdulaziz Owen MD  Your health is very important to us during the Covid Crisis. Following your   procedure today, you will receive a daily text for 2 weeks asking about   signs or symptoms of Covid 19.  Please respond to this text when you   receive it so we can follow up and keep you as safe as possible.   RESTRICTIONS:  During your procedure today, you received medications for sedation.  These   medications may affect your judgment, balance and coordination.  Therefore,   for 24 hours, you have the following restrictions:   - DO NOT drive a car, operate machinery, make legal/financial decisions,   sign important papers or drink alcohol.    ACTIVITY:  Today: no heavy lifting, straining or running due to procedural   sedation/anesthesia.  The following day: return to full activity including work.  DIET:  Eat and drink normally unless instructed otherwise.     TREATMENT FOR COMMON SIDE EFFECTS:  - Mild abdominal pain, nausea, belching, bloating or excessive gas:  rest,   eat lightly and use a heating pad.  - Sore Throat: treat with throat lozenges and/or gargle with warm salt   water.  - Because air was used during the procedure, expelling large amounts of air   from your rectum or belching is normal.  - If a bowel prep was taken, you may not have a bowel movement for 1-3 days.    This is normal.  SYMPTOMS TO WATCH FOR AND REPORT TO YOUR PHYSICIAN:  1. Abdominal pain or bloating, other than gas cramps.  2. Chest pain.  3. Back pain.  4. Signs of infection such as: chills or fever occurring within 24 hours   after the procedure.  5. Rectal bleeding, which would show as bright red, maroon, or black stools.   (A tablespoon of blood from the rectum is not serious, especially if   hemorrhoids are present.)  6. Vomiting.  7. Weakness or dizziness.  GO  DIRECTLY TO THE NEAREST EMERGENCY ROOM IF YOU HAVE ANY OF THE FOLLOWING:      Difficulty breathing              Chills and/or fever over 101 F   Persistent vomiting and/or vomiting blood   Severe abdominal pain   Severe chest pain   Black, tarry stools   Bleeding- more than one tablespoon   Any other symptom or condition that you feel may need urgent attention  Your doctor recommends these additional instructions:  If any biopsies were taken, your doctors clinic will contact you in 1 to 2   weeks with any results.  - Discharge patient to home.   - Resume previous diet.   - Continue present medications.   - Perform CT scan (computed tomography) of the abdomen with contrast at   appointment to be scheduled if her LFTs do not respond to determine degree   of metastatic burden  - Return to referring physician as previously scheduled.  For questions, problems or results please call your physician - Abdulaziz Owen MD.  EMERGENCY PHONE NUMBER: [phone],  LAB RESULTS: [phone]  IF A COMPLICATION OR EMERGENCY SITUATION ARISES AND YOU ARE UNABLE TO REACH   YOUR PHYSICIAN - GO DIRECTLY TO THE EMERGENCY ROOM.  Abdulaziz Owen MD  9/22/2020 10:21:24 AM  This report has been verified and signed electronically.  [employer]

## 2020-09-22 NOTE — ANESTHESIA PREPROCEDURE EVALUATION
09/22/2020  Ching Bruce is a 69 y.o., female .    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    Secondary malignant neoplasm of liver     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) 5/7/2013    Thrombocytopenia 11/12/2013    Thyroid disease     Unspecified essential hypertension 11/12/2013     Past Surgical History:   Procedure Laterality Date    ERCP N/A 6/21/2018    Procedure: ERCP, stent exchange;  Surgeon: Jake Lieberman MD;  Location: [facility];  Service: Endoscopy;  Laterality: N/A;    ERCP N/A 5/23/2019    Procedure: ERCP (ENDOSCOPIC RETROGRADE CHOLANGIOPANCREATOGRAPHY), stent exchange;  Surgeon: Jake Lieberman MD;  Location: [facility];  Service: Endoscopy;  Laterality: N/A;    ERCP N/A 11/14/2019    Procedure: ERCP (ENDOSCOPIC RETROGRADE CHOLANGIOPANCREATOGRAPHY), stent exchange;  Surgeon: Jake Lieberman MD;  Location: [facility];  Service: Endoscopy;  Laterality: N/A;    THYROIDECTOMY  2011         Anesthesia Evaluation    I have reviewed the Patient Summary Reports.    I have reviewed the Nursing Notes.   I have reviewed the Medications.     Review of Systems  Anesthesia Hx:  No problems with previous Anesthesia    Cardiovascular:   Hypertension    Pulmonary:  Pulmonary Normal    Renal/:   Chronic Renal  Disease    Hepatic/GI:   Liver Disease,    Neurological:  Neurology Normal    Endocrine:  Endocrine Normal        Physical Exam  General:  Well nourished    Airway/Jaw/Neck:  Airway Findings: Mouth Opening: Normal Tongue: Normal  General Airway Assessment: Adult  Mallampati: II  TM Distance: Normal, at least 6 cm       Chest/Lungs:  Chest/Lungs Findings: Clear to auscultation, Normal Respiratory Rate     Heart/Vascular:  Heart Findings: Rate: Normal  Rhythm: Regular Rhythm  Sounds: Normal        Mental Status:  Mental Status Findings:  Cooperative, Alert and Oriented       Lab Results   Component Value Date    WBC 4.4 11/04/2019    HGB 12.3 11/04/2019    HCT 36.7 11/04/2019    MCV 96.1 11/04/2019     11/04/2019       BMP  Lab Results   Component Value Date     09/17/2020    K 3.4 (L) 09/17/2020     09/17/2020    CO2 27 09/17/2020    BUN 12 09/17/2020    CREATININE 0.9 09/17/2020    CALCIUM 12.5 (HH) 09/17/2020    ANIONGAP 9 09/17/2020    ESTGFRAFRICA >60 09/17/2020    EGFRNONAA >60 09/17/2020         Anesthesia Plan  Type of Anesthesia, risks & benefits discussed:  Anesthesia Type:  MAC  Patient's Preference:   Intra-op Monitoring Plan: standard ASA monitors  Intra-op Monitoring Plan Comments:   Post Op Pain Control Plan: per primary service following discharge from PACU  Post Op Pain Control Plan Comments:   Induction:   IV  Beta Blocker:         Informed Consent: Patient understands risks and agrees with Anesthesia plan.  Questions answered. Anesthesia consent signed with patient.  ASA Score: 3     Day of Surgery Review of History & Physical:  There are no significant changes.          Ready For Surgery From Anesthesia Perspective.                                                                                                                  09/22/2020  Ching Bruce is a 69 y.o., female presents for ERCP under MAC.    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    Secondary malignant neoplasm of liver     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) 5/7/2013    Thrombocytopenia 11/12/2013    Thyroid disease     Unspecified essential hypertension 11/12/2013     Past Surgical History:   Procedure Laterality Date    ERCP N/A 6/21/2018    Procedure: ERCP, stent exchange;  Surgeon: Jake Lieberman MD;  Location: [facility];  Service: Endoscopy;  Laterality: N/A;    ERCP N/A 5/23/2019    Procedure: ERCP (ENDOSCOPIC RETROGRADE CHOLANGIOPANCREATOGRAPHY), stent exchange;  Surgeon: Jake Lieberman MD;  Location: [facility] END[facility];  Service: Endoscopy;  Laterality: N/A;    ERCP N/A 11/14/2019    Procedure: ERCP (ENDOSCOPIC RETROGRADE CHOLANGIOPANCREATOGRAPHY), stent exchange;  Surgeon: Jake Lieberman MD;  Location: [facility];  Service: Endoscopy;  Laterality: N/A;    THYROIDECTOMY  2011         Pre-op Assessment    I have reviewed the Patient Summary Reports.     I have reviewed the Nursing Notes. I have reviewed the NPO Status.   I have reviewed the Medications.     Review of Systems  Anesthesia Hx:  No problems with previous Anesthesia    Cardiovascular:   Hypertension    Pulmonary:   Shortness of breath    Renal/:   Chronic Renal Disease    Hepatic/GI:   Liver Disease,    Neurological:  Neurology Normal    Endocrine:   Hypothyroidism    Psych:   Psychiatric History          Physical Exam  General:  Well nourished     Airway/Jaw/Neck:  Airway Findings: Mouth Opening: Normal Tongue: Normal  General Airway Assessment: Adult  Mallampati: II  TM Distance: Normal, at least 6 cm  Jaw/Neck Findings:     Neck ROM: Normal ROM      Dental:  Dental Findings: In tact   Chest/Lungs:  Chest/Lungs Findings: Clear to auscultation, Normal Respiratory Rate     Heart/Vascular:  Heart Findings: Rate: Normal  Rhythm: Regular Rhythm  Sounds: Normal        Mental Status:  Mental Status Findings:  Cooperative, Alert and Oriented       Lab Results   Component Value Date    WBC 4.4 11/04/2019    HGB 12.3 11/04/2019    HCT 36.7 11/04/2019    MCV 96.1 11/04/2019     11/04/2019       BMP  Lab Results   Component Value Date     09/17/2020    K 3.4 (L) 09/17/2020     09/17/2020    CO2 27 09/17/2020    BUN 12 09/17/2020    CREATININE 0.9 09/17/2020    CALCIUM 12.5 (HH) 09/17/2020    ANIONGAP 9 09/17/2020    ESTGFRAFRICA >60 09/17/2020    EGFRNONAA >60 09/17/2020     · Left ventricular systolic function. The estimated ejection fraction is 60%.  · Grade I (mild) left ventricular diastolic dysfunction consistent with impaired relaxation.  · Moderate left atrial enlargement.  · Normal right ventricular systolic function.  · Normal central venous pressure (3 mmHg).  · The estimated PA systolic pressure is 29 mmHg.    Anesthesia Plan  Type of Anesthesia, risks & benefits discussed:  Anesthesia Type:  MAC  Patient's Preference:   Intra-op Monitoring Plan: standard ASA monitors  Intra-op Monitoring Plan Comments:   Post Op Pain Control Plan: per primary service following discharge from PACU  Post Op Pain Control Plan Comments:   Induction:   IV  Beta Blocker:         Informed Consent: Patient understands risks and agrees with Anesthesia plan.  Questions answered. Anesthesia consent signed with patient.  ASA Score: 3     Day of Surgery Review of History & Physical:  There are no significant changes.          Ready For Surgery From Anesthesia  Perspective.

## 2020-09-22 NOTE — TRANSFER OF CARE
"Anesthesia Transfer of Care Note    Patient: Ching Bruce    Procedure(s) Performed: Procedure(s) (LRB):  ERCP (ENDOSCOPIC RETROGRADE CHOLANGIOPANCREATOGRAPHY) (N/A)    Patient location: GI    Anesthesia Type: MAC    Transport from OR: Transported from OR on room air with adequate spontaneous ventilation    Post pain: adequate analgesia    Post assessment: no apparent anesthetic complications    Post vital signs: stable    Level of consciousness: awake, alert and oriented    Nausea/Vomiting: no nausea/vomiting    Complications: none    Transfer of care protocol was followed      Last vitals:   Visit Vitals  BP (!) 112/56 (BP Location: Left arm, Patient Position: Lying)   Pulse 90   Temp 36.6 °C (97.9 °F) (Temporal)   Resp (!) 22   Ht 5' 7" (1.702 m)   Wt 66.7 kg (147 lb)   LMP  (LMP Unknown)   SpO2 95%   BMI 23.02 kg/m²     "

## 2020-09-22 NOTE — ANESTHESIA POSTPROCEDURE EVALUATION
Anesthesia Post Evaluation    Patient: Ching Bruce    Procedure(s) Performed: Procedure(s) (LRB):  ERCP (ENDOSCOPIC RETROGRADE CHOLANGIOPANCREATOGRAPHY) (N/A)    Final Anesthesia Type: MAC    Patient location during evaluation: GI PACU  Patient participation: Yes- Able to Participate  Level of consciousness: awake and alert and oriented  Post-procedure vital signs: reviewed and stable  Pain management: adequate  Airway patency: patent    PONV status at discharge: No PONV  Anesthetic complications: no      Cardiovascular status: blood pressure returned to baseline, bradycardic and hemodynamically stable  Respiratory status: unassisted  Hydration status: euvolemic  Follow-up not needed.          Vitals Value Taken Time   /56 09/22/20 1017   Temp 36.6 °C (97.9 °F) 09/22/20 1017   Pulse 90 09/22/20 1017   Resp 22 09/22/20 1017   SpO2 95 % 09/22/20 1017         No case tracking events are documented in the log.      Pain/John Score: John Score: 9 (9/22/2020 10:17 AM)

## 2020-09-22 NOTE — DISCHARGE INSTRUCTIONS
Discharge Instructions for ERCP (Endoscopic Retrograde Cholangiopancreatography)  You had a procedure known as an ERCP. Your healthcare provider performed the ERCP to look at your bile or pancreatic ducts, and to locate and treat blockages in the ducts. This procedure is used to diagnose diseases of the pancreas, bile ducts, and pancreatic duct, liver, and gallbladder. Heres what you need to do following your ERCP.  Home care  · Dont take aspirin or any other blood-thinning medicines (anticoagulants) until your provider says its OK.  · Your provider may prescribe an antibiotic, depending on what was done during the ERCP.  · You may have a sore throat for 1 to 2 days after the procedure. Use lozenges or gargle with salt water for your sore throat. If you're not better in a few days, call your provider.  · Rest, drink fluids, and eat light meals. If you feel bloated or have too much gas, use a heating pad on your belly to help reduce the discomfort. This should help you feel better. But if it doesn't, call your provider.  · Dont drink alcohol for 2 days after the procedure.  Follow-up care  Make a follow-up appointment as directed by our staff.     When to seek medical care  Call your provider right away if you have any of the following:  · Trouble swallowing or throat pain that gets worse   · Chest pain or severe belly or abdominal pain  · Fever above 100°F (37.7°C) or chills  · Upset stomach (nausea) and vomiting  · Black or tarry stools   Date Last Reviewed: 6/13/2015  © 8269-3406 [employer]. [address] [place] [address], [place] [address]. All rights reserved. This information is not intended as a substitute for professional medical care. Always follow your healthcare professional's instructions.

## 2020-09-22 NOTE — PLAN OF CARE
Recovery complete. Patient recovered to baseline. Discharge instructions reviewed with patient. VSS. Dr. Owen visited at bedside, discussed findings and recommendations with patient and called Daughter all questions asked and answered. Verbalized understanding of information given. Handout provided at time of discharge.

## 2020-09-22 NOTE — H&P
Short Stay Endoscopy History and Physical    PCP - Gaby Corea MD  Referring Physician - Abdulaziz Owen MD  [address] [facility]  [address]  [place] [address]    Procedure - ercp  ASA - per anesthesia  Mallampati - per anesthesia  History of Anesthesia problems - no  Family history Anesthesia problems -  no   Plan of anesthesia - General    HPI:  This is a 69 y.o. female here for evaluation of: stricture    Reflux - no  Dysphagia - no  Abdominal pain - no  Diarrhea - no    ROS:  Constitutional: No fevers, chills, No weight loss  CV: No chest pain  Pulm: No cough, No shortness of breath  Ophtho: No vision changes  GI: see HPI  Derm: No rash    Medical History:  has a past medical history of Abdominal swelling (9/1/2020), Anemia, Chemotherapy follow-up examination (5/27/2014), Confusion (9/1/2020), Encounter for blood transfusion, Falls (9/1/2020), Generalized abdominal pain (9/1/2020), HTN (hypertension), Hypercalcemia (5/7/2013), Hyperlipidemia, Increased bilirubin level (9/15/2020), Kidney insufficiency, Maintenance chemotherapy following disease, Primary malignant neuroendocrine tumor of pancreas, Primary pancreatic neuroendocrine tumor (8/2012), Secondary malignant neoplasm of liver, 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    Surgical History:  has a past surgical history that includes Thyroidectomy (2011); ERCP (N/A, 6/21/2018); ERCP (N/A, 5/23/2019); and ERCP (N/A, 11/14/2019).    Family History: family history includes Cancer in her maternal grandmother; Diabetes in her father..    Social History:  reports that she has never smoked. She has never used smokeless tobacco. She reports that she does not drink alcohol or use drugs.    Review of patient's allergies indicates:   Allergen Reactions    Epinephrine Anaphylaxis and Other (See Comments)     Can cause Carcinoid Crisis     Sulfa (sulfonamide antibiotics) Other (See Comments)     Urticaria       Medications:   Medications Prior to Admission   Medication Sig Dispense Refill Last Dose    alendronate (FOSAMAX) 70 MG tablet Take 1 tablet (70 mg total) by mouth every 7 days. 4 tablet 0 9/21/2020 at Unknown time    capecitabine (XELODA) 500 MG Tab Take 2 tablets (1,000 mg total) by mouth 2 (two) times daily. 56 tablet 6 9/21/2020 at Unknown time    ferrous sulfate (FEOSOL) 325 mg (65 mg iron) Tab tablet Take 325 mg by mouth once daily.   9/21/2020 at Unknown time    furosemide (LASIX) 20 MG tablet Take 1 tablet (20 mg total) by mouth every 12 (twelve) hours. 60 tablet 0 9/21/2020 at Unknown time    iron-vit c-vit b12-folic acid (IRON-C PLUS) tablet Take 1 tablet by mouth once daily. 30 tablet 0 9/21/2020 at Unknown time    levothyroxine (SYNTHROID) 200 MCG tablet Take 1 tablet (200 mcg total) by mouth before breakfast. 30 tablet 0 9/21/2020 at Unknown time    spironolactone (ALDACTONE) 50 MG tablet Take 1 tablet (50 mg total) by mouth every 8 (eight) hours. for 14 days 42 tablet 0 9/21/2020 at Unknown time       Physical Exam:    Vital Signs:   Vitals:    09/22/20 0902   BP: 139/63   Pulse: 98   Resp: 18   Temp: 98.2 °F (36.8 °C)       General Appearance: Well appearing in no acute distress    Labs:  Lab Results   Component Value Date    WBC 6.65 09/17/2020    HGB 9.3 (L) 09/17/2020    HCT 29.9 (L) 09/17/2020     (L) 09/17/2020    ALT 26 09/17/2020    AST 27 09/17/2020     09/17/2020    K 3.4 (L) 09/17/2020     09/17/2020    CREATININE 0.9 09/17/2020    BUN 12 09/17/2020    CO2 27 09/17/2020    TSH 4.851 (H) 09/11/2020    INR 1.1 09/04/2020       I have explained the risks and benefits of this endoscopic procedure to the patient including but not limited to bleeding, inflammation, infection, perforation, and death.      Adbulaziz Owen MD

## 2020-09-22 NOTE — PLAN OF CARE
Glenroy Palacios RN called radiology and spoke with Demetrio.  Informed him that this patient is here for an ERCP. He verbalized understanding.

## 2020-09-28 NOTE — TELEPHONE ENCOUNTER
Post procedure Instructions: Perform CT scan (computed tomography) of the abdomen with contrast at appointment to be scheduled if her LFTs do not respond to determine degree of metastatic burden. Please contact patient to schedule.

## 2020-09-29 PROBLEM — E87.6 HYPOKALEMIA: Status: ACTIVE | Noted: 2020-01-01

## 2020-09-29 NOTE — NURSING
Notified Dr. Prasad that after patient taking Mg citrate she vomited it up. Received orders to give Doculax supp.

## 2020-09-29 NOTE — ED NOTES
Pt transferred to ICU on portable cardiac monitor with this RN, ED tech, and daughter. ICU nurse x 3 at bedside to receive pt. Pt transferred to hospital bed without incident.

## 2020-09-29 NOTE — H&P
[facility] Neuroendocrine Surgery/General Surgery  ICU H&P    SUBJECTIVE:     Reason for Consultation:   Confusion, hypercalcemia, hypokalemia    History of Present Illness:  70 yo F patient w hx of metastatic pancreatic neuroendocrine tumor (diagnosed in 2012) s/p ERCP, sphincterotomy and biliary stent placement with extensive hepatic tumor burden. Patient daughter has brought her to the ED today because her laboratory showed hypercalcemia and hypokalemia per Dr. Rodriguez. She also states patient hasn't been her usual self. She has been having intermittent episodes of confusion. Today she his oriented to self but not to time or place. She also has had very poor appetite and had only been eating at home once or twice including only one boost supplement. Has also been constipated. Last bowel movement was small a few days ago after an enema. No issues with voiding. Denies nausea, vomiting, fever, chills. Denies any respiratory symptoms including no increased respiratory rate, no cough.     She was recently admitted and discharged on 9/9/20 for symptoms of fluid overload thought to be from portal hypertension from tumor burden,  Hypercalcemia and  Hypothyroidism. On that previous admission she received thoracentesis and paracentesis. She received a ERCP outpatient and was followed with Dr. Rodriguez for PRRT. Patient was not started on PRRT because she was still recovering from recent admission.     Allergies:  Review of patient's allergies indicates:   Allergen Reactions    Epinephrine Anaphylaxis and Other (See Comments)     Can cause Carcinoid Crisis    Sulfa (sulfonamide antibiotics) Other (See Comments)     Urticaria       Home Medications:  No current facility-administered medications on file prior to encounter.      Current Outpatient Medications on File Prior to Encounter   Medication Sig    alendronate (FOSAMAX) 70 MG tablet Take 1 tablet (70 mg total) by mouth every 7 days.    capecitabine (XELODA) 500 MG Tab Take  2 tablets (1,000 mg total) by mouth 2 (two) times daily.    ferrous sulfate (FEOSOL) 325 mg (65 mg iron) Tab tablet Take 325 mg by mouth once daily.    furosemide (LASIX) 20 MG tablet Take 1 tablet (20 mg total) by mouth every 12 (twelve) hours.    iron-vit c-vit b12-folic acid (IRON-C PLUS) tablet Take 1 tablet by mouth once daily.    levothyroxine (SYNTHROID) 200 MCG tablet Take 1 tablet (200 mcg total) by mouth before breakfast.    spironolactone (ALDACTONE) 50 MG tablet Take 1 tablet (50 mg total) by mouth every 8 (eight) hours. 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    Secondary malignant neoplasm of liver     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) 5/7/2013    Thrombocytopenia 11/12/2013    Thyroid disease     Unspecified essential hypertension 11/12/2013     Past Surgical History:   Procedure Laterality Date    ERCP N/A 6/21/2018    Procedure: ERCP, stent exchange;  Surgeon: Jake Liebreman MD;  Location: [facility] ENDO;  Service: Endoscopy;  Laterality: N/A;    ERCP N/A 5/23/2019    Procedure: ERCP (ENDOSCOPIC RETROGRADE CHOLANGIOPANCREATOGRAPHY), stent exchange;  Surgeon: Jake Lieberman MD;  Location: [facility] ENDO;  Service: Endoscopy;  Laterality: N/A;    ERCP N/A 11/14/2019    Procedure: ERCP (ENDOSCOPIC RETROGRADE CHOLANGIOPANCREATOGRAPHY), stent exchange;  Surgeon: Jake Lieberman MD;  Location:  [facility] ENDO;  Service: Endoscopy;  Laterality: N/A;    ERCP      ERCP N/A 9/22/2020    Procedure: ERCP (ENDOSCOPIC RETROGRADE CHOLANGIOPANCREATOGRAPHY);  Surgeon: Abdulaziz Owen MD;  Location: [facility] ENDO;  Service: Endoscopy;  Laterality: N/A;    THYROIDECTOMY  2011     Family History   Problem Relation Age of Onset    Cancer Maternal Grandmother     Diabetes Father     Breast cancer Neg Hx     Colon cancer Neg Hx     Ovarian cancer Neg Hx      Social History     Tobacco Use    Smoking status: Never Smoker    Smokeless tobacco: Never Used   Substance Use Topics    Alcohol use: No    Drug use: No        Review of Systems:  Constitutional: no fever or chills  Eyes: no visual changes  ENT: no nasal congestion or sore throat  Respiratory: no cough or shortness of breath  Cardiovascular: no chest pain or palpitations  Gastrointestinal: no nausea or vomiting, positive for abdominal distention, negative for abdominal pain, positive for constipation   Genitourinary: no hematuria or dysuria  Neurological: no seizures or tremors, but positive for increased confusion       OBJECTIVE:     Vital Signs:  Temp: 97.8 °F (36.6 °C) (09/29/20 1121)  Pulse: 97 (09/29/20 1215)  Resp: (!) 22 (09/29/20 1215)  BP: 126/61 (09/29/20 1215)  SpO2: 98 % (09/29/20 1215)    Physical Exam:  General: alert, oriented only to self, acute ill appearing, cachectic,  Normal respiratory effort on room air  Tachycardic in the low 100s  Abdomen soft, nontender, distended, + fluid wave  Moving all extremities, no pitting edema observed    Laboratory:  Labs Reviewed   CBC W/ AUTO DIFFERENTIAL - Abnormal; Notable for the following components:       Result Value    RBC 3.22 (*)     Hemoglobin 10.2 (*)     Hematocrit 32.6 (*)     Mean Corpuscular Volume 101 (*)     Mean Corpuscular Hemoglobin 31.7 (*)     Mean Corpuscular Hemoglobin Conc 31.3 (*)     RDW 16.8 (*)     Platelets 107 (*)     Immature Granulocytes 0.6 (*)     Lymph # 0.7 (*)     Lymph%  14.1 (*)     All other components within normal limits   COMPREHENSIVE METABOLIC PANEL - Abnormal; Notable for the following components:    Potassium 2.8 (*)     Glucose 114 (*)     Calcium 15.5 (*)     Albumin 3.0 (*)     Total Bilirubin 2.1 (*)     Alkaline Phosphatase 611 (*)     All other components within normal limits   MAGNESIUM   TSH   SARS-COV-2 RNA AMPLIFICATION, QUAL   VITAMIN B12   FOLATE   SARS-COV-2 RNA AMPLIFICATION, QUAL   PREALBUMIN   C-REACTIVE PROTEIN   PHOSPHORUS   VITAMIN B12   FOLATE   AMMONIA       Diagnostic Results:  Imaging Results          X-Ray Chest 1 View (Final result)  Result time 09/29/20 13:26:58    Final result by Abdulaziz Price MD (09/29/20 13:26:58)                 Impression:      See above      Electronically signed by: Abdulaziz Price MD  Date:    09/29/2020  Time:    13:26             Narrative:    EXAMINATION:  XR CHEST 1 VIEW    CLINICAL HISTORY:  evaluate for pleural effusions; Hypercalcemia    TECHNIQUE:  Single frontal view of the chest was performed.    COMPARISON:  No 9 5020 ne    FINDINGS:  Central venous catheter in the SVC.  Heart size normal.  Subsegmental atelectatic changes noted at the lung bases.  The upper lung fields are clear.  The left costophrenic angle is slightly blunted suggesting small amount of pleural effusion                               X-Ray Abdomen Flat And Erect (Final result)  Result time 09/29/20 13:25:56    Final result by Abdulaziz Price MD (09/29/20 13:25:56)                 Impression:      See above      Electronically signed by: Abdulaziz Price MD  Date:    09/29/2020  Time:    13:25             Narrative:    EXAMINATION:  XR ABDOMEN FLAT AND ERECT    CLINICAL HISTORY:  Constipation, unspecified    TECHNIQUE:  Flat and erect AP views of the abdomen were performed.    COMPARISON:  None    FINDINGS:  No free air in the abdomen.  No significant bowel dilatation identified.  Mild constipation.                                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     PLAN:     - admit to NET team ICU for very close monitoring    NEURO - no pain at this time. Monitor mental status. Follow up ammonia level ordered, may need head imaging after electrolyte control and hydration   RESP - encourage deep breathing/cough/IS, no pleural effusion seen on CXR  CVS - monitor hemodynamics; no pressor requirement currently; tachycardic, will need IVF  FEN/GI - NPO with sips for meds  - CVC placed at bedside by anesthesia, will need resuscitation, IVF, correction of electrolytes  - telemetry, EKG  - calcitonin, zolendronic acid for hypercalcemia  - k for hypokalcemia   - CMP and labs every 6hrs  RENAL - normal creatinine, sinclair for accurate input/output, lasix   ENDO - normoglycemic  HEME/ID - b12 and folate for macrocytic anemia  MSK - Mobilize once mental status improves   PPX - DVT ppx with SCDs, GI ppx with famotidine  DISPO - transfer to ICU        Priyanka Alvarado MD  PGY-2, [facility] General Surgery  Neuroendocrine Surgery

## 2020-09-29 NOTE — ANESTHESIA PROCEDURE NOTES
Central Line    Diagnosis: Hypercalcemia   Doctor requesting consult: Roxanna   Patient location during procedure: ED  Timeout: 9/29/2020 12:30 PM    Staffing  Authorizing Provider: Janak Loo MD  Performing Provider: Zak Hubbard MD    Anesthesiologist was present at the time of the procedure.  Preanesthetic Checklist  Completed: patient identified, site marked, surgical consent, pre-op evaluation, timeout performed, IV checked, risks and benefits discussed, monitors and equipment checked and anesthesia consent given  Indication   Indication: hemodynamic monitoring, med administration, vascular access     Anesthesia   local infiltration    Central Line   Skin Prep: skin prepped with ChloraPrep, skin prep agent completely dried prior to procedure  maximum sterile barriers used during central venous catheter insertion  hand hygiene performed prior to central venous catheter insertion  Location: right, internal jugular.   Catheter type: triple lumen  Catheter Size: 7 Fr  Inserted Catheter Length: 16 cm  Ultrasound: vascular probe with ultrasound  Vessel Caliber: large, patent, compressibility normal  Needle advanced into vessel with real time Ultrasound guidance.  Guidewire confirmed in vessel.  Manometry: none  Insertion Attempts: 1   Securement:line sutured, chlorhexidine patch, sterile dressing applied and blood return through all ports    Post-Procedure   X-Ray: placement verified by x-ray  Adverse Events:none    Guidewire Guidewire removed intact.

## 2020-09-29 NOTE — ED NOTES
Pt continues to attempt to removed central line dressing. Dressing changed using sterile technique, pt tolerated well

## 2020-09-29 NOTE — ED NOTES
Pt attempting to remove dressing from central line. Pt redirected. Dressing reinforced with tape.

## 2020-09-29 NOTE — ED PROVIDER NOTES
Encounter Date: 9/29/2020       History     Chief Complaint   Patient presents with    Abnormal Lab     pt had blood work completed today and recieved call to come to ed for evual of reported k- 2.6, and calcium- 15.6, pt voices no complaints      69 yr old female presents to the ER with reports of hypokalemia and hypercalcemia. Daughter reports decrease in oral intake of fluid and food. Denies fever. No vomiting. LBM was on Saturday. Labs obtained this morning with calcium reported to be 15.6 and K of 2.6. No chest pain or sob.   PMH: Abdominal swelling (9/1/2020), Anemia, Chemotherapy follow-up examination (5/27/2014), Confusion (9/1/2020), Encounter for blood transfusion, Falls (9/1/2020), Generalized abdominal pain (9/1/2020), HTN (hypertension), Hypercalcemia (5/7/2013), Hyperlipidemia, Increased bilirubin level (9/15/2020), Kidney insufficiency, Maintenance chemotherapy following disease, Primary malignant neuroendocrine tumor of pancreas, Primary pancreatic neuroendocrine tumor (8/2012), Secondary malignant neoplasm of liver, 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    The history is provided by the patient. No  was used.     Review of patient's allergies indicates:   Allergen Reactions    Epinephrine Anaphylaxis and Other (See Comments)     Can cause Carcinoid Crisis    Sulfa (sulfonamide antibiotics) Other (See Comments)     Urticaria     Past Medical History:   Diagnosis Date    Abdominal swelling 9/1/2020    Anemia     Chemotherapy follow-up examination 5/27/2014    Confusion 9/1/2020    Encounter for blood transfusion     Falls 9/1/2020    Generalized abdominal pain 9/1/2020    HTN (hypertension)     Hypercalcemia 5/7/2013    Hyperlipidemia     Increased bilirubin level 9/15/2020    Kidney insufficiency     Stage 1    Maintenance chemotherapy  following disease     Capecitabine and temozolomide    Primary malignant neuroendocrine tumor of pancreas     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    Secondary malignant neoplasm of liver     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) 5/7/2013    Thrombocytopenia 11/12/2013    Thyroid disease     Unspecified essential hypertension 11/12/2013     Past Surgical History:   Procedure Laterality Date    ERCP N/A 6/21/2018    Procedure: ERCP, stent exchange;  Surgeon: Jake Lieberman MD;  Location: [facility] ENDO;  Service: Endoscopy;  Laterality: N/A;    ERCP N/A 5/23/2019    Procedure: ERCP (ENDOSCOPIC RETROGRADE CHOLANGIOPANCREATOGRAPHY), stent exchange;  Surgeon: Jake Lieberman MD;  Location: [facility] ENDO;  Service: Endoscopy;  Laterality: N/A;    ERCP N/A 11/14/2019    Procedure: ERCP (ENDOSCOPIC RETROGRADE CHOLANGIOPANCREATOGRAPHY), stent exchange;  Surgeon: Jake Lieberman MD;  Location: [facility] ENDO;  Service: Endoscopy;  Laterality: N/A;    ERCP      ERCP N/A 9/22/2020    Procedure: ERCP (ENDOSCOPIC RETROGRADE CHOLANGIOPANCREATOGRAPHY);  Surgeon: Abdulaziz Owen MD;  Location: [facility] ENDO;  Service: Endoscopy;  Laterality: N/A;    THYROIDECTOMY  2011     Family History   Problem Relation Age of Onset    Cancer Maternal Grandmother     Diabetes Father     Breast cancer Neg Hx     Colon cancer Neg Hx     Ovarian cancer Neg Hx      Social History     Tobacco Use    Smoking status: Never Smoker    Smokeless tobacco: Never Used   Substance Use Topics    Alcohol use: No    Drug use: No     Review of Systems   Constitutional: Negative for fever.        Hypercalcemia, hypokalemia   Respiratory: Negative for shortness of breath.    Cardiovascular: Negative for chest pain.   Gastrointestinal: Positive for abdominal distention and constipation.   Genitourinary: Negative for dysuria.   All other systems reviewed and are negative.      Physical Exam     Initial  Vitals [09/29/20 1121]   BP Pulse Resp Temp SpO2   133/60 96 17 97.8 °F (36.6 °C) 100 %      MAP       --         Physical Exam    Nursing note and vitals reviewed.  Constitutional: She is not diaphoretic.   HENT:   Head: Normocephalic.   Right Ear: Hearing and tympanic membrane normal.   Left Ear: Hearing and tympanic membrane normal.   Nose: Nose normal.   Mouth/Throat: Oropharynx is clear and moist.   Eyes: Lids are normal. Pupils are equal, round, and reactive to light.   Neck: Normal range of motion. No neck rigidity.   Cardiovascular: Normal rate.   Murmur heard.  Pulmonary/Chest: Breath sounds normal. No respiratory distress. She has no wheezes. She has no rhonchi.   Abdominal: She exhibits mass. There is generalized abdominal tenderness.       Musculoskeletal: Normal range of motion.   Neurological: She is alert and oriented to person, place, and time.   Skin: Skin is warm and dry. No rash noted.   Psychiatric: She has a normal mood and affect. Her behavior is normal. Judgment and thought content normal.         ED Course   Procedures  Labs Reviewed   CBC W/ AUTO DIFFERENTIAL - Abnormal; Notable for the following components:       Result Value    RBC 3.22 (*)     Hemoglobin 10.2 (*)     Hematocrit 32.6 (*)     Mean Corpuscular Volume 101 (*)     Mean Corpuscular Hemoglobin 31.7 (*)     Mean Corpuscular Hemoglobin Conc 31.3 (*)     RDW 16.8 (*)     Platelets 107 (*)     Immature Granulocytes 0.6 (*)     Lymph # 0.7 (*)     Lymph% 14.1 (*)     All other components within normal limits   COMPREHENSIVE METABOLIC PANEL - Abnormal; Notable for the following components:    Potassium 2.8 (*)     Glucose 114 (*)     Calcium 15.5 (*)     Albumin 3.0 (*)     Total Bilirubin 2.1 (*)     Alkaline Phosphatase 611 (*)     All other components within normal limits   MAGNESIUM   SARS-COV-2 RNA AMPLIFICATION, QUAL   VITAMIN B12   FOLATE   SARS-COV-2 RNA AMPLIFICATION, QUAL   TSH   PREALBUMIN   C-REACTIVE PROTEIN   PHOSPHORUS    VITAMIN B12   FOLATE          Imaging Results          X-Ray Chest 1 View (Final result)  Result time 09/29/20 13:26:58    Final result by Abdulaziz Price MD (09/29/20 13:26:58)                 Impression:      See above      Electronically signed by: Abdulaziz Price MD  Date:    09/29/2020  Time:    13:26             Narrative:    EXAMINATION:  XR CHEST 1 VIEW    CLINICAL HISTORY:  evaluate for pleural effusions; Hypercalcemia    TECHNIQUE:  Single frontal view of the chest was performed.    COMPARISON:  No 9 5020 ne    FINDINGS:  Central venous catheter in the SVC.  Heart size normal.  Subsegmental atelectatic changes noted at the lung bases.  The upper lung fields are clear.  The left costophrenic angle is slightly blunted suggesting small amount of pleural effusion                               X-Ray Abdomen Flat And Erect (Final result)  Result time 09/29/20 13:25:56    Final result by Abdulaziz Price MD (09/29/20 13:25:56)                 Impression:      See above      Electronically signed by: Abdulaziz Price MD  Date:    09/29/2020  Time:    13:25             Narrative:    EXAMINATION:  XR ABDOMEN FLAT AND ERECT    CLINICAL HISTORY:  Constipation, unspecified    TECHNIQUE:  Flat and erect AP views of the abdomen were performed.    COMPARISON:  None    FINDINGS:  No free air in the abdomen.  No significant bowel dilatation identified.  Mild constipation.                                 Medical Decision Making:   Initial Assessment:   69 yr old female presents to the ER with reports of hypokalemia and hypercalcemia. Daughter reports decrease in oral intake of fluid and food. Denies fever. No vomiting. LBM was on Saturday. Labs obtained this morning with calcium reported to be 15.6 and K of 2.6. No chest pain or sob.   PMH: Abdominal swelling (9/1/2020), Anemia, Chemotherapy follow-up examination (5/27/2014), Confusion (9/1/2020), Encounter for blood transfusion, Falls (9/1/2020), Generalized abdominal pain  (9/1/2020), HTN (hypertension), Hypercalcemia (5/7/2013), Hyperlipidemia, Increased bilirubin level (9/15/2020), Kidney insufficiency, Maintenance chemotherapy following disease, Primary malignant neuroendocrine tumor of pancreas, Primary pancreatic neuroendocrine tumor (8/2012), Secondary malignant neoplasm of liver, 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    The history is provided by the patient. No  was used.     Clinical Tests:   Lab Tests: Ordered  Radiological Study: Ordered  ED Management:  ED Course as of Sep 29 1337  Tue Sep 29, 2020  1202 Spoke with general surgery- [facility] resident concerning recommendations. They are requesting additional labs and central line placement.     (DT)  1204 Anesthesia on way for central line placement.     (DT)  1205 I have spoken with the pt and her daughter concerning the admission and need for central line placement. Verbalized understanding. General surgery team has placed admit orders    (DT)    ED Course User Index  (DT) Caitlyn Epstein NP    Other:   I discussed test(s) with the performing physician.              Attending Attestation:     Physician Attestation Statement for NP/PA:   I discussed this assessment and plan of this patient with the NP/PA, but I did not personally examine the patient. The face to face encounter was performed by the NP/PA.                  ED Course as of Sep 29 1338   Tue Sep 29, 2020   1202 Spoke with general surgery- [facility] resident concerning recommendations. They are requesting additional labs and central line placement.     [DT]   1204 Anesthesia on way for central line placement.     [DT]   1205 I have spoken with the pt and her daughter concerning the admission and need for central line placement. Verbalized understanding. General surgery team has placed admit orders    [DT]      ED Course User  Index  [DT] Caitlyn Epstein NP            Clinical Impression:       ICD-10-CM ICD-9-CM   1. Hypercalcemia  E83.52 275.42   2. Hypokalemia  E87.6 276.8   3. Constipation  K59.00 564.00   4. Anemia, unspecified type  D64.9 285.9                          ED Disposition Condition    Admit                             Caitlyn Epstein NP  09/29/20 1338       Yash Vasquez MD  09/30/20 1000

## 2020-09-30 NOTE — PROGRESS NOTES
ICU Progress Note    Admit Date: 9/29/2020   LOS: 1 day   POD # N A    SUBJECTIVE:       Remains confused.  No acute events overnight.   Denies pain  No nausea or vomiting.    No fevers or chills.     Jorge In place - diuresing appropriately     Scheduled Meds:   bisacodyL  5 mg Oral BID    cyanocobalamin  1,000 mcg Intramuscular Daily    enoxaparin  40 mg Subcutaneous Q24H    ergocalciferol  50,000 Units Oral Q7 Days    famotidine (PF)  20 mg Intravenous BID    furosemide (LASIX) IV  80 mg Intravenous Once    lactulose  10 g Oral Q6H    levothyroxine  200 mcg Oral Before breakfast    lidocaine (PF) 10 mg/ml (1%)  1 mL Other Once    potassium chloride  40 mEq Oral BID     Continuous Infusions:   sodium chloride 0.9% 125 mL/hr at 09/30/20 0709    octreotide infusion (50 mcg/mL) 250 mcg/hr (09/29/20 1622)     PRN Meds:acetaminophen, bisacodyL, melatonin, ondansetron, sodium chloride 0.9%    Review of patient's allergies indicates:   Allergen Reactions    Epinephrine Anaphylaxis and Other (See Comments)     Can cause Carcinoid Crisis    Sulfa (sulfonamide antibiotics) Other (See Comments)     Urticaria         OBJECTIVE:     Vital Signs (Most Recent)  Temp: 97.6 °F (36.4 °C) (09/30/20 0715)  Pulse: 79 (09/30/20 0715)  Resp: (!) 25 (09/30/20 0715)  BP: 120/61 (09/30/20 0715)  SpO2: 95 % (09/30/20 0715)    Vital Signs Range (Last 24H):  Temp:  [97.6 °F (36.4 °C)-99.2 °F (37.3 °C)]   Pulse:  []   Resp:  [17-33]   BP: ()/(52-81)   SpO2:  [94 %-100 %]     I & O (Last 24H):    Intake/Output Summary (Last 24 hours) at 9/30/2020 0739  Last data filed at 9/30/2020 0600  Gross per 24 hour   Intake 4575.82 ml   Output 3171 ml   Net 1404.82 ml       Physical Exam:  NAD, AAOx1 - verbalizing appropriately, nontoxic appearing  Normal respiratory effort  RR  Abdomen soft, + fluid wave, non- tender, mildly distended,   Moving all extremities. No edema     LABS  CBC  Recent Labs   Lab 09/29/20  0910  09/29/20  1250 09/30/20  0437   WBC 6.95 5.25 6.53   RBC 2.83* 3.22* 2.26*   HGB 8.9* 10.2* 7.2*   HCT 28.7* 32.6* 23.2*   * 107* 127*   * 101* 103*   MCH 31.4* 31.7* 31.9*   MCHC 31.0* 31.3* 31.0*     CMP  Recent Labs   Lab 09/29/20  1250 09/29/20  1723 09/30/20  0239   * 102 143*   CALCIUM 15.5* 14.4* 12.7*   ALBUMIN 3.0* 2.9* 2.8*   PROT 7.4 7.3 6.9    142 142   K 2.8* 3.4* 4.7   CO2 27 28 29    106 107   BUN 12 11 11   CREATININE 0.8 0.7 0.8   ALKPHOS 611* 618* 546*   ALT 14 14 13   AST 23 22 18   BILITOT 2.1* 2.1* 1.9*       Recent Labs   Lab 09/29/20  1250 09/29/20  1723 09/30/20  0239 09/30/20  0437   CALCIUM 15.5* 14.4* 12.7*  --    MG 1.9 1.8 2.3  --    PHOS  --  1.7* 3.6 3.0         POCT-Glucose  No results found for: POCTGLUCOSE    COAGS  Recent Labs   Lab 09/30/20  0437   INR 1.3*       CE  No results for input(s): TROPONINI, CPK, CPKMB in the last 168 hours.  BNP  No results for input(s): BNP in the last 168 hours.    ABGs  No results for input(s): PH, PCO2, PO2, HCO3, POCSATURATED, BE in the last 24 hours.    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   FEN/GI -NPO, IVF, Diuresis, electrolyte repletion. Will ask IR to tap ascites.   RENAL - Cr to 1.2, UOP adequate, continue to monitor and trend  ENDO - calcitonin, will discuss PT nodule  HEME/ID - Hg down slightly but suspect  continue to monitor and trend, continue empiric antibiotics until POD#3  MSK - encourage OOB and ambulation, PT/OT  PPX - DVT ppx with SCDs,  hold lovenox for possible IR   DISPO - continue ICU care    Victor M Matthews MD  [employer] Surgery, PGY-4  9/30/2020

## 2020-09-30 NOTE — PLAN OF CARE
Problem: Occupational Therapy Goal  Goal: Occupational Therapy Goal  Description: Goals to be met by: 10/30/2020      Patient will increase functional independence with ADLs by performing:    UE Dressing with Modified [place].  LE Dressing with Supervision.  Grooming while standing with Supervision.  Toileting from toilet or BSC with Stand-by Assistance for hygiene and clothing management.   Toilet transfer to toilet or BSC with Supervision.  Increased functional strength to WFL for self care.  Upper extremity exercise program x10 reps per handout, with supervision.     Outcome: Ongoing, Progressing   Pt would benefit from continued OT to address deficits in self care and functional mobility. Recommendations TBD pending progress with therapies, family is requesting HHOT/PT. DME needs likely RW, BSC, SC vs TTB, possibly w/c

## 2020-09-30 NOTE — PT/OT/SLP EVAL
"Occupational Therapy   Evaluation    Name: Ching Bruce  MRN: [id number]  Admitting Diagnosis:  <principal problem not specified>      Recommendations:     Discharge Recommendations: (TD pending progress, family requesting HHOT/PT)  Discharge Equipment Recommendations:  (TBD pending progress, may require TTB v SC, BSC, RW, w/c)  Barriers to discharge:  Inaccessible home environment    Assessment:     Ching Bruce is a 69 y.o. female with a medical diagnosis of <principal problem not specified>.  She presents with confusion, deconditioning. Pt answering direct questions with extremely tangiental answers and incorrect information such as stating that her daughter who was present in the room was named, "Abdulaziz" then later asked if her name was "Nicole Pena" or "Susan" Pt reoriented to daughter's name (Pranav) several times but continued to call her "Isabelle" x2 and other names. Pt able to state she has two daughters correctly. Pt lost train of thought several times when attempting to answer simple questions several times such as, "Are you in pain?" instead telling a story about a man named "Abdulaziz" which daughter states is not a familiar name to her. Performance deficits affecting function: weakness, impaired endurance, impaired self care skills, impaired functional mobilty, decreased coordination, impaired cognition, impaired balance, gait instability, decreased upper extremity function, decreased lower extremity function, decreased safety awareness, impaired fine motor, impaired coordination, edema.      Rehab Prognosis: Good; patient would benefit from acute skilled OT services to address these deficits and reach maximum level of function.       Plan:     Patient to be seen 5 x/week to address the above listed problems via self-care/home management, therapeutic activities, therapeutic exercises  · Plan of Care Expires: 10/30/20  · Plan of Care Reviewed with: patient, daughter    Subjective     Chief Complaint: Pt " "confused throughout, no true complaints noted.   Patient/Family Comments/goals: To return to PLOF    Occupational Profile:  Living Environment: Pt lives with daughter in 2SH,0STE, bed and 1/2 bath available 1st floor but it has been ~2 weeks since pt was able to climb steps to 2nd floor where there is a tub/sh. Pt's family currently uses "pop up shower" to bathe pt.   Previous level of function: Indep ADLs and functional mobility. Pt's daughter states that pt was able to drive, manage her own medications and money.  Roles and Routines: Caretaker to self and home. Cooks, cleans, drives, completes own grocery shopping.   Equipment Used at Home:  cane, straight(transport w/c, pop up shower)  Assistance upon Discharge: Family    Pain/Comfort:  · Pain Rating 1: (unable to rate)  · Location 1: foot(reported shooting/stabbing pain, though notably pt currently very confused)  · Pain Addressed 1: Reposition, Distraction, Cessation of Activity, Nurse notified  · Pain Rating Post-Intervention 1: (did not rate)    Patients cultural, spiritual, Baptism conflicts given the current situation:      Objective:     Communicated with: nsg prior to session.  Patient found HOB elevated with SCD, telemetry, peripheral IV, pulse ox (continuous), sinclair catheter, bowel management system(bowel management system dislodged during session, nsg notified, aware and assessed pt immediately) upon OT entry to room.    General Precautions: Standard, fall   Orthopedic Precautions:N/A   Braces: N/A     Occupational Performance:    Bed Mobility:    · Patient completed Rolling/Turning to Right with moderate assistance  · Patient completed Scooting/Bridging with moderate assistance  · Patient completed Supine to Sit with moderate assistance  · Patient completed Sit to Supine with moderate assistance    Functional Mobility/Transfers:  · Patient completed Sit <> Stand Transfer with minimum assistance and moderate assistance  with  hand-held assist " "  Functional Mobility: Pt with fair dynamic seated and fair-/poor+ dynamic standing balance.  ·  Pt stood 2x~2 and 1x~3 minutes for pericare after rectal tube dislodged. Pt required x2 seated rest breaks and max v/c to maintain fully upright posture but limited endurance in task noted. Pt with x1 episode of Vtach in stance, recovered well with return to seated and v/c to breathe    Activities of Daily Living:  · Upper Body Dressing: moderate assistance to don gown as robe seated EOB  · Lower Body Dressing: maximal assistance and total assistance to don/doff B socks seated EOB, pt attemtped to reach for B socks to doff but began stating that she cannot take off her socks because "they're not mine, they're Isabelle's" Pt also instructed to take off Isabelle's socks but was unable to follow directive.  · Toileting: total assistance for pericare in stance    Cognitive/Visual Perceptual:  Cognitive/Psychosocial Skills:     -       Oriented to: Person first name only. Stated "11" when asked for her birthday but unable to state full birthdate  -       Follows Commands/attention:Follows one  Commands inconsistenly  -       Communication: clear/fluent speech though tangential and confused  -       Memory: Impaired STM, LTM, and immediate recall  -       Safety awareness/insight to disability: impaired   -       Mood/Affect/Coping skills/emotional control: Pleasant, confused, cooperative    Physical Exam:  Postural examination/scapula alignment:    -       Rounded shoulders  Skin integrity: Visible skin intact  Motor Planning:    -       WFL  Dominant hand:    -       R handed  Upper Extremity Range of Motion: BUE WFL     Upper Extremity Strength:  BUE grossly 3+ to 4/5 but pt unable to maintain against resistance during MMT likely 2/2 decreased ability to follow commands   Strength:  B hands WFL  Fine Motor Coordination:    -       Intact  Gross motor coordination:   WFL     AMPAC 6 Click ADL:  AMPAC Total Score:  "     Treatment & Education:  Pt educated on role of OT and POC.   Pt performing skills as listed above.   Education:    Patient left HOB elevated with all lines intact, call button in reach, nsg notified and daughter present    GOALS:   Multidisciplinary Problems     Occupational Therapy Goals        Problem: Occupational Therapy Goal    Goal Priority Disciplines Outcome Interventions   Occupational Therapy Goal     OT, PT/OT Ongoing, Progressing    Description: Goals to be met by: 10/30/2020      Patient will increase functional independence with ADLs by performing:    UE Dressing with Modified [place].  LE Dressing with Supervision.  Grooming while standing with Supervision.  Toileting from toilet or BSC with Stand-by Assistance for hygiene and clothing management.   Toilet transfer to toilet or BSC with Supervision.  Increased functional strength to WF for self care.  Upper extremity exercise program x10 reps per handout, with supervision.                      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    Secondary malignant neoplasm of liver     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) 5/7/2013    Thrombocytopenia 11/12/2013    Thyroid disease     Unspecified essential hypertension 11/12/2013       Past Surgical History:   Procedure Laterality Date    ERCP N/A 6/21/2018    Procedure: ERCP, stent exchange;  Surgeon:  Jake Lieberman MD;  Location: [facility] ENDO;  Service: Endoscopy;  Laterality: N/A;    ERCP N/A 5/23/2019    Procedure: ERCP (ENDOSCOPIC RETROGRADE CHOLANGIOPANCREATOGRAPHY), stent exchange;  Surgeon: Jake Lieberman MD;  Location: [facility] ENDO;  Service: Endoscopy;  Laterality: N/A;    ERCP N/A 11/14/2019    Procedure: ERCP (ENDOSCOPIC RETROGRADE CHOLANGIOPANCREATOGRAPHY), stent exchange;  Surgeon: Jake Lieberman MD;  Location: [facility] ENDO;  Service: Endoscopy;  Laterality: N/A;    ERCP      ERCP N/A 9/22/2020    Procedure: ERCP (ENDOSCOPIC RETROGRADE CHOLANGIOPANCREATOGRAPHY);  Surgeon: Abdulaziz Owen MD;  Location: [facility];  Service: Endoscopy;  Laterality: N/A;    THYROIDECTOMY  2011       Time Tracking:     OT Date of Treatment: 09/30/20  OT Start Time: 1410  OT Stop Time: 1448  OT Total Time (min): 38 min    Billable Minutes:Evaluation 10  Self Care/Home Management 13  Therapeutic Activity 15    Yudelka Jewell OT  9/30/2020

## 2020-09-30 NOTE — PLAN OF CARE
The pt's family chose [facility] for the pt at d/c. The  faxed the pt's facesheet and h&p to [facility] via [facility].        09/30/20 1308   Post-Acute Status   Post-Acute Authorization Home Health   Home Health Status Referrals Sent   Discharge Plan   Discharge Plan A Home Health   Discharge Plan B Home with family

## 2020-09-30 NOTE — PLAN OF CARE
Patient remained in bed for the shift. Bed in lowest position, sides rails up, and call light within reach. Plan of care reviewed with patient, verbalized understanding. Patient only oriented to self. VS remained WDL.  ST/NSR on telemetry. Fluids stopped at 0500. Urine output adequate. Positioned independently in bed. Will report to oncoming shift

## 2020-09-30 NOTE — PLAN OF CARE
Problem: Physical Therapy Goal  Goal: Physical Therapy Goal  Description: Goals to be met by: 10/25/20     Patient will increase functional independence with mobility by performin.  Supine to/from sit with supervision.  2.  Bed to/from chair with supervision  3.  Ambulate 75' with QC or RW with CG/sup  4.  Sit in chair 2 hours    Outcome: Ongoing, Progressing

## 2020-09-30 NOTE — PLAN OF CARE
Reason for Consultation:   Confusion, hypercalcemia, hypokalemia     History of Present Illness:  70 yo F patient w hx of metastatic pancreatic neuroendocrine tumor (diagnosed in 2012) s/p ERCP, sphincterotomy and biliary stent placement with extensive hepatic tumor burden. Patient daughter has brought her to the ED today because her laboratory showed hypercalcemia and hypokalemia per Dr. Rodriguez. She also states patient hasn't been her usual self. She has been having intermittent episodes of confusion. Today she his oriented to self but not to time or place. She also has had very poor appetite and had only been eating at home once or twice including only one boost supplement. Has also been constipated. Last bowel movement was small a few days ago after an enema. No issues with voiding. Denies nausea, vomiting, fever, chills. Denies any respiratory symptoms including no increased respiratory rate, no cough.      She was recently admitted and discharged on 9/9/20 for symptoms of fluid overload thought to be from portal hypertension from tumor burden,  Hypercalcemia and  Hypothyroidism. On that previous admission she received thoracentesis and paracentesis. She received a ERCP outpatient and was followed with Dr. Rodriguez for PRRT. Patient was not started on PRRT because she was still recovering from recent admission.      The pt's currently in ICU. The pt is a readmit. The Sw met with the pt and her dtr Rocio Bruce [phone] who was at bedside to complete the assessment. The pt's other dtr Isabelle Teo [phone] was on the phone during the assessment on speaker phone. Isabelle lives with the pt in [place]. The pt's family assist her with her adl's as needed and she has a transport w/c,s cane and a rolling walker that's too small for her. The pt paid privately for the r walker. The pt's family is requesting a r walker,bsc and home health at d/c for the pt. One of the pt's daughter's will transport her home at d/c. The  Sw completed the white board in the pt's room and gave them a d/c brochure. The Sw encouraged them to call if they have any further questions or concerns. The Sw will continue to follow the pt throughout her transitions of care and will assist with any d/c needs.        09/30/20 1027   Discharge Assessment   Assessment Type Discharge Planning Assessment   Confirmed/corrected address and phone number on facesheet? Yes   Assessment information obtained from? Caregiver   Prior to hospitilization cognitive status: Alert/Oriented   Prior to hospitalization functional status: Assistive Equipment;Needs Assistance   Current cognitive status: Not Oriented to Time;Not Oriented to Place   Current Functional Status: Assistive Equipment;Needs Assistance   Lives With child(shilpa), adult   Able to Return to Prior Arrangements yes   Is patient able to care for self after discharge? Unable to determine at this time (comments)   Who are your caregiver(s) and their phone number(s)? Isabelle Teo(dtr who lives with her)[phone] / Rocio Bruce(dtr who lives in [place])[phone]   Patient's perception of discharge disposition home health  (family is requesting hh at d/c)   Readmission Within the Last 30 Days previous discharge plan unsuccessful   If yes, most recent facility name: [facility]   Patient currently being followed by outpatient case management? Yes   If yes, name of outpatient case management following: [facility] outpatient case management   Patient currently receives any other outside agency services? No   Equipment Currently Used at Home wheelchair;cane, straight;walker, rolling  (pt's dtr states the rolliong walker is too small for the pt but they paid privately for it)   Do you have any problems affording any of your prescribed medications? No  (the pt receives her meds affordably at [facility] in [place])   Is the patient taking medications as prescribed? yes   Does the patient have transportation home? Yes   Transportation  Anticipated family or friend will provide   Does the patient receive services at the [facility]? No   Discharge Plan A Home Health   Discharge Plan B Home with family   DME Needed Upon Discharge  bedside commode;harley gonzalez   Patient/Family in Agreement with Plan yes   Readmission Questionnaire   At the time of your discharge, did someone talk to you about what your health problems were? Yes   At the time of discharge, did someone talk to you about what to watch out for regarding worsening of your health problem? Yes   At the time of discharge, did someone talk to you about what to do if you experienced worsening of your health problem? Yes   At the time of discharge, did someone talk to you about which medication to take when you left the hospital and which ones to stop taking? Yes   At the time of discharge, did someone talk to you about when and where to follow up with a doctor after you left the hospital? Yes   What do you believe caused you to be sick enough to be re-admitted? abnormal labs   How often do you need to have someone help you when you read instructions, pamphlets, or other written material from your doctor or pharmacy? Often   Do you have problems taking your medications as prescribed? No   Do you have any problems affording any of  your prescribed medications? No   Do you have problems obtaining/receiving your medications? No   Does the patient have transportation to healthcare appointments? Yes   Living Arrangements house   Does the patient have family/friends to help with healtcare needs after discharge? yes   Does your caregiver provide all the help you need? Yes   Are you currently feeling confused? Yes   Are you currently having problems thinking? Yes   Are you currently having memory problems? Yes   Have you felt down, depressed, or hopeless? Unable to Assess   Have you felt little interest or pleasure in doing things? Unable to assess   In the last 7 days, my sleep quality was:  fair

## 2020-09-30 NOTE — PT/OT/SLP EVAL
Physical Therapy Evaluation    Patient Name:  Ching Bruce   MRN:  [id number]    Recommendations:     Discharge Recommendations:    Home with HHPT  Discharge Equipment Recommendations: none   Barriers to discharge: decreased functional mobility    Assessment:     Ching Bruce is a 69 y.o. female admitted with a medical diagnosis of Hypokalemia and Metastatic pancreatic CA.  She presents with the following impairments/functional limitations:  weakness, impaired functional mobilty, impaired cognition, impaired cardiopulmonary response to activity, impaired endurance, gait instability, impaired balance, impaired self care skills, decreased lower extremity function. Pt went supine to sit with Min/Mod assist of 1.  Pt sat at EOB 8 min total.  Pt went sit to stand x 2 and ambulated 2' x 2 to the right with HHA/Min assist of 1.  Pt sit to supine with Min/mod assist of 1.      Rehab Prognosis: Good; patient would benefit from acute skilled PT services to address these deficits and reach maximum level of function.    Recent Surgery: * No surgery found *      Plan:     During this hospitalization, patient to be seen 5 x/week to address the identified rehab impairments via gait training, therapeutic activities, therapeutic groups and progress toward the following goals:    · Plan of Care Expires:  10/30/20    Subjective     Chief Complaint: none stated  Patient/Family Comments/goals: none stated  Pain/Comfort:  · Pain Rating 1: 0/10    Patients cultural, spiritual, Restoration conflicts given the current situation: no    Living Environment:  Pt is a poor historian.  Pt states she lives with her daughter in a 2SH with 1 step to enter and no railing.  Pt states she lives on the first floor.  Prior to admission, patients level of function was ambulated at household level with a QC per patient.  Equipment used at home: QC  DME owned (not currently used): Unknown.  Upon discharge, patient will have assistance from her  daughter.    Objective:     Communicated with nurse prior to session.  Patient found HOB elevated with SCD, telemetry, pulse ox (continuous), peripheral IV, central line, sinclair catheter, colostomy  upon PT entry to room.    General Precautions: Standard, fall   Orthopedic Precautions:    Braces:       Exams:  · Pt is not oriented to place, time or situation.  Pt has BLE AROM WFL.  Pt does fairly consistently follow functional commands with delay noted. Pt grossly has 3 to 4+/5 BLE's.       Functional Mobility:  . Pt went supine to sit with Min/Mod assist of 1.  Pt sat at EOB 8 min total.  Pt went sit to stand x 2 and ambulated 2' x 2 to the right with HHA/Min assist of 1.  Pt sit to supine with Min/mod assist of 1.    Therapeutic Activities and Exercises:   Pt sat at EOB 8 min., see above.    AM-PAC 6 CLICK MOBILITY  Total Score:17     Patient left HOB elevated with all lines intact, call button in reach and nurse notified.    GOALS:   Multidisciplinary Problems     Physical Therapy Goals        Problem: Physical Therapy Goal    Goal Priority Disciplines Outcome Goal Variances Interventions   Physical Therapy Goal     PT, PT/OT Ongoing, Progressing     Description: Goals to be met by: 10/25/20     Patient will increase functional independence with mobility by performin.  Supine to/from sit with supervision.  2.  Bed to/from chair with supervision  3.  Ambulate 75' with QC or RW with CG/sup  4.  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    Secondary malignant neoplasm of liver     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) 5/7/2013    Thrombocytopenia 11/12/2013    Thyroid disease     Unspecified essential hypertension 11/12/2013       Past Surgical History:   Procedure Laterality Date    ERCP N/A 6/21/2018    Procedure: ERCP, stent exchange;  Surgeon: Jake Lieberman MD;  Location: [facility];  Service: Endoscopy;  Laterality: N/A;    ERCP N/A 5/23/2019    Procedure: ERCP (ENDOSCOPIC RETROGRADE CHOLANGIOPANCREATOGRAPHY), stent exchange;  Surgeon: Jake Lieberman MD;  Location: [facility];  Service: Endoscopy;  Laterality: N/A;    ERCP N/A 11/14/2019    Procedure: ERCP (ENDOSCOPIC RETROGRADE CHOLANGIOPANCREATOGRAPHY), stent exchange;  Surgeon: Jake Lieberman MD;  Location: [facility];  Service: Endoscopy;  Laterality: N/A;    ERCP      ERCP N/A 9/22/2020    Procedure: ERCP (ENDOSCOPIC RETROGRADE CHOLANGIOPANCREATOGRAPHY);  Surgeon: Abdulaziz Owen MD;  Location: [facility];  Service: Endoscopy;  Laterality: N/A;    THYROIDECTOMY  2011       Time Tracking:     PT Received On: 09/30/20  PT Start Time: 0910     PT Stop Time: 0935  PT Total Time (min): 25 min     Billable Minutes: Evaluation 15 and Therapeutic Activity 10      Nieves Be, PT  09/30/2020

## 2020-09-30 NOTE — NURSING
Contacted MD Matthews to update on patient and get plan for the night. Patient refusing PO K+. Will order IV K+ replacement and another order of Lasix. To recheck lytes at 0200 when K+ finished infusing.

## 2020-10-01 NOTE — PLAN OF CARE
Patient remained in bed for the shift. Bed in lowest position, sides rails up, and call light within reach. Plan of care reviewed with patient, verbalized understanding. Patient only oriented to self. VS remained WDL.  ST/NSR on telemetry. Fluids maintained at 125ml/hr. Urine output adequate. Positioned independently in bed. Patient restless -> put on 1LNC and given seroquel. Will report to oncoming shift

## 2020-10-01 NOTE — NURSING
L sided paracentesis completed, pt tolerated well. 1250 ml of clear, yellow ascitic fluid pulled off. VSS. ICU nurse JORGE Garcia at bedside throughout procedure. Manual pressure held to insertion/puncture site for 5 min. Band-aid applied to puncture/insertion site. No bleeding or hematoma noted. Pt ready for transport back to room.

## 2020-10-01 NOTE — CONSULTS
Inpatient Radiology Pre-procedure Note    History of Present Illness:  Ching Bruce is a 69 y.o. female who presents for paracentesis. Recurrent ascites.    Admission H&P reviewed.  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    Secondary malignant neoplasm of liver     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) 5/7/2013    Thrombocytopenia 11/12/2013    Thyroid disease     Unspecified essential hypertension 11/12/2013     Past Surgical History:   Procedure Laterality Date    ERCP N/A 6/21/2018    Procedure: ERCP, stent exchange;  Surgeon: Jake Lieberman MD;  Location: [facility];  Service: Endoscopy;  Laterality: N/A;    ERCP N/A 5/23/2019    Procedure: ERCP (ENDOSCOPIC RETROGRADE CHOLANGIOPANCREATOGRAPHY), stent exchange;  Surgeon: Jake Lieberman MD;  Location: [facility];  Service: Endoscopy;  Laterality: N/A;    ERCP N/A 11/14/2019    Procedure: ERCP (ENDOSCOPIC RETROGRADE CHOLANGIOPANCREATOGRAPHY), stent exchange;  Surgeon: Jake Lieberman MD;  Location: [facility];  Service: Endoscopy;  Laterality: N/A;    ERCP      ERCP N/A 9/22/2020    Procedure: ERCP (ENDOSCOPIC RETROGRADE CHOLANGIOPANCREATOGRAPHY);  Surgeon: Abdulaziz Owen MD;  Location: [facility];  Service: Endoscopy;  Laterality: N/A;    THYROIDECTOMY  2011       Review of Systems:   As documented in primary team H&P    Home Meds:   Prior to Admission medications     Medication Sig Start Date End Date Taking? Authorizing Provider   furosemide (LASIX) 20 MG tablet Take 1 tablet (20 mg total) by mouth every 12 (twelve) hours. 9/16/20 10/16/20 Yes Cory Rodriguez DO, FACP   levothyroxine (SYNTHROID) 200 MCG tablet Take 1 tablet (200 mcg total) by mouth before breakfast. 9/9/20 9/9/21 Yes Jese Li MD   spironolactone (ALDACTONE) 50 MG tablet Take 1 tablet (50 mg total) by mouth every 8 (eight) hours. for 14 days 9/4/20 9/29/20 Yes Jese Li MD   alendronate (FOSAMAX) 70 MG tablet Take 1 tablet (70 mg total) by mouth every 7 days. 9/9/20   Jese Li MD   capecitabine (XELODA) 500 MG Tab Take 2 tablets (1,000 mg total) by mouth 2 (two) times daily. 10/25/18   Cory Rodriguez DO, FACP   ferrous sulfate (FEOSOL) 325 mg (65 mg iron) Tab tablet Take 325 mg by mouth once daily.    Historical Provider   iron-vit c-vit b12-folic acid (IRON-C PLUS) tablet Take 1 tablet by mouth once daily. 9/9/20   Jese Li MD     Scheduled Meds:    bisacodyL  5 mg Oral BID    cyanocobalamin  1,000 mcg Intramuscular Daily    famotidine (PF)  20 mg Intravenous BID    lactulose  10 g Oral Q6H    levothyroxine  200 mcg Oral Before breakfast    lidocaine (PF) 10 mg/ml (1%)  1 mL Other Once    phytonadione ((AQUA-MEPHYTON) IVPB  10 mg Intravenous Daily    potassium chloride  40 mEq Oral BID    potassium phosphate IVPB  30 mmol Intravenous Once    spironolactone  50 mg Oral Daily     Continuous Infusions:    sodium chloride 0.9% 125 mL/hr at 10/01/20 0741    octreotide infusion (50 mcg/mL) 250 mcg/hr (09/30/20 1254)     PRN Meds:acetaminophen, bisacodyL, melatonin, ondansetron, sodium chloride 0.9%  Anticoagulants/Antiplatelets: no anticoagulation    Allergies:   Review of patient's allergies indicates:   Allergen Reactions    Epinephrine Anaphylaxis and Other (See Comments)     Can cause Carcinoid Crisis    Sulfa (sulfonamide antibiotics) Other (See Comments)      "Urticaria     Sedation Hx: have not been any systemic reactions    Labs:  Recent Labs   Lab 09/30/20  0437   INR 1.3*       Recent Labs   Lab 10/01/20  0416   WBC 7.45   HGB 7.1*   HCT 23.4*   *   *      Recent Labs   Lab 10/01/20  0416      *   K 3.9   *   CO2 26   BUN 18   CREATININE 0.9   CALCIUM 11.5*   MG 2.2   ALT 11   AST 18   ALBUMIN 2.7*   BILITOT 1.8*         Vitals:  Temp: 98.7 °F (37.1 °C) (10/01/20 1112)  Pulse: 93 (10/01/20 1145)  Resp: (!) 30 (10/01/20 1145)  BP: 117/61 (10/01/20 1145)  SpO2: 96 % (10/01/20 1145)     Physical Exam:  ASA: 3  Mallampati: 3    General: no acute distress  Mental Status: patient is mildly confused  HEENT: normocephalic, atraumatic  Chest: unlabored breathing  Heart: regular heart rate  Abdomen: nondistended  Extremity: moves all extremities    Plan: paracentesis  Sedation Plan: local    Mychal Raymundo MD ([place])  Interventional Radiology  [phone]        "

## 2020-10-01 NOTE — PROCEDURES
"  Pre Op Diagnosis: ascites  Post Op Diagnosis: Same    Procedure: Paracentesis    Procedure performed by: Breanne    Written Informed Consent Obtained: Yes  Specimen Removed: YES  1250cc  Estimated Blood Loss: Minimal    Findings:   Successful paracentesis with 1250 cc of serous fluid removed.    Patient tolerated procedure well.    Mychal Raymundo MD ([place])  Interventional Radiology  [phone]        "

## 2020-10-01 NOTE — EICU
Pt got more restless, agitated, unable to sleep, melatonin was given with no effect, pt was put on 1 liter of oxygen since sats went down with agitation episodes.now 98 %, VSS.  Seroquel 50 mg po prn ordered.  D/w RN

## 2020-10-01 NOTE — PLAN OF CARE
Problem: Occupational Therapy Goal  Goal: Occupational Therapy Goal  Description: Goals to be met by: 10/30/2020      Patient will increase functional independence with ADLs by performing:    UE Dressing with Modified [place].  LE Dressing with Supervision.  Grooming while standing with Supervision.  Toileting from toilet or BSC with Stand-by Assistance for hygiene and clothing management.   Toilet transfer to toilet or BSC with Supervision.  Increased functional strength to WFL for self care.  Upper extremity exercise program x10 reps per handout, with supervision.     Outcome: Ongoing, Not Progressing   Increased somnolence with sitting EOB. Continue with OT POC.

## 2020-10-01 NOTE — PT/OT/SLP PROGRESS
"Occupational Therapy   Treatment    Name: Ching Bruce  MRN: [id number]  Admitting Diagnosis:  <principal problem not specified>     The primary encounter diagnosis was Hypercalcemia. Diagnoses of Jaundice, Hypokalemia, Constipation, Anemia, unspecified type, and Confusion were also pertinent to this visit.    Recommendations:     Discharge Recommendations: (TD pending progress, family requesting HHOT/PT)  Discharge Equipment Recommendations:  (TBD pending progress, may require TTB v SC, BSC, RW, w/c)  Barriers to discharge:       Assessment:     Ching Bruce is a 69 y.o. female with a medical diagnosis of <principal problem not specified>.  She presents with Performance deficits affecting function are weakness, impaired endurance, impaired functional mobilty, impaired self care skills, gait instability, impaired balance, impaired cognition, decreased coordination, decreased upper extremity function, decreased lower extremity function, decreased safety awareness.     Pt with increased somnolence sitting EOB for ~ 20 min. Pt denied dizziness,  but unreliable and very confused.  Oriented to person only, inconsistent following simple commands.  Arms go "limp" with attempt to engage in fx tasks.  Continue with OT POC as appropriate.     Rehab Prognosis:  Fair; patient would benefit from acute skilled OT services to address these deficits and reach maximum level of function.       Plan:     Patient to be seen 5 x/week to address the above listed problems via self-care/home management, therapeutic activities, therapeutic exercises  · Plan of Care Expires: 10/30/20  · Plan of Care Reviewed with: patient, daughter    Subjective     Pain/Comfort:  · Pain Rating 1: 0/10  · Pain Rating Post-Intervention 1: 0/10    Objective:     Communicated with: nurse prior to session.  Patient found HOB elevated with sinclair catheter, pulse ox (continuous), peripheral IV, blood pressure cuff, telemetry, SCD upon OT entry to room.    General " Precautions: Standard, fall(H&H 7.1/23.4)   Orthopedic Precautions:N/A   Braces: N/A     Occupational Performance:     Bed Mobility:    · Patient completed Rolling/Turning to Left with  maximal assistance, with side rail and not following commands  · Patient completed Rolling/Turning to Right with maximal assistance, with side rail and same  · Patient completed Scooting/Bridging with total assistance and 2 toward HOB with drawsheet persons  · Patient completed Supine to Sit with maximal assistance  · Patient completed Sit to Supine with maximal assistance, due to increased somnolence /slow responsiveness     Activities of Daily Living:  · Grooming: total assistance [place] to wash face, minimally following commands to wipe eyes      AMPA 6 Click ADL: 12    Treatment & Education:  Sat ~ 20 min EOB after cleanup in bed with nursing staff, OT instructing pt in rolling with max Assist.  Pt sat EOB with SBA, changed gown with total A, maintain sit balance. Attempted to stand but unable due to sudden slow responsiveness.  Pt returned to supine.  /59.  Nurse in room all times and aware of pt.s status. Pt's H&H is low but nurse says its been low. Pt could be orthostatic, will monitor next tx session.      Patient left HOB elevated with all lines intact, call button in reach, bed alarm on, nurse notified and nurse and CNA presentEducation:      GOALS:   Multidisciplinary Problems     Occupational Therapy Goals        Problem: Occupational Therapy Goal    Goal Priority Disciplines Outcome Interventions   Occupational Therapy Goal     OT, PT/OT Ongoing, Not Progressing    Description: Goals to be met by: 10/30/2020      Patient will increase functional independence with ADLs by performing:    UE Dressing with Modified [place].  LE Dressing with Supervision.  Grooming while standing with Supervision.  Toileting from toilet or BSC with Stand-by Assistance for hygiene and clothing management.   Toilet transfer to toilet  or BSC with Supervision.  Increased functional strength to WFL for self care.  Upper extremity exercise program x10 reps per handout, with supervision.                      Time Tracking:     OT Date of Treatment: 10/01/20  OT Start Time: 1520  OT Stop Time: 1545  OT Total Time (min): 25 min    Billable Minutes:Self Care/Home Management 10  Therapeutic Activity 15  Total Time 25    Rosangela Galvan OT  10/1/2020

## 2020-10-01 NOTE — PROGRESS NOTES
ICU Progress Note    Admit Date: 9/29/2020   LOS: 2 days   POD # N A    SUBJECTIVE:       Remains altered  Continues to diurese appropriately   Electrolytes improving       Scheduled Meds:   bisacodyL  5 mg Oral BID    cyanocobalamin  1,000 mcg Intramuscular Daily    famotidine (PF)  20 mg Intravenous BID    lactulose  10 g Oral Q6H    levothyroxine  200 mcg Oral Before breakfast    lidocaine (PF) 10 mg/ml (1%)  1 mL Other Once    phytonadione ((AQUA-MEPHYTON) IVPB  10 mg Intravenous Daily    potassium chloride  40 mEq Oral BID    potassium phosphate IVPB  30 mmol Intravenous Once    spironolactone  50 mg Oral Daily     Continuous Infusions:   sodium chloride 0.9% 125 mL/hr at 10/01/20 0741    octreotide infusion (50 mcg/mL) 250 mcg/hr (09/30/20 1254)     PRN Meds:acetaminophen, bisacodyL, melatonin, ondansetron, sodium chloride 0.9%    Review of patient's allergies indicates:   Allergen Reactions    Epinephrine Anaphylaxis and Other (See Comments)     Can cause Carcinoid Crisis    Sulfa (sulfonamide antibiotics) Other (See Comments)     Urticaria         OBJECTIVE:     Vital Signs (Most Recent)  Temp: 98.6 °F (37 °C) (10/01/20 0713)  Pulse: 79 (10/01/20 0730)  Resp: (!) 23 (10/01/20 0730)  BP: 127/65 (10/01/20 0730)  SpO2: 97 % (10/01/20 0834)    Vital Signs Range (Last 24H):  Temp:  [98.5 °F (36.9 °C)-98.9 °F (37.2 °C)]   Pulse:  []   Resp:  [18-49]   BP: ()/(47-82)   SpO2:  [92 %-99 %]     I & O (Last 24H):    Intake/Output Summary (Last 24 hours) at 10/1/2020 0849  Last data filed at 10/1/2020 0826  Gross per 24 hour   Intake 4208.4 ml   Output 2415 ml   Net 1793.4 ml       Physical Exam:  NAD, AAOx1 - verbalizing appropriately, nontoxic appearing  Normal respiratory effort  RR  Abdomen soft, + fluid wave, non- tender, mildly distended,   Moving all extremities. No edema     LABS  CBC  Recent Labs   Lab 09/29/20  1250 09/30/20  0437 10/01/20  0416   WBC 5.25 6.53 7.45   RBC 3.22*  2.26* 2.21*   HGB 10.2* 7.2* 7.1*   HCT 32.6* 23.2* 23.4*   * 127* 118*   * 103* 106*   MCH 31.7* 31.9* 32.1*   MCHC 31.3* 31.0* 30.3*     CMP  Recent Labs   Lab 09/30/20  0239 09/30/20  1244 09/30/20  1844 10/01/20  0416   * 134* 125* 101   CALCIUM 12.7* 12.2* 11.9* 11.5*   ALBUMIN 2.8*  --  2.8* 2.7*   PROT 6.9  --  6.9 6.6    143 144 146*   K 4.7 3.9 3.8 3.9   CO2 29 29 27 26    109 109 113*   BUN 11 14 17 18   CREATININE 0.8 0.9 0.9 0.9   ALKPHOS 546*  --  521* 507*   ALT 13  --  11 11   AST 18  --  17 18   BILITOT 1.9*  --  1.9* 1.8*       Recent Labs   Lab 09/30/20  0239 09/30/20  0437 09/30/20  1244 09/30/20  1844 10/01/20  0416   CALCIUM 12.7*  --  12.2* 11.9* 11.5*   MG 2.3  --   --  2.1 2.2   PHOS 3.6 3.0  --  2.2* 2.1*         POCT-Glucose  No results found for: POCTGLUCOSE    COAGS  Recent Labs   Lab 09/30/20  0437   INR 1.3*       CE  No results for input(s): TROPONINI, CPK, CPKMB in the last 168 hours.  BNP  No results for input(s): BNP in the last 168 hours.    ABGs  No results for input(s): PH, PCO2, PO2, HCO3, POCSATURATED, BE in the last 24 hours.    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   FEN/GI -NPO, IVF, diuresis (lasix), electrolyte repletion. Will ask IR for paracentesis.   RENAL - Cr to 0.9, UOP adequate, continue to monitor and trend  ENDO - calcitonin, Sestamibi scan to further characterize PT nodule  HEME/ID - Hg decreased from admission, but stable at 7;   MSK - encourage OOB and ambulation, PT/OT    PPX - DVT ppx with SCDs, hold lovenox for IR   DISPO - continue ICU care    Victor M Matthews MD  [employer] Surgery, PGY-4  10/1/2020

## 2020-10-01 NOTE — PT/OT/SLP PROGRESS
Physical Therapy Treatment    Patient Name:  Ching Bruce   MRN:  [id number]    Recommendations:     Discharge Recommendations:  (TBD pending improvement, family requesting HHPT and HHOT)   Discharge Equipment Recommendations: bedside commode, bath bench, walker, rolling, wheelchair   Barriers to discharge: decreased functional mobility tolerance and inability to follow directions    Assessment:     Ching Bruce is a 69 y.o. female admitted with a medical diagnosis of <principal problem not specified>.  She presents with the following impairments/functional limitations:  weakness, impaired endurance, impaired cognition, impaired self care skills, decreased upper extremity function, impaired functional mobilty, decreased lower extremity function, gait instability, decreased safety awareness, impaired balance, impaired cardiopulmonary response to activity Pt supine to sit with Max assist of 1.  Pt scooted to EOB with Max assist of 1.  Pt not following commands well enough to go from sit to stand with RW with Mod assist of 1.  Pt in sitting 13 min.  Pt required Mod assist for sit to supine and max assist of 2 to scoot to HOB.  Progress limited by patient's inability to follow commands.    Rehab Prognosis: Fair; patient would benefit from acute skilled PT services to address these deficits and reach maximum level of function.    Recent Surgery: * No surgery found *      Plan:     During this hospitalization, patient to be seen 5 x/week to address the identified rehab impairments via gait training, therapeutic activities, therapeutic exercises, neuromuscular re-education and progress toward the following goals:    · Plan of Care Expires:  11/24/20    Subjective     Chief Complaint: none stated  Patient/Family Comments/goals: none stated  Pain/Comfort:  · Pain Rating 1: 0/10      Objective:     Communicated with nurse prior to session.  Patient found HOB elevated with SCD, telemetry, peripheral IV, pulse ox (continuous),  sinclair catheter, bowel management system upon PT entry to room.     General Precautions: Standard, fall   Orthopedic Precautions:    Braces:       Functional Mobility:  Pt supine to sit with Max assist of 1.  Pt scooted to EOB with Max assist of 1.  Pt not following commands well enough to go from sit to stand with RW with Mod assist of 1.  Pt in sitting 13 min.  Pt required Mod assist for sit to supine and max assist of 2 to scoot to HOB.  Progress limited by patient's inability to follow commands.      AM-PAC 6 CLICK MOBILITY  Turning over in bed (including adjusting bedclothes, sheets and blankets)?: 3  Sitting down on and standing up from a chair with arms (e.g., wheelchair, bedside commode, etc.): 3  Moving from lying on back to sitting on the side of the bed?: 3  Moving to and from a bed to a chair (including a wheelchair)?: 3  Need to walk in hospital room?: 3  Climbing 3-5 steps with a railing?: 2  Basic Mobility Total Score: 17       Therapeutic Activities and Exercises:   See functional mobility.    Patient left HOB elevated with all lines intact, call button in reach and nurse present..    GOALS:   Multidisciplinary Problems     Physical Therapy Goals        Problem: Physical Therapy Goal    Goal Priority Disciplines Outcome Goal Variances Interventions   Physical Therapy Goal     PT, PT/OT Ongoing, Progressing     Description: Goals to be met by: 10/25/20     Patient will increase functional independence with mobility by performin.  Supine to/from sit with supervision.  2.  Bed to/from chair with supervision  3.  Ambulate 75' with QC or RW with CG/sup  4.  Sit in chair 2 hours                     Time Tracking:     PT Received On: 10/01/20  PT Start Time: 1240     PT Stop Time: 1304  PT Total Time (min): 24 min     Billable Minutes: Therapeutic Activity 24                   Nieves Be, PT  10/01/2020

## 2020-10-01 NOTE — NURSING
Patient started to try to climb out of bed and get agitated. Started state to 92% when previously slightly higher. Repositioned in bed and applied NC for comfort and redirected. Contacted eICU to review chart and order oxygen.

## 2020-10-01 NOTE — PLAN OF CARE
Patient had a paracentesis today and 1350 mls removed,  VSS, no signs of acute distress noted, patient continues to be confused and weak, will continue to monitor ans tx per MD orders

## 2020-10-02 NOTE — PT/OT/SLP PROGRESS
Occupational Therapy   Treatment    Name: Ching Bruce  MRN: [id number]  Admitting Diagnosis:  <principal problem not specified>       Recommendations:     Discharge Recommendations: other (see comments)(TBD)  Discharge Equipment Recommendations:  bedside commode, bath bench, walker, rolling, wheelchair  Barriers to discharge:  Inaccessible home environment    Assessment:     Ching Bruce is a 69 y.o. female with a medical diagnosis of <principal problem not specified>.  Performance deficits affecting function are weakness, impaired endurance, impaired self care skills, impaired functional mobilty, gait instability, impaired balance, impaired cognition, decreased coordination, decreased upper extremity function, decreased lower extremity function, decreased safety awareness, impaired coordination, impaired fine motor. Pt found in bed, agreeable to therapy.  RN ok'ed OOB to chair.  Pt with improved performance with therapy.  She is more alert, however, O x person only.  She was able to transfer to chair with Min A using RW.  Vitals stable throughout. Continue OT services to address functional goals, progressing as able.      Rehab Prognosis:  Good; patient would benefit from acute skilled OT services to address these deficits and reach maximum level of function.       Plan:     Patient to be seen 5 x/week to address the above listed problems via self-care/home management, therapeutic activities, therapeutic exercises  · Plan of Care Expires: 10/30/20  · Plan of Care Reviewed with: patient    Subjective     Pain/Comfort:  · Pain Rating 1: 0/10  · Pain Rating Post-Intervention 1: 0/10    Objective:     Communicated with: RN prior to session.  Patient found HOB elevated with sinclair catheter, peripheral IV(multiple ICU lines and monitors) upon OT entry to room.    General Precautions: Standard, fall   Orthopedic Precautions:N/A   Braces: N/A     Occupational Performance:     Bed Mobility:    · Patient completed  Rolling/Turning to Right with moderate assistance, maximal assistance and with side rail  · Patient completed Scooting/Bridging with maximal assistance to scoot seated to EOB  · Patient completed Supine to Sit with maximal assistance, with side rail and HOB elevated, increased time and effort, vc's for effective technique    Functional Mobility/Transfers:  · Patient completed Sit <> Stand Transfer with minimum assistance  with  rolling walker and vc's for hand placement   · Patient completed Bed <> Chair Transfer using Stand Pivot technique with minimum assistance with rolling walker  · Functional Mobility: Pt took 5 steps to chair with Min A using RW.  Pt requires vc's and assistance for RW mgmt/safety.      Activities of Daily Living:  · Grooming: moderate assistance chair level.  Pt with mod diffculty opening, closing, and squeezing toothpaste 2/2 poor FMC and decreased strength.  Pt requires vc's throughout for sequencing steps.   · Toileting: Pt stood with CGA while cleaning residuals from backside with Tot A.       AMPA 6 Click ADL: 12      Patient left up in chair with all lines intact, call button in reach and RN notifiedEducation:      GOALS:   Multidisciplinary Problems     Occupational Therapy Goals        Problem: Occupational Therapy Goal    Goal Priority Disciplines Outcome Interventions   Occupational Therapy Goal     OT, PT/OT Ongoing, Not Progressing    Description: Goals to be met by: 10/30/2020      Patient will increase functional independence with ADLs by performing:    UE Dressing with Modified [place].  LE Dressing with Supervision.  Grooming while standing with Supervision.  Toileting from toilet or BSC with Stand-by Assistance for hygiene and clothing management.   Toilet transfer to toilet or BSC with Supervision.  Increased functional strength to WFL for self care.  Upper extremity exercise program x10 reps per handout, with supervision.                      Time Tracking:     OT  Date of Treatment: 10/02/20  OT Start Time: 0950  OT Stop Time: 1016  OT Total Time (min): 26 min    Billable Minutes:Self Care/Home Management 13  Therapeutic Activity 13    HELEN Worthington  10/2/2020

## 2020-10-02 NOTE — EICU
Rounding (Video Assessment):  No    Intervention Initiated From:  [facility] / EICU    Melinda Communicated with Bedside Nurse regarding:  Respiratory    Nurse Notified:  Yes    Doctor Notified:  No    Comments: called unit to inform pt is desating to 70%. Attempted to get pt to place o2 back on himself. He could not. Nurse has come to room and has reapplied o2 on pt. sats are going up.

## 2020-10-02 NOTE — EICU
Rounding (Video Assessment):  No    Intervention Initiated From:  Bedside    Melinda Communicated with Bedside Nurse regarding:  Labs    Nurse Notified:  No    Doctor Notified:  Yes    Comments: bedside nurse called for k and mg replacement. k-3.2 and mag-1.8. informed Dr. Herrera

## 2020-10-02 NOTE — PROGRESS NOTES
ICU Progress Note    Admit Date: 9/29/2020   LOS: 3 days   POD # N A    SUBJECTIVE:       Remains confused despite improving ammonia / Ca+  Continues to diurese appropriately   Ca+ slowly improving   1200cc ascites removed yesterday    from 156; Sestamibi scan equivocal     Scheduled Meds:   bisacodyL  5 mg Oral BID    cyanocobalamin  1,000 mcg Intramuscular Daily    famotidine (PF)  20 mg Intravenous BID    levothyroxine  200 mcg Oral Before breakfast    lidocaine (PF) 10 mg/ml (1%)  1 mL Other Once    phytonadione ((AQUA-MEPHYTON) IVPB  10 mg Intravenous Daily    potassium chloride in water  40 mEq Intravenous Q4H    spironolactone  50 mg Oral Daily     Continuous Infusions:   sodium chloride 0.9% 75 mL/hr at 10/02/20 0733    octreotide infusion (50 mcg/mL) 250 mcg/hr (09/30/20 1254)     PRN Meds:acetaminophen, bisacodyL, melatonin, ondansetron, sodium chloride 0.9%    Review of patient's allergies indicates:   Allergen Reactions    Epinephrine Anaphylaxis and Other (See Comments)     Can cause Carcinoid Crisis    Sulfa (sulfonamide antibiotics) Other (See Comments)     Urticaria         OBJECTIVE:     Vital Signs (Most Recent)  Temp: 100.1 °F (37.8 °C) (10/02/20 0730)  Pulse: 84 (10/02/20 0730)  Resp: (!) 24 (10/02/20 0730)  BP: 118/61 (10/02/20 0730)  SpO2: (!) 93 % (10/02/20 0730)    Vital Signs Range (Last 24H):  Temp:  [98.3 °F (36.8 °C)-100.1 °F (37.8 °C)]   Pulse:  []   Resp:  [20-40]   BP: (108-170)/(55-87)   SpO2:  [92 %-99 %]     I & O (Last 24H):    Intake/Output Summary (Last 24 hours) at 10/2/2020 0852  Last data filed at 10/2/2020 0654  Gross per 24 hour   Intake 2646.6 ml   Output 4685 ml   Net -2038.4 ml       Physical Exam:  NAD, sleepy but arousable, follows commands, Still confused / not answering questions appropriately   Normal respiratory effort  RR  Abdomen soft, non-distended   Moving all extremities. Minimal edema     LABS  CBC  Recent Labs   Lab 09/30/20  2886  10/01/20  0416 10/02/20  0409   WBC 6.53 7.45 6.56   RBC 2.26* 2.21* 2.23*   HGB 7.2* 7.1* 7.1*   HCT 23.2* 23.4* 23.2*   * 118* 112*   * 106* 104*   MCH 31.9* 32.1* 31.8*   MCHC 31.0* 30.3* 30.6*     CMP  Recent Labs   Lab 10/01/20  0416 10/01/20  1639 10/02/20  0409    110 95   CALCIUM 11.5* 11.1* 10.9*   ALBUMIN 2.7* 2.8* 3.4*   PROT 6.6 6.9 6.9   * 146* 147*   K 3.9 4.1 3.2*   CO2 26 28 26   * 110 109   BUN 18 19 19   CREATININE 0.9 0.9 1.0   ALKPHOS 507* 520* 502*   ALT 11 11 9*   AST 18 19 18   BILITOT 1.8* 2.1* 2.6*       Recent Labs   Lab 10/01/20  0416 10/01/20  1639 10/02/20  0409   CALCIUM 11.5* 11.1* 10.9*   MG 2.2 1.9 1.8   PHOS 2.1* 3.9 3.3         POCT-Glucose  No results found for: POCTGLUCOSE    COAGS  Recent Labs   Lab 09/30/20  0437   INR 1.3*       CE  No results for input(s): TROPONINI, CPK, CPKMB in the last 168 hours.  BNP  No results for input(s): BNP in the last 168 hours.    ABGs  No results for input(s): PH, PCO2, PO2, HCO3, POCSATURATED, BE in the last 24 hours.    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 No pressor requirement   FEN/GI -Reg diet as tolerated, IVF, diuresis (lasix)/Aldactone, electrolyte replacement. Albumin for ascites losses.   RENAL - Cr to 1, Diuresing appropriately,  continue to monitor and trend  ENDO - calcitonin, CT neck PT protocol   HEME/ID - Hg decreased from admission, but stable at 7;   MSK - encourage OOB and ambulation,  PT/OT   PPX - DVT ppx with SCDs, hold lovenox   DISPO - continue ICU care    Victor M Matthews MD  [employer] Surgery, PGY-4  10/2/2020

## 2020-10-02 NOTE — PLAN OF CARE
Problem: Occupational Therapy Goal  Goal: Occupational Therapy Goal  Description: Goals to be met by: 10/30/2020      Patient will increase functional independence with ADLs by performing:    UE Dressing with Modified [place].  LE Dressing with Supervision.  Grooming while standing with Supervision.  Toileting from toilet or BSC with Stand-by Assistance for hygiene and clothing management.   Toilet transfer to toilet or BSC with Supervision.  Increased functional strength to WFL for self care.  Upper extremity exercise program x10 reps per handout, with supervision.     Outcome: Ongoing, Progressing  Ching Bruce is a 69 y.o. female with a medical diagnosis of <principal problem not specified>.  Performance deficits affecting function are weakness, impaired endurance, impaired self care skills, impaired functional mobilty, gait instability, impaired balance, impaired cognition, decreased coordination, decreased upper extremity function, decreased lower extremity function, decreased safety awareness, impaired coordination, impaired fine motor. Pt found in bed, agreeable to therapy.  RN ok'ed OOB to chair.  Pt with improved performance with therapy.  She is more alert, however, O x person only.  She was able to transfer to chair with Min A using RW.  Vitals stable throughout. Continue OT services to address functional goals, progressing as able.  JOSE A Worthington/L

## 2020-10-02 NOTE — NURSING
Notified Dr. Dumont that patient has 1 run of SVT with a HR of 155, but returned to NSR in the 80's. No new orders noted, will continue to monitor and tx per MD orders.

## 2020-10-02 NOTE — EICU
Rounding (Video Assessment):  No    Intervention Initiated From:  Bedside    Melinda Communicated with Bedside Nurse regarding:  Other    Nurse Notified:  No    Doctor Notified:  Yes    Comments: Bedside nurse called to get Seroquel reordered for tonight , due to excessive agitation. It was very effective last night

## 2020-10-02 NOTE — PT/OT/SLP PROGRESS
Physical Therapy      Patient Name:  Ching Bruce   MRN:  [id number]    Patient not seen today secondary to pt not in room, at CT Scan. Will follow-up as able.    Nieves Be, PT

## 2020-10-02 NOTE — PLAN OF CARE
Patient remained in bed for the shift. Bed in lowest position, sides rails up, and call light within reach. Plan of care reviewed with patient, verbalized understanding. Patient only oriented to self. VS remained WDL.  ST/NSR on telemetry. Fluids maintained at 125ml/hr until DC at 2400. Urine output adequate. Positioned independently in bed. Patient restless -> put on 1LNC and given seroquel. Will report to oncoming shift

## 2020-10-02 NOTE — PLAN OF CARE
Patient more alert today, still confused but able to recognize daughter and family members, also able to stand and sit in chair today with PT, CT of head and parathyroid completed, GI consult called in to possibly replace biliary stent, VSS and no signs of acute distress noted, will continue to monitor and tx per MD orders.

## 2020-10-02 NOTE — PROGRESS NOTES
Patient agitated.   Had received Seroquel last night & it worked   - will order Seroquel XL 50 mg po once       5 : 07 AM   K 3.2, Mg 1.8   - KCl IV 30 once   - IV Mg 2 g once

## 2020-10-03 NOTE — NURSING TRANSFER
Nursing Transfer Note      10/2/2020     Transfer To: Room 518    Transfer via bed    Transfer with cardiac monitoring    Transported by RN and Charge    Medicines sent: yes    Chart send with patient: Yes    Notified: daughter    Patient reassessed at: 10/02/20 @2100     Upon arrival to floor: cardiac monitor applied, patient oriented to room, call bell in reach and bed in lowest position, reported given to JORGE Norman Charge nurse

## 2020-10-03 NOTE — CONSULTS
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    Patient states she is feeling alright this am, minimal abdominal pain. Denies N/v. No fevers or chills. Denies diarrhea/constipation. Has not noticed pale stools or dark urine or jaundice.     LFTs appear mostly stable since recent ERCP where it was unclear whether LFT abnormalities were related to migrated stent or worsening metastatic dz    Chart reviewed and summarized here.    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    Secondary malignant neoplasm of liver     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) 5/7/2013    Thrombocytopenia 11/12/2013    Thyroid disease     Unspecified essential hypertension 11/12/2013       Past Surgical History  Past Surgical History:   Procedure Laterality Date    ERCP N/A 6/21/2018    Procedure: ERCP, stent exchange;  Surgeon: Jake Lieberman MD;  Location: [facility] ENDO;  Service: Endoscopy;  Laterality: N/A;    ERCP N/A 5/23/2019    Procedure: ERCP (ENDOSCOPIC RETROGRADE CHOLANGIOPANCREATOGRAPHY), stent exchange;  Surgeon: Jake Lieberman MD;  Location: [facility] ENDO;  Service: Endoscopy;  Laterality: N/A;    ERCP N/A 11/14/2019    Procedure: ERCP (ENDOSCOPIC  "RETROGRADE CHOLANGIOPANCREATOGRAPHY), stent exchange;  Surgeon: Jake Lieberman MD;  Location: [facility] ENDO;  Service: Endoscopy;  Laterality: N/A;    ERCP      ERCP N/A 9/22/2020    Procedure: ERCP (ENDOSCOPIC RETROGRADE CHOLANGIOPANCREATOGRAPHY);  Surgeon: Abdulaziz Owen MD;  Location: [facility] ENDO;  Service: Endoscopy;  Laterality: N/A;    THYROIDECTOMY  2011       Social History  Social History     Tobacco Use    Smoking status: Never Smoker    Smokeless tobacco: Never Used   Substance Use Topics    Alcohol use: No    Drug use: No       Family History  Family History   Problem Relation Age of Onset    Cancer Maternal Grandmother     Diabetes Father     Breast cancer Neg Hx     Colon cancer Neg Hx     Ovarian cancer Neg Hx        Review of Systems  General ROS: negative for chills, fever or weight loss  Psychological ROS: negative for hallucination, depression or suicidal ideation  Ophthalmic ROS: negative for blurry vision, photophobia or eye pain  ENT ROS: negative for epistaxis, sore throat or rhinorrhea  Respiratory ROS: no cough, shortness of breath, or wheezing  Cardiovascular ROS: no chest pain or dyspnea on exertion  Gastrointestinal ROS: no abdominal pain, change in bowel habits, or black/ bloody stools  Genito-Urinary ROS: no dysuria, trouble voiding, or hematuria  Musculoskeletal ROS: negative for gait disturbance or muscular weakness  Neurological ROS: no syncope or seizures; no ataxia  Dermatological ROS: negative for pruritis, rash and jaundice    Physical Examination  BP (!) 119/56 (Patient Position: Sitting)   Pulse 87   Temp 98.9 °F (37.2 °C) (Oral)   Resp 18   Ht 5' 7" (1.702 m)   Wt 60.3 kg (132 lb 15 oz)   LMP  (LMP Unknown)   SpO2 95%   BMI 20.82 kg/m²   General appearance: alert, cooperative, no distress  HENT: Normocephalic, atraumatic, neck symmetrical, no nasal discharge   Eyes: conjunctivae/corneas clear, PERRL, EOM's intact  Lungs: clear to auscultation bilaterally, no " dullness to percussion bilaterally  Heart: regular rate and rhythm without rub; no displacement of the PMI   Abdomen: soft, non-tender; bowel sounds normoactive; no organomegaly  Extremities: extremities symmetric; no clubbing, cyanosis, or edema  Integument: Skin color, texture, turgor normal; no rashes; hair distrubution normal  Neurologic: Alert and oriented X 3, normal strength, normal coordination and gait  Psychiatric: no pressured speech; normal affect; no evidence of impaired cognition     Labs:  CMP  Sodium   Date Value Ref Range Status   10/03/2020 144 136 - 145 mmol/L Final     Potassium   Date Value Ref Range Status   10/03/2020 3.8 3.5 - 5.1 mmol/L Final     Chloride   Date Value Ref Range Status   10/03/2020 109 95 - 110 mmol/L Final     CO2   Date Value Ref Range Status   10/03/2020 26 23 - 29 mmol/L Final     Glucose   Date Value Ref Range Status   10/03/2020 108 70 - 110 mg/dL Final     BUN, Bld   Date Value Ref Range Status   10/03/2020 21 8 - 23 mg/dL Final     Creatinine   Date Value Ref Range Status   10/03/2020 1.1 0.5 - 1.4 mg/dL Final     Calcium   Date Value Ref Range Status   10/03/2020 10.4 8.7 - 10.5 mg/dL Final     Total Protein   Date Value Ref Range Status   10/03/2020 6.7 6.0 - 8.4 g/dL Final     Albumin   Date Value Ref Range Status   10/03/2020 3.2 (L) 3.5 - 5.2 g/dL Final     Total Bilirubin   Date Value Ref Range Status   10/03/2020 2.3 (H) 0.1 - 1.0 mg/dL Final     Comment:     For infants and newborns, interpretation of results should be based  on gestational age, weight and in agreement with clinical  observations.  Premature Infant recommended reference ranges:  Up to 24 hours.............<8.0 mg/dL  Up to 48 hours............<12.0 mg/dL  3-5 days..................<15.0 mg/dL  6-29 days.................<15.0 mg/dL       Alkaline Phosphatase   Date Value Ref Range Status   10/03/2020 572 (H) 55 - 135 U/L Final     AST   Date Value Ref Range Status   10/03/2020 21 10 - 40 U/L  "Final     ALT   Date Value Ref Range Status   10/03/2020 11 10 - 44 U/L Final     Anion Gap   Date Value Ref Range Status   10/03/2020 9 8 - 16 mmol/L Final     eGFR if    Date Value Ref Range Status   10/03/2020 59 (A) >60 mL/min/1.73 m^2 Final     eGFR if non    Date Value Ref Range Status   10/03/2020 51 (A) >60 mL/min/1.73 m^2 Final     Comment:     Calculation used to obtain the estimated glomerular filtration  rate (eGFR) is the CKD-EPI equation.          Imaging:  Impression:           - A previously placed stent had migrated out of the                         biliary tree, removed                         - One stent was removed from the biliary tree.                         - Elevated liver function tests related to either                         stent migration or metastric disease   Recommendation:       - Discharge patient to home.                         - Resume previous diet.                         - Continue present medications.                         - Perform CT scan (computed tomography) of the                         abdomen with contrast at appointment to be                         scheduled if her LFTs do not respond to determine                         degree of metastatic burden                         - Return to referring physician as previously                         scheduled.   I have personally reviewed and interpreted these images.    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 Unclear whether worsening burden of mets or mild obstruction.    Plan:   - CT abdomen as mentioned above, further recommendations to follow    Discussed with Dr. Lieberman.    Carlos Alberto Carmona (Lee)  [facility] GI Fellow, PGY IV  Pager: [phone]    "

## 2020-10-03 NOTE — PLAN OF CARE
Patient is disoriented X4. No complaints of pain. No nausea or vomiting. No falls occurred during shift. No signs of infection noticed during shift. IVs clean, dry, intact, and infusing during shift. Jorge removed patient is needed to void by 10/3/2020 at 2300. Patient encouraged to reposition every 2 hours and to call staff for assistance for needs. No signs of skin breakdown. Cardiac monitoring in place. Call light in reach. Bed at lowest position, rails up x2, bedside table, water and belonging within reach.

## 2020-10-03 NOTE — PLAN OF CARE
Patient came from ICU around 2100. Sandostatin and NS infusing overnight. Patient reoriented to place and time, pleasantly confused. Patient can turn in bed. Jorge to gravity. KENNA telesitter in use. Call light in reach, remains free from falls, bed alarm in use.

## 2020-10-03 NOTE — PROGRESS NOTES
Progress Note    Admit Date: 9/29/2020   LOS: 4 days   SUBJECTIVE:   NAEO  More alert this morning   No complaints of pain     Scheduled Meds:   bisacodyL  5 mg Oral BID    cinacalcet  30 mg Oral BID WM    cyanocobalamin  1,000 mcg Intramuscular Daily    famotidine  20 mg Oral BID    levothyroxine  200 mcg Oral Before breakfast    lidocaine (PF) 10 mg/ml (1%)  1 mL Other Once    QUEtiapine  25 mg Oral QHS    sodium phosphate IVPB  39.99 mmol Intravenous Once    spironolactone  50 mg Oral Daily     Continuous Infusions:   sodium chloride 0.9% 75 mL/hr at 10/02/20 2348    octreotide infusion (50 mcg/mL) 250 mcg/hr (09/30/20 1254)     PRN Meds:acetaminophen, bisacodyL, ondansetron, sodium chloride 0.9%    Review of patient's allergies indicates:   Allergen Reactions    Epinephrine Anaphylaxis and Other (See Comments)     Can cause Carcinoid Crisis    Sulfa (sulfonamide antibiotics) Other (See Comments)     Urticaria         OBJECTIVE:     Vital Signs (Most Recent)  Temp: 98.6 °F (37 °C) (10/03/20 0808)  Pulse: 93 (10/03/20 0808)  Resp: 20 (10/03/20 0808)  BP: 133/65 (10/03/20 0808)  SpO2: (!) 94 % (10/03/20 0808)    Vital Signs Range (Last 24H):  Temp:  [98.6 °F (37 °C)-99.4 °F (37.4 °C)]   Pulse:  []   Resp:  [16-36]   BP: (104-153)/(54-70)   SpO2:  [93 %-100 %]     I & O (Last 24H):    Intake/Output Summary (Last 24 hours) at 10/3/2020 0859  Last data filed at 10/3/2020 0850  Gross per 24 hour   Intake 2578.75 ml   Output 2610 ml   Net -31.25 ml       Physical Exam:  NAD,follows commands  Normal respiratory effort  RR  Abdomen soft, non-distended   Moving all extremities. Minimal edema     LABS  CBC  Recent Labs   Lab 10/01/20  0416 10/02/20  0409 10/03/20  0527   WBC 7.45 6.56 5.76   RBC 2.21* 2.23* 2.16*   HGB 7.1* 7.1* 6.9*   HCT 23.4* 23.2* 22.2*   * 112* 121*   * 104* 103*   MCH 32.1* 31.8* 31.9*   MCHC 30.3* 30.6* 31.1*     CMP  Recent Labs   Lab 10/01/20  1639 10/02/20  0403  10/03/20  0527    95 108   CALCIUM 11.1* 10.9* 10.4   ALBUMIN 2.8* 3.4* 3.2*   PROT 6.9 6.9 6.7   * 147* 144   K 4.1 3.2* 3.8   CO2 28 26 26    109 109   BUN 19 19 21   CREATININE 0.9 1.0 1.1   ALKPHOS 520* 502* 572*   ALT 11 9* 11   AST 19 18 21   BILITOT 2.1* 2.6* 2.3*       Recent Labs   Lab 10/01/20  1639 10/02/20  0409 10/03/20  0527   CALCIUM 11.1* 10.9* 10.4   MG 1.9 1.8 2.1   PHOS 3.9 3.3 2.2*         POCT-Glucose  No results found for: POCTGLUCOSE    COAGS  Recent Labs   Lab 09/30/20  0437   INR 1.3*       CE  No results for input(s): TROPONINI, CPK, CPKMB in the last 168 hours.  BNP  No results for input(s): BNP in the last 168 hours.    ABGs  No results for input(s): PH, PCO2, PO2, HCO3, POCSATURATED, BE in the last 24 hours.    UA  Recent Labs   Lab 09/29/20  1704   COLORU Yellow   SPECGRAV 1.020   PHUR 8.0   PROTEINUA Negative   NITRITE Negative   LEUKOCYTESUR Negative   UROBILINOGEN 2.0-3.0*       LAST HbA1c  No results found for: HGBA1C    ASSESSMENT/PLAN:   69 y.o.female with metastatic NET presents with recurrent hypercalcemia   -CT concerning for parathyroid adenoma  -regular diet  -monitor calcium, cincalcet  -PT/OT, encourage ambulation, OOB  -will discuss transfusion w/ Hg 6.9  -consider removal of dottie Guerrero  [employer] General Surgery PGY-2

## 2020-10-03 NOTE — PLAN OF CARE
Problem: Adult Inpatient Plan of Care  Goal: Plan of Care Review  Outcome: Ongoing, Progressing   VIRTUAL NURSE:  Cued into patient's room.  Permission received per patient to turn camera to view patient. Pt to floor at this time from ICU.  Introduced as VN for night shift that will be working with floor nurse and nursing assistant.  Educated patient on VN's role in patient care and  VIP model.  Plan of care reviewed with patient.  Education per flowsheet.   Informed patient that staff will round on them every 2 hours but to use call light for any other needs they may have; informed of fall risk and fall precautions. Call light within reach; bed siderails up x2.  Opportunity given for questions and questions answered.  Patient denies complaints or any needs at this time. Instructed to call for assistance.  Will cont to monitor and intervene as needed.    Labs, notes, orders, and careplan reviewed.

## 2020-10-04 NOTE — PROGRESS NOTES
Progress Note    Admit Date: 9/29/2020   LOS: 5 days   SUBJECTIVE:   NAEO  Voiding since removal of sinclair  Tolerating diet without nausea or vomiting   alert this morning   No complaints of pain     Scheduled Meds:   bisacodyL  5 mg Oral BID    cinacalcet  30 mg Oral BID WM    cyanocobalamin  1,000 mcg Intramuscular Daily    famotidine  20 mg Oral BID    levothyroxine  200 mcg Oral Before breakfast    lidocaine (PF) 10 mg/ml (1%)  1 mL Other Once    potassium chloride  40 mEq Oral Once    QUEtiapine  25 mg Oral QHS    spironolactone  50 mg Oral Daily    ursodioL  300 mg Oral BID     Continuous Infusions:   sodium chloride 0.9% 75 mL/hr at 10/02/20 2348    octreotide infusion (50 mcg/mL) 250 mcg/hr (10/04/20 0945)     PRN Meds:acetaminophen, bisacodyL, ondansetron, sodium chloride 0.9%    Review of patient's allergies indicates:   Allergen Reactions    Epinephrine Anaphylaxis and Other (See Comments)     Can cause Carcinoid Crisis    Sulfa (sulfonamide antibiotics) Other (See Comments)     Urticaria         OBJECTIVE:     Vital Signs (Most Recent)  Temp: 99.1 °F (37.3 °C) (10/04/20 1105)  Pulse: 94 (10/04/20 1105)  Resp: 18 (10/04/20 1105)  BP: (!) 141/67 (10/04/20 1105)  SpO2: 95 % (10/04/20 0900)    Vital Signs Range (Last 24H):  Temp:  [97.9 °F (36.6 °C)-99.1 °F (37.3 °C)]   Pulse:  [78-95]   Resp:  [18]   BP: (118-145)/(56-85)   SpO2:  [95 %-97 %]     I & O (Last 24H):No intake or output data in the 24 hours ending 10/04/20 1120    Physical Exam:  Gen: NAD  Cardio: RR  Pulm: Normal effort on RA   Abdomen: soft, NTND  MSK: Moving all extremities  Neuro: alert    LABS  CBC  Recent Labs   Lab 10/02/20  0409 10/03/20  0527 10/04/20  0605   WBC 6.56 5.76 5.63   RBC 2.23* 2.16* 2.17*   HGB 7.1* 6.9* 6.9*   HCT 23.2* 22.2* 22.5*   * 121* 111*   * 103* 104*   MCH 31.8* 31.9* 31.8*   MCHC 30.6* 31.1* 30.7*     CMP  Recent Labs   Lab 10/02/20  0409 10/03/20  0527 10/04/20  0605   GLU 95 108 111*    CALCIUM 10.9* 10.4 9.5   ALBUMIN 3.4* 3.2* 2.8*   PROT 6.9 6.7 6.4   * 144 143   K 3.2* 3.8 3.6   CO2 26 26 25    109 110   BUN 19 21 18   CREATININE 1.0 1.1 1.0   ALKPHOS 502* 572* 516*   ALT 9* 11 11   AST 18 21 17   BILITOT 2.6* 2.3* 2.0*       Recent Labs   Lab 10/02/20  0409 10/03/20  0527 10/04/20  0605   CALCIUM 10.9* 10.4 9.5   MG 1.8 2.1 2.0   PHOS 3.3 2.2* 3.0         POCT-Glucose  No results found for: POCTGLUCOSE    COAGS  Recent Labs   Lab 09/30/20  0437   INR 1.3*       CE  No results for input(s): TROPONINI, CPK, CPKMB in the last 168 hours.  BNP  No results for input(s): BNP in the last 168 hours.    ABGs  No results for input(s): PH, PCO2, PO2, HCO3, POCSATURATED, BE in the last 24 hours.    UA  Recent Labs   Lab 09/29/20  1704   COLORU Yellow   SPECGRAV 1.020   PHUR 8.0   PROTEINUA Negative   NITRITE Negative   LEUKOCYTESUR Negative   UROBILINOGEN 2.0-3.0*       LAST HbA1c  No results found for: HGBA1C    ASSESSMENT/PLAN:   69 y.o.female with metastatic NET admitted for recurrent hypercalcemia. Calcium has normalized and patient is clinically doing much better.   -CT concerning for parathyroid adenoma, monitor calcium, continue cincalcet  -Patient's bilirubin stable at 2, normal LFTs, GI on board. CT AP ordered today.    -regular diet  -PT/OT, encourage ambulation, OOB  -Hg stable at 6.9, not symptomatic, will observe  -discontinue central line  Dispo: pending results of CT possible DC tomorrow.     Navid Guerreor  [employer] General Surgery PGY-2

## 2020-10-04 NOTE — PLAN OF CARE
Patient is disoriented X4. No complaints of pain,nausea or vomiting. IVs clean, dry,and intact. Patient voided during shift. New purewick in place. Patient encouraged to reposition every 2 hours and to call staff for assistance for needs. No signs of skin breakdown. Cardiac monitoring in place. Call light in reach. Bed at lowest position, rails up x2, bedside table, water and belonging within reach. Will continue to monitor.

## 2020-10-05 NOTE — PLAN OF CARE
Patient is AAOx4. No complaints of pain,nausea or vomiting. IV clean, dry,and intact. Patient voided during shift, incontinence pad and walked to the BR. Patient encouraged to reposition every 2 hours and to call staff for assistance for needs. No signs of skin breakdown. Cardiac monitoring in place. Call light in reach. Bed at lowest position, rails up x2, bedside table, water and belonging within reach. Will continue to monitor.

## 2020-10-05 NOTE — PROGRESS NOTES
Progress Note    Admit Date: 9/29/2020   LOS: 6 days   SUBJECTIVE:   NAEO  No complaints of pain  Tolerating diet without nausea or vomiting   +BM/flatus, voiding  OOB    Scheduled Meds:   bisacodyL  5 mg Oral BID    cinacalcet  30 mg Oral BID WM    cyanocobalamin  1,000 mcg Intramuscular Daily    famotidine  20 mg Oral BID    levothyroxine  200 mcg Oral Before breakfast    lidocaine (PF) 10 mg/ml (1%)  1 mL Other Once    QUEtiapine  25 mg Oral QHS    spironolactone  50 mg Oral Daily    ursodioL  300 mg Oral BID     Continuous Infusions:  PRN Meds:acetaminophen, bisacodyL, ondansetron, sodium chloride 0.9%    Review of patient's allergies indicates:   Allergen Reactions    Epinephrine Anaphylaxis and Other (See Comments)     Can cause Carcinoid Crisis    Sulfa (sulfonamide antibiotics) Other (See Comments)     Urticaria         OBJECTIVE:     Vital Signs (Most Recent)  Temp: 97.9 °F (36.6 °C) (10/05/20 0741)  Pulse: 103 (10/05/20 0741)  Resp: 18 (10/05/20 0741)  BP: 129/75 (10/05/20 0741)  SpO2: 98 % (10/05/20 0733)    Vital Signs Range (Last 24H):  Temp:  [97.9 °F (36.6 °C)-99.1 °F (37.3 °C)]   Pulse:  []   Resp:  [15-19]   BP: (129-141)/(61-75)   SpO2:  [94 %-98 %]     I & O (Last 24H):No intake or output data in the 24 hours ending 10/05/20 0816    Physical Exam:  Gen: NAD  Cardio: RR  Pulm: Normal effort on RA   Abdomen: soft, NT, mildly distended, no guarding or rigidity  MSK: Moving all extremities  Neuro: alert    LABS  CBC  Recent Labs   Lab 10/03/20  0527 10/04/20  0605 10/05/20  0550   WBC 5.76 5.63 5.96   RBC 2.16* 2.17* 2.42*   HGB 6.9* 6.9* 7.8*   HCT 22.2* 22.5* 25.3*   * 111* 115*   * 104* 105*   MCH 31.9* 31.8* 32.2*   MCHC 31.1* 30.7* 30.8*     CMP  Recent Labs   Lab 10/02/20  0409 10/03/20  0527 10/04/20  0605   GLU 95 108 111*   CALCIUM 10.9* 10.4 9.5   ALBUMIN 3.4* 3.2* 2.8*   PROT 6.9 6.7 6.4   * 144 143   K 3.2* 3.8 3.6   CO2 26 26 25    109 110   BUN  19 21 18   CREATININE 1.0 1.1 1.0   ALKPHOS 502* 572* 516*   ALT 9* 11 11   AST 18 21 17   BILITOT 2.6* 2.3* 2.0*       Recent Labs   Lab 10/02/20  0409 10/03/20  0527 10/04/20  0605   CALCIUM 10.9* 10.4 9.5   MG 1.8 2.1 2.0   PHOS 3.3 2.2* 3.0         POCT-Glucose  No results found for: POCTGLUCOSE    COAGS  Recent Labs   Lab 09/30/20  0437   INR 1.3*       CE  No results for input(s): TROPONINI, CPK, CPKMB in the last 168 hours.  BNP  No results for input(s): BNP in the last 168 hours.    ABGs  No results for input(s): PH, PCO2, PO2, HCO3, POCSATURATED, BE in the last 24 hours.    UA  Recent Labs   Lab 09/29/20  1704   COLORU Yellow   SPECGRAV 1.020   PHUR 8.0   PROTEINUA Negative   NITRITE Negative   LEUKOCYTESUR Negative   UROBILINOGEN 2.0-3.0*       LAST HbA1c  No results found for: HGBA1C    ASSESSMENT/PLAN:   69 y.o.female with metastatic NET admitted for recurrent hypercalcemia. Calcium has normalized and patient is clinically doing much better.   -CT neck concerning for parathyroid adenoma, monitor calcium, continue cincalcet  -Patient's bilirubin stable, normal LFTs, GI on board. CT AP completed with metastatic lesions to the liver as previously noted. Will follow up GI recs.   -regular diet  -PT/OT, encourage ambulation, OOB  -discontinue central line before DC  Dispo: likely DC today     Navid Guerrero  [employer] General Surgery PGY-2

## 2020-10-05 NOTE — PLAN OF CARE
Problem: Occupational Therapy Goal  Goal: Occupational Therapy Goal  Description: Goals to be met by: 10/30/2020      Patient will increase functional independence with ADLs by performing:    UE Dressing with Modified [place].  LE Dressing with Supervision.  Grooming while standing with Supervision.  Toileting from toilet or BSC with Stand-by Assistance for hygiene and clothing management.   Toilet transfer to toilet or BSC with Supervision.  Increased functional strength to WF for self care.  Upper extremity exercise program x10 reps per handout, with supervision.     Outcome: Ongoing, Progressing     Ching Bruce is a 69 y.o. female with a medical diagnosis of <principal problem not specified>.  Performance deficits affecting function are weakness, impaired endurance, impaired self care skills, impaired functional mobilty, gait instability, impaired balance, decreased upper extremity function, decreased lower extremity function, decreased safety awareness, impaired skin. Pt found in bed, agreeable to therapy. Pt tolerated tx well.  No complaints throughout tx.  Pt progressing well towards goals. Continue OT services to address functional goals, progressing as able.    JOSE A Worthington/FREDDY

## 2020-10-05 NOTE — PT/OT/SLP PROGRESS
Occupational Therapy   Treatment    Name: Ching Bruce  MRN: [id number]  Admitting Diagnosis:  <principal problem not specified>       Recommendations:     Discharge Recommendations: home health OT  Discharge Equipment Recommendations:  bedside commode, bath bench, walker, rolling  Barriers to discharge:  Inaccessible home environment    Assessment:     Ching Bruce is a 69 y.o. female with a medical diagnosis of <principal problem not specified>.  Performance deficits affecting function are weakness, impaired endurance, impaired self care skills, impaired functional mobilty, gait instability, impaired balance, decreased upper extremity function, decreased lower extremity function, decreased safety awareness, impaired skin. Pt found in bed, agreeable to therapy. Pt tolerated tx well.  No complaints throughout tx.  Pt progressing well towards goals. Continue OT services to address functional goals, progressing as able.      Rehab Prognosis:  Good; patient would benefit from acute skilled OT services to address these deficits and reach maximum level of function.       Plan:     Patient to be seen 5 x/week to address the above listed problems via self-care/home management, therapeutic activities, therapeutic exercises  · Plan of Care Expires: 10/30/20  · Plan of Care Reviewed with: patient    Subjective     Pain/Comfort:  · Pain Rating 1: 0/10  · Pain Rating Post-Intervention 1: 0/10    Objective:     Communicated with: RN prior to session.  Patient found HOB elevated with peripheral IV upon OT entry to room.    General Precautions: Standard, fall   Orthopedic Precautions:N/A   Braces: N/A     Occupational Performance:     Bed Mobility:    · Patient completed Rolling/Turning to Right with supervision  · Patient completed Scooting/Bridging with supervision  · Patient completed Supine to Sit with supervision  · Patient completed Sit to Supine with supervision     Functional Mobility/Transfers:  · Patient completed Sit  <> Stand Transfer with contact guard assistance (from EOB) and minimum assistance (from toilet)  with  rolling walker, grab bars(s) and vc's for hand placement   · Patient completed Toilet Transfer Stand Pivot technique with minimum assistance with  rolling walker, grab bars and vc's for hand placement  · Functional Mobility: Pt ambulated room distances and an additional 80 feet with CGA using RW.  VC's for upright posture.     Activities of Daily Living:  · Grooming: contact guard assistance standing at sink  · Toileting: contact guard assistance        [facility] 6 Click ADL: 19    Treatment & Education:  Pt provided with, instructed on, and performed BUE yellow theraband exercises  x 10 reps for shld flex, abd, hori abd/add, bicep curls and tricep extensions.  She tolerated well with rest breaks 2/2 muscle fatigue.     Patient left HOB elevated with all lines intact, call button in reach, bed alarm on and RN notifiedEducation:      GOALS:   Multidisciplinary Problems     Occupational Therapy Goals        Problem: Occupational Therapy Goal    Goal Priority Disciplines Outcome Interventions   Occupational Therapy Goal     OT, PT/OT Ongoing, Progressing    Description: Goals to be met by: 10/30/2020      Patient will increase functional independence with ADLs by performing:    UE Dressing with Modified [place].  LE Dressing with Supervision.  Grooming while standing with Supervision.  Toileting from toilet or BSC with Stand-by Assistance for hygiene and clothing management.   Toilet transfer to toilet or BSC with Supervision.  Increased functional strength to WFL for self care.  Upper extremity exercise program x10 reps per handout, with supervision.                      Time Tracking:     OT Date of Treatment: 10/05/20  OT Start Time: 1310  OT Stop Time: 1341  OT Total Time (min): 31 min    Billable Minutes:Self Care/Home Management 18  Therapeutic Exercise 13    HELEN Worthington  10/5/2020

## 2020-10-05 NOTE — PLAN OF CARE
Problem: Physical Therapy Goal  Goal: Physical Therapy Goal  Description: Goals to be met by: 10/25/20     Patient will increase functional independence with mobility by performin.  Supine to/from sit with supervision - met 10/5/20  2.  Bed to/from chair with supervision   3.  Ambulate 75' with QC or RW with CG/sup  4.  Sit in chair 2 hours    Outcome: Ongoing, Progressing

## 2020-10-05 NOTE — PROGRESS NOTES
NET Team Rounds     Reason for admission:   Hypercalcemia [E83.52], Hypokalemia [E87.6], Confusion [R41.0], Constipation [K59.00]    Recurrent hypercalcemia.      Admit Date: 9/29/2020                                         Length of Stay Days: 6    Surgery/Procedure:       NET Physician:  Cory Rodriguez DO, FACP   Local Treating Physician:Gaby Corea MD    Assessment  Diet: Regular  Oral Supplement: Boost Plus with meals   Nutrition Support - none   Appetite - good  Nausea - No  Vomiting- No  BM- brown soft  Abdomen- soft, distended and + flatus  Incision- none  Drain- none    Urine-  voiding without difficulty  Pain-none    IV Access- Peripheral   Edema: 1+     Vital Signs (Most Recent):  Temp: 97.9 °F (36.6 °C) (10/05/20 0741)  Pulse: 103 (10/05/20 0741)  Resp: 18 (10/05/20 0741)  BP: 129/75 (10/05/20 0741)  SpO2: 98 % (10/05/20 0733) Vital Signs Range (Last 24H):  Temp:  [97.9 °F (36.6 °C)-99.1 °F (37.3 °C)]   Pulse:  []   Resp:  [15-19]   BP: (129-141)/(61-75)   SpO2:  [94 %-98 %]            Labs:   Recent Labs   Lab 10/03/20  0527 10/04/20  0605 10/05/20  0550   WBC 5.76 5.63 5.96   HGB 6.9* 6.9* 7.8*   HCT 22.2* 22.5* 25.3*   * 111* 115*   * 104* 105*   RDW 17.7* 18.0* 17.7*    143 141   K 3.8 3.6 4.2    110 109   CO2 26 25 21*   BUN 21 18 17   CREATININE 1.1 1.0 0.9    111* 82   PROT 6.7 6.4 6.8   ALBUMIN 3.2* 2.8* 3.0*   BILITOT 2.3* 2.0* 2.3*   AST 21 17 19   ALKPHOS 572* 516* 513*   ALT 11 11 11      Recent Labs   Lab 10/02/20  0409   PREALBUMIN 9*   CRP 52.9*     Recent Labs   Lab 10/03/20  0527 10/04/20  0605 10/05/20  0550   CALCIUM 10.4 9.5 9.9   MG 2.1 2.0 1.8   PHOS 2.2* 3.0 2.3*       Urinalysis  Recent Labs   Lab 09/29/20  1704   COLORU Yellow   SPECGRAV 1.020   PHUR 8.0   PROTEINUA Negative   NITRITE Negative   LEUKOCYTESUR Negative   UROBILINOGEN 2.0-3.0*       Cultures: none to date this admission    Scheduled Meds   bisacodyL  5 mg Oral BID     cinacalcet  30 mg Oral BID WM    cyanocobalamin  1,000 mcg Intramuscular Daily    famotidine  20 mg Oral BID    levothyroxine  200 mcg Oral Before breakfast    lidocaine (PF) 10 mg/ml (1%)  1 mL Other Once    QUEtiapine  25 mg Oral QHS    spironolactone  50 mg Oral Daily    ursodioL  300 mg Oral BID       Continuous Infusion - none    PRN Meds  acetaminophen, bisacodyL, ondansetron, sodium chloride 0.9%     Assessment/Plan:  --Encourage cough/deep breath/IS  -monitor calcium, continue cincalcet  -possible discharge today     Discharge Planning - Anticipated Date of D/C:          Appointments- Cory Rodriguez DO, FACP  TBD:    D/C Needs: Home    Nutrition: Regular diet as Tolerated; Boost Plus with meals; Avoid high calcium food.     Home support system- lives with daughters  Barriers to Care- none    Reviewed follow up plan.  Patient verbalized understanding.  ___________________________________________  IRMA Khan, RN, ONN-CG  Nurse Navigator [facility]    Tracie Weil Cavalier, RDN, LDN, CNSC  Registered Dietitian [facility]      Face to Face Time: 15 minutes

## 2020-10-05 NOTE — PT/OT/SLP PROGRESS
Physical Therapy Treatment    Patient Name:  Ching Bruce   MRN:  [id number]    Recommendations:     Discharge Recommendations:  home health PT 24/7 assist  Discharge Equipment Recommendations: bedside commode, bath bench, walker, rolling, wheelchair   Barriers to discharge: None    Assessment:     Ching Bruce is a 69 y.o. female admitted with a medical diagnosis of <principal problem not specified>.  She presents with the following impairments/functional limitations:  weakness, impaired balance, impaired endurance, impaired cardiopulmonary response to activity, impaired self care skills, impaired functional mobilty, gait instability, decreased lower extremity function.  Pt supine to/from sit with supervision.  Pt ambulated 45' x 2 with RW with CG with short step length and decreased knee extension BLE's.    Rehab Prognosis: Good; patient would benefit from acute skilled PT services to address these deficits and reach maximum level of function.    Recent Surgery: * No surgery found *      Plan:     During this hospitalization, patient to be seen 5 x/week to address the identified rehab impairments via gait training, therapeutic activities and progress toward the following goals:    · Plan of Care Expires:  11/24/20    Subjective     Chief Complaint: wants to go home  Patient/Family Comments/goals: go home  Pain/Comfort:  · Pain Rating 1: 0/10      Objective:     Communicated with nurse prior to session.  Patient found HOB elevated with peripheral IV upon PT entry to room.     General Precautions: Standard, fall   Orthopedic Precautions:    Braces:       Functional Mobility:   Pt supine to/from sit with supervision.  Pt ambulated 45' x 2 with RW with CG with short step length and decreased knee extension BLE's.      AM-PAC 6 CLICK MOBILITY  Turning over in bed (including adjusting bedclothes, sheets and blankets)?: 4  Sitting down on and standing up from a chair with arms (e.g., wheelchair, bedside commode, etc.):  3  Moving from lying on back to sitting on the side of the bed?: 4  Moving to and from a bed to a chair (including a wheelchair)?: 3  Need to walk in hospital room?: 3  Climbing 3-5 steps with a railing?: 3  Basic Mobility Total Score: 20       Therapeutic Activities and Exercises:   Pt performed seated LAQ, hip flexion and AP x 15 each    Patient left HOB elevated with all lines intact, call button in reach, bed alarm on and nurse notified..    GOALS:   Multidisciplinary Problems     Physical Therapy Goals        Problem: Physical Therapy Goal    Goal Priority Disciplines Outcome Goal Variances Interventions   Physical Therapy Goal     PT, PT/OT Ongoing, Progressing     Description: Goals to be met by: 10/25/20     Patient will increase functional independence with mobility by performin.  Supine to/from sit with supervision.  2.  Bed to/from chair with supervision  3.  Ambulate 75' with QC or RW with CG/sup  4.  Sit in chair 2 hours                     Time Tracking:     PT Received On: 10/05/20  PT Start Time: 1001     PT Stop Time: 1025  PT Total Time (min): 24 min     Billable Minutes: Gait Training 12 and Therapeutic Exercise 12                   Nieves Be, PT  10/05/2020

## 2020-10-06 NOTE — PHYSICIAN QUERY
PT Name: Ching Bruce  MR #: [id number]  Nicole Curry, RN, CCDS; [email or web address]    This form is a permanent document in the medical record.    Query Date: October 6, 2020    Neurological Condition Clarification    By submitting this query, we are merely seeking further clarification of documentation to reflect the severity of illness of your patient. Please utilize your independent clinical judgment when addressing the question(s) below.    The Medical record reflects the following:     Indicators   Supporting Clinical Findings Location in Medical Record   x AMS, Confusion,  LOC, etc.  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 H&P            Sx Onc PN 9/30    Radiology Findings     x Electrolyte Imbalance HypoK;  hyperCA H&P    x Medication Furosemide IV 80 mg once on 10/2; 10/1; 9/30  Furosemide IV 60 mg once on 10/1  Furosemide IV 40 mg once on 10/1; 9/30; 9/29 x2  Albumin 25% 25g IV once on 10/2  Albumin 25% 50g IV once on 10/1  Spironolactone PO 50 mg daily start 9/29  Lactulose 10 gm  PO every 6 hrs 9/30-10/1  Lactulose 200 g Rectal on 9/29    Home meds per H&P:  Aldactone Furosemide   MAR                  Home meds per H&P   x Treatment         1 l O2 now 98%  stable hypercalcemia corrected more alert    NEURO - no pain at this time.   Monitor mental status.   Follow up ammonia level ordered, may need head imaging after electrolyte control and hydration    eICU MD 10/1  NE PN 10/2        H&P   x Other 10/1 ammonia level:  44 Lab     Encephalopathy- is a general term for any diffuse disease of the brain that alters brain function or structure. Treatment of the   cognitive dysfunction varies but  is ultimately dependent on the treatment of the underlying condition.  Major Symptoms of Encephalopathy - Decreased level of consciousness, fluctuating alertness/concentration, confusion,   Agitation,  lethargy, somnolence, drowsiness, obtundation, stupor, or coma.  References: National Institutes of Healths (NIH) National [place] of Neurological Disorders and Strokes;  HCPro 2016;   Advisory Board   The noted clinical guidelines are only system guidelines and do not replace the providers clinical judgment.    Provider, please specify the diagnosis or diagnoses associated with above clinical findings.                      - - uriel all that apply - -     [   ] Metabolic Encephalopathy - Due to electrolyte imbalance, metabolic derangements, or infectious processes, includes Septic Encephalopathy, Uremic Encephalopathy   [ x  ] Hepatic Encephalopathy; acuity not specified  - Due to liver disease/failure   [   ] Chronic Hepatic Encephalopathy    [   ] Encephalopathy, unspecified      [   ] Other Encephalopathy (please specify): ____________________   [   ] Other Neurological Condition- Includes Post-ictal altered mental status (please specify condition): _________   [   ] Encephalopathy ruled out   [   ]  Clinically Undetermined     Please document in your progress notes daily for the duration of treatment until resolved, and include in your discharge summary.

## 2020-10-06 NOTE — PLAN OF CARE
Pts safety maintained, room near nursing station, bed alarm on. No acute episodes over night. No N/V, SOB, distress. Vitals stable through the night.

## 2020-10-06 NOTE — NURSING
Patient received to IR via transport. VS documented. Patient is not currently complaining of any pain.

## 2020-10-06 NOTE — CONSULTS
Interventional Radiology Consult/Pre-Procedure Note      Chief Complaint/Reason for Consult: Ascites    History of Present Illness:  Ching Bruce is a 69 y.o. female with the PMH listed below who presents with a ascites in the setting of NET. IR consulted for paracentesis.    Admission H&P reviewed.    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    Secondary malignant neoplasm of liver     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) 5/7/2013    Thrombocytopenia 11/12/2013    Thyroid disease     Unspecified essential hypertension 11/12/2013     Past Surgical History:   Procedure Laterality Date    ERCP N/A 6/21/2018    Procedure: ERCP, stent exchange;  Surgeon: Jake Lieberman MD;  Location: [facility];  Service: Endoscopy;  Laterality: N/A;    ERCP N/A 5/23/2019    Procedure: ERCP (ENDOSCOPIC RETROGRADE CHOLANGIOPANCREATOGRAPHY), stent exchange;  Surgeon: Jake Lieberman MD;  Location: [facility];  Service: Endoscopy;  Laterality: N/A;    ERCP N/A 11/14/2019    Procedure: ERCP (ENDOSCOPIC RETROGRADE CHOLANGIOPANCREATOGRAPHY), stent exchange;  Surgeon: Jake Lieberman MD;  Location: [facility];  Service: Endoscopy;  Laterality: N/A;    ERCP      ERCP N/A 9/22/2020    Procedure: ERCP (ENDOSCOPIC RETROGRADE CHOLANGIOPANCREATOGRAPHY);  Surgeon: Abdulaziz Owen MD;  Location: [facility];  Service: Endoscopy;  Laterality: N/A;     THYROIDECTOMY  2011       Allergies:   Review of patient's allergies indicates:   Allergen Reactions    Epinephrine Anaphylaxis and Other (See Comments)     Can cause Carcinoid Crisis    Sulfa (sulfonamide antibiotics) Other (See Comments)     Urticaria       Scheduled Meds:    bisacodyL  5 mg Oral BID    cinacalcet  30 mg Oral BID WM    famotidine  20 mg Oral BID    furosemide  40 mg Oral Daily    levothyroxine  200 mcg Oral Before breakfast    lidocaine (PF) 10 mg/ml (1%)  1 mL Other Once    QUEtiapine  25 mg Oral QHS    spironolactone  50 mg Oral Daily    ursodioL  300 mg Oral BID     Continuous Infusions:   PRN Meds:acetaminophen, bisacodyL, influenza, ondansetron, sodium chloride 0.9%    Anticoagulation/Antiplatelet Meds: no anticoagulation    Review of Systems:   As documented in admission H&P.    Physical Exam:  Temp: 97.9 °F (36.6 °C) (10/06/20 1618)  Pulse: 88 (10/06/20 1645)  Resp: 18 (10/06/20 1645)  BP: (!) 140/70 (10/06/20 1645)  SpO2: 99 % (10/06/20 1645)     General: NAD  HEENT: Normocephalic, sclera anicteric, oropharynx clear  Heart: RRR  Lungs: Symmetric excursions, breathing unlabored  Abd: Moderately distended, NT, soft  Extremities: CORBETT  Neuro: Gross nonfocal    Labs:  Recent Labs   Lab 09/30/20  0437   INR 1.3*       Recent Labs   Lab 10/06/20  0434   WBC 5.54   HGB 7.3*   HCT 23.7*   *   *      Recent Labs   Lab 10/06/20  0434   GLU 92      K 3.8      CO2 24   BUN 14   CREATININE 0.9   CALCIUM 9.9   MG 1.7   ALT 11   AST 16   ALBUMIN 2.9*   BILITOT 1.8*       Assessment/Plan:   Recurrent ascites. Will undergo repeat paracentesis today.    Sedation: None    Risks (including, but not limited to, pain, bleeding, infection, damage to nearby structures, failure, and the need for additional procedures), benefits, and alternatives were discussed with the patient. All questions were answered to the best of my abilities. The patient wishes to proceed. Written  informed consent was obtained.      Colt Bonner MD  [facility]  Pager [phone]

## 2020-10-06 NOTE — PROGRESS NOTES
Progress Note    Admit Date: 9/29/2020   LOS: 7 days   SUBJECTIVE:   NAEO  No complaints of pain  Tolerating diet without nausea or vomiting   +BM/flatus, voiding  OOB    Scheduled Meds:   bisacodyL  5 mg Oral BID    cinacalcet  30 mg Oral BID WM    cyanocobalamin  1,000 mcg Intramuscular Daily    famotidine  20 mg Oral BID    levothyroxine  200 mcg Oral Before breakfast    lidocaine (PF) 10 mg/ml (1%)  1 mL Other Once    QUEtiapine  25 mg Oral QHS    spironolactone  50 mg Oral Daily    ursodioL  300 mg Oral BID     Continuous Infusions:  PRN Meds:acetaminophen, bisacodyL, influenza, ondansetron, sodium chloride 0.9%    Review of patient's allergies indicates:   Allergen Reactions    Epinephrine Anaphylaxis and Other (See Comments)     Can cause Carcinoid Crisis    Sulfa (sulfonamide antibiotics) Other (See Comments)     Urticaria         OBJECTIVE:     Vital Signs (Most Recent)  Temp: 98.3 °F (36.8 °C) (10/06/20 0438)  Pulse: 89 (10/06/20 0438)  Resp: 17 (10/06/20 0438)  BP: 132/60 (10/06/20 0438)  SpO2: 95 % (10/06/20 0402)    Vital Signs Range (Last 24H):  Temp:  [97.7 °F (36.5 °C)-98.9 °F (37.2 °C)]   Pulse:  []   Resp:  [17-20]   BP: (129-148)/(60-75)   SpO2:  [95 %-98 %]     I & O (Last 24H):    Intake/Output Summary (Last 24 hours) at 10/6/2020 0655  Last data filed at 10/6/2020 0600  Gross per 24 hour   Intake 840 ml   Output --   Net 840 ml       Physical Exam:  Gen: NAD  Cardio: RR  Pulm: Normal effort on RA   Abdomen: soft, NT, mildly distended, no guarding or rigidity  MSK: Moving all extremities  Neuro: alert    LABS  CBC  Recent Labs   Lab 10/04/20  0605 10/05/20  0550 10/06/20  0434   WBC 5.63 5.96 5.54   RBC 2.17* 2.42* 2.28*   HGB 6.9* 7.8* 7.3*   HCT 22.5* 25.3* 23.7*   * 115* 112*   * 105* 104*   MCH 31.8* 32.2* 32.0*   MCHC 30.7* 30.8* 30.8*     CMP  Recent Labs   Lab 10/04/20  0605 10/05/20  0550 10/06/20  0434   * 82 92   CALCIUM 9.5 9.9 9.9   ALBUMIN 2.8*  3.0* 2.9*   PROT 6.4 6.8 6.6    141 140   K 3.6 4.2 3.8   CO2 25 21* 24    109 109   BUN 18 17 14   CREATININE 1.0 0.9 0.9   ALKPHOS 516* 513* 463*   ALT 11 11 11   AST 17 19 16   BILITOT 2.0* 2.3* 1.8*       Recent Labs   Lab 10/04/20  0605 10/05/20  0550 10/06/20  0434   CALCIUM 9.5 9.9 9.9   MG 2.0 1.8 1.7   PHOS 3.0 2.3* 2.3*         POCT-Glucose  No results found for: POCTGLUCOSE    COAGS  Recent Labs   Lab 09/30/20  0437   INR 1.3*       CE  No results for input(s): TROPONINI, CPK, CPKMB in the last 168 hours.  BNP  No results for input(s): BNP in the last 168 hours.    ABGs  No results for input(s): PH, PCO2, PO2, HCO3, POCSATURATED, BE in the last 24 hours.    UA  Recent Labs   Lab 09/29/20  1704   COLORU Yellow   SPECGRAV 1.020   PHUR 8.0   PROTEINUA Negative   NITRITE Negative   LEUKOCYTESUR Negative   UROBILINOGEN 2.0-3.0*       LAST HbA1c  No results found for: HGBA1C    ASSESSMENT/PLAN:   69 y.o.female with metastatic NET admitted for recurrent hypercalcemia. Calcium has normalized and patient is doing well.   -CT neck concerning for parathyroid adenoma, monitor calcium, continue cincalcet  -Patient's bilirubin stable but remains elevated, normal LFTs, GI on board. CT AP completed with metastatic lesions to the liver as previously noted. MRCP ordered. Will follow up GI recs.   -regular diet  -PT/OT, encourage ambulation, OOB  Dispo: pending MRCP    Navid Guerrero  [employer] General Surgery PGY-2

## 2020-10-06 NOTE — PLAN OF CARE
Patient A&Ox4.  Room air.  NSR on telemetry.  Regular diet. NPO at midnight.  Up to the bathroom x1 assistance. Incont. At times.  Skin intact.  MRI of abdomen done. Ascites shown.  Paracentesis planned for tomorrow 10/7.  All questions answered.  Will continue to monitor.    Lesa Whitmore RN      Problem: Adult Inpatient Plan of Care  Goal: Plan of Care Review  Outcome: Ongoing, Progressing  Flowsheets (Taken 10/6/2020 1425)  Plan of Care Reviewed With:   patient   daughter     Problem: Fall Injury Risk  Goal: Absence of Fall and Fall-Related Injury  Outcome: Ongoing, Progressing  Intervention: Identify and Manage Contributors to Fall Injury Risk  Flowsheets (Taken 10/6/2020 1425)  Self-Care Promotion: independence encouraged  Medication Review/Management: medications reviewed     Problem: Fluid Volume Excess  Goal: Fluid Balance  Outcome: Ongoing, Progressing  Intervention: Monitor and Manage Hypervolemia  Flowsheets (Taken 10/6/2020 1425)  Skin Protection: adhesive use limited  Fluid/Electrolyte Management: electrolyte supplement initiated     Problem: Electrolyte Imbalance  Goal: Electrolyte Balance  Outcome: Ongoing, Progressing  Intervention: Monitor and Manage Electrolyte Imbalance  Flowsheets (Taken 10/6/2020 1425)  Fluid/Electrolyte Management: electrolyte supplement initiated

## 2020-10-06 NOTE — PROCEDURES
Interventional Radiology Post-Procedure Note    Pre Op Diagnosis: NET  Post Op Diagnosis: Same    Procedure: Paracentesis under US guidance    Procedure performed by: Ha    Written Informed Consent Obtained: Yes  Specimen Sent: No  Estimated Blood Loss: Minimal    Findings:   Moderate ascites. 2500 mL cloudy yellow fluid removed from the LLQ.    No immediate complications. Patient tolerated procedure well. Please see full dictated procedure report for additional details and recommendations.      Colt Bonner MD  [facility]  Pager [phone]

## 2020-10-06 NOTE — PROGRESS NOTES
NET Team Rounds     Admit Date: 9/29/2020                                         Length of Stay Days: 7    Reason for admission:   Hypercalcemia [E83.52], Hypokalemia [E87.6], Confusion [R41.0], Constipation [K59.00]     9/22- Outpt ERCP- stents removed but not replaced    9/29/20- admitted for Recurrent hypercalcemia, hypokalemia, confusion   10/1- paracentesis for ascities  10/2- t bili increased to 2.6 from 2.1  10/2--CT neck- concerning for parathyroid adenoma  10/4- CT abd/pelvis- rec by GI to assess tumor burden in liver- multiple liver lesions TNTC, Ascites   10/6- MRCP-Common bile duct stent (NO STENT confirmed with radiology review)  with pneumobilia, -Large volume ascites.  Small bilateral pleural effusions with bibasilar atelectasis    Surgery/Procedure:       NET Physician:  Cory Rodriguez DO, FACP   Local Treating Physician:Gaby Corea MD    Assessment  Diet: Regular  Oral Supplement: Boost Plus with meals   Nutrition Support - none   Appetite - good  Nausea - No  Vomiting- No  BM- brown soft  Abdomen- soft, distended and + flatus  Incision- none  Drain- none    Urine-  voiding without difficulty  Pain-none    IV Access- Peripheral   Edema: 1+     Vital Signs (Most Recent):  Temp: 97.1 °F (36.2 °C) (10/06/20 0728)  Pulse: 85 (10/06/20 0815)  Resp: 18 (10/06/20 0728)  BP: 129/61 (10/06/20 0728)  SpO2: 97 % (10/06/20 0833) Vital Signs Range (Last 24H):  Temp:  [97.1 °F (36.2 °C)-98.9 °F (37.2 °C)]   Pulse:  [82-90]   Resp:  [17-18]   BP: (129-140)/(60-65)   SpO2:  [95 %-98 %]            Labs:   Recent Labs   Lab 10/04/20  0605 10/05/20  0550 10/06/20  0434   WBC 5.63 5.96 5.54   HGB 6.9* 7.8* 7.3*   HCT 22.5* 25.3* 23.7*   * 115* 112*   * 105* 104*   RDW 18.0* 17.7* 17.7*    141 140   K 3.6 4.2 3.8    109 109   CO2 25 21* 24   BUN 18 17 14   CREATININE 1.0 0.9 0.9   * 82 92   PROT 6.4 6.8 6.6   ALBUMIN 2.8* 3.0* 2.9*   BILITOT 2.0* 2.3* 1.8*   AST 17 19 16    ALKPHOS 516* 513* 463*   ALT 11 11 11      Recent Labs   Lab 10/02/20  0409 10/06/20  0434   PREALBUMIN 9* 8*   CRP 52.9* 39.8*     Recent Labs   Lab 10/04/20  0605 10/05/20  0550 10/06/20  0434   CALCIUM 9.5 9.9 9.9   MG 2.0 1.8 1.7   PHOS 3.0 2.3* 2.3*       Urinalysis  Recent Labs   Lab 09/29/20  1704   COLORU Yellow   SPECGRAV 1.020   PHUR 8.0   PROTEINUA Negative   NITRITE Negative   LEUKOCYTESUR Negative   UROBILINOGEN 2.0-3.0*       Cultures: none to date this admission    Scheduled Meds   bisacodyL  5 mg Oral BID    cinacalcet  30 mg Oral BID WM    famotidine  20 mg Oral BID    levothyroxine  200 mcg Oral Before breakfast    lidocaine (PF) 10 mg/ml (1%)  1 mL Other Once    QUEtiapine  25 mg Oral QHS    sodium phosphate IVPB  39.99 mmol Intravenous Once    spironolactone  50 mg Oral Daily    ursodioL  300 mg Oral BID       Continuous Infusion - none    PRN Meds  acetaminophen, bisacodyL, influenza, ondansetron, sodium chloride 0.9%     Assessment/Plan:  --Encourage cough/deep breath/IS  -monitor calcium, continue cincalcet  -paracentesis tomorrow  -GI to evaluate the need for another stent, had MRCP today  -poss d/c tomorrow     Discharge Planning -                                 Anticipated Date of D/C:  10/7/20       Appointments- Cory Rodriguez DO, FACP  TBD:    D/C Needs: Home    Nutrition: Regular diet as Tolerated; Boost Plus with meals; Avoid high calcium food.     Home support system- lives with daughters  Barriers to Care- none    Reviewed follow up plan.  Patient verbalized understanding.  ___________________________________________  TIMMY KhanN, RN, ONN-CG  Nurse Navigator [facility]    Tracie Weil Cavalier, GILLIANN, LDN, CNSC  Registered Dietitian [facility]      Face to Face Time: 15 minutes

## 2020-10-06 NOTE — PT/OT/SLP PROGRESS
"Physical Therapy      Patient Name:  Ching Bruce   MRN:  [id number]    Patient not seen today secondary to pt refusing ambulation. Encouragement given, but pt still stating "I just don't feel like it today".  Will follow-up later if time allows.    Lia Mccoy, SURAJ    "

## 2020-10-06 NOTE — NURSING
Procedure complete. Patient tolerated well. No complications. 2500 ml of cloudy yellow fluid removed from LLQ. Band aid applied to puncture site. No bleeding or hematoma noted. VSS. Patient ready for transport back to room. Report called to JORGE Mcfadden.

## 2020-10-06 NOTE — PLAN OF CARE
Problem: Occupational Therapy Goal  Goal: Occupational Therapy Goal  Description: Goals to be met by: 10/30/2020      Patient will increase functional independence with ADLs by performing:    UE Dressing with Modified [place].  LE Dressing with Supervision.  Grooming while standing with Supervision.  Toileting from toilet or BSC with Stand-by Assistance for hygiene and clothing management.   Toilet transfer to toilet or BSC with Supervision.  Increased functional strength to WFL for self care.  Upper extremity exercise program x10 reps per handout, with supervision.     Outcome: Ongoing, Progressing  Ching Bruce is a 69 y.o. female with a medical diagnosis of Hypercalcemia.   Performance deficits affecting function are weakness, impaired endurance, impaired self care skills, impaired functional mobilty, gait instability, impaired balance, decreased upper extremity function, decreased lower extremity function, impaired skin, edema. Pt found sitting EOB, finishing up shower with dtrs supervision.  Pt tolerated tx well.  Progressing daily. Continue OT services to address functional goals, progressing as able.    JOSE A Worthington/L

## 2020-10-07 PROBLEM — E87.6 HYPOKALEMIA: Status: RESOLVED | Noted: 2020-01-01 | Resolved: 2020-01-01

## 2020-10-07 PROBLEM — R41.0 CONFUSION: Status: RESOLVED | Noted: 2020-01-01 | Resolved: 2020-01-01

## 2020-10-07 NOTE — NURSING
Pt being discharged home in stable condition. AVS packet given to pt. VN Sangeeta to go over discharged instructions with pt and pt's daughter.

## 2020-10-07 NOTE — PLAN OF CARE
AVS and educational attachments shared with patient and daughter via [employer]. Discussed thoroughly. Patient and daughter verbalized clear understanding using teach back method. Notified bedside nurse of completion of education. At present no distress noted. Patient to be discharged via w/c with escort service and family with all of patient's belonings. Will cont to be available to patient and family and intervene prn.

## 2020-10-07 NOTE — PLAN OF CARE
RW delivered to bedside. [facility] Follow up appointments made.

## 2020-10-07 NOTE — PLAN OF CARE
[facility]    HOME HEALTH ORDERS  FACE TO FACE ENCOUNTER    Patient Name: Ching Bruce  YOB: 1950    PCP: Gaby Corea MD   PCP Address: [address] / [place] [address]  PCP Phone Number: [phone]  PCP Fax: [phone]    Encounter Date: 10/07/2020    Admit to Home Health    Diagnoses:  Active Hospital Problems    Diagnosis  POA    *Hypercalcemia [E83.52]  Yes    Hyperparathyroidism, unspecified [E21.3]  Yes      Resolved Hospital Problems    Diagnosis Date Resolved POA    Altered mental status [R41.82] 10/07/2020 Yes    Hypokalemia [E87.6] 10/07/2020 Yes    Confusion [R41.0] 10/07/2020 Yes       Future Appointments   Date Time Provider Department Center   10/14/2020 10:20 AM APPOINTMENT LABANNA [facility] LAB [facility]   10/14/2020 11:40 AM Cory Rodriguez DO, FACP [facility] TUMOR [facility]   10/27/2020  2:20 PM Cory Rodriguez DO, FACP [facility] TUMOR [facility]   10/28/2020  7:00 AM [facility] NM4 [address] [facility] NUCLEAR [facility]   10/28/2020  7:00 AM CHAIR 10 [facility] CHEMO [facility]   11/25/2020  8:30 AM APPOINTMENT LABANNA [facility] LAB [facility]   12/22/2020 10:30 AM ELISE Mcknight MD [facility] TUMOR [facility]   12/23/2020  7:00 AM [facility] NM4 [address] [facility] NUCLEAR [facility]   12/23/2020  7:00 AM CHAIR 10 [facility] CHEMO [facility]   1/20/2021  8:30 AM APPOINTMENT LABANNA [facility] LAB [facility]   2/17/2021  7:00 AM [facility] NM4 [address] [facility] NUCLEAR [facility]   2/18/2021  8:00 AM CHAIR 10 [facility] CHEMO [facility]   3/17/2021  8:30 AM APPOINTMENT LABANNA [facility] LAB [facility]           I have seen and examined this patient face to face today. My clinical findings that support the need for the home health skilled services and home bound status are the following:  Requiring assistive device to leave home due to unsteady gait caused by  Weakness/Debility.    Allergies:  Review of patient's allergies indicates:   Allergen Reactions     Epinephrine Anaphylaxis and Other (See Comments)     Can cause Carcinoid Crisis    Sulfa (sulfonamide antibiotics) Other (See Comments)     Urticaria       Diet: regular diet    Activities: activity as tolerated    Nursing:   SN to complete comprehensive assessment including routine vital signs. Instruct on disease process and s/s of complications to report to MD. Review/verify medication list sent home with the patient at time of discharge  and instruct patient/caregiver as needed. Frequency may be adjusted depending on start of care date.    Notify MD if SBP > 160 or < 90; DBP > 90 or < 50; HR > 120 or < 50; Temp > 101;      CONSULTS:    Physical and Occupational Therapy    MISCELLANEOUS CARE:  N/A    WOUND CARE ORDERS  n/a      Medications: Review discharge medications with patient and family and provide education.      Current Discharge Medication List      START taking these medications    Details   cinacalcet (SENSIPAR) 30 MG Tab Take 1 tablet (30 mg total) by mouth 2 (two) times daily with meals.  Qty: 60 tablet, Refills: 11      ursodioL (ACTIGALL) 300 mg capsule Take 1 capsule (300 mg total) by mouth 2 (two) times daily.  Qty: 60 capsule, Refills: 11         CONTINUE these medications which have CHANGED    Details   furosemide (LASIX) 40 MG tablet Take 1 tablet (40 mg total) by mouth once daily.  Qty: 30 tablet, Refills: 11      spironolactone (ALDACTONE) 50 MG tablet Take 1 tablet (50 mg total) by mouth once daily.  Qty: 30 tablet, Refills: 11    Comments: .         CONTINUE these medications which have NOT CHANGED    Details   levothyroxine (SYNTHROID) 200 MCG tablet Take 1 tablet (200 mcg total) by mouth before breakfast.  Qty: 30 tablet, Refills: 0      alendronate (FOSAMAX) 70 MG tablet Take 1 tablet (70 mg total) by mouth every 7 days.  Qty: 4 tablet, Refills: 0      capecitabine (XELODA) 500 MG Tab Take 2 tablets (1,000 mg total) by mouth 2 (two) times daily.  Qty: 56 tablet, Refills: 6     Associated Diagnoses: Primary malignant neuroendocrine tumor of pancreas; Secondary malignant neoplasm of liver      ferrous sulfate (FEOSOL) 325 mg (65 mg iron) Tab tablet Take 325 mg by mouth once daily.      iron-vit c-vit b12-folic acid (IRON-C PLUS) tablet Take 1 tablet by mouth once daily.  Qty: 30 tablet, Refills: 0             I certify that this patient is confined to her home and needs skilled nursing, physical and occupational therapy.

## 2020-10-07 NOTE — PROGRESS NOTES
NET Team Rounds     Admit Date: 9/29/2020                                         Length of Stay Days: 7    Reason for admission:   Hypercalcemia [E83.52], Hypokalemia [E87.6], Confusion [R41.0], Constipation [K59.00]     9/22- Outpt ERCP- stents removed but not replaced    9/29/20- admitted for Recurrent hypercalcemia, hypokalemia, confusion   10/1- paracentesis for ascities  10/2- t bili increased to 2.6 from 2.1  10/2--CT neck- concerning for parathyroid adenoma  10/4- CT abd/pelvis- rec by GI to assess tumor burden in liver- multiple liver lesions TNTC, Ascites   10/6- MRCP-Common bile duct stent (NO STENT confirmed with radiology review)  with pneumobilia, -Large volume ascites.  Small bilateral pleural effusions with bibasilar atelectasis  10/6- paracentesis    Surgery/Procedure:       NET Physician:  Cory Rodriguez DO, FACP                             Local Treating Physician:Gaby Corea MD    Assessment  Diet: Regular  Oral Supplement: Boost Plus with meals   Nutrition Support - none   Appetite - good  Nausea - No  Vomiting- No  BM- brown soft  Abdomen- soft, distended and + flatus  Incision- none  Drain- none    Urine-  voiding without difficulty  Pain-none    IV Access- Peripheral   Edema: 1+     Vital Signs (Most Recent):  Temp: 98.4 °F (36.9 °C) (10/07/20 0809)  Pulse: 87 (10/07/20 0809)  Resp: 20 (10/07/20 0809)  BP: 135/62 (10/07/20 0809)  SpO2: 97 % (10/07/20 0809) Vital Signs Range (Last 24H):  Temp:  [97.7 °F (36.5 °C)-98.6 °F (37 °C)]   Pulse:  [75-92]   Resp:  [17-20]   BP: (122-152)/(60-71)   SpO2:  [96 %-99 %]            Labs:   Recent Labs   Lab 10/05/20  0550 10/06/20  0434 10/07/20  0812   WBC 5.96 5.54 5.89   HGB 7.8* 7.3* 8.0*   HCT 25.3* 23.7* 25.8*   * 112* 138*   * 104* 103*   RDW 17.7* 17.7* 17.4*    140 142   K 4.2 3.8 3.7    109 109   CO2 21* 24 23   BUN 17 14 12   CREATININE 0.9 0.9 0.9   GLU 82 92 97   PROT 6.8 6.6 7.1   ALBUMIN 3.0* 2.9* 3.0*    BILITOT 2.3* 1.8* 1.7*   AST 19 16 15   ALKPHOS 513* 463* 465*   ALT 11 11 12      Recent Labs   Lab 10/02/20  0409 10/06/20  0434   PREALBUMIN 9* 8*   CRP 52.9* 39.8*     Recent Labs   Lab 10/05/20  0550 10/06/20  0434 10/07/20  0812   CALCIUM 9.9 9.9 9.9   MG 1.8 1.7 1.9   PHOS 2.3* 2.3* 2.9     Cultures: none to date this admission    Scheduled Meds   bisacodyL  5 mg Oral BID    cinacalcet  30 mg Oral BID WM    famotidine  20 mg Oral BID    furosemide  40 mg Oral Daily    levothyroxine  200 mcg Oral Before breakfast    lidocaine (PF) 10 mg/ml (1%)  1 mL Other Once    QUEtiapine  25 mg Oral QHS    spironolactone  50 mg Oral Daily    ursodioL  300 mg Oral BID       Continuous Infusion - none    PRN Meds  acetaminophen, bisacodyL, influenza, ondansetron, sodium chloride 0.9%     Assessment/Plan:  --Encourage cough/deep breath/IS  -monitor calcium, continue cincalcet, Ca -9.9 today, T bili 1.7  -paracentesis yesterday  -d/c today  -GI reviewed scans and determined no need for stent at this time     Discharge Planning -                                 Anticipated Date of D/C:  10/7/20       Appointments- Cory Rodriguez DO, FACP  10/14/20:    D/C Needs: Home    Nutrition: Regular diet as Tolerated; Boost Plus with meals; Avoid high calcium food.     Home support system- lives with daughters    Barriers to Care- none.  ___________________________________________  TIMMY KhanN, RN, ONN-CG  Nurse Navigator [facility]    Tracie Weil Cavalier, RDN, LDN, CNSC  Registered Dietitian [facility]      Face to Face Time: 15 minutes

## 2020-10-07 NOTE — PLAN OF CARE
VN note: VN completed AVS and attachments and notified bedside nurse, Boris. Will cont to be available and intervene prn.

## 2020-10-07 NOTE — PLAN OF CARE
Problem: Physical Therapy Goal  Goal: Physical Therapy Goal  Description: Goals to be met by: 10/25/20     Patient will increase functional independence with mobility by performin.  Supine to/from sit with supervision.  2.  Bed to/from chair with supervision  3.  Ambulate 75' with QC or RW with CG/sup  4.  Sit in chair 2 hours    Outcome: Ongoing, Progressing   Pt continues to work towards established goals. Able to perform ambulation training ~80 ft with RW requiring CGA/SBA.

## 2020-10-07 NOTE — PLAN OF CARE
Pts safety maintained, room near nursing station, bed alarm on. No acute episodes over night. Abdomen soft and non tender after paracenteses. No N/V, SOB, distress. Vitals stable through the night

## 2020-10-07 NOTE — PT/OT/SLP PROGRESS
"Physical Therapy Treatment    Patient Name:  Ching Bruce   MRN:  [id number]    Recommendations:     Discharge Recommendations:  home health PT, home health OT(with 24/7 assist)   Discharge Equipment Recommendations: bedside commode, bath bench, walker, rolling, wheelchair   Barriers to discharge: Inaccessible home    Assessment:     Ching Bruce is a 69 y.o. female admitted with a medical diagnosis of Hypercalcemia.  She presents with the following impairments/functional limitations:  weakness, impaired endurance, impaired self care skills, impaired functional mobilty, gait instability, impaired balance, decreased coordination, decreased upper extremity function, decreased lower extremity function, decreased safety awareness, pain, decreased ROM, impaired coordination, impaired skin, edema. Pt very pleasant and cooperative. Able to perform ambulation training ~80 ft with RW requiring CGA/SBA. Slow nhi throughout. Would benefit from continued PT services to increase pt's out of bed therapeutic activity and exercise addressing all impairments listed above.    Rehab Prognosis: Good; patient would benefit from acute skilled PT services to address these deficits and reach maximum level of function.    Recent Surgery: * No surgery found *      Plan:     During this hospitalization, patient to be seen 5 x/week to address the identified rehab impairments via gait training, therapeutic activities, therapeutic exercises and progress toward the following goals:    · Plan of Care Expires:  11/24/20    Subjective     Chief Complaint: None Expressed  Patient/Family Comments/goals: "I hope I can go home today".  Pain/Comfort:  · Pain Rating 1: (Did not complain of pain)  · Pain Rating Post-Intervention 1: 0/10      Objective:     Communicated with  nurse prior to session.  Patient found supine with bed alarm, peripheral IV, telemetry upon PT entry to room.     General Precautions: Standard, fall   Orthopedic Precautions:N/A "   Braces: N/A     Functional Mobility:  · Bed Mobility:     · Rolling Right: modified independence and supervision  · Scooting: modified independence and supervision  · Supine to Sit: supervision  · Sit to Supine: supervision and stand by assistance  · Transfers:     · Sit to Stand:  supervision and stand by assistance with rolling walker  · Gait:  ~80 ft with RW requiring CGA/SBA      AM-PAC 6 CLICK MOBILITY  Turning over in bed (including adjusting bedclothes, sheets and blankets)?: 4  Sitting down on and standing up from a chair with arms (e.g., wheelchair, bedside commode, etc.): 3  Moving from lying on back to sitting on the side of the bed?: 4  Moving to and from a bed to a chair (including a wheelchair)?: 3  Need to walk in hospital room?: 3  Climbing 3-5 steps with a railing?: 3  Basic Mobility Total Score: 20       Therapeutic Activities and Exercises:   Pt very pleasant and cooperative with all requests. Pt able to perform ambulation training ~80 ft with RW requiring CGA/SBA. Slow nhi noted with a decreased step length requiring verbal cueing to decrease slight trunk flexion. Seated therapeutic exercises 1 x 10 reps BLE's consisting of hip add/abd, LAQ's, and hip/knee flexion (marchrocky). Also, performed 1 x 5 reps of sit to stand tranfers for therapeutic exercise.    Patient left supine with all lines intact, call button in reach, bed alarm on,  nurse notified and  nursing students present..    GOALS:   Multidisciplinary Problems     Physical Therapy Goals        Problem: Physical Therapy Goal    Goal Priority Disciplines Outcome Goal Variances Interventions   Physical Therapy Goal     PT, PT/OT Ongoing, Progressing     Description: Goals to be met by: 10/25/20     Patient will increase functional independence with mobility by performin.  Supine to/from sit with supervision.  2.  Bed to/from chair with supervision  3.  Ambulate 75' with QC or RW with CG/sup  4.  Sit in chair 2 hours                      Time Tracking:     PT Received On: 10/07/20  PT Start Time: 1046     PT Stop Time: 1110  PT Total Time (min): 24 min     Billable Minutes: Gait Training  12 and Therapeutic Exercise  12    Treatment Type: Treatment  PT/PTA: PTA     PTA Visit Number: 1     Brandee Preciado, PTA  10/07/2020

## 2020-10-07 NOTE — PROGRESS NOTES
Progress Note    Admit Date: 9/29/2020   LOS: 8 days   SUBJECTIVE:   NAEO  No complaints of pain  Tolerating diet without nausea or vomiting   +BM/flatus, voiding  OOB    Scheduled Meds:   bisacodyL  5 mg Oral BID    cinacalcet  30 mg Oral BID WM    famotidine  20 mg Oral BID    furosemide  40 mg Oral Daily    levothyroxine  200 mcg Oral Before breakfast    lidocaine (PF) 10 mg/ml (1%)  1 mL Other Once    QUEtiapine  25 mg Oral QHS    spironolactone  50 mg Oral Daily    ursodioL  300 mg Oral BID     Continuous Infusions:  PRN Meds:acetaminophen, bisacodyL, ondansetron, sodium chloride 0.9%    Review of patient's allergies indicates:   Allergen Reactions    Epinephrine Anaphylaxis and Other (See Comments)     Can cause Carcinoid Crisis    Sulfa (sulfonamide antibiotics) Other (See Comments)     Urticaria         OBJECTIVE:     Vital Signs (Most Recent)  Temp: 98.1 °F (36.7 °C) (10/07/20 1249)  Pulse: 101 (10/07/20 1550)  Resp: 20 (10/07/20 1249)  BP: 132/65 (10/07/20 1249)  SpO2: 100 % (10/07/20 1629)    Vital Signs Range (Last 24H):  Temp:  [98.1 °F (36.7 °C)-98.6 °F (37 °C)]   Pulse:  []   Resp:  [17-20]   BP: (122-140)/(60-70)   SpO2:  [96 %-100 %]     I & O (Last 24H):No intake or output data in the 24 hours ending 10/07/20 1643    Physical Exam:  Gen: NAD  Cardio: RR  Pulm: Normal effort on RA   Abdomen: soft, NT, distended, no guarding or rigidity  MSK: Moving all extremities  Neuro: alert    LABS  CBC  Recent Labs   Lab 10/05/20  0550 10/06/20  0434 10/07/20  0812   WBC 5.96 5.54 5.89   RBC 2.42* 2.28* 2.51*   HGB 7.8* 7.3* 8.0*   HCT 25.3* 23.7* 25.8*   * 112* 138*   * 104* 103*   MCH 32.2* 32.0* 31.9*   MCHC 30.8* 30.8* 31.0*     CMP  Recent Labs   Lab 10/05/20  0550 10/06/20  0434 10/07/20  0812   GLU 82 92 97   CALCIUM 9.9 9.9 9.9   ALBUMIN 3.0* 2.9* 3.0*   PROT 6.8 6.6 7.1    140 142   K 4.2 3.8 3.7   CO2 21* 24 23    109 109   BUN 17 14 12   CREATININE 0.9 0.9  0.9   ALKPHOS 513* 463* 465*   ALT 11 11 12   AST 19 16 15   BILITOT 2.3* 1.8* 1.7*       Recent Labs   Lab 10/05/20  0550 10/06/20  0434 10/07/20  0812   CALCIUM 9.9 9.9 9.9   MG 1.8 1.7 1.9   PHOS 2.3* 2.3* 2.9         POCT-Glucose  No results found for: POCTGLUCOSE    COAGS  No results for input(s): PT, INR, APTT in the last 168 hours.    CE  No results for input(s): TROPONINI, CPK, CPKMB in the last 168 hours.  BNP  No results for input(s): BNP in the last 168 hours.    ABGs  No results for input(s): PH, PCO2, PO2, HCO3, POCSATURATED, BE in the last 24 hours.    UA  No results for input(s): COLORU, CLARITYU, SPECGRAV, PHUR, PROTEINUA, GLUCOSEU, BILIRUBINCON, BLOODU, WBCU, RBCU, BACTERIA, MUCUS, NITRITE, LEUKOCYTESUR, UROBILINOGEN, HYALINECASTS in the last 168 hours.    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 Calcium has normalized and patient is doing well. She is s/p paracentesis 10/6.   -The patient's CT neck is concerning for parathyroid adenoma, will need to monitor calcium. Continue cincalcet  -Bilirubin stable, normal LFTs. MRCP with no dilation or obstruction, GI says no need to replace stent at this time.   -regular diet  -ordered DME as per therapy recommendations.   -PT/OT, encourage ambulation, OOB  Dispo: DC today     Navid Guerrero  [employer] General Surgery PGY-2

## 2020-10-07 NOTE — DISCHARGE INSTRUCTIONS
Hypokalemia, Discharge Instructions (English) View Edit Remove  Diet: High Potassium, Discharge Instructions (English) View Edit Remove  Hypercalcemia, Discharge Instructions (English) View Edit Remove  Paracentesis, Discharge Instructions for (English) View Edit Remove  Cinacalcet tablets (English) View Edit Remove  Ursodeoxycholic Acid, Ursodiol capsules or tablets (English) View Edit Remove

## 2020-10-07 NOTE — DISCHARGE SUMMARY
[facility] Neuroendocrine Surgery/General Surgery  Discharge Summary    Admit Date:   9/29/2020    Discharge Date:   10/7/2020    Attending Physician:   No att. providers found    Consults:   IR, GI    Procedures Performed:   Paracentesis    Admission HPI:   70 yo F patient w hx of metastatic pancreatic neuroendocrine tumor (diagnosed in 2012) s/p ERCP, sphincterotomy and biliary stent placement (removed 9/22) with extensive hepatic tumor burden. She was brought to the ED today because her labs showed hypercalcemia (15.5) and hypokalemia (2.8) per Dr. Rodriguez. The patient was also experiencing intermittent episodes of confusion (on exam she was oriented to self but not to time or place) and poor appetite.      Hospital Course:   She was admitted for observation and resuscitation. She was started on calcitonin and lasix. Over the course of the next few days her calcium slowly improved. She was found to have elevated PTH and a sestamibi scan performed was equivocal but a follow up CT neck with some initial improvement in her calcium level. She had a sestamibi scan was concerning for parathyroid adenoma. She was started on cincalcet which resolved her hypercalcemia. Her confusion resolved and she is now tolerating diet without nausea or vomiting. Her total bilirubin was elevated to 2 and an MRCP was performed which noted no dilation or obstruction of the duct. GI was consulted, they do not recommend placement of a biliary stent and her bilirubin has improved. Her abdomen had become more distended throughout the stay now s/p paracentesis. The patient feels comfortable going home and she will be discharged home on cincalcet with close follow up with Dr. Rodriguez.     Final Diagnoses:  Active Hospital Problems    Diagnosis  POA    *Hypercalcemia [E83.52]  Yes    Hyperparathyroidism, unspecified [E21.3]  Yes      Resolved Hospital Problems    Diagnosis Date Resolved POA    Altered mental status [R41.82] 10/07/2020 Yes     "Hypokalemia [E87.6] 10/07/2020 Yes    Confusion [R41.0] 10/07/2020 Yes       Discharged Condition:   stable    Disposition:    Home or Self Care    Discharge Instructions:   Discharge Procedure Orders   WALKER FOR HOME USE     Order Specific Question Answer Comments   Type of Walker: Rollator with brakes and/or seat    With wheels? Yes    Height: 5' 7" (1.702 m)    Weight: 60.1 kg (132 lb 7.9 oz)    Length of need (1-99 months): 99    Does patient have medical equipment at home? cane, straight    Please check all that apply: Patient needs help to get in and out of chair.      COMMODE FOR HOME USE     Order Specific Question Answer Comments   Type: Standard    Height: 5' 7" (1.702 m)    Weight: 60.1 kg (132 lb 7.9 oz)    Does patient have medical equipment at home? cane, straight    Length of need (1-99 months): 99      BATH/SHOWER CHAIR FOR HOME USE     Order Specific Question Answer Comments   Height: 5' 7" (1.702 m)    Weight: 60.1 kg (132 lb 7.9 oz)    Does patient have medical equipment at home? cane, straight    Length of need (1-99 months): 99    Type: With back      Diet Adult Regular     Notify your health care provider if you experience any of the following:  temperature >100.4     Notify your health care provider if you experience any of the following:  persistent nausea and vomiting or diarrhea     Notify your health care provider if you experience any of the following:  severe uncontrolled pain     Activity as tolerated     Follow-up Information     Cory BRIANNA Rodriguez DO, FACP On 10/14/2020.    Specialties: Hematology and Oncology, Radiation Oncology  Why: For your follow up appointment at 11:40 am.  Contact information:  [address]  [place] [address]  [phone]             [facility] On 10/8/2020.    Specialty: Home Health Services  Why: home health  Contact information:  [address]  [place] [address] [phone]                 Discharge Medication List as of " 10/7/2020  4:26 PM      START taking these medications    Details   cinacalcet (SENSIPAR) 30 MG Tab Take 1 tablet (30 mg total) by mouth 2 (two) times daily with meals., Starting Wed 10/7/2020, Until Thu 10/7/2021, Print      ursodioL (ACTIGALL) 300 mg capsule Take 1 capsule (300 mg total) by mouth 2 (two) times daily., Starting Wed 10/7/2020, Until Thu 10/7/2021, Print         CONTINUE these medications which have CHANGED    Details   furosemide (LASIX) 40 MG tablet Take 1 tablet (40 mg total) by mouth once daily., Starting Thu 10/8/2020, Until Fri 10/8/2021, Print      spironolactone (ALDACTONE) 50 MG tablet Take 1 tablet (50 mg total) by mouth once daily., Starting Thu 10/8/2020, Until Fri 10/8/2021, Print         CONTINUE these medications which have NOT CHANGED    Details   alendronate (FOSAMAX) 70 MG tablet Take 1 tablet (70 mg total) by mouth every 7 days., Starting Wed 9/9/2020, Normal      capecitabine (XELODA) 500 MG Tab Take 2 tablets (1,000 mg total) by mouth 2 (two) times daily., Starting Thu 10/25/2018, Normal      ferrous sulfate (FEOSOL) 325 mg (65 mg iron) Tab tablet Take 325 mg by mouth once daily., Historical Med      iron-vit c-vit b12-folic acid (IRON-C PLUS) tablet Take 1 tablet by mouth once daily., Starting Wed 9/9/2020, Normal      levothyroxine (SYNTHROID) 200 MCG tablet Take 1 tablet (200 mcg total) by mouth before breakfast., Starting Wed 9/9/2020, Until Thu 9/9/2021, Normal               Navid Guerrero MD  PGY-2, [facility] General Surgery  Neuroendocrine Surgery

## 2020-10-08 NOTE — TELEPHONE ENCOUNTER
I Informed patient I need a copy of her Social Security Award Letter and EOB or print out of medication she purchase from 1/1/20-10/6/20 in order to submit an application with Pharmacy Patient Assistance.

## 2020-10-08 NOTE — PROGRESS NOTES
Received consult late yesterday afternoon from Dr. Rodriguez's nurse Miriam re: patient needing assistance obtaining Sensipar medication. Spoke with Naheed initially and later with Shannon in [facility] at [facility] (as the [place] location was already closed and patient discharged from [facility]). Called patient at [phone] to discuss and also spoke with her daughter Isabelle Bruce ([phone]) who was at home with her on speaker phone. They agreed with the prescription being transferred back to [facility] and daughters will be able to  the medication on Thurs. 10/8. They said they couldn't make it before 7 PM when the pharmacy closed last night. Patient received the second dose due for the day before she left the hospital. The copay total is $326.87; patient stated the ability to afford $100.00 towards it and I agreed to assist with $226.87 from the [facility] Patient Assistance Fund. Cost transfer form faxed to Shannon's attention at [phone] last evening. Spoke with Shannon this morning to confirm receipt of the fax and that the prescription is ready. Per patient and daughter's approval, referring patient to the Pharmacy Patient Assistance team; message sent via Epic this morning; requesting their help in searching for any possible copay assistance or  assistance programs that patient can apply for as she will need this medication daily long-term. Will continue to follow and assist as needs are identified.

## 2020-10-13 NOTE — PLAN OF CARE
Pt arrived to unit in wheelchair, accompanied by family. Pt informed she will receive Zometa and IVF since her Ca is high. Verbalized understanding. Tolerated Zometa and NS. Pt ate some lunch. Pt has next appt to f/u with Dr. Rodriguez. Pt discharged in wheelchair, accompanied by family. No distress noted.

## 2020-10-14 NOTE — PROGRESS NOTES
NOLANETS:  [facility]  A collaboration between [facility] and [facility]    PATIENT: Ching Bruce  MRN: [id number]  DATE: 10/14/2020      Diagnosis:   1. Primary pancreatic neuroendocrine tumor    2. Secondary neuroendocrine tumor of liver    3. Hyperparathyroidism, unspecified    4. Increased bilirubin level    5. Malnutrition compromising bodily function        Chief Complaint: Follow-up      Oncologic History:    Oncologic History Pancreatic neuroendocrine tumor with metasatic disease to liver diagnosed 10/12  Progressive disease in liver 7/2020    Oncologic Treatment Capecitabine/Temozolomide (CAPTEM) 1/13 -11/2018     Pathology 2012:  Pancreatic/liver aspirate- pancreatic endocrine neoplasm,  Ki-67 1%  7/2020: Liver biopsy - well differentiated neuroendocrine tumor, grade 2, Ki-67 15%, Mitotic rate <2/2mm2          Subjective:    Interval History: Ms. Bruce is a 69 y.o. female who returns for follow up for her pancreatic neuroendocrine tumor.  Since I had seen her last she was hospitalized and underwent an ERCP with a stent removal.  A subsequent stent was not felt to be indicated at that time.  She also underwent paracentesis and was treated for hypercalcemia.  Yesterday's labs show persistent hypercalcemia and she received IV fluids and zoledronic acid.  She states that her main complaints are related to a decreased appetite and weight loss.  She has tried to stay active and is currently undergoing physical therapy and occupational therapy.  She does have some abdominal distension but denies pain.  She denies any shortness of breath.  She has no other new complaints.        ONCOLOGIC HISTORY:   A 69 y.o. year-old -American female who I had initially   seen on 12/18/2012. Her history dates back to September 2012 when she was noted  to have several weeks of feeling fatigued. She sought treatment with  her   primary care doctor who did a CBC and found her to be severely anemic. She was   seen at [facility] and transfused 3 units of packed red blood cells  and no source of bleeding had been found. Records at that time had shown   hemoglobin of 5. She sought followup in September 2012 with Dr. Guillen who   performed an EGD and colonoscopy with an EGD showing an ulcerated and fungating   nonbleeding 2 cm mass, malignant in appearance. It did cause partial   obstructions. Biopsies were taken, which showed no evidence of malignancy at   that time. She had a CT of the abdomen and pelvis showing multiple metastatic   lesions and the liver biopsy was performed on 10/17/2012 showed pancreatic   neuroendocrine neoplasm in the left lobe of the liver aspirate. She went on to   have an ERCP along with sphincterotomy and biliary stent placement and   subsequent PET had shown numerous hypodense lesions in the liver. Octreotide   scan was performed, which showed multiple right and left hepatic metastases.   MRI of the abdomen was performed on 12/05/2012 showing innumerable lesions that   demonstrated peripheral to hyperintensity consistent with hypervascular   metastasis. She was seen by Dr. Humphrey, who thought she was not a candidate   for surgical resection during that time. Her pathology from her tumor came back  positive for chromogranin, synaptophysin and had a Ki-67 of less than 1%. She   was started on treatment with temozolomide and capecitabine with cycle   1 being on 01/22/2013. This was discontinued in 11/2018 due to prolonged stability of disease.  In 7/2020 she had progressive disease within the liver.  A biopsy showed a grade 2 NET with Ki-67 of 15%.  Gallium 68 PET/CT showed uptake within the liver.    Past Medical History:   Past Medical History:   Diagnosis Date    Abdominal swelling 9/1/2020    Anemia     Chemotherapy follow-up examination 5/27/2014    Confusion 9/1/2020    Encounter for  blood transfusion     Falls 9/1/2020    Generalized abdominal pain 9/1/2020    HTN (hypertension)     Hypercalcemia 5/7/2013    Hyperlipidemia     Increased bilirubin level 9/15/2020    Kidney insufficiency     Stage 1    Maintenance chemotherapy following disease     Capecitabine and temozolomide    Primary malignant neuroendocrine tumor of pancreas     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    Secondary malignant neoplasm of liver     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) 5/7/2013    Thrombocytopenia 11/12/2013    Thyroid disease     Unspecified essential hypertension 11/12/2013       Past Surgical HIstory:   Past Surgical History:   Procedure Laterality Date    ERCP N/A 6/21/2018    Procedure: ERCP, stent exchange;  Surgeon: Jake Lieberman MD;  Location: [facility];  Service: Endoscopy;  Laterality: N/A;    ERCP N/A 5/23/2019    Procedure: ERCP (ENDOSCOPIC RETROGRADE CHOLANGIOPANCREATOGRAPHY), stent exchange;  Surgeon: Jake Lieberman MD;  Location: [facility];  Service: Endoscopy;  Laterality: N/A;    ERCP N/A 11/14/2019    Procedure: ERCP (ENDOSCOPIC RETROGRADE CHOLANGIOPANCREATOGRAPHY), stent exchange;  Surgeon: Jake Lieberman MD;  Location: [facility];  Service: Endoscopy;  Laterality: N/A;    ERCP      ERCP N/A 9/22/2020    Procedure: ERCP (ENDOSCOPIC RETROGRADE CHOLANGIOPANCREATOGRAPHY);  Surgeon: Abdulaziz Owen MD;  Location: [facility];  Service: Endoscopy;  Laterality: N/A;    THYROIDECTOMY  2011       Family History:   Family History   Problem Relation Age of Onset    Cancer Maternal Grandmother     Diabetes Father     Breast cancer Neg Hx     Colon cancer Neg Hx     Ovarian cancer Neg Hx        Social History:  reports that she has never smoked. She has never used smokeless tobacco. She reports that she does not drink alcohol or use drugs.    Allergies:  Review of patient's allergies indicates:   Allergen Reactions     Epinephrine Other (See Comments)     Carcinoid patient    Sulfa (sulfonamide antibiotics) Other (See Comments)     Urticaria       Medications:  Current Outpatient Medications   Medication Sig Dispense Refill    cinacalcet (SENSIPAR) 30 MG Tab Take 1 tablet (30 mg total) by mouth 2 (two) times daily with meals. 60 tablet 11    ferrous sulfate (FEOSOL) 325 mg (65 mg iron) Tab tablet Take 325 mg by mouth once daily.      furosemide (LASIX) 40 MG tablet Take 1 tablet (40 mg total) by mouth once daily. 30 tablet 11    levothyroxine (SYNTHROID) 200 MCG tablet Take 1 tablet (200 mcg total) by mouth before breakfast. 30 tablet 0    spironolactone (ALDACTONE) 50 MG tablet Take 1 tablet (50 mg total) by mouth once daily. 30 tablet 11    ursodioL (ACTIGALL) 300 mg capsule Take 1 capsule (300 mg total) by mouth 2 (two) times daily. 60 capsule 11    alendronate (FOSAMAX) 70 MG tablet Take 1 tablet (70 mg total) by mouth every 7 days. (Patient not taking: Reported on 10/14/2020) 4 tablet 0    capecitabine (XELODA) 500 MG Tab Take 2 tablets (1,000 mg total) by mouth 2 (two) times daily. (Patient not taking: Reported on 10/14/2020.) 56 tablet 6    iron-vit c-vit b12-folic acid (IRON-C PLUS) tablet Take 1 tablet by mouth once daily. (Patient not taking: Reported on 10/14/2020) 30 tablet 0     No current facility-administered medications for this visit.          Review of Systems   Constitutional: Positive for malaise/fatigue and weight loss. Negative for chills and fever.   HENT: Negative for congestion.    Eyes: Negative for blurred vision.   Respiratory: Negative for cough and shortness of breath.    Cardiovascular: Negative for chest pain and leg swelling.   Gastrointestinal: Negative for abdominal pain, constipation, diarrhea, nausea and vomiting.        Abdominal distension    Genitourinary: Negative for dysuria.   Musculoskeletal: Negative for myalgias.   Skin: Negative for rash.   Neurological: Positive for  "weakness. Negative for focal weakness and headaches.             Endo/Heme/Allergies: Does not bruise/bleed easily.         ECOG Performance Status: 2   Objective:      Vitals:   Vitals:    10/14/20 1037   BP: 109/62   BP Location: Right arm   Patient Position: Sitting   BP Method: Medium (Automatic)   Pulse: 91   Temp: 96.8 °F (36 °C)   TempSrc: Oral   SpO2: 99%   Weight: 59.2 kg (130 lb 10 oz)   Height: 5' 7" (1.702 m)     BMI: Body mass index is 20.46 kg/m².      Physical Exam   Constitutional: She is oriented to person, place, and time. She appears unhealthy.   Sitting in a wheelchair, unable to ambulate to exam table   Cardiovascular: Normal rate.   Pulmonary/Chest: Breath sounds normal. No respiratory distress.   Abdominal: Soft. She exhibits distension. There is no abdominal tenderness.   Musculoskeletal:         General: No edema.   Neurological: She is oriented to person, place, and time.   Skin: Skin is warm.   Psychiatric: Affect normal.         Laboratory Data:  Abstract on 12/06/2016   Component Date Value Ref Range Status    EXT WBC 12/03/2016 4.1  3.8 - 10.8 1000/ul Final    EXT Hemoglobin 12/03/2016 12.9  11.7 - 15.5 g/dl Final    EXT Hematocrit 12/03/2016 38.5  35.0 - 45.0 % Final    EXT Platelets 12/03/2016 138* 140 - 400 1000/ul Final    EXT Glucose 12/03/2016 88  65 - 99 mg/dl Final    EXT BUN 12/03/2016 13  7 - 25 mg/dl Final    EXT Creatinine 12/03/2016 0.90  0.50 - 0.99 mg/dl Final    EXT Sodium 12/03/2016 141  135 - 146 mmol/l Final    EXT Potassium 12/03/2016 4.2  3.5 - 5.3 mmol/l Final    EXT Chloride 12/03/2016 105  98 - 110 mmol/l Final    EXT CO2 12/03/2016 29  20 - 31 mmol/l Final    EXT Calcium 12/03/2016 9.7  8.6 - 10.4 mg/dl Final    EXT Protein total 12/03/2016 6.9  6.1 - 8.1 g/dl Final    EXT Albumin 12/03/2016 4.0  3.6 - 5.1 g/dl Final    EXT BilirubiN Total 12/03/2016 1.1  0.2 - 1.2 mg/dl Final    EXT Alkaline Phosphatase 12/03/2016 181* 33 - 130 u/l Final    EXT " AST 12/03/2016 21  10 - 35 u/l Final    EXT ALT 12/03/2016 28  6 - 29 u/l Final   Office Visit on 11/14/2016   Component Date Value Ref Range Status    Blood Stool 11/14/2016 Negative  Negative Final     Acceptable 11/14/2016 Yes   Final    SOURCE: 11/14/2016 Cervical   Final    Slides: 11/14/2016 1   Final    LMP: 11/14/2016 NA   Final    Specimen adequacy: 11/14/2016 (NOTE)   Final    Comment:      Satisfactory for evaluation.          Endocervical cells absent.  Metaplastic cells indicative       of transformation zone cannot be reliably distinguished from       parabasal or atrophic cells.                      Interpretation 11/14/2016 NO EPITHELIAL ABNORMALITY SEE BELOW   Final    Comment: ----------------------------------------------------------------------       *NEGATIVE FOR INTRAEPITHELIAL LESION OR MALIGNANCY   ----------------------------------------------------------------------         Comments: 11/14/2016 (NOTE)   Final    Comment: Due to technical or specimen issues, imaging could not be  performed. A cytotechnologist has manually screened this slide.         Cytotechnologist: 11/14/2016 BRENDON Ca(ASCP)[facility]   Final    LOCATION 11/14/2016 (NOTE)   Final    Comment: Specimens processed and interpreted at :[facility], [address] [place] [address], Phone: [phone], CLIA: [id number]      CPT Codes: 11/14/2016 (NOTE)   Final    Comment: [id number]        UNLESS OTHERWISE INDICATED, COMPUTER AIDED AND CYTOTECHNOLOGIST     SCREENING PERFORMED.          The Pap test is a screening test with an inherent, but low       probability of error.  Your patient should be reminded to       consult you immediately if she experiences any suspicious       signs or symptoms, regardless of her Pap test result.       An alternate report format containing images or consolidated       prior Pap history is available as applicable.         HPV HIGH RISK IF ASC-US,  [facility]PATH 11/14/2016 CRITERIA NOT MET   Final    Comment:       UNLESS OTHERWISE INDICATED, ALL TESTING PERFORMED AT  [facility]  [address] [place] [address]            :  CARLOS MENDEZ M.D.        IA NUMBER [id number]  [place]. [id number]                 Imaging:   MRI 07/07/2020  COMPARISON:  09/30/2019     FINDINGS:  Compared with the prior study there is considerable interval worsening of extensive neoplastic disease throughout the liver with innumerable confluent lesions along the periphery of the liver in both the left and right hepatic lobes.  Previously measured lesion in the left hepatic lobe is now obscured by innumerable confluent lesions.  Superior right hepatic lobe lesion that previously measured 2 cm now is at least 6 cm.  There is hypertrophy/enlargement of the left hepatic lobe.  Moderate splenomegaly has developed in the interim.     There is a metallic wall stent identified within the distal common bile duct.  No definite central biliary ductal dilatation.  No definite pancreatic ductal dilatation or discrete mass is seen within the pancreas but there is nonspecific fullness of the pancreatic head.     The kidneys and adrenal glands are remarkable for small bilateral renal cysts.  The retroperitoneal vessels enhance normally.     Miscellaneous: There is no ascites.     MRCP: Not performed     Impression:     Significant interval worsening of extensive neoplastic/metastatic disease throughout the liver.     Interval development of splenomegaly        Gallium 68 PET/CT 7/20/2020  COMPARISON:  [place] PET-CT 11/30/2018, MRI abdomen 07/07/2020, neck ultrasound 03/05/2013     FINDINGS:  Quality of the study: Adequate.     Small focus of radiotracer uptake within the right posterior thyroid resection bed and tracheoesophageal groove.  SUV max of 2.9.     New focal radiotracer uptake within a 0.6 cm right internal mammary lymph node with  maximum SUV of 5.8.     Numerous diffuse foci of increased radiotracer uptake throughout the liver.  Overall number of hepatic lesions is likely similar compared to prior exam however lesions demonstrate decreased intensity of radiotracer uptake.  Index right hepatic dome lesion with maximum SUV 19 (previously 29.64).     Physiologic distribution of the tracer is seen within the pituitary, salivary, stomach, pancreas uncinate process, spleen, adrenal glands,  tract, and bowel.     Incidental CT findings: Small volume ascites.  Common bile duct stent in place with expected pneumobilia.     Impression:     Numerous diffuse tracer avid hepatic lesions which demonstrate decreased intensity of uptake compared to prior exam.     New radiotracer uptake within a small right internal mammary lymph node concerning for metastatic disease.     Small focus of radiotracer uptake within the right posterior thyroid resection bed likely correlating with a 0.7 cm low-density nodule.  Similar sized nodule was described on prior ultrasound 03/05/2013.  Differential considerations include parathyroid adenoma versus residual thyroid tissue.     I, Jake Horowitz MD, attest that I reviewed and interpreted the images.            Assessment:       1. Primary pancreatic neuroendocrine tumor    2. Secondary neuroendocrine tumor of liver    3. Hyperparathyroidism, unspecified    4. Increased bilirubin level    5. Malnutrition compromising bodily function           Plan:       I had a long conversation with Mrs. Toure today concerning her disease.  Her last MRI did not show any ductal dilation and her elevated bilirubin is possibly related to the volume of disease within her liver.  I will review her scans in tumor Board next week but an ERCP was not felt to be indicated previously.  Clinically, she has declined in do not think that PRRT initiation at this time would be a good option but will watch her closely to see if she improves with  physical therapy and occupational therapy.  I will have our dietitian see her.  We have also discussed the reasons for an elevated calcium and will also discuss in tumor Board if she may be a candidate for a parathyroidectomy.  Given her current performance status and malnutrition any surgery would likely be very risky.  For now, will plan to check weekly labs and she may need IV fluids.  I have written her for Marinol.  I will plan to see her back in 2 weeks and she is to report any new symptoms in the interim.  Multiple questions were answered and she is agreeable with this plan.    Cory Rodriguez DO, FACP  Hematology & Oncology, [facility]  [address]  [place]  [address]  ph. [phone]; [phone]  fax. [phone]    40 minutes were spent in coordination of patient's care, record review and counseling.  More than 50% of the time was face-to-face.

## 2020-10-16 NOTE — TELEPHONE ENCOUNTER
Returned pt's call re: Synthroid refill. Spoke with pt and then daughter and instructed to attempt to get PCP to fill RX as medication requires routine monitoring for maintenance dosing; daughter verbalized understanding

## 2020-10-19 PROBLEM — I50.30 HEART FAILURE WITH PRESERVED EJECTION FRACTION: Status: ACTIVE | Noted: 2020-01-01

## 2020-10-19 PROBLEM — Z71.89 GOALS OF CARE, COUNSELING/DISCUSSION: Status: ACTIVE | Noted: 2020-01-01

## 2020-10-19 PROBLEM — Z51.5 PALLIATIVE CARE ENCOUNTER: Status: ACTIVE | Noted: 2020-01-01

## 2020-10-19 PROBLEM — D53.9 MACROCYTIC ANEMIA: Status: ACTIVE | Noted: 2020-01-01

## 2020-10-19 PROBLEM — Z71.89 ADVANCED CARE PLANNING/COUNSELING DISCUSSION: Status: ACTIVE | Noted: 2020-01-01

## 2020-10-19 NOTE — ASSESSMENT & PLAN NOTE
Hx pancreatic neuroendocrine tumor, hyperparathyroidism, and concern for parathyroid adenoma presenting for fatigue and AMS. Prior admission from 9/29-10/7 for hypercalcemia with sestamibi scan concerning for parathyroid adenoma, started on cinacalcet. Takes alendronate q 7 days, also has taken zoledronic acid.   - Ca on arrival corrected to 15.7  - no bowel movement x 7 days  - CXR: left pleural effusion, possible pulmonary edema    Plan:  - 200cc/hr NS + KCl x 10 hours  - calcitonin  - can consider alendronate in house as patient is due for dose 10/20  - CMP q 6 hours  - furosemide 40 IV QD   - KUB to evaluate for obstruction/impaction

## 2020-10-19 NOTE — CONSULTS
Palliative Care Acknowledgement of Consult - .date 10/19/2020     Consult received. Palliative Care Provider:BROCK Wiley, APRN, [facility] touch base with team prior to seeing patient. Patient to be seen in AM 10/20/2020     Full consult to follow.    Thank you for allowing us to be a part of the care of this patient.

## 2020-10-19 NOTE — ED TRIAGE NOTES
Patient identifiers for Ching Bruce 69 y.o. female checked and correct.  Chief Complaint   Patient presents with    Fatigue     hx liver /pancreatic cancer, not on chemo, more lethargic for past 3 days and been falling     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  Patient is clean and well groomed. Pt lethargic    SKIN: The skin is warm and dry; color consistent with ethnicity.  Patient has normal skin turgor and moist mucus membranes.  Skin intact; no breakdown or bruising noted.     MUSCULOSKELETAL: Patient moving upper and lower extremities without difficulty.  Generalized wekaness    RESPIRATORY: Airway is open and patent. Respirations spontaneous, even, easy, and non-labored.  Patient has a normal effort and rate.  No accessory muscle use noted. Denies cough.     CARDIAC:   Normal rhythm and rate noted.  No peripheral edema noted. No complaints of chest pain.      ABDOMEN: Soft and non tender to palpation. Distention noted. Decrease in appetite     NEUROLOGIC: Eyes open spontaneously.  Follows commands; facial expression symmetrical.  Purposeful motor response noted; normal sensation in all extremities.

## 2020-10-19 NOTE — ED PROVIDER NOTES
Encounter Date: 10/19/2020       History     Chief Complaint   Patient presents with    Fatigue     hx liver /pancreatic cancer, not on chemo, more lethargic for past 3 days and been falling     69-year-old female presents with worsening generalized weakness and lethargy over the last 3 days.  She gets like this when her calcium gets elevated.  She has a neuroendocrine tumor in her pancreas and a nodule on her parathyroid.  She is followed by the newer endocrine surgeons at [place].  She was post to get IV fluids tomorrow but seemed to be too weak to make that appointment.  There is no pain anywhere.  She denies headache neck stiffness abdominal pain.  There has been no fever or difficulty breathing.  Her appetite has been poor.  She was started on Marinol last week with no improvement.        Review of patient's allergies indicates:   Allergen Reactions    Epinephrine Anaphylaxis and Other (See Comments)     Can cause Carcinoid Crisis    Sulfa (sulfonamide antibiotics) Other (See Comments)     Urticaria     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    Secondary malignant neoplasm of liver     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) 5/7/2013    Thrombocytopenia 11/12/2013    Thyroid disease     Unspecified essential hypertension 11/12/2013     Past Surgical History:   Procedure Laterality Date    ERCP N/A 6/21/2018    Procedure: ERCP,  stent exchange;  Surgeon: Jake Lieberman MD;  Location: [facility] ENDO;  Service: Endoscopy;  Laterality: N/A;    ERCP N/A 5/23/2019    Procedure: ERCP (ENDOSCOPIC RETROGRADE CHOLANGIOPANCREATOGRAPHY), stent exchange;  Surgeon: Jake Lieberman MD;  Location: [facility] ENDO;  Service: Endoscopy;  Laterality: N/A;    ERCP N/A 11/14/2019    Procedure: ERCP (ENDOSCOPIC RETROGRADE CHOLANGIOPANCREATOGRAPHY), stent exchange;  Surgeon: Jake Lieberman MD;  Location: [facility] ENDO;  Service: Endoscopy;  Laterality: N/A;    ERCP      ERCP N/A 9/22/2020    Procedure: ERCP (ENDOSCOPIC RETROGRADE CHOLANGIOPANCREATOGRAPHY);  Surgeon: Abdulaziz Owen MD;  Location: [facility] ENDO;  Service: Endoscopy;  Laterality: N/A;    THYROIDECTOMY  2011     Family History   Problem Relation Age of Onset    Cancer Maternal Grandmother     Diabetes Father     Breast cancer Neg Hx     Colon cancer Neg Hx     Ovarian cancer Neg Hx      Social History     Tobacco Use    Smoking status: Never Smoker    Smokeless tobacco: Never Used   Substance Use Topics    Alcohol use: No    Drug use: No     Review of Systems   Constitutional: Negative for chills and fever.   HENT: Negative for congestion.    Eyes: Negative for visual disturbance.   Respiratory: Negative for shortness of breath.    Cardiovascular: Negative for chest pain.   Gastrointestinal: Negative for abdominal pain.   Endocrine: Negative for polydipsia and polyuria.   Genitourinary: Negative for difficulty urinating.   Musculoskeletal: Negative for arthralgias.   Neurological: Negative for dizziness and headaches.   Hematological: Negative for adenopathy.   Psychiatric/Behavioral: Negative for agitation.       Physical Exam     Initial Vitals [10/19/20 1206]   BP Pulse Resp Temp SpO2   (!) 124/57 91 18 98.2 °F (36.8 °C) 95 %      MAP       --         Physical Exam    Constitutional: She appears well-developed and well-nourished. She is not diaphoretic. No distress.   HENT:   Head:  Normocephalic.   Eyes: EOM are normal. Pupils are equal, round, and reactive to light.   Neck: Normal range of motion. Neck supple. No JVD present.   Cardiovascular: Normal rate, regular rhythm and normal heart sounds. Exam reveals no gallop and no friction rub.    No murmur heard.  Pulmonary/Chest: Breath sounds normal. No stridor. No respiratory distress. She has no wheezes. She has no rales.   Skin:   Sleepy but arousable         ED Course   Procedures  Labs Reviewed   CBC W/ AUTO DIFFERENTIAL - Abnormal; Notable for the following components:       Result Value    RBC 2.60 (*)     Hemoglobin 8.4 (*)     Hematocrit 28.2 (*)     Mean Corpuscular Volume 109 (*)     Mean Corpuscular Hemoglobin 32.3 (*)     Mean Corpuscular Hemoglobin Conc 29.8 (*)     RDW 17.5 (*)     Platelets 129 (*)     Immature Granulocytes 1.1 (*)     Immature Grans (Abs) 0.08 (*)     Lymph # 0.9 (*)     Gran% 73.8 (*)     Lymph% 11.2 (*)     Platelet Estimate Decreased (*)     All other components within normal limits   COMPREHENSIVE METABOLIC PANEL - Abnormal; Notable for the following components:    Potassium 2.7 (*)     CO2 30 (*)     Calcium 14.6 (*)     Albumin 2.6 (*)     Total Bilirubin 2.2 (*)     Alkaline Phosphatase 455 (*)     All other components within normal limits   TSH - Abnormal; Notable for the following components:    TSH 0.279 (*)     All other components within normal limits   CBC W/ AUTO DIFFERENTIAL - Abnormal; Notable for the following components:    RBC 2.36 (*)     Hemoglobin 7.7 (*)     Hematocrit 25.4 (*)     Mean Corpuscular Volume 108 (*)     Mean Corpuscular Hemoglobin 32.6 (*)     Mean Corpuscular Hemoglobin Conc 30.3 (*)     RDW 17.8 (*)     Platelets 131 (*)     Immature Granulocytes 0.6 (*)     Lymph% 16.0 (*)     All other components within normal limits   COMPREHENSIVE METABOLIC PANEL - Abnormal; Notable for the following components:    Potassium 3.3 (*)     Glucose 65 (*)     Calcium 12.6 (*)     Albumin  2.2 (*)     Total Bilirubin 2.1 (*)     Alkaline Phosphatase 387 (*)     All other components within normal limits   T4, FREE   T4, FREE   LIPASE   MAGNESIUM   SARS-COV-2 RDRP GENE    Narrative:     This test utilizes isothermal nucleic acid amplification   technology to detect the SARS-CoV-2 RdRp nucleic acid segment.   The analytical sensitivity (limit of detection) is 125 genome   equivalents/mL.   A POSITIVE result implies infection with the SARS-CoV-2 virus;   the patient is presumed to be contagious.     A NEGATIVE result means that SARS-CoV-2 nucleic acids are not   present above the limit of detection. A NEGATIVE result should be   treated as presumptive. It does not rule out the possibility of   COVID-19 and should not be the sole basis for treatment decisions.   If COVID-19 is strongly suspected based on clinical and exposure   history, re-testing using an alternate molecular assay should be   considered.   This test is only for use under the Food and Drug   Administration s Emergency Use Authorization (EUA).   Commercial kits are provided by [employer].   Performance characteristics of the EUA have been independently   verified by [facility] Department of   Pathology and Laboratory Medicine.   _________________________________________________________________   The authorized Fact Sheet for Healthcare Providers and the authorized Fact   Sheet for Patients of the ID NOW COVID-19 are available on the FDA   website:     https://www.fda.gov/media/287167/download  https://www.fda.gov/media/497074/download              Imaging Results          X-Ray Abdomen Flat And Erect (Final result)  Result time 10/19/20 19:14:28    Final result by Aleks Chance MD (10/19/20 19:14:28)                 Impression:      Nonobstructive bowel gas pattern.  Follow-up, as clinically warranted.    Moderate to large colonic stool burden.  The findings may be seen with constipation.      Electronically signed  by: Aleks Chance MD  Date:    10/19/2020  Time:    19:14             Narrative:    EXAMINATION:  XR ABDOMEN FLAT AND ERECT    CLINICAL HISTORY:  eval for obstruction;    TECHNIQUE:  Flat and erect AP views of the abdomen were performed.    COMPARISON:  09/29/2020.    FINDINGS:  There is a tubing catheter overlying the left lower pelvis.  Monitoring EKG leads are present.    There is no evidence of free air beneath the hemidiaphragms.  There are scattered airspace opacities in the lung bases.  The stomach is nondistended.  The distribution of the bowel gas pattern is nonobstructive.  There is moderate to large colonic stool burden.  There is no evidence of pneumatosis.  No portal venous air is identified.    There are degenerative changes in the osseous structures.  There is no evidence of a fracture.                               CT Head Without Contrast (Final result)  Result time 10/19/20 14:40:41    Final result by Rip Thayer MD (10/19/20 14:40:41)                 Impression:      1. No acute intracranial abnormalities noting sequela of chronic microvascular ischemic change and senescent change.      Electronically signed by: Rip Thayer MD  Date:    10/19/2020  Time:    14:40             Narrative:    EXAMINATION:  CT HEAD WITHOUT CONTRAST    CLINICAL HISTORY:  Altered mental status;    TECHNIQUE:  Low dose axial images were obtained through the head.  Coronal and sagittal reformations were also performed. Contrast was not administered.    COMPARISON:  CT 10/02/2020    FINDINGS:  There is generalized cerebral volume loss.  There is hypoattenuation in a periventricular fashion, likely sequela of chronic microvascular ischemic change.  There is no evidence of acute major vascular territory infarct, hemorrhage, or mass.  There is no hydrocephalus.  There are no abnormal extra-axial fluid collections.  The paranasal sinuses and mastoid air cells are clear, and there is no evidence of calvarial fracture.   The visualized soft tissues are unremarkable.                               X-Ray Chest AP Portable (Final result)  Result time 10/19/20 13:26:10    Final result by Lakshmi Benoit MD (10/19/20 13:26:10)                 Impression:      Abnormal chest radiograph.      Electronically signed by: Lakshmi Benoit MD  Date:    10/19/2020  Time:    13:26             Narrative:    EXAMINATION:  XR CHEST AP PORTABLE    CLINICAL HISTORY:  weakness;    TECHNIQUE:  Single frontal view of the chest was performed.    COMPARISON:  09/29/2020.  09/05/2020.    FINDINGS:  Hemostasis clips project over the base of the neck.    Skin folds project over the lateral aspect of the left and right abdifatah thoraces.    Study was obtained with the patient in mild kyphotic posture.  There is a small amount of dependent left pleural fluid causing blunting of the left lateral costophrenic angle.  Right pleural fluid is neither confirmed nor excluded.    Pulmonary vessels are slightly ill-defined raising the question of pulmonary edema or other form of interstitial lung disease.    I detect no convincing evidence of airspace edema, aspiration, pneumonia, pneumothorax, pneumomediastinum, pneumoperitoneum or significant osseous abnormality.                                 Medical Decision Making:   History:   Old Medical Records: I decided to obtain old medical records.  Clinical Tests:   Lab Tests: Ordered and Reviewed  Radiological Study: Ordered and Reviewed  Medical Tests: Ordered and Reviewed  ED Management:  Patient found have markedly elevated calcium level and low potassium.  I gave IV fluids aggressively in consult it with internal medicine.  I suspect this explains her symptoms              Attending Attestation:         Attending Critical Care:   Critical Care Times:   Direct Patient Care (initial evaluation, reassessments, and time considering the case)................................................................22 minutes.    Additional History from reviewing old medical records or taking additional history from the family, EMS, PCP, etc.......................3 minutes.   Ordering, Reviewing, and Interpreting Diagnostic Studies...............................................................................................................3 minutes.   Documentation..................................................................................................................................................................................3 minutes.   Consultation with other Physicians. .................................................................................................................................................3 minutes.   ==============================================================  · Total Critical Care Time - exclusive of procedural time: 34 minutes.  ==============================================================                        Clinical Impression:       ICD-10-CM ICD-9-CM   1. Hypercalcemia  E83.52 275.42   2. Weakness  R53.1 780.79   3. Hypokalemia  E87.6 276.8                          ED Disposition Condition    Admit                             Jorge Payne MD  10/19/20 2011

## 2020-10-19 NOTE — MEDICAL/APP STUDENT
History & Physical  [facility]P MED 3    SUBJECTIVE:   Chief Complaint/Reason for Admission: fatigue and altered mental status    History of Present Illness:  Patient is a 69 y.o. 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 She has a past history of HTN, HLD, hypothyroidism, and anemia. She has been complaining of lethargy and had 3 falls over the weekend without injury to head or body. During this time she has also had increased weakness, urination and burping with reduced bowel movements, and some n/v a few weeks back. She has been sleeping more and progressively disoriented. She denies pain, fever, headache, chest pain, dyspnea, diarrhea, or muscle aches.     She was diagnosed with a neuroendocrine tumor in 9/2012, with numerous lesions in the liver and pancreas. She began chemotherapy with temozolomide and capecitabine in 1/2013 which she used until 11/2018. Tumor at that time was stable, with a Ki-67 of 1%. In 7/2020, she was found to have progressive disease within the liver, and biopsy showed grade 2 NET with Ki-67 of 15%.    ED course: NS IVF bolus of 1L, with potassium bicarbonate.    Old chart was reviewed.    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    Secondary malignant neoplasm of liver     Secondary  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) 5/7/2013    Thrombocytopenia 11/12/2013    Thyroid disease     Unspecified essential hypertension 11/12/2013       Past Surgical History:   Procedure Laterality Date    ERCP N/A 6/21/2018    Procedure: ERCP, stent exchange;  Surgeon: Jake Lieberman MD;  Location: [facility] ENDO;  Service: Endoscopy;  Laterality: N/A;    ERCP N/A 5/23/2019    Procedure: ERCP (ENDOSCOPIC RETROGRADE CHOLANGIOPANCREATOGRAPHY), stent exchange;  Surgeon: Jake Lieberman MD;  Location: [facility] ENDO;  Service: Endoscopy;  Laterality: N/A;    ERCP N/A 11/14/2019    Procedure: ERCP (ENDOSCOPIC RETROGRADE CHOLANGIOPANCREATOGRAPHY), stent exchange;  Surgeon: Jake Lieberman MD;  Location: [facility] ENDO;  Service: Endoscopy;  Laterality: N/A;    ERCP      ERCP N/A 9/22/2020    Procedure: ERCP (ENDOSCOPIC RETROGRADE CHOLANGIOPANCREATOGRAPHY);  Surgeon: Abdulaziz Owen MD;  Location: [facility];  Service: Endoscopy;  Laterality: N/A;    THYROIDECTOMY  2011      Family History   Problem Relation Age of Onset    Cancer Maternal Grandmother     Diabetes Father     Breast cancer Neg Hx     Colon cancer Neg Hx     Ovarian cancer Neg Hx        Social History     Tobacco Use    Smoking status: Never Smoker    Smokeless tobacco: Never Used   Substance Use Topics    Alcohol use: No    Drug use: No      Review of patient's allergies indicates:   Allergen Reactions    Epinephrine Anaphylaxis and Other (See Comments)     Can cause Carcinoid Crisis    Sulfa (sulfonamide antibiotics) Other (See Comments)     Urticaria       No current facility-administered medications on file prior to encounter.      Current Outpatient Medications on File Prior to Encounter   Medication Sig Dispense Refill    alendronate (FOSAMAX) 70 MG tablet Take 1 tablet (70 mg total) by mouth every 7 days. 4 tablet 0    capecitabine (XELODA) 500 MG Tab Take 2 tablets (1,000 mg total) by mouth 2 (two) times daily. 56 tablet 6     cinacalcet (SENSIPAR) 30 MG Tab Take 1 tablet (30 mg total) by mouth 2 (two) times daily with meals. 60 tablet 11    dronabinoL (MARINOL) 2.5 MG capsule Take 1 capsule (2.5 mg total) by mouth 2 (two) times daily before meals. 60 capsule 3    ferrous sulfate (FEOSOL) 325 mg (65 mg iron) Tab tablet Take 325 mg by mouth once daily.      furosemide (LASIX) 40 MG tablet Take 1 tablet (40 mg total) by mouth once daily. 30 tablet 11    iron-vit c-vit b12-folic acid (IRON-C PLUS) tablet Take 1 tablet by mouth once daily. 30 tablet 0    levothyroxine (SYNTHROID) 200 MCG tablet Take 1 tablet (200 mcg total) by mouth before breakfast. 30 tablet 0    spironolactone (ALDACTONE) 50 MG tablet Take 1 tablet (50 mg total) by mouth once daily. 30 tablet 11    ursodioL (ACTIGALL) 300 mg capsule Take 1 capsule (300 mg total) by mouth 2 (two) times daily. 60 capsule 11        Review of Systems   Constitutional: Positive for malaise/fatigue. Negative for chills, diaphoresis and fever.   HENT: Negative for congestion, hearing loss, nosebleeds and sore throat.    Eyes: Negative for blurred vision and pain.   Respiratory: Negative for cough, hemoptysis, shortness of breath and wheezing.    Cardiovascular: Negative for chest pain, palpitations, claudication and leg swelling.   Gastrointestinal: Positive for heartburn and vomiting. Negative for abdominal pain, constipation and diarrhea.   Genitourinary: Positive for frequency (increased). Negative for flank pain and hematuria.   Musculoskeletal: Positive for falls (x3 no injury). Negative for back pain, myalgias and neck pain.   Skin: Negative for itching and rash.   Neurological: Positive for weakness. Negative for dizziness, sensory change, seizures, loss of consciousness and headaches.   Psychiatric/Behavioral: Negative for depression and hallucinations. The patient is not nervous/anxious and does not have insomnia.         OBJECTIVE:     Vital Signs (Most Recent)   Temp: 98.2 °F  (36.8 °C) (10/19/20 1206)  Pulse: 91 (10/19/20 1206)  Resp: 18 (10/19/20 1206)  BP: (!) 124/57 (10/19/20 1206)  SpO2: 95 % (10/19/20 1206)  Body mass index is 20.36 kg/m².      The patient has been examined and the H&P has been reviewed:      Physical Exam  Constitutional:       Appearance: She is cachectic. She is ill-appearing. She is not toxic-appearing.      Comments: A&O x 1, GCS 13   HENT:      Head: Normocephalic and atraumatic.      Nose: Nose normal.      Mouth/Throat:      Mouth: Mucous membranes are moist.      Pharynx: Oropharynx is clear.   Eyes:      General: No scleral icterus.     Extraocular Movements: Extraocular movements intact.      Pupils: Pupils are equal, round, and reactive to light.   Neck:      Musculoskeletal: Normal range of motion.   Cardiovascular:      Rate and Rhythm: Normal rate and regular rhythm.      Pulses: Normal pulses.      Heart sounds: No murmur. No friction rub. No gallop.    Pulmonary:      Effort: Pulmonary effort is normal. No respiratory distress.      Breath sounds: Normal breath sounds. No stridor. No wheezing or rales.   Abdominal:      General: Bowel sounds are normal. There is distension.      Palpations: There is mass (epigastric lipoma).      Tenderness: There is no abdominal tenderness. There is no guarding.      Hernia: No hernia is present.   Musculoskeletal: Normal range of motion.         General: No tenderness.      Right lower leg: Edema present.      Left lower leg: Edema present.   Skin:     General: Skin is warm and dry.   Neurological:      Mental Status: She is lethargic and disoriented.      Motor: Weakness present.          Laboratory:  CBC/Anemia Labs: Coags:    Recent Labs   Lab 10/13/20  0905 10/19/20  1313   WBC 8.46 7.57   HGB 8.7* 8.4*   HCT 27.5* 28.2*    129*   * 109*   RDW 17.3* 17.5*    No results for input(s): PT, INR, APTT in the last 168 hours.     Chemistries: ABG:   Recent Labs   Lab 10/13/20  0905 10/19/20  1313   NA  139 142   K 3.2* 2.7*    102   CO2 26 30*   BUN 11 12   CREATININE 0.8 0.7   CALCIUM 12.7* 14.6*   PROT 7.2 7.4   BILITOT 2.0* 2.2*   ALKPHOS 476* 455*   ALT 13 12   AST 20 21    No results for input(s): PH, PCO2, PO2, HCO3, POCSATURATED, BE in the last 168 hours.         Imaging Results          CT Head Without Contrast (Final result)  Result time 10/19/20 14:40:41    Final result by Rip Thayer MD (10/19/20 14:40:41)                 Impression:      1. No acute intracranial abnormalities noting sequela of chronic microvascular ischemic change and senescent change.      Electronically signed by: Rip Thayer MD  Date:    10/19/2020  Time:    14:40             Narrative:    EXAMINATION:  CT HEAD WITHOUT CONTRAST    CLINICAL HISTORY:  Altered mental status;    TECHNIQUE:  Low dose axial images were obtained through the head.  Coronal and sagittal reformations were also performed. Contrast was not administered.    COMPARISON:  CT 10/02/2020    FINDINGS:  There is generalized cerebral volume loss.  There is hypoattenuation in a periventricular fashion, likely sequela of chronic microvascular ischemic change.  There is no evidence of acute major vascular territory infarct, hemorrhage, or mass.  There is no hydrocephalus.  There are no abnormal extra-axial fluid collections.  The paranasal sinuses and mastoid air cells are clear, and there is no evidence of calvarial fracture.  The visualized soft tissues are unremarkable.                               X-Ray Chest AP Portable (Final result)  Result time 10/19/20 13:26:10    Final result by Lakshmi Benoit MD (10/19/20 13:26:10)                 Impression:      Abnormal chest radiograph.      Electronically signed by: Lakshmi Benoit MD  Date:    10/19/2020  Time:    13:26             Narrative:    EXAMINATION:  XR CHEST AP PORTABLE    CLINICAL HISTORY:  weakness;    TECHNIQUE:  Single frontal view of the chest was performed.    COMPARISON:  09/29/2020.   09/05/2020.    FINDINGS:  Hemostasis clips project over the base of the neck.    Skin folds project over the lateral aspect of the left and right abdifatah thoraces.    Study was obtained with the patient in mild kyphotic posture.  There is a small amount of dependent left pleural fluid causing blunting of the left lateral costophrenic angle.  Right pleural fluid is neither confirmed nor excluded.    Pulmonary vessels are slightly ill-defined raising the question of pulmonary edema or other form of interstitial lung disease.    I detect no convincing evidence of airspace edema, aspiration, pneumonia, pneumothorax, pneumomediastinum, pneumoperitoneum or significant osseous abnormality.                                   ASSESSMENT/PLAN:     Active Hospital Problems    Diagnosis  POA    Hypercalcemia [E83.52]  Yes      Resolved Hospital Problems   No resolved problems to display.       Hypercalcemia   Pt is a 70 yo female w/history of neuroendocrine tumor of the pancreas/liver and parathyroid nodule. She has a long cycle of repeat hospitalizations due to hypercalcemia with hypokalemia. During these events, she has increased confusion, lethargy, constipation, and urination. She has had increased falls during this time, and home regimen is ineffective at this time. Her calcium is elevated to 14.6.   -IVF bolus 1L NS in the ED, follow with 2 L NS with KCl over 24 hours   -commence calcitonin 4 units/kg (236 units), Q12 for 2 days   -consider bisphosphonate for home treatment   -repeat CMP Q4hr     Hypokalemia   Patient has reduced Potassium of 2.7.    -KCl included in IVF infusion    Constipation   Patient is complaining of reduced bowel movement. Examination shows distended abdomen.   -KUB   -miralax and senna    Anemia   Patient currently has a macrocytic anemia. Hgb 8.4 with .   -consider labs for B12, folate, peripheral smear with CBC   -transfuse with RBC with Hgb <7      CODE status- Full code      Logan  Bobby, MS4

## 2020-10-20 PROBLEM — E03.9 HYPOTHYROIDISM: Status: ACTIVE | Noted: 2020-01-01

## 2020-10-20 NOTE — HPI
HPI obtained from chart review:       Ms. Bruce is a 70 yo lady with h PMH of:  pancreatic neuroendocrine tumor  (dx 2012) s/p ERCP, sphincterectomy, and biliary stent placement with removal 9/22, hyperparathyroidism, parathyroid adenoma, and HFpEF (EF 60%, G1DD) presenting for generalized weakness and AMS. History obtained from daughter at bedside as patient is profoundly lethargic. She has had recurrent episodes of hypercalcemia and has been on alendronate, cinacalet, zoledronic acid, and IVF infusions. For the past three days she has had generalized weakness, lethargy, and sustained 3 non-traumatic falls. She has been intermittently confused and has had decreased oral intake. She has not experienced chest pain, palpitations, bone pain, nausea, vomiting, or abdominal pain. She does endorse weight loss, constipation with last BM one week ago, polyuria, abdominal distension, and weakness. Her daughter is unsure if she is passing flatulence. Their oncologist is Dr. Rodriguez who they were scheduled to see tomorrow for IVF.      ED Course:  HDS. Calcium 15.6 corrected, K 2.7. Received 1 liter bolus. Ct head without acute finding. CXR with mild pulmonary edema.

## 2020-10-20 NOTE — PLAN OF CARE
"  Problem: Fall Injury Risk  Goal: Absence of Fall and Fall-Related Injury  Outcome: Ongoing, Progressing     Problem: Adult Inpatient Plan of Care  Goal: Plan of Care Review  Outcome: Ongoing, Progressing     Patient remained free of falls today. Patient currently has potassium drip going for 6 hours and will received NS after infusion is complete. Patient had enema with a moderate BM afterwards of small "balls" No distress noted. Call bell in reach. Bed in lowest position. Safety maintained. Will continue to monitor.     "

## 2020-10-20 NOTE — ASSESSMENT & PLAN NOTE
TTE 9/03 with EF 60%, G1DD, PAP 29. Patient takes furosemide 40 mg daily  - CXR with effusion, mild edema  - pitting edema on PE    Plan:  - strict intake/output  - lasix 40 IV daily

## 2020-10-20 NOTE — PROGRESS NOTES
Patient drank the potassium, however, she vomited it right back up with one of her pills. Will notify MD.

## 2020-10-20 NOTE — SUBJECTIVE & OBJECTIVE
Interval History: No events overnight, HDS and remains on room air. S/p approximately 3 liters of IVF. Serial calcium levels show decrease to 14 corrected. K improving. Patient lethargic on exam this AM, unable to elicit history. Fleet enema yesterday with mild stool. Intake/output not correctly documented in the EMR.     Review of Systems   Constitutional: Positive for activity change, appetite change, fatigue and unexpected weight change. Negative for diaphoresis and fever.   HENT: Negative for rhinorrhea, tinnitus, trouble swallowing and voice change.    Eyes: Negative for visual disturbance.   Respiratory: Negative for cough, choking and shortness of breath.    Cardiovascular: Positive for leg swelling. Negative for chest pain and palpitations.   Gastrointestinal: Positive for abdominal distention and constipation. Negative for abdominal pain, blood in stool, diarrhea, nausea and vomiting.   Endocrine: Positive for polyuria.   Genitourinary: Negative for difficulty urinating, dysuria, flank pain, hematuria and urgency.   Musculoskeletal: Negative for back pain and joint swelling.   Skin: Positive for pallor. Negative for color change.   Neurological: Positive for weakness. Negative for tremors, syncope, light-headedness, numbness and headaches.   Psychiatric/Behavioral: Positive for confusion. Negative for behavioral problems.     Objective:     Vital Signs (Most Recent):  Temp: 97.8 °F (36.6 °C) (10/20/20 1230)  Pulse: 88 (10/20/20 1140)  Resp: 18 (10/20/20 0900)  BP: 124/68 (10/20/20 0900)  SpO2: 97 % (10/20/20 0900) Vital Signs (24h Range):  Temp:  [97.5 °F (36.4 °C)-97.8 °F (36.6 °C)] 97.8 °F (36.6 °C)  Pulse:  [85-97] 88  Resp:  [16-20] 18  SpO2:  [97 %] 97 %  BP: (112-129)/(59-68) 124/68     Weight: 59 kg (130 lb)  Body mass index is 20.36 kg/m².    Intake/Output Summary (Last 24 hours) at 10/20/2020 1503  Last data filed at 10/20/2020 1100  Gross per 24 hour   Intake 1263.33 ml   Output 1350 ml   Net  -86.67 ml      Physical Exam  Vitals signs and nursing note reviewed.   Constitutional:       General: She is not in acute distress.     Appearance: Normal appearance. She is normal weight. She is ill-appearing. She is not toxic-appearing or diaphoretic.   HENT:      Head: Normocephalic and atraumatic.      Nose: Nose normal.      Mouth/Throat:      Mouth: Mucous membranes are dry.   Eyes:      General: No scleral icterus.     Pupils: Pupils are equal, round, and reactive to light.      Comments: Conjunctival pallor   Cardiovascular:      Rate and Rhythm: Normal rate and regular rhythm.      Pulses: Normal pulses.      Heart sounds: Murmur (systolic murmur RSB) present.   Pulmonary:      Effort: Pulmonary effort is normal. No respiratory distress.      Breath sounds: Normal breath sounds.   Abdominal:      General: Abdomen is flat. Bowel sounds are normal. There is distension.      Palpations: Abdomen is soft.      Tenderness: There is no abdominal tenderness. There is no guarding or rebound.   Musculoskeletal: Normal range of motion.         General: No swelling.      Right lower leg: Edema present.      Left lower leg: Edema present.   Lymphadenopathy:      Cervical: No cervical adenopathy.   Skin:     General: Skin is warm and dry.      Capillary Refill: Capillary refill takes less than 2 seconds.      Coloration: Skin is not jaundiced.   Neurological:      Mental Status: She is alert.      Comments: No focal deficit  Alert and oriented to person (daugther) and place ([facility]) but not time  Strength decreased throughout          Significant Labs: All pertinent labs within the past 24 hours have been reviewed.    Significant Imaging: I have reviewed all pertinent imaging results/findings within the past 24 hours.

## 2020-10-20 NOTE — HPI
Ms. Bruce is a 70 yo female with PMH of pancreatic neuroendocrine tumor  (dx 2012) s/p ERCP, sphincterectomy, and biliary stent placement with removal 9/22, hyperparathyroidism, parathyroid adenoma, and HFpEF (EF 60%, G1DD) presenting for generalized weakness and AMS. History obtained from daughter at bedside as patient is profoundly lethargic. She has had recurrent episodes of hypercalcemia and has been on alendronate, cinacalet, zoledronic acid, and IVF infusions. For the past three days she has had generalized weakness, lethargy, and sustained 3 non-traumatic falls. She has been intermittently confused and has had decreased oral intake. She has not experienced chest pain, palpitations, bone pain, nausea, vomiting, or abdominal pain. She does endorse weight loss, constipation with last BM one week ago, polyuria, abdominal distension, and weakness. Her daughter is unsure if she is passing flatulence. Their oncologist is Dr. Rodriguez who they were scheduled to see tomorrow for IVF.     ED Course:  HDS. Calcium 15.6 corrected, K 2.7. Received 1 liter bolus. Ct head without acute finding. CXR with mild pulmonary edema.

## 2020-10-20 NOTE — SUBJECTIVE & OBJECTIVE
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    Secondary malignant neoplasm of liver     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) 5/7/2013    Thrombocytopenia 11/12/2013    Thyroid disease     Unspecified essential hypertension 11/12/2013       Past Surgical History:   Procedure Laterality Date    ERCP N/A 6/21/2018    Procedure: ERCP, stent exchange;  Surgeon: Jake Lieberman MD;  Location: [facility];  Service: Endoscopy;  Laterality: N/A;    ERCP N/A 5/23/2019    Procedure: ERCP (ENDOSCOPIC RETROGRADE CHOLANGIOPANCREATOGRAPHY), stent exchange;  Surgeon: Jake Lieberman MD;  Location: [facility];  Service: Endoscopy;  Laterality: N/A;    ERCP N/A 11/14/2019    Procedure: ERCP (ENDOSCOPIC RETROGRADE CHOLANGIOPANCREATOGRAPHY), stent exchange;  Surgeon: Jake Lieberman MD;  Location: [facility] ENDO;  Service: Endoscopy;  Laterality: N/A;    ERCP      ERCP N/A 9/22/2020    Procedure: ERCP (ENDOSCOPIC RETROGRADE CHOLANGIOPANCREATOGRAPHY);  Surgeon: Abdulaziz Owen MD;  Location: [facility];  Service: Endoscopy;  Laterality: N/A;    THYROIDECTOMY  2011       Review of patient's allergies indicates:   Allergen Reactions    Epinephrine Anaphylaxis and Other (See Comments)     Can cause Carcinoid Crisis    Sulfa (sulfonamide antibiotics) Other (See Comments)     Urticaria       No current facility-administered medications on file prior to  encounter.      Current Outpatient Medications on File Prior to Encounter   Medication Sig    alendronate (FOSAMAX) 70 MG tablet Take 1 tablet (70 mg total) by mouth every 7 days.    capecitabine (XELODA) 500 MG Tab Take 2 tablets (1,000 mg total) by mouth 2 (two) times daily.    cinacalcet (SENSIPAR) 30 MG Tab Take 1 tablet (30 mg total) by mouth 2 (two) times daily with meals.    dronabinoL (MARINOL) 2.5 MG capsule Take 1 capsule (2.5 mg total) by mouth 2 (two) times daily before meals.    ferrous sulfate (FEOSOL) 325 mg (65 mg iron) Tab tablet Take 325 mg by mouth once daily.    furosemide (LASIX) 40 MG tablet Take 1 tablet (40 mg total) by mouth once daily.    iron-vit c-vit b12-folic acid (IRON-C PLUS) tablet Take 1 tablet by mouth once daily.    levothyroxine (SYNTHROID) 200 MCG tablet Take 1 tablet (200 mcg total) by mouth before breakfast.    spironolactone (ALDACTONE) 50 MG tablet Take 1 tablet (50 mg total) by mouth once daily.    ursodioL (ACTIGALL) 300 mg capsule Take 1 capsule (300 mg total) by mouth 2 (two) times daily.     Family History     Problem Relation (Age of Onset)    Cancer Maternal Grandmother    Diabetes Father        Tobacco Use    Smoking status: Never Smoker    Smokeless tobacco: Never Used   Substance and Sexual Activity    Alcohol use: No    Drug use: No    Sexual activity: Not on file     Review of Systems   Constitutional: Positive for activity change, appetite change, fatigue and unexpected weight change. Negative for diaphoresis and fever.   HENT: Negative for rhinorrhea, tinnitus, trouble swallowing and voice change.    Eyes: Negative for visual disturbance.   Respiratory: Negative for cough, choking and shortness of breath.    Cardiovascular: Positive for leg swelling. Negative for chest pain and palpitations.   Gastrointestinal: Positive for abdominal distention and constipation. Negative for abdominal pain, blood in stool, diarrhea, nausea and vomiting.    Endocrine: Positive for polyuria.   Genitourinary: Negative for difficulty urinating, dysuria, flank pain, hematuria and urgency.   Musculoskeletal: Negative for back pain and joint swelling.   Skin: Positive for pallor. Negative for color change.   Neurological: Positive for weakness. Negative for tremors, syncope, light-headedness, numbness and headaches.   Psychiatric/Behavioral: Positive for confusion. Negative for behavioral problems.     Objective:     Vital Signs (Most Recent):  Temp: 98.2 °F (36.8 °C) (10/19/20 1206)  Pulse: 91 (10/19/20 1206)  Resp: 18 (10/19/20 1206)  BP: (!) 124/57 (10/19/20 1206)  SpO2: 95 % (10/19/20 1206) Vital Signs (24h Range):  Temp:  [98.2 °F (36.8 °C)] 98.2 °F (36.8 °C)  Pulse:  [91] 91  Resp:  [18] 18  SpO2:  [95 %] 95 %  BP: (124)/(57) 124/57     Weight: 59 kg (130 lb)  Body mass index is 20.36 kg/m².    Physical Exam  Constitutional:       General: She is not in acute distress.     Appearance: Normal appearance. She is normal weight. She is ill-appearing. She is not toxic-appearing or diaphoretic.   HENT:      Head: Normocephalic and atraumatic.      Nose: Nose normal.      Mouth/Throat:      Mouth: Mucous membranes are dry.   Eyes:      General: No scleral icterus.     Pupils: Pupils are equal, round, and reactive to light.      Comments: Conjunctival pallor   Cardiovascular:      Rate and Rhythm: Normal rate and regular rhythm.      Pulses: Normal pulses.      Heart sounds: Murmur (systolic murmur RSB) present.   Pulmonary:      Effort: Pulmonary effort is normal. No respiratory distress.      Breath sounds: Normal breath sounds.   Abdominal:      General: Abdomen is flat. Bowel sounds are normal. There is distension.      Palpations: Abdomen is soft.      Tenderness: There is no abdominal tenderness. There is no guarding or rebound.   Musculoskeletal: Normal range of motion.         General: No swelling.      Right lower leg: Edema present.      Left lower leg: Edema  present.   Lymphadenopathy:      Cervical: No cervical adenopathy.   Skin:     General: Skin is warm and dry.      Capillary Refill: Capillary refill takes less than 2 seconds.      Coloration: Skin is not jaundiced.   Neurological:      Mental Status: She is alert.      Comments: No focal deficit  Alert and oriented to person (daugther) and place ([facility]) but not time  Strength decreased throughout            CRANIAL NERVES     CN III, IV, VI   Pupils are equal, round, and reactive to light.       Significant Labs:   CBC:   Recent Labs   Lab 10/19/20  1313 10/19/20  1700   WBC 7.57 6.81   HGB 8.4* 7.7*   HCT 28.2* 25.4*   * 131*     CMP:   Recent Labs   Lab 10/19/20  1313 10/19/20  1833    144   K 2.7* 3.3*    108   CO2 30* 27   GLU 80 65*   BUN 12 11   CREATININE 0.7 0.6   CALCIUM 14.6* 12.6*   PROT 7.4 6.3   ALBUMIN 2.6* 2.2*   BILITOT 2.2* 2.1*   ALKPHOS 455* 387*   AST 21 20   ALT 12 11   ANIONGAP 10 9   EGFRNONAA >60.0 >60.0       Significant Imaging: I have reviewed all pertinent imaging results/findings within the past 24 hours.

## 2020-10-20 NOTE — H&P
[facility] Hospital Medicine  History & Physical    Patient Name: Ching Bruce  MRN: [id number]  Admission Date: 10/19/2020  Attending Physician: Karli Navarro MD   Primary Care Provider: Gaby Corea MD    [facility] Medicine Team: [facility] HOSP MED 3 Nohemi Harding MD     Patient information was obtained from patient and ER records.     Subjective:     Principal Problem:Hypercalcemia    Chief Complaint:   Chief Complaint   Patient presents with    Fatigue     hx liver /pancreatic cancer, not on chemo, more lethargic for past 3 days and been falling        HPI: Ms. Bruce is a 68 yo female with PMH of pancreatic neuroendocrine tumor  (dx 2012) s/p ERCP, sphincterectomy, and biliary stent placement with removal 9/22, hyperparathyroidism, parathyroid adenoma, and HFpEF (EF 60%, G1DD) presenting for generalized weakness and AMS. History obtained from daughter at bedside as patient is profoundly lethargic. She has had recurrent episodes of hypercalcemia and has been on alendronate, cinacalet, zoledronic acid, and IVF infusions. For the past three days she has had generalized weakness, lethargy, and sustained 3 non-traumatic falls. She has been intermittently confused and has had decreased oral intake. She has not experienced chest pain, palpitations, bone pain, nausea, vomiting, or abdominal pain. She does endorse weight loss, constipation with last BM one week ago, polyuria, abdominal distension, and weakness. Her daughter is unsure if she is passing flatulence. Their oncologist is Dr. Rodriguez who they were scheduled to see tomorrow for IVF.     ED Course:  HDS. Calcium 15.6 corrected, K 2.7. Received 1 liter bolus. Ct head without acute finding. CXR with mild pulmonary edema.     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    Secondary malignant neoplasm of liver     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) 5/7/2013    Thrombocytopenia 11/12/2013    Thyroid disease     Unspecified essential hypertension 11/12/2013       Past Surgical History:   Procedure Laterality Date    ERCP N/A 6/21/2018    Procedure: ERCP, stent exchange;  Surgeon: Jake Lieberman MD;  Location: [facility] ENDO;  Service: Endoscopy;  Laterality: N/A;    ERCP N/A 5/23/2019    Procedure: ERCP (ENDOSCOPIC RETROGRADE CHOLANGIOPANCREATOGRAPHY), stent exchange;  Surgeon: Jake Lieberman MD;  Location: [facility];  Service: Endoscopy;  Laterality: N/A;    ERCP N/A 11/14/2019    Procedure: ERCP (ENDOSCOPIC RETROGRADE CHOLANGIOPANCREATOGRAPHY), stent exchange;  Surgeon: Jake Lieberman MD;  Location: [facility];  Service: Endoscopy;  Laterality: N/A;    ERCP      ERCP N/A 9/22/2020    Procedure: ERCP (ENDOSCOPIC RETROGRADE CHOLANGIOPANCREATOGRAPHY);  Surgeon: Abdulaziz Owen MD;  Location: [facility];  Service: Endoscopy;  Laterality: N/A;    THYROIDECTOMY  2011       Review of patient's allergies indicates:   Allergen Reactions    Epinephrine Anaphylaxis and Other (See Comments)     Can cause Carcinoid Crisis    Sulfa (sulfonamide antibiotics) Other (See Comments)     Urticaria       No current facility-administered medications on file prior to encounter.      Current Outpatient Medications on File Prior to Encounter   Medication Sig    alendronate (FOSAMAX) 70 MG tablet Take 1 tablet (70 mg total) by mouth every 7 days.    capecitabine (XELODA) 500 MG Tab  Take 2 tablets (1,000 mg total) by mouth 2 (two) times daily.    cinacalcet (SENSIPAR) 30 MG Tab Take 1 tablet (30 mg total) by mouth 2 (two) times daily with meals.    dronabinoL (MARINOL) 2.5 MG capsule Take 1 capsule (2.5 mg total) by mouth 2 (two) times daily before meals.    ferrous sulfate (FEOSOL) 325 mg (65 mg iron) Tab tablet Take 325 mg by mouth once daily.    furosemide (LASIX) 40 MG tablet Take 1 tablet (40 mg total) by mouth once daily.    iron-vit c-vit b12-folic acid (IRON-C PLUS) tablet Take 1 tablet by mouth once daily.    levothyroxine (SYNTHROID) 200 MCG tablet Take 1 tablet (200 mcg total) by mouth before breakfast.    spironolactone (ALDACTONE) 50 MG tablet Take 1 tablet (50 mg total) by mouth once daily.    ursodioL (ACTIGALL) 300 mg capsule Take 1 capsule (300 mg total) by mouth 2 (two) times daily.     Family History     Problem Relation (Age of Onset)    Cancer Maternal Grandmother    Diabetes Father        Tobacco Use    Smoking status: Never Smoker    Smokeless tobacco: Never Used   Substance and Sexual Activity    Alcohol use: No    Drug use: No    Sexual activity: Not on file     Review of Systems   Constitutional: Positive for activity change, appetite change, fatigue and unexpected weight change. Negative for diaphoresis and fever.   HENT: Negative for rhinorrhea, tinnitus, trouble swallowing and voice change.    Eyes: Negative for visual disturbance.   Respiratory: Negative for cough, choking and shortness of breath.    Cardiovascular: Positive for leg swelling. Negative for chest pain and palpitations.   Gastrointestinal: Positive for abdominal distention and constipation. Negative for abdominal pain, blood in stool, diarrhea, nausea and vomiting.   Endocrine: Positive for polyuria.   Genitourinary: Negative for difficulty urinating, dysuria, flank pain, hematuria and urgency.   Musculoskeletal: Negative for back pain and joint swelling.   Skin: Positive for pallor.  Negative for color change.   Neurological: Positive for weakness. Negative for tremors, syncope, light-headedness, numbness and headaches.   Psychiatric/Behavioral: Positive for confusion. Negative for behavioral problems.     Objective:     Vital Signs (Most Recent):  Temp: 98.2 °F (36.8 °C) (10/19/20 1206)  Pulse: 91 (10/19/20 1206)  Resp: 18 (10/19/20 1206)  BP: (!) 124/57 (10/19/20 1206)  SpO2: 95 % (10/19/20 1206) Vital Signs (24h Range):  Temp:  [98.2 °F (36.8 °C)] 98.2 °F (36.8 °C)  Pulse:  [91] 91  Resp:  [18] 18  SpO2:  [95 %] 95 %  BP: (124)/(57) 124/57     Weight: 59 kg (130 lb)  Body mass index is 20.36 kg/m².    Physical Exam  Constitutional:       General: She is not in acute distress.     Appearance: Normal appearance. She is normal weight. She is ill-appearing. She is not toxic-appearing or diaphoretic.   HENT:      Head: Normocephalic and atraumatic.      Nose: Nose normal.      Mouth/Throat:      Mouth: Mucous membranes are dry.   Eyes:      General: No scleral icterus.     Pupils: Pupils are equal, round, and reactive to light.      Comments: Conjunctival pallor   Cardiovascular:      Rate and Rhythm: Normal rate and regular rhythm.      Pulses: Normal pulses.      Heart sounds: Murmur (systolic murmur RSB) present.   Pulmonary:      Effort: Pulmonary effort is normal. No respiratory distress.      Breath sounds: Normal breath sounds.   Abdominal:      General: Abdomen is flat. Bowel sounds are normal. There is distension.      Palpations: Abdomen is soft.      Tenderness: There is no abdominal tenderness. There is no guarding or rebound.   Musculoskeletal: Normal range of motion.         General: No swelling.      Right lower leg: Edema present.      Left lower leg: Edema present.   Lymphadenopathy:      Cervical: No cervical adenopathy.   Skin:     General: Skin is warm and dry.      Capillary Refill: Capillary refill takes less than 2 seconds.      Coloration: Skin is not jaundiced.    Neurological:      Mental Status: She is alert.      Comments: No focal deficit  Alert and oriented to person (daugther) and place ([facility]) but not time  Strength decreased throughout            CRANIAL NERVES     CN III, IV, VI   Pupils are equal, round, and reactive to light.       Significant Labs:   CBC:   Recent Labs   Lab 10/19/20  1313 10/19/20  1700   WBC 7.57 6.81   HGB 8.4* 7.7*   HCT 28.2* 25.4*   * 131*     CMP:   Recent Labs   Lab 10/19/20  1313 10/19/20  1833    144   K 2.7* 3.3*    108   CO2 30* 27   GLU 80 65*   BUN 12 11   CREATININE 0.7 0.6   CALCIUM 14.6* 12.6*   PROT 7.4 6.3   ALBUMIN 2.6* 2.2*   BILITOT 2.2* 2.1*   ALKPHOS 455* 387*   AST 21 20   ALT 12 11   ANIONGAP 10 9   EGFRNONAA >60.0 >60.0       Significant Imaging: I have reviewed all pertinent imaging results/findings within the past 24 hours.    Assessment/Plan:     * Hypercalcemia  Hx pancreatic neuroendocrine tumor, hyperparathyroidism, and concern for parathyroid adenoma presenting for fatigue and AMS. Prior admission from 9/29-10/7 for hypercalcemia with sestamibi scan concerning for parathyroid adenoma, started on cinacalcet. Takes alendronate q 7 days, also has taken zoledronic acid.   - Ca on arrival corrected to 15.7  - no bowel movement x 7 days  - CXR: left pleural effusion, pulmonary edema    Plan:  - 200cc/hr NS + KCl x 10 hours  - calcitonin  - can consider zolendronic acid in house, last dose 10/13  - CMP q 6 hours  - furosemide 40 IV QD   - KUB to evaluate for obstruction/impaction      Hypokalemia  K 2.7 on presentation    Plan:  - replete as needed  - CMP q6      Macrocytic anemia  , HgB 8.4, decreased from baseline  - folate/b12 wnl during last admission    Plan:  - type and screen  - transfuse HgB < 7  - will obtain consent      Primary pancreatic neuroendocrine tumor  Please see Hypercalcemia    Heart failure with preserved ejection fraction  TTE 9/03 with EF 60%, G1DD, PAP 29. Patient  takes furosemide 40 mg daily  - CXR with effusion, mild edema  - pitting edema on PE    Plan:  - strict intake/output  - lasix 40 IV daily      Abdominal swelling  Abdominal distension on exam, has been getting paracentesis with most recent during last hospital stay. No history of SBP    Plan:  - RUQ ultrasound to evaluate for ascites       VTE Risk Mitigation (From admission, onward)         Ordered     enoxaparin injection 40 mg  Every 24 hours      10/19/20 1614     IP VTE HIGH RISK PATIENT  Once      10/19/20 1614     Place sequential compression device  Until discontinued      10/19/20 1614                   Nohemi Harding MD  Department of Hospital Medicine   [facility]                    10/19/2020                             STAFF PHYSICIAN NOTE                                   Attending Attestation for Rounds with Resident  I have reviewed and concur with the resident's history, physical, assessment, and plan.  I have personally interviewed and examined the patient at bedside and agree with the resident's findings.         69 y.o. female w/ neuroendocrine tumor of the pancreas with metastasis to the liver 2012, HTN, HLD, hypothyroidism, and anemia, parathyroid nodule who presents with a 3 day history of increased fatigue,  altered mental status, lethargy and 3 falls.  Also w constipation , poor appetite, high calcium.  + ascites. Progressive tumor growth since 7/20. K 2.7, mag - pend, scott 14.6, alb 2.6.  due for zometa tomorrow.  CT head - cerebral atrophy, MV ischemia changes. Cxr - pulm edema.  Started IVFs, iv calcitonin, needs bisphosphonates. FULL code confirmed. Consider consulting PC. Patient may need a parcentesis, and set up for serial paracentesis. Hospice is appropriate, but pt and DTR not interested. May need to discuss goals again.                                    Karli Navarro MD  Senior Hospitalist  [id number], [phone]

## 2020-10-20 NOTE — ASSESSMENT & PLAN NOTE
Hx pancreatic neuroendocrine tumor, hyperparathyroidism, and concern for parathyroid adenoma presenting for fatigue and AMS. Prior admission from 9/29-10/7 for hypercalcemia with sestamibi scan concerning for parathyroid adenoma, started on cinacalcet. Takes alendronate q 7 days, also has taken zoledronic acid. Per chart review, patient follows with Dr. Rodriguez and is approaching the end of treatment options given her weakness.   - Ca on arrival corrected to 15.7  - no bowel movement x 7 days  - CXR: left pleural effusion, pulmonary edema  - KUB done 10/19 with moderate stool burden, without obstruction/impaction    Plan:  - IVF  - calcitonin  - zometa 10/20  - CMP q 6 hours  - furosemide 40 IV BID

## 2020-10-20 NOTE — ASSESSMENT & PLAN NOTE
Abdominal distension on exam, has been getting paracentesis with most recent on 10/6. No history of SBP  - RUQ with moderate ascites     Plan:  - can consider paracentesis

## 2020-10-20 NOTE — ASSESSMENT & PLAN NOTE
, HgB 8.4, decreased from baseline  - folate/b12 wnl during last admission    Plan:  - type and screen  - transfuse HgB < 7  - will obtain consent

## 2020-10-20 NOTE — PLAN OF CARE
CM met with patient and daughter, Rocio, for discharge planning.  Rocio states the patient lives with her daughter in a two story house but the patient stays on the first floor with no steps to enter.  Patient ambulates with assistance.  Her daughters will provide assistance at home if needed and will provide ride home.  [facility] for nurse, PT/OT. Plan is to discharge home with continued home health.  The daughters do not want SNF if recommended by PT/OT.    Pharmacy:    [facility] #[id number] - [place] - [address]  [address]  [place] [address]  Phone: [phone] Fax: [phone]    PCP:  Gaby Corea MD    Payor: [employer] MANAGED MEDICARE / Plan: [employer] MEDICARE HMO / Product Type: Capitation /      10/20/20 1255   Discharge Assessment   Assessment Type Discharge Planning Assessment   Confirmed/corrected address and phone number on facesheet? Yes   Assessment information obtained from? Patient   Expected Length of Stay (days) 3   Prior to hospitilization cognitive status: Alert/Oriented   Prior to hospitalization functional status: Needs Assistance   Current cognitive status: Alert/Oriented   Current Functional Status: Needs Assistance   Facility Arrived From: Emergency room   Lives With child(shilpa), adult   Able to Return to Prior Arrangements yes   Is patient able to care for self after discharge? Unable to determine at this time (comments)   Patient's perception of discharge disposition home health   Readmission Within the Last 30 Days previous discharge plan unsuccessful   Patient currently being followed by outpatient case management? No   Patient currently receives any other outside agency services? No   Equipment Currently Used at Home walker, rolling;shower chair;bedside commode  (Transport chair)   Do you have any problems affording any of your prescribed medications? No   Is the patient taking medications as prescribed? yes   Does the patient have  transportation home? Yes   Transportation Anticipated family or friend will provide   Does the patient receive services at the [facility]? No   Discharge Plan A Home Health   Discharge Plan B Skilled Nursing Facility   DME Needed Upon Discharge  none   Patient/Family in Agreement with Plan yes   Readmission Questionnaire   At the time of your discharge, did someone talk to you about what your health problems were? Yes   At the time of discharge, did someone talk to you about what to watch out for regarding worsening of your health problem? Yes   At the time of discharge, did someone talk to you about what to do if you experienced worsening of your health problem? Yes   At the time of discharge, did someone talk to you about which medication to take when you left the hospital and which ones to stop taking? Yes   At the time of discharge, did someone talk to you about when and where to follow up with a doctor after you left the hospital? Yes   How often do you need to have someone help you when you read instructions, pamphlets, or other written material from your doctor or pharmacy? Sometimes   Do you have problems taking your medications as prescribed? No   Do you have any problems affording any of  your prescribed medications? No   Do you have problems obtaining/receiving your medications? No   Living Arrangements house

## 2020-10-20 NOTE — ASSESSMENT & PLAN NOTE
TTE 9/03 with EF 60%, G1DD, PAP 29. Patient takes furosemide 40 mg daily  - CXR with effusion, mild edema  - pitting edema on PE    Plan:  - strict intake/output  - lasix 60 IV BID

## 2020-10-20 NOTE — CONSULTS
[facility]  Palliative Medicine  Consult Note    Patient Name: Ching Bruce  MRN: [id number]  Admission Date: 10/19/2020  Hospital Length of Stay: 1 days  Code Status: Full Code   Attending Provider: Karli Navarro MD  Consulting Provider: ARSENIO Dobbs  Primary Care Physician: Gaby Corea MD  Principal Problem:Hypercalcemia    Patient information was obtained from patient, relative(s), past medical records and ER records.      Consults  Assessment/Plan:     Palliative Care  Reason for Consult: goals of care and advanced care planning     Impression: Ms. 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  Performance status too poor to continue chemotherapy at this time. Continued treatment plan is pending results of tumor board.  Returns to clinic weekly for IV hydration and treatment for hypercalcemia.  Has sustained more than one non traumatic fall. Has been admitted multiple times for hypercalcemia.  At time of this assessment is very lethargic, falls asleep easily  and barely able to participate in conversation.  Appears comfortable with no facial grimacing or moaning.  Does not appear short of breath.  Oxygen saturation 97% on room air.      Given hx of metastatic disease, poor performance status and no current cancer treatment options, clinical condition is appropriate for transition to hospice care.  However, patient and family are not amenable at this time.  Patient and family state interest in any and all life sustaining treatments.       Advance Care Planning     - Advanced directives not received, advanced care planning education provided  - Patient able to make own decisions  - legal next of kin for medical decision making are children   Isabelle Bruce [phone] and Rocio Bruce [phone]  - appears to have poor  insight regarding prognosis  - Resuscitation status: full code per primary team   - CPR and Intubation  "discussed. Patient and family state will pursue all life sustaining interventions      Goals  of Care:   - Palliative medicine APRNmet with  patient/family at bedside.  Palliative medicine introduced  - Today goal remains to be the continued pursuit of continued cancer treatment.  Daughter states their hope is the tumor board decision will be favorable.  - Daughter states understanding her performance status will have to improve.  Patient and family amenable to continuing PT/OT, oral dietary supplements and appetite stimulant   - Patient and family not amenable to changing code status and will pursue all life sustaining treatment options   - Encouraged patient and family to hope for the best and to consider the "what ifs" she does not improve enough to  continue with treatment.   - Hard Choices - decision guide provided     Symptom Management    Pain: no complaints of pain     Bowel Management  - continue current plan miralax, senna docusate    Dyspnea   No complaints of shortness of breath   Room air pulse ox 97%    Nausea   - no c/o nausea at time of this assessment   -continue current plan of care prn ondansetron     Plan/Recommendations   - Continue current plan of care  -Patient and family would benefit from continued information and education regarding clinical condition and treatment plan  -Palliative medicine will continue to follow for ACP and completing of HPOA documents   -Follow up with Dr. Ross in the oncology/pal med outpatient clinic.  Pal med APRN will facilitate follow up appointment  - emotional support provided       Primary team attending and resident  aware of the above goals of care and recommendations.  Thank you for consult and opportunity to participate in patient's care       .        Thank you for your consult. I will follow-up with patient. Please contact us if you have any additional questions.    Subjective:     HPI:    HPI obtained from chart review:       Ms. Bruce is a 68 yo lady " with h PMH of:  pancreatic neuroendocrine tumor  (dx 2012) s/p ERCP, sphincterectomy, and biliary stent placement with removal 9/22, hyperparathyroidism, parathyroid adenoma, and HFpEF (EF 60%, G1DD) presenting for generalized weakness and AMS. History obtained from daughter at bedside as patient is profoundly lethargic. She has had recurrent episodes of hypercalcemia and has been on alendronate, cinacalet, zoledronic acid, and IVF infusions. For the past three days she has had generalized weakness, lethargy, and sustained 3 non-traumatic falls. She has been intermittently confused and has had decreased oral intake. She has not experienced chest pain, palpitations, bone pain, nausea, vomiting, or abdominal pain. She does endorse weight loss, constipation with last BM one week ago, polyuria, abdominal distension, and weakness. Her daughter is unsure if she is passing flatulence. Their oncologist is Dr. Rodriguez who they were scheduled to see tomorrow for IVF.      ED Course:  HDS. Calcium 15.6 corrected, K 2.7. Received 1 liter bolus. Ct head without acute finding. CXR with mild pulmonary edema.        Hospital Course:  No notes on file    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    Secondary malignant neoplasm of liver     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) 5/7/2013    Thrombocytopenia 11/12/2013    Thyroid  disease     Unspecified essential hypertension 11/12/2013       Past Surgical History:   Procedure Laterality Date    ERCP N/A 6/21/2018    Procedure: ERCP, stent exchange;  Surgeon: Jake Lieberman MD;  Location: [facility] ENDO;  Service: Endoscopy;  Laterality: N/A;    ERCP N/A 5/23/2019    Procedure: ERCP (ENDOSCOPIC RETROGRADE CHOLANGIOPANCREATOGRAPHY), stent exchange;  Surgeon: Jake Lieberman MD;  Location: [facility] ENDO;  Service: Endoscopy;  Laterality: N/A;    ERCP N/A 11/14/2019    Procedure: ERCP (ENDOSCOPIC RETROGRADE CHOLANGIOPANCREATOGRAPHY), stent exchange;  Surgeon: Jake Lieberman MD;  Location: [facility] ENDO;  Service: Endoscopy;  Laterality: N/A;    ERCP      ERCP N/A 9/22/2020    Procedure: ERCP (ENDOSCOPIC RETROGRADE CHOLANGIOPANCREATOGRAPHY);  Surgeon: Abdulaziz Owen MD;  Location: [facility];  Service: Endoscopy;  Laterality: N/A;    THYROIDECTOMY  2011       Review of patient's allergies indicates:   Allergen Reactions    Epinephrine Anaphylaxis and Other (See Comments)     Can cause Carcinoid Crisis    Sulfa (sulfonamide antibiotics) Other (See Comments)     Urticaria       Medications:  Continuous Infusions:   sodium chloride 0.9% 225 mL/hr at 10/20/20 0644     Scheduled Meds:   calcitonin  4 Units/kg Intramuscular Q12H    cinacalcet  30 mg Oral BID WM    enoxaparin  40 mg Subcutaneous Q24H    furosemide (LASIX) IV  40 mg Intravenous Q12H    levothyroxine  200 mcg Oral Before breakfast    polyethylene glycol  17 g Oral Daily    potassium chloride  40 mEq Oral Q2H    potassium, sodium phosphates  2 packet Oral Q4H    senna-docusate 8.6-50 mg  1 tablet Oral BID    spironolactone  50 mg Oral Daily    ursodioL  300 mg Oral BID     PRN Meds:acetaminophen, dextrose 50%, dextrose 50%, glucagon (human recombinant), glucose, glucose, melatonin, ondansetron, sodium chloride 0.9%    Family History     Problem Relation (Age of Onset)    Cancer Maternal Grandmother    Diabetes Father         Tobacco Use    Smoking status: Never Smoker    Smokeless tobacco: Never Used   Substance and Sexual Activity    Alcohol use: No    Drug use: No    Sexual activity: Not on file       Review of Systems   Unable to perform ROS: Other   Constitutional: Positive for activity change, appetite change, chills, fatigue and unexpected weight change.   HENT: Negative for trouble swallowing.    Eyes: Negative.    Respiratory: Negative for cough and shortness of breath.    Cardiovascular: Negative for chest pain, palpitations and leg swelling.   Gastrointestinal: Positive for abdominal distention. Negative for nausea and vomiting.   Endocrine: Negative.    Genitourinary: Negative for difficulty urinating.   Musculoskeletal: Negative.    Allergic/Immunologic: Positive for immunocompromised state.   Neurological: Positive for weakness.   Hematological: Negative.    Psychiatric/Behavioral: Negative for agitation, behavioral problems and confusion.     Objective:     Vital Signs (Most Recent):  Temp: 97.5 °F (36.4 °C) (10/19/20 2004)  Pulse: 89 (10/20/20 0749)  Resp: 20 (10/20/20 0300)  BP: 112/61 (10/20/20 0300)  SpO2: 97 % (10/20/20 0300) Vital Signs (24h Range):  Temp:  [97.5 °F (36.4 °C)-98.2 °F (36.8 °C)] 97.5 °F (36.4 °C)  Pulse:  [85-97] 89  Resp:  [16-20] 20  SpO2:  [95 %-97 %] 97 %  BP: (112-129)/(57-61) 112/61     Weight: 59 kg (130 lb)  Body mass index is 20.36 kg/m².    Physical Exam  Vitals signs and nursing note reviewed.   Constitutional:       Appearance: She is ill-appearing.   HENT:      Head: Normocephalic and atraumatic.   Neck:      Musculoskeletal: Normal range of motion.   Cardiovascular:      Rate and Rhythm: Normal rate and regular rhythm.      Pulses: Normal pulses.      Heart sounds: Normal heart sounds.   Pulmonary:      Effort: Pulmonary effort is normal.      Breath sounds: Normal breath sounds.   Musculoskeletal:      Comments: Extreme weakness, non  Ambulatory    Skin:     General: Skin is  warm and dry.   Neurological:      Mental Status: She is alert.         Review of Symptoms    Symptom Assessment (ESAS 0-10 Scale)  Pain:  0  Dyspnea:  0  Anxiety:  0  Nausea:  0  Depression:  0  Anorexia:  0  Fatigue:  0  Insomnia:  0  Restlessness:  0  Agitation:  0         Comments:  Ms. Bruce is very  Lethargic and unable to complete ESAS.  Appears comfortable no behavioral symptoms of pain or dyspnea           OME in 24 hours:  0    Performance Status:  40    ECOG Performance Status Grade:  3 - Confined to bed or chair 50% of waking hours    Living Arrangements:  Lives with family    Psychosocial/Cultural:  lives alone, two adult daughters, Isabelle lives close by and Pranav lives in [place], has been visiting for couple of months.  Has been mostly home bound for the past few months.  She is unable ambulate to clinic.      Spiritual:  F - Nisreen and Belief:  Roman Catholic  A - Address in Care:  Not amenable to  visits       Advance Care Planning   Advance Directives:   Living Will: No        Oral Declaration: No    LaPOST: No    Do Not Resuscitate Status: No    Medical Power of : No        Oral Declaration: No      Decision Making:  Patient answered questions and Family answered questions         Significant Labs: All pertinent labs within the past 24 hours have been reviewed.  CBC:   Recent Labs   Lab 10/20/20  0326   WBC 6.49   HGB 7.7*   HCT 26.0*   *   *     BMP:  Recent Labs   Lab 10/20/20  0326   GLU 75   *   K 3.5      CO2 27   BUN 12   CREATININE 0.7   CALCIUM 12.8*   MG 2.3     LFT:  Lab Results   Component Value Date    AST 19 10/20/2020    ALKPHOS 424 (H) 10/20/2020    BILITOT 2.3 (H) 10/20/2020     Albumin:   Albumin   Date Value Ref Range Status   10/20/2020 2.5 (L) 3.5 - 5.2 g/dL Final     Protein:   Total Protein   Date Value Ref Range Status   10/20/2020 7.0 6.0 - 8.4 g/dL Final     Lactic acid:   Lab Results   Component Value Date    LACTATE 1.2 09/01/2020     LACTATE 1.0 06/29/2014       Significant Imaging: None      > 50% of 70  min visit spent in chart review, face to face discussion of goals of care,  symptom assessment, coordination of care and emotional support.  - additional 20 mins spent in advanced care planning     BROCK Wiley, APRN, ACNS-BC  Palliative Medicine  [facility]

## 2020-10-20 NOTE — PROGRESS NOTES
[facility] Hospital Medicine  Progress Note    Patient Name: Ching Bruce  MRN: [id number]  Patient Class: IP- Inpatient   Admission Date: 10/19/2020  Length of Stay: 1 days  Attending Physician: Karli Navarro MD  Primary Care Provider: Gaby Corea MD    Hospital Medicine Team: [facility] 3 Nohemi Harding MD    Subjective:     Principal Problem:Hypercalcemia        HPI:  Ms. Bruce is a 70 yo female with PMH of pancreatic neuroendocrine tumor  (dx 2012) s/p ERCP, sphincterectomy, and biliary stent placement with removal 9/22, hyperparathyroidism, parathyroid adenoma, and HFpEF (EF 60%, G1DD) presenting for generalized weakness and AMS. History obtained from daughter at bedside as patient is profoundly lethargic. She has had recurrent episodes of hypercalcemia and has been on alendronate, cinacalet, zoledronic acid, and IVF infusions. For the past three days she has had generalized weakness, lethargy, and sustained 3 non-traumatic falls. She has been intermittently confused and has had decreased oral intake. She has not experienced chest pain, palpitations, bone pain, nausea, vomiting, or abdominal pain. She does endorse weight loss, constipation with last BM one week ago, polyuria, abdominal distension, and weakness. Her daughter is unsure if she is passing flatulence. Their oncologist is Dr. Rodriguez who they were scheduled to see tomorrow for IVF.     ED Course:  HDS. Calcium 15.6 corrected, K 2.7. Received 1 liter bolus. Ct head without acute finding. CXR with mild pulmonary edema.     Overview/Hospital Course:  Admitted to hospital medicine for management of severe hypercalcemia. Started on IVF, calcitonin, lasix, cinacalcet, and given one dose of zometa. KUB ordered showed large stool burden without obstruction/impaction. Palliative care was consulted to establish baseline given patient's decline and reucrrent episodes of hypercalcemia with seemingly limited treatment options.      Interval History: No events overnight, HDS and remains on room air. S/p approximately 3 liters of IVF. Serial calcium levels show decrease to 14 corrected. K improving. Patient lethargic on exam this AM, unable to elicit history. Fleet enema yesterday with mild stool. Intake/output not correctly documented in the EMR.     Review of Systems   Constitutional: Positive for activity change, appetite change, fatigue and unexpected weight change. Negative for diaphoresis and fever.   HENT: Negative for rhinorrhea, tinnitus, trouble swallowing and voice change.    Eyes: Negative for visual disturbance.   Respiratory: Negative for cough, choking and shortness of breath.    Cardiovascular: Positive for leg swelling. Negative for chest pain and palpitations.   Gastrointestinal: Positive for abdominal distention and constipation. Negative for abdominal pain, blood in stool, diarrhea, nausea and vomiting.   Endocrine: Positive for polyuria.   Genitourinary: Negative for difficulty urinating, dysuria, flank pain, hematuria and urgency.   Musculoskeletal: Negative for back pain and joint swelling.   Skin: Positive for pallor. Negative for color change.   Neurological: Positive for weakness. Negative for tremors, syncope, light-headedness, numbness and headaches.   Psychiatric/Behavioral: Positive for confusion. Negative for behavioral problems.     Objective:     Vital Signs (Most Recent):  Temp: 97.8 °F (36.6 °C) (10/20/20 1230)  Pulse: 88 (10/20/20 1140)  Resp: 18 (10/20/20 0900)  BP: 124/68 (10/20/20 0900)  SpO2: 97 % (10/20/20 0900) Vital Signs (24h Range):  Temp:  [97.5 °F (36.4 °C)-97.8 °F (36.6 °C)] 97.8 °F (36.6 °C)  Pulse:  [85-97] 88  Resp:  [16-20] 18  SpO2:  [97 %] 97 %  BP: (112-129)/(59-68) 124/68     Weight: 59 kg (130 lb)  Body mass index is 20.36 kg/m².    Intake/Output Summary (Last 24 hours) at 10/20/2020 1503  Last data filed at 10/20/2020 1100  Gross per 24 hour   Intake 1263.33 ml   Output 1350 ml   Net  -86.67 ml      Physical Exam  Vitals signs and nursing note reviewed.   Constitutional:       General: She is not in acute distress.     Appearance: Normal appearance. She is normal weight. She is ill-appearing. She is not toxic-appearing or diaphoretic.   HENT:      Head: Normocephalic and atraumatic.      Nose: Nose normal.      Mouth/Throat:      Mouth: Mucous membranes are dry.   Eyes:      General: No scleral icterus.     Pupils: Pupils are equal, round, and reactive to light.      Comments: Conjunctival pallor   Cardiovascular:      Rate and Rhythm: Normal rate and regular rhythm.      Pulses: Normal pulses.      Heart sounds: Murmur (systolic murmur RSB) present.   Pulmonary:      Effort: Pulmonary effort is normal. No respiratory distress.      Breath sounds: Normal breath sounds.   Abdominal:      General: Abdomen is flat. Bowel sounds are normal. There is distension.      Palpations: Abdomen is soft.      Tenderness: There is no abdominal tenderness. There is no guarding or rebound.   Musculoskeletal: Normal range of motion.         General: No swelling.      Right lower leg: Edema present.      Left lower leg: Edema present.   Lymphadenopathy:      Cervical: No cervical adenopathy.   Skin:     General: Skin is warm and dry.      Capillary Refill: Capillary refill takes less than 2 seconds.      Coloration: Skin is not jaundiced.   Neurological:      Mental Status: She is alert.      Comments: No focal deficit  Alert and oriented to person (daugther) and place ([facility]) but not time  Strength decreased throughout          Significant Labs: All pertinent labs within the past 24 hours have been reviewed.    Significant Imaging: I have reviewed all pertinent imaging results/findings within the past 24 hours.      Assessment/Plan:      * Hypercalcemia  Hx pancreatic neuroendocrine tumor, hyperparathyroidism, and concern for parathyroid adenoma presenting for fatigue and AMS. Prior admission from 9/29-10/7 for  hypercalcemia with sestamibi scan concerning for parathyroid adenoma, started on cinacalcet. Takes alendronate q 7 days, also has taken zoledronic acid. Per chart review, patient follows with Dr. Rodriguez and is approaching the end of treatment options given her weakness.   - Ca on arrival corrected to 15.7  - no bowel movement x 7 days  - CXR: left pleural effusion, pulmonary edema  - KUB done 10/19 with moderate stool burden, without obstruction/impaction    Plan:  - IVF  - calcitonin  - zometa 10/20  - CMP q 6 hours  - furosemide 40 IV BID        Hypokalemia  K 2.7 on presentation    Plan:  - replete as needed  - CMP q6      Macrocytic anemia  , HgB 8.4, decreased from baseline  - folate/b12 wnl during last admission    Plan:  - type and screen  - transfuse HgB < 7  - will obtain consent      Primary pancreatic neuroendocrine tumor  Please see Hypercalcemia    Plan:  - palliative care consult to establish baseline given patient's prognosis and lack of treatment options    Hypothyroidism  - continue home levothyroxine      Heart failure with preserved ejection fraction  TTE 9/03 with EF 60%, G1DD, PAP 29. Patient takes furosemide 40 mg daily  - CXR with effusion, mild edema  - pitting edema on PE    Plan:  - strict intake/output  - lasix 40 IV BID      Abdominal swelling  Abdominal distension on exam, has been getting paracentesis with most recent on 10/6. No history of SBP  - RUQ with moderate ascites     Plan:  - can consider paracentesis       VTE Risk Mitigation (From admission, onward)         Ordered     enoxaparin injection 40 mg  Every 24 hours      10/19/20 1614     IP VTE HIGH RISK PATIENT  Once      10/19/20 1614     Place sequential compression device  Until discontinued      10/19/20 1614                Discharge Planning   MOSES: 10/21/2020     Code Status: Full Code   Is the patient medically ready for discharge?: No    Reason for patient still in hospital (select all that apply):  Treatment  Discharge Plan A: Home Health          Nohemi Harding MD  Department of Hospital Medicine   [facility]                    10/20/2020                             STAFF PHYSICIAN NOTE                                   Attending Attestation for Rounds with Resident  I have reviewed and concur with the resident's history, physical, assessment, and plan.  I have personally interviewed and examined the patient at bedside and agree with the resident's findings.             69 y.o. female w/ neuroendocrine tumor of the pancreas with metastasis to the liver 2012, HTN, HLD, hypothyroidism, and anemia, parathyroid nodule who presents with a 3 day history of increased fatigue,  altered mental status, lethargy and 3 falls.  Also w constipation , poor appetite, high calcium.  + ascites. Progressive tumor growth since 7/20. K 2.7, mag - pend, scott 14.6, alb 2.6.  due for zometa tomorrow.  CT head - cerebral atrophy, MV ischemia changes. Cxr - pulm edema.  Started IVFs, iv calcitonin, zolmeda given,  FULL code confirmed by DTR. Pt willing to f/u w PC in clinic.                               Karli Navarro MD  Senior Hospitalist  [id number], [phone]

## 2020-10-20 NOTE — HOSPITAL COURSE
Admitted to hospital medicine for management of severe hypercalcemia. Started on IVF, calcitonin, lasix, cinacalcet (continued from home), and given one dose of zometa on 10/20. Cinacalcet dose increased to 60 BID on 10/23. KUB ordered showed large stool burden without obstruction/impaction. Palliative care was consulted to establish baseline given patient's decline and recurrent episodes of hypercalcemia with seemingly limited treatment options. Calcium and mental status improved with treatments, however calcium level stable between 12-13. Consultation placed to endocrinology for assistance and recommendations on further treatment options, recommend uptitration of cinacalcet to 90 BID on 10/30 and evaluate for repeat zometa on 10/27. We reached out to our oncology service to have them speak with the patient and family on her limited therapy options. On the morning of 10/26, however, patient and daughter report desire to leave AMA and pursue care in [place]. Pt informed of the risks and still intends to leave AM.

## 2020-10-20 NOTE — ASSESSMENT & PLAN NOTE
Please see Hypercalcemia    Plan:  - palliative care consult to establish baseline given patient's prognosis and lack of treatment options

## 2020-10-20 NOTE — ASSESSMENT & PLAN NOTE
Reason for Consult: goals of care and advanced care planning     Impression: Ms. 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  Performance status too poor to continue chemotherapy at this time. Continued treatment plan is pending results of tumor board.  Returns to clinic weekly for IV hydration and treatment for hypercalcemia.  Has sustained more than one non traumatic fall. Has been admitted multiple times for hypercalcemia.  At time of this assessment is very lethargic, falls asleep easily  and barely able to participate in conversation.  Appears comfortable with no facial grimacing or moaning.  Does not appear short of breath.  Oxygen saturation 97% on room air.      Given hx of metastatic disease, poor performance status and no current cancer treatment options, clinical condition is appropriate for transition to hospice care.  However, patient and family are not amenable at this time.  Patient and family state interest in any and all life sustaining treatments.       Advance Care Planning     - Advanced directives not received, advanced care planning education provided  - Patient able to make own decisions  - legal next of kin for medical decision making are children   Isabelle Bruce [phone] and Rocio Bruce [phone]  - appears to have poor  insight regarding prognosis  - Resuscitation status: full code per primary team   - CPR and Intubation discussed. Patient and family state will pursue all life sustaining interventions      Goals  of Care:   - Palliative medicine APRNmet with  patient/family at bedside.  Palliative medicine introduced  - Today goal remains to be the continued pursuit of continued cancer treatment.  Daughter states their hope is the tumor board decision will be favorable.  - Daughter states understanding her performance status will have to improve.  Patient and family amenable to continuing PT/OT, oral dietary supplements and  "appetite stimulant   - Patient and family not amenable to changing code status and will pursue all life sustaining treatment options   - Encouraged patient and family to hope for the best and to consider the "what ifs" she does not improve enough to  continue with treatment.   - Hard Choices - decision guide provided     Symptom Management    Pain: no complaints of pain     Bowel Management  - continue current plan miralax, senna docusate    Dyspnea   No complaints of shortness of breath   Room air pulse ox 97%    Nausea   - no c/o nausea at time of this assessment   -continue current plan of care prn ondansetron     Plan/Recommendations   - Continue current plan of care  -Patient and family would benefit from continued information and education regarding clinical condition and treatment plan  -Palliative medicine will continue to follow for ACP and completing of HPOA documents   -Follow up with Dr. Ross in the oncology/pal med outpatient clinic.  Pal med APRN will facilitate follow up appointment  - emotional support provided       Primary team attending and resident  aware of the above goals of care and recommendations.  Thank you for consult and opportunity to participate in patient's care       .  "

## 2020-10-20 NOTE — SUBJECTIVE & OBJECTIVE
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    Secondary malignant neoplasm of liver     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) 5/7/2013    Thrombocytopenia 11/12/2013    Thyroid disease     Unspecified essential hypertension 11/12/2013       Past Surgical History:   Procedure Laterality Date    ERCP N/A 6/21/2018    Procedure: ERCP, stent exchange;  Surgeon: Jake Lieberman MD;  Location: [facility];  Service: Endoscopy;  Laterality: N/A;    ERCP N/A 5/23/2019    Procedure: ERCP (ENDOSCOPIC RETROGRADE CHOLANGIOPANCREATOGRAPHY), stent exchange;  Surgeon: Jake Lieberman MD;  Location: [facility];  Service: Endoscopy;  Laterality: N/A;    ERCP N/A 11/14/2019    Procedure: ERCP (ENDOSCOPIC RETROGRADE CHOLANGIOPANCREATOGRAPHY), stent exchange;  Surgeon: Jake Lieberman MD;  Location: [facility] ENDO;  Service: Endoscopy;  Laterality: N/A;    ERCP      ERCP N/A 9/22/2020    Procedure: ERCP (ENDOSCOPIC RETROGRADE CHOLANGIOPANCREATOGRAPHY);  Surgeon: Abdulaziz Owen MD;  Location: [facility];  Service: Endoscopy;  Laterality: N/A;    THYROIDECTOMY  2011       Review of patient's allergies indicates:   Allergen Reactions    Epinephrine Anaphylaxis and Other (See Comments)     Can cause Carcinoid Crisis    Sulfa (sulfonamide antibiotics) Other (See Comments)     Urticaria       Medications:  Continuous Infusions:    sodium chloride 0.9% 225 mL/hr at 10/20/20 0644     Scheduled Meds:   calcitonin  4 Units/kg Intramuscular Q12H    cinacalcet  30 mg Oral BID WM    enoxaparin  40 mg Subcutaneous Q24H    furosemide (LASIX) IV  40 mg Intravenous Q12H    levothyroxine  200 mcg Oral Before breakfast    polyethylene glycol  17 g Oral Daily    potassium chloride  40 mEq Oral Q2H    potassium, sodium phosphates  2 packet Oral Q4H    senna-docusate 8.6-50 mg  1 tablet Oral BID    spironolactone  50 mg Oral Daily    ursodioL  300 mg Oral BID     PRN Meds:acetaminophen, dextrose 50%, dextrose 50%, glucagon (human recombinant), glucose, glucose, melatonin, ondansetron, sodium chloride 0.9%    Family History     Problem Relation (Age of Onset)    Cancer Maternal Grandmother    Diabetes Father        Tobacco Use    Smoking status: Never Smoker    Smokeless tobacco: Never Used   Substance and Sexual Activity    Alcohol use: No    Drug use: No    Sexual activity: Not on file       Review of Systems   Unable to perform ROS: Other   Constitutional: Positive for activity change, appetite change, chills, fatigue and unexpected weight change.   HENT: Negative for trouble swallowing.    Eyes: Negative.    Respiratory: Negative for cough and shortness of breath.    Cardiovascular: Negative for chest pain, palpitations and leg swelling.   Gastrointestinal: Positive for abdominal distention. Negative for nausea and vomiting.   Endocrine: Negative.    Genitourinary: Negative for difficulty urinating.   Musculoskeletal: Negative.    Allergic/Immunologic: Positive for immunocompromised state.   Neurological: Positive for weakness.   Hematological: Negative.    Psychiatric/Behavioral: Negative for agitation, behavioral problems and confusion.     Objective:     Vital Signs (Most Recent):  Temp: 97.5 °F (36.4 °C) (10/19/20 2004)  Pulse: 89 (10/20/20 0749)  Resp: 20 (10/20/20 0300)  BP: 112/61 (10/20/20 0300)  SpO2: 97 % (10/20/20 0300) Vital  Signs (24h Range):  Temp:  [97.5 °F (36.4 °C)-98.2 °F (36.8 °C)] 97.5 °F (36.4 °C)  Pulse:  [85-97] 89  Resp:  [16-20] 20  SpO2:  [95 %-97 %] 97 %  BP: (112-129)/(57-61) 112/61     Weight: 59 kg (130 lb)  Body mass index is 20.36 kg/m².    Physical Exam  Vitals signs and nursing note reviewed.   Constitutional:       Appearance: She is ill-appearing.   HENT:      Head: Normocephalic and atraumatic.   Neck:      Musculoskeletal: Normal range of motion.   Cardiovascular:      Rate and Rhythm: Normal rate and regular rhythm.      Pulses: Normal pulses.      Heart sounds: Normal heart sounds.   Pulmonary:      Effort: Pulmonary effort is normal.      Breath sounds: Normal breath sounds.   Musculoskeletal:      Comments: Extreme weakness, non  Ambulatory    Skin:     General: Skin is warm and dry.   Neurological:      Mental Status: She is alert.         Review of Symptoms    Symptom Assessment (ESAS 0-10 Scale)  Pain:  0  Dyspnea:  0  Anxiety:  0  Nausea:  0  Depression:  0  Anorexia:  0  Fatigue:  0  Insomnia:  0  Restlessness:  0  Agitation:  0         Comments:  Ms. Bruce is very  Lethargic and unable to complete ESAS.  Appears comfortable no behavioral symptoms of pain or dyspnea           OME in 24 hours:  0    Performance Status:  40    ECOG Performance Status Grade:  3 - Confined to bed or chair 50% of waking hours    Living Arrangements:  Lives with family    Psychosocial/Cultural:  lives alone, two adult daughters, Isabelle lives close by and Pranav lives in [place], has been visiting for couple of months.  Has been mostly home bound for the past few months.  She is unable ambulate to clinic.      Spiritual:  F - Nisreen and Belief:  Lutheran  A - Address in Care:  Not amenable to  visits       Advance Care Planning   Advance Directives:   Living Will: No        Oral Declaration: No    LaPOST: No    Do Not Resuscitate Status: No    Medical Power of : No        Oral Declaration: No      Decision  Making:  Patient answered questions and Family answered questions         Significant Labs: All pertinent labs within the past 24 hours have been reviewed.  CBC:   Recent Labs   Lab 10/20/20  0326   WBC 6.49   HGB 7.7*   HCT 26.0*   *   *     BMP:  Recent Labs   Lab 10/20/20  0326   GLU 75   *   K 3.5      CO2 27   BUN 12   CREATININE 0.7   CALCIUM 12.8*   MG 2.3     LFT:  Lab Results   Component Value Date    AST 19 10/20/2020    ALKPHOS 424 (H) 10/20/2020    BILITOT 2.3 (H) 10/20/2020     Albumin:   Albumin   Date Value Ref Range Status   10/20/2020 2.5 (L) 3.5 - 5.2 g/dL Final     Protein:   Total Protein   Date Value Ref Range Status   10/20/2020 7.0 6.0 - 8.4 g/dL Final     Lactic acid:   Lab Results   Component Value Date    LACTATE 1.2 09/01/2020    LACTATE 1.0 06/29/2014       Significant Imaging: None

## 2020-10-20 NOTE — PLAN OF CARE
Problem: Fall Injury Risk  Goal: Absence of Fall and Fall-Related Injury  Outcome: Ongoing, Progressing     Problem: Adult Inpatient Plan of Care  Goal: Plan of Care Review  Outcome: Ongoing, Progressing  Goal: Patient-Specific Goal (Individualization)  Outcome: Ongoing, Progressing  Goal: Absence of Hospital-Acquired Illness or Injury  Outcome: Ongoing, Progressing   Pt stable. No complaints of pain. Pt given fleet enema and stool was removed. Medicated as ordered. Safety and fall precautions maintained. WCTM

## 2020-10-21 PROBLEM — K59.00 CONSTIPATION: Status: ACTIVE | Noted: 2020-01-01

## 2020-10-21 NOTE — ASSESSMENT & PLAN NOTE
Hx pancreatic neuroendocrine tumor, hyperparathyroidism, and concern for parathyroid adenoma presenting for fatigue and AMS. Prior admission from 9/29-10/7 for hypercalcemia with sestamibi scan concerning for parathyroid adenoma, started on cinacalcet. Takes alendronate q 7 days, also has taken zoledronic acid. Per chart review, patient follows with Dr. Rodriguez and is approaching the end of treatment options given her weakness.   - Ca on arrival corrected to 15.7  - no bowel movement x 7 days  - CXR: left pleural effusion, pulmonary edema  - KUB done 10/19 with moderate stool burden, without obstruction/impaction    Plan:  - IVF  - calcitonin  - zometa 10/20  - CMP q 6 hours  - furosemide 60 IV BID

## 2020-10-21 NOTE — PHARMACY MED REC
"Admission Medication Reconciliation - Pharmacy Consult Note    The home medication history was taken by Dawn Chung CPhT.  Based on information gathered and subsequent review by the clinical pharmacist, the items below may need attention.     You may go to "Admission" then "Reconcile Home Medications" tabs to review and/or act upon these items.     Potentially problematic discrepancies with current MAR  o Patient IS taking the following which was not ordered upon admit  o Dronabinol 2.5 mg PO BID     Please address this information as you see fit.  Feel free to contact us if you have any questions or require assistance.    Eri Bethea, PharmD  PGY-2 Internal Medicine Pharmacy Resident  [id number]                .    .            "

## 2020-10-21 NOTE — PROGRESS NOTES
[facility] Hospital Medicine  Progress Note    Patient Name: Ching Bruce  MRN: [id number]  Patient Class: IP- Inpatient   Admission Date: 10/19/2020  Length of Stay: 2 days  Attending Physician: Karli Navarro MD  Primary Care Provider: Gaby Corea MD    Hospital Medicine Team: [facility] HOSP MED 3 Soham Guzman DO    Subjective:     Principal Problem:Hypercalcemia        HPI:  Ms. Bruce is a 70 yo female with PMH of pancreatic neuroendocrine tumor  (dx 2012) s/p ERCP, sphincterectomy, and biliary stent placement with removal 9/22, hyperparathyroidism, parathyroid adenoma, and HFpEF (EF 60%, G1DD) presenting for generalized weakness and AMS. History obtained from daughter at bedside as patient is profoundly lethargic. She has had recurrent episodes of hypercalcemia and has been on alendronate, cinacalet, zoledronic acid, and IVF infusions. For the past three days she has had generalized weakness, lethargy, and sustained 3 non-traumatic falls. She has been intermittently confused and has had decreased oral intake. She has not experienced chest pain, palpitations, bone pain, nausea, vomiting, or abdominal pain. She does endorse weight loss, constipation with last BM one week ago, polyuria, abdominal distension, and weakness. Her daughter is unsure if she is passing flatulence. Their oncologist is Dr. Rodriguez who they were scheduled to see tomorrow for IVF.     ED Course:  HDS. Calcium 15.6 corrected, K 2.7. Received 1 liter bolus. Ct head without acute finding. CXR with mild pulmonary edema.     Overview/Hospital Course:  Admitted to hospital medicine for management of severe hypercalcemia. Started on IVF, calcitonin, lasix, cinacalcet, and given one dose of zometa. KUB ordered showed large stool burden without obstruction/impaction. Palliative care was consulted to establish baseline given patient's decline and reucrrent episodes of hypercalcemia with seemingly limited treatment options.      Interval History: Drowsy this AM but overall more alert today compared to yesterday. Lost peripheral access during the morning and received her calcitonin, K-phos, IV fluids and IV lasix late.    Review of Systems   Constitutional: Positive for activity change, appetite change, fatigue and unexpected weight change. Negative for diaphoresis and fever.   HENT: Negative for rhinorrhea, tinnitus, trouble swallowing and voice change.    Eyes: Negative for visual disturbance.   Respiratory: Negative for cough, choking and shortness of breath.    Cardiovascular: Positive for leg swelling. Negative for chest pain and palpitations.   Gastrointestinal: Positive for abdominal distention and constipation. Negative for abdominal pain, blood in stool, diarrhea, nausea and vomiting.   Endocrine: Positive for polyuria.   Genitourinary: Negative for difficulty urinating, dysuria, flank pain, hematuria and urgency.   Musculoskeletal: Negative for back pain and joint swelling.   Skin: Positive for pallor. Negative for color change.   Neurological: Positive for weakness. Negative for tremors, syncope, light-headedness, numbness and headaches.   Psychiatric/Behavioral: Positive for confusion. Negative for behavioral problems.     Objective:     Vital Signs (Most Recent):  Temp: 97.9 °F (36.6 °C) (10/21/20 1627)  Pulse: 83 (10/21/20 1100)  Resp: 20 (10/21/20 1100)  BP: 110/64 (10/21/20 1100)  SpO2: 97 % (10/21/20 1100) Vital Signs (24h Range):  Temp:  [97.9 °F (36.6 °C)-98.8 °F (37.1 °C)] 97.9 °F (36.6 °C)  Pulse:  [] 83  Resp:  [16-25] 20  SpO2:  [97 %] 97 %  BP: (105-123)/(61-71) 110/64     Weight: 59 kg (130 lb)  Body mass index is 20.36 kg/m².    Intake/Output Summary (Last 24 hours) at 10/21/2020 1731  Last data filed at 10/21/2020 0800  Gross per 24 hour   Intake 120 ml   Output 750 ml   Net -630 ml      Physical Exam  Vitals signs and nursing note reviewed.   Constitutional:       General: She is not in acute distress.      Appearance: Normal appearance. She is normal weight. She is ill-appearing. She is not toxic-appearing or diaphoretic.   HENT:      Head: Normocephalic and atraumatic.      Nose: Nose normal.      Mouth/Throat:      Mouth: Mucous membranes are dry.   Eyes:      General: No scleral icterus.     Pupils: Pupils are equal, round, and reactive to light.      Comments: Conjunctival pallor   Cardiovascular:      Rate and Rhythm: Normal rate and regular rhythm.      Pulses: Normal pulses.      Heart sounds: Murmur (systolic murmur RSB) present.   Pulmonary:      Effort: Pulmonary effort is normal. No respiratory distress.      Breath sounds: Normal breath sounds.   Abdominal:      General: Abdomen is flat. Bowel sounds are normal. There is distension.      Palpations: Abdomen is soft.      Tenderness: There is no abdominal tenderness. There is no guarding or rebound.   Musculoskeletal: Normal range of motion.         General: No swelling.      Right lower leg: Edema present.      Left lower leg: Edema present.   Lymphadenopathy:      Cervical: No cervical adenopathy.   Skin:     General: Skin is warm and dry.      Capillary Refill: Capillary refill takes less than 2 seconds.      Coloration: Skin is not jaundiced.   Neurological:      Comments: No focal deficit  Drowsy but improved from admission  Alert and oriented to person (daugther) and place ([facility]) but not time  Strength decreased throughout          Significant Labs:   CBC:   Recent Labs   Lab 10/20/20  0326 10/21/20  0557   WBC 6.49 7.31   HGB 7.7* 7.9*   HCT 26.0* 27.2*   * 100*     CMP:   Recent Labs   Lab 10/20/20  2356 10/21/20  0557 10/21/20  1141    148* 148*   K 3.1* 3.2* 3.1*    114* 113*   CO2 27 27 26   * 123* 125*   BUN 15 15 16   CREATININE 0.8 0.8 0.8   CALCIUM 11.9* 11.4* 11.6*   PROT 6.8 6.8 6.6   ALBUMIN 2.5* 2.4* 2.4*   BILITOT 1.7* 1.8* 1.8*   ALKPHOS 427* 421* 433*   AST 20 20 19   ALT 12 11 12   ANIONGAP 9 7* 9    EGFRNONAA >60.0 >60.0 >60.0     All pertinent labs within the past 24 hours have been reviewed.    Significant Imaging: I have reviewed all pertinent imaging results/findings within the past 24 hours.      Assessment/Plan:      * Hypercalcemia  Hx pancreatic neuroendocrine tumor, hyperparathyroidism, and concern for parathyroid adenoma presenting for fatigue and AMS. Prior admission from 9/29-10/7 for hypercalcemia with sestamibi scan concerning for parathyroid adenoma, started on cinacalcet. Takes alendronate q 7 days, also has taken zoledronic acid. Per chart review, patient follows with Dr. Rodriguez and is approaching the end of treatment options given her weakness.   - Ca on arrival corrected to 15.7  - no bowel movement x 7 days  - CXR: left pleural effusion, pulmonary edema  - KUB done 10/19 with moderate stool burden, without obstruction/impaction    Plan:  - IVF  - calcitonin  - zometa 10/20  - CMP q 6 hours  - furosemide 60 IV BID        Constipation  - Senna-doc  - Miralax  - Fleet enemas      Hypothyroidism  - continue home levothyroxine      Heart failure with preserved ejection fraction  TTE 9/03 with EF 60%, G1DD, PAP 29. Patient takes furosemide 40 mg daily  - CXR with effusion, mild edema  - pitting edema on PE    Plan:  - strict intake/output  - lasix 60 IV BID    Macrocytic anemia  , HgB 8.4, decreased from baseline  - folate/b12 wnl during last admission    Plan:  - type and screen  - transfuse HgB < 7  - will obtain consent      Hypokalemia  K 2.7 on presentation    Plan:  - replete as needed  - CMP q6      Abdominal swelling  Abdominal distension on exam, has been getting paracentesis with most recent on 10/6. No history of SBP  - RUQ with moderate ascites     Plan:  - can consider paracentesis       Primary pancreatic neuroendocrine tumor  Please see Hypercalcemia    Plan:  - palliative care consult to establish baseline given patient's prognosis and lack of treatment options    VTE  Risk Mitigation (From admission, onward)         Ordered     enoxaparin injection 40 mg  Every 24 hours      10/19/20 1614     IP VTE HIGH RISK PATIENT  Once      10/19/20 1614     Place sequential compression device  Until discontinued      10/19/20 1614                Discharge Planning   MOSES: 10/22/2020     Code Status: Full Code   Is the patient medically ready for discharge?: No    Reason for patient still in hospital (select all that apply): Patient trending condition and Laboratory test  Discharge Plan A: Home Health        Soham Guzman DO  Department of Hospital Medicine   [facility]                      10/21/2020                             STAFF PHYSICIAN NOTE                                   Attending Attestation for Rounds with Resident  I have reviewed and concur with the resident's history, physical, assessment, and plan.  I have personally interviewed and examined the patient at bedside and agree with the resident's findings.              69 y.o. female w/ neuroendocrine tumor of the pancreas with metastasis to the liver 2012, HTN, HLD, hypothyroidism, and anemia, parathyroid nodule who presents with a 3 day history of increased fatigue,  altered mental status, lethargy and 3 falls.  Also w constipation , poor appetite, high calcium.  + ascites. Progressive tumor growth since 7/20. K 2.7, mag - pend, scott 14.6, alb 2.6.  due for zometa tomorrow.  CT head - cerebral atrophy, MV ischemia changes. Cxr - pulm edema.  Started IVFs, iv calcitonin, zolmeda given,  FULL code confirmed by DTR. Pt willing to f/u w PC in clinic.                             Karli Navarro MD  Senior Hospitalist  [id number], [phone]

## 2020-10-21 NOTE — PLAN OF CARE
Problem: Fall Injury Risk  Goal: Absence of Fall and Fall-Related Injury  Outcome: Ongoing, Progressing     Problem: Adult Inpatient Plan of Care  Goal: Plan of Care Review  Outcome: Ongoing, Progressing     Patient remained free of falls today. Patient did not eat much during meals only a few bites. Patient had one episode of vomiting after she ate dinner. Call bell in reach. Bed in lowest position. Safety maintained. Will continue to monitor.

## 2020-10-21 NOTE — SUBJECTIVE & OBJECTIVE
Interval History: Drowsy this AM but overall more alert today compared to yesterday. Lost peripheral access during the morning and received her calcitonin, K-phos, IV fluids and IV lasix late.    Review of Systems   Constitutional: Positive for activity change, appetite change, fatigue and unexpected weight change. Negative for diaphoresis and fever.   HENT: Negative for rhinorrhea, tinnitus, trouble swallowing and voice change.    Eyes: Negative for visual disturbance.   Respiratory: Negative for cough, choking and shortness of breath.    Cardiovascular: Positive for leg swelling. Negative for chest pain and palpitations.   Gastrointestinal: Positive for abdominal distention and constipation. Negative for abdominal pain, blood in stool, diarrhea, nausea and vomiting.   Endocrine: Positive for polyuria.   Genitourinary: Negative for difficulty urinating, dysuria, flank pain, hematuria and urgency.   Musculoskeletal: Negative for back pain and joint swelling.   Skin: Positive for pallor. Negative for color change.   Neurological: Positive for weakness. Negative for tremors, syncope, light-headedness, numbness and headaches.   Psychiatric/Behavioral: Positive for confusion. Negative for behavioral problems.     Objective:     Vital Signs (Most Recent):  Temp: 97.9 °F (36.6 °C) (10/21/20 1627)  Pulse: 83 (10/21/20 1100)  Resp: 20 (10/21/20 1100)  BP: 110/64 (10/21/20 1100)  SpO2: 97 % (10/21/20 1100) Vital Signs (24h Range):  Temp:  [97.9 °F (36.6 °C)-98.8 °F (37.1 °C)] 97.9 °F (36.6 °C)  Pulse:  [] 83  Resp:  [16-25] 20  SpO2:  [97 %] 97 %  BP: (105-123)/(61-71) 110/64     Weight: 59 kg (130 lb)  Body mass index is 20.36 kg/m².    Intake/Output Summary (Last 24 hours) at 10/21/2020 1731  Last data filed at 10/21/2020 0800  Gross per 24 hour   Intake 120 ml   Output 750 ml   Net -630 ml      Physical Exam  Vitals signs and nursing note reviewed.   Constitutional:       General: She is not in acute distress.      Appearance: Normal appearance. She is normal weight. She is ill-appearing. She is not toxic-appearing or diaphoretic.   HENT:      Head: Normocephalic and atraumatic.      Nose: Nose normal.      Mouth/Throat:      Mouth: Mucous membranes are dry.   Eyes:      General: No scleral icterus.     Pupils: Pupils are equal, round, and reactive to light.      Comments: Conjunctival pallor   Cardiovascular:      Rate and Rhythm: Normal rate and regular rhythm.      Pulses: Normal pulses.      Heart sounds: Murmur (systolic murmur RSB) present.   Pulmonary:      Effort: Pulmonary effort is normal. No respiratory distress.      Breath sounds: Normal breath sounds.   Abdominal:      General: Abdomen is flat. Bowel sounds are normal. There is distension.      Palpations: Abdomen is soft.      Tenderness: There is no abdominal tenderness. There is no guarding or rebound.   Musculoskeletal: Normal range of motion.         General: No swelling.      Right lower leg: Edema present.      Left lower leg: Edema present.   Lymphadenopathy:      Cervical: No cervical adenopathy.   Skin:     General: Skin is warm and dry.      Capillary Refill: Capillary refill takes less than 2 seconds.      Coloration: Skin is not jaundiced.   Neurological:      Comments: No focal deficit  Drowsy but improved from admission  Alert and oriented to person (daugther) and place ([facility]) but not time  Strength decreased throughout          Significant Labs:   CBC:   Recent Labs   Lab 10/20/20  0326 10/21/20  0557   WBC 6.49 7.31   HGB 7.7* 7.9*   HCT 26.0* 27.2*   * 100*     CMP:   Recent Labs   Lab 10/20/20  2356 10/21/20  0557 10/21/20  1141    148* 148*   K 3.1* 3.2* 3.1*    114* 113*   CO2 27 27 26   * 123* 125*   BUN 15 15 16   CREATININE 0.8 0.8 0.8   CALCIUM 11.9* 11.4* 11.6*   PROT 6.8 6.8 6.6   ALBUMIN 2.5* 2.4* 2.4*   BILITOT 1.7* 1.8* 1.8*   ALKPHOS 427* 421* 433*   AST 20 20 19   ALT 12 11 12   ANIONGAP 9 7* 9    EGFRNONAA >60.0 >60.0 >60.0     All pertinent labs within the past 24 hours have been reviewed.    Significant Imaging: I have reviewed all pertinent imaging results/findings within the past 24 hours.

## 2020-10-21 NOTE — PLAN OF CARE
Problem: Fall Injury Risk  Goal: Absence of Fall and Fall-Related Injury  Outcome: Ongoing, Progressing     Problem: Adult Inpatient Plan of Care  Goal: Plan of Care Review  Outcome: Ongoing, Progressing  Goal: Patient-Specific Goal (Individualization)  Outcome: Ongoing, Progressing  Goal: Absence of Hospital-Acquired Illness or Injury  Outcome: Ongoing, Progressing  Goal: Optimal Comfort and Wellbeing  Outcome: Ongoing, Progressing   Pt oriented to self. No complaints of pain. NS infusing. No falls or injuries. Call light and personal items within reach. WCTM

## 2020-10-21 NOTE — MEDICAL/APP STUDENT
Progress Note  Hospital Medicine    Primary Team: [facility] [place] [facility]OSP MED 3  Admit Date: 10/19/2020   Length of Stay:  LOS: 2 days   SUBJECTIVE:   Reason for Admission:  Hypercalcemia    HPI:    Patient is a 69 y.o. 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 She has a past history of HTN, HLD, hypothyroidism, and anemia. She has been complaining of lethargy and had 3 falls over the weekend without injury to head or body. During this time she has also had increased weakness, urination and burping with reduced bowel movements, and some n/v a few weeks back. She has been sleeping more and progressively disoriented. She denies pain, fever, headache, chest pain, dyspnea, diarrhea, or muscle aches.      She was diagnosed with a neuroendocrine tumor in 9/2012, with numerous lesions in the liver and pancreas. She began chemotherapy with temozolomide and capecitabine in 1/2013 which she used until 11/2018. Tumor at that time was stable, with a Ki-67 of 1%. In 7/2020, she was found to have progressive disease within the liver, and biopsy showed grade 2 NET with Ki-67 of 15%.    Hospital Course:  HDS. Calcium 15.6 corrected, K 2.7. Received 1 liter bolus. Ct head without acute finding. CXR with mild pulmonary edema.      Admitted to hospital medicine for management of severe hypercalcemia. Started on IVF, calcitonin, lasix, cinacalcet, and given one dose of zometa. KUB ordered showed large stool burden without obstruction/impaction. Palliative care was consulted to establish baseline given patient's decline and reucrrent episodes of hypercalcemia with seemingly limited treatment options.     Interval:  No events overnight, HDS and remains on room air. S/p approximately 3 liters of IVF. Serial calcium levels show decrease to 12.7 corrected. K improving. Patient lethargic on exam this AM, unable to  elicit history.    Review of Systems   Constitutional: Positive for weight loss. Negative for chills, diaphoresis, fever and malaise/fatigue.   HENT: Negative for congestion, hearing loss, nosebleeds, sore throat and tinnitus.    Eyes: Negative for blurred vision, double vision, photophobia and pain.   Respiratory: Negative for cough, hemoptysis, shortness of breath and stridor.    Cardiovascular: Positive for leg swelling. Negative for chest pain and palpitations.   Gastrointestinal: Positive for constipation. Negative for abdominal pain, diarrhea, heartburn, nausea and vomiting.   Genitourinary: Positive for frequency (increased). Negative for dysuria, flank pain and hematuria.   Musculoskeletal: Negative for back pain, falls and neck pain.   Neurological: Positive for weakness. Negative for dizziness, tingling, tremors, sensory change and headaches.        Somnolent   Psychiatric/Behavioral: Negative for depression and hallucinations. The patient is not nervous/anxious.         OBJECTIVE:     Temp:  [97.8 °F (36.6 °C)-98.8 °F (37.1 °C)]   Pulse:  []   Resp:  [16-25]   BP: (105-123)/(61-71)   SpO2:  [97 %]  Body mass index is 20.36 kg/m².  Intake/Outake:  This Shift:  I/O this shift:  In: -   Out: 100 [Urine:100]    Net I/O past 24h:     Intake/Output Summary (Last 24 hours) at 10/21/2020 1102  Last data filed at 10/21/2020 0800  Gross per 24 hour   Intake 237 ml   Output 750 ml   Net -513 ml             Physical Exam  Constitutional:       General: She is not in acute distress.     Appearance: She is ill-appearing.   HENT:      Head: Normocephalic and atraumatic.      Nose: Nose normal.      Mouth/Throat:      Mouth: Mucous membranes are moist.      Pharynx: Oropharynx is clear.   Eyes:      General: No scleral icterus.     Conjunctiva/sclera: Conjunctivae normal.   Neck:      Musculoskeletal: No neck rigidity or muscular tenderness.   Cardiovascular:      Rate and Rhythm: Normal rate and regular rhythm.       Heart sounds: Normal heart sounds. No murmur. No friction rub. No gallop.    Pulmonary:      Effort: Pulmonary effort is normal. No respiratory distress.      Breath sounds: Normal breath sounds. No stridor. No wheezing or rales.   Abdominal:      General: Bowel sounds are normal. There is distension.      Palpations: There is mass (repigastric region, lipoma).      Comments: Abdominal bruit, midline   Musculoskeletal:         General: No deformity.      Right lower leg: No edema.      Left lower leg: No edema.   Skin:     General: Skin is warm and dry.      Coloration: Skin is not pale.      Findings: No bruising or erythema.   Neurological:      Mental Status: She is lethargic and disoriented.      GCS: GCS eye subscore is 3. GCS verbal subscore is 4. GCS motor subscore is 6.      Motor: Weakness present.   Psychiatric:         Attention and Perception: Attention and perception normal.         Mood and Affect: Mood and affect normal.         Speech: Speech normal.         Behavior: Behavior is cooperative.          Laboratory:  CBC/Anemia Labs: Coags:    Recent Labs   Lab 10/19/20  1700 10/20/20  0326 10/21/20  0557   WBC 6.81 6.49 7.31   HGB 7.7* 7.7* 7.9*   HCT 25.4* 26.0* 27.2*   * 116* 100*   * 110* 111*   RDW 17.8* 17.8* 18.3*    No results for input(s): PT, INR, APTT in the last 168 hours.     Chemistries:   Recent Labs   Lab 10/19/20  1833  10/20/20  0326  10/20/20  1800 10/20/20  2356 10/21/20  0557      < > 146*   < > 147* 145 148*   K 3.3*   < > 3.5   < > 4.0 3.1* 3.2*      < > 109   < > 110 109 114*   CO2 27   < > 27   < > 26 27 27   BUN 11   < > 12   < > 14 15 15   CREATININE 0.6   < > 0.7   < > 0.8 0.8 0.8   CALCIUM 12.6*   < > 12.8*   < > 11.9* 11.9* 11.4*   PROT 6.3   < > 7.0   < > 7.2 6.8 6.8   BILITOT 2.1*   < > 2.3*   < > 2.0* 1.7* 1.8*   ALKPHOS 387*   < > 424*   < > 434* 427* 421*   ALT 11   < > 12   < > 14 12 11   AST 20   < > 19   < > 29 20 20   MG 1.7  --  2.3  --    --   --  1.9   PHOS  --   --  1.4*  --   --   --  2.0*    < > = values in this interval not displayed.        Medications:  Scheduled Meds:   calcitonin  4 Units/kg Intramuscular Q12H    cinacalcet  30 mg Oral BID WM    enoxaparin  40 mg Subcutaneous Q24H    furosemide (LASIX) IV  60 mg Intravenous BID    levothyroxine  200 mcg Oral Before breakfast    polyethylene glycol  17 g Oral Daily    potassium phosphate IVPB  30 mmol Intravenous Q6H    senna-docusate 8.6-50 mg  1 tablet Oral BID    spironolactone  50 mg Oral Daily    ursodioL  300 mg Oral BID                             Continuous Infusions:   sodium chloride 0.9%       PRN Meds:.acetaminophen, dextrose 50%, dextrose 50%, glucagon (human recombinant), glucose, glucose, melatonin, ondansetron, sodium chloride 0.9%     ASSESSMENT/PLAN:     Active Hospital Problems    Diagnosis  POA    *Hypercalcemia [E83.52]  Yes    Hypothyroidism [E03.9]  Unknown    Pain [R52]  Unknown    Nausea [R11.0]  Unknown    Dyspnea [R06.00]  Unknown    Macrocytic anemia [D53.9]  Unknown    Heart failure with preserved ejection fraction [I50.30]  Unknown    Palliative care encounter [Z51.5]  Not Applicable    Goals of care, counseling/discussion [Z71.89]  Not Applicable    Advanced care planning/counseling discussion [Z71.89]  Not Applicable    Hypokalemia [E87.6]  Unknown    Abdominal swelling [R19.00]  Yes    Primary pancreatic neuroendocrine tumor [D3A.8]  Yes      Resolved Hospital Problems   No resolved problems to display.     Hypercalcemia of malignancy              Pt is a 68 yo female w/history of neuroendocrine tumor of the pancreas/liver and parathyroid nodule. She has a long cycle of repeat hospitalizations due to hypercalcemia with hypokalemia. During these events, she has increased confusion, lethargy, constipation, and urination. She has had increased falls during this time, and home regimen is ineffective at this time. Her calcium is elevated  to 14.6.    Plan:  - IVF  - calcitonin until corrected calcium below 12.0  - zometa 10/20  - CMP q 6 hours  - furosemide 60 IV BID    Hypokalemia  K 2.7 on presentation, currently 3.4     Plan:  - replete as needed        Macrocytic anemia  , HgB 7.9 decreased from baseline  - folate/b12 wnl during last admission     Plan:  - type and screen  - transfuse HgB < 7        Primary pancreatic neuroendocrine tumor  Please see Hypercalcemia     Plan:  - palliative care consult to establish baseline given patient's prognosis and lack of treatment options     Hypothyroidism  - continue home levothyroxine        Heart failure with preserved ejection fraction  TTE 9/03 with EF 60%, G1DD, PAP 29. Patient takes furosemide 40 mg daily  - CXR with effusion, mild edema  - pitting edema on PE     Plan:  - strict intake/output  - lasix 60 IV BID         CODE Status- full code      Logan Rosales, MS4

## 2020-10-22 PROBLEM — T17.308A CHOKING: Status: ACTIVE | Noted: 2020-01-01

## 2020-10-22 NOTE — PLAN OF CARE
Problem: SLP Goal  Goal: SLP Goal  Description: Speech Language Pathology Goals  Goals expected to be met by 10/29:  1. Patient will tolerate a dysphagia soft diet and thin liquids with  no overt signs of airway compromise.   Outcome: Ongoing, Progressing     Swallow eval completed. SLP to follow up to ensure tolerance,.   Emily P. Abadie M.S., CCC-SLP  Speech Language Pathologist  [phone]  10/22/2020

## 2020-10-22 NOTE — PHYSICIAN QUERY
PT Name: Ching Bruce  MR #: [id number]     ACUITY OF CONDITION CLARIFICATION      CDS/: Sheldon Jeff        Contact information:   [email or web address]      This form is a permanent document in the medical record.     Query Date: October 22, 2020    By submitting this query, we are merely seeking further clarification of documentation to reflect the severity of illness of your patient. Please utilize your independent clinical judgment when addressing the question(s) below.    The Medical record reflects the following:     Indicators   Supporting Clinical Findings Location in Medical Record   x Documentation of condition Heart failure with preserved ejection fraction  TTE 9/03 with EF 60%, G1DD, PAP 29. Patient takes furosemide 40 mg daily  - CXR with effusion, mild edema  - pitting edema on PE   Plan:  - strict intake/output  - lasix 40 IV daily 10/19 , Hospital medicine    Lab Value(s)     x Radiology Findings There is a small amount of dependent left pleural fluid causing blunting of the left lateral costophrenic angle.  Right pleural fluid is neither confirmed nor excluded....Pulmonary vessels are slightly ill-defined raising the question of pulmonary edema or other form of interstitial lung disease.    10/19 CXR           x Treatment/Medication Furosemide, IV; 10/19, 10/20, 10/21-22   MAR    Other        Provider, please specify the acuity/chronicity of       Heart failure with preserved ejection fraction :    [   ] Acute   [   ] Chronic   [ X  ] Acute on chronic   [   ]  Clinically Undetermined     Please document in your progress notes daily for the duration of treatment until resolved, and include in your discharge summary.

## 2020-10-22 NOTE — ASSESSMENT & PLAN NOTE
Abdominal distension on exam, has been getting paracentesis with most recent on 10/6. No history of SBP  - RUQ US with moderate ascites     Plan:  - can consider paracentesis

## 2020-10-22 NOTE — MEDICAL/APP STUDENT
Progress Note  Hospital Medicine    Primary Team: [facility]OS[facility] MED 3  Admit Date: 10/19/2020   Length of Stay:  LOS: 3 days   SUBJECTIVE:   Reason for Admission:  Hypercalcemia    HPI:    Patient is a 69 y.o. 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 She has a past history of HTN, HLD, hypothyroidism, and anemia. She has been complaining of lethargy and had 3 falls over the weekend without injury to head or body. During this time she has also had increased weakness, urination and burping with reduced bowel movements, and some n/v a few weeks back. She has been sleeping more and progressively disoriented. She denies pain, fever, headache, chest pain, dyspnea, diarrhea, or muscle aches.      She was diagnosed with a neuroendocrine tumor in 9/2012, with numerous lesions in the liver and pancreas. She began chemotherapy with temozolomide and capecitabine in 1/2013 which she used until 11/2018. Tumor at that time was stable, with a Ki-67 of 1%. In 7/2020, she was found to have progressive disease within the liver, and biopsy showed grade 2 NET with Ki-67 of 15%.     Hospital Course:  HDS. Calcium 15.6 corrected, K 2.7. Received 1 liter bolus. Ct head without acute finding. CXR with mild pulmonary edema.      Admitted to hospital medicine for management of severe hypercalcemia. Started on IVF, calcitonin, lasix, cinacalcet, and given one dose of zometa. KUB ordered showed large stool burden without obstruction/impaction. Palliative care was consulted to establish baseline given patient's decline and reucrrent episodes of hypercalcemia with seemingly limited treatment options.     Interval:  Pt is more alert today, opening eyes spontaneously and speaking in full sentences without falling asleep. She denies any pain or distress. She had one episode of vomiting overnight, but has not had  one since. She aspirated some food while eating, so a ST eval was ordered, determining a dysphagia soft/thin liquid diet would be best.    Review of Systems   Constitutional: Negative for chills, diaphoresis, fever and weight loss.   HENT: Negative for congestion, ear pain, hearing loss, nosebleeds, sinus pain and sore throat.    Eyes: Negative for blurred vision, pain and redness.   Respiratory: Negative for cough, hemoptysis, sputum production, shortness of breath and wheezing.    Cardiovascular: Negative for chest pain, orthopnea, claudication and leg swelling.   Gastrointestinal: Negative for abdominal pain, constipation, diarrhea, heartburn, nausea and vomiting.   Genitourinary: Negative for dysuria, flank pain, frequency, hematuria and urgency.   Musculoskeletal: Negative for back pain, joint pain, myalgias and neck pain.   Skin: Negative for itching and rash.   Neurological: Positive for weakness. Negative for dizziness, tremors, focal weakness, seizures and headaches.   Psychiatric/Behavioral: Negative for depression and substance abuse. The patient is not nervous/anxious.         OBJECTIVE:     Temp:  [97.8 °F (36.6 °C)-98.5 °F (36.9 °C)]   Pulse:  [82-96]   Resp:  [19-25]   BP: (111-126)/(63-72)   SpO2:  [97 %-98 %]  Body mass index is 20.36 kg/m².  Intake/Outake:  This Shift:  I/O this shift:  In: 358 [P.O.:358]  Out: 400 [Urine:400]    Net I/O past 24h:     Intake/Output Summary (Last 24 hours) at 10/22/2020 1535  Last data filed at 10/22/2020 1300  Gross per 24 hour   Intake 358 ml   Output 1550 ml   Net -1192 ml             Physical Exam  Constitutional:       General: She is awake. She is not in acute distress.     Appearance: She is underweight.   HENT:      Head: Normocephalic and atraumatic.      Nose: Nose normal.      Mouth/Throat:      Mouth: Mucous membranes are moist.      Pharynx: Oropharynx is clear.   Eyes:      General: No scleral icterus.     Extraocular Movements: Extraocular movements  intact.      Pupils: Pupils are equal, round, and reactive to light.   Neck:      Musculoskeletal: Normal range of motion. No neck rigidity or muscular tenderness.      Vascular: No carotid bruit.   Cardiovascular:      Rate and Rhythm: Normal rate and regular rhythm.      Pulses: Normal pulses.      Heart sounds: Normal heart sounds. No murmur. No friction rub. No gallop.    Pulmonary:      Effort: Pulmonary effort is normal. No respiratory distress.      Breath sounds: Normal breath sounds. No stridor. No wheezing or rales.   Abdominal:      General: Bowel sounds are normal. There is distension and abdominal bruit.      Palpations: Abdomen is soft. There is shifting dullness.      Tenderness: There is no abdominal tenderness.   Musculoskeletal: Normal range of motion.         General: No swelling or tenderness.      Right lower leg: Edema (trace) present.      Left lower leg: Edema (trace) present.   Lymphadenopathy:      Cervical: No cervical adenopathy.   Skin:     General: Skin is warm and dry.      Coloration: Skin is not jaundiced or pale.      Findings: No bruising or rash.   Neurological:      General: No focal deficit present.      Mental Status: She is alert. Mental status is at baseline. She is disoriented.      Motor: Weakness present.   Psychiatric:         Mood and Affect: Mood normal.         Behavior: Behavior normal.         Thought Content: Thought content normal.         Judgment: Judgment normal.          Laboratory:  CBC/Anemia Labs: Coags:    Recent Labs   Lab 10/20/20  0326 10/21/20  0557 10/22/20  0412   WBC 6.49 7.31 6.95   HGB 7.7* 7.9* 6.7*   HCT 26.0* 27.2* 22.8*   * 100* 83*   * 111* 111*   RDW 17.8* 18.3* 18.5*    No results for input(s): PT, INR, APTT in the last 168 hours.     Chemistries:   Recent Labs   Lab 10/21/20  0557  10/22/20  0412 10/22/20  0413 10/22/20  0716 10/22/20  1200   *   < >  --  139 146* 143   K 3.2*   < >  --  7.4* 3.3* 3.2*   *   < >   --  103 110 106   CO2 27   < >  --  24 26 29   BUN 15   < >  --  15 16 17   CREATININE 0.8   < >  --  0.9 0.8 0.9   CALCIUM 11.4*   < >  --  11.2* 10.8* 11.1*   PROT 6.8   < >  --  6.2 6.5 6.7   BILITOT 1.8*   < >  --  1.7* 1.9* 1.8*   ALKPHOS 421*   < >  --  403* 423* 444*   ALT 11   < >  --  9* 11 11   AST 20   < >  --  19 18 19   MG 1.9  --  1.5*  --  1.6  --    PHOS 2.0*  --  >15.0*  --  3.7  --     < > = values in this interval not displayed.        Medications:  Scheduled Meds:   cinacalcet  30 mg Oral BID WM    enoxaparin  40 mg Subcutaneous Q24H    furosemide (LASIX) IV  60 mg Intravenous BID    levothyroxine  200 mcg Oral Before breakfast    polyethylene glycol  17 g Oral Daily    senna-docusate 8.6-50 mg  1 tablet Oral BID    spironolactone  50 mg Oral Daily    ursodioL  300 mg Oral BID                             Continuous Infusions:   sodium chloride 0.45% Stopped (10/22/20 1145)     PRN Meds:.sodium chloride, sodium chloride, acetaminophen, dextrose 50%, dextrose 50%, glucagon (human recombinant), glucose, glucose, melatonin, ondansetron, sodium chloride 0.9%     ASSESSMENT/PLAN:     Active Hospital Problems    Diagnosis  POA    *Hypercalcemia [E83.52]  Yes    Constipation [K59.00]  Unknown    Hypothyroidism [E03.9]  Unknown    Pain [R52]  Unknown    Nausea [R11.0]  Unknown    Dyspnea [R06.00]  Unknown    Macrocytic anemia [D53.9]  Unknown    Heart failure with preserved ejection fraction [I50.30]  Unknown    Palliative care encounter [Z51.5]  Not Applicable    Goals of care, counseling/discussion [Z71.89]  Not Applicable    Advanced care planning/counseling discussion [Z71.89]  Not Applicable    Hypokalemia [E87.6]  Unknown    Abdominal swelling [R19.00]  Yes    Primary pancreatic neuroendocrine tumor [D3A.8]  Yes      Resolved Hospital Problems   No resolved problems to display.     Hypercalcemia of malignancy              Pt is a 68 yo female w/history of neuroendocrine tumor  of the pancreas/liver and parathyroid nodule. She has a long cycle of repeat hospitalizations due to hypercalcemia with hypokalemia. During these events, she has increased confusion, lethargy, constipation, and urination. She has had increased falls during this time, and home regimen is ineffective at this time. Her calcium is elevated to 12.6 corrected.     Plan:  - IVF  - calcitonin given for 48 hours, switch to cinacalcet  - zometa 10/20  - CMP q 6 hours  - furosemide 60 IV BID     Hypokalemia  K 2.7 on presentation, currently 3.3     Plan:  - replete as needed        Macrocytic anemia  , HgB 6.7 decreased from baseline. Given 1 unit PRBC  - folate/b12 wnl during last admission     Plan:  - type and screen  - transfuse HgB < 7        Primary pancreatic neuroendocrine tumor  Please see Hypercalcemia     Plan:  - palliative care consult to establish baseline given patient's prognosis and lack of treatment options     Hypothyroidism  - continue home levothyroxine        Heart failure with preserved ejection fraction  TTE 9/03 with EF 60%, G1DD, PAP 29. Patient takes furosemide 40 mg daily  - CXR with effusion, mild edema  - pitting edema on PE     Plan:  - strict intake/output  - lasix 60 IV BID       CODE Status- Full Code    Logan Rosales, MS4

## 2020-10-22 NOTE — SUBJECTIVE & OBJECTIVE
Interval History: Lost IV access overnight and K phos/IVF were delayed. Hemodynamically stable, sats wnl on RA. Alert and responsive this morning. Had bowel movement yesterday.    Review of Systems   Constitutional: Positive for activity change, appetite change, fatigue and unexpected weight change. Negative for diaphoresis and fever.   HENT: Negative for rhinorrhea, tinnitus, trouble swallowing and voice change.    Eyes: Negative for visual disturbance.   Respiratory: Negative for cough, choking and shortness of breath.    Cardiovascular: Positive for leg swelling. Negative for chest pain and palpitations.   Gastrointestinal: Positive for abdominal distention and constipation. Negative for abdominal pain, blood in stool, diarrhea, nausea and vomiting.   Endocrine: Positive for polyuria.   Genitourinary: Negative for difficulty urinating, dysuria, flank pain, hematuria and urgency.   Musculoskeletal: Negative for back pain and joint swelling.   Skin: Positive for pallor. Negative for color change.   Neurological: Positive for weakness. Negative for tremors, syncope, light-headedness, numbness and headaches.   Psychiatric/Behavioral: Positive for confusion. Negative for behavioral problems.     Objective:     Vital Signs (Most Recent):  Temp: 98 °F (36.7 °C) (10/22/20 1515)  Pulse: 93 (10/22/20 1525)  Resp: (!) 21 (10/22/20 1515)  BP: 116/66 (10/22/20 1515)  SpO2: 98 % (10/22/20 1515) Vital Signs (24h Range):  Temp:  [97.8 °F (36.6 °C)-98.5 °F (36.9 °C)] 98 °F (36.7 °C)  Pulse:  [82-96] 93  Resp:  [19-25] 21  SpO2:  [97 %-98 %] 98 %  BP: (111-126)/(63-72) 116/66     Weight: 59 kg (130 lb)  Body mass index is 20.36 kg/m².    Intake/Output Summary (Last 24 hours) at 10/22/2020 1536  Last data filed at 10/22/2020 1300  Gross per 24 hour   Intake 358 ml   Output 1550 ml   Net -1192 ml      Physical Exam  Vitals signs and nursing note reviewed.   Constitutional:       General: She is not in acute distress.     Appearance:  Normal appearance. She is normal weight. She is ill-appearing. She is not toxic-appearing or diaphoretic.   HENT:      Head: Normocephalic and atraumatic.      Nose: Nose normal.      Mouth/Throat:      Mouth: Mucous membranes are dry.   Eyes:      General: No scleral icterus.     Pupils: Pupils are equal, round, and reactive to light.      Comments: Conjunctival pallor   Cardiovascular:      Rate and Rhythm: Normal rate and regular rhythm.      Pulses: Normal pulses.      Heart sounds: Murmur (systolic murmur RSB) present.   Pulmonary:      Effort: Pulmonary effort is normal. No respiratory distress.      Breath sounds: Normal breath sounds.   Abdominal:      General: Abdomen is flat. Bowel sounds are normal. There is distension.      Palpations: Abdomen is soft.      Tenderness: There is no abdominal tenderness. There is no guarding or rebound.   Musculoskeletal: Normal range of motion.         General: No swelling.      Right lower leg: Edema present.      Left lower leg: Edema present.   Lymphadenopathy:      Cervical: No cervical adenopathy.   Skin:     General: Skin is warm and dry.      Capillary Refill: Capillary refill takes less than 2 seconds.      Coloration: Skin is not jaundiced.   Neurological:      Mental Status: She is alert.      Comments: No focal deficit  Alert and oriented to person (daugther) and place ([facility]) but not time  Strength decreased throughout          Significant Labs: All pertinent labs within the past 24 hours have been reviewed.    Significant Imaging: I have reviewed all pertinent imaging results/findings within the past 24 hours.

## 2020-10-22 NOTE — PHYSICIAN QUERY
PT Name: Ching Bruce  MR #: [id number]     Etiology of Condition Clarification      CDS/: Sheldon Mcfarlane RN               Contact information:   [email or web address]      This form is a permanent document in the medical record.     Query Date: October 22, 2020    By submitting this query, we are merely seeking further clarification of documentation.  Please utilize your independent clinical judgment when addressing the question(s) below.     The Medical Record contains the following:    Clinical Information Location in Medical Record     PMH of pancreatic neuroendocrine tumor  (dx 2012) s/p ERCP, sphincterectomy, and biliary stent placement with removal 9/22, hyperparathyroidism, parathyroid adenoma, and HFpEF .....  ......Prior admission from 9/29-10/7 for hypercalcemia with sestamibi scan concerning for parathyroid adenoma, started on cinacalcet....... Ca on arrival corrected to 15.7.......  .....recurrent episodes of hypercalcemia and has been on alendronate, cinacalet, zoledronic acid, and IVF infusions........     ....There is hypoattenuation in a periventricular fashion, likely sequela of chronic microvascular ischemic change.  There is no evidence of acute major vascular territory infarct, hemorrhage, or mass.  There is no hydrocephalus.  There are no abnormal extra-axial fluid collections.....       10/19 , [facility]                  10/19 CT Head Without Contrast     Please document your best medical opinion regarding the etiology of     hyperparathyroidism ?       [ X  ] Parathyroid adenoma   [   ] Other parathyroid condition- please specify: __________________   [   ] Other etiology (please specify):___________________   [  ] Clinically Undetermined       Please document in your progress notes daily for the duration of treatment, until resolved, and include in your discharge summary.

## 2020-10-22 NOTE — PHYSICIAN QUERY
"PT Name: Ching Bruce  MR #: [id number]    Hematology Clarification     CDS/: Sheldon Mcfarlane RN               Contact information:   [email or web address]      This form is a permanent document in the medical record.     Query Date: October 22, 2020    By submitting this query, we are merely seeking further clarification of documentation. Please utilize your independent clinical judgment when addressing the question(s) below.    The medical record contains the following:   Indicators Supporting Clinical Findings Location in Medical Record    "Thrombocytopenia" documented      x Platelets 10/19 PLTs : 129, 131  10/20 PLTs  1116  10/21 PLTs: 100  10/22 PLTs:  83   Labs      Acute bleeding, Petechiae, Bruising     x Anticoagulant medication Enoxaparin, SQ: 10/19-21 MAR      Transfusion(s)      Treatments      Other         Provider, please specify diagnosis or diagnoses associated with above clinical findings.    [   ] Primary thrombocytopenia   [   ] Secondary thrombocytopenia related to (please specify): _____________   [ X  ] Unspecified thrombocytopenia   [   ] Drug induced thrombocytopenia (please specify drug): __________   [   ] Other hematological diagnosis (please specify): ________________   [  ] Clinically Undetermined         Please document in your progress notes daily for the duration of treatment, until resolved, and include in your discharge summary.                                                                                                                                                                                                "

## 2020-10-22 NOTE — ASSESSMENT & PLAN NOTE
, HgB 8.4, decreased from baseline  - folate/b12 wnl during last admission  - HgB 6.7 today    Plan:  - type and screen  - transfuse HgB < 7, will transfuse 1 unit

## 2020-10-22 NOTE — ASSESSMENT & PLAN NOTE
Hx pancreatic neuroendocrine tumor, hyperparathyroidism, and concern for parathyroid adenoma presenting for fatigue and AMS. Prior admission from 9/29-10/7 for hypercalcemia with sestamibi scan concerning for parathyroid adenoma, started on cinacalcet. Takes alendronate q 7 days, also has taken zoledronic acid. Per chart review, patient follows with Dr. Rodriguez and is approaching the end of treatment options given her weakness.   - Ca on arrival corrected to 15.7  - no bowel movement x 7 days  - CXR: left pleural effusion, pulmonary edema  - KUB done 10/19 with moderate stool burden, without obstruction/impaction  - Calcium is trending down    Plan:  - IVF - 1/2 ns at 100 cc/hr  - calcitonin x 48 hours, completed  - zometa 10/20 x 1  - CMP q 6 hours  - furosemide 60 IV BID

## 2020-10-22 NOTE — PROGRESS NOTES
[facility] Hospital Medicine  Progress Note    Patient Name: Ching Bruce  MRN: [id number]  Patient Class: IP- Inpatient   Admission Date: 10/19/2020  Length of Stay: 3 days  Attending Physician: Sylwia Guillen MD  Primary Care Provider: Gaby Corea MD    Hospital Medicine Team: [facility] 3 Nohemi Harding MD    Subjective:     Principal Problem:Hypercalcemia        HPI:  Ms. Bruce is a 68 yo female with PMH of pancreatic neuroendocrine tumor  (dx 2012) s/p ERCP, sphincterectomy, and biliary stent placement with removal 9/22, hyperparathyroidism, parathyroid adenoma, and HFpEF (EF 60%, G1DD) presenting for generalized weakness and AMS. History obtained from daughter at bedside as patient is profoundly lethargic. She has had recurrent episodes of hypercalcemia and has been on alendronate, cinacalet, zoledronic acid, and IVF infusions. For the past three days she has had generalized weakness, lethargy, and sustained 3 non-traumatic falls. She has been intermittently confused and has had decreased oral intake. She has not experienced chest pain, palpitations, bone pain, nausea, vomiting, or abdominal pain. She does endorse weight loss, constipation with last BM one week ago, polyuria, abdominal distension, and weakness. Her daughter is unsure if she is passing flatulence. Their oncologist is Dr. Rodriguez who they were scheduled to see tomorrow for IVF.     ED Course:  HDS. Calcium 15.6 corrected, K 2.7. Received 1 liter bolus. Ct head without acute finding. CXR with mild pulmonary edema.     Overview/Hospital Course:  Admitted to hospital medicine for management of severe hypercalcemia. Started on IVF, calcitonin, lasix, cinacalcet, and given one dose of zometa. KUB ordered showed large stool burden without obstruction/impaction. Palliative care was consulted to establish baseline given patient's decline and reucrrent episodes of hypercalcemia with seemingly limited treatment options.  Calcium and mental status improved.     Interval History: Lost IV access overnight and K phos/IVF were delayed. Hemodynamically stable, sats wnl on RA. Alert and responsive this morning. Had bowel movement yesterday. Patient had one episode of emesis overnight after dinner. Per nursing patient choked while taking medication this AM.     Review of Systems   Constitutional: Positive for activity change, appetite change, fatigue and unexpected weight change. Negative for diaphoresis and fever.   HENT: Negative for rhinorrhea, tinnitus, trouble swallowing and voice change.    Eyes: Negative for visual disturbance.   Respiratory: Negative for cough, choking and shortness of breath.    Cardiovascular: Positive for leg swelling. Negative for chest pain and palpitations.   Gastrointestinal: Positive for abdominal distention and constipation. Negative for abdominal pain, blood in stool, diarrhea, nausea and vomiting.   Endocrine: Positive for polyuria.   Genitourinary: Negative for difficulty urinating, dysuria, flank pain, hematuria and urgency.   Musculoskeletal: Negative for back pain and joint swelling.   Skin: Positive for pallor. Negative for color change.   Neurological: Positive for weakness. Negative for tremors, syncope, light-headedness, numbness and headaches.   Psychiatric/Behavioral: Positive for confusion. Negative for behavioral problems.     Objective:     Vital Signs (Most Recent):  Temp: 98 °F (36.7 °C) (10/22/20 1515)  Pulse: 93 (10/22/20 1525)  Resp: (!) 21 (10/22/20 1515)  BP: 116/66 (10/22/20 1515)  SpO2: 98 % (10/22/20 1515) Vital Signs (24h Range):  Temp:  [97.8 °F (36.6 °C)-98.5 °F (36.9 °C)] 98 °F (36.7 °C)  Pulse:  [82-96] 93  Resp:  [19-25] 21  SpO2:  [97 %-98 %] 98 %  BP: (111-126)/(63-72) 116/66     Weight: 59 kg (130 lb)  Body mass index is 20.36 kg/m².    Intake/Output Summary (Last 24 hours) at 10/22/2020 1536  Last data filed at 10/22/2020 1300  Gross per 24 hour   Intake 358 ml   Output  1550 ml   Net -1192 ml      Physical Exam  Vitals signs and nursing note reviewed.   Constitutional:       General: She is not in acute distress.     Appearance: Normal appearance. She is normal weight. She is ill-appearing. She is not toxic-appearing or diaphoretic.   HENT:      Head: Normocephalic and atraumatic.      Nose: Nose normal.      Mouth/Throat:      Mouth: Mucous membranes are dry.   Eyes:      General: No scleral icterus.     Pupils: Pupils are equal, round, and reactive to light.      Comments: Conjunctival pallor   Cardiovascular:      Rate and Rhythm: Normal rate and regular rhythm.      Pulses: Normal pulses.      Heart sounds: Murmur (systolic murmur RSB) present.   Pulmonary:      Effort: Pulmonary effort is normal. No respiratory distress.      Breath sounds: Normal breath sounds.   Abdominal:      General: Abdomen is flat. Bowel sounds are normal. There is distension.      Palpations: Abdomen is soft.      Tenderness: There is no abdominal tenderness. There is no guarding or rebound.   Musculoskeletal: Normal range of motion.         General: No swelling.      Right lower leg: Edema present.      Left lower leg: Edema present.   Lymphadenopathy:      Cervical: No cervical adenopathy.   Skin:     General: Skin is warm and dry.      Capillary Refill: Capillary refill takes less than 2 seconds.      Coloration: Skin is not jaundiced.   Neurological:      Mental Status: She is alert.      Comments: No focal deficit  Alert and oriented to person (daugther) and place ([facility]) but not time  Strength decreased throughout          Significant Labs: All pertinent labs within the past 24 hours have been reviewed.    Significant Imaging: I have reviewed all pertinent imaging results/findings within the past 24 hours.      Assessment/Plan:      * Hypercalcemia  Hx pancreatic neuroendocrine tumor, hyperparathyroidism, and concern for parathyroid adenoma presenting for fatigue and AMS. Prior admission from  9/29-10/7 for hypercalcemia with sestamibi scan concerning for parathyroid adenoma, started on cinacalcet. Takes alendronate q 7 days, also has taken zoledronic acid. Per chart review, patient follows with Dr. Rodriguez and is approaching the end of treatment options given her weakness.   - Ca on arrival corrected to 15.7  - no bowel movement x 7 days  - CXR: left pleural effusion, pulmonary edema  - KUB done 10/19 with moderate stool burden, without obstruction/impaction  - Calcium is trending down    Plan:  - IVF - 1/2 ns at 100 cc/hr  - calcitonin x 48 hours, completed  - zometa 10/20 x 1  - CMP q 6 hours  - furosemide 60 IV BID        Hypokalemia  K 2.7 on presentation    Plan:  - replete as needed  - CMP q6    Macrocytic anemia  , HgB 8.4, decreased from baseline  - folate/b12 wnl during last admission  - HgB 6.7 today    Plan:  - type and screen  - transfuse HgB < 7, will transfuse 1 unit      Primary pancreatic neuroendocrine tumor  Please see Hypercalcemia    Plan:  - palliative care consult to establish baseline given patient's prognosis and lack of treatment options    Choking  - SLP evaluation, dysphagia soft diet/thin liquids      Constipation  - Senna-doc  - Miralax  - Fleet enemas      Hypothyroidism  - continue home levothyroxine      Heart failure with preserved ejection fraction  TTE 9/03 with EF 60%, G1DD, PAP 29. Patient takes furosemide 40 mg daily  - CXR with effusion, mild edema  - pitting edema on PE    Plan:  - strict intake/output  - lasix 60 IV BID    Abdominal swelling  Abdominal distension on exam, has been getting paracentesis with most recent on 10/6. No history of SBP  - RUQ US with moderate ascites     Plan:  - can consider paracentesis       VTE Risk Mitigation (From admission, onward)         Ordered     enoxaparin injection 40 mg  Every 24 hours      10/19/20 1614     IP VTE HIGH RISK PATIENT  Once      10/19/20 1614     Place sequential compression device  Until discontinued       10/19/20 1614                Discharge Planning   MOSES: 10/23/2020     Code Status: Full Code   Is the patient medically ready for discharge?: No    Reason for patient still in hospital (select all that apply): Treatment  Discharge Plan A: Home Health          Nohemi Harding MD  Department of Hospital Medicine   [facility]

## 2020-10-22 NOTE — PLAN OF CARE
Problem: Adult Inpatient Plan of Care  Goal: Plan of Care Review  Outcome: Ongoing, Progressing   Pt in room with no s/s of distress. K Phos given IV overnight. LR infusing continuous. Pt labs this morning critical K+ and critical Phos. Will do lab redraw. Will continue care.

## 2020-10-23 NOTE — PROGRESS NOTES
[facility]  Palliative Medicine  Progress Note    Patient Name: Ching Bruce  MRN: [id number]  Admission Date: 10/19/2020  Hospital Length of Stay: 4 days  Code Status: Full Code   Attending Provider: Sylwia Guillen MD  Consulting Provider: ARSENIO Dobbs  Primary Care Physician: Gaby Corea MD  Principal Problem:Hypercalcemia    Patient information was obtained from patient and EMR.      Assessment/Plan:     Palliative care encounter  Follow up to goals of care     Impression: Ms. 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  Performance status too poor to continue chemotherapy at this time. Continued treatment plan is pending results of tumor board.  Returns to clinic weekly for IV hydration and treatment for hypercalcemia.  Has sustained more than one non traumatic fall. Has been admitted multiple times for hypercalcemia.  At time of this assessment Ms. Bruce, is awake alert and oriented although she  appears lethargic. She is able to participate in conversation today.   Appears comfortable with no facial grimacing or moaning.  Does not appear short of breath.  Oxygen saturation 97% on room air.      Given hx of metastatic disease, poor performance status and limited  cancer treatment options, clinical condition is appropriate for transition to hospice care.  However, patient and family are not amenable at this time.  Patient and family state interest in any and all life sustaining treatments.     Offered follow up in the [facility] Med/oncology clinic - Ms. Bruce has refused clinic follow up and continued inpatient palliative care.         Advance Care Planning      - Advanced directives not received, advanced care planning education provided  - Patient able to make own decisions  - legal next of kin for medical decision making are children   Isabelle Bruce [phone] and Rocio Bruce [phone]  - appears to have poor  insight  regarding prognosis  - Resuscitation status: full code per primary team CPR and Intubation discussed. Patient and family state will pursue all life sustaining interventions    -      Goals  of Care:   - Goals of care remain unchanged.   - goal remains to be the continued pursuit of continued cancer treatment. Followed by Dr. Morris at [facility].   - Daughter states understanding her performance status will have to improve.  Patient and family amenable to continuing PT/OT, oral dietary supplements and appetite stimulant   - Patient and family not amenable to changing code status and will pursue all life sustaining treatment options   - Offered continued palliative care in conjunction with follow up with Dr. Morris and continued treatment.   - patient is not amenable to continued palliative care at this time.     Plan/Recommendations   - continue current plan of care   - would benefit from continued information and education regarding clinical condition  - follow up with primary oncologist after discharge.                     I will sign off. Please contact us if you have any additional questions.    Subjective:     Chief Complaint:   Chief Complaint   Patient presents with    Fatigue     hx liver /pancreatic cancer, not on chemo, more lethargic for past 3 days and been falling       HPI:    HPI obtained from chart review:       Ms. Bruce is a 70 yo lady with h PMH of:  pancreatic neuroendocrine tumor  (dx 2012) s/p ERCP, sphincterectomy, and biliary stent placement with removal 9/22, hyperparathyroidism, parathyroid adenoma, and HFpEF (EF 60%, G1DD) presenting for generalized weakness and AMS. History obtained from daughter at bedside as patient is profoundly lethargic. She has had recurrent episodes of hypercalcemia and has been on alendronate, cinacalet, zoledronic acid, and IVF infusions. For the past three days she has had generalized weakness, lethargy, and sustained 3 non-traumatic falls. She has been  intermittently confused and has had decreased oral intake. She has not experienced chest pain, palpitations, bone pain, nausea, vomiting, or abdominal pain. She does endorse weight loss, constipation with last BM one week ago, polyuria, abdominal distension, and weakness. Her daughter is unsure if she is passing flatulence. Their oncologist is Dr. Rodriguez who they were scheduled to see tomorrow for IVF.      ED Course:  HDS. Calcium 15.6 corrected, K 2.7. Received 1 liter bolus. Ct head without acute finding. CXR with mild pulmonary edema.        Hospital Course:  No notes on file    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    Secondary malignant neoplasm of liver     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) 5/7/2013    Thrombocytopenia 11/12/2013    Thyroid disease     Unspecified essential hypertension 11/12/2013       Past Surgical History:   Procedure Laterality Date    ERCP N/A 6/21/2018    Procedure: ERCP, stent exchange;  Surgeon: Jake Lieberman MD;  Location: [facility] ENDO;  Service: Endoscopy;  Laterality: N/A;    ERCP N/A 5/23/2019    Procedure: ERCP (ENDOSCOPIC RETROGRADE CHOLANGIOPANCREATOGRAPHY), stent exchange;  Surgeon: Jake Lieberman MD;  Location: [facility] ENDO;  Service: Endoscopy;  Laterality: N/A;    ERCP N/A 11/14/2019    Procedure: ERCP (ENDOSCOPIC RETROGRADE CHOLANGIOPANCREATOGRAPHY), stent  exchange;  Surgeon: Jake Lieberman MD;  Location: [facility] ENDO;  Service: Endoscopy;  Laterality: N/A;    ERCP      ERCP N/A 9/22/2020    Procedure: ERCP (ENDOSCOPIC RETROGRADE CHOLANGIOPANCREATOGRAPHY);  Surgeon: Abdulaziz Owen MD;  Location: [facility] ENDO;  Service: Endoscopy;  Laterality: N/A;    THYROIDECTOMY  2011       Review of patient's allergies indicates:   Allergen Reactions    Epinephrine Anaphylaxis and Other (See Comments)     Can cause Carcinoid Crisis    Sulfa (sulfonamide antibiotics) Other (See Comments)     Urticaria       Medications:  Continuous Infusions:   sodium chloride 0.45% 150 mL/hr at 10/23/20 0729     Scheduled Meds:   cinacalcet  60 mg Oral BID WM    enoxaparin  40 mg Subcutaneous Q24H    furosemide (LASIX) IV  60 mg Intravenous BID    levothyroxine  200 mcg Oral Before breakfast    polyethylene glycol  17 g Oral Daily    potassium chloride in water  10 mEq Intravenous Q1H    senna-docusate 8.6-50 mg  1 tablet Oral BID    spironolactone  50 mg Oral Daily    ursodioL  300 mg Oral BID     PRN Meds:sodium chloride, sodium chloride, acetaminophen, dextrose 50%, dextrose 50%, glucagon (human recombinant), glucose, glucose, melatonin, ondansetron, sodium chloride 0.9%    Family History     Problem Relation (Age of Onset)    Cancer Maternal Grandmother    Diabetes Father        Tobacco Use    Smoking status: Never Smoker    Smokeless tobacco: Never Used   Substance and Sexual Activity    Alcohol use: No    Drug use: No    Sexual activity: Not on file       Review of Systems   Unable to perform ROS: Other   Constitutional: Positive for activity change, chills, fatigue and unexpected weight change.   HENT: Negative for trouble swallowing.    Eyes: Negative.    Respiratory: Negative for cough and shortness of breath.    Cardiovascular: Negative for chest pain, palpitations and leg swelling.   Gastrointestinal: Positive for abdominal distention. Negative for nausea and vomiting.    Endocrine: Negative.    Genitourinary: Negative for difficulty urinating.   Musculoskeletal: Negative.    Allergic/Immunologic: Positive for immunocompromised state.   Neurological: Positive for weakness.   Hematological: Negative.    Psychiatric/Behavioral: Negative for agitation, behavioral problems and confusion.     Objective:     Vital Signs (Most Recent):  Temp: 98.7 °F (37.1 °C) (10/23/20 0447)  Pulse: 94 (10/23/20 0449)  Resp: 15 (10/23/20 0449)  BP: (!) 125/56 (10/23/20 0449)  SpO2: 98 % (10/23/20 0449) Vital Signs (24h Range):  Temp:  [97.6 °F (36.4 °C)-99.5 °F (37.5 °C)] 98.7 °F (37.1 °C)  Pulse:  [88-96] 94  Resp:  [15-25] 15  SpO2:  [97 %-98 %] 98 %  BP: (116-125)/(56-70) 125/56     Weight: 59 kg (130 lb)  Body mass index is 20.36 kg/m².    Physical Exam  Vitals signs and nursing note reviewed.   Constitutional:       Appearance: She is ill-appearing.   HENT:      Head: Normocephalic and atraumatic.   Neck:      Musculoskeletal: Normal range of motion.   Cardiovascular:      Rate and Rhythm: Normal rate and regular rhythm.      Pulses: Normal pulses.      Heart sounds: Normal heart sounds.   Pulmonary:      Effort: Pulmonary effort is normal.      Breath sounds: Normal breath sounds.   Musculoskeletal:      Comments: Extreme weakness, non  Ambulatory    Skin:     General: Skin is warm and dry.   Neurological:      Mental Status: She is alert.         Review of Symptoms    Symptom Assessment (ESAS 0-10 Scale)  Pain:  0  Dyspnea:  0  Anxiety:  0  Nausea:  0  Depression:  0  Anorexia:  0  Fatigue:  0  Insomnia:  0  Restlessness:  0  Agitation:  0         Comments:  Ms. Bruce is very  Lethargic and unable to complete ESAS.  Appears comfortable no behavioral symptoms of pain or dyspnea           OME in 24 hours:  0    Performance Status:  40    ECOG Performance Status Grade:  3 - Confined to bed or chair 50% of waking hours    Living Arrangements:  Lives with family    Psychosocial/Cultural:   lives alone, two adult daughters, Isabelle lives close by and Pranav lives in [place], has been visiting for couple of months.  Has been mostly home bound for the past few months.  She is unable ambulate to clinic.      Spiritual:  F - Nisreen and Belief:  Pentecostalism  A - Address in Care:  Not amenable to  visits       Advance Care Planning   Advance Directives:   Living Will: No        Oral Declaration: No    LaPOST: No    Do Not Resuscitate Status: No    Medical Power of : No        Oral Declaration: No      Decision Making:  Patient answered questions and Family answered questions         Significant Labs: All pertinent labs within the past 24 hours have been reviewed.  CBC:   Recent Labs   Lab 10/23/20  0229   WBC 9.53   HGB 8.6*   HCT 28.5*   *   PLT 90*     BMP:  Recent Labs   Lab 10/23/20  0229 10/23/20  1014   GLU 86 119*    143   K 3.2* 3.1*    107   CO2 24 26   BUN 16 17   CREATININE 0.8 0.9   CALCIUM 11.5* 11.7*   MG 2.0  --      LFT:  Lab Results   Component Value Date    AST 20 10/23/2020    ALKPHOS 437 (H) 10/23/2020    BILITOT 2.1 (H) 10/23/2020     Albumin:   Albumin   Date Value Ref Range Status   10/23/2020 2.3 (L) 3.5 - 5.2 g/dL Final     Protein:   Total Protein   Date Value Ref Range Status   10/23/2020 6.6 6.0 - 8.4 g/dL Final     Lactic acid:   Lab Results   Component Value Date    LACTATE 1.2 09/01/2020    LACTATE 1.0 06/29/2014       Significant Imaging: None      > 50% of 35  min visit spent in chart review, face to face discussion of goals of care,  symptom assessment, coordination of care and emotional support.    Emili Chowdhury, CNS  Palliative Medicine  [facility]

## 2020-10-23 NOTE — ASSESSMENT & PLAN NOTE
Follow up to goals of care     Impression: Ms. 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  Performance status too poor to continue chemotherapy at this time. Continued treatment plan is pending results of tumor board.  Returns to clinic weekly for IV hydration and treatment for hypercalcemia.  Has sustained more than one non traumatic fall. Has been admitted multiple times for hypercalcemia.  At time of this assessment Ms. Bruce, is awake alert and oriented although she  appears lethargic. She is able to participate in conversation today.   Appears comfortable with no facial grimacing or moaning.  Does not appear short of breath.  Oxygen saturation 97% on room air.      Given hx of metastatic disease, poor performance status and limited  cancer treatment options, clinical condition is appropriate for transition to hospice care.  However, patient and family are not amenable at this time.  Patient and family state interest in any and all life sustaining treatments.     Offered follow up in the [facility] Med/oncology clinic - Ms. Bruce has refused clinic follow up and continued inpatient palliative care.         Advance Care Planning      - Advanced directives not received, advanced care planning education provided  - Patient able to make own decisions  - legal next of kin for medical decision making are children   Isabelle Bruce [phone] and Rocio Bruce [phone]  - appears to have poor  insight regarding prognosis  - Resuscitation status: full code per primary team CPR and Intubation discussed. Patient and family state will pursue all life sustaining interventions    -      Goals  of Care:   - Goals of care remain unchanged.   - goal remains to be the continued pursuit of continued cancer treatment. [facility].   - Daughter states understanding her performance status will have to improve.  Patient and family amenable to continuing  PT/OT, oral dietary supplements and appetite stimulant   - Patient and family not amenable to changing code status and will pursue all life sustaining treatment options   - patient is not amenable to continued palliative care at this time.     Plan/Recommendations   - continue current plan of care   - would benefit from continued information and education regarding clinical condition  - follow up with primary oncologist after discharge.

## 2020-10-23 NOTE — PROGRESS NOTES
[facility] Hospital Medicine  Progress Note    Patient Name: Ching Bruce  MRN: [id number]  Patient Class: IP- Inpatient   Admission Date: 10/19/2020  Length of Stay: 4 days  Attending Physician: Sylwia Guillen MD  Primary Care Provider: Gaby Corea MD    Hospital Medicine Team: [facility] HOSP MED 3 Nohemi Harding MD    Subjective:     Principal Problem:Hypercalcemia        HPI:  Ms. Bruce is a 68 yo female with PMH of pancreatic neuroendocrine tumor  (dx 2012) s/p ERCP, sphincterectomy, and biliary stent placement with removal 9/22, hyperparathyroidism, parathyroid adenoma, and HFpEF (EF 60%, G1DD) presenting for generalized weakness and AMS. History obtained from daughter at bedside as patient is profoundly lethargic. She has had recurrent episodes of hypercalcemia and has been on alendronate, cinacalet, zoledronic acid, and IVF infusions. For the past three days she has had generalized weakness, lethargy, and sustained 3 non-traumatic falls. She has been intermittently confused and has had decreased oral intake. She has not experienced chest pain, palpitations, bone pain, nausea, vomiting, or abdominal pain. She does endorse weight loss, constipation with last BM one week ago, polyuria, abdominal distension, and weakness. Her daughter is unsure if she is passing flatulence. Their oncologist is Dr. Rodriguez who they were scheduled to see tomorrow for IVF.     ED Course:  HDS. Calcium 15.6 corrected, K 2.7. Received 1 liter bolus. Ct head without acute finding. CXR with mild pulmonary edema.     Overview/Hospital Course:  Admitted to hospital medicine for management of severe hypercalcemia. Started on IVF, calcitonin, lasix, cinacalcet, and given one dose of zometa. KUB ordered showed large stool burden without obstruction/impaction. Palliative care was consulted to establish baseline given patient's decline and reucrrent episodes of hypercalcemia with seemingly limited treatment options.  "Calcium and mental status improved with treatments. Her cinacalcet dose was increased to 60 mg BID.    Interval History: No events overnight. Hemodynamically stable, on room air. She is complaining of abdominal "bloating" this AM. Overall more alert and engaging. Calcium level has seemed to plateau around 12.5-12.9. Increased lower extremity edema today.     Review of Systems   Constitutional: Positive for activity change, appetite change, fatigue and unexpected weight change. Negative for diaphoresis and fever.   HENT: Negative for rhinorrhea, tinnitus, trouble swallowing and voice change.    Eyes: Negative for visual disturbance.   Respiratory: Negative for cough, choking and shortness of breath.    Cardiovascular: Positive for leg swelling. Negative for chest pain and palpitations.   Gastrointestinal: Positive for abdominal distention and constipation. Negative for abdominal pain, blood in stool, diarrhea, nausea and vomiting.   Endocrine: Positive for polyuria.   Genitourinary: Negative for difficulty urinating, dysuria, flank pain, hematuria and urgency.   Musculoskeletal: Negative for back pain and joint swelling.   Skin: Positive for pallor. Negative for color change.   Neurological: Positive for weakness. Negative for tremors, syncope, light-headedness, numbness and headaches.   Psychiatric/Behavioral: Positive for confusion. Negative for behavioral problems.     Objective:     Vital Signs (Most Recent):  Temp: 98.7 °F (37.1 °C) (10/23/20 0447)  Pulse: 94 (10/23/20 0449)  Resp: 15 (10/23/20 0449)  BP: (!) 125/56 (10/23/20 0449)  SpO2: 98 % (10/23/20 0449) Vital Signs (24h Range):  Temp:  [97.6 °F (36.4 °C)-99.5 °F (37.5 °C)] 98.7 °F (37.1 °C)  Pulse:  [88-96] 94  Resp:  [15-25] 15  SpO2:  [97 %-98 %] 98 %  BP: (116-125)/(56-70) 125/56     Weight: 59 kg (130 lb)  Body mass index is 20.36 kg/m².    Intake/Output Summary (Last 24 hours) at 10/23/2020 1146  Last data filed at 10/23/2020 0500  Gross per 24 hour "   Intake 470 ml   Output 1000 ml   Net -530 ml      Physical Exam  Vitals signs and nursing note reviewed.   Constitutional:       General: She is not in acute distress.     Appearance: Normal appearance. She is normal weight. She is ill-appearing. She is not toxic-appearing or diaphoretic.   HENT:      Head: Normocephalic and atraumatic.      Nose: Nose normal.      Mouth/Throat:      Mouth: Mucous membranes are dry.   Eyes:      General: No scleral icterus.     Pupils: Pupils are equal, round, and reactive to light.      Comments: Conjunctival pallor   Cardiovascular:      Rate and Rhythm: Normal rate and regular rhythm.      Pulses: Normal pulses.      Heart sounds: Murmur (systolic murmur RSB) present.   Pulmonary:      Effort: Pulmonary effort is normal. No respiratory distress.      Breath sounds: Normal breath sounds.   Abdominal:      General: Abdomen is flat. Bowel sounds are normal. There is distension.      Palpations: Abdomen is soft.      Tenderness: There is no abdominal tenderness. There is no guarding or rebound.   Musculoskeletal: Normal range of motion.         General: No swelling.      Right lower leg: Edema present.      Left lower leg: Edema present.   Lymphadenopathy:      Cervical: No cervical adenopathy.   Skin:     General: Skin is warm and dry.      Capillary Refill: Capillary refill takes less than 2 seconds.      Coloration: Skin is not jaundiced.   Neurological:      Mental Status: She is alert.      Comments: No focal deficit  Alert and oriented to person (daugther) and place ([facility]) but not time  Strength decreased throughout          Significant Labs:   CBC:   Recent Labs   Lab 10/22/20  0412 10/23/20  0229   WBC 6.95 9.53   HGB 6.7* 8.6*   HCT 22.8* 28.5*   PLT 83* 90*     CMP:   Recent Labs   Lab 10/23/20  0008 10/23/20  0229 10/23/20  1014    144 143   K 3.0* 3.2* 3.1*    108 107   CO2 25 24 26   * 86 119*   BUN 16 16 17   CREATININE 0.9 0.8 0.9   CALCIUM  11.5* 11.5* 11.7*   PROT 6.6 6.6 6.6   ALBUMIN 2.3* 2.3* 2.3*   BILITOT 1.5* 1.7* 2.1*   ALKPHOS 431* 431* 437*   AST 17 19 20   ALT 10 14 12   ANIONGAP 9 12 10   EGFRNONAA >60.0 >60.0 >60.0       Significant Imaging: I have reviewed all pertinent imaging results/findings within the past 24 hours.      Assessment/Plan:      * Hypercalcemia  Hx pancreatic neuroendocrine tumor, hyperparathyroidism 2/2 to parathyroid adenoma presenting for fatigue and AMS. Prior admission from 9/29-10/7 for hypercalcemia with sestamibi scan concerning for parathyroid adenoma, started on cinacalcet during that admission. Takes alendronate q 7 days, also has taken zoledronic acid. Per chart review, patient follows with Dr. Rodriguez and is approaching the end of treatment options given her weakness.   - Ca on arrival corrected to 15.7, had not had a bowel movement x 7 days  - CXR: left pleural effusion, pulmonary edema  - KUB done 10/19 with moderate stool burden, without obstruction/impaction  - Calcium today 12.9, stable to slight increase from yesterday    Plan:  - IVF - 1/2 ns at 150 cc/hr  - calcitonin x 48 hours, completed  - zometa 10/20 x 1  - CMP q 12 hours  - furosemide 60 IV BID  - increase cinacalcet to 60 mg BID        Hypokalemia  K 2.7 on presentation    Plan:  - replete as needed  - CMP q12    Macrocytic anemia  , HgB 8.4, decreased from baseline  - folate/b12 wnl during last admission  - s/p 1 unit PRBC, likely dilutional effect    Plan:  - maintain up to date type and screen  - transfuse for HgB <7      Primary pancreatic neuroendocrine tumor  Please see Hypercalcemia    Plan:  - palliative care consult to establish baseline given patient's prognosis and lack of treatment options    Choking  - SLP evaluation, dysphagia soft diet/thin liquids      Constipation  - Senna-doc  - Miralax  - Fleet enemas      Hypothyroidism  - continue home levothyroxine      Heart failure with preserved ejection fraction  TTE 9/03 with  EF 60%, G1DD, PAP 29. Patient takes furosemide 40 mg daily  - CXR with effusion, mild edema  - pitting edema on PE, increasing from yesterday    Plan:  - strict intake/output  - lasix 60 IV BID  - aldactone    Abdominal swelling  Abdominal distension on exam, has been getting paracentesis with most recent on 10/6. No history of SBP  - RUQ US with moderate ascites     Plan:  - inpatient paracentesis ordered        VTE Risk Mitigation (From admission, onward)         Ordered     enoxaparin injection 40 mg  Every 24 hours      10/19/20 1614     IP VTE HIGH RISK PATIENT  Once      10/19/20 1614     Place sequential compression device  Until discontinued      10/19/20 1614                Discharge Planning   MOSES: 10/23/2020     Code Status: Full Code   Is the patient medically ready for discharge?: No    Reason for patient still in hospital (select all that apply): Treatment  Discharge Plan A: Home Health            Nohemi Harding MD  Department of Hospital Medicine   [facility]

## 2020-10-23 NOTE — SUBJECTIVE & OBJECTIVE
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    Secondary malignant neoplasm of liver     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) 5/7/2013    Thrombocytopenia 11/12/2013    Thyroid disease     Unspecified essential hypertension 11/12/2013       Past Surgical History:   Procedure Laterality Date    ERCP N/A 6/21/2018    Procedure: ERCP, stent exchange;  Surgeon: Jake Lieberman MD;  Location: [facility];  Service: Endoscopy;  Laterality: N/A;    ERCP N/A 5/23/2019    Procedure: ERCP (ENDOSCOPIC RETROGRADE CHOLANGIOPANCREATOGRAPHY), stent exchange;  Surgeon: Jake Lieberman MD;  Location: [facility];  Service: Endoscopy;  Laterality: N/A;    ERCP N/A 11/14/2019    Procedure: ERCP (ENDOSCOPIC RETROGRADE CHOLANGIOPANCREATOGRAPHY), stent exchange;  Surgeon: Jake Lieberman MD;  Location: [facility];  Service: Endoscopy;  Laterality: N/A;    ERCP      ERCP N/A 9/22/2020    Procedure: ERCP (ENDOSCOPIC RETROGRADE CHOLANGIOPANCREATOGRAPHY);  Surgeon: Abdulaziz Owen MD;  Location: [facility];  Service: Endoscopy;  Laterality: N/A;    THYROIDECTOMY  2011       Review of patient's allergies indicates:   Allergen Reactions    Epinephrine Anaphylaxis and Other (See Comments)     Can cause Carcinoid Crisis    Sulfa (sulfonamide antibiotics) Other (See Comments)     Urticaria       Medications:  Continuous Infusions:    sodium chloride 0.45% 150 mL/hr at 10/23/20 0729     Scheduled Meds:   cinacalcet  60 mg Oral BID WM    enoxaparin  40 mg Subcutaneous Q24H    furosemide (LASIX) IV  60 mg Intravenous BID    levothyroxine  200 mcg Oral Before breakfast    polyethylene glycol  17 g Oral Daily    potassium chloride in water  10 mEq Intravenous Q1H    senna-docusate 8.6-50 mg  1 tablet Oral BID    spironolactone  50 mg Oral Daily    ursodioL  300 mg Oral BID     PRN Meds:sodium chloride, sodium chloride, acetaminophen, dextrose 50%, dextrose 50%, glucagon (human recombinant), glucose, glucose, melatonin, ondansetron, sodium chloride 0.9%    Family History     Problem Relation (Age of Onset)    Cancer Maternal Grandmother    Diabetes Father        Tobacco Use    Smoking status: Never Smoker    Smokeless tobacco: Never Used   Substance and Sexual Activity    Alcohol use: No    Drug use: No    Sexual activity: Not on file       Review of Systems   Unable to perform ROS: Other   Constitutional: Positive for activity change, chills, fatigue and unexpected weight change.   HENT: Negative for trouble swallowing.    Eyes: Negative.    Respiratory: Negative for cough and shortness of breath.    Cardiovascular: Negative for chest pain, palpitations and leg swelling.   Gastrointestinal: Positive for abdominal distention. Negative for nausea and vomiting.   Endocrine: Negative.    Genitourinary: Negative for difficulty urinating.   Musculoskeletal: Negative.    Allergic/Immunologic: Positive for immunocompromised state.   Neurological: Positive for weakness.   Hematological: Negative.    Psychiatric/Behavioral: Negative for agitation, behavioral problems and confusion.     Objective:     Vital Signs (Most Recent):  Temp: 98.7 °F (37.1 °C) (10/23/20 0447)  Pulse: 94 (10/23/20 0449)  Resp: 15 (10/23/20 0449)  BP: (!) 125/56 (10/23/20 0449)  SpO2: 98 % (10/23/20 0449) Vital Signs (24h Range):  Temp:  [97.6 °F (36.4 °C)-99.5 °F (37.5  °C)] 98.7 °F (37.1 °C)  Pulse:  [88-96] 94  Resp:  [15-25] 15  SpO2:  [97 %-98 %] 98 %  BP: (116-125)/(56-70) 125/56     Weight: 59 kg (130 lb)  Body mass index is 20.36 kg/m².    Physical Exam  Vitals signs and nursing note reviewed.   Constitutional:       Appearance: She is ill-appearing.   HENT:      Head: Normocephalic and atraumatic.   Neck:      Musculoskeletal: Normal range of motion.   Cardiovascular:      Rate and Rhythm: Normal rate and regular rhythm.      Pulses: Normal pulses.      Heart sounds: Normal heart sounds.   Pulmonary:      Effort: Pulmonary effort is normal.      Breath sounds: Normal breath sounds.   Musculoskeletal:      Comments: Extreme weakness, non  Ambulatory    Skin:     General: Skin is warm and dry.   Neurological:      Mental Status: She is alert.         Review of Symptoms    Symptom Assessment (ESAS 0-10 Scale)  Pain:  0  Dyspnea:  0  Anxiety:  0  Nausea:  0  Depression:  0  Anorexia:  0  Fatigue:  0  Insomnia:  0  Restlessness:  0  Agitation:  0         Comments:  Ms. Bruce is very  Lethargic and unable to complete ESAS.  Appears comfortable no behavioral symptoms of pain or dyspnea           OME in 24 hours:  0    Performance Status:  40    ECOG Performance Status Grade:  3 - Confined to bed or chair 50% of waking hours    Living Arrangements:  Lives with family    Psychosocial/Cultural:  lives alone, two adult daughters, Isabelle lives close by and Pranav lives in [place], has been visiting for couple of months.  Has been mostly home bound for the past few months.  She is unable ambulate to clinic.      Spiritual:  F - Nisreen and Belief:  Shinto  A - Address in Care:  Not amenable to  visits       Advance Care Planning   Advance Directives:   Living Will: No        Oral Declaration: No    LaPOST: No    Do Not Resuscitate Status: No    Medical Power of : No        Oral Declaration: No      Decision Making:  Patient answered questions and Family answered  questions         Significant Labs: All pertinent labs within the past 24 hours have been reviewed.  CBC:   Recent Labs   Lab 10/23/20  0229   WBC 9.53   HGB 8.6*   HCT 28.5*   *   PLT 90*     BMP:  Recent Labs   Lab 10/23/20  0229 10/23/20  1014   GLU 86 119*    143   K 3.2* 3.1*    107   CO2 24 26   BUN 16 17   CREATININE 0.8 0.9   CALCIUM 11.5* 11.7*   MG 2.0  --      LFT:  Lab Results   Component Value Date    AST 20 10/23/2020    ALKPHOS 437 (H) 10/23/2020    BILITOT 2.1 (H) 10/23/2020     Albumin:   Albumin   Date Value Ref Range Status   10/23/2020 2.3 (L) 3.5 - 5.2 g/dL Final     Protein:   Total Protein   Date Value Ref Range Status   10/23/2020 6.6 6.0 - 8.4 g/dL Final     Lactic acid:   Lab Results   Component Value Date    LACTATE 1.2 09/01/2020    LACTATE 1.0 06/29/2014       Significant Imaging: None

## 2020-10-23 NOTE — PLAN OF CARE
Plan is to discharge home with continued [facility].       10/23/20 141   Discharge Reassessment   Assessment Type Discharge Planning Reassessment   Discharge Plan A Home Health   Discharge Plan B Skilled Nursing Facility   DME Needed Upon Discharge  none   Anticipated Discharge Disposition Home-Health

## 2020-10-23 NOTE — ASSESSMENT & PLAN NOTE
Hx pancreatic neuroendocrine tumor, hyperparathyroidism 2/2 to parathyroid adenoma presenting for fatigue and AMS. Prior admission from 9/29-10/7 for hypercalcemia with sestamibi scan concerning for parathyroid adenoma, started on cinacalcet during that admission. Takes alendronate q 7 days, also has taken zoledronic acid. Per chart review, patient follows with Dr. Rodriguez and is approaching the end of treatment options given her weakness.   - Ca on arrival corrected to 15.7, had not had a bowel movement x 7 days  - CXR: left pleural effusion, pulmonary edema  - KUB done 10/19 with moderate stool burden, without obstruction/impaction  - Calcium today 12.9, stable to slight increase from yesterday    Plan:  - IVF - 1/2 ns at 150 cc/hr  - calcitonin x 48 hours, completed  - zometa 10/20 x 1  - CMP q 12 hours  - furosemide 60 IV BID  - increase cinacalcet to 60 mg BID

## 2020-10-23 NOTE — PT/OT/SLP PROGRESS
"Speech Language Pathology Treatment  And Discharge Summary    Patient Name:  Ching Bruce   MRN:  [id number]  Admitting Diagnosis: Hypercalcemia    Recommendations:                 General Recommendations:  Follow-up not indicated  Diet recommendations:  Dental Soft, Liquid Diet Level: Thin   Aspiration Precautions: Alternating bites/sips, Assistance with meals, HOB to 90 degrees, Meds crushed in puree and Standard aspiration precautions   General Precautions: Standard,    Communication strategies:  none    Subjective     Patient awake and alert, oriented x4.    "Oh I can't eat that" - regarding whole sausage links on breakfast tray.      Objective:     Has the patient been evaluated by SLP for swallowing?   Yes  Keep patient NPO? No   Current Respiratory Status: room air      Patient seen for ongoing swallow assessment. Patient seen with breakfast tray present.  Patient with whole sausage links despite dysphagia soft diet recommendations. Patient stated "I can't eat those".  Patient tolerated thin liquids via straw sips over 4oz along with puree trials x3 and regular solids x2.  Patient remains with diffuse oral stasis with regular solids.  Appears appropriate to continue dysphagia soft diet and thin liquids. Skilled education was provided to patient  re: diet recs, standard aspiration precautions of which to follow, and discharge ST plan of care.  Patient verbalized understanding.        Assessment:     Ching Bruce is a 69 y.o. female with an SLP diagnosis of minimal oral dysphagia, eliminated with implementation of dysphagia soft diet. .  She presents with no further acute speech therapy needs at this time. .    Goals:   Multidisciplinary Problems     SLP Goals        Problem: SLP Goal    Goal Priority Disciplines Outcome   SLP Goal     SLP Ongoing, Progressing   Description: Speech Language Pathology Goals  Goals expected to be met by 10/29:  1. Patient will tolerate a dysphagia soft diet and thin liquids with "  no overt signs of airway compromise.                            Plan:     · Patient to be seen:  3 x/week   · Plan of Care expires:     · Plan of Care reviewed with:  patient   · SLP Follow-Up:  Yes       Discharge recommendations:  other (see comments)   Barriers to Discharge:  None    Time Tracking:     SLP Treatment Date:   10/23/20  Speech Start Time:  0900  Speech Stop Time:  0913     Speech Total Time (min):  13 min    Billable Minutes: Treatment Swallowing Dysfunction 5 and Seld Care/Home Management Training 8    Emily Abadie, CCC-SLP  10/23/2020

## 2020-10-23 NOTE — ASSESSMENT & PLAN NOTE
Abdominal distension on exam, has been getting paracentesis with most recent on 10/6. No history of SBP  - RUQ US with moderate ascites     Plan:  - inpatient paracentesis ordered

## 2020-10-23 NOTE — ASSESSMENT & PLAN NOTE
TTE 9/03 with EF 60%, G1DD, PAP 29. Patient takes furosemide 40 mg daily  - CXR with effusion, mild edema  - pitting edema on PE, increasing from yesterday    Plan:  - strict intake/output  - lasix 60 IV BID  - aldactone

## 2020-10-23 NOTE — H&P
Inpatient Radiology Pre-procedure Note    History of Present Illness:  Ching Bruce is a 69 y.o. female who presents for ultrasound guided paracentesis.  Admission H&P reviewed.  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    Secondary malignant neoplasm of liver     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) 5/7/2013    Thrombocytopenia 11/12/2013    Thyroid disease     Unspecified essential hypertension 11/12/2013     Past Surgical History:   Procedure Laterality Date    ERCP N/A 6/21/2018    Procedure: ERCP, stent exchange;  Surgeon: Jake Lieberman MD;  Location: [facility];  Service: Endoscopy;  Laterality: N/A;    ERCP N/A 5/23/2019    Procedure: ERCP (ENDOSCOPIC RETROGRADE CHOLANGIOPANCREATOGRAPHY), stent exchange;  Surgeon: Jake Lieberman MD;  Location: [facility];  Service: Endoscopy;  Laterality: N/A;    ERCP N/A 11/14/2019    Procedure: ERCP (ENDOSCOPIC RETROGRADE CHOLANGIOPANCREATOGRAPHY), stent exchange;  Surgeon: Jake Lieberman MD;  Location: [facility];  Service: Endoscopy;  Laterality: N/A;    ERCP      ERCP N/A 9/22/2020    Procedure: ERCP (ENDOSCOPIC RETROGRADE CHOLANGIOPANCREATOGRAPHY);  Surgeon: Abdulaziz Owen MD;  Location: [facility];  Service: Endoscopy;  Laterality: N/A;    THYROIDECTOMY  2011       Review of Systems:   As documented in primary team H&P    Home Meds:   Prior to Admission medications     Medication Sig Start Date End Date Taking? Authorizing Provider   cinacalcet (SENSIPAR) 30 MG Tab Take 1 tablet (30 mg total) by mouth 2 (two) times daily with meals. 10/7/20 10/7/21 Yes Navid Guerrero MD   dronabinoL (MARINOL) 2.5 MG capsule Take 1 capsule (2.5 mg total) by mouth 2 (two) times daily before meals. 10/14/20  Yes Cory Rodriguez DO, FACALLI   ferrous gluconate (FERGON) 324 MG tablet Take 324 mg by mouth once daily.   Yes Historical Provider   furosemide (LASIX) 40 MG tablet Take 1 tablet (40 mg total) by mouth once daily. 10/8/20 10/8/21 Yes Navid Guerrero MD   levothyroxine (SYNTHROID) 200 MCG tablet Take 1 tablet (200 mcg total) by mouth before breakfast. 9/9/20 9/9/21 Yes Jese Li MD   spironolactone (ALDACTONE) 50 MG tablet Take 1 tablet (50 mg total) by mouth once daily. 10/8/20 10/8/21 Yes Navid Guerrero MD   ursodioL (ACTIGALL) 300 mg capsule Take 1 capsule (300 mg total) by mouth 2 (two) times daily. 10/7/20 10/7/21 Yes Navid Guerrero MD     Scheduled Meds:    cinacalcet  60 mg Oral BID WM    enoxaparin  40 mg Subcutaneous Q24H    furosemide (LASIX) IV  60 mg Intravenous BID    levothyroxine  200 mcg Oral Before breakfast    polyethylene glycol  17 g Oral Daily    senna-docusate 8.6-50 mg  1 tablet Oral BID    spironolactone  50 mg Oral Daily    ursodioL  300 mg Oral BID     Continuous Infusions:    sodium chloride 0.45% 150 mL/hr at 10/23/20 0729     PRN Meds:sodium chloride, sodium chloride, acetaminophen, dextrose 50%, dextrose 50%, glucagon (human recombinant), glucose, glucose, melatonin, ondansetron, sodium chloride 0.9%  Anticoagulants/Antiplatelets: no anticoagulation    Allergies:   Review of patient's allergies indicates:   Allergen Reactions    Epinephrine Anaphylaxis and Other (See Comments)     Can cause Carcinoid Crisis    Sulfa (sulfonamide antibiotics) Other (See Comments)     Urticaria     Sedation Hx: have not been any systemic  reactions    Vitals:  Temp: 98.7 °F (37.1 °C) (10/23/20 0447)  Pulse: 94 (10/23/20 0449)  Resp: 15 (10/23/20 0449)  BP: (!) 125/56 (10/23/20 0449)  SpO2: 98 % (10/23/20 0449)     Physical Exam:  ASA: 3  Mallampati: n/a    General: no acute distress  Mental Status: alert and oriented to person, place and time  HEENT: normocephalic, atraumatic  Chest: unlabored breathing  Heart: regular heart rate  Abdomen: nondistended  Extremity: moves all extremities    Plan: ultrasound guided paracentesis  Sedation Plan: local    Isabel Goetz PA-C  Interventional Radiology  Clinic [phone]

## 2020-10-23 NOTE — PROCEDURES
During ultrasound evaluation, insufficient ascites identified for safe paracentesis. No paracentesis performed.     Isabel Goetz PA-C  Interventional Radiology  Clinic [phone]

## 2020-10-23 NOTE — MEDICAL/APP STUDENT
Progress Note  Hospital Medicine    Primary Team: [facility] MED 3  Admit Date: 10/19/2020   Length of Stay:  LOS: 4 days   SUBJECTIVE:   Reason for Admission:  Hypercalcemia    HPI:    Patient is a 69 y.o. 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 She has a past history of HTN, HLD, hypothyroidism, and anemia. She has been complaining of lethargy and had 3 falls over the weekend without injury to head or body. During this time she has also had increased weakness, urination and burping with reduced bowel movements, and some n/v a few weeks back. She has been sleeping more and progressively disoriented. She denies pain, fever, headache, chest pain, dyspnea, diarrhea, or muscle aches.      She was diagnosed with a neuroendocrine tumor in 9/2012, with numerous lesions in the liver and pancreas. She began chemotherapy with temozolomide and capecitabine in 1/2013 which she used until 11/2018. Tumor at that time was stable, with a Ki-67 of 1%. In 7/2020, she was found to have progressive disease within the liver, and biopsy showed grade 2 NET with Ki-67 of 15%.     Hospital Course:  HDS. Calcium 15.6 corrected, K 2.7. Received 1 liter bolus. Ct head without acute finding. CXR with mild pulmonary edema.      Admitted to hospital medicine for management of severe hypercalcemia. Started on IVF, calcitonin, lasix, cinacalcet, and given one dose of zometa. KUB ordered showed large stool burden without obstruction/impaction. Palliative care was consulted to establish baseline given patient's decline and reucrrent episodes of hypercalcemia with seemingly limited treatment options.     10/23: Calcium 11.5 (corrected 12.9)     Interval:  Pt is more alert today, opening eyes spontaneously and speaking in full sentences without falling asleep. She denies any pain or distress. She has been  scheduled for IR paracentesis 10/23    Review of Systems   Constitutional: Positive for weight loss. Negative for chills, diaphoresis and fever.   HENT: Negative for congestion, ear discharge, hearing loss, nosebleeds, sore throat and tinnitus.    Eyes: Negative for blurred vision, photophobia and discharge.   Respiratory: Negative for cough, hemoptysis and shortness of breath.    Cardiovascular: Negative for chest pain, palpitations, claudication and leg swelling.   Gastrointestinal: Negative for abdominal pain, blood in stool, constipation, diarrhea, heartburn, nausea and vomiting.   Genitourinary: Negative for dysuria, frequency and hematuria.   Musculoskeletal: Negative for back pain, joint pain and neck pain.   Skin: Negative for rash.   Neurological: Negative for dizziness, sensory change, weakness and headaches.   Psychiatric/Behavioral: Negative for depression and hallucinations. The patient is not nervous/anxious.         OBJECTIVE:     Temp:  [97.6 °F (36.4 °C)-99.5 °F (37.5 °C)]   Pulse:  [89-96]   Resp:  [15-21]   BP: (116-125)/(56-69)   SpO2:  [97 %-98 %]  Body mass index is 20.36 kg/m².  Intake/Outake:  This Shift:  No intake/output data recorded.    Net I/O past 24h:     Intake/Output Summary (Last 24 hours) at 10/23/2020 1509  Last data filed at 10/23/2020 0500  Gross per 24 hour   Intake 470 ml   Output 800 ml   Net -330 ml             Physical Exam  Constitutional:       General: She is awake. She is not in acute distress.     Appearance: She is underweight.   HENT:      Head: Normocephalic and atraumatic.      Nose: Nose normal.      Mouth/Throat:      Mouth: Mucous membranes are moist.      Pharynx: Oropharynx is clear.   Eyes:      General: No scleral icterus.     Extraocular Movements: Extraocular movements intact.      Pupils: Pupils are equal, round, and reactive to light.   Neck:      Musculoskeletal: Normal range of motion and neck supple. No muscular tenderness.      Vascular: No carotid  bruit.   Cardiovascular:      Rate and Rhythm: Normal rate and regular rhythm.      Pulses: Normal pulses.      Heart sounds: Normal heart sounds. No murmur. No friction rub. No gallop.    Pulmonary:      Effort: Pulmonary effort is normal. No respiratory distress.      Breath sounds: Normal breath sounds. No stridor. No wheezing or rales.   Abdominal:      General: Abdomen is flat. Bowel sounds are normal. There is abdominal bruit. There is no distension.      Palpations: Abdomen is soft. There is no mass.      Tenderness: There is no abdominal tenderness. There is no guarding.   Musculoskeletal:         General: No swelling, deformity or signs of injury.      Right lower leg: Edema present.      Left lower leg: No edema.   Skin:     Coloration: Skin is not jaundiced.      Findings: No bruising or lesion.   Neurological:      General: No focal deficit present.      Mental Status: She is alert and easily aroused. Mental status is at baseline. She is disoriented.      Comments: Oriented to self and situation   Psychiatric:         Mood and Affect: Mood normal.         Behavior: Behavior normal.         Thought Content: Thought content normal.         Judgment: Judgment normal.          Laboratory:  CBC/Anemia Labs: Coags:    Recent Labs   Lab 10/21/20  0557 10/22/20  0412 10/23/20  0229 10/23/20  1212   WBC 7.31 6.95 9.53 8.90   HGB 7.9* 6.7* 8.6*  --    HCT 27.2* 22.8* 28.5*  --    * 83* 90*  --    * 111* 104*  --    RDW 18.3* 18.5* 20.4*  --     No results for input(s): PT, INR, APTT in the last 168 hours.     Chemistries:   Recent Labs   Lab 10/22/20  0412  10/22/20  0716  10/23/20  0008 10/23/20  0229 10/23/20  1014 10/23/20  1212   NA  --    < > 146*   < > 142 144 143  --    K  --    < > 3.3*   < > 3.0* 3.2* 3.1*  --    CL  --    < > 110   < > 108 108 107  --    CO2  --    < > 26   < > 25 24 26  --    BUN  --    < > 16   < > 16 16 17  --    CREATININE  --    < > 0.8   < > 0.9 0.8 0.9  --    CALCIUM   --    < > 10.8*   < > 11.5* 11.5* 11.7*  --    PROT  --    < > 6.5   < > 6.6 6.6 6.6 6.5   BILITOT  --    < > 1.9*   < > 1.5* 1.7* 2.1*  --    ALKPHOS  --    < > 423*   < > 431* 431* 437*  --    ALT  --    < > 11   < > 10 14 12  --    AST  --    < > 18   < > 17 19 20  --    MG 1.5*  --  1.6  --   --  2.0  --   --    PHOS >15.0*  --  3.7  --   --  2.6*  --   --     < > = values in this interval not displayed.        Medications:  Scheduled Meds:   cinacalcet  60 mg Oral BID WM    enoxaparin  40 mg Subcutaneous Q24H    furosemide (LASIX) IV  60 mg Intravenous BID    levothyroxine  200 mcg Oral Before breakfast    polyethylene glycol  17 g Oral Daily    senna-docusate 8.6-50 mg  1 tablet Oral BID    spironolactone  50 mg Oral Daily    ursodioL  300 mg Oral BID                             Continuous Infusions:   sodium chloride 0.45% 150 mL/hr at 10/23/20 0729     PRN Meds:.sodium chloride, sodium chloride, acetaminophen, dextrose 50%, dextrose 50%, glucagon (human recombinant), glucose, glucose, melatonin, ondansetron, sodium chloride 0.9%     ASSESSMENT/PLAN:     Active Hospital Problems    Diagnosis  POA    *Hypercalcemia [E83.52]  Yes    Choking [T17.308A]  No    Constipation [K59.00]  Unknown    Hypothyroidism [E03.9]  Unknown    Pain [R52]  Unknown    Nausea [R11.0]  Unknown    Dyspnea [R06.00]  Unknown    Macrocytic anemia [D53.9]  Unknown    Heart failure with preserved ejection fraction [I50.30]  Unknown    Palliative care encounter [Z51.5]  Not Applicable    Goals of care, counseling/discussion [Z71.89]  Not Applicable    Advanced care planning/counseling discussion [Z71.89]  Not Applicable    Hypokalemia [E87.6]  Unknown    Abdominal swelling [R19.00]  Yes    Primary pancreatic neuroendocrine tumor [D3A.8]  Yes      Resolved Hospital Problems   No resolved problems to display.     Hypercalcemia (d/t malignancy or parathyroid adenoma              Pt is a 70 yo female w/history of  neuroendocrine tumor of the pancreas/liver and parathyroid nodule. She has a long cycle of repeat hospitalizations due to hypercalcemia with hypokalemia. During these events, she has increased confusion, lethargy, constipation, and urination. She has had increased falls during this time, and home regimen is ineffective at this time. Her calcium is elevated to 12.9 corrected, has been stable at this level for 3 days.     Plan:  - IVF  - calcitonin given for 48 hours  - consider increase in cinacalcet dose  - zometa 10/20  - CMP q 6 hours  - furosemide 60 IV BID     Hypokalemia  K 2.7 on presentation, currently 3.2     Plan:  - replete as needed        Macrocytic anemia  , HgB 6.7 decreased from baseline. Given 1 unit PRBC. Current Hgb 8.6  - folate/b12 wnl during last admission     Plan:  - type and screen  - transfuse HgB < 7        Primary pancreatic neuroendocrine tumor  Please see Hypercalcemia     Plan:  - palliative care consult to establish baseline given patient's prognosis and lack of treatment options     Hypothyroidism  - continue home levothyroxine        Heart failure with preserved ejection fraction  TTE 9/03 with EF 60%, G1DD, PAP 29. Patient takes furosemide 40 mg daily  - CXR with effusion, mild edema  - pitting edema on PE     Plan:  - strict intake/output  - lasix 60 IV BID    CODE Status-     Logan Rosales, MS4

## 2020-10-23 NOTE — ASSESSMENT & PLAN NOTE
, HgB 8.4, decreased from baseline  - folate/b12 wnl during last admission  - s/p 1 unit PRBC, likely dilutional effect    Plan:  - maintain up to date type and screen  - transfuse for HgB <7

## 2020-10-23 NOTE — SUBJECTIVE & OBJECTIVE
"Interval History: No events overnight. Hemodynamically stable, on room air. She is complaining of abdominal "bloating" this AM. Overall more alert and engaging. Calcium level has seemed to plateau around 12.5-12.9. Increased lower extremity edema today.     Review of Systems   Constitutional: Positive for activity change, appetite change, fatigue and unexpected weight change. Negative for diaphoresis and fever.   HENT: Negative for rhinorrhea, tinnitus, trouble swallowing and voice change.    Eyes: Negative for visual disturbance.   Respiratory: Negative for cough, choking and shortness of breath.    Cardiovascular: Positive for leg swelling. Negative for chest pain and palpitations.   Gastrointestinal: Positive for abdominal distention and constipation. Negative for abdominal pain, blood in stool, diarrhea, nausea and vomiting.   Endocrine: Positive for polyuria.   Genitourinary: Negative for difficulty urinating, dysuria, flank pain, hematuria and urgency.   Musculoskeletal: Negative for back pain and joint swelling.   Skin: Positive for pallor. Negative for color change.   Neurological: Positive for weakness. Negative for tremors, syncope, light-headedness, numbness and headaches.   Psychiatric/Behavioral: Positive for confusion. Negative for behavioral problems.     Objective:     Vital Signs (Most Recent):  Temp: 98.7 °F (37.1 °C) (10/23/20 0447)  Pulse: 94 (10/23/20 0449)  Resp: 15 (10/23/20 0449)  BP: (!) 125/56 (10/23/20 0449)  SpO2: 98 % (10/23/20 0449) Vital Signs (24h Range):  Temp:  [97.6 °F (36.4 °C)-99.5 °F (37.5 °C)] 98.7 °F (37.1 °C)  Pulse:  [88-96] 94  Resp:  [15-25] 15  SpO2:  [97 %-98 %] 98 %  BP: (116-125)/(56-70) 125/56     Weight: 59 kg (130 lb)  Body mass index is 20.36 kg/m².    Intake/Output Summary (Last 24 hours) at 10/23/2020 1144  Last data filed at 10/23/2020 0500  Gross per 24 hour   Intake 470 ml   Output 1000 ml   Net -530 ml      Physical Exam  Vitals signs and nursing note " reviewed.   Constitutional:       General: She is not in acute distress.     Appearance: Normal appearance. She is normal weight. She is ill-appearing. She is not toxic-appearing or diaphoretic.   HENT:      Head: Normocephalic and atraumatic.      Nose: Nose normal.      Mouth/Throat:      Mouth: Mucous membranes are dry.   Eyes:      General: No scleral icterus.     Pupils: Pupils are equal, round, and reactive to light.      Comments: Conjunctival pallor   Cardiovascular:      Rate and Rhythm: Normal rate and regular rhythm.      Pulses: Normal pulses.      Heart sounds: Murmur (systolic murmur RSB) present.   Pulmonary:      Effort: Pulmonary effort is normal. No respiratory distress.      Breath sounds: Normal breath sounds.   Abdominal:      General: Abdomen is flat. Bowel sounds are normal. There is distension.      Palpations: Abdomen is soft.      Tenderness: There is no abdominal tenderness. There is no guarding or rebound.   Musculoskeletal: Normal range of motion.         General: No swelling.      Right lower leg: Edema present.      Left lower leg: Edema present.   Lymphadenopathy:      Cervical: No cervical adenopathy.   Skin:     General: Skin is warm and dry.      Capillary Refill: Capillary refill takes less than 2 seconds.      Coloration: Skin is not jaundiced.   Neurological:      Mental Status: She is alert.      Comments: No focal deficit  Alert and oriented to person (daugther) and place ([facility]) but not time  Strength decreased throughout          Significant Labs:   CBC:   Recent Labs   Lab 10/22/20  0412 10/23/20  0229   WBC 6.95 9.53   HGB 6.7* 8.6*   HCT 22.8* 28.5*   PLT 83* 90*     CMP:   Recent Labs   Lab 10/23/20  0008 10/23/20  0229 10/23/20  1014    144 143   K 3.0* 3.2* 3.1*    108 107   CO2 25 24 26   * 86 119*   BUN 16 16 17   CREATININE 0.9 0.8 0.9   CALCIUM 11.5* 11.5* 11.7*   PROT 6.6 6.6 6.6   ALBUMIN 2.3* 2.3* 2.3*   BILITOT 1.5* 1.7* 2.1*   ALKPHOS 431*  431* 437*   AST 17 19 20   ALT 10 14 12   ANIONGAP 9 12 10   EGFRNONAA >60.0 >60.0 >60.0       Significant Imaging: I have reviewed all pertinent imaging results/findings within the past 24 hours.

## 2020-10-23 NOTE — PLAN OF CARE
Problem: Adult Inpatient Plan of Care  Goal: Plan of Care Review  10/22/2020 1939 by Anila Bower RN  Outcome: Ongoing, Not Progressing    Problem: Adult Inpatient Plan of Care  Goal: Optimal Comfort and Wellbeing  10/22/2020 1939 by Anila Bower RN  Outcome: Ongoing, Not Progressing     Problem: Electrolyte Imbalance  Goal: Electrolyte Balance  Outcome: Ongoing, Not Progressing     Alert and verbal. Completed 1 unit of RBC, tolerated without difficulty. No a/r noted. Continues on iv fluids.

## 2020-10-24 NOTE — ASSESSMENT & PLAN NOTE
Hx pancreatic neuroendocrine tumor, hyperparathyroidism 2/2 to parathyroid adenoma presenting for fatigue and AMS. Prior admission from 9/29-10/7 for hypercalcemia with sestamibi scan concerning for parathyroid adenoma, started on cinacalcet during that admission. Takes alendronate q 7 days, also has taken zoledronic acid. Per chart review, patient follows with Dr. Rodriguez and is approaching the end of treatment options given her weakness.   - Ca on arrival corrected to 15.7, had not had a bowel movement x 7 days  - CXR: left pleural effusion, pulmonary edema  - KUB done 10/19 with moderate stool burden, without obstruction/impaction  - Calcium level has plateaued  between 12-13, stable to slight increase from yesterday    Plan:  - IVF - 1/2 ns at 150 cc/hr  - calcitonin x 48 hours, completed  - zometa 10/20 x 1  - CMP q 12 hours  - furosemide 60 IV BID  - increase cinacalcet to 60 mg BID  - endocrinology consultation, appreciate recs   - will reach out to Dr. Rodriguez

## 2020-10-24 NOTE — CONSULTS
[facility]  Endocrinology  Consult Note    Consult Requested by: Sylwia Guillen MD   Reason for admit: Hypercalcemia    HISTORY OF PRESENT ILLNESS:  68 YO Female w/ dx of Pancreatic neuroendocrine tumor w/ liver mets, Hypercalcemia of malignancy on  Monthly Zometa, Post- surgical hypothyroidism, CHF and HTN that was admitted to [facility] [place] for failure to thrive, fatigue and AMS. During hospital course patient was found to have corrected calcium of 15.7, phos 2.4, PTHrp 40 and .  Pt was stared on IV, received 4 doses of calcitonin and 1 dose of Zometa 4mg w/ improvement in calcium levels to 12.8 and improvement in Mental status.  Pt was consulted with endocrinology for assistance with management of hypercalcemia.      In regards with hypercalcemia  -Pt w/ prior know hypercalcemia of malignancy form pancreatic neuroendocrine tumor and PTHrp +  -Receiving Zometa 4mg IV monthly since 8/2020   -Ca 15.7 on Admission   -Phos 1.4 on admission   -PTHrp  101 ---> 40  (ELEVATED, consistent w/ hypercalcemia of malignancy     -PTH  145 (Elevated in the setting of hypercalcemia)  Concerning for superimposed PTH mediated   -PT had normal PTH levels on 2013      -Vitamin D 14 on 9/20  (Low, could cause secondary hyperpara, but in the setting of severe hypercalcemia, would not expect vitamin D deficiency to elevated PTH to such degree)      -Sestamibi scan 9/30/20:  NO PARATHYROID ADENOMA IDENTIFIED  -NECK 4D CT on 10/2/20:   SHOWING possible RIGHT INFERIOR PARATHYROID ADENOMA     -Symptoms :  AMS on admission,  Constipation, polyuria  -GFR > 60    -Denies lithium or HCTZ use       Medications and/or Treatments Impacting Glycemic Control:  Immunotherapy:    Immunosuppressants     None        Steroids:   Hormones (From admission, onward)    Start     Stop Route Frequency Ordered    10/19/20 1712  melatonin tablet 6 mg      -- Oral Nightly PRN 10/19/20 1614        Pressors:    Autonomic Drugs (From admission,  onward)    None          Medications Prior to Admission   Medication Sig Dispense Refill Last Dose    cinacalcet (SENSIPAR) 30 MG Tab Take 1 tablet (30 mg total) by mouth 2 (two) times daily with meals. 60 tablet 11 10/18/2020    dronabinoL (MARINOL) 2.5 MG capsule Take 1 capsule (2.5 mg total) by mouth 2 (two) times daily before meals. 60 capsule 3 10/18/2020    ferrous gluconate (FERGON) 324 MG tablet Take 324 mg by mouth once daily.       furosemide (LASIX) 40 MG tablet Take 1 tablet (40 mg total) by mouth once daily. 30 tablet 11 10/18/2020    levothyroxine (SYNTHROID) 200 MCG tablet Take 1 tablet (200 mcg total) by mouth before breakfast. 30 tablet 0 10/18/2020    spironolactone (ALDACTONE) 50 MG tablet Take 1 tablet (50 mg total) by mouth once daily. 30 tablet 11 10/18/2020    ursodioL (ACTIGALL) 300 mg capsule Take 1 capsule (300 mg total) by mouth 2 (two) times daily. 60 capsule 11 10/18/2020       Current Facility-Administered Medications   Medication Dose Route Frequency Provider Last Rate Last Dose    0.45% NaCl infusion   Intravenous Continuous Nohemi Harding  mL/hr at 10/24/20 0952      0.9%  NaCl infusion (for blood administration)   Intravenous Q24H PRN Nohemi Harding MD        0.9%  NaCl infusion (for blood administration)   Intravenous Q24H PRN Nohemi Harding MD        acetaminophen tablet 650 mg  650 mg Oral Q8H PRN Nohemi Harding MD        cinacalcet tablet 60 mg  60 mg Oral BID WM Nohemi Harding MD   60 mg at 10/24/20 0800    dextrose 50% injection 12.5 g  12.5 g Intravenous PRN Nohemi Harding MD        dextrose 50% injection 25 g  25 g Intravenous PRN Nohemi Harding MD        enoxaparin injection 40 mg  40 mg Subcutaneous Q24H Nohemi Harding MD   40 mg at 10/23/20 1830    furosemide injection 60 mg  60 mg Intravenous BID Soham Megan DO   60 mg at 10/24/20 0942    glucagon (human recombinant) injection 1 mg  1 mg Intramuscular PRN Nohemi Harding MD         glucose chewable tablet 16 g  16 g Oral PRN Nohemi Harding MD        glucose chewable tablet 24 g  24 g Oral PRN Nohemi Harding MD        levothyroxine tablet 200 mcg  200 mcg Oral Before breakfast Nohemi Harding MD   200 mcg at 10/24/20 0629    melatonin tablet 6 mg  6 mg Oral Nightly PRN Nohemi Harding MD   6 mg at 10/23/20 2111    ondansetron disintegrating tablet 8 mg  8 mg Oral Q8H PRN Nohemi Harding MD        polyethylene glycol packet 17 g  17 g Oral Daily Nohemi Harding MD   17 g at 10/24/20 0941    potassium chloride 10 mEq in 100 mL IVPB  10 mEq Intravenous Q1H Nohemi Harding MD        senna-docusate 8.6-50 mg per tablet 1 tablet  1 tablet Oral BID Nohemi Harding MD   1 tablet at 10/24/20 0942    sodium chloride 0.9% flush 10 mL  10 mL Intravenous PRN Nohemi Harding MD        spironolactone tablet 50 mg  50 mg Oral Daily Nohemi Harding MD   50 mg at 10/24/20 0942    ursodioL capsule 300 mg  300 mg Oral BID Nohemi Harding MD   300 mg at 10/24/20 0942       Interval HPI:   AAOX3, lethargic,  Corrected calcium 12.8 (Elevated but improving), Phos 2.4 (Low).  Denies hypercalcemic symptoms.     Overnight events: KHADRA   Eatin%  Nausea: No  Hypoglycemia and intervention: No  Fever: No  TPN and/or TF: No    PMH, PSH, FH, SH updated and reviewed     ROS:      Review of Systems   Constitutional: Positive for fatigue. Negative for chills.   HENT: Negative for rhinorrhea and sore throat.    Eyes: Negative for discharge and visual disturbance.   Respiratory: Negative for shortness of breath and wheezing.    Cardiovascular: Negative for chest pain and leg swelling.   Gastrointestinal: Positive for constipation. Negative for abdominal distention and abdominal pain.   Endocrine: Negative for cold intolerance and polydipsia.   Musculoskeletal: Negative for arthralgias and myalgias.   Skin: Negative for rash and wound.   Neurological: Negative for dizziness, weakness and  light-headedness.   Psychiatric/Behavioral: Negative for agitation and sleep disturbance.            PHYSICAL EXAMINATION:  Vitals:    10/24/20 1109   BP: (!) 127/51   Pulse: 91   Resp: 16   Temp: 98.9 °F (37.2 °C)     Body mass index is 20.36 kg/m².    Physical Exam  Vitals signs and nursing note reviewed.   Constitutional:       General: She is not in acute distress.     Appearance: She is well-developed.   Eyes:      General: No scleral icterus.     Conjunctiva/sclera: Conjunctivae normal.   Neck:      Musculoskeletal: Normal range of motion.      Thyroid: No thyromegaly.   Cardiovascular:      Rate and Rhythm: Normal rate and regular rhythm.      Heart sounds: No murmur. No friction rub.   Pulmonary:      Effort: Pulmonary effort is normal. No respiratory distress.      Breath sounds: Normal breath sounds. No wheezing.   Abdominal:      Palpations: Abdomen is soft.      Tenderness: There is no abdominal tenderness. There is no guarding.      Hernia: No hernia is present.   Musculoskeletal: Normal range of motion.         General: No tenderness.   Lymphadenopathy:      Cervical: No cervical adenopathy.   Skin:     General: Skin is warm.      Findings: No erythema or rash.   Neurological:      Mental Status: She is oriented to person, place, and time.      Cranial Nerves: No cranial nerve deficit.   Psychiatric:         Behavior: Behavior normal.       .     ASSESSMENT and PLAN:    * Hypercalcemia  -Pt w/ prior know hypercalcemia of malignancy form pancreatic neuroendocrine tumor and PTHrp +   -On monthly Zometa 4mg IV since 8/20/20  -Ca 15.7 on Admission   -Phos 1.4 on admission   -PTHrp  101 ---> 40  (ELEVATED, consistent w/ hypercalcemia of malignancy     -PTH  145 (Elevated in the setting of hypercalcemia)  Concerning for superimposed PTH mediated   -PT had normal PTH levels on 2013      -Vitamin D 14 on 9/20  (Low, could cause secondary hyperpara, but in the setting of severe hypercalcemia, would not expect  vitamin D deficiency to elevated PTH to such degree)      -Sestamibi scan 9/30/20:  NO PARATHYROID ADENOMA IDENTIFIED  -NECK 4D CT on 10/2/20:   SHOWING possible RIGHT INFERIOR PARATHYROID ADENOMA     -Symptoms :  AMS on admission,  Constipation, polyuria    -S/p Calcitonin and Zometa 4mg IV on 10/20/20     HYPERCALCEMIA LIKELY MULTIFACTORIAL FROM MALIGNANCY AND PRIMARY HYPERPARATHYROIDISM     Plan:   -Due to patient having evidence of hypercalcemia of malignancy and PTH mediated hypercalcemia (Likely primary hyperparathyroidism), will recommend the following.     -Agree with Increasing Cinacalcet 60mg BID  (Can optimize up to 90mg BID in 7 days)    -C/w IV and oral hydration per primary team     -Will defer Zometa dosing to Hemeonc (If calcium > 12 and patient symptomatic can consider repeating Zometa on 10/27/20 as it can be given q7 days)     -Renal panel q 12hrs     -Would target treatment based on symptoms     -Send calcitriol levels     -If after maximal medical optimization of Cinacalcet and Zometa if no improvement of hypercalcemia and symptoms could consider parathyroidectomy but this would be last resort as patient was previously deemed a poor surgical candidate and is unsure if parathyroidectomy will completely resolve hypercalcemia as patient has underlying hypercalcemia of malignancy.      -Will follow           Primary pancreatic neuroendocrine tumor  -Per primary   -Pt w/ hypercalcemia of malignancy on Zometa       Hypokalemia  -Replete per primary team       Post Surgical Hypothyroidism      -S/p Total Thyroidectomy for MNG       -Per patient benign pathology      -TSH 0.27 (Below goal)       -FT4 1.33  (WNL)       -ON LT4 200mcg  (Double the weight based recommended)      -High doses could be from poor absorbtion as patient with            peripheral edema and ascites and could have bowel edema        VS. Non compliance               Plan:   -Due to acute illness and patient previously hypothryoid  on LT4 150mcg would recommend to c/w LT4 200mcg and Check TFT in 4 weeks after DC for dose adjustment.      Heart failure with preserved ejection fraction  -Per primary   -On IV fluids for Hypercalcemia   -Monitor respiratory status   -Avoid fluid overload           DISCHARGE NEEDS: will assess daily    Max Alize Shaw MD  Endocrinology  [facility]

## 2020-10-24 NOTE — SUBJECTIVE & OBJECTIVE
Interval HPI:   AAOX3, lethargic,  Corrected calcium 12.8 (Elevated but improving), Phos 2.4 (Low).  Denies hypercalcemic symptoms.     Overnight events: KHADRA   Eatin%  Nausea: No  Hypoglycemia and intervention: No  Fever: No  TPN and/or TF: No    PMH, PSH, FH, SH updated and reviewed     ROS:      Review of Systems   Constitutional: Positive for fatigue. Negative for chills.   HENT: Negative for rhinorrhea and sore throat.    Eyes: Negative for discharge and visual disturbance.   Respiratory: Negative for shortness of breath and wheezing.    Cardiovascular: Negative for chest pain and leg swelling.   Gastrointestinal: Positive for constipation. Negative for abdominal distention and abdominal pain.   Endocrine: Negative for cold intolerance and polydipsia.   Musculoskeletal: Negative for arthralgias and myalgias.   Skin: Negative for rash and wound.   Neurological: Negative for dizziness, weakness and light-headedness.   Psychiatric/Behavioral: Negative for agitation and sleep disturbance.            PHYSICAL EXAMINATION:  Vitals:    10/24/20 1109   BP: (!) 127/51   Pulse: 91   Resp: 16   Temp: 98.9 °F (37.2 °C)     Body mass index is 20.36 kg/m².    Physical Exam  Vitals signs and nursing note reviewed.   Constitutional:       General: She is not in acute distress.     Appearance: She is well-developed.   Eyes:      General: No scleral icterus.     Conjunctiva/sclera: Conjunctivae normal.   Neck:      Musculoskeletal: Normal range of motion.      Thyroid: No thyromegaly.   Cardiovascular:      Rate and Rhythm: Normal rate and regular rhythm.      Heart sounds: No murmur. No friction rub.   Pulmonary:      Effort: Pulmonary effort is normal. No respiratory distress.      Breath sounds: Normal breath sounds. No wheezing.   Abdominal:      Palpations: Abdomen is soft.      Tenderness: There is no abdominal tenderness. There is no guarding.      Hernia: No hernia is present.   Musculoskeletal: Normal range of  motion.         General: No tenderness.   Lymphadenopathy:      Cervical: No cervical adenopathy.   Skin:     General: Skin is warm.      Findings: No erythema or rash.   Neurological:      Mental Status: She is oriented to person, place, and time.      Cranial Nerves: No cranial nerve deficit.   Psychiatric:         Behavior: Behavior normal.

## 2020-10-24 NOTE — HPI
69 year old Female w/ dx of Pancreatic neuroendocrine tumor w/ liver mets, Hypercalcemia of malignancy on  Monthly Zometa, Post- surgical hypothyroidism, CHF and HTN that was admitted to [facility] for failure to thrive, fatigue and AMS. During hospital course patient was found to have corrected calcium of 15.7, phos 2.4, PTHrp 40 and .  Pt was stared on IV, received 4 doses of calcitonin and 1 dose of Zometa 4mg w/ improvement in calcium levels to 12.8 and improvement in Mental status.  Pt was consulted with endocrinology for assistance with management of hypercalcemia.      In regards with hypercalcemia  -Pt w/ prior know hypercalcemia of malignancy form pancreatic neuroendocrine tumor and PTHrp +  -Ca 15.7 on Admission   -Phos 1.4 on admission   -PTHrp  101 ---> 40  (ELEVATED, consistent w/ hypercalcemia of malignancy     -PTH  145 (Elevated in the setting of hypercalcemia)  Concerning for superimposed PTH mediated   -PT had normal PTH levels on 2013      -Vitamin D 14 on 9/20  (Low, could cause secondary hyperpara, but in the setting of severe hypercalcemia, would not expect vitamin D deficiency to elevated PTH to such degree)      -Sestamibi scan 9/30/20:  NO PARATHYROID ADENOMA IDENTIFIED  -NECK 4D CT on 10/2/20:   SHOWING possible RIGHT INFERIOR PARATHYROID ADENOMA     -Symptoms :  AMS on admission,  Constipation, polyuria  -GFR > 60    -Denies lithium or HCTZ use

## 2020-10-24 NOTE — PROGRESS NOTES
[facility] Hospital Medicine  Progress Note    Patient Name: Ching Bruce  MRN: [id number]  Patient Class: IP- Inpatient   Admission Date: 10/19/2020  Length of Stay: 5 days  Attending Physician: Sylwia Guillen MD  Primary Care Provider: Gaby Corea MD    Hospital Medicine Team: [facility] HOSP MED 3 Nohemi Harding MD    Subjective:     Principal Problem:Hypercalcemia        HPI:  Ms. Bruce is a 68 yo female with PMH of pancreatic neuroendocrine tumor  (dx 2012) s/p ERCP, sphincterectomy, and biliary stent placement with removal 9/22, hyperparathyroidism, parathyroid adenoma, and HFpEF (EF 60%, G1DD) presenting for generalized weakness and AMS. History obtained from daughter at bedside as patient is profoundly lethargic. She has had recurrent episodes of hypercalcemia and has been on alendronate, cinacalet, zoledronic acid, and IVF infusions. For the past three days she has had generalized weakness, lethargy, and sustained 3 non-traumatic falls. She has been intermittently confused and has had decreased oral intake. She has not experienced chest pain, palpitations, bone pain, nausea, vomiting, or abdominal pain. She does endorse weight loss, constipation with last BM one week ago, polyuria, abdominal distension, and weakness. Her daughter is unsure if she is passing flatulence. Their oncologist is Dr. Rodriguez who they were scheduled to see tomorrow for IVF.     ED Course:  HDS. Calcium 15.6 corrected, K 2.7. Received 1 liter bolus. Ct head without acute finding. CXR with mild pulmonary edema.     Overview/Hospital Course:  Admitted to hospital medicine for management of severe hypercalcemia. Started on IVF, calcitonin, lasix, cinacalcet (continued from home), and given one dose of zometa on 10/20. KUB ordered showed large stool burden without obstruction/impaction. Palliative care was consulted to establish baseline given patient's decline and reucrrent episodes of hypercalcemia with seemingly  limited treatment options. Calcium and mental status improved with treatments, however calcium level stable between 12-13. Consultation placed to endocrinology for assistance and recommendations on further treatment options. Her cinacalcet dose was increased to 60 mg BID on 10/23.     Interval History: Patient unable to undergo paracentesis yesterday 2/2 safety due to small pocket of ascitic fluid. Otherwise hemodynamically stable, on room air. Mentation improved, more alert and not lethargic. Denies bone pain and abdominal pain. Last BM yesterday. Ca yesterday evening 13.2 corrected.    Review of Systems   Constitutional: Positive for activity change, appetite change, fatigue and unexpected weight change. Negative for diaphoresis and fever.   HENT: Negative for rhinorrhea, tinnitus, trouble swallowing and voice change.    Eyes: Negative for visual disturbance.   Respiratory: Negative for cough, choking and shortness of breath.    Cardiovascular: Positive for leg swelling. Negative for chest pain and palpitations.   Gastrointestinal: Positive for abdominal distention and constipation. Negative for abdominal pain, blood in stool, diarrhea, nausea and vomiting.   Endocrine: Positive for polyuria.   Genitourinary: Negative for difficulty urinating, dysuria, flank pain, hematuria and urgency.   Musculoskeletal: Negative for back pain and joint swelling.   Skin: Positive for pallor. Negative for color change.   Neurological: Positive for weakness. Negative for tremors, syncope, light-headedness, numbness and headaches.   Psychiatric/Behavioral: Positive for confusion. Negative for behavioral problems.     Objective:     Vital Signs (Most Recent):  Temp: 98.9 °F (37.2 °C) (10/24/20 1109)  Pulse: 84 (10/24/20 1152)  Resp: 16 (10/24/20 1109)  BP: (!) 127/51 (10/24/20 1109)  SpO2: 97 % (10/24/20 1109) Vital Signs (24h Range):  Temp:  [96 °F (35.6 °C)-98.9 °F (37.2 °C)] 98.9 °F (37.2 °C)  Pulse:  [84-91] 84  Resp:  [16-20]  16  SpO2:  [96 %-97 %] 97 %  BP: (119-130)/(51-64) 127/51     Weight: 59 kg (130 lb)  Body mass index is 20.36 kg/m².    Intake/Output Summary (Last 24 hours) at 10/24/2020 1154  Last data filed at 10/24/2020 0900  Gross per 24 hour   Intake 240 ml   Output 1200 ml   Net -960 ml      Physical Exam  Vitals signs and nursing note reviewed.   Constitutional:       General: She is not in acute distress.     Appearance: Normal appearance. She is normal weight. She is ill-appearing. She is not toxic-appearing or diaphoretic.   HENT:      Head: Normocephalic and atraumatic.      Nose: Nose normal.      Mouth/Throat:      Mouth: Mucous membranes are dry.   Eyes:      General: No scleral icterus.     Pupils: Pupils are equal, round, and reactive to light.      Comments: Conjunctival pallor   Cardiovascular:      Rate and Rhythm: Normal rate and regular rhythm.      Pulses: Normal pulses.      Heart sounds: Murmur (systolic murmur RSB) present.   Pulmonary:      Effort: Pulmonary effort is normal. No respiratory distress.      Breath sounds: Normal breath sounds.   Abdominal:      General: Abdomen is flat. Bowel sounds are normal. There is distension.      Palpations: Abdomen is soft.      Tenderness: There is no abdominal tenderness. There is no guarding or rebound.   Musculoskeletal: Normal range of motion.         General: No swelling.      Right lower leg: Edema present.      Left lower leg: Edema present.   Lymphadenopathy:      Cervical: No cervical adenopathy.   Skin:     General: Skin is warm and dry.      Capillary Refill: Capillary refill takes less than 2 seconds.      Coloration: Skin is not jaundiced.   Neurological:      Mental Status: She is alert.      Comments: No focal deficit  Alert and oriented to person (daugther) and place ([facility]) but not time  Strength decreased throughout          Significant Labs:   CBC:   Recent Labs   Lab 10/23/20  0229 10/23/20  1212 10/24/20  0659   WBC 9.53 8.90 7.89   HGB 8.6*   --  8.5*   HCT 28.5*  --  27.8*   PLT 90*  --  81*     CMP:   Recent Labs   Lab 10/23/20  1014 10/23/20  1212 10/23/20  1814 10/24/20  0852     --  138 136   K 3.1*  --  3.5 3.0*     --  106 103   CO2 26  --  24 25   *  --  85 93   BUN 17  --  17 17   CREATININE 0.9  --  0.8 0.8   CALCIUM 11.7*  --  11.8* 11.5*   PROT 6.6 6.5 6.5 6.5   ALBUMIN 2.3* 2.3* 2.3* 2.2*   BILITOT 2.1*  --  1.9* 2.4*   ALKPHOS 437*  --  442* 446*   AST 20  --  19 22   ALT 12  --  9* 12   ANIONGAP 10  --  8 8   EGFRNONAA >60.0  --  >60.0 >60.0       Significant Imaging: I have reviewed all pertinent imaging results/findings within the past 24 hours.      Assessment/Plan:      * Hypercalcemia  Hx pancreatic neuroendocrine tumor, hyperparathyroidism 2/2 to parathyroid adenoma presenting for fatigue and AMS. Prior admission from 9/29-10/7 for hypercalcemia with sestamibi scan concerning for parathyroid adenoma, started on cinacalcet during that admission. Takes alendronate q 7 days, also has taken zoledronic acid. Per chart review, patient follows with Dr. Rodriguez and is approaching the end of treatment options given her weakness.   - Ca on arrival corrected to 15.7, had not had a bowel movement x 7 days  - CXR: left pleural effusion, pulmonary edema  - KUB done 10/19 with moderate stool burden, without obstruction/impaction  - Calcium level has plateaued  between 12-13, stable to slight increase from yesterday    Plan:  - IVF - 1/2 ns at 150 cc/hr  - calcitonin x 48 hours, completed  - zometa 10/20 x 1  - CMP q 12 hours  - furosemide 60 IV BID  - increase cinacalcet to 60 mg BID  - endocrinology consultation, appreciate recs   - will reach out to Dr. Rodriguez        Hypokalemia  K 2.7 on presentation    Plan:  - replete as needed  - CMP q12    Macrocytic anemia  , HgB 8.4, decreased from baseline  - folate/b12 wnl during last admission  - s/p 1 unit PRBC, likely dilutional effect    Plan:  - maintain up to date type  and screen  - transfuse for HgB <7      Primary pancreatic neuroendocrine tumor  Please see Hypercalcemia    Plan:  - palliative care consult to establish baseline given patient's prognosis and lack of treatment options  - will reach out to Dr. Michael Granda  - SLP evaluation, dysphagia soft diet/thin liquids      Constipation  - Senna-doc  - Miralax  - Fleet enemas      Hypothyroidism  - continue home levothyroxine      Heart failure with preserved ejection fraction  TTE 9/03 with EF 60%, G1DD, PAP 29. Patient takes furosemide 40 mg daily  - CXR with effusion, mild edema  - pitting edema on PE, slight increase since admission    Plan:  - strict intake/output  - lasix 60 IV BID  - aldactone    Abdominal swelling  Abdominal distension on exam, has been getting paracentesis with most recent on 10/6. No history of SBP  - RUQ US with moderate ascites     Plan:  - inpatient paracentesis ordered, not performed as pocket too small        VTE Risk Mitigation (From admission, onward)         Ordered     enoxaparin injection 40 mg  Every 24 hours      10/19/20 1614     IP VTE HIGH RISK PATIENT  Once      10/19/20 1614     Place sequential compression device  Until discontinued      10/19/20 1614                Discharge Planning   MOSES: 10/25/2020     Code Status: Full Code   Is the patient medically ready for discharge?: No    Reason for patient still in hospital (select all that apply): Treatment  Discharge Plan A: Home Health            Nohemi Harding MD  Department of Hospital Medicine   [facility]

## 2020-10-24 NOTE — ASSESSMENT & PLAN NOTE
TTE 9/03 with EF 60%, G1DD, PAP 29. Patient takes furosemide 40 mg daily  - CXR with effusion, mild edema  - pitting edema on PE, slight increase since admission    Plan:  - strict intake/output  - lasix 60 IV BID  - aldactone

## 2020-10-24 NOTE — ASSESSMENT & PLAN NOTE
Abdominal distension on exam, has been getting paracentesis with most recent on 10/6. No history of SBP  - RUQ US with moderate ascites     Plan:  - inpatient paracentesis ordered, not performed as pocket too small

## 2020-10-24 NOTE — MEDICAL/APP STUDENT
Progress Note  Hospital Medicine    Primary Team: [facility] [place] [facility] MED 3  Admit Date: 10/19/2020   Length of Stay:  LOS: 5 days   SUBJECTIVE:   Reason for Admission:  Hypercalcemia    HPI:    Patient is a 69 y.o. 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 She has a past history of HTN, HLD, hypothyroidism, and anemia. She has been complaining of lethargy and had 3 falls over the weekend without injury to head or body. During this time she has also had increased weakness, urination and burping with reduced bowel movements, and some n/v a few weeks back. She has been sleeping more and progressively disoriented. She denies pain, fever, headache, chest pain, dyspnea, diarrhea, or muscle aches.      She was diagnosed with a neuroendocrine tumor in 9/2012, with numerous lesions in the liver and pancreas. She began chemotherapy with temozolomide and capecitabine in 1/2013 which she used until 11/2018. Tumor at that time was stable, with a Ki-67 of 1%. In 7/2020, she was found to have progressive disease within the liver, and biopsy showed grade 2 NET with Ki-67 of 15%.     Hospital Course:  HDS. Calcium 15.6 corrected, K 2.7. Received 1 liter bolus. Ct head without acute finding. CXR with mild pulmonary edema.      Admitted to hospital medicine for management of severe hypercalcemia. Started on IVF, calcitonin, lasix, cinacalcet, and given one dose of zometa. KUB ordered showed large stool burden without obstruction/impaction. Palliative care was consulted to establish baseline given patient's decline and reucrrent episodes of hypercalcemia with seemingly limited treatment options.      10/23: Calcium 11.5 (corrected 12.9)  10/24: Calcium 11.8 (cerrected 13.2)    Interval:  NAEON. Pt was assessed by IR on 10/23 for paracentesis but they did not identify enough fluid to withdraw, determined  unsafe at this time. Pt denies any pain or discomfort. Endocrine was consulted for assistance with hypercalcemia, agree with current regimen and advise increasing cinacalcet to 90mg BID after 7 days on 60mg BID (if patient can tolerate). Also recommend repeat Zometa on 10/27.    Review of Systems   Constitutional: Positive for malaise/fatigue and weight loss. Negative for chills, diaphoresis and fever.   HENT: Negative for congestion, hearing loss, nosebleeds, sinus pain and sore throat.    Eyes: Negative for double vision and pain.   Respiratory: Negative for cough, hemoptysis, shortness of breath and wheezing.    Cardiovascular: Positive for leg swelling. Negative for chest pain and palpitations.   Gastrointestinal: Negative for abdominal pain, blood in stool, constipation, diarrhea, heartburn, nausea and vomiting.   Genitourinary: Negative for dysuria, frequency and hematuria.   Musculoskeletal: Negative for back pain, falls, joint pain, myalgias and neck pain.   Skin: Negative for rash.   Neurological: Negative for dizziness, focal weakness, seizures, weakness and headaches.   Psychiatric/Behavioral: Negative for depression. The patient is not nervous/anxious and does not have insomnia.         OBJECTIVE:     Temp:  [96 °F (35.6 °C)-98.9 °F (37.2 °C)]   Pulse:  [84-91]   Resp:  [16-20]   BP: (119-130)/(51-64)   SpO2:  [96 %-97 %]  Body mass index is 20.36 kg/m².  Intake/Outake:  This Shift:  I/O this shift:  In: 240 [P.O.:240]  Out: 400 [Urine:400]    Net I/O past 24h:     Intake/Output Summary (Last 24 hours) at 10/24/2020 1243  Last data filed at 10/24/2020 0900  Gross per 24 hour   Intake 240 ml   Output 1200 ml   Net -960 ml             Physical Exam  Constitutional:       General: She is not in acute distress.     Appearance: She is underweight. She is ill-appearing.   HENT:      Head: Normocephalic and atraumatic.      Nose: Nose normal.      Mouth/Throat:      Mouth: Mucous membranes are moist.   Eyes:       General: No scleral icterus.     Extraocular Movements: Extraocular movements intact.      Pupils: Pupils are equal, round, and reactive to light.   Neck:      Musculoskeletal: Normal range of motion and neck supple. No neck rigidity or muscular tenderness.   Cardiovascular:      Chest Wall: Thrill present.      Heart sounds: No murmur. No friction rub. No gallop.    Pulmonary:      Effort: Pulmonary effort is normal. No respiratory distress.      Breath sounds: Normal breath sounds. No stridor. No wheezing or rales.   Abdominal:      General: Bowel sounds are normal. There is distension and abdominal bruit.      Palpations: There is mass.   Musculoskeletal:      Right lower leg: Edema (trace) present.      Left lower leg: Edema (trace) present.   Lymphadenopathy:      Cervical: No cervical adenopathy.   Skin:     General: Skin is warm and dry.      Findings: No bruising or erythema.   Neurological:      General: No focal deficit present.      Mental Status: She is alert. Mental status is at baseline.   Psychiatric:         Mood and Affect: Mood normal.         Behavior: Behavior normal.         Thought Content: Thought content normal.         Judgment: Judgment normal.          Laboratory:  CBC/Anemia Labs: Coags:    Recent Labs   Lab 10/22/20  0412 10/23/20  0229 10/23/20  1212 10/24/20  0659   WBC 6.95 9.53 8.90 7.89   HGB 6.7* 8.6*  --  8.5*   HCT 22.8* 28.5*  --  27.8*   PLT 83* 90*  --  81*   * 104*  --  104*   RDW 18.5* 20.4*  --  19.3*    No results for input(s): PT, INR, APTT in the last 168 hours.     Chemistries:   Recent Labs   Lab 10/22/20  0716  10/23/20  0229 10/23/20  1014 10/23/20  1212 10/23/20  1814 10/24/20  0659 10/24/20  0852   *   < > 144 143  --  138  --  136   K 3.3*   < > 3.2* 3.1*  --  3.5  --  3.0*      < > 108 107  --  106  --  103   CO2 26   < > 24 26  --  24  --  25   BUN 16   < > 16 17  --  17  --  17   CREATININE 0.8   < > 0.8 0.9  --  0.8  --  0.8   CALCIUM  10.8*   < > 11.5* 11.7*  --  11.8*  --  11.5*   PROT 6.5   < > 6.6 6.6 6.5 6.5  --  6.5   BILITOT 1.9*   < > 1.7* 2.1*  --  1.9*  --  2.4*   ALKPHOS 423*   < > 431* 437*  --  442*  --  446*   ALT 11   < > 14 12  --  9*  --  12   AST 18   < > 19 20  --  19  --  22   MG 1.6  --  2.0  --   --   --  1.6  --    PHOS 3.7  --  2.6*  --   --   --  2.4*  --     < > = values in this interval not displayed.        Medications:  Scheduled Meds:   cinacalcet  60 mg Oral BID WM    enoxaparin  40 mg Subcutaneous Q24H    furosemide (LASIX) IV  60 mg Intravenous BID    levothyroxine  200 mcg Oral Before breakfast    magnesium sulfate IVPB  2 g Intravenous Once    polyethylene glycol  17 g Oral Daily    potassium chloride in water  10 mEq Intravenous Q1H    potassium, sodium phosphates  2 packet Oral QID (WM & HS)    senna-docusate 8.6-50 mg  1 tablet Oral BID    spironolactone  50 mg Oral Daily    ursodioL  300 mg Oral BID                             Continuous Infusions:   sodium chloride 0.45% 150 mL/hr at 10/24/20 0952     PRN Meds:.sodium chloride, sodium chloride, acetaminophen, dextrose 50%, dextrose 50%, glucagon (human recombinant), glucose, glucose, melatonin, ondansetron, sodium chloride 0.9%     ASSESSMENT/PLAN:     Active Hospital Problems    Diagnosis  POA    *Hypercalcemia [E83.52]  Yes    Choking [T17.308A]  No    Constipation [K59.00]  Unknown    Hypothyroidism [E03.9]  Unknown    Pain [R52]  Unknown    Nausea [R11.0]  Unknown    Dyspnea [R06.00]  Unknown    Macrocytic anemia [D53.9]  Unknown    Heart failure with preserved ejection fraction [I50.30]  Unknown    Palliative care encounter [Z51.5]  Not Applicable    Goals of care, counseling/discussion [Z71.89]  Not Applicable    Advanced care planning/counseling discussion [Z71.89]  Not Applicable    Hypokalemia [E87.6]  Unknown    Abdominal swelling [R19.00]  Yes    Primary pancreatic neuroendocrine tumor [D3A.8]  Yes      Resolved Hospital  Problems   No resolved problems to display.     Hypercalcemia (d/t malignancy or parathyroid adenoma              Pt is a 68 yo female w/history of neuroendocrine tumor of the pancreas/liver and parathyroid nodule. She has a long cycle of repeat hospitalizations due to hypercalcemia with hypokalemia. During these events, she has increased confusion, lethargy, constipation, and urination. She has had increased falls during this time, and home regimen is ineffective at this time. Her calcium is elevated to 12.9 corrected, has been stable at this level for 3 days.     Plan:  - Endocrine consulted, appreciate recs  - IVF, 1/2 NS at 150 cc/hr  - calcitonin given for 48 hours  - consider increase in cinacalcet dose after 7 days at current dose  - zometa 10/20 x 1, can repeat on 10/27  - CMP q 6 hours  - furosemide 60 IV BID  - reach out to Dr. Rodriguez     Hypokalemia  K 2.7 on presentation, currently 3.5     Plan:  - replete as needed        Macrocytic anemia  , HgB 6.7 decreased from baseline. Given 1 unit PRBC. Current Hgb 8.5  - folate/b12 wnl during last admission     Plan:  - type and screen  - transfuse HgB < 7        Primary pancreatic neuroendocrine tumor  Please see Hypercalcemia     Plan:  - palliative care consult to establish baseline given patient's prognosis and lack of treatment options     Hypothyroidism  - continue home levothyroxine        Heart failure with preserved ejection fraction  TTE 9/03 with EF 60%, G1DD, PAP 29. Patient takes furosemide 40 mg daily  - CXR with effusion, mild edema  - pitting edema on PE     Plan:  - strict intake/output  - lasix 60 IV BID    CODE Status- full code      Logan Rosales, MS4

## 2020-10-24 NOTE — ASSESSMENT & PLAN NOTE
-Per primary   -On IV fluids for Hypercalcemia   -Monitor respiratory status   -Avoid fluid overload

## 2020-10-24 NOTE — SUBJECTIVE & OBJECTIVE
Interval History: Patient unable to undergo paracentesis yesterday 2/2 safety due to small pocket of ascitic fluid. Otherwise hemodynamically stable, on room air. Mentation improved, more alert and not lethargic. Denies bone pain and abdominal pain. Last BM yesterday. Ca yesterday evening 13.2 corrected.    Review of Systems   Constitutional: Positive for activity change, appetite change, fatigue and unexpected weight change. Negative for diaphoresis and fever.   HENT: Negative for rhinorrhea, tinnitus, trouble swallowing and voice change.    Eyes: Negative for visual disturbance.   Respiratory: Negative for cough, choking and shortness of breath.    Cardiovascular: Positive for leg swelling. Negative for chest pain and palpitations.   Gastrointestinal: Positive for abdominal distention and constipation. Negative for abdominal pain, blood in stool, diarrhea, nausea and vomiting.   Endocrine: Positive for polyuria.   Genitourinary: Negative for difficulty urinating, dysuria, flank pain, hematuria and urgency.   Musculoskeletal: Negative for back pain and joint swelling.   Skin: Positive for pallor. Negative for color change.   Neurological: Positive for weakness. Negative for tremors, syncope, light-headedness, numbness and headaches.   Psychiatric/Behavioral: Positive for confusion. Negative for behavioral problems.     Objective:     Vital Signs (Most Recent):  Temp: 98.9 °F (37.2 °C) (10/24/20 1109)  Pulse: 84 (10/24/20 1152)  Resp: 16 (10/24/20 1109)  BP: (!) 127/51 (10/24/20 1109)  SpO2: 97 % (10/24/20 1109) Vital Signs (24h Range):  Temp:  [96 °F (35.6 °C)-98.9 °F (37.2 °C)] 98.9 °F (37.2 °C)  Pulse:  [84-91] 84  Resp:  [16-20] 16  SpO2:  [96 %-97 %] 97 %  BP: (119-130)/(51-64) 127/51     Weight: 59 kg (130 lb)  Body mass index is 20.36 kg/m².    Intake/Output Summary (Last 24 hours) at 10/24/2020 1154  Last data filed at 10/24/2020 0900  Gross per 24 hour   Intake 240 ml   Output 1200 ml   Net -960 ml       Physical Exam  Vitals signs and nursing note reviewed.   Constitutional:       General: She is not in acute distress.     Appearance: Normal appearance. She is normal weight. She is ill-appearing. She is not toxic-appearing or diaphoretic.   HENT:      Head: Normocephalic and atraumatic.      Nose: Nose normal.      Mouth/Throat:      Mouth: Mucous membranes are dry.   Eyes:      General: No scleral icterus.     Pupils: Pupils are equal, round, and reactive to light.      Comments: Conjunctival pallor   Cardiovascular:      Rate and Rhythm: Normal rate and regular rhythm.      Pulses: Normal pulses.      Heart sounds: Murmur (systolic murmur RSB) present.   Pulmonary:      Effort: Pulmonary effort is normal. No respiratory distress.      Breath sounds: Normal breath sounds.   Abdominal:      General: Abdomen is flat. Bowel sounds are normal. There is distension.      Palpations: Abdomen is soft.      Tenderness: There is no abdominal tenderness. There is no guarding or rebound.   Musculoskeletal: Normal range of motion.         General: No swelling.      Right lower leg: Edema present.      Left lower leg: Edema present.   Lymphadenopathy:      Cervical: No cervical adenopathy.   Skin:     General: Skin is warm and dry.      Capillary Refill: Capillary refill takes less than 2 seconds.      Coloration: Skin is not jaundiced.   Neurological:      Mental Status: She is alert.      Comments: No focal deficit  Alert and oriented to person (daugther) and place ([facility]) but not time  Strength decreased throughout          Significant Labs:   CBC:   Recent Labs   Lab 10/23/20  0229 10/23/20  1212 10/24/20  0659   WBC 9.53 8.90 7.89   HGB 8.6*  --  8.5*   HCT 28.5*  --  27.8*   PLT 90*  --  81*     CMP:   Recent Labs   Lab 10/23/20  1014 10/23/20  1212 10/23/20  1814 10/24/20  0852     --  138 136   K 3.1*  --  3.5 3.0*     --  106 103   CO2 26  --  24 25   *  --  85 93   BUN 17  --  17 17   CREATININE  0.9  --  0.8 0.8   CALCIUM 11.7*  --  11.8* 11.5*   PROT 6.6 6.5 6.5 6.5   ALBUMIN 2.3* 2.3* 2.3* 2.2*   BILITOT 2.1*  --  1.9* 2.4*   ALKPHOS 437*  --  442* 446*   AST 20  --  19 22   ALT 12  --  9* 12   ANIONGAP 10  --  8 8   EGFRNONAA >60.0  --  >60.0 >60.0       Significant Imaging: I have reviewed all pertinent imaging results/findings within the past 24 hours.

## 2020-10-24 NOTE — ASSESSMENT & PLAN NOTE
Please see Hypercalcemia    Plan:  - palliative care consult to establish baseline given patient's prognosis and lack of treatment options  - will reach out to Dr. Rodriguez

## 2020-10-24 NOTE — ASSESSMENT & PLAN NOTE
-Pt w/ prior know hypercalcemia of malignancy form pancreatic neuroendocrine tumor and PTHrp +   -On monthly Zometa   -Ca 15.7 on Admission   -Phos 1.4 on admission   -PTHrp  101 ---> 40  (ELEVATED, consistent w/ hypercalcemia of malignancy     -PTH  145 (Elevated in the setting of hypercalcemia)  Concerning for superimposed PTH mediated   -PT had normal PTH levels on 2013      -Vitamin D 14 on 9/20  (Low, could cause secondary hyperpara, but in the setting of severe hypercalcemia, would not expect vitamin D deficiency to elevated PTH to such degree)      -Sestamibi scan 9/30/20:  NO PARATHYROID ADENOMA IDENTIFIED  -NECK 4D CT on 10/2/20:   SHOWING possible RIGHT INFERIOR PARATHYROID ADENOMA     -Symptoms :  AMS on admission,  Constipation, polyuria  -S/p Calcitonin and Zometa 4mg IV on 10/20/20     HYPERCALCEMIA LIKELY MULTIFACTORIAL FROM MALIGNANCY AND PRIMARY HYPERPARATHYROIDISM     Plan:   -Due to patient having evidence of hypercalcemia of malignancy and PTH mediated hypercalcemia (Likely primary hyperparathyroidism), will recommend the following.     -Agree with Increasing Cinacalcet 60mg BID  (Can optimize up to 90mg BID in 7 days)    -C/w IV and oral hydration per primary team     -Will defer Zometa dosing to Hemeonc (If calcium > 12 and patient symptomatic can consider repeating Zometa on 10/27/20 as it can be given q7 days)     -Renal panel q 12hrs     -Would target treatment based on symptoms     -Send calcitriol levels     -If after maximal medical optimization of Cinacalcet and Zometa if no improvement of hypercalcemia and symptoms could consider parathyroidectomy but this would be last resort as patient was previously deemed a poor surgical candidate and is unsure if parathyroidectomy will completely resolve hypercalcemia as patient has underlying hypercalcemia of malignancy.      -Will follow

## 2020-10-25 NOTE — ASSESSMENT & PLAN NOTE
Hx pancreatic neuroendocrine tumor, hyperparathyroidism 2/2 to parathyroid adenoma presenting for fatigue and AMS. Prior admission from 9/29-10/7 for hypercalcemia with sestamibi scan concerning for parathyroid adenoma, started on cinacalcet during that admission. Takes alendronate q 7 days, also has taken zoledronic acid. Per chart review, patient follows with Dr. Rodriguez and is approaching the end of treatment options given her weakness.   DDX: hypercalcemia of malignancy, primary hyperparathyroidism 2/2 adenoma, likely mixed picture of the two  - Ca on arrival corrected to 15.7, had not had a bowel movement x 7 days  - CXR: left pleural effusion, pulmonary edema  - KUB done 10/19 with moderate stool burden, without obstruction/impaction  - Calcium level has plateaued  between 12-13 but patient's symptoms have improved     Plan:  - IVF - 1/2 ns at 150 cc/hr  - calcitonin x 48 hours, completed  - zometa 10/20 x 1 -- consider repeat 10/27 if necessary  - CMP q 12 hours  - furosemide 60 IV BID  - cinacalcet to 60 mg BID -- uptitrate to 90 BID on 10/30  - endocrinology consultation, appreciate recs   - will reach out to Dr. Rodriguez and oncology team

## 2020-10-25 NOTE — ASSESSMENT & PLAN NOTE
-S/p Total Thyroidectomy for MNG   -Per patient benign pathology  -TSH 0.27 (Below goal)   -FT4 1.33  (WNL)   -ON LT4 200mcg  (Double the weight based recommended)  -High doses could be from poor absorbtion as patient with peripheral edema and ascites and could have bowel edema  VS. Non compliance          Plan:   -Due to acute illness and patient previously hypothryoid on LT4 150mcg would recommend to c/w LT4 200mcg and Check TFT in 4 weeks after DC for dose adjustment.

## 2020-10-25 NOTE — PROGRESS NOTES
[facility] Hospital Medicine  Progress Note    Patient Name: Ching Bruce  MRN: [id number]  Patient Class: IP- Inpatient   Admission Date: 10/19/2020  Length of Stay: 6 days  Attending Physician: Sylwia Guillen MD  Primary Care Provider: Gaby Corea MD    Hospital Medicine Team: [facility] HOSP MED 3 Nohemi Harding MD    Subjective:     Principal Problem:Hypercalcemia        HPI:  Ms. Bruce is a 70 yo female with PMH of pancreatic neuroendocrine tumor  (dx 2012) s/p ERCP, sphincterectomy, and biliary stent placement with removal 9/22, hyperparathyroidism, parathyroid adenoma, and HFpEF (EF 60%, G1DD) presenting for generalized weakness and AMS. History obtained from daughter at bedside as patient is profoundly lethargic. She has had recurrent episodes of hypercalcemia and has been on alendronate, cinacalet, zoledronic acid, and IVF infusions. For the past three days she has had generalized weakness, lethargy, and sustained 3 non-traumatic falls. She has been intermittently confused and has had decreased oral intake. She has not experienced chest pain, palpitations, bone pain, nausea, vomiting, or abdominal pain. She does endorse weight loss, constipation with last BM one week ago, polyuria, abdominal distension, and weakness. Her daughter is unsure if she is passing flatulence. Their oncologist is Dr. Rodriguez who they were scheduled to see tomorrow for IVF.     ED Course:  HDS. Calcium 15.6 corrected, K 2.7. Received 1 liter bolus. Ct head without acute finding. CXR with mild pulmonary edema.     Overview/Hospital Course:  Admitted to hospital medicine for management of severe hypercalcemia. Started on IVF, calcitonin, lasix, cinacalcet (continued from home), and given one dose of zometa on 10/20. Cinacalcet dose increased to 60 BID on 10/23. KUB ordered showed large stool burden without obstruction/impaction. Palliative care was consulted to establish baseline given patient's decline and  recurrent episodes of hypercalcemia with seemingly limited treatment options. Calcium and mental status improved with treatments, however calcium level stable between 12-13. Consultation placed to endocrinology for assistance and recommendations on further treatment options, recommend uptitration of cinacalcet to 90 BID on 10/30 and evaluate for repeat zometa on 10/27. We reached out to our oncology service to have them speak with the patient and family on her limited therapy options.     Interval History: No events overnight. Ca yesterday 12.6. Patient had BM this AM. Denies abdominal pain or bone pain. Fluid status appears unchanged from prior days however net UO was +1240cc yesterday.     Review of Systems   Constitutional: Positive for activity change, appetite change, fatigue and unexpected weight change. Negative for diaphoresis and fever.   HENT: Negative for rhinorrhea, tinnitus, trouble swallowing and voice change.    Eyes: Negative for visual disturbance.   Respiratory: Negative for cough, choking and shortness of breath.    Cardiovascular: Positive for leg swelling. Negative for chest pain and palpitations.   Gastrointestinal: Positive for abdominal distention and constipation. Negative for abdominal pain, blood in stool, diarrhea, nausea and vomiting.   Endocrine: Positive for polyuria.   Genitourinary: Negative for difficulty urinating, dysuria, flank pain, hematuria and urgency.   Musculoskeletal: Negative for back pain and joint swelling.   Skin: Positive for pallor. Negative for color change.   Neurological: Positive for weakness. Negative for tremors, syncope, light-headedness, numbness and headaches.   Psychiatric/Behavioral: Positive for confusion. Negative for behavioral problems.     Objective:     Vital Signs (Most Recent):  Temp: 98.3 °F (36.8 °C) (10/25/20 1111)  Pulse: 89 (10/25/20 1111)  Resp: 17 (10/25/20 1111)  BP: (!) 126/58 (10/25/20 1111)  SpO2: 97 % (10/25/20 1111) Vital Signs (24h  Range):  Temp:  [97.8 °F (36.6 °C)-98.7 °F (37.1 °C)] 98.3 °F (36.8 °C)  Pulse:  [86-94] 89  Resp:  [16-20] 17  SpO2:  [96 %-98 %] 97 %  BP: (117-131)/(55-60) 126/58     Weight: 59 kg (130 lb)  Body mass index is 20.36 kg/m².    Intake/Output Summary (Last 24 hours) at 10/25/2020 1328  Last data filed at 10/25/2020 1000  Gross per 24 hour   Intake 3040 ml   Output 1500 ml   Net 1540 ml      Physical Exam  Vitals signs and nursing note reviewed.   Constitutional:       General: She is not in acute distress.     Appearance: Normal appearance. She is normal weight. She is ill-appearing. She is not toxic-appearing or diaphoretic.   HENT:      Head: Normocephalic and atraumatic.      Nose: Nose normal.      Mouth/Throat:      Mouth: Mucous membranes are dry.   Eyes:      General: No scleral icterus.     Pupils: Pupils are equal, round, and reactive to light.      Comments: Conjunctival pallor   Cardiovascular:      Rate and Rhythm: Normal rate and regular rhythm.      Pulses: Normal pulses.      Heart sounds: Murmur (systolic murmur RSB) present.   Pulmonary:      Effort: Pulmonary effort is normal. No respiratory distress.      Breath sounds: Normal breath sounds.   Abdominal:      General: Abdomen is flat. Bowel sounds are normal. There is distension.      Palpations: Abdomen is soft.      Tenderness: There is no abdominal tenderness. There is no guarding or rebound.   Musculoskeletal: Normal range of motion.         General: No swelling.      Right lower leg: Edema present.      Left lower leg: Edema present.   Lymphadenopathy:      Cervical: No cervical adenopathy.   Skin:     General: Skin is warm and dry.      Capillary Refill: Capillary refill takes less than 2 seconds.      Coloration: Skin is not jaundiced.   Neurological:      Mental Status: She is alert.      Comments: No focal deficit  Alert and oriented to person (daugther) and place ([facility]) but not time  Strength decreased throughout          Significant  Labs: All pertinent labs within the past 24 hours have been reviewed.    Significant Imaging: I have reviewed all pertinent imaging results/findings within the past 24 hours.      Assessment/Plan:      * Hypercalcemia  Hx pancreatic neuroendocrine tumor, hyperparathyroidism 2/2 to parathyroid adenoma presenting for fatigue and AMS. Prior admission from 9/29-10/7 for hypercalcemia with sestamibi scan concerning for parathyroid adenoma, started on cinacalcet during that admission. Takes alendronate q 7 days, also has taken zoledronic acid. Per chart review, patient follows with Dr. Rodriguez and is approaching the end of treatment options given her weakness.   DDX: hypercalcemia of malignancy, primary hyperparathyroidism 2/2 adenoma, likely mixed picture of the two  - Ca on arrival corrected to 15.7, had not had a bowel movement x 7 days  - CXR: left pleural effusion, pulmonary edema  - KUB done 10/19 with moderate stool burden, without obstruction/impaction  - Calcium level has plateaued  between 12-13 but patient's symptoms have improved     Plan:  - IVF - 1/2 ns at 150 cc/hr  - calcitonin x 48 hours, completed  - zometa 10/20 x 1 -- consider repeat 10/27 if necessary  - CMP q 12 hours  - furosemide 60 IV BID  - cinacalcet 60 mg BID -- uptitrate to 90 BID on 10/30  - endocrinology consultation, appreciate recs  -- calcitriol level pending per endo recs   - will reach out to Dr. Rodriguez and oncology team        Hypokalemia  K 2.7 on presentation    Plan:  - replete as needed  - CMP q12    Macrocytic anemia  , HgB 8.4, decreased from baseline  - folate/b12 wnl during last admission  - s/p 1 unit PRBC, likely dilutional effect    Plan:  - maintain up to date type and screen  - transfuse for HgB <7      Primary pancreatic neuroendocrine tumor  Please see Hypercalcemia    Plan:  - palliative care consult to establish baseline given patient's prognosis and lack of treatment options  - will reach out to   Michael    Choking  - SLP evaluation, dysphagia soft diet/thin liquids      Constipation  - Senna-doc  - Miralax  - Fleet enemas      Hypothyroidism  - continue home levothyroxine      Heart failure with preserved ejection fraction  TTE 9/03 with EF 60%, G1DD, PAP 29. Patient takes furosemide 40 mg daily  - CXR with effusion, mild edema  - pitting edema on PE, slight increase since admission    Plan:  - strict intake/output  - lasix 60 IV BID  - aldactone    Abdominal swelling  Abdominal distension on exam, has been getting paracentesis with most recent on 10/6. No history of SBP  - RUQ US with moderate ascites     Plan:  - inpatient paracentesis ordered, not performed as pocket too small      VTE Risk Mitigation (From admission, onward)         Ordered     enoxaparin injection 40 mg  Every 24 hours      10/19/20 1614     IP VTE HIGH RISK PATIENT  Once      10/19/20 1614     Place sequential compression device  Until discontinued      10/19/20 1614                Discharge Planning   MOSES: 10/27/2020     Code Status: Full Code   Is the patient medically ready for discharge?: No    Reason for patient still in hospital (select all that apply): Treatment  Discharge Plan A: Home Health          Nohemi Harding MD  Department of Hospital Medicine   [facility]

## 2020-10-25 NOTE — PROGRESS NOTES
"[facility]  Endocrinology  Progress Note    Admit Date: 10/19/2020     68 YO Female w/ dx of Pancreatic neuroendocrine tumor w/ liver mets, Hypercalcemia of malignancy on  Monthly Zometa, Post- surgical hypothyroidism, CHF and HTN that was admitted to [facility] for failure to thrive, fatigue and AMS. During hospital course patient was found to have corrected calcium of 15.7, phos 2.4, PTHrp 40 and .  Pt was stared on IV, received 4 doses of calcitonin and 1 dose of Zometa 4mg w/ improvement in calcium levels to 12.8 and improvement in Mental status.  Pt was consulted with endocrinology for assistance with management of hypercalcemia.      In regards with hypercalcemia  -Pt w/ prior know hypercalcemia of malignancy form pancreatic neuroendocrine tumor and PTHrp +  -Ca 15.7 on Admission   -Phos 1.4 on admission   -PTHrp  101 ---> 40  (ELEVATED, consistent w/ hypercalcemia of malignancy     -PTH  145 (Elevated in the setting of hypercalcemia)  Concerning for superimposed PTH mediated   -PT had normal PTH levels on       -Vitamin D 14 on   (Low, could cause secondary hyperpara, but in the setting of severe hypercalcemia, would not expect vitamin D deficiency to elevated PTH to such degree)      -Sestamibi scan 20:  NO PARATHYROID ADENOMA IDENTIFIED  -NECK 4D CT on 10/2/20:   SHOWING possible RIGHT INFERIOR PARATHYROID ADENOMA     -Symptoms :  AMS on admission,  Constipation, polyuria  -GFR > 60    -Denies lithium or HCTZ use       Interval HPI:   Corrected calcium 12.6  Slowly improving.     Overnight events: KHADRA   Eatin%  Nausea: No  Hypoglycemia and intervention: No  Fever: No  TPN and/or TF: No    BP (!) 121/57   Pulse 90   Temp 98.5 °F (36.9 °C) (Oral)   Resp 17   Ht 5' 7" (1.702 m)   Wt 59 kg (130 lb)   LMP  (LMP Unknown)   SpO2 96%   Breastfeeding No   BMI 20.36 kg/m²     Labs Reviewed and Include    Recent Labs   Lab 10/24/20  1727   GLU 84   CALCIUM 11.2* "   ALBUMIN 2.2*   PROT 6.5      K 3.8   CO2 24      BUN 17   CREATININE 0.8   ALKPHOS 446*   ALT 12   AST 24   BILITOT 2.2*     Lab Results   Component Value Date    WBC 8.51 10/25/2020    HGB 8.8 (L) 10/25/2020    HCT 28.4 (L) 10/25/2020     (H) 10/25/2020    PLT 81 (L) 10/25/2020     Recent Labs   Lab 10/19/20  1700   TSH 0.279*   FREET4 1.33  1.33     No results found for: HGBA1C    Nutritional status:   Body mass index is 20.36 kg/m².  Lab Results   Component Value Date    ALBUMIN 2.2 (L) 10/24/2020    ALBUMIN 2.2 (L) 10/24/2020    ALBUMIN 2.3 (L) 10/23/2020     Lab Results   Component Value Date    PREALBUMIN 8 (L) 10/06/2020    PREALBUMIN 9 (L) 10/02/2020    PREALBUMIN 11 (L) 09/07/2020       Estimated Creatinine Clearance: 61.8 mL/min (based on SCr of 0.8 mg/dL).    Accu-Checks  No results for input(s): POCTGLUCOSE in the last 72 hours.    Current Medications and/or Treatments Impacting Glycemic Control  Immunotherapy:    Immunosuppressants     None        Steroids:   Hormones (From admission, onward)    Start     Stop Route Frequency Ordered    10/19/20 1712  melatonin tablet 6 mg      -- Oral Nightly PRN 10/19/20 1614        Pressors:    Autonomic Drugs (From admission, onward)    None        Hyperglycemia/Diabetes Medications:   Antihyperglycemics (From admission, onward)    None          ASSESSMENT and PLAN    * Hypercalcemia  -Pt w/ prior know hypercalcemia of malignancy form pancreatic neuroendocrine tumor and PTHrp +   -On monthly Zometa   -Ca 15.7 on Admission  ----> 12.6 (Slowly improving)   -Phos 1.4 on admission ----> 2.4   -PTHrp  101 ---> 40  (ELEVATED, consistent w/ hypercalcemia of malignancy     -PTH  145 (Elevated in the setting of hypercalcemia)  Concerning for superimposed PTH mediated   -PT had normal PTH levels on 2013      -Vitamin D 14 on 9/20  (Low, could cause secondary hyperpara, but in the setting of severe hypercalcemia, would not expect vitamin D deficiency to  elevated PTH to such degree)      -Sestamibi scan 9/30/20:  NO PARATHYROID ADENOMA IDENTIFIED  -NECK 4D CT on 10/2/20:   SHOWING possible RIGHT INFERIOR PARATHYROID ADENOMA     -Symptoms :  AMS on admission,  Constipation, polyuria   (IMPROVING)   -S/p Calcitonin and Zometa 4mg IV on 10/20/20     HYPERCALCEMIA LIKELY MULTIFACTORIAL FROM MALIGNANCY AND PRIMARY HYPERPARATHYROIDISM     Plan:   -Due to patient having evidence of hypercalcemia of malignancy and PTH mediated hypercalcemia (Likely primary hyperparathyroidism), will recommend the following.     -C/w  Cinacalcet 60mg BID  (Can optimize up to 90mg BID in 7 days)  (Started on 10/23/20)    -C/w IV and oral hydration per primary team     -Will defer Zometa dosing to Hemeonc (If calcium > 12 and patient symptomatic can consider repeating Zometa on 10/27/20 as it can be given q7 days)     -Renal panel q 12hrs     -Would target treatment based on symptoms     -Follow calcitriol levels     -If after maximal medical optimization of Cinacalcet and Zometa if no improvement of hypercalcemia and symptoms could consider parathyroidectomy but this would be last resort as patient was previously deemed a poor surgical candidate and is unsure if parathyroidectomy will completely resolve hypercalcemia as patient has underlying hypercalcemia of malignancy.        Primary pancreatic neuroendocrine tumor  -Per primary   -Pt w/ hypercalcemia of malignancy on Zometa       Hypokalemia  -Replete per primary team       Heart failure with preserved ejection fraction  -Per primary   -On IV fluids for Hypercalcemia   -Monitor respiratory status   -Avoid fluid overload       Hypothyroidism   -S/p Total Thyroidectomy for MNG   -Per patient benign pathology  -TSH 0.27 (Below goal)   -FT4 1.33  (WNL)   -ON LT4 200mcg  (Double the weight based recommended)  -High doses could be from poor absorbtion as patient with peripheral edema and ascites and could have bowel edema  VS. Non compliance           Plan:   -Due to acute illness and patient previously hypothryoid on LT4 150mcg would recommend to c/w LT4 200mcg and Check TFT in 4 weeks after DC for dose adjustment.               Gabino Shaw MD  Endocrinology  [facility]

## 2020-10-25 NOTE — SUBJECTIVE & OBJECTIVE
"Interval HPI:   Corrected calcium 12.6  Slowly improving.     Overnight events: KHADRA   Eatin%  Nausea: No  Hypoglycemia and intervention: No  Fever: No  TPN and/or TF: No    BP (!) 121/57   Pulse 90   Temp 98.5 °F (36.9 °C) (Oral)   Resp 17   Ht 5' 7" (1.702 m)   Wt 59 kg (130 lb)   LMP  (LMP Unknown)   SpO2 96%   Breastfeeding No   BMI 20.36 kg/m²     Labs Reviewed and Include    Recent Labs   Lab 10/24/20  1727   GLU 84   CALCIUM 11.2*   ALBUMIN 2.2*   PROT 6.5      K 3.8   CO2 24      BUN 17   CREATININE 0.8   ALKPHOS 446*   ALT 12   AST 24   BILITOT 2.2*     Lab Results   Component Value Date    WBC 8.51 10/25/2020    HGB 8.8 (L) 10/25/2020    HCT 28.4 (L) 10/25/2020     (H) 10/25/2020    PLT 81 (L) 10/25/2020     Recent Labs   Lab 10/19/20  1700   TSH 0.279*   FREET4 1.33  1.33     No results found for: HGBA1C    Nutritional status:   Body mass index is 20.36 kg/m².  Lab Results   Component Value Date    ALBUMIN 2.2 (L) 10/24/2020    ALBUMIN 2.2 (L) 10/24/2020    ALBUMIN 2.3 (L) 10/23/2020     Lab Results   Component Value Date    PREALBUMIN 8 (L) 10/06/2020    PREALBUMIN 9 (L) 10/02/2020    PREALBUMIN 11 (L) 2020       Estimated Creatinine Clearance: 61.8 mL/min (based on SCr of 0.8 mg/dL).    Accu-Checks  No results for input(s): POCTGLUCOSE in the last 72 hours.    Current Medications and/or Treatments Impacting Glycemic Control  Immunotherapy:    Immunosuppressants     None        Steroids:   Hormones (From admission, onward)    Start     Stop Route Frequency Ordered    10/19/20 1712  melatonin tablet 6 mg      -- Oral Nightly PRN 10/19/20 1614        Pressors:    Autonomic Drugs (From admission, onward)    None        Hyperglycemia/Diabetes Medications:   Antihyperglycemics (From admission, onward)    None        "

## 2020-10-25 NOTE — ASSESSMENT & PLAN NOTE
Hx pancreatic neuroendocrine tumor, hyperparathyroidism 2/2 to parathyroid adenoma presenting for fatigue and AMS. Prior admission from 9/29-10/7 for hypercalcemia with sestamibi scan concerning for parathyroid adenoma, started on cinacalcet during that admission. Takes alendronate q 7 days, also has taken zoledronic acid. Per chart review, patient follows with Dr. Rodriguez and is approaching the end of treatment options given her weakness.   DDX: hypercalcemia of malignancy, primary hyperparathyroidism 2/2 adenoma, likely mixed picture of the two  - Ca on arrival corrected to 15.7, had not had a bowel movement x 7 days  - CXR: left pleural effusion, pulmonary edema  - KUB done 10/19 with moderate stool burden, without obstruction/impaction  - Calcium level has plateaued  between 12-13 but patient's symptoms have improved     Plan:  - IVF - 1/2 ns at 150 cc/hr  - calcitonin x 48 hours, completed  - zometa 10/20 x 1 -- consider repeat 10/27 if necessary  - CMP q 12 hours  - furosemide 60 IV BID  - cinacalcet 60 mg BID -- uptitrate to 90 BID on 10/30  - endocrinology consultation, appreciate recs  -- calcitriol level pending per endo recs   - will reach out to Dr. Rodriguez and oncology team

## 2020-10-25 NOTE — ASSESSMENT & PLAN NOTE
-Pt w/ prior know hypercalcemia of malignancy form pancreatic neuroendocrine tumor and PTHrp +   -On monthly Zometa   -Ca 15.7 on Admission  ----> 12.6 (Slowly improving)   -Phos 1.4 on admission ----> 2.4   -PTHrp  101 ---> 40  (ELEVATED, consistent w/ hypercalcemia of malignancy     -PTH  145 (Elevated in the setting of hypercalcemia)  Concerning for superimposed PTH mediated   -PT had normal PTH levels on 2013      -Vitamin D 14 on 9/20  (Low, could cause secondary hyperpara, but in the setting of severe hypercalcemia, would not expect vitamin D deficiency to elevated PTH to such degree)      -Sestamibi scan 9/30/20:  NO PARATHYROID ADENOMA IDENTIFIED  -NECK 4D CT on 10/2/20:   SHOWING possible RIGHT INFERIOR PARATHYROID ADENOMA     -Symptoms :  AMS on admission,  Constipation, polyuria   (IMPROVING)   -S/p Calcitonin and Zometa 4mg IV on 10/20/20     HYPERCALCEMIA LIKELY MULTIFACTORIAL FROM MALIGNANCY AND PRIMARY HYPERPARATHYROIDISM     Plan:   -Due to patient having evidence of hypercalcemia of malignancy and PTH mediated hypercalcemia (Likely primary hyperparathyroidism), will recommend the following.     -C/w  Cinacalcet 60mg BID  (Can optimize up to 90mg BID in 7 days)  (Started on 10/23/20)    -C/w IV and oral hydration per primary team     -Will defer Zometa dosing to Hemeonc (If calcium > 12 and patient symptomatic can consider repeating Zometa on 10/27/20 as it can be given q7 days)     -Renal panel q 12hrs     -Would target treatment based on symptoms     -Follow calcitriol levels     -If after maximal medical optimization of Cinacalcet and Zometa if no improvement of hypercalcemia and symptoms could consider parathyroidectomy but this would be last resort as patient was previously deemed a poor surgical candidate and is unsure if parathyroidectomy will completely resolve hypercalcemia as patient has underlying hypercalcemia of malignancy.      -ENDOCRINE WILL SIGN OFF   (WILL SCHEDULE OUTPATIENT FOLLOW  UP) PLEASE CALL IF QUETIONS

## 2020-10-25 NOTE — SUBJECTIVE & OBJECTIVE
Interval History: No events overnight. Ca yesterday 12.6. Patient had BM this AM. Denies abdominal pain or bone pain. Fluid status appears unchanged from prior days however net UO was +1240cc yesterday.     Review of Systems   Constitutional: Positive for activity change, appetite change, fatigue and unexpected weight change. Negative for diaphoresis and fever.   HENT: Negative for rhinorrhea, tinnitus, trouble swallowing and voice change.    Eyes: Negative for visual disturbance.   Respiratory: Negative for cough, choking and shortness of breath.    Cardiovascular: Positive for leg swelling. Negative for chest pain and palpitations.   Gastrointestinal: Positive for abdominal distention and constipation. Negative for abdominal pain, blood in stool, diarrhea, nausea and vomiting.   Endocrine: Positive for polyuria.   Genitourinary: Negative for difficulty urinating, dysuria, flank pain, hematuria and urgency.   Musculoskeletal: Negative for back pain and joint swelling.   Skin: Positive for pallor. Negative for color change.   Neurological: Positive for weakness. Negative for tremors, syncope, light-headedness, numbness and headaches.   Psychiatric/Behavioral: Positive for confusion. Negative for behavioral problems.     Objective:     Vital Signs (Most Recent):  Temp: 98.3 °F (36.8 °C) (10/25/20 1111)  Pulse: 89 (10/25/20 1111)  Resp: 17 (10/25/20 1111)  BP: (!) 126/58 (10/25/20 1111)  SpO2: 97 % (10/25/20 1111) Vital Signs (24h Range):  Temp:  [97.8 °F (36.6 °C)-98.7 °F (37.1 °C)] 98.3 °F (36.8 °C)  Pulse:  [86-94] 89  Resp:  [16-20] 17  SpO2:  [96 %-98 %] 97 %  BP: (117-131)/(55-60) 126/58     Weight: 59 kg (130 lb)  Body mass index is 20.36 kg/m².    Intake/Output Summary (Last 24 hours) at 10/25/2020 1328  Last data filed at 10/25/2020 1000  Gross per 24 hour   Intake 3040 ml   Output 1500 ml   Net 1540 ml      Physical Exam  Vitals signs and nursing note reviewed.   Constitutional:       General: She is not in  acute distress.     Appearance: Normal appearance. She is normal weight. She is ill-appearing. She is not toxic-appearing or diaphoretic.   HENT:      Head: Normocephalic and atraumatic.      Nose: Nose normal.      Mouth/Throat:      Mouth: Mucous membranes are dry.   Eyes:      General: No scleral icterus.     Pupils: Pupils are equal, round, and reactive to light.      Comments: Conjunctival pallor   Cardiovascular:      Rate and Rhythm: Normal rate and regular rhythm.      Pulses: Normal pulses.      Heart sounds: Murmur (systolic murmur RSB) present.   Pulmonary:      Effort: Pulmonary effort is normal. No respiratory distress.      Breath sounds: Normal breath sounds.   Abdominal:      General: Abdomen is flat. Bowel sounds are normal. There is distension.      Palpations: Abdomen is soft.      Tenderness: There is no abdominal tenderness. There is no guarding or rebound.   Musculoskeletal: Normal range of motion.         General: No swelling.      Right lower leg: Edema present.      Left lower leg: Edema present.   Lymphadenopathy:      Cervical: No cervical adenopathy.   Skin:     General: Skin is warm and dry.      Capillary Refill: Capillary refill takes less than 2 seconds.      Coloration: Skin is not jaundiced.   Neurological:      Mental Status: She is alert.      Comments: No focal deficit  Alert and oriented to person (daugther) and place ([facility]) but not time  Strength decreased throughout          Significant Labs: All pertinent labs within the past 24 hours have been reviewed.    Significant Imaging: I have reviewed all pertinent imaging results/findings within the past 24 hours.

## 2020-10-25 NOTE — PLAN OF CARE
Pt AAOx4 and VSS. Pt is progressing with plan of care. Free of skin breakdown as the pt positioned/repositioned well independently. No complain of pain at this time. Frequent rounds made to assess pain and safety and no complaints at this time noted. Side rails up x 2. Bed locked. Call light within reach. No falls noted. Will continue to monitor.

## 2020-10-26 NOTE — DISCHARGE SUMMARY
[facility] Hospital Medicine  Discharge Summary      Patient Name: Ching Bruce  MRN: [id number]  Admission Date: 10/19/2020  Hospital Length of Stay: 7 days  Discharge Date and Time:  10/26/2020 12:36 PM  Attending Physician: No att. providers found   Discharging Provider: Jovany Bird MD  Primary Care Provider: Gaby Corea MD  [facility] Medicine Team: [facility] 3 Jovany Bird MD    HPI:   Ms. Bruce is a 68 yo female with PMH of pancreatic neuroendocrine tumor  (dx 2012) s/p ERCP, sphincterectomy, and biliary stent placement with removal 9/22, hyperparathyroidism, parathyroid adenoma, and HFpEF (EF 60%, G1DD) presenting for generalized weakness and AMS. History obtained from daughter at bedside as patient is profoundly lethargic. She has had recurrent episodes of hypercalcemia and has been on alendronate, cinacalet, zoledronic acid, and IVF infusions. For the past three days she has had generalized weakness, lethargy, and sustained 3 non-traumatic falls. She has been intermittently confused and has had decreased oral intake. She has not experienced chest pain, palpitations, bone pain, nausea, vomiting, or abdominal pain. She does endorse weight loss, constipation with last BM one week ago, polyuria, abdominal distension, and weakness. Her daughter is unsure if she is passing flatulence. Their oncologist is Dr. Rodriguez who they were scheduled to see tomorrow for IVF.     ED Course:  HDS. Calcium 15.6 corrected, K 2.7. Received 1 liter bolus. Ct head without acute finding. CXR with mild pulmonary edema.     * No surgery found *      Hospital Course:   Admitted to hospital medicine for management of severe hypercalcemia. Started on IVF, calcitonin, lasix, cinacalcet (continued from home), and given one dose of zometa on 10/20. Cinacalcet dose increased to 60 BID on 10/23. KUB ordered showed large stool burden without obstruction/impaction. Palliative care was consulted to establish  baseline given patient's decline and recurrent episodes of hypercalcemia with seemingly limited treatment options. Calcium and mental status improved with treatments, however calcium level stable between 12-13. Consultation placed to endocrinology for assistance and recommendations on further treatment options, recommend uptitration of cinacalcet to 90 BID on 10/30 and evaluate for repeat zometa on 10/27. We reached out to our oncology service to have them speak with the patient and family on her limited therapy options. On the morning of 10/26, however, patient and daughter report desire to leave AMA and pursue care in [place]. Pt informed of the risks and still intends to leave [place].        Vitals:    10/26/20 1116   BP: (!) 114/54   Pulse: 83   Resp: 19   Temp: 97.7 °F (36.5 °C)       Physical Exam  Vitals signs and nursing note reviewed.   Constitutional:       General: She is not in acute distress.     Appearance: Normal appearance. She is normal weight. She is ill-appearing. She is not toxic-appearing or diaphoretic.   HENT:      Head: Normocephalic and atraumatic.      Nose: Nose normal.      Mouth/Throat:      Mouth: Mucous membranes are dry.   Eyes:      General: No scleral icterus.     Pupils: Pupils are equal, round, and reactive to light.      Comments: Conjunctival pallor   Cardiovascular:      Rate and Rhythm: Normal rate and regular rhythm.      Pulses: Normal pulses.      Heart sounds: Murmur (systolic murmur RSB) present.   Pulmonary:      Effort: Pulmonary effort is normal. No respiratory distress.      Breath sounds: Normal breath sounds.   Abdominal:      General: Abdomen is flat. Bowel sounds are normal. There is distension.      Palpations: Abdomen is soft.      Tenderness: There is no abdominal tenderness. There is no guarding or rebound.   Musculoskeletal: Normal range of motion.         General: No swelling.      Right lower leg: Edema present.      Left lower leg: Edema present.    Lymphadenopathy:      Cervical: No cervical adenopathy.   Skin:     General: Skin is warm and dry.      Capillary Refill: Capillary refill takes less than 2 seconds.      Coloration: Skin is not jaundiced.   Neurological:      Mental Status: She is alert.      Comments: No focal deficit  Alert and oriented to person and place ([facility]) but not time  Strength decreased throughout         Consults:   Consults (From admission, onward)        Status Ordering Provider     Inpatient consult to Endocrinology  Once     Provider:  (Not yet assigned)    Completed DIOGENES CANTRELL     Inpatient consult to Palliative Care  Once     Provider:  (Not yet assigned)    Completed DIOGENES CANTRELL          * Hypercalcemia  Hx pancreatic neuroendocrine tumor, hyperparathyroidism 2/2 to parathyroid adenoma presenting for fatigue and AMS. Prior admission from 9/29-10/7 for hypercalcemia with sestamibi scan concerning for parathyroid adenoma, started on cinacalcet during that admission. Takes alendronate q 7 days, also has taken zoledronic acid. Per chart review, patient follows with Dr. Rodriguez and is approaching the end of treatment options given her weakness.   DDX: hypercalcemia of malignancy, primary hyperparathyroidism 2/2 adenoma, likely mixed picture of the two  - Ca on arrival corrected to 15.7, had not had a bowel movement x 7 days  - CXR: left pleural effusion, pulmonary edema  - KUB done 10/19 with moderate stool burden, without obstruction/impaction  - Calcium level has plateaued  between 12-13 but patient's symptoms have improved     Plan:  - IVF - 1/2 ns at 150 cc/hr  - calcitonin x 48 hours, completed  - zometa 10/20 x 1 -- consider repeat 10/27 if necessary  - CMP q 12 hours  - furosemide 60 IV BID  - cinacalcet 60 mg BID -- uptitrate to 90 BID on 10/30  - endocrinology consultation, appreciate recs  -- calcitriol level pending per endo recs   - will reach out to Dr. Rodriguez and oncology team        Cho  - SLP  evaluation, dysphagia soft diet/thin liquids      Constipation  - Senna-doc  - Miralax  - Fleet enemas      Hypothyroidism  - continue home levothyroxine      Heart failure with preserved ejection fraction  TTE 9/03 with EF 60%, G1DD, PAP 29. Patient takes furosemide 40 mg daily  - CXR with effusion, mild edema  - pitting edema on PE, slight increase since admission    Plan:  - strict intake/output  - lasix 60 IV BID  - aldactone    Macrocytic anemia  , HgB 8.4, decreased from baseline  - folate/b12 wnl during last admission  - s/p 1 unit PRBC, likely dilutional effect    Plan:  - maintain up to date type and screen  - transfuse for HgB <7      Hypokalemia  K 2.7 on presentation    Plan:  - replete as needed  - CMP q12    Abdominal swelling  Abdominal distension on exam, has been getting paracentesis with most recent on 10/6. No history of SBP  - RUQ US with moderate ascites     Plan:  - inpatient paracentesis ordered, not performed as pocket too small      Primary pancreatic neuroendocrine tumor  Please see Hypercalcemia    Plan:  - palliative care consult to establish baseline given patient's prognosis and lack of treatment options  - will reach out to Dr. Rodriguez      Final Active Diagnoses:    Diagnosis Date Noted POA    PRINCIPAL PROBLEM:  Hypercalcemia [E83.52] 05/07/2013 Yes    Choking [T17.308A] 10/22/2020 No    Constipation [K59.00] 10/21/2020 Unknown    Hypothyroidism [E03.9] 10/20/2020 Unknown    Pain [R52]  Unknown    Nausea [R11.0]  Unknown    Dyspnea [R06.00]  Unknown    Macrocytic anemia [D53.9] 10/19/2020 Unknown    Heart failure with preserved ejection fraction [I50.30] 10/19/2020 Unknown    Palliative care encounter [Z51.5] 10/19/2020 Not Applicable    Goals of care, counseling/discussion [Z71.89] 10/19/2020 Not Applicable    Advanced care planning/counseling discussion [Z71.89] 10/19/2020 Not Applicable    Hypokalemia [E87.6] 09/29/2020 Unknown    Abdominal swelling [R19.00]  09/01/2020 Yes    Primary pancreatic neuroendocrine tumor [D3A.8] 05/07/2013 Yes      Problems Resolved During this Admission:       Discharged Condition: against medical advice    Disposition: Left Against Medical Adv*    Follow Up:    Patient Instructions:      Notify your health care provider if you experience any of the following:  temperature >100.4     Notify your health care provider if you experience any of the following:  increased confusion or weakness     Notify your health care provider if you experience any of the following:  persistent dizziness, light-headedness, or visual disturbances     Notify your health care provider if you experience any of the following:  worsening rash     Notify your health care provider if you experience any of the following:  severe persistent headache     Notify your health care provider if you experience any of the following:  difficulty breathing or increased cough     Notify your health care provider if you experience any of the following:  severe uncontrolled pain     Notify your health care provider if you experience any of the following:  persistent nausea and vomiting or diarrhea     Activity as tolerated       Significant Diagnostic Studies: Labs: All labs within the past 24 hours have been reviewed    Pending Diagnostic Studies:     Procedure Component Value Units Date/Time    Calcitriol [[id number]] Collected: 10/24/20 1347    Order Status: Sent Lab Status: In process Updated: 10/24/20 1420    Specimen: Blood     IR US Abdomen Limited [[id number]] Resulted: 10/23/20 1546    Order Status: Sent Lab Status: In process Updated: 10/23/20 1547         Medications:  Reconciled Home Medications:      Medication List      CHANGE how you take these medications    cinacalcet 30 MG Tab  Commonly known as: SENSIPAR  Take 2 tablets (60 mg total) by mouth 2 (two) times daily with meals.  What changed: how much to take     levothyroxine 175 MCG tablet  Commonly known as:  SYNTHROID, LEVOTHROID  Take 1 tablet (175 mcg total) by mouth before breakfast.  Start taking on: October 27, 2020  What changed:   · medication strength  · how much to take        CONTINUE taking these medications    dronabinoL 2.5 MG capsule  Commonly known as: MARINOL  Take 1 capsule (2.5 mg total) by mouth 2 (two) times daily before meals.     ferrous gluconate 324 MG tablet  Commonly known as: FERGON  Take 324 mg by mouth once daily.     furosemide 40 MG tablet  Commonly known as: LASIX  Take 1 tablet (40 mg total) by mouth once daily.     spironolactone 50 MG tablet  Commonly known as: ALDACTONE  Take 1 tablet (50 mg total) by mouth once daily.     ursodioL 300 mg capsule  Commonly known as: ACTIGALL  Take 1 capsule (300 mg total) by mouth 2 (two) times daily.            Indwelling Lines/Drains at time of discharge:   Lines/Drains/Airways     Drain            Female External Urinary Catheter 10/19/20 1755 6 days                Time spent on the discharge of patient: 45 minutes  Patient was seen and examined on the date of discharge and determined to be suitable for discharge.         Jovany Bird MD PGY1  Department of Hospital Medicine  [facility]

## 2020-10-26 NOTE — NURSING
Patient is leaving against medical advice. Daughter is at bedside. Both patient and daughter were educated of the risks of leaving against medical advice. AMA form has been signed by patient, MD and nurse. Peripheral IVs, Telemetry monitor and ViSi monitor have been removed from patient.

## 2020-10-26 NOTE — ASSESSMENT & PLAN NOTE
-Pt w/ prior know hypercalcemia of malignancy form pancreatic neuroendocrine tumor and PTHrp +   -On monthly Zometa   -Ca 15.7 on Admission  ----> 12.7 (Slowly improving)   -Phos 1.4 on admission ----> 2.6   -PTHrp  101 ---> 40  (ELEVATED, consistent w/ hypercalcemia of malignancy     -PTH  145 (Elevated in the setting of hypercalcemia)  Concerning for superimposed PTH mediated   -PT had normal PTH levels on 2013      -Vitamin D 14 on 9/20  (Low, could cause secondary hyperpara, but in the setting of severe hypercalcemia, would not expect vitamin D deficiency to elevated PTH to such degree)      -Sestamibi scan 9/30/20:  NO PARATHYROID ADENOMA IDENTIFIED  -NECK 4D CT on 10/2/20:   SHOWING possible RIGHT INFERIOR PARATHYROID ADENOMA     -Symptoms :  AMS on admission,  Constipation, polyuria   (IMPROVING)   -S/p Calcitonin and Zometa 4mg IV on 10/20/20     HYPERCALCEMIA LIKELY MULTIFACTORIAL FROM MALIGNANCY AND PRIMARY HYPERPARATHYROIDISM     Plan:   -Due to patient having evidence of hypercalcemia of malignancy and PTH mediated hypercalcemia (Likely primary hyperparathyroidism), will recommend the following.     -C/w  Cinacalcet 60mg BID  (Can optimize up to 90mg BID in 7 days)  (Started on 10/23/20)    -C/w IV and oral hydration per primary team     -Will defer Zometa dosing to Hemeonc (If calcium > 12 and patient symptomatic can consider repeating Zometa on 10/27/20 as it can be given q7 days)     -Renal panel q 12hrs     -Would target treatment based on symptoms      -Follow calcitriol levels     -If after maximal medical optimization of Cinacalcet and Zometa if no improvement of hypercalcemia and symptoms could consider parathyroidectomy but this would be last resort as patient was previously deemed a poor surgical candidate and is unsure if parathyroidectomy will completely resolve hypercalcemia as patient has underlying hypercalcemia of malignancy.      -ENDOCRINE WILL SIGN OFF   (WILL SCHEDULE OUTPATIENT  FOLLOW UP) PLEASE CALL IF QUETIONS

## 2020-10-26 NOTE — ASSESSMENT & PLAN NOTE
-S/p Total Thyroidectomy for MNG   -Per patient benign pathology  -TSH 0.27 (Below goal)   -FT4 1.33  (WNL)   -ON LT4 200mcg  (Double the weight based recommended)  -High doses could be from poor absorbtion as patient with peripheral edema and ascites and could have bowel edema  VS. Non compliance          Plan:   -Due to acute illness and patient previously hypothryoid LT4 150 mcg was increased to LT4 200mcg  -Due to TSH been below goal will decrease LT4 to 175 mcg PO daily  -Check TFT in 4 weeks after DC for dose adjustment.

## 2020-10-26 NOTE — SUBJECTIVE & OBJECTIVE
"Interval HPI:   Overnight events: Serum calcium 11.1, corrected 12.7, impoved from admission  Eating:   <25%  Nausea: No  Hypoglycemia and intervention: No  Fever: No  TPN and/or TF: No  If yes, type of TF/TPN and rate: NA    BP (!) 115/54   Pulse 88   Temp 98.3 °F (36.8 °C)   Resp (!) 22   Ht 5' 7" (1.702 m)   Wt 59 kg (130 lb)   LMP  (LMP Unknown)   SpO2 97%   Breastfeeding No   BMI 20.36 kg/m²     Labs Reviewed and Include    Recent Labs   Lab 10/26/20  0310   GLU 71   CALCIUM 11.1*   ALBUMIN 2.0*   PROT 6.1   *   K 3.8   CO2 21*      BUN 17   CREATININE 0.8   ALKPHOS 435*   ALT 12   AST 28   BILITOT 2.1*     Lab Results   Component Value Date    WBC 8.95 10/26/2020    HGB 8.3 (L) 10/26/2020    HCT 27.2 (L) 10/26/2020     (H) 10/26/2020    PLT 88 (L) 10/26/2020     Recent Labs   Lab 10/19/20  1700   TSH 0.279*   FREET4 1.33  1.33     No results found for: HGBA1C    Nutritional status:   Body mass index is 20.36 kg/m².  Lab Results   Component Value Date    ALBUMIN 2.0 (L) 10/26/2020    ALBUMIN 2.1 (L) 10/25/2020    ALBUMIN 2.2 (L) 10/24/2020     Lab Results   Component Value Date    PREALBUMIN 8 (L) 10/06/2020    PREALBUMIN 9 (L) 10/02/2020    PREALBUMIN 11 (L) 09/07/2020       Estimated Creatinine Clearance: 61.8 mL/min (based on SCr of 0.8 mg/dL).    Accu-Checks  No results for input(s): POCTGLUCOSE in the last 72 hours.    Current Medications and/or Treatments Impacting Glycemic Control  Immunotherapy:    Immunosuppressants     None        Steroids:   Hormones (From admission, onward)    Start     Stop Route Frequency Ordered    10/19/20 1712  melatonin tablet 6 mg      -- Oral Nightly PRN 10/19/20 1614        Pressors:    Autonomic Drugs (From admission, onward)    None        Hyperglycemia/Diabetes Medications:   Antihyperglycemics (From admission, onward)    None        "

## 2020-10-26 NOTE — ED NOTES
Pt presents to the ED c/o calcium levels being elevated for the past few days with recent admission. Pt left AMA because she was dissatisfied with the treatment she was receiving.Pt has pancreatic CA secondary to Liver CA and being followed by Dr Lloyd. Daughter at bedside affirming mothers claims of inadequate treatment.    Patient identifiers for Ching Bruce checked and correct.    LOC: The patient is awake, alert and aware of environment with an appropriate affect, the patient is oriented x 3 and speaking appropriately. Patient resting comfortably and in no acute distress  SKIN: The skin is warm dry and intact  MUSCULOSKELETAL: Patient moving all extremities.Reports weakness to bilat lower legs   RESPIRATORY: Airway is open and patent, respirations are spontaneous  CARDIAC: Patient has a normal rate and rhythm, + edema to the lower extremities.  ABDOMEN: Soft and non tender to palpation, no distention noted.  NEUROLOGIC: PERRL, facial expression is symmetrical

## 2020-10-26 NOTE — NURSING
Patient's daughter is at bedside and would like to MD about patient's plan of care.  MD notified.

## 2020-10-26 NOTE — PT/OT/SLP EVAL
Occupational Therapy   Evaluation and Discharge Note    Name: Ching Bruce  MRN: [id number]  Admitting Diagnosis:  Hypercalcemia      Recommendations:     Discharge Recommendations: home with home health  Discharge Equipment Recommendations:  none  Barriers to discharge:  Other (Comment)(increased required level of care)    Assessment:     Ching Bruce is a 69 y.o. female with a medical diagnosis of Hypercalcemia. At this time,Prior to evaluation documentation patient left hospital.  Requires and desires HH as with decreased functional performance and safety.       Plan:     During this hospitalization, patient does not require further acute OT services.  Please re-consult if situation changes.    · Plan of Care Reviewed with: patient    Subjective     Chief Complaint: decreased functional performance   Patient/Family Comments/goals: improved functional performance, return to home     Occupational Profile:  Living Environment: Pt lives with 2 daughters in single level house with 5 steps to enter   Previous level of function: Pt with CGA/Min A for self care completion and walker for in home access   Equipment Used at home:  walker, rolling, bedside commode, shower chair, wheelchair  Assistance upon Discharge: daughters are able to assist     Pain/Comfort:  · Pain Rating 1: 0/10    Patients cultural, spiritual, Gnosticist conflicts given the current situation: no    Objective:     Communicated with: RN prior to session.  Patient found supine with telemetry, peripheral IV, PureWick upon OT entry to room.    General Precautions: Standard, fall   Orthopedic Precautions:N/A   Braces: N/A     Occupational Performance:    Bed Mobility:    · Patient completed Rolling/Turning to Left with  minimum assistance  · Patient completed Rolling/Turning to Right with minimum assistance  · Patient completed Scooting/Bridging with minimum assistance  · Patient completed Supine to Sit with minimum assistance  · Patient completed Sit to  Supine with minimum assistance    Functional Mobility/Transfers:  · Patient completed Sit <> Stand Transfer with moderate assistance  with  hand-held assist   · Functional Mobility: Pt with difficulty with side stepping and with poor standing tolerance     Activities of Daily Living:  · Feeding:  contact guard assistance    · Grooming: contact guard assistance    · Upper Body Dressing: minimum assistance    · Lower Body Dressing: maximal assistance    · Toileting: maximal assistance      Cognitive/Visual Perceptual:  Cognitive/Psychosocial Skills:     -       Oriented to: Person, Place, Time and Situation   -       Follows Commands/attention:Follows two-step commands  -       Communication: clear/fluent  -       Memory: No Deficits noted  -       Safety awareness/insight to disability: intact   -       Mood/Affect/Coping skills/emotional control: Appropriate to situation and Flat affect    Physical Exam:  Upper Extremity Range of Motion:     -       Right Upper Extremity: WFL  -       Left Upper Extremity: WFL  Upper Extremity Strength:    -       Right Upper Extremity: WFL  -       Left Upper Extremity: WFL    AMPAC 6 Click ADL:  AMPAC Total Score: 15    Treatment & Education:  Evaluation complete. Pt educated on safety, role of OT, importance of increased participation in self care for gains , expectations for participation, expectations for gains, POC, energy conservation, caregiver strain. White board updated.   -ADL training for dressing  Education:    Patient left supine with all lines intact    GOALS:   Multidisciplinary Problems     Occupational Therapy Goals     Not on file                History:     Past Medical History:   Diagnosis Date    Abdominal swelling 9/1/2020    Anemia     Chemotherapy follow-up examination 5/27/2014    Confusion 9/1/2020    Encounter for blood transfusion     Falls 9/1/2020    Generalized abdominal pain 9/1/2020    HTN (hypertension)     Hypercalcemia 5/7/2013     Hyperlipidemia     Increased bilirubin level 9/15/2020    Kidney insufficiency     Stage 1    Maintenance chemotherapy following disease     Capecitabine and temozolomide    Primary malignant neuroendocrine tumor of pancreas     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    Secondary malignant neoplasm of liver     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) 5/7/2013    Thrombocytopenia 11/12/2013    Thyroid disease     Unspecified essential hypertension 11/12/2013       Past Surgical History:   Procedure Laterality Date    ERCP N/A 6/21/2018    Procedure: ERCP, stent exchange;  Surgeon: Jake Lieberman MD;  Location: [facility] ENDO;  Service: Endoscopy;  Laterality: N/A;    ERCP N/A 5/23/2019    Procedure: ERCP (ENDOSCOPIC RETROGRADE CHOLANGIOPANCREATOGRAPHY), stent exchange;  Surgeon: Jake Lieberman MD;  Location: [facility];  Service: Endoscopy;  Laterality: N/A;    ERCP N/A 11/14/2019    Procedure: ERCP (ENDOSCOPIC RETROGRADE CHOLANGIOPANCREATOGRAPHY), stent exchange;  Surgeon: Jake Lieberman MD;  Location: [facility] ENDO;  Service: Endoscopy;  Laterality: N/A;    ERCP      ERCP N/A 9/22/2020    Procedure: ERCP (ENDOSCOPIC RETROGRADE CHOLANGIOPANCREATOGRAPHY);  Surgeon: Abdulaziz Owen MD;  Location: [facility];  Service: Endoscopy;  Laterality: N/A;    THYROIDECTOMY  2011       Time Tracking:     OT Date of Treatment: 10/26/20  OT Start Time: 0940  OT Stop Time: 1000  OT Total Time (min): 20 min    Billable Minutes:Evaluation 10  Self Care/Home Management 10    Becky Mcdaniels, OT  10/26/2020

## 2020-10-26 NOTE — PROGRESS NOTES
"[facility]  Endocrinology  Progress Note    Admit Date: 10/19/2020     69 year old Female w/ dx of Pancreatic neuroendocrine tumor w/ liver mets, Hypercalcemia of malignancy on  Monthly Zometa, Post- surgical hypothyroidism, CHF and HTN that was admitted to [facility] for failure to thrive, fatigue and AMS. During hospital course patient was found to have corrected calcium of 15.7, phos 2.4, PTHrp 40 and .  Pt was stared on IV, received 4 doses of calcitonin and 1 dose of Zometa 4mg w/ improvement in calcium levels to 12.8 and improvement in Mental status.  Pt was consulted with endocrinology for assistance with management of hypercalcemia.      In regards with hypercalcemia  -Pt w/ prior know hypercalcemia of malignancy form pancreatic neuroendocrine tumor and PTHrp +  -Ca 15.7 on Admission   -Phos 1.4 on admission   -PTHrp  101 ---> 40  (ELEVATED, consistent w/ hypercalcemia of malignancy     -PTH  145 (Elevated in the setting of hypercalcemia)  Concerning for superimposed PTH mediated   -PT had normal PTH levels on 2013      -Vitamin D 14 on 9/20  (Low, could cause secondary hyperpara, but in the setting of severe hypercalcemia, would not expect vitamin D deficiency to elevated PTH to such degree)      -Sestamibi scan 9/30/20:  NO PARATHYROID ADENOMA IDENTIFIED  -NECK 4D CT on 10/2/20:   SHOWING possible RIGHT INFERIOR PARATHYROID ADENOMA     -Symptoms :  AMS on admission,  Constipation, polyuria  -GFR > 60    -Denies lithium or HCTZ use       Interval HPI:   Overnight events: Serum calcium 11.1, corrected 12.7, impoved from admission  Eating:   <25%  Nausea: No  Hypoglycemia and intervention: No  Fever: No  TPN and/or TF: No  If yes, type of TF/TPN and rate: NA    BP (!) 115/54   Pulse 88   Temp 98.3 °F (36.8 °C)   Resp (!) 22   Ht 5' 7" (1.702 m)   Wt 59 kg (130 lb)   LMP  (LMP Unknown)   SpO2 97%   Breastfeeding No   BMI 20.36 kg/m²     Labs Reviewed and Include    Recent Labs "   Lab 10/26/20  0310   GLU 71   CALCIUM 11.1*   ALBUMIN 2.0*   PROT 6.1   *   K 3.8   CO2 21*      BUN 17   CREATININE 0.8   ALKPHOS 435*   ALT 12   AST 28   BILITOT 2.1*     Lab Results   Component Value Date    WBC 8.95 10/26/2020    HGB 8.3 (L) 10/26/2020    HCT 27.2 (L) 10/26/2020     (H) 10/26/2020    PLT 88 (L) 10/26/2020     Recent Labs   Lab 10/19/20  1700   TSH 0.279*   FREET4 1.33  1.33     No results found for: HGBA1C    Nutritional status:   Body mass index is 20.36 kg/m².  Lab Results   Component Value Date    ALBUMIN 2.0 (L) 10/26/2020    ALBUMIN 2.1 (L) 10/25/2020    ALBUMIN 2.2 (L) 10/24/2020     Lab Results   Component Value Date    PREALBUMIN 8 (L) 10/06/2020    PREALBUMIN 9 (L) 10/02/2020    PREALBUMIN 11 (L) 09/07/2020       Estimated Creatinine Clearance: 61.8 mL/min (based on SCr of 0.8 mg/dL).    Accu-Checks  No results for input(s): POCTGLUCOSE in the last 72 hours.    Current Medications and/or Treatments Impacting Glycemic Control  Immunotherapy:    Immunosuppressants     None        Steroids:   Hormones (From admission, onward)    Start     Stop Route Frequency Ordered    10/19/20 1712  melatonin tablet 6 mg      -- Oral Nightly PRN 10/19/20 1614        Pressors:    Autonomic Drugs (From admission, onward)    None        Hyperglycemia/Diabetes Medications:   Antihyperglycemics (From admission, onward)    None          ASSESSMENT and PLAN    * Hypercalcemia  -Pt w/ prior know hypercalcemia of malignancy form pancreatic neuroendocrine tumor and PTHrp +   -On monthly Zometa   -Ca 15.7 on Admission  ----> 12.7 (Slowly improving)   -Phos 1.4 on admission ----> 2.6   -PTHrp  101 ---> 40  (ELEVATED, consistent w/ hypercalcemia of malignancy     -PTH  145 (Elevated in the setting of hypercalcemia)  Concerning for superimposed PTH mediated   -PT had normal PTH levels on 2013      -Vitamin D 14 on 9/20  (Low, could cause secondary hyperpara, but in the setting of severe  hypercalcemia, would not expect vitamin D deficiency to elevated PTH to such degree)      -Sestamibi scan 9/30/20:  NO PARATHYROID ADENOMA IDENTIFIED  -NECK 4D CT on 10/2/20:   SHOWING possible RIGHT INFERIOR PARATHYROID ADENOMA     -Symptoms :  AMS on admission,  Constipation, polyuria   (IMPROVING)   -S/p Calcitonin and Zometa 4mg IV on 10/20/20     HYPERCALCEMIA LIKELY MULTIFACTORIAL FROM MALIGNANCY AND PRIMARY HYPERPARATHYROIDISM     Plan:   -Due to patient having evidence of hypercalcemia of malignancy and PTH mediated hypercalcemia (Likely primary hyperparathyroidism), will recommend the following.     -C/w  Cinacalcet 60mg BID  (Can optimize up to 90mg BID in 7 days)  (Started on 10/23/20)    -C/w IV and oral hydration per primary team     -Will defer Zometa dosing to Hemeonc (If calcium > 12 and patient symptomatic can consider repeating Zometa on 10/27/20 as it can be given q7 days)     -Renal panel q 12hrs     -Would target treatment based on symptoms      -Follow calcitriol levels     -If after maximal medical optimization of Cinacalcet and Zometa if no improvement of hypercalcemia and symptoms could consider parathyroidectomy but this would be last resort as patient was previously deemed a poor surgical candidate and is unsure if parathyroidectomy will completely resolve hypercalcemia as patient has underlying hypercalcemia of malignancy.      -ENDOCRINE WILL SIGN OFF   (WILL SCHEDULE OUTPATIENT FOLLOW UP) PLEASE CALL IF QUETIONS      Hypothyroidism   -S/p Total Thyroidectomy for MNG   -Per patient benign pathology  -TSH 0.27 (Below goal)   -FT4 1.33  (WNL)   -ON LT4 200mcg  (Double the weight based recommended)  -High doses could be from poor absorbtion as patient with peripheral edema and ascites and could have bowel edema  VS. Non compliance          Plan:   -Due to acute illness and patient previously hypothryoid LT4 150 mcg was increased to LT4 200mcg  -Due to TSH been below goal will decrease LT4  to 175 mcg PO daily  -Check TFT in 4 weeks after DC for dose adjustment.           Hypokalemia  -Replete per primary team       Primary pancreatic neuroendocrine tumor  -Per primary   -Pt w/ hypercalcemia of malignancy on Zometa         Beatrice Turpin MD  Endocrinology  [facility] SMITH, Janneth Dickerson MD,  have personally taken the history and examined the patient and agree with the resident's note as stated above.

## 2020-10-27 PROBLEM — E44.0 MODERATE MALNUTRITION: Status: ACTIVE | Noted: 2020-01-01

## 2020-10-27 NOTE — PLAN OF CARE
Pt arrived to the floor. Pt AAOx4. IV sites CDI, continuous fluids provided. Abd distention for the last 2 months per Pt. Bilateral edema lower extremities. Encouraged to turn q 2 hrs. Denies pain. New purewick placed. Medication administered per MAR. BG monitored. Telemetry monitor in place. Diet changed to dysphagia soft. Safety maintained. Will continue to monitor.

## 2020-10-27 NOTE — PT/OT/SLP EVAL
Occupational Therapy   Evaluation    Name: Ching Bruce  MRN: [id number]  Admitting Diagnosis:  <principal problem not specified>      Recommendations:     Discharge Recommendations: other (see comments)(TBD)  Discharge Equipment Recommendations:  none  Barriers to discharge:  None    Assessment:     Ching Bruce is a 69 y.o. female with a medical diagnosis of <principal problem not specified>.  She presents with performance deficits affecting function: weakness, gait instability, decreased upper extremity function, impaired endurance, impaired balance, decreased lower extremity function, impaired self care skills, impaired functional mobilty, edema.      Rehab Prognosis: Good; patient would benefit from acute skilled OT services to address these deficits and reach maximum level of function.       Plan:     Patient to be seen 5 x/week to address the above listed problems via self-care/home management, therapeutic activities, therapeutic exercises  · Plan of Care Expires: 11/27/20  · Plan of Care Reviewed with: patient    Subjective     Chief Complaint: weakness  Patient/Family Comments/goals: regain strength    Occupational Profile:  Living Environment: Lives w/dtr in 2SH, 5 KATELIN Pt sleeps downstairs, 1/2 bath down  Previous level of function: assist for ADls and mobility over last month or so  Roles and Routines:   Equipment Used at Home:  bedside commode, wheelchair, cane, quad, rollator  Assistance upon Discharge: family    Pain/Comfort:  · Pain Rating 1: 0/10  · Pain Rating Post-Intervention 1: 0/10    Patients cultural, spiritual, Restorationist conflicts given the current situation: no    Objective:   Regain strength  Communicated with: nurse prior to session.  Patient found HOB elevated with peripheral IV, PureWick, bed alarm, telemetry upon OT entry to room.    General Precautions: Standard, fall   Orthopedic Precautions:    Braces:       Occupational Performance:    Bed Mobility:    · Patient completed  Rolling/Turning to Left with  contact guard assistance  · Patient completed Rolling/Turning to Right with contact guard assistance  · Patient completed Scooting/Bridging with stand by assistance  · Patient completed Supine to Sit with moderate assistance and maximal assistance    Functional Mobility/Transfers:  · Patient completed Sit <> Stand Transfer with moderate assistance and of 2 persons  with  rolling walker   · Patient completed Bed <> Chair Transfer using Step Transfer technique with minimum assistance, moderate assistance and of 2 persons with rolling walker  · Functional Mobility: NT    Activities of Daily Living:  · Lower Body Dressing: total assistance socks  · Toileting: total assistance bed level    Cognitive/Visual Perceptual:  AO4    Physical Exam:  BUE AROM WFL except shoulders to midrange  BUE strength 3/5 proximally, 3+ to 3+/5 distally  Good sit balance, poor stand balance    AMPAC 6 Click ADL:  AMPAC Total Score: 15    Treatment & Education:  Pt educated on role of OT/POC, BUE AROM ex, wt shift in standing. Performed sit to stand 2 trials with increased assistance required, L knee hyperextension noted.  Education:    Patient left up in chair with all lines intact, call button in reach, chair alarm on and nurse notified    GOALS:   Multidisciplinary Problems     Occupational Therapy Goals        Problem: Occupational Therapy Goal    Goal Priority Disciplines Outcome Interventions   Occupational Therapy Goal     OT, PT/OT Ongoing, Progressing    Description: Goals to be met by: 11/27     Patient will increase functional independence with ADLs by performing:    UE Dressing with Stand-by Assistance.  LE Dressing with Minimal Assistance.  Grooming while seated at sink with Modified [place].  Toileting from bedside commode with Stand-by Assistance for hygiene and clothing management.   Toilet transfer to bedside commode with Stand-by Assistance.  Upper extremity exercise program x10 reps per  handout, with independence.                     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    Secondary malignant neoplasm of liver     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) 5/7/2013    Thrombocytopenia 11/12/2013    Thyroid disease     Unspecified essential hypertension 11/12/2013       Past Surgical History:   Procedure Laterality Date    ERCP N/A 6/21/2018    Procedure: ERCP, stent exchange;  Surgeon: Jake Lieberman MD;  Location: [facility] ENDO;  Service: Endoscopy;  Laterality: N/A;    ERCP N/A 5/23/2019    Procedure: ERCP (ENDOSCOPIC RETROGRADE CHOLANGIOPANCREATOGRAPHY), stent exchange;  Surgeon: Jake Lieberman MD;  Location: [facility];  Service: Endoscopy;  Laterality: N/A;    ERCP N/A 11/14/2019    Procedure: ERCP (ENDOSCOPIC RETROGRADE CHOLANGIOPANCREATOGRAPHY), stent exchange;  Surgeon: Jake Lieberman MD;  Location: [facility] ENDO;  Service: Endoscopy;  Laterality: N/A;    ERCP      ERCP N/A 9/22/2020    Procedure: ERCP (ENDOSCOPIC RETROGRADE CHOLANGIOPANCREATOGRAPHY);  Surgeon: Abdulaziz Owen MD;  Location: [facility] ENDO;  Service: Endoscopy;  Laterality: N/A;    THYROIDECTOMY  2011       Time Tracking:     OT Date of Treatment: 10/27/20  OT Start Time: 1049  OT Stop Time: 1115  OT Total Time (min): 26 min    Billable Minutes:Evaluation 15  Therapeutic Activity 11    Demarcus S Petta, OT  10/27/2020

## 2020-10-27 NOTE — ASSESSMENT & PLAN NOTE
Nutrition Problem:  Moderate Protein-Calorie Malnutrition  Malnutrition in the context of Chronic Illness/Injury    Related to (etiology):  Cancer/decreased appetite    Signs and Symptoms (as evidenced by):  Energy Intake: <75% of estimated energy requirement for at least 1 week  Body Fat Depletion: moderate depletion of orbitals, triceps and thoracic and lumbar region   Muscle Mass Depletion: moderate depletion of temples, scapular region, interosseous muscle and lower extremities and severe depletion of clavicles   Weight Loss: not significant     Interventions(treatment strategy):  Commercial beverage  Collaboration with other providers    Nutrition Diagnosis Status:  Continues

## 2020-10-27 NOTE — PLAN OF CARE
TN met with pt and friend Ms. Bishop     pt transferred from [facility]   10/19-25   dx:  Pancreatic Neuroendocrine    current with [facility]    pt has a RW, SC and BSC       pt has transportation to home at discharge        10/27/20 5343   Discharge Assessment   Assessment Type Discharge Planning Assessment   Confirmed/corrected address and phone number on facesheet? Yes   Assessment information obtained from? Patient   Expected Length of Stay (days) 3   Communicated expected length of stay with patient/caregiver yes   Prior to hospitilization cognitive status: Alert/Oriented   Prior to hospitalization functional status: Assistive Equipment   Current cognitive status: Alert/Oriented   Current Functional Status: Assistive Equipment   Lives With child(shilpa), adult   Able to Return to Prior Arrangements yes   Is patient able to care for self after discharge? Yes   Who are your caregiver(s) and their phone number(s)? daughter Isabelle  [phone];   Rocio (lives in [place])  [phone]   Patient's perception of discharge disposition home or selfcare;home health  (current with [facility])   Readmission Within the Last 30 Days no previous admission in last 30 days  (transferred from [facility])   Patient currently being followed by outpatient case management? No   Patient currently receives any other outside agency services? No   Equipment Currently Used at Home walker, rolling;shower chair;bedside commode   Do you have any problems affording any of your prescribed medications? No   Is the patient taking medications as prescribed? yes   Does the patient have transportation home? Yes   Transportation Anticipated family or friend will provide   Does the patient receive services at the [facility]? No   Discharge Plan A Home;Home with family;Home Health   DME Needed Upon Discharge  none   Patient/Family in Agreement with Plan yes

## 2020-10-27 NOTE — PLAN OF CARE
Pt aaox4 throughout the day. No c/o pain or N/V throughout the day. Pt ambulated to the bedside commode with stand by assist. Call light and personal belongings in reach. Pt instructed to press the call light. Pt verbalizes understanding. Pt turned q2h. Tolerating diet well. pt up in the chair for part of the day. Mepalex place on sacrum.

## 2020-10-27 NOTE — CONSULTS
Recommendation:   1. Encourage PO intake at every meals.   2. Encourage Boost plus between meals TID  3. RD to follow to monitor nutrition status     Goals:   Encourage pt to consume at least 50% of PO intake by RD follow up     Nutrition Goal Status: new  Communication of RD Recs: other (comment)(poc)

## 2020-10-27 NOTE — PLAN OF CARE
Problem: Physical Therapy Goal  Goal: Physical Therapy Goal  Description: Goals to be met by: 11/27/2020     Patient will increase functional independence with mobility by performing:    Supine<>sit SPVN  Sit<>stand CGA  Step transfer bed to bedside chair with RW CGA  Ambulate 30 feet within room with RW and CGA/min A  Up in chair x 2 hours  LE TE X 10 reps BLE    10/27/2020 1139 by Nicole Coley, PT  Outcome: Ongoing, Progressing    Initial evaluation completed on this date, patient demonstrating weakness BLE / functional movement with greater deficits in LLE vs RLE. Max assist to stand from bed at it's lowest height. Difficulty clearing floor with each foot while attempting marching in place due to weakness and overall deconditioning. Up to bedside chair with mod/max assist. Discharge disposition is ongoing at this time due to need to determine daughters ability to assist pt at this level of burden of care.

## 2020-10-27 NOTE — PLAN OF CARE
Recommendation:   1. Encourage PO intake at every meals.   2. Encourage Boost plus between meals TID  3. RD to follow to monitor nutrition status    Goals: Encourage pt to consume at least 50% of PO intake by RD follow up    Nutrition Goal Status: new  Communication of RD Recs: other (comment)(poc)    Problem: Oral Intake Altered (Oncology Care)  Goal: Optimal Oral Intake  Outcome: Ongoing, Progressing

## 2020-10-27 NOTE — CONSULTS
Otolaryngology Clinic Note    Ching Bruce  Encounter Date: 10/26/2020   YOB: 1950  Referring Physician: Aaareferral Self  No address on file   PCP: Gaby Corea MD    Chief Complaint:   Chief Complaint   Patient presents with    Fatigue     pt has pancreatic neuroendocrine, pt presents to ed for abnormal calcium level        HPI: Ching Bruce is a 69 y.o. female admitted for hypercalcemia. Has metastatic neuroendocrine tumor as well. Suspected parathyroid adenoma. Patient with history of thyroidectomy in 2011. Reports some hoarseness after surgery that lasted about  6 weeks. Feels her voice is not the same as what it once was. Feels the pitch is different. No breathiness. No trouble breathing. ENT consulted for preoperative evaluation of vocal cords.    ROS:   As per HPI    Review of patient's allergies indicates:   Allergen Reactions    Epinephrine Anaphylaxis and Other (See Comments)     Can cause Carcinoid Crisis    Sulfa (sulfonamide antibiotics) Other (See Comments)     Urticaria       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    Secondary malignant neoplasm of liver     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) 5/7/2013    Thrombocytopenia 11/12/2013    Thyroid disease     Unspecified essential hypertension 11/12/2013       Past Surgical History:   Procedure Laterality Date    ERCP N/A 6/21/2018     Procedure: ERCP, stent exchange;  Surgeon: Jake Lieberman MD;  Location: [facility] ENDO;  Service: Endoscopy;  Laterality: N/A;    ERCP N/A 5/23/2019    Procedure: ERCP (ENDOSCOPIC RETROGRADE CHOLANGIOPANCREATOGRAPHY), stent exchange;  Surgeon: Jake Lieberman MD;  Location: [facility] ENDO;  Service: Endoscopy;  Laterality: N/A;    ERCP N/A 11/14/2019    Procedure: ERCP (ENDOSCOPIC RETROGRADE CHOLANGIOPANCREATOGRAPHY), stent exchange;  Surgeon: Jake Lieberman MD;  Location: [facility] ENDO;  Service: Endoscopy;  Laterality: N/A;    ERCP      ERCP N/A 9/22/2020    Procedure: ERCP (ENDOSCOPIC RETROGRADE CHOLANGIOPANCREATOGRAPHY);  Surgeon: Abdulaziz Owen MD;  Location: [facility];  Service: Endoscopy;  Laterality: N/A;    THYROIDECTOMY  2011       Social History     Socioeconomic History    Marital status:      Spouse name: Not on file    Number of children: Not on file    Years of education: Not on file    Highest education level: Not on file   Occupational History     Employer: [employer]   Social Needs    Financial resource strain: Not on file    Food insecurity     Worry: Not on file     Inability: Not on file    Transportation needs     Medical: Not on file     Non-medical: Not on file   Tobacco Use    Smoking status: Never Smoker    Smokeless tobacco: Never Used   Substance and Sexual Activity    Alcohol use: No    Drug use: No    Sexual activity: Not on file   Lifestyle    Physical activity     Days per week: Not on file     Minutes per session: Not on file    Stress: Not on file   Relationships    Social connections     Talks on phone: Not on file     Gets together: Not on file     Attends Rastafari service: Not on file     Active member of club or organization: Not on file     Attends meetings of clubs or organizations: Not on file     Relationship status: Not on file   Other Topics Concern    Not on file   Social History Narrative    Not on file       Family History   Problem Relation Age  of Onset    Cancer Maternal Grandmother     Diabetes Father     Breast cancer Neg Hx     Colon cancer Neg Hx     Ovarian cancer Neg Hx        [unfilled]    Physical Exam:  Vitals:    10/27/20 1153   BP:    Pulse: 69   Resp:    Temp:        Constitutional  General Appearance: thin, well-developed, alert, oriented, in no acute distress  Communication: ability, understanding, voice quality normal  Head and Face  Inspection: normocephalic, atraumatic, no scars, lesions or masses    Palpation: no stepoffs, sinus tenderness or masses  Parotid glands: no masses, stones, swelling or tenderness  Eyes  Ocular Motility / Alignment: normal alignment, motility, no proptosis, enophthalmus or nystagmus  Conjunctiva: not injected  Eyelids: no entropion or ectropion, no edema  Ears  Hearing: speech reception thresholds grossly normal  External Ears: no auricle lesions, non-tender, mobile to palpation  Nose  External Nose: no lesions, tenderness, trauma or deformity  Intranasal Exam: no edema, erythema, discharge, mass or obstruction  Oral Cavity / Oropharynx  Lips: upper and lower lips pink and moist  Gingiva: healthy  Oral Mucosa: moist, no mucosal lesions  Tongue: moist, normal mobility, no lesions  Palate: soft and hard palates without lesions or ulcers  Oropharynx: tonsils and walls without erythema, exudate, lesions, fullness or obstruction  Neck  Inspection and Palpation: no erythema, induration, emphysema, tenderness or masses  Lymphatic:  Anterior, Posterior, Submandibular, Submental, Supraclavicular: no lymphadenopathy present  Chest / Respiratory  Chest: no stridor or retractions, normal effort and expansion  Cardiovascular:  Pulses: 2+ radial pulses, regular rhythm and rate  Auscultation: deferred  Neurological  Cranial Nerves: grossly intact  General: no focal deficits  Psychiatric  Orientation: oriented to time, place and person  Mood and Affect: no depression, anxiety or agitation  Extremities  Inspection: no gross  deformity    Procedures:    Flexible Fiberoptic Laryngoscopy    Indications: history of thyroidectomy with questionable vocal cord dysfunction, evaluate preoperatively     Anesthesia: Topical xylocaine with albert-synephrine    Complications: None    Findings: normal cord mobility    Procedure in Detail: After verbal consent obtained and risks, benefits and alternatives were discussed with the patient, nares were decongested and anesthetized, and flexible fiberoptic laryngoscope passed via left nare with following results:  - Nasal cavity: no mass or lesion  - Nasopharynx: no adenoid hypertrophy, no mass or lesion  - Oropharynx: no lingual tonsil asymmetry, no mass or lesion  - Hypopharynx: no mass or lesion  - Supraglottis: no mass or lesion, no significant interarytenoid edema or erythema  - Glottis: bilateral true vocal cords with normal mobility, no edema, no mass or lesion    Patient tolerated the procedure well    Pertinent Data:  ? LABS:      ? AUDIO:  ? PATH:  ? XRAY:  ? Ultrasound:  ? CT Scan:  CT Parathyroid    ? MRI Scan:  ? PET/CT Scan:    I personally reviewed the following pertinent data at today's visit:    Imaging:   I personally reviewed the following images:    Summary of Outside Records:      Assessment and Plan:  Ching Bruce is a 69 y.o. female with metastatic neuroendocrine tumor admitted with hypercalcemia. Suspected parathyroid adenoma. History of thyroidectomy in 2011    Flexible laryngoscopy with normal vocal cord mobility - see procedure note.    Please call with any other questions or concerns.      ABBY Schuler MD  [facility]

## 2020-10-27 NOTE — PROGRESS NOTES
"[facility]  Adult Nutrition  Progress Note    SUMMARY       Recommendations    Recommendation:   1. Encourage PO intake at every meals.   2. Encourage Boost plus between meals TID  3. RD to follow to monitor nutrition status    Goals: Encourage pt to consume at least 50% of PO intake by RD follow up    Nutrition Goal Status: new  Communication of RD Recs: other (comment)(poc)    Reason for Assessment  Reason For Assessment: consult(prompted to order per admit)  Diagnosis: other (see comments)(hypercalcemia)  Relevant Medical History: HT, kidney insufficiency, thyroid disease, anemia, HLD, secondary malignant neoplasm of liver, primary pancreatic neuroendocrine tumor, hypercalcemia, thrombocytopenia, chemotherapy  Interdisciplinary Rounds: did not attend  General Information Comments: Pt in room with lunch in front of her. C/o poor appetite at home and during hospital stay. States nausea episodes 3-4x but has been given antiemesis medication. Boost plus ordered but none were on tray at time of visit. Pt would prefer strawberry Boost Plus. Discussed eating slow and trying to eat as much as tolerated with ONS between meals. Pt reports some difficulty swallowing, will continue to monitor. Ruddy 15 skin intact. NFPE- completed today 10/27 moderate subcutaneous fat loss and muscle wasting        Nutrition Discharge Planning: d/c needs to be determined    Nutrition Risk Screen  Nutrition Risk Screen: dysphagia or difficulty swallowing    Nutrition/Diet History  Food Preferences: no Episcopalian or cultural food prefs  Spiritual, Cultural Beliefs, Yarsani Practices, Values that Affect Care: no  Food Allergies: NKFA  Factors Affecting Nutritional Intake: difficulty/impaired swallowing, altered taste, decreased appetite    Anthropometrics  Temp: 98.2 °F (36.8 °C)  Height Method: Stated  Height: 5' 7" (170.2 cm)  Height (inches): 67 in  Weight Method: Bed Scale  Weight: 66.5 kg (146 lb 9.7 oz)  Weight (lb): " 146.61 lb  Ideal Body Weight (IBW), Female: 135 lb  % Ideal Body Weight, Female (lb): 108.6 %  BMI (Calculated): 23  BMI Grade: 18.5-24.9 - normal       Lab/Procedures/Meds  Pertinent Labs Reviewed: reviewed  Pertinent Labs Comments: L hgb 7.2; L hct 24.2; L prealbumin 8; L Na 133; H calcium 11.1; L phosphorus 2.6; H Alk phosphorus 435; L albumin 2; H bili 2.1  Pertinent Medications Reviewed: reviewed  Pertinent Medications Comments: calcitonin, cinacalcet, dronabinal, enoxaparin, ferrous gluconate, furosemide, lactated ringers, levothyroxine, lidocaine, polyethylene glycol, senna-docusate, spironolactone, ursodiol, D5W and 0.45%NaCl with KCl 20 mEq    Estimated/Assessed Needs  Weight Used For Calorie Calculations: 66.5 kg (146 lb 9.7 oz)  Energy Calorie Requirements (kcal): 1995(x 30 kcal/kg)  Energy Need Method: Kcal/kg  Protein Requirements: 80 g(x 1.2 g/kg)  Weight Used For Protein Calculations: 66.5 kg (146 lb 9.7 oz)     Estimated Fluid Requirement Method: RDA Method  RDA Method (mL): 1995         Nutrition Prescription Ordered  Current Diet Order: dysphagia soft (6)  Oral Nutrition Supplement: Boost Plus tid    Evaluation of Received Nutrient/Fluid Intake  I/O: 2703/950  Energy Calories Required: not meeting needs  Protein Required: not meeting needs  Fluid Required: not meeting needs  Comments: LBM 10/25  % Intake of Estimated Energy Needs: 0 - 25 %  % Meal Intake: 0 - 25 %    Nutrition Risk  Level of Risk/Frequency of Follow-up: (2xweekly)     Assessment and Plan  Moderate malnutrition  Nutrition Problem:  (Moderate/Severe) Protein-Calorie Malnutrition  Malnutrition in the context of Chronic Illness/Injury    Related to (etiology):  Cancer/decreased appetite    Signs and Symptoms (as evidenced by):  Energy Intake: <75% of estimated energy requirement for at least 1 week  Body Fat Depletion: moderate depletion of orbitals, triceps and thoracic and lumbar region   Muscle Mass Depletion: moderate depletion of  temples, scapular region, interosseous muscle and lower extremities and severe depletion of clavicles   Weight Loss: not significant     Interventions(treatment strategy):  Commercial beverage  Collaboration with other providers    Nutrition Diagnosis Status:  New         Monitor and Evaluation  Food and Nutrient Intake: energy intake  Food and Nutrient Adminstration: diet order  Physical Activity and Function: nutrition-related ADLs and IADLs  Anthropometric Measurements: weight  Biochemical Data, Medical Tests and Procedures: glucose/endocrine profile, electrolyte and renal panel  Nutrition-Focused Physical Findings: overall appearance     Malnutrition Assessment  Energy Intake (Malnutrition): less than 75% for greater than 7 days   Orbital Region (Subcutaneous Fat Loss): moderate depletion  Upper Arm Region (Subcutaneous Fat Loss): moderate depletion  Thoracic and Lumbar Region: moderate depletion   [place] Region (Muscle Loss): moderate depletion  Clavicle Bone Region (Muscle Loss): severe depletion  Dorsal Hand (Muscle Loss): moderate depletion       Subcutaneous Fat Loss (Final Summary): moderate protein-calorie malnutrition  Muscle Loss Evaluation (Final Summary): moderate protein-calorie malnutrition         Nutrition Follow-Up  RD Follow-up?: Yes

## 2020-10-27 NOTE — PT/OT/SLP EVAL
Physical Therapy Evaluation    Patient Name:  Ching Bruce   MRN:  [id number]    Recommendations:     Discharge Recommendations:  other (see comments)(pending, need to determine daughters ability to provide supervision and physical assistance)   Discharge Equipment Recommendations: none   Barriers to discharge: unknown level of support that daughter is able to provide at this time.     Assessment:     Ching Bruce is a 69 y.o. female admitted with a medical diagnosis of <principal problem not specified>.  She presents with the following impairments/functional limitations:  weakness, impaired endurance, impaired self care skills, impaired functional mobilty, gait instability, impaired balance, impaired cardiopulmonary response to activity, decreased safety awareness, edema, decreased lower extremity function, decreased upper extremity function, decreased coordination, decreased ROM   Initial evaluation completed on this date, patient demonstrating weakness BLE / functional movement with greater deficits in LLE vs RLE. Max assist to stand from bed at it's lowest height. Difficulty clearing floor with each foot while attempting marching in place due to weakness and overall deconditioning. Up to bedside chair with mod/max assist. Discharge disposition is ongoing at this time due to need to determine daughters ability to assist pt at this level of burden of care. .    Rehab Prognosis: Fair; patient would benefit from acute skilled PT services to address these deficits and reach maximum level of function.    Recent Surgery: * No surgery found *      Plan:     During this hospitalization, patient to be seen 6 x/week to address the identified rehab impairments via therapeutic activities, therapeutic exercises, gait training and progress toward the following goals:    · Plan of Care Expires:  11/27/20    Subjective     Chief Complaint: weakness  Patient/Family Comments/goals: Daughter called in on face time and reported  "patient had been in bed over a week without moving while in a facility. Patient reports she can't even stand up. "when I left here last time, I was able to walk"  Pain/Comfort:  · Pain Rating 1: 0/10    Patients cultural, spiritual, Christianity conflicts given the current situation: no    Living Environment:  2 story home with daughter, no steps to enter. Bed and half bath downstairs, has to go upstairs to shower. Has tub/shower combo.  Was independent and driving in mid August according to chart review from prior admission  Prior to this admission, patients level of function was max assist.  Equipment used at home: bedside commode, wheelchair, rollator, cane, quad.  DME owned (not currently used): none.  Upon discharge, patient will have assistance from unknown level of care daughter able to provide.    Objective:     Communicated with nurse prior to session.  Patient found supine with PureWick, peripheral IV, telemetry  upon PT entry to room.    General Precautions: Standard, fall   Orthopedic Precautions:N/A   Braces: N/A     Exams:  · Cognitive Exam:  Patient is oriented to Person, Place, Time and Situation  · Gross Motor Coordination:  slow movements, limited by deconditioning  · Postural Exam:  Patient presented with the following abnormalities:    · -       Rounded shoulders  · -       Forward head  · -       leaning to right sitting at EOB, but corrected with cues, slightly kyphotic posture  ·  -  Tending to maintain L knee flexed in standing, hyperextending when provided with cues to straighten  · Sensation:    · -       Intact   · Edema B feet; abdominal distension  · LLE grossly 2+/5 - 3-/5, particularly knee/hip extension / flexion; RLE grossly 3/5    Functional Mobility:  · Bed Mobility:     · Rolling Right: stand by assistance and contact guard assistance  · Scooting: stand by assistance  · Bridging: contact guard assistance  · Supine to Sit: moderate assistance and maximal assistance  · Transfers:   "   · Sit to Stand:  maximal assistance with rolling walker; uncontrolled descent to chair, not following cues to reach back  · Bed to Chair: moderate assistance with  rolling walker  using  Step Transfer  · Gait: patient able  to step in place with decreased clearance of feet from floor, then took 3 steps very slowly using RW and mod A to get to chair.   · Balance: fair - with use of RW; maintained static Sit with and without foot support with SPVN    Therapeutic Activities and Exercises:   Initial evaluation completed with OT. Pt cleaned of urine, transferred to chair with all needs in reach    AM-PAC 6 CLICK MOBILITY  Total Score:12     Patient left up in chair with all lines intact, call button in reach, chair alarm on and RN notified.    GOALS:   Multidisciplinary Problems     Physical Therapy Goals        Problem: Physical Therapy Goal    Goal Priority Disciplines Outcome Goal Variances Interventions   Physical Therapy Goal     PT, PT/OT Ongoing, Progressing     Description: Goals to be met by: 11/27/2020     Patient will increase functional independence with mobility by performing:    Supine<>sit SPVN  Sit<>stand CGA  Step transfer bed to bedside chair with RW CGA  Ambulate 30 feet within room with RW and CGA/min A  Up in chair x 2 hours  LE TE X 10 reps BLE                     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    Secondary  malignant neoplasm of liver     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) 5/7/2013    Thrombocytopenia 11/12/2013    Thyroid disease     Unspecified essential hypertension 11/12/2013       Past Surgical History:   Procedure Laterality Date    ERCP N/A 6/21/2018    Procedure: ERCP, stent exchange;  Surgeon: Jake Lieberman MD;  Location: [facility];  Service: Endoscopy;  Laterality: N/A;    ERCP N/A 5/23/2019    Procedure: ERCP (ENDOSCOPIC RETROGRADE CHOLANGIOPANCREATOGRAPHY), stent exchange;  Surgeon: Jake Lieberman MD;  Location: [facility];  Service: Endoscopy;  Laterality: N/A;    ERCP N/A 11/14/2019    Procedure: ERCP (ENDOSCOPIC RETROGRADE CHOLANGIOPANCREATOGRAPHY), stent exchange;  Surgeon: Jake Lieberman MD;  Location: [facility];  Service: Endoscopy;  Laterality: N/A;    ERCP      ERCP N/A 9/22/2020    Procedure: ERCP (ENDOSCOPIC RETROGRADE CHOLANGIOPANCREATOGRAPHY);  Surgeon: Abdulaziz Owen MD;  Location: [facility];  Service: Endoscopy;  Laterality: N/A;    THYROIDECTOMY  2011       Time Tracking:     PT Received On: 10/27/20  PT Start Time: 1051     PT Stop Time: 1115  PT Total Time (min): 24 min     Billable Minutes: Evaluation 24      Nicole Coley, PT  10/27/2020

## 2020-10-27 NOTE — PROGRESS NOTES
Progress Note    Admit Date: 10/26/2020   LOS: 1 day   SUBJECTIVE:   No acute events overnight.    No complaints of pain   Tolerating diet without nausea or vomiting  LBM yesterday, +flatus, voiding   OOB    Scheduled Meds:   cinacalcet  60 mg Oral BID WM    dronabinoL  2.5 mg Oral BID AC    enoxaparin  40 mg Subcutaneous Q24H    ferrous gluconate  324 mg Oral Daily    furosemide (LASIX) IV  60 mg Intravenous BID    levothyroxine  175 mcg Oral Before breakfast    lidocaine (PF) 10 mg/ml (1%)  1 mL Other Once    polyethylene glycol  17 g Oral Daily    senna-docusate 8.6-50 mg  1 tablet Oral BID    spironolactone  50 mg Oral Daily    ursodioL  300 mg Oral BID     Continuous Infusions:   dextrose 5 % and 0.45 % NaCl with KCl 20 mEq 150 mL/hr at 10/27/20 0010    octreotide infusion (50 mcg/mL) 500 mcg/hr (10/27/20 0339)     PRN Meds:acetaminophen, albuterol-ipratropium, dextrose 50%, dextrose 50%, glucagon (human recombinant), glucose, glucose, insulin aspart U-100, melatonin, ondansetron, oxyCODONE, promethazine (PHENERGAN) IVPB, traMADoL    Review of patient's allergies indicates:   Allergen Reactions    Epinephrine Anaphylaxis and Other (See Comments)     Can cause Carcinoid Crisis    Sulfa (sulfonamide antibiotics) Other (See Comments)     Urticaria         OBJECTIVE:     Vital Signs (Most Recent)  Temp: 97.4 °F (36.3 °C) (10/27/20 0525)  Pulse: 73 (10/27/20 0525)  Resp: 17 (10/27/20 0525)  BP: (!) 110/53 (10/27/20 0525)  SpO2: 97 % (10/27/20 0352)    Vital Signs Range (Last 24H):  Temp:  [97.4 °F (36.3 °C)-98.3 °F (36.8 °C)]   Pulse:  [66-88]   Resp:  [16-22]   BP: (110-133)/(50-60)   SpO2:  [95 %-98 %]     I & O (Last 24H):    Intake/Output Summary (Last 24 hours) at 10/27/2020 0702  Last data filed at 10/27/2020 0509  Gross per 24 hour   Intake 2703 ml   Output 950 ml   Net 1753 ml       Physical Exam:  Gen: NAD  Cardio: RR  Pulm: Normal respiratory effort on room air  Abdomen: soft, nontender,  distended  MSK: Moving all extremities, bilateral LE with edema   Neuro: alert    LABS  CBC  Recent Labs   Lab 10/25/20  0435 10/26/20  0310 10/27/20  0503   WBC 8.51 8.95 6.05   RBC 2.73* 2.63* 2.22*   HGB 8.8* 8.3* 7.2*   HCT 28.4* 27.2* 24.2*   PLT 81* 88* 81*   * 103* 109*   MCH 32.2* 31.6* 32.4*   MCHC 31.0* 30.5* 29.8*     CMP  Recent Labs   Lab 10/24/20  1727 10/25/20  0854 10/26/20  0310   GLU 84 81 71   CALCIUM 11.2* 11.0* 11.1*   ALBUMIN 2.2* 2.1* 2.0*   PROT 6.5 6.4 6.1    136 133*   K 3.8 3.1* 3.8   CO2 24 23 21*    102 101   BUN 17 17 17   CREATININE 0.8 0.8 0.8   ALKPHOS 446* 456* 435*   ALT 12 12 12   AST 24 29 28   BILITOT 2.2* 3.0* 2.1*       Recent Labs   Lab 10/24/20  1727 10/25/20  0435 10/25/20  0854 10/26/20  0310 10/27/20  0503   CALCIUM 11.2*  --  11.0* 11.1*  --    MG  --  1.7  --  1.7 1.6   PHOS  --  3.0  --  2.6* 3.4         POCT-Glucose  POCT Glucose   Date Value Ref Range Status   10/27/2020 171 (H) 70 - 110 mg/dL Final   10/26/2020 119 (H) 70 - 110 mg/dL Final       COAGS  No results for input(s): PT, INR, APTT in the last 168 hours.    CE  No results for input(s): TROPONINI, CPK, CPKMB in the last 168 hours.  BNP  No results for input(s): BNP in the last 168 hours.    ABGs  No results for input(s): PH, PCO2, PO2, HCO3, POCSATURATED, BE in the last 24 hours.    UA  No results for input(s): COLORU, CLARITYU, SPECGRAV, PHUR, PROTEINUA, GLUCOSEU, BILIRUBINCON, BLOODU, WBCU, RBCU, BACTERIA, MUCUS, NITRITE, LEUKOCYTESUR, UROBILINOGEN, HYALINECASTS in the last 168 hours.    LAST HbA1c  No results found for: HGBA1C    ASSESSMENT/PLAN:   68 yo female with metastatic NET (dx 2012) s/p ERCP, sphincterectomy, and biliary stent placement with removal 9/22, hyperparathyroidism, parathyroid adenoma, and HFpEF (EF 60%, G1DD) who presents with recurrent symptomatic hypercalcemia  -The patient is hesitant to have surgery. Will discuss with staff today.   -cincalcet, lasix,  spironolactone, IVF, follow up labs   -regular diet, dranabinol, nutritional supplements  -ultrasound abdomen with moderate ascites, improved when compared to 10/19  -PT/OT, encourage ambulation, deep breathing, cough, IS  -wound care consulted for evaluation of pressure wounds  -levothyroxine   -SSI, monitor glucose   -pain control as needed  dvt ppx: misty Guerrero  [employer] General Surgery PGY-2

## 2020-10-27 NOTE — PLAN OF CARE
WYATT carrera performed, report to follow  Ongoing assessment of post acute needs    Problem: Occupational Therapy Goal  Goal: Occupational Therapy Goal  Description: Goals to be met by: 11/27     Patient will increase functional independence with ADLs by performing:    UE Dressing with Stand-by Assistance.  LE Dressing with Minimal Assistance.  Grooming while seated at sink with Modified [place].  Toileting from bedside commode with Stand-by Assistance for hygiene and clothing management.   Toilet transfer to bedside commode with Stand-by Assistance.  Upper extremity exercise program x10 reps per handout, with independence.    Outcome: Ongoing, Progressing

## 2020-10-28 NOTE — PROGRESS NOTES
NET Team Rounds with Dr. ELISE Mcknight MD    Admit Date: 10/26/2020                    Length of Stay Days: 1                NET Physician:  Cory Rodriguez DO, [facility]                    Local Treating Physician:Gaby Corea MD    Reason for admission:  Hypercalcemia [E83.52]    HPI: metastatic NET (dx 2012) s/p ERCP, sphincterectomy, and biliary stent placement with removal 9/22, hyperparathyroidism, parathyroid adenoma, and HFpEF (EF 60%, G1DD) who presents with recurrent symptomatic hypercalcemia    Surgery/Procedure: none    Assessment  Diet: dysphsia soft                Oral Supplement: Boost Plus with meals   Nutrition Support - none   Appetite - fair                     Nausea - No                Vomiting- No  BM-not assessed  Urine-  voiding without difficulty  Abdomen- nondistended and nontender  Incision- none  Pain-none    IV Access- Peripheral   Drains- none    Edema - 2+ bilat lower edema, small amt in abd     Vital Signs (Most Recent):  Temp: 97.6 °F (36.4 °C) (10/28/20 0836)  Pulse: 71 (10/28/20 0836)  Resp: 18 (10/28/20 0836)  BP: (!) 106/50 (10/28/20 0836)  SpO2: 98 % (10/28/20 0836) Vital Signs Range (Last 24H):  Temp:  [97.3 °F (36.3 °C)-98.2 °F (36.8 °C)]   Pulse:  [65-77]   Resp:  [16-18]   BP: ()/(49-59)   SpO2:  [95 %-99 %]            Labs:   Recent Labs   Lab 10/26/20  0310 10/27/20  0503 10/27/20  1241 10/27/20  1728 10/28/20  0513   WBC 8.95 6.05  --   --  6.97   HGB 8.3* 7.2*  --   --  8.8*   HCT 27.2* 24.2*  --   --  27.7*   PLT 88* 81*  --   --  111*   * 109*  --   --  102*   RDW 18.2* 18.0*  --   --  17.7*   *  --  134* 135* 135*   K 3.8  --  4.8 3.9 4.3     --  103 101 104   CO2 21*  --  20* 26 25   BUN 17  --  17 16 16   CREATININE 0.8  --  0.9 0.9 0.9   GLU 71  --  131* 121* 121*   PROT 6.1  --  7.1  --  5.9*   ALBUMIN 2.0*  --  2.2*  --  2.0*   BILITOT 2.1*  --  2.2*  --  2.0*   AST 28  --  43*  --  19   ALKPHOS 435*  --  452*  --  376*   ALT 12   --  15  --  12      Recent Labs   Lab 10/26/20  1557   PREALBUMIN 8*   CRP 66.1*     Recent Labs   Lab 10/26/20  0310 10/27/20  0503 10/27/20  1241 10/27/20  1728 10/28/20  0513   CALCIUM 11.1*  --  11.2* 11.1* 10.2   MG 1.7 1.6  --   --  2.3   PHOS 2.6* 3.4  --   --  2.9     Cultures: none to date this admission    Scheduled Meds   cinacalcet  90 mg Oral BID WM    dronabinoL  2.5 mg Oral BID AC    enoxaparin  40 mg Subcutaneous Q24H    ferrous gluconate  324 mg Oral Daily    levothyroxine  175 mcg Oral Before breakfast    lidocaine (PF) 10 mg/ml (1%)  1 mL Other Once    polyethylene glycol  17 g Oral Daily    senna-docusate 8.6-50 mg  1 tablet Oral BID    spironolactone  50 mg Oral Daily    ursodioL  300 mg Oral BID       Continuous Infusion   dextrose 5 % and 0.45 % NaCl with KCl 20 mEq 150 mL/hr at 10/28/20 1000    octreotide infusion (50 mcg/mL) 500 mcg/hr (10/28/20 0142)       PRN Meds  acetaminophen, albuterol-ipratropium, dextrose 50%, dextrose 50%, glucagon (human recombinant), glucose, glucose, insulin aspart U-100, melatonin, ondansetron, oxyCODONE, promethazine (PHENERGAN) IVPB, traMADoL     Assessment/Plan:  -octreotide drip at 500 mcg/hr  -IVF- D51/2NS w 20 KCL at 150 ml/hr  -Ca today 10.2  -ENT consulted for evaluation of vocal cords which shows normal cord mobility.   -cincalcet, lasix, spironolactone, IVF  -Patient abdominal distention stable, will monitor for need of paracentesis  -IR consult for venous sampling          Discharge Planning         ---     Anticipated Date of D/C:   TBD         Appointments: Cory Rodriguez DO, FACP  - TBD    Dispo / Needs: Home    Nutrition: Regular    Home support system- TBD    Barriers to Care- none  ___________________________________________  TIMMY KhanN, RN, ONN-CG  Nurse Navigator [facility]    Tracie Weil Cavalier, RDN, LDN, CNSC  Registered Dietitian [facility]      Face to Face Time: 15  minutes

## 2020-10-28 NOTE — PT/OT/SLP PROGRESS
Physical Therapy Treatment    Patient Name:  Ching Bruce   MRN:  [id number]    Recommendations:     Discharge Recommendations:  (Pending need to determine daughters ability to provide supervision and physical assistance)   Discharge Equipment Recommendations: none   Barriers to discharge:  Unknown level of support that daughter is able to provide at this time    Assessment:     Ching Bruce is a 69 y.o. female admitted with a medical diagnosis of <principal problem not specified>.  She presents with the following impairments/functional limitations:  weakness, impaired endurance, impaired self care skills, impaired functional mobilty, gait instability, impaired balance, decreased coordination, decreased upper extremity function, decreased lower extremity function, decreased safety awareness, decreased ROM, impaired coordination, edema. Pt able to perform marching in place within RW 1 x 10 each BLE's requiring mod A. Also performed ~4-5 turning steps to sit in B/S chair requiring mod A. Would benefit from continued PT services to increase pt's out of bed therapeutic activity and exercise.    Rehab Prognosis: Fair; patient would benefit from acute skilled PT services to address these deficits and reach maximum level of function.    Recent Surgery: * No surgery found *      Plan:     During this hospitalization, patient to be seen 6 x/week to address the identified rehab impairments via gait training, therapeutic activities, therapeutic exercises and progress toward the following goals:    · Plan of Care Expires:  11/27/20    Subjective     Chief Complaint: None Expressed  Patient/Family Comments/goals: None Epressed  Pain/Comfort:  · Pain Rating 1: 0/10(Did not complain of pain)  · Pain Rating Post-Intervention 1: 0/10      Objective:     Communicated with  nurse prior to session.  Patient found supine with bed alarm, peripheral IV, PureWick, telemetry upon PT entry to room.     General Precautions: Standard, fall    Orthopedic Precautions:N/A   Braces: N/A     Functional Mobility:  · Bed Mobility:     · Rolling Left:  stand by assistance and contact guard assistance  · Rolling Right: stand by assistance and contact guard assistance  · Scooting: stand by assistance  · Supine to Sit: moderate assistance and maximal assistance  · Transfers:     · Sit to Stand:  maximal assistance and  with HOB elevated with rolling walker  · Gait:  Marching in place 1 x 10 reps each BLE and able to take ~4-5 turning step to sit in B/S chair all with RW requiring min/mod  A for marching and mod A for steps.      AM-PAC 6 CLICK MOBILITY  Turning over in bed (including adjusting bedclothes, sheets and blankets)?: 3  Sitting down on and standing up from a chair with arms (e.g., wheelchair, bedside commode, etc.): 2  Moving from lying on back to sitting on the side of the bed?: 2  Moving to and from a bed to a chair (including a wheelchair)?: 2  Need to walk in hospital room?: 2  Climbing 3-5 steps with a railing?: 1  Basic Mobility Total Score: 12       Therapeutic Activities and Exercises:   Pt required cleaning and changing secondary to being soiled. Able to perform marching in place within RW 1 x 10 reps each BLE's requiring min/mod A. Able to take ~4-5 turning steps to sit in B/S chair with RW requiring mod A. Slow nhi requiring increased time to complete. Demonstrates a decreased step length and inability to lift feet off of floor. Verbal/Tactile cueing to decrease trunk flexion.    Patient left up in chair with all lines intact, call button in reach, chair alarm on and  nurse notified..    GOALS:   Multidisciplinary Problems     Physical Therapy Goals        Problem: Physical Therapy Goal    Goal Priority Disciplines Outcome Goal Variances Interventions   Physical Therapy Goal     PT, PT/OT Ongoing, Progressing     Description: Goals to be met by: 11/27/2020     Patient will increase functional independence with mobility by  performing:    Supine<>sit SPVN  Sit<>stand CGA  Step transfer bed to bedside chair with RW CGA  Ambulate 30 feet within room with RW and CGA/min A  Up in chair x 2 hours  LE TE X 10 reps BLE                     Time Tracking:     PT Received On: 10/28/20  PT Start Time: 0813     PT Stop Time: 0836  PT Total Time (min): 23 min     Billable Minutes: Gait Training  10 and Therapeutic Activity  13    Treatment Type: Treatment  PT/PTA: PTA     PTA Visit Number: 1     Brandee Preciado, PTA  10/28/2020

## 2020-10-28 NOTE — ED PROVIDER NOTES
Encounter Date: 10/26/2020       History     Chief Complaint   Patient presents with    Fatigue     pt has pancreatic neuroendocrine, pt presents to ed for abnormal calcium level      Ching Bruce is a 69 y.o. 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    The patient presents to the ED due to fatigue and hypercalcemia.  Patient presents with her daughter who provides majority of history.  Patient has history of pancreatic neuroendocrine tumor as well as hypercalcemia.   She was recently admitted to [facility] and states her calcium had improved to normal.   However, her calcium became elevated again, and she was admitted to [facility].   During her admission, daughter states she was not satisfied with her care. She states they were not doing anything to correct her calcium, and they also neglected many other aspects of her care, including DVT prophylaxis and PT/OT. She signed out AMA earlier today and presents to ED for evaluation by the neuroendocrine service.    Patient reports persistent fatigue, decreased appetite, shortness of breath, and generalized weakness. No fever, vomiting, diarrhea, cough, syncope, CP.        Review of patient's allergies indicates:   Allergen Reactions    Epinephrine Anaphylaxis and Other (See Comments)     Can cause Carcinoid  Crisis    Sulfa (sulfonamide antibiotics) Other (See Comments)     Urticaria     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    Secondary malignant neoplasm of liver     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) 5/7/2013    Thrombocytopenia 11/12/2013    Thyroid disease     Unspecified essential hypertension 11/12/2013     Past Surgical History:   Procedure Laterality Date    ERCP N/A 6/21/2018    Procedure: ERCP, stent exchange;  Surgeon: Jake Lieberman MD;  Location: [facility];  Service: Endoscopy;  Laterality: N/A;    ERCP N/A 5/23/2019    Procedure: ERCP (ENDOSCOPIC RETROGRADE CHOLANGIOPANCREATOGRAPHY), stent exchange;  Surgeon: Jake Lieberman MD;  Location: [facility];  Service: Endoscopy;  Laterality: N/A;    ERCP N/A 11/14/2019    Procedure: ERCP (ENDOSCOPIC RETROGRADE CHOLANGIOPANCREATOGRAPHY), stent exchange;  Surgeon: Jake Lieberman MD;  Location: [facility];  Service: Endoscopy;  Laterality: N/A;    ERCP      ERCP N/A 9/22/2020    Procedure: ERCP (ENDOSCOPIC RETROGRADE CHOLANGIOPANCREATOGRAPHY);  Surgeon: Abdulaziz Owen MD;  Location: [facility];  Service: Endoscopy;  Laterality: N/A;    THYROIDECTOMY  2011     Family History   Problem Relation Age of Onset    Cancer Maternal Grandmother     Diabetes Father     Breast cancer Neg Hx     Colon cancer Neg Hx     Ovarian cancer Neg Hx      Social History     Tobacco  Use    Smoking status: Never Smoker    Smokeless tobacco: Never Used   Substance Use Topics    Alcohol use: No    Drug use: No     Review of Systems   Constitutional: Positive for activity change, appetite change and fatigue. Negative for chills and fever.   HENT: Negative for sore throat.    Respiratory: Positive for shortness of breath. Negative for cough.    Cardiovascular: Negative for chest pain and palpitations.   Gastrointestinal: Positive for abdominal pain and nausea. Negative for constipation and vomiting.   Genitourinary: Negative for dysuria, frequency and urgency.   Musculoskeletal: Negative for back pain.   Skin: Negative for rash and wound.   Neurological: Positive for weakness. Negative for syncope.   Hematological: Does not bruise/bleed easily.   Psychiatric/Behavioral: Negative for agitation, behavioral problems and confusion.       Physical Exam     Initial Vitals [10/26/20 1314]   BP Pulse Resp Temp SpO2   (!) 118/57 85 16 97.7 °F (36.5 °C) 98 %      MAP       --         Physical Exam    Nursing note and vitals reviewed.  Constitutional: She appears well-developed and well-nourished. She is not diaphoretic. No distress.   Appears fatigued.   HENT:   Head: Normocephalic and atraumatic.   Mouth/Throat: Oropharynx is clear and moist.   Eyes: EOM are normal. Pupils are equal, round, and reactive to light.   Neck: No tracheal deviation present.   Cardiovascular: Normal rate, regular rhythm, normal heart sounds and intact distal pulses.   Pulmonary/Chest: Breath sounds normal. No stridor. No respiratory distress. She has no wheezes.   Abdominal: Soft. Bowel sounds are normal. She exhibits no distension and no mass. There is no abdominal tenderness.   Musculoskeletal: Normal range of motion. No edema.   Neurological: She is alert and oriented to person, place, and time. She has normal strength. No cranial nerve deficit or sensory deficit.   Skin: Skin is warm and dry. Capillary refill takes less  than 2 seconds. No pallor.   Psychiatric: She has a normal mood and affect. Her behavior is normal. Thought content normal.         ED Course   Procedures  Labs Reviewed   PREALBUMIN - Abnormal; Notable for the following components:       Result Value    Prealbumin 8 (*)     All other components within normal limits   C-REACTIVE PROTEIN - Abnormal; Notable for the following components:    CRP 66.1 (*)     All other components within normal limits   SARS-COV-2 RNA AMPLIFICATION, QUAL   POCT GLUCOSE MONITORING CONTINUOUS          Imaging Results          US Abdomen Limited (Final result)  Result time 10/26/20 16:27:55   Procedure changed from US Abdomen Complete     Final result by Gerry Travis MD (10/26/20 16:27:55)                 Impression:      Moderate volume diffuse abdominopelvic ascites, slightly decreased in overall volume when compared to 10/19/2020 ultrasound.      Electronically signed by: Gerry Travis MD  Date:    10/26/2020  Time:    16:27             Narrative:    EXAMINATION:  US ABDOMEN LIMITED    CLINICAL HISTORY:  Evaluate for ascites; Hypercalcemia    TECHNIQUE:  Limited ultrasound of the abdomen to assess for evaluation of ascites    COMPARISON:  Limited abdominal ultrasound 10/19/2020    FINDINGS:  There is moderate volume of abdominal and pelvic ascites present, most pronounced within the midline and left lower quadrant at time of acquisition, slightly decreased in overall volume when compared to 10/19/2020 ultrasound.                                 Medical Decision Making:   History:   Old Medical Records: I decided to obtain old medical records.  Old Records Summarized: records from clinic visits.       <> Summary of Records: Patient admitted to [facility] from 10/19-10/26 due to hypercalcemia. She left AMA earlier today and presents to ED for further evaluation.  Initial Assessment:   68 yo F with pancreatic neuroendocrine tumor s/p resection and biliary stent, hyperparathyroidism, presents  to ED for further evaluation of hypercalcemia.   Labs and recent admission reviewed from earlier today.  Will discuss with Neuroendocrine Surgery team for further recommendations.  Differential Diagnosis:   Differential Diagnosis includes, but is not limited to:  CVA/TIA, vertigo, anemia/blood loss, cardiogenic shock, arrhythmia, orthostatic hypotension, dehydration, medication side effect, vitamin deficiency, liver disease, hypothyroidism, drug intoxication/withdrawal, metabolic derangement.    Clinical Tests:   Lab Tests: Reviewed       <> Summary of Lab: Calcium elevated but improving from admission.  Radiological Study: Reviewed  Medical Tests: Reviewed  ED Management:  [facility] General Surgery team will admit for further management.    On re-evaluation, the patient's status has remained stable.  At this time, I believe the patient should be admitted to the hospital for further evaluation and management of hypercalcemia.  Surgery service was contacted and the case was discussed.   The consulting physician/team agrees with plan and will admit under their service.   The patient and family were updated with test results, overall impression, and further plan of care. All questions were answered. The patient expressed understanding and agrees with the current plan.                               Clinical Impression:       ICD-10-CM ICD-9-CM   1. Hypercalcemia  E83.52 275.42   2. History of thyroidectomy  Z90.09 V15.29   3. Primary pancreatic neuroendocrine tumor  D3A.8 209.69   4. Weakness  R53.1 780.79   5. Fatigue, unspecified type  R53.83 780.79                          ED Disposition Condition    Admit                             Zak John MD  10/27/20 0679

## 2020-10-28 NOTE — PROGRESS NOTES
Progress Note    Admit Date: 10/26/2020   LOS: 1 day   SUBJECTIVE:   No acute events overnight.    No complaints of pain   Tolerating diet without nausea or vomiting  +BM/flatus, voiding   OOB w/ PT    Scheduled Meds:   calcitonin  4 Units/kg Intramuscular Daily    cinacalcet  90 mg Oral BID WM    dronabinoL  2.5 mg Oral BID AC    enoxaparin  40 mg Subcutaneous Q24H    ferrous gluconate  324 mg Oral Daily    furosemide (LASIX) IV  60 mg Intravenous Once    furosemide (LASIX) IV  60 mg Intravenous Once    levothyroxine  175 mcg Oral Before breakfast    lidocaine (PF) 10 mg/ml (1%)  1 mL Other Once    polyethylene glycol  17 g Oral Daily    senna-docusate 8.6-50 mg  1 tablet Oral BID    spironolactone  50 mg Oral Daily    ursodioL  300 mg Oral BID     Continuous Infusions:   dextrose 5 % and 0.45 % NaCl with KCl 20 mEq 150 mL/hr at 10/28/20 0323    octreotide infusion (50 mcg/mL) 500 mcg/hr (10/28/20 0142)     PRN Meds:acetaminophen, albuterol-ipratropium, dextrose 50%, dextrose 50%, glucagon (human recombinant), glucose, glucose, insulin aspart U-100, melatonin, ondansetron, oxyCODONE, promethazine (PHENERGAN) IVPB, traMADoL    Review of patient's allergies indicates:   Allergen Reactions    Epinephrine Anaphylaxis and Other (See Comments)     Can cause Carcinoid Crisis    Sulfa (sulfonamide antibiotics) Other (See Comments)     Urticaria         OBJECTIVE:     Vital Signs (Most Recent)  Temp: 97.9 °F (36.6 °C) (10/28/20 0455)  Pulse: 72 (10/28/20 0455)  Resp: 18 (10/28/20 0455)  BP: (!) 112/52 (10/28/20 0455)  SpO2: 98 % (10/28/20 0522)    Vital Signs Range (Last 24H):  Temp:  [97.3 °F (36.3 °C)-98.2 °F (36.8 °C)]   Pulse:  [65-74]   Resp:  [12-18]   BP: ()/(49-77)   SpO2:  [95 %-99 %]     I & O (Last 24H):    Intake/Output Summary (Last 24 hours) at 10/28/2020 0619  Last data filed at 10/27/2020 1800  Gross per 24 hour   Intake 2230.5 ml   Output 500 ml   Net 1730.5 ml       Physical  Exam:  Gen: NAD  Cardio: RR  Pulm: Normal respiratory effort on room air  Abdomen: soft, nontender, distended  MSK: Moving all extremities, bilateral LE with edema   Neuro: alert    LABS  CBC  Recent Labs   Lab 10/25/20  0435 10/26/20  0310 10/27/20  0503   WBC 8.51 8.95 6.05   RBC 2.73* 2.63* 2.22*   HGB 8.8* 8.3* 7.2*   HCT 28.4* 27.2* 24.2*   PLT 81* 88* 81*   * 103* 109*   MCH 32.2* 31.6* 32.4*   MCHC 31.0* 30.5* 29.8*     CMP  Recent Labs   Lab 10/25/20  0854 10/26/20  0310 10/27/20  1241 10/27/20  1728   GLU 81 71 131* 121*   CALCIUM 11.0* 11.1* 11.2* 11.1*   ALBUMIN 2.1* 2.0* 2.2*  --    PROT 6.4 6.1 7.1  --     133* 134* 135*   K 3.1* 3.8 4.8 3.9   CO2 23 21* 20* 26    101 103 101   BUN 17 17 17 16   CREATININE 0.8 0.8 0.9 0.9   ALKPHOS 456* 435* 452*  --    ALT 12 12 15  --    AST 29 28 43*  --    BILITOT 3.0* 2.1* 2.2*  --        Recent Labs   Lab 10/25/20  0435  10/26/20  0310 10/27/20  0503 10/27/20  1241 10/27/20  1728   CALCIUM  --    < > 11.1*  --  11.2* 11.1*   MG 1.7  --  1.7 1.6  --   --    PHOS 3.0  --  2.6* 3.4  --   --     < > = values in this interval not displayed.         POCT-Glucose  POCT Glucose   Date Value Ref Range Status   10/28/2020 117 (H) 70 - 110 mg/dL Final   10/27/2020 158 (H) 70 - 110 mg/dL Final   10/27/2020 102 70 - 110 mg/dL Final   10/27/2020 165 (H) 70 - 110 mg/dL Final   10/27/2020 171 (H) 70 - 110 mg/dL Final   10/26/2020 119 (H) 70 - 110 mg/dL Final       COAGS  No results for input(s): PT, INR, APTT in the last 168 hours.    CE  No results for input(s): TROPONINI, CPK, CPKMB in the last 168 hours.  BNP  No results for input(s): BNP in the last 168 hours.    ABGs  No results for input(s): PH, PCO2, PO2, HCO3, POCSATURATED, BE in the last 24 hours.    UA  No results for input(s): COLORU, CLARITYU, SPECGRAV, PHUR, PROTEINUA, GLUCOSEU, BILIRUBINCON, BLOODU, WBCU, RBCU, BACTERIA, MUCUS, NITRITE, LEUKOCYTESUR, UROBILINOGEN, HYALINECASTS in the last 168  hours.    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 It is not safe to discharge the patient home at this time and will likely need a surgery this admission. Surgical planning initiated. ENT consulted for evaluation of vocal cords which shows normal cord mobility.   -cincalcet, lasix, spironolactone, IVF, follow up labs BID  -regular diet, dranabinol, nutritional supplements  -Patient abdominal distention stable, will monitor for need of paracentesis.   -PT/OT, encourage ambulation, deep breathing, cough, IS  -wound care consulted for evaluation of pressure wounds  -levothyroxine   -SSI, monitor glucose   -pain control as needed  dvt ppx: misty Guerrero  [employer] General Surgery PGY-2

## 2020-10-28 NOTE — PT/OT/SLP PROGRESS
"Occupational Therapy      Patient Name:  Ching Bruce   MRN:  [id number]    Patient not seen today secondary to Patient unwilling to participate.  Pt reports "I'm just not feeling it today" continued to refuse.  Will follow-up .    Demarcus Moura OT  10/28/2020  "

## 2020-10-28 NOTE — PLAN OF CARE
Problem: Physical Therapy Goal  Goal: Physical Therapy Goal  Description: Goals to be met by: 11/27/2020     Patient will increase functional independence with mobility by performing:    Supine<>sit SPVN  Sit<>stand CGA  Step transfer bed to bedside chair with RW CGA  Ambulate 30 feet within room with RW and CGA/min A  Up in chair x 2 hours  LE TE X 10 reps BLE    Outcome: Ongoing, Progressing   Pt continues to work to achieve all goals. Able to perform ~4-5 turning steps to sit in B/S chair with RW requiring mod A.

## 2020-10-29 NOTE — PLAN OF CARE
Recommendation:   1. Reintiate diet as medically acceptable with texture/consistency modifications per MD/SLP.   2. When diet reinitated continue to provide Boost Plus-Strawberry TID.   3. RD to follow and monitor nutrition status    Goals:   Pt to recieve nutrition by RD follow up    Nutrition Goal Status: new  Communication of RD Recs: other (comment)(poc)      Problem: Oral Intake Altered (Oncology Care)  Goal: Optimal Oral Intake  Outcome: Ongoing, Progressing

## 2020-10-29 NOTE — NURSING
Notified  about pt refusing paracentesis. Received report from IR. Pt vs stable.    Hide Additional Notes?: No Detail Level: Detailed

## 2020-10-29 NOTE — PROCEDURES
Interventional Radiology Post-Procedure Note    Pre Op Diagnosis: Hypercalcemia  Post Op Diagnosis: Same    Procedure: Venogram, venous sampling    Procedure performed by: Ha    Written Informed Consent Obtained: Yes  Specimen Sent: Yes  Estimated Blood Loss: Minimal    Findings:   Venous blood samples taken from the bilateral IJ veins, bilateral brachiocephalic veins, superior vena cava, inferior vena cava above the level of the hepatic veins, and inferior vena cava below the level of there hepatic veins (total of 7 sites). Samples sent for PTH and PTHrP, as requested.    No immediate complications. Patient tolerated procedure well. Please see full dictated procedure report for additional details and recommendations.      Colt Bonner MD  [facility]  Pager [phone]

## 2020-10-29 NOTE — CONSULTS
Interventional Radiology Consult/Pre-Procedure Note      Chief Complaint/Reason for Consult: Hypercalcemia    History of Present Illness:  Ching Bruce is a 69 y.o. female with the PMH listed below who presents with hypercalcemia, elevated PTH in the setting of metastatic NET. D/w Dr. Mcknight. Pt has a history of thyroidectomy. Concern for parathyroid adenoma vs liver metastases as the source of the patient's elevated PTH. CT neck concerning for right-sided parathyroid adenoma. NM sestamibi scan was non confirmatory. Dr. Mcknight requests venous sampling of the bilateral IJ veins, brachiocephalic veins, and the IVC above and below the hepatic veins to localize PTH production to head/neck vs abdomen. Requests samples be sent for PTH and PTHrP.    IR also consulted for paracentesis.    Admission H&P reviewed.    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    Secondary malignant neoplasm of liver     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) 5/7/2013    Thrombocytopenia 11/12/2013    Thyroid disease     Unspecified essential hypertension 11/12/2013     Past Surgical History:   Procedure Laterality Date    ERCP N/A 6/21/2018    Procedure: ERCP, stent exchange;  Surgeon: Jake Lieberman MD;  Location: [facility];  Service: Endoscopy;  Laterality: N/A;    ERCP N/A 5/23/2019    Procedure: ERCP (ENDOSCOPIC RETROGRADE  CHOLANGIOPANCREATOGRAPHY), stent exchange;  Surgeon: Jake Lieberman MD;  Location: [facility] ENDO;  Service: Endoscopy;  Laterality: N/A;    ERCP N/A 11/14/2019    Procedure: ERCP (ENDOSCOPIC RETROGRADE CHOLANGIOPANCREATOGRAPHY), stent exchange;  Surgeon: Jake Lieberman MD;  Location: [facility] ENDO;  Service: Endoscopy;  Laterality: N/A;    ERCP      ERCP N/A 9/22/2020    Procedure: ERCP (ENDOSCOPIC RETROGRADE CHOLANGIOPANCREATOGRAPHY);  Surgeon: Abdulaziz Owen MD;  Location: [facility] ENDO;  Service: Endoscopy;  Laterality: N/A;    THYROIDECTOMY  2011       Allergies:   Review of patient's allergies indicates:   Allergen Reactions    Epinephrine Anaphylaxis and Other (See Comments)     Can cause Carcinoid Crisis    Sulfa (sulfonamide antibiotics) Other (See Comments)     Urticaria       Scheduled Meds:    cinacalcet  90 mg Oral BID WM    dronabinoL  2.5 mg Oral BID AC    [START ON 10/30/2020] enoxaparin  40 mg Subcutaneous Q24H    ferrous gluconate  324 mg Oral Daily    levothyroxine  175 mcg Oral Before breakfast    lidocaine (PF) 10 mg/ml (1%)  1 mL Other Once    polyethylene glycol  17 g Oral Daily    senna-docusate 8.6-50 mg  1 tablet Oral BID    spironolactone  50 mg Oral Daily    ursodioL  300 mg Oral BID     Continuous Infusions:    dextrose 5 % and 0.45 % NaCl with KCl 20 mEq 75 mL/hr at 10/29/20 0940    octreotide infusion (50 mcg/mL) 250 mcg/hr (10/29/20 0939)     PRN Meds:acetaminophen, albumin human 25%, albuterol-ipratropium, dextrose 50%, dextrose 50%, glucagon (human recombinant), glucose, glucose, insulin aspart U-100, melatonin, ondansetron, oxyCODONE, promethazine (PHENERGAN) IVPB, traMADoL    Anticoagulation/Antiplatelet Meds: no anticoagulation    Review of Systems:   As documented in admission H&P.    Physical Exam:  Temp: 97.5 °F (36.4 °C) (10/29/20 1208)  Pulse: 74 (10/29/20 1447)  Resp: 16 (10/29/20 1447)  BP: 125/63 (10/29/20 1447)  SpO2: 100 % (10/29/20 1447)     General:  NAD  HEENT: Normocephalic, sclera anicteric, oropharynx clear  Neck: Supple, no palpable lymphadenopathy  Heart: RRR  Lungs: Symmetric excursions, breathing unlabored  Abd: NTND, soft  Extremities: CORBETT, BLE edema  Pulses: 2+ DP b/l  Neuro: Gross nonfocal    Labs:  No results for input(s): INR in the last 168 hours.    Invalid input(s):  PT,  PTT    Recent Labs   Lab 10/29/20  0537   WBC 6.18   HGB 9.0*   HCT 28.4*   *   *      Recent Labs   Lab 10/29/20  0537   *      K 4.3      CO2 26   BUN 15   CREATININE 0.8   CALCIUM 9.8   MG 2.0   ALT 12   AST 21   ALBUMIN 2.1*   BILITOT 2.4*       Imaging:  NM parathyroid scan 10/1/20, CT neck 10/2/20, CT AP 10/4/20, MRI abd 10/6/20 reviewed.    Assessment/Plan:   Hypercalcemia, possible parathyroid adenoma vs liver metastases as the source of the patient's elevated PTH. Will undergo venous sampling today. Pt declines paracentesis today.    Sedation:  Sedation history: have not been any systemic reactions  ASA: 3 / Mallampati: 3  Sedation plan: Moderate (Versed, fentanyl)     Risks (including, but not limited to, pain, bleeding, infection, damage to nearby structures, failure, and the need for additional procedures), benefits, and alternatives were discussed with the patient. All questions were answered to the best of my abilities. The patient wishes to proceed. Written informed consent was obtained.      Andrew Marsala MD [facility]   Pager [phone]

## 2020-10-29 NOTE — PLAN OF CARE
Patient on room air with documented SpO2 and in no apparent distress. Will continue to monitor.       Thanks for sending her over we will get her in for a lumbar MONICA let us know if thiers anything else we can do thanksArslan

## 2020-10-29 NOTE — PLAN OF CARE
Patient's chart, progress notes, and care plan reviewed. Pt NPO for IR today. Will be available as needed.

## 2020-10-29 NOTE — PLAN OF CARE
Problem: Occupational Therapy Goal  Goal: Occupational Therapy Goal  Description: Goals to be met by: 11/27     Patient will increase functional independence with ADLs by performing:    UE Dressing with Stand-by Assistance.  LE Dressing with Minimal Assistance.  Grooming while seated at sink with Modified [place].  Toileting from bedside commode with Stand-by Assistance for hygiene and clothing management.   Toilet transfer to bedside commode with Stand-by Assistance.  Upper extremity exercise program x10 reps per handout, with independence.    Outcome: Ongoing, Progressing

## 2020-10-29 NOTE — PLAN OF CARE
Patient is AAO X 4. BP systolic borderline low. On tele monitor shows NSR. Denies pain at present. Kept NPO from 12 midnight for IR today. Safety measures maintained. IV fluids and Cornell continued as per MAR. Will continue to monitor patient.

## 2020-10-29 NOTE — NURSING
Patient's IR care is complete. VSS. See flowsheets. Right groin site covered with tegaderm and gauze. Report called to Belkys QUINTERO RN. Patient will be accompanied back to her IP room via stretcher. Notified Belkys QUINTERO RN that patient refused paracentesis procedure.

## 2020-10-29 NOTE — PROGRESS NOTES
Progress Note    Admit Date: 10/26/2020   LOS: 1 day   SUBJECTIVE:   No acute events overnight.    No complaints of pain   NPO since midnight without nausea or vomiting  +BM/flatus, voiding   OOB w/ PT    Scheduled Meds:   calcitonin  4 Units/kg Intramuscular Daily    cinacalcet  90 mg Oral BID WM    dronabinoL  2.5 mg Oral BID AC    [START ON 10/30/2020] enoxaparin  40 mg Subcutaneous Q24H    ferrous gluconate  324 mg Oral Daily    furosemide (LASIX) IV  60 mg Intravenous Once    levothyroxine  175 mcg Oral Before breakfast    lidocaine (PF) 10 mg/ml (1%)  1 mL Other Once    polyethylene glycol  17 g Oral Daily    senna-docusate 8.6-50 mg  1 tablet Oral BID    sodium phosphate IVPB  39.99 mmol Intravenous Once    spironolactone  50 mg Oral Daily    ursodioL  300 mg Oral BID     Continuous Infusions:   dextrose 5 % and 0.45 % NaCl with KCl 20 mEq 150 mL/hr at 10/28/20 2318    octreotide infusion (50 mcg/mL) 500 mcg/hr (10/28/20 2317)     PRN Meds:acetaminophen, albumin human 25%, albuterol-ipratropium, dextrose 50%, dextrose 50%, glucagon (human recombinant), glucose, glucose, insulin aspart U-100, melatonin, ondansetron, oxyCODONE, promethazine (PHENERGAN) IVPB, traMADoL    Review of patient's allergies indicates:   Allergen Reactions    Epinephrine Anaphylaxis and Other (See Comments)     Can cause Carcinoid Crisis    Sulfa (sulfonamide antibiotics) Other (See Comments)     Urticaria         OBJECTIVE:     Vital Signs (Most Recent)  Temp: 98.5 °F (36.9 °C) (10/29/20 0518)  Pulse: 81 (10/29/20 0735)  Resp: 17 (10/28/20 2315)  BP: (!) 103/55 (10/29/20 0518)  SpO2: 98 % (10/29/20 0417)    Vital Signs Range (Last 24H):  Temp:  [97.3 °F (36.3 °C)-98.5 °F (36.9 °C)]   Pulse:  [62-84]   Resp:  [17-18]   BP: (103-123)/(50-58)   SpO2:  [94 %-99 %]     I & O (Last 24H):    Intake/Output Summary (Last 24 hours) at 10/29/2020 0822  Last data filed at 10/29/2020 0600  Gross per 24 hour   Intake 1875 ml    Output 400 ml   Net 1475 ml       Physical Exam:  Gen: NAD  Cardio: RR  Pulm: Normal respiratory effort on room air  Abdomen: soft, nontender, distended  MSK: Moving all extremities, bilateral LE with edema   Neuro: alert    LABS  CBC  Recent Labs   Lab 10/27/20  0503 10/28/20  0513 10/29/20  0537   WBC 6.05 6.97 6.18   RBC 2.22* 2.72* 2.78*   HGB 7.2* 8.8* 9.0*   HCT 24.2* 27.7* 28.4*   PLT 81* 111* 119*   * 102* 102*   MCH 32.4* 32.4* 32.4*   MCHC 29.8* 31.8* 31.7*     CMP  Recent Labs   Lab 10/27/20  1241 10/27/20  1728 10/28/20  0513 10/29/20  0537   * 121* 121* 112*   CALCIUM 11.2* 11.1* 10.2 9.8   ALBUMIN 2.2*  --  2.0* 2.1*   PROT 7.1  --  5.9* 6.0   * 135* 135* 140   K 4.8 3.9 4.3 4.3   CO2 20* 26 25 26    101 104 106   BUN 17 16 16 15   CREATININE 0.9 0.9 0.9 0.8   ALKPHOS 452*  --  376* 401*   ALT 15  --  12 12   AST 43*  --  19 21   BILITOT 2.2*  --  2.0* 2.4*       Recent Labs   Lab 10/27/20  0503  10/27/20  1728 10/28/20  0513 10/29/20  0537   CALCIUM  --    < > 11.1* 10.2 9.8   MG 1.6  --   --  2.3 2.0   PHOS 3.4  --   --  2.9 2.3*    < > = values in this interval not displayed.         POCT-Glucose  POCT Glucose   Date Value Ref Range Status   10/29/2020 110 70 - 110 mg/dL Final   10/28/2020 113 (H) 70 - 110 mg/dL Final   10/28/2020 104 70 - 110 mg/dL Final   10/28/2020 127 (H) 70 - 110 mg/dL Final   10/28/2020 117 (H) 70 - 110 mg/dL Final   10/27/2020 158 (H) 70 - 110 mg/dL Final   10/27/2020 102 70 - 110 mg/dL Final   10/27/2020 165 (H) 70 - 110 mg/dL Final   10/27/2020 171 (H) 70 - 110 mg/dL Final   10/26/2020 119 (H) 70 - 110 mg/dL Final       COAGS  No results for input(s): PT, INR, APTT in the last 168 hours.    CE  No results for input(s): TROPONINI, CPK, CPKMB in the last 168 hours.  BNP  No results for input(s): BNP in the last 168 hours.    ABGs  No results for input(s): PH, PCO2, PO2, HCO3, POCSATURATED, BE in the last 24 hours.    UA  No results for input(s):  COLORU, CLARITYU, SPECGRAV, PHUR, PROTEINUA, GLUCOSEU, BILIRUBINCON, BLOODU, WBCU, RBCU, BACTERIA, MUCUS, NITRITE, LEUKOCYTESUR, UROBILINOGEN, HYALINECASTS in the last 168 hours.    LAST HbA1c  No results found for: HGBA1C    ASSESSMENT/PLAN:   68 yo female with metastatic NET (dx 2012) s/p ERCP, sphincterectomy, and biliary stent placement with removal 9/22, hyperparathyroidism, parathyroid adenoma, and HFpEF (EF 60%, G1DD) who presents with recurrent symptomatic hypercalcemia  -IR consulted for venous sampling of PTHrP,PTH today & possible paracentesis.   -cincalcet, lasix BID, spironolactone, IVF, follow up labs BID  -NPO for procedure will advance to regular diet after. Dranabinol, nutritional supplements.  -PT/OT, encourage ambulation, deep breathing, cough, IS  -wound care consulted for evaluation of pressure wounds  -levothyroxine   -SSI, monitor glucose   -pain control as needed  dvt ppx: lovenox    Dispo:   The patient has hypercalcemia that is has failed maximal medical management with recurrent admissions and falls. It is not safe to discharge the patient home at this time and will likely need a surgery this admission. Surgical planning initiated. ENT consulted for evaluation of vocal cords which shows normal cord mobility. IR performing venous sampling today to evaluate for ectopic source of PTH.    Navid Guerrero  [employer] General Surgery PGY-2

## 2020-10-29 NOTE — PT/OT/SLP PROGRESS
Occupational Therapy   Treatment    Name: Ching Bruce  MRN: [id number]  Admitting Diagnosis:  <principal problem not specified>     The primary encounter diagnosis was Hypercalcemia. Diagnoses of History of thyroidectomy, Primary pancreatic neuroendocrine tumor, Weakness, Fatigue, unspecified type, and Dyspnea and respiratory abnormalities were also pertinent to this visit.    Recommendations:     Discharge Recommendations: (TBD)  Discharge Equipment Recommendations:  none  Barriers to discharge:  None    Assessment:     Ching Bruce is a 69 y.o. female with a medical diagnosis of <principal problem not specified>.  She presents with Performance deficits affecting function are weakness, impaired endurance, impaired self care skills, impaired functional mobilty, gait instability, impaired balance, decreased coordination, decreased lower extremity function, decreased safety awareness.   Pt. making steady gains toward goals with increased indep with bed mobility and fx transfers with RW.  Pt. presents with flat affect flat and self limiting behavior and initially declined OT but improved after education, encouragement from friends and therapy session.     Rehab Prognosis:  Good; patient would benefit from acute skilled OT services to address these deficits and reach maximum level of function.       Plan:     Patient to be seen 5 x/week to address the above listed problems via self-care/home management, therapeutic activities, therapeutic exercises  · Plan of Care Expires: 11/27/20  · Plan of Care Reviewed with: patient, friend    Subjective     Pain/Comfort:  · Pain Rating 1: 0/10  · Pain Rating Post-Intervention 1: 0/10    Objective:     Communicated with: nurse prior to session.  Patient found HOB elevated with SCD, peripheral IV, PureWick, telemetry, bed alarm(telesitter) upon OT entry to room.    General Precautions: Standard, fall, NPO(NPO for procedure)   Orthopedic Precautions:N/A   Braces: N/A      Occupational Performance:     Bed Mobility:  Initially refused to participate but explained importance of OB activity.  Friend at bedside also encouraged pt to participate.   · Patient completed Rolling/Turning to Right with contact guard assistance and with side rail  · Patient completed Scooting/Bridging with minimum assistance  · Patient completed Supine to Sit with minimum assistance and with side rail  · Patient completed Sit to Supine with minimum assistance and with side rail     Functional Mobility/Transfers:  · Patient completed Sit <> Stand Transfer with minimum assistance  with  rolling walker   · Patient completed Toilet Transfer Step Transfer technique with minimum assistance with  rolling walker and bedside commode  · Functional Mobility: ambulated ~ 4 feet with min A using RW bed<>BSC, unsteady,LE weakness, c/o leg muscle fatigue.    Activities of Daily Living:  · Toileting: moderate assistance pt did perihygiene and perirectal hygiene seated on toielt, doffed brief with assistance and donned brief seated on EOB and fastened lying down in bed.      [facility] 6 Click ADL: 15    Treatment & Education:  --pt sat EOB with supervision, completed BUE AROM ex with towel roll for strength and endurance:  Pt. moved slowly but with mod vc and visual guidance perform ex x 10 reps for shld flex/ext/abd/add, chest press, rows and biceps curls.    --Two trials supine<>sit, initial trial min A, second trial SBA with bed rail for supine>sit, CGA sit>supine, difficult processing simple instructions for tech and LE weakness are the main reasons for needed assistance.      Patient left HOB elevated with all lines intact, call button in reach, bed alarm on and friends presentEducation:      GOALS:   Multidisciplinary Problems     Occupational Therapy Goals        Problem: Occupational Therapy Goal    Goal Priority Disciplines Outcome Interventions   Occupational Therapy Goal     OT, PT/OT Ongoing, Progressing     Description: Goals to be met by: 11/27     Patient will increase functional independence with ADLs by performing:    UE Dressing with Stand-by Assistance.  LE Dressing with Minimal Assistance.  Grooming while seated at sink with Modified [place].  Toileting from bedside commode with Stand-by Assistance for hygiene and clothing management.   Toilet transfer to bedside commode with Stand-by Assistance.  Upper extremity exercise program x10 reps per handout, with independence.                     Time Tracking:     OT Date of Treatment: 10/29/20  OT Start Time: 1312  OT Stop Time: 1407  OT Total Time (min): 55 min    Billable Minutes:Self Care/Home Management 15  Therapeutic Activity 24  Therapeutic Exercise 16  Total Time 55    Rosangela Galvan OT  10/29/2020

## 2020-10-29 NOTE — PROGRESS NOTES
"[facility]  Adult Nutrition  Progress Note    SUMMARY       Recommendations    Recommendation:   1. Reintiate diet as medically acceptable with texture/consistency modifications per MD/SLP.   2. When diet reinitated continue to provide Boost Plus-Strawberry TID.   3. RD to follow and monitor nutrition status    Goals:   Pt to recieve nutrition by RD follow up    Nutrition Goal Status: new  Communication of RD Recs: other (comment)(poc)    Reason for Assessment    Reason For Assessment: RD follow-up  Diagnosis: other (see comments)(hypercalcemia)  Relevant Medical History: HT, kidney insufficiency, thyroid disease, anemia, HLD, secondary malignant neoplasm of liver, primary pancreatic neuroendocrine tumor, hypercalcemia, thrombocytopenia, chemotherapy  Interdisciplinary Rounds: attended  General Information Comments: Pt in room with family member, NPO for procedure today. Pt to be started on PPN today, not started yet. Pt currently recieving D5 at 75 ml/hr. Previously on regular diet with Boost Plus Strawberry TID. Pt reports feeling a little bit hungry, denies N/V/D/C at this time. Ruddy Score: 16 PIV in place. NFPE completed 10/27: 10/27 moderate subcutaneous fat loss and muscle wasting   Nutrition Discharge Planning: d/c needs to be determined    Nutrition Risk Screen    Nutrition Risk Screen: no indicators present    Nutrition/Diet History    Food Preferences: no Advent or cultural food prefs  Spiritual, Cultural Beliefs, Catholic Practices, Values that Affect Care: no  Food Allergies: NKFA  Factors Affecting Nutritional Intake: difficulty/impaired swallowing, altered taste, decreased appetite    Anthropometrics    Temp: 97.5 °F (36.4 °C)  Height Method: Stated  Height: 5' 7" (170.2 cm)  Height (inches): 67 in  Weight Method: Bed Scale  Weight: 67.2 kg (148 lb 2.4 oz)  Weight (lb): 148.15 lb  Ideal Body Weight (IBW), Female: 135 lb  % Ideal Body Weight, Female (lb): 108.6 %  BMI " (Calculated): 23.2  BMI Grade: 18.5-24.9 - normal       Lab/Procedures/Meds    Pertinent Labs Reviewed: reviewed  Pertinent Labs Comments: H/H 9.0/28.4, Glu 112, Phos 2.3, Alk Phos 401, Alb 2.1, Bili 2.4   Pertinent Medications Reviewed: reviewed  Pertinent Medications Comments: cinacalcet, dronbinol, enoxaparin, ferrous gluconate, levothyroxine, poly glycol, senna doscusate, Na phos, spironolactone, ursodiol, D5 and NaCl w KCl, octreotide    Physical Findings/Assessment     Ruddy Score: 16    Estimated/Assessed Needs    Weight Used For Calorie Calculations: 66.5 kg (146 lb 9.7 oz)  Energy Calorie Requirements (kcal): 1995(x 30 kcal/kg)  Energy Need Method: Kcal/kg  Protein Requirements: 80 g(x 1.2 g/kg)  Weight Used For Protein Calculations: 66.5 kg (146 lb 9.7 oz)     Estimated Fluid Requirement Method: RDA Method  RDA Method (mL): 1995         Nutrition Prescription Ordered    Current Diet Order: NPO  Oral Nutrition Supplement: canceled    Evaluation of Received Nutrient/Fluid Intake    Other Fluid (mL): 1800(D5 and NaCl with KCl)  I/O: 1875/400  Energy Calories Required: not meeting needs  Protein Required: not meeting needs  Fluid Required: not meeting needs  Comments: LBM 10/27  % Intake of Estimated Energy Needs: 0 - 25 %  % Meal Intake: NPO    Nutrition Risk    Level of Risk/Frequency of Follow-up: (2xweekly)     Assessment and Plan    Moderate malnutrition  Nutrition Problem:  Moderate Protein-Calorie Malnutrition  Malnutrition in the context of Chronic Illness/Injury    Related to (etiology):  Cancer/decreased appetite    Signs and Symptoms (as evidenced by):  Energy Intake: <75% of estimated energy requirement for at least 1 week  Body Fat Depletion: moderate depletion of orbitals, triceps and thoracic and lumbar region   Muscle Mass Depletion: moderate depletion of temples, scapular region, interosseous muscle and lower extremities and severe depletion of clavicles   Weight Loss: not significant      Interventions(treatment strategy):  Commercial beverage  Collaboration with other providers    Nutrition Diagnosis Status:  Continues         Monitor and Evaluation    Food and Nutrient Intake: energy intake  Food and Nutrient Adminstration: diet order  Physical Activity and Function: nutrition-related ADLs and IADLs  Anthropometric Measurements: weight  Biochemical Data, Medical Tests and Procedures: glucose/endocrine profile, electrolyte and renal panel  Nutrition-Focused Physical Findings: overall appearance     Malnutrition Assessment             Energy Intake (Malnutrition): less than 75% for greater than 7 days   Orbital Region (Subcutaneous Fat Loss): moderate depletion  Upper Arm Region (Subcutaneous Fat Loss): moderate depletion  Thoracic and Lumbar Region: moderate depletion   [place] Region (Muscle Loss): moderate depletion  Clavicle Bone Region (Muscle Loss): severe depletion  Dorsal Hand (Muscle Loss): moderate depletion       Subcutaneous Fat Loss (Final Summary): moderate protein-calorie malnutrition  Muscle Loss Evaluation (Final Summary): moderate protein-calorie malnutrition         Nutrition Follow-Up    RD Follow-up?: Yes

## 2020-10-29 NOTE — PROGRESS NOTES
NET Team Rounds with Dr. 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, sphincterectomy, and biliary stent placement with removal 9/22, hyperparathyroidism, parathyroid adenoma, and HFpEF (EF 60%, G1DD) who presents with recurrent symptomatic hypercalcemia    Surgery/Procedure: none    Assessment  Diet: dysphsia soft , NPO for procedure              Oral Supplement: Boost Plus with meals   Nutrition Support - none   Appetite - fair                     Nausea - No                Vomiting- No  BM-yesterday  Urine-  voiding without difficulty  Abdomen- nontender and distended  Incision- none  Pain-none    IV Access- Peripheral   Drains- none    Edema - 2+ bilat lower edema, abd swelling noted, denies SOB     Vital Signs (Most Recent):  Temp: 98.5 °F (36.9 °C) (10/29/20 0518)  Pulse: 81 (10/29/20 0735)  Resp: 17 (10/28/20 2315)  BP: (!) 103/55 (10/29/20 0518)  SpO2: 98 % (10/29/20 0417) Vital Signs Range (Last 24H):  Temp:  [97.3 °F (36.3 °C)-98.5 °F (36.9 °C)]   Pulse:  [62-84]   Resp:  [17-18]   BP: (103-123)/(55-58)   SpO2:  [94 %-99 %]            Labs:   Recent Labs   Lab 10/27/20  0503 10/27/20  1241 10/27/20  1728 10/28/20  0513 10/29/20  0537   WBC 6.05  --   --  6.97 6.18   HGB 7.2*  --   --  8.8* 9.0*   HCT 24.2*  --   --  27.7* 28.4*   PLT 81*  --   --  111* 119*   *  --   --  102* 102*   RDW 18.0*  --   --  17.7* 17.5*   NA  --  134* 135* 135* 140   K  --  4.8 3.9 4.3 4.3   CL  --  103 101 104 106   CO2  --  20* 26 25 26   BUN  --  17 16 16 15   CREATININE  --  0.9 0.9 0.9 0.8   GLU  --  131* 121* 121* 112*   PROT  --  7.1  --  5.9* 6.0   ALBUMIN  --  2.2*  --  2.0* 2.1*   BILITOT  --  2.2*  --  2.0* 2.4*   AST  --  43*  --  19 21   ALKPHOS  --   452*  --  376* 401*   ALT  --  15  --  12 12      Recent Labs   Lab 10/26/20  1557   PREALBUMIN 8*   CRP 66.1*     Recent Labs   Lab 10/27/20  0503  10/27/20  1728 10/28/20  0513 10/29/20  0537   CALCIUM  --    < > 11.1* 10.2 9.8   MG 1.6  --   --  2.3 2.0   PHOS 3.4  --   --  2.9 2.3*    < > = values in this interval not displayed.     Cultures: none to date this admission    Scheduled Meds   calcitonin  4 Units/kg Intramuscular Daily    cinacalcet  90 mg Oral BID WM    dronabinoL  2.5 mg Oral BID AC    [START ON 10/30/2020] enoxaparin  40 mg Subcutaneous Q24H    ferrous gluconate  324 mg Oral Daily    furosemide (LASIX) IV  60 mg Intravenous Once    levothyroxine  175 mcg Oral Before breakfast    lidocaine (PF) 10 mg/ml (1%)  1 mL Other Once    polyethylene glycol  17 g Oral Daily    senna-docusate 8.6-50 mg  1 tablet Oral BID    sodium phosphate IVPB  39.99 mmol Intravenous Once    spironolactone  50 mg Oral Daily    ursodioL  300 mg Oral BID       Continuous Infusion   dextrose 5 % and 0.45 % NaCl with KCl 20 mEq 150 mL/hr at 10/28/20 2318    octreotide infusion (50 mcg/mL) 500 mcg/hr (10/28/20 2317)       PRN Meds  acetaminophen, albumin human 25%, albuterol-ipratropium, dextrose 50%, dextrose 50%, glucagon (human recombinant), glucose, glucose, insulin aspart U-100, melatonin, ondansetron, oxyCODONE, promethazine (PHENERGAN) IVPB, traMADoL     Assessment/Plan:  -octreotide drip at 500 mcg/hr, decrease to 250 mcg/hr  -IVF- D51/2NS w 20 KCL at 150 ml/hr, decrease to 75 ml/hr  -starting PPN at 50 ml/hr today, will decrease IVF to 25m;/hr when PPN starts  -Ca today 9.8  -T Bili trending up - 2.4  -ENT consulted for evaluation of vocal cords which shows normal cord mobility.   -cincalcet, lasix, spironolactone, IVF  -Patient abdominal distention appears bigger today, not SOB  -Venous sampling done today          Discharge Planning         ---     Anticipated Date of D/C:   TBD         Appointments:  Cory Rodriguez DO, FACP  - TBD    Dispo / Needs: Home    Nutrition: Regular    Home support system- TBD    Barriers to Care- none  ___________________________________________  TIMMY KhanN, RN, ONN-CG  Nurse Navigator [facility]    Tracie Weil Cavalier, GILLIANN, LDN, CNSC  Registered Dietitian [facility]      Face to Face Time: 15 minutes

## 2020-10-30 NOTE — PROGRESS NOTES
Pt has been here 5 days, states that she is uncomfortable on mattress, not able to turn herself well at this time due to illness. Pt has a large bony prominence at the coccyx area with very little fat protecting her.     Pulsate mattress overlay ordered, conf#  [id number]

## 2020-10-30 NOTE — PT/OT/SLP PROGRESS
Occupational Therapy   Treatment    Name: Ching Bruce  MRN: [id number]  Admitting Diagnosis:  <principal problem not specified>       Recommendations:     Discharge Recommendations: other (see comments)(TBD)  Discharge Equipment Recommendations:  none  Barriers to discharge:  None    Assessment:     Ching Bruce is a 69 y.o. female with a medical diagnosis of <principal problem not specified>.  Performance deficits affecting function are weakness, impaired endurance, impaired self care skills, impaired functional mobilty, gait instability, impaired balance, decreased upper extremity function, decreased lower extremity function, decreased safety awareness. Pt found in bed, agreeable to therapy.  Pt with improved performance and participation with therapy.  No complaints of pain throughout.  Progressing towards goals. Continue OT services to address functional goals, progressing as able.      Rehab Prognosis:  Good; patient would benefit from acute skilled OT services to address these deficits and reach maximum level of function.       Plan:     Patient to be seen 5 x/week to address the above listed problems via self-care/home management, therapeutic activities, therapeutic exercises  · Plan of Care Expires: 11/27/20  · Plan of Care Reviewed with: patient    Subjective     Pain/Comfort:  · Pain Rating 1: 0/10  · Pain Rating Post-Intervention 1: 0/10    Objective:     Communicated with: RN prior to session.  Patient found HOB elevated with peripheral IV, PureWick, telemetry, SCD, bed alarm upon OT entry to room.    General Precautions: Standard, fall   Orthopedic Precautions:N/A   Braces: N/A     Occupational Performance:     Bed Mobility:    · Patient completed Rolling/Turning to Left with  stand by assistance and with side rail  · Patient completed Scooting/Bridging with stand by assistance and scoot seated to EOB  · Patient completed Supine to Sit with minimum assistance, with side rail and   HOB elevated,  increased time and effort, vc's for effective technique      Functional Mobility/Transfers:  · Patient completed Sit <> Stand Transfer with moderate assistance  with  rolling walker and vc's for effective technique   · Patient completed Bed <> Chair Transfer using Stand Pivot technique with contact guard assistance and moderate assistance with rolling walker and Mod A for sit>stand  · Patient completed Toilet Transfer Stand Pivot technique with contact guard assistance and moderate assistance with  rolling walker, bedside commode and Mod A for sit>stand  · Functional Mobility: Pt ambulated ~10 feet to BSC then ~15 feet to bedside chair with CGA/Min A with RW.      Activities of Daily Living:  · Grooming: stand by assistance chair level  · Toileting: minimum assistance for clothing mgmt      [facility] 6 Click ADL: 16    Patient left up in chair with all lines intact, call button in reach, chair alarm on and RN notifiedEducation:      GOALS:   Multidisciplinary Problems     Occupational Therapy Goals        Problem: Occupational Therapy Goal    Goal Priority Disciplines Outcome Interventions   Occupational Therapy Goal     OT, PT/OT Ongoing, Progressing    Description: Goals to be met by: 11/27     Patient will increase functional independence with ADLs by performing:    UE Dressing with Stand-by Assistance.  LE Dressing with Minimal Assistance.  Grooming while seated at sink with Modified [place].  Toileting from bedside commode with Stand-by Assistance for hygiene and clothing management.   Toilet transfer to bedside commode with Stand-by Assistance.  Upper extremity exercise program x10 reps per handout, with independence.                     Time Tracking:     OT Date of Treatment: 10/30/20  OT Start Time: 0948  OT Stop Time: 1018  OT Total Time (min): 30 min    Billable Minutes:Self Care/Home Management 30    HELEN Worthington  10/30/2020

## 2020-10-30 NOTE — PLAN OF CARE
Problem: Occupational Therapy Goal  Goal: Occupational Therapy Goal  Description: Goals to be met by: 11/27     Patient will increase functional independence with ADLs by performing:    UE Dressing with Stand-by Assistance.  LE Dressing with Minimal Assistance.  Grooming while seated at sink with Modified [place].  Toileting from bedside commode with Stand-by Assistance for hygiene and clothing management.   Toilet transfer to bedside commode with Stand-by Assistance.  Upper extremity exercise program x10 reps per handout, with independence.    Outcome: Ongoing, Progressing     Ching Bruce is a 69 y.o. female with a medical diagnosis of <principal problem not specified>.  Performance deficits affecting function are weakness, impaired endurance, impaired self care skills, impaired functional mobilty, gait instability, impaired balance, decreased upper extremity function, decreased lower extremity function, decreased safety awareness. Pt found in bed, agreeable to therapy.  Pt with improved performance and participation with therapy.  No complaints of pain throughout.  Progressing towards goals. Continue OT services to address functional goals, progressing as able.    JOSE A Worthington/FREDDY

## 2020-10-30 NOTE — PLAN OF CARE
Patient is AAO X 4. Remains on IV fluids and Sandostatin infusion. PURE WICK in place with good output. On tele monitor shows NSR. Abdomen is very distended. Slept fairly well during night. Will continue to monitor patient.

## 2020-10-30 NOTE — PLAN OF CARE
Patient has poor leg strength when transferring.  Patient was a 2 person transfer with lots of encouragement needed from chair to bed.

## 2020-10-30 NOTE — PT/OT/SLP PROGRESS
Physical Therapy      Patient Name:  Ching Bruce   MRN:  [id number]  [id number]  Patient not seen today secondary to Unavailable (Comment)(pt out of room at US testing). Will follow-up     Zahra Harris, PT   10/30/2020

## 2020-10-30 NOTE — PROGRESS NOTES
Progress Note    Admit Date: 10/26/2020   LOS: 2 days   SUBJECTIVE:   Patient complaining of RUE swelling but denies tenderness  No complaints of pain   Poor appetite but tolerating diet without nausea or vomiting  No BM/flatus, voiding   OOB w/ PT    Scheduled Meds:   cinacalcet  90 mg Oral BID WM    dronabinoL  2.5 mg Oral BID AC    enoxaparin  40 mg Subcutaneous Q24H    ferrous gluconate  324 mg Oral Daily    furosemide (LASIX) IV  60 mg Intravenous BID loop    levothyroxine  175 mcg Oral Before breakfast    lidocaine (PF) 10 mg/ml (1%)  1 mL Other Once    polyethylene glycol  17 g Oral Daily    senna-docusate 8.6-50 mg  1 tablet Oral BID    spironolactone  50 mg Oral Daily    ursodioL  300 mg Oral BID     Continuous Infusions:   dextrose 5 % and 0.45 % NaCl with KCl 20 mEq 75 mL/hr at 10/30/20 0047    octreotide infusion (50 mcg/mL) 250 mcg/hr (10/30/20 0444)     PRN Meds:acetaminophen, albumin human 25%, albuterol-ipratropium, dextrose 50%, dextrose 50%, glucagon (human recombinant), glucose, glucose, insulin aspart U-100, melatonin, ondansetron, oxyCODONE, promethazine (PHENERGAN) IVPB, traMADoL    Review of patient's allergies indicates:   Allergen Reactions    Epinephrine Anaphylaxis and Other (See Comments)     Can cause Carcinoid Crisis    Sulfa (sulfonamide antibiotics) Other (See Comments)     Urticaria         OBJECTIVE:     Vital Signs (Most Recent)  Temp: 98.9 °F (37.2 °C) (10/30/20 0513)  Pulse: 85 (10/30/20 0513)  Resp: 17 (10/30/20 0513)  BP: 137/64 (10/30/20 0513)  SpO2: 99 % (10/29/20 2111)    Vital Signs Range (Last 24H):  Temp:  [97.5 °F (36.4 °C)-98.9 °F (37.2 °C)]   Pulse:  [67-85]   Resp:  [10-18]   BP: (104-140)/(54-65)   SpO2:  [96 %-100 %]     I & O (Last 24H):    Intake/Output Summary (Last 24 hours) at 10/30/2020 0635  Last data filed at 10/30/2020 0600  Gross per 24 hour   Intake 1275 ml   Output 1350 ml   Net -75 ml       Physical Exam:  Gen: NAD  Cardio: RR  Pulm:  Normal respiratory effort on room air  Abdomen: soft, nontender, distended  MSK: Moving all extremities, bilateral LE with edema, RUE swelling  Neuro: alert    LABS  CBC  Recent Labs   Lab 10/27/20  0503 10/28/20  0513 10/29/20  0537   WBC 6.05 6.97 6.18   RBC 2.22* 2.72* 2.78*   HGB 7.2* 8.8* 9.0*   HCT 24.2* 27.7* 28.4*   PLT 81* 111* 119*   * 102* 102*   MCH 32.4* 32.4* 32.4*   MCHC 29.8* 31.8* 31.7*     CMP  Recent Labs   Lab 10/27/20  1241 10/27/20  1728 10/28/20  0513 10/29/20  0537   * 121* 121* 112*   CALCIUM 11.2* 11.1* 10.2 9.8   ALBUMIN 2.2*  --  2.0* 2.1*   PROT 7.1  --  5.9* 6.0   * 135* 135* 140   K 4.8 3.9 4.3 4.3   CO2 20* 26 25 26    101 104 106   BUN 17 16 16 15   CREATININE 0.9 0.9 0.9 0.8   ALKPHOS 452*  --  376* 401*   ALT 15  --  12 12   AST 43*  --  19 21   BILITOT 2.2*  --  2.0* 2.4*       Recent Labs   Lab 10/27/20  0503  10/27/20  1728 10/28/20  0513 10/29/20  0537   CALCIUM  --    < > 11.1* 10.2 9.8   MG 1.6  --   --  2.3 2.0   PHOS 3.4  --   --  2.9 2.3*    < > = values in this interval not displayed.         POCT-Glucose  POCT Glucose   Date Value Ref Range Status   10/30/2020 101 70 - 110 mg/dL Final   10/29/2020 113 (H) 70 - 110 mg/dL Final   10/29/2020 109 70 - 110 mg/dL Final   10/29/2020 106 70 - 110 mg/dL Final   10/29/2020 110 70 - 110 mg/dL Final   10/28/2020 113 (H) 70 - 110 mg/dL Final   10/28/2020 104 70 - 110 mg/dL Final   10/28/2020 127 (H) 70 - 110 mg/dL Final   10/28/2020 117 (H) 70 - 110 mg/dL Final   10/27/2020 158 (H) 70 - 110 mg/dL Final   10/27/2020 102 70 - 110 mg/dL Final   10/27/2020 165 (H) 70 - 110 mg/dL Final       COAGS  No results for input(s): PT, INR, APTT in the last 168 hours.    CE  No results for input(s): TROPONINI, CPK, CPKMB in the last 168 hours.  BNP  No results for input(s): BNP in the last 168 hours.    ABGs  No results for input(s): PH, PCO2, PO2, HCO3, POCSATURATED, BE in the last 24 hours.    UA  No results for  input(s): COLORU, CLARITYU, SPECGRAV, PHUR, PROTEINUA, GLUCOSEU, BILIRUBINCON, BLOODU, WBCU, RBCU, BACTERIA, MUCUS, NITRITE, LEUKOCYTESUR, UROBILINOGEN, HYALINECASTS in the last 168 hours.    LAST HbA1c  No results found for: HGBA1C    ASSESSMENT/PLAN:   68 yo female with metastatic NET (dx 2012) s/p ERCP, sphincterectomy, and biliary stent placement with removal 9/22, hyperparathyroidism, parathyroid adenoma, and HFpEF (EF 60%, G1DD) who presents with recurrent symptomatic hypercalcemia s/p IR venous sampling.   -PTH from  which is the highest from the venous sampling study. Will follow up PTHrP results as they become available.   -cincalcet, lasix BID, spironolactone, IVF, follow up labs BID  -Regular diet. Dranabinol, nutritional supplements.  -PT/OT, encourage ambulation, deep breathing, cough, IS  -wound care on board for stage 1 pressure wound of lower back   -levothyroxine   -SSI, monitor glucose   -pain control as needed  dvt ppx: lovenox    Dispo:   The patient has hypercalcemia that is has failed maximal medical management with recurrent admissions and falls. It is not safe to discharge the patient home at this time and will likely need a surgery this admission. Surgical planning initiated. ENT consulted for evaluation of vocal cords which shows normal cord mobility. IR venous sampling with elevated PTH in RIJ, waiting for PTHrP results.     Navid Guerrero  [employer] General Surgery PGY-2

## 2020-10-31 NOTE — PLAN OF CARE
Problem: Adult Inpatient Plan of Care  Goal: Plan of Care Review  Outcome: Ongoing, Progressing  Virtual Nursing: Labs, notes and care plan reviewed

## 2020-10-31 NOTE — PLAN OF CARE
Recommendations    1. Encourage PO intake of meals and commercial beverages.     2. Daily weights.     Goals: Pt to recieve nutrition by RD follow up  Nutrition Goal Status: met  Communication of RD Recs: other (comment)(poc)

## 2020-10-31 NOTE — PROGRESS NOTES
Progress Note    Admit Date: 10/26/2020   LOS: 3 days   SUBJECTIVE:   Doing well overall  Electrolytes normalized  Tolerating diet       Scheduled Meds:   cinacalcet  90 mg Oral BID WM    dronabinoL  2.5 mg Oral BID AC    enoxaparin  40 mg Subcutaneous Q24H    ferrous gluconate  324 mg Oral Daily    furosemide (LASIX) IV  60 mg Intravenous BID loop    levothyroxine  175 mcg Oral Before breakfast    lidocaine (PF) 10 mg/ml (1%)  1 mL Other Once    polyethylene glycol  17 g Oral Daily    senna-docusate 8.6-50 mg  1 tablet Oral BID    spironolactone  50 mg Oral Daily    ursodioL  300 mg Oral BID     Continuous Infusions:   octreotide infusion (50 mcg/mL) 250 mcg/hr (10/31/20 0616)     PRN Meds:acetaminophen, albumin human 25%, albuterol-ipratropium, dextrose 50%, dextrose 50%, glucagon (human recombinant), glucose, glucose, insulin aspart U-100, melatonin, ondansetron, oxyCODONE, promethazine (PHENERGAN) IVPB, traMADoL    Review of patient's allergies indicates:   Allergen Reactions    Epinephrine Anaphylaxis and Other (See Comments)     Can cause Carcinoid Crisis    Sulfa (sulfonamide antibiotics) Other (See Comments)     Urticaria         OBJECTIVE:     Vital Signs (Most Recent)  Temp: 98.7 °F (37.1 °C) (10/31/20 0800)  Pulse: 78 (10/31/20 0807)  Resp: 17 (10/31/20 0807)  BP: (!) 121/57 (10/31/20 0800)  SpO2: 98 % (10/31/20 0807)    Vital Signs Range (Last 24H):  Temp:  [98.1 °F (36.7 °C)-98.7 °F (37.1 °C)]   Pulse:  [72-82]   Resp:  [12-18]   BP: (115-126)/(55-73)   SpO2:  [98 %-100 %]     I & O (Last 24H):    Intake/Output Summary (Last 24 hours) at 10/31/2020 0918  Last data filed at 10/31/2020 0600  Gross per 24 hour   Intake 1567.92 ml   Output 625 ml   Net 942.92 ml       Physical Exam:  Gen: NAD  Cardio: RR  Pulm: Normal respiratory effort on room air  Abdomen: soft, nontender, distended  MSK: Moving all extremities, bilateral LE with edema, RUE swelling  Neuro: alert    LABS  CBC  Recent Labs    Lab 10/29/20  0537 10/30/20  0838 10/31/20  0655   WBC 6.18 5.98 5.44   RBC 2.78* 2.76* 2.49*   HGB 9.0* 9.1* 8.0*   HCT 28.4* 28.4* 25.5*   * 121* 112*   * 103* 102*   MCH 32.4* 33.0* 32.1*   MCHC 31.7* 32.0 31.4*     CMP  Recent Labs   Lab 10/29/20  0537 10/30/20  0838 10/31/20  0655   * 101 92   CALCIUM 9.8 9.6 9.3   ALBUMIN 2.1* 2.3* 2.2*   PROT 6.0 6.5 6.1    138 138   K 4.3 4.0 3.9   CO2 26 25 26    104 102   BUN 15 14 15   CREATININE 0.8 0.8 0.8   ALKPHOS 401* 466* 464*   ALT 12 16 15   AST 21 26 19   BILITOT 2.4* 2.8* 2.5*       Recent Labs   Lab 10/29/20  0537 10/30/20  0838 10/31/20  0655   CALCIUM 9.8 9.6 9.3   MG 2.0 1.8 2.1   PHOS 2.3* 3.1 2.8         POCT-Glucose  POCT Glucose   Date Value Ref Range Status   10/31/2020 90 70 - 110 mg/dL Final   10/30/2020 95 70 - 110 mg/dL Final   10/30/2020 96 70 - 110 mg/dL Final   10/30/2020 149 (H) 70 - 110 mg/dL Final   10/30/2020 101 70 - 110 mg/dL Final   10/29/2020 113 (H) 70 - 110 mg/dL Final   10/29/2020 109 70 - 110 mg/dL Final   10/29/2020 106 70 - 110 mg/dL Final   10/29/2020 110 70 - 110 mg/dL Final   10/28/2020 113 (H) 70 - 110 mg/dL Final   10/28/2020 104 70 - 110 mg/dL Final   10/28/2020 127 (H) 70 - 110 mg/dL Final       COAGS  No results for input(s): PT, INR, APTT in the last 168 hours.    CE  No results for input(s): TROPONINI, CPK, CPKMB in the last 168 hours.  BNP  No results for input(s): BNP in the last 168 hours.    ABGs  No results for input(s): PH, PCO2, PO2, HCO3, POCSATURATED, BE in the last 24 hours.    UA  No results for input(s): COLORU, CLARITYU, SPECGRAV, PHUR, PROTEINUA, GLUCOSEU, BILIRUBINCON, BLOODU, WBCU, RBCU, BACTERIA, MUCUS, NITRITE, LEUKOCYTESUR, UROBILINOGEN, HYALINECASTS in the last 168 hours.    LAST HbA1c  No results found for: HGBA1C    ASSESSMENT/PLAN:   68 yo female with metastatic NET (dx 2012) s/p ERCP, sphincterectomy, and biliary stent placement with removal 9/22,  hyperparathyroidism, parathyroid adenoma, and HFpEF (EF 60%, G1DD) who presents with recurrent symptomatic hypercalcemia s/p IR venous sampling.   -PTH localized to right neck   -cincalcet, lasix BID, spironolactone  -Regular diet. Dranabinol, nutritional supplements.  -PT/OT, encourage ambulation, deep breathing, cough, IS  -wound care on board for stage 1 pressure wound of lower back   -levothyroxine   -SSI, monitor glucose   -pain control as needed  dvt ppx: lovenox     Dispo:   Surgical planning for right PT nodule     Victor M Matthews MD  [employer] Surgery, PGY-4  10/31/2020

## 2020-10-31 NOTE — PLAN OF CARE
Problem: Physical Therapy Goal  Goal: Physical Therapy Goal  Description: Goals to be met by: 11/27/2020     Patient will increase functional independence with mobility by performing:    Supine<>sit SPVN  Sit<>stand CGA  Step transfer bed to bedside chair with RW CGA  Ambulate 30 feet within room with RW and CGA/min A  Up in chair x 2 hours  LE TE X 10 reps BLE    Outcome: Ongoing, Progressing   Pt continues to work to achieve all goals. Able to complete ~4-5 turning steps from EOB to sit in bed side chair with RW requiring min/mod A.

## 2020-10-31 NOTE — PLAN OF CARE
Pt remains A+Ox4. IV braden to PIV. Tolerating small amts diet, tolerating boost, pt encouraged to increase intake. Pt remains in NSR. Air mattress in place. Tolerated some ambulation with PT. Safety precautions maintained.

## 2020-10-31 NOTE — PLAN OF CARE
Patient is AAO X 4. Remains on IV fluids and Sandostatin infusion. PURE WICK in place with good output. On tele monitor shows NSR. Abdomen is very distended. Slept fairly well during night. Will continue to monitor patient

## 2020-10-31 NOTE — PT/OT/SLP PROGRESS
Physical Therapy Treatment    Patient Name:  Ching Bruce   MRN:  [id number]    Recommendations:     Discharge Recommendations:  (TBD Pending, Need to determine daughter's ability to provide supervision and physical assistance.)   Discharge Equipment Recommendations: none   Barriers to discharge:  Unknown level of support daughter is able to provide at this time    Assessment:     Ching Bruce is a 69 y.o. female admitted with a medical diagnosis of <principal problem not specified>.  She presents with the following impairments/functional limitations:  weakness, impaired endurance, impaired self care skills, gait instability, impaired balance, impaired functional mobilty, decreased coordination, decreased upper extremity function, decreased lower extremity function, decreased safety awareness, decreased ROM, impaired coordination, edema. Pt able to perform 1 sit to stand transfer trial from EOB with RW requiring max A. Able to take ~4-5 turning steps from EOB to B/S chair with RW requiring min/mod A. Would benefit from continued PT services to increase pt's out of bed therapeutic activity and exercises addressing all impairments listed above.    Rehab Prognosis: Good and Fair; patient would benefit from acute skilled PT services to address these deficits and reach maximum level of function.    Recent Surgery: * No surgery found *      Plan:     During this hospitalization, patient to be seen 6 x/week to address the identified rehab impairments via gait training, therapeutic activities, therapeutic exercises and progress toward the following goals:    · Plan of Care Expires:  11/27/20    Subjective     Chief Complaint:  None Expressed  Patient/Family Comments/goals: None Expressed  Pain/Comfort:  · Pain Rating 1: 0/10  · Pain Rating Post-Intervention 1: 0/10      Objective:     Communicated with nurse prior to session.  Patient found supine with bed alarm, PureWick, peripheral IV, telemetry upon PT entry to room.      General Precautions: Standard, fall   Orthopedic Precautions:N/A   Braces: N/A     Functional Mobility:  · Bed Mobility:     · Rolling Left:  stand by assistance, contact guard assistance and  pt using bed rail  · Rolling Right: stand by assistance, contact guard assistance and  pt using bed rail  · Scooting: stand by assistance and  to EOB and to edge of B/S chair  · Supine to Sit: moderate assistance and maximal assistance  · Transfers:     · Sit to Stand:  maximal assistance with rolling walker  · Gait:  ~4-5 turning steps form EOB to B/S chair with RW requiring min/mod A      AM-PAC 6 CLICK MOBILITY  Turning over in bed (including adjusting bedclothes, sheets and blankets)?: 3  Sitting down on and standing up from a chair with arms (e.g., wheelchair, bedside commode, etc.): 2  Moving from lying on back to sitting on the side of the bed?: 2  Moving to and from a bed to a chair (including a wheelchair)?: 2  Need to walk in hospital room?: 2  Climbing 3-5 steps with a railing?: 1  Basic Mobility Total Score: 12       Therapeutic Activities and Exercises:   Pt had pure wick and adult diaper diaper donned. PTA noticed urine throughout pure wick tube. Pt urinated and pure wick did not suction urine. Nursing called into room. Pure wick was not hooked up correctly. Pt cleaned and changed with clean adult diaper and pure wick in place. Able to perform ~4-5 turning steps to sit in B/S chair with RW and min/mod A. Ambulates to chair with increased trunk flexion requiring verbal/tactile cueing to correct. Attempted to perform sit to stand from B/S chair with pt only able to clear buttocks off of chair but unable to come to a full upright position. Bilateral knees staying in flexion. Returned to sitting and requested to stay in B/S chair. Friend present throughout session and assisted PTA with changing and cleaning.    Patient left up in chair with all lines intact, call button in reach, chair alarm on,  nurse notified  and  friend present..    GOALS:   Multidisciplinary Problems     Physical Therapy Goals        Problem: Physical Therapy Goal    Goal Priority Disciplines Outcome Goal Variances Interventions   Physical Therapy Goal     PT, PT/OT Ongoing, Progressing     Description: Goals to be met by: 11/27/2020     Patient will increase functional independence with mobility by performing:    Supine<>sit SPVN  Sit<>stand CGA  Step transfer bed to bedside chair with RW CGA  Ambulate 30 feet within room with RW and CGA/min A  Up in chair x 2 hours  LE TE X 10 reps BLE                     Time Tracking:     PT Received On: 10/31/20  PT Start Time: 1243     PT Stop Time: 1328  PT Total Time (min): 45 min     Billable Minutes: Gait Training  15 and Therapeutic Activity  30    Treatment Type: Treatment  PT/PTA: PTA     PTA Visit Number: 2     Brandee Preciado, PTA  10/31/2020

## 2020-10-31 NOTE — CONSULTS
"  [facility]  Adult Nutrition  Consult Note    SUMMARY     Recommendations    1. Encourage PO intake of meals and commercial beverages.     2. Daily weights.     Goals: Pt to recieve nutrition by RD follow up  Nutrition Goal Status: met  Communication of RD Recs: other (comment)(poc)    Reason for Assessment    Reason For Assessment: RD follow-up  Diagnosis: other (see comments)(hypercalcemia)  Relevant Medical History: HT, kidney insufficiency, thyroid disease, anemia, HLD, secondary malignant neoplasm of liver, primary pancreatic neuroendocrine tumor, hypercalcemia, thrombocytopenia, chemotherapy  Interdisciplinary Rounds: attended  General Information Comments: Pt in room with family member, NPO for procedure today. Pt to be started on PPN today, not started yet. Pt currently recieving D5 at 75 ml/hr. Previously on regular diet with Boost Plus Strawberry TID. Pt reports feeling a little bit hungry, denies N/V/D/C at this time. Ruddy Score: 16 PIV in place. NFPE completed 10/27: 10/27 moderate subcutaneous fat loss and muscle wasting   10.31.20- Pt already being followed by RD. Consulted today for malnutrition, NFPE completed 10.27.20. +7lbs since last note. 25-50% of PO intake.   Nutrition Discharge Planning: d/c needs to be determined    Nutrition Risk Screen    Nutrition Risk Screen: no indicators present    Nutrition/Diet History    Food Preferences: no Confucianism or cultural food prefs  Spiritual, Cultural Beliefs, Adventist Practices, Values that Affect Care: no  Food Allergies: NKFA  Factors Affecting Nutritional Intake: difficulty/impaired swallowing, altered taste, decreased appetite    Anthropometrics    Temp: 98.5 °F (36.9 °C)  Height Method: Stated  Height: 5' 7" (170.2 cm)  Height (inches): 67 in  Weight Method: Bed Scale  Weight: 69.7 kg (153 lb 10.6 oz)  Weight (lb): 153.66 lb  Ideal Body Weight (IBW), Female: 135 lb  % Ideal Body Weight, Female (lb): 108.6 %  BMI (Calculated): " 24.1  BMI Grade: 18.5-24.9 - normal     Lab/Procedures/Meds    Pertinent Labs Reviewed: reviewed  Pertinent Labs Comments: H/H 9.0/28.4, Glu 112, Phos 2.3, Alk Phos 401, Alb 2.1, Bili 2.4   Pertinent Medications Reviewed: reviewed  Pertinent Medications Comments: cinacalcet, dronbinol, enoxaparin, ferrous gluconate, levothyroxine, poly glycol, senna doscusate, Na phos, spironolactone, ursodiol, D5 and NaCl w KCl, octreotide    Estimated/Assessed Needs    Weight Used For Calorie Calculations: 66.5 kg (146 lb 9.7 oz)  Energy Calorie Requirements (kcal): 1995(x 30 kcal/kg)  Energy Need Method: Kcal/kg  Protein Requirements: 80 g(x 1.2 g/kg)  Weight Used For Protein Calculations: 66.5 kg (146 lb 9.7 oz)     Estimated Fluid Requirement Method: RDA Method  RDA Method (mL): 1995    Nutrition Prescription Ordered    Current Diet Order: NPO  Oral Nutrition Supplement: Boost Plus TID    Evaluation of Received Nutrient/Fluid Intake    Energy Calories Required: not meeting needs  Protein Required: not meeting needs  Fluid Required: not meeting needs  Comments: LBM 10/31  % Intake of Estimated Energy Needs: 25 - 50 %  % Meal Intake: 25 - 50 %    Nutrition Risk    Level of Risk/Frequency of Follow-up: (2xweekly)     Assessment and Plan    Moderate malnutrition  Nutrition Problem:  Moderate Protein-Calorie Malnutrition  Malnutrition in the context of Chronic Illness/Injury    Related to (etiology):  Cancer/decreased appetite    Signs and Symptoms (as evidenced by):  Energy Intake: <75% of estimated energy requirement for at least 1 week  Body Fat Depletion: moderate depletion of orbitals, triceps and thoracic and lumbar region   Muscle Mass Depletion: moderate depletion of temples, scapular region, interosseous muscle and lower extremities and severe depletion of clavicles   Weight Loss: not significant     Interventions(treatment strategy):  Commercial beverage  Collaboration with other providers    Nutrition Diagnosis  Status:  Continues    Monitor and Evaluation    Food and Nutrient Intake: energy intake  Food and Nutrient Adminstration: diet order  Physical Activity and Function: nutrition-related ADLs and IADLs  Anthropometric Measurements: weight  Biochemical Data, Medical Tests and Procedures: glucose/endocrine profile, electrolyte and renal panel  Nutrition-Focused Physical Findings: overall appearance     Malnutrition Assessment     Ruddy Score: 19  Energy Intake (Malnutrition): less than 75% for greater than 7 days   Orbital Region (Subcutaneous Fat Loss): moderate depletion  Upper Arm Region (Subcutaneous Fat Loss): moderate depletion  Thoracic and Lumbar Region: moderate depletion   [place] Region (Muscle Loss): moderate depletion  Clavicle Bone Region (Muscle Loss): severe depletion  Dorsal Hand (Muscle Loss): moderate depletion       Subcutaneous Fat Loss (Final Summary): moderate protein-calorie malnutrition  Muscle Loss Evaluation (Final Summary): moderate protein-calorie malnutrition      Nutrition Follow-Up    RD Follow-up?: Yes

## 2020-11-01 NOTE — PLAN OF CARE
Pt AAOx4, denies pain, n/v/d and SOB. Regular diet tolerated poorly, pt drinking sips of boost with meds. Purewick catheter in place, pt voiding clear yellow urine without difficulties. Cornell infusing per MAR to PIV. Air mattress and AVASYS camera monitoring in use. Cardiac monitoring and blood glucose checks continued. Safety maintained, bed alarm on - will cont to monitor.

## 2020-11-01 NOTE — PROGRESS NOTES
GENERAL SURGERY  PROGRESS NOTE    Admit Date: 10/26/2020  Hospital Day: 4    No acute events overnight.  PTHrP continues to be pending.      Physical Exam:  Gen: NAD  Cardio: RR  Pulm: Normal respiratory effort on room air  Abdomen: soft, nontender, distended  MSK: Moving all extremities, bilateral LE with edema, RUE swelling  Neuro: alert      Inpatient Data      Vitals:   Temp:  [98.5 °F (36.9 °C)-99 °F (37.2 °C)]   Pulse:  [73-89]   Resp:  [18-20]   BP: (100-125)/(51-60)   SpO2:  [94 %-97 %]     Diet Adult Regular (IDDSI Level 7)   Intake/Output Summary (Last 24 hours) at 11/1/2020 0944  Last data filed at 11/1/2020 0836  Gross per 24 hour   Intake 215 ml   Output 300 ml   Net -85 ml          Recent Labs     10/30/20  0838 10/31/20  0655 11/01/20  0528 11/01/20  0529   WBC 5.98 5.44 5.73  --    HGB 9.1* 8.0* 7.9*  --    HCT 28.4* 25.5* 24.4*  --    * 112* 117*  --     138 139  --    K 4.0 3.9 3.4*  --     102 102  --    CO2 25 26 29  --    BUN 14 15 18  --    CREATININE 0.8 0.8 1.0  --    BILITOT 2.8* 2.5* 2.3*  --    AST 26 19 16  --    ALT 16 15 12  --    ALKPHOS 466* 464* 435*  --    CALCIUM 9.6 9.3 9.6  --    ALBUMIN 2.3* 2.2* 2.4*  --    PROT 6.5 6.1 6.1  --    MG 1.8 2.1  --  1.8   PHOS 3.1 2.8  --  2.8     Scheduled Meds: cinacalcet, 90 mg, BID WM  dronabinoL, 2.5 mg, BID AC  enoxaparin, 40 mg, Q24H  ferrous gluconate, 324 mg, Daily  furosemide (LASIX) IV, 60 mg, BID loop  levothyroxine, 175 mcg, Before breakfast  lidocaine (PF) 10 mg/ml (1%), 1 mL, Once  polyethylene glycol, 17 g, Daily  senna-docusate 8.6-50 mg, 1 tablet, BID  spironolactone, 50 mg, Daily  ursodioL, 300 mg, BID       octreotide infusion (50 mcg/mL) 250 mcg/hr (11/01/20 0249)     70 yo female with metastatic NET (dx 2012) s/p ERCP, sphincterectomy, and biliary stent placement with removal 9/22, hyperparathyroidism, parathyroid adenoma, and HFpEF (EF 60%, G1DD) who presents with recurrent symptomatic hypercalcemia s/p  IR venous sampling.   -PTH localized to right neck   -cincalcet, lasix BID, spironolactone  -Regular diet. Dranabinol, nutritional supplements.  -PT/OT, encourage ambulation, deep breathing, cough, IS  -wound care on board for stage 1 pressure wound of lower back   -levothyroxine   -SSI, monitor glucose   -pain control as needed  dvt ppx: lovenox      Dispo:   Surgical planning for right PT nodule     Abraham Lantigua MD  PGY IV

## 2020-11-02 NOTE — PLAN OF CARE
Remains on continuous Sandostatin infusion 5 ml per hour. Kept her NPO for U/S abdomen today. Patient says she did not rest well so she is requesting not to have PT session today.

## 2020-11-02 NOTE — PROGRESS NOTES
Patient c/o of itching, blisters appear on RUQ. MD Renee notified. MD Renee assessing patient at bedside. States will take pictures to record  and to continue to monitor.

## 2020-11-02 NOTE — PLAN OF CARE
Pt remains A+Ox4. Small amt diet tolerated, pt encouraged to increase intake/boost. Voiding clear yellow urine via purewick. IV braden to PIV. Air mattress in place. Safety precautions maintained.

## 2020-11-02 NOTE — PT/OT/SLP PROGRESS
Occupational Therapy      Patient Name:  Ching Bruce   MRN:  [id number]    Patient not seen today secondary to Patient unwilling to participate(AM 1038: Pt pleasantly declined therapy secondary to not getting any sleep last night and is awaiting US this am.  PM 1259: Pt refused secondary to burning pain on R abdomen.  RN notified and aware.  Blisters present). Will follow-up at later time.    HELEN Worthington  11/2/2020

## 2020-11-02 NOTE — PT/OT/SLP PROGRESS
"Physical Therapy  Visit Attempt    Patient Name:  Ching Bruce   MRN:  [id number]    Patient not seen today secondary to (pt deferring therapy at this time, stating "I told them I can't do therapy today, I didn't have a good night" - despite max encouragement, pt still deferring participation in any therapy). Will follow-up.    Zenaida Horowitz, PT   11/2/2020      "

## 2020-11-02 NOTE — NURSING
Patient's care in IR is complete after paracentesis. 3900 mL cloudy yellow fluid removed. Sterile band-aid applied to puncture site, clean, dry and intact. VSS. See flowsheets. Report called to Delaney QUINTERO RN. Patient will be accompanied by transport back to her room via bed.

## 2020-11-02 NOTE — PLAN OF CARE
pt's case discussed in [facility] Meeting   pt underwent paracentesis today - 4 L off;     pt found to have extensive mets --- MD to meet with pt to discuss goals of care     pt transferred from [facility]   10/19-25   dx:  Pancreatic Neuroendocrine    current with [facility]  -- pt info sent per [facility] -- awaiting orders   pt has a RW, SC and BSC     daughter Isabelle  [phone];   Rocio (lives in [place])    lives with daughter Amee        pt has transportation to home at discharge           11/02/20 1647   Discharge Reassessment   Assessment Type Discharge Planning Reassessment

## 2020-11-02 NOTE — CONSULTS
Interventional Radiology Consult/Pre-Procedure Note      Chief Complaint/Reason for Consult: Ascites    History of Present Illness:  Ching Bruce is a 69 y.o. female with the PMH listed below who presents with recurrent ascitesi nthe setting opf NET. IR consulted for paracentesis.    Admission H&P reviewed.    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    Secondary malignant neoplasm of liver     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) 5/7/2013    Thrombocytopenia 11/12/2013    Thyroid disease     Unspecified essential hypertension 11/12/2013     Past Surgical History:   Procedure Laterality Date    ERCP N/A 6/21/2018    Procedure: ERCP, stent exchange;  Surgeon: Jake Lieberman MD;  Location: [facility];  Service: Endoscopy;  Laterality: N/A;    ERCP N/A 5/23/2019    Procedure: ERCP (ENDOSCOPIC RETROGRADE CHOLANGIOPANCREATOGRAPHY), stent exchange;  Surgeon: Jake Lieberman MD;  Location: [facility];  Service: Endoscopy;  Laterality: N/A;    ERCP N/A 11/14/2019    Procedure: ERCP (ENDOSCOPIC RETROGRADE CHOLANGIOPANCREATOGRAPHY), stent exchange;  Surgeon: Jake Lieberman MD;  Location: [facility];  Service: Endoscopy;  Laterality: N/A;    ERCP      ERCP N/A 9/22/2020    Procedure: ERCP (ENDOSCOPIC RETROGRADE CHOLANGIOPANCREATOGRAPHY);  Surgeon: Abdulaziz Owen MD;  Location: [facility];  Service: Endoscopy;  Laterality: N/A;     THYROIDECTOMY  2011       Allergies:   Review of patient's allergies indicates:   Allergen Reactions    Epinephrine Anaphylaxis and Other (See Comments)     Can cause Carcinoid Crisis    Sulfa (sulfonamide antibiotics) Other (See Comments)     Urticaria       Scheduled Meds:    cinacalcet  90 mg Oral BID WM    dronabinoL  2.5 mg Oral BID AC    enoxaparin  40 mg Subcutaneous Q24H    ferrous gluconate  324 mg Oral Daily    furosemide (LASIX) IV  60 mg Intravenous BID loop    gabapentin  300 mg Oral TID    levothyroxine  175 mcg Oral Before breakfast    lidocaine (PF) 10 mg/ml (1%)  1 mL Other Once    polyethylene glycol  17 g Oral Daily    potassium phosphate IVPB  20 mmol Intravenous Once    senna-docusate 8.6-50 mg  1 tablet Oral BID    spironolactone  50 mg Oral Daily    ursodioL  300 mg Oral BID     Continuous Infusions:    octreotide infusion (50 mcg/mL) 250 mcg/hr (11/01/20 2309)     PRN Meds:acetaminophen, albumin human 25%, albuterol-ipratropium, dextrose 50%, dextrose 50%, glucagon (human recombinant), glucose, glucose, insulin aspart U-100, melatonin, ondansetron, oxyCODONE, promethazine (PHENERGAN) IVPB, traMADoL    Anticoagulation/Antiplatelet Meds: Lovenox    Review of Systems:   As documented in admission H&P.    Physical Exam:  Temp: 98.8 °F (37.1 °C) (11/02/20 1148)  Pulse: 88 (11/02/20 1315)  Resp: 18 (11/02/20 1315)  BP: 137/63 (11/02/20 1315)  SpO2: 98 % (11/02/20 1315)     General: NAD  HEENT: Normocephalic, sclera anicteric, oropharynx clear  Heart: RRR  Lungs: Symmetric excursions, breathing unlabored  Abd: NTND, soft  Extremities: CORBETT, BLE edema  Pulses: 2+ DP b/l  Neuro: Gross nonfocal    Labs:  Recent Labs   Lab 11/01/20  0529   INR 1.3*       Recent Labs   Lab 11/02/20  1030   WBC 5.99   HGB 7.8*   HCT 24.6*   *   *      Recent Labs   Lab 11/02/20  1030   GLU 99      K 2.8*      CO2 29   BUN 20   CREATININE 1.1   CALCIUM 10.1   MG 1.8   ALT 11   AST  16   ALBUMIN 2.8*   BILITOT 2.6*       Assessment/Plan:   Recurrent ascites. Will undergo paracentesis today    Sedation: None    Risks (including, but not limited to, pain, bleeding, infection, damage to nearby structures, failure, and the need for additional procedures), benefits, and alternatives were discussed with the patient. All questions were answered to the best of my abilities. The patient wishes to proceed. Written informed consent was obtained.      Colt Bonner MD  [facility]  Pager [phone]

## 2020-11-02 NOTE — PROGRESS NOTES
GENERAL SURGERY  PROGRESS NOTE    Admit Date: 10/26/2020    Patient with CT chest yesterday with innumerable pulmonary nodules consistent with metastatic disease.      Physical Exam:  Gen: NAD  Cardio: RR  Pulm: Normal respiratory effort on room air  Abdomen: soft, nontender, distended  MSK: Moving all extremities, bilateral LE with edema, RUE swelling  Neuro: alert          Inpatient Data      Vitals:   Temp:  [98.2 °F (36.8 °C)-99.2 °F (37.3 °C)]   Pulse:  [79-90]   Resp:  [17-20]   BP: (100-127)/(52-76)   SpO2:  [95 %-97 %]     Diet NPO   Intake/Output Summary (Last 24 hours) at 11/2/2020 0800  Last data filed at 11/2/2020 0614  Gross per 24 hour   Intake 350 ml   Output 700 ml   Net -350 ml          Recent Labs     10/31/20  0655 11/01/20  0528 11/01/20  0529   WBC 5.44 5.73  --    HGB 8.0* 7.9*  --    HCT 25.5* 24.4*  --    * 117*  --     139  --    K 3.9 3.4*  --     102  --    CO2 26 29  --    BUN 15 18  --    CREATININE 0.8 1.0  --    BILITOT 2.5* 2.3*  --    AST 19 16  --    ALT 15 12  --    ALKPHOS 464* 435*  --    CALCIUM 9.3 9.6  --    ALBUMIN 2.2* 2.4*  --    PROT 6.1 6.1  --    MG 2.1  --  1.8   PHOS 2.8  --  2.8     Scheduled Meds: cinacalcet, 90 mg, BID WM  dronabinoL, 2.5 mg, BID AC  enoxaparin, 40 mg, Q24H  ferrous gluconate, 324 mg, Daily  furosemide (LASIX) IV, 60 mg, BID loop  levothyroxine, 175 mcg, Before breakfast  lidocaine (PF) 10 mg/ml (1%), 1 mL, Once  polyethylene glycol, 17 g, Daily  senna-docusate 8.6-50 mg, 1 tablet, BID  spironolactone, 50 mg, Daily  ursodioL, 300 mg, BID       octreotide infusion (50 mcg/mL) 250 mcg/hr (11/01/20 9118)     68 yo female with metastatic NET (dx 2012) s/p ERCP, sphincterectomy, and biliary stent placement with removal 9/22, hyperparathyroidism, parathyroid adenoma, and HFpEF (EF 60%, G1DD) who presents with recurrent symptomatic hypercalcemia s/p IR venous sampling.   -PTH localized to right neck from selective venous sampling  -CT  of the chest shows innumerable pulmonary nodules  -cincalcet, lasix BID, spironolactone  -NPO for possible paracentesis today  -PT/OT, encourage ambulation, deep breathing, cough, IS  -wound care on board for stage 1 pressure wound of lower back   -levothyroxine   -SSI, monitor glucose   -pain control as needed  dvt ppx: lovenox      Dispo:   Surgical planning for right PT nodule, likely 11/3     Abraham Lantigua MD  PGY IV

## 2020-11-03 NOTE — PLAN OF CARE
Problem: Physical Therapy Goal  Goal: Physical Therapy Goal  Description: Goals to be met by: 11/27/2020     Patient will increase functional independence with mobility by performing:    Supine<>sit SPVN  Sit<>stand CGA  Step transfer bed to bedside chair with RW CGA  Ambulate 30 feet within room with RW and CGA/min A  Up in chair x 2 hours  LE TE X 10 reps BLE    Outcome: Ongoing, Progressing

## 2020-11-03 NOTE — ASSESSMENT & PLAN NOTE
She was discussed in tumor board this morning and her images were reviewed.  She has widespread disease within the liver with recurrent ascites and elevated bilirubin.  Given her poor performance status, ECOG PS 3, along with laboratory abnormalities I do not think further systemic treatment would be beneficial at this time.  I have discussed this with her and her daughter in detail today and also introduced the concept of hospice.  They do wish to think about this further and see if her abnormalities improve but I would recommend we consult palliative Care for symptom management and consideration of transition to hospice.

## 2020-11-03 NOTE — PHYSICIAN QUERY
PT Name: Ching Bruce  MR #: [id number]     ACUITY OF CONDITION CLARIFICATION      CDS: Brandy Capley, RN  Email: [email or web address]    This form is a permanent document in the medical record.     Query Date: November 3, 2020    By submitting this query, we are merely seeking further clarification of documentation to reflect the severity of illness of your patient. Please utilize your independent clinical judgment when addressing the question(s) below.    The Medical record reflects the following:     Indicators   Supporting Clinical Findings Location in Medical Record   X Documentation of condition   Cirrhosis/liver failure presents challenge to surgical intervention     Neuroendocrine progress notes 11/2   X Lab Value(s)    10/26/2020 03:10   Alkaline Phosphatase 435 (H)   PROTEIN TOTAL 6.1   Albumin 2.0 (L)   BILIRUBIN TOTAL 2.1 (H)      Labs 10/26    Radiology Findings      Treatment/Medication     X Other Ching Bruce is a 69 y.o. female with the PMH listed below who presents with recurrent ascitesi nthe setting opf NET. IR consulted for paracentesis.    68 yo female with metastatic NET (dx 2012) s/p ERCP, sphincterectomy, and biliary stent placement with removal 9/22, hyperparathyroidism, parathyroid adenoma, and HFpEF (EF 60%, G1DD) who presents with recurrent symptomatic hypercalcemia s/p IR venous sampling.    Radiology consult 11/2      Provider, please specify the acuity/chronicity of _____liver failure_______________:    [ x  ] Chronic     [   ] Other (please specify):  ___________________   [   ] Clinically Undetermined       Please document in your progress notes daily for the duration of treatment until resolved, and include in your discharge summary.

## 2020-11-03 NOTE — PLAN OF CARE
Problem: Adult Inpatient Plan of Care  Goal: Chart reviewed  Outcome: Ongoing, Progressing   Care plan updated

## 2020-11-03 NOTE — PLAN OF CARE
Problem: Occupational Therapy Goal  Goal: Occupational Therapy Goal  Description: Goals to be met by: 11/27     Patient will increase functional independence with ADLs by performing:    UE Dressing with Stand-by Assistance.  LE Dressing with Minimal Assistance.  Grooming while seated at sink with Modified [place].  Toileting from bedside commode with Stand-by Assistance for hygiene and clothing management.   Toilet transfer to bedside commode with Stand-by Assistance.  Upper extremity exercise program x10 reps per handout, with independence.    Outcome: Ongoing, Progressing     Ching Bruce is a 69 y.o. female with a medical diagnosis of <principal problem not specified>.  Performance deficits affecting function are weakness, impaired endurance, impaired self care skills, impaired functional mobilty, gait instability, impaired balance, decreased upper extremity function, decreased lower extremity function, decreased safety awareness, impaired skin. Pt found in bed, agreeable to therapy.  RN ok'ed for OOB.  BP improved.  Pt requires Min A for bed mobility and Mod/Max A to transfer <>BSC secondary to weakness and posterior lean.  Tolerated xt fairly well. Continue OT services to address functional goals, progressing as able.    JOSE A Worthington/FREDDY

## 2020-11-03 NOTE — SUBJECTIVE & OBJECTIVE
Oncology Treatment Plan:   [No treatment plan]    Medications:  Continuous Infusions:   octreotide infusion (50 mcg/mL) 250 mcg/hr (11/03/20 0330)     Scheduled Meds:   cinacalcet  90 mg Oral BID WM    dronabinoL  2.5 mg Oral BID AC    enoxaparin  40 mg Subcutaneous Q24H    ferrous gluconate  324 mg Oral Daily    furosemide (LASIX) IV  60 mg Intravenous BID loop    gabapentin  300 mg Oral TID    levothyroxine  175 mcg Oral Before breakfast    lidocaine (PF) 10 mg/ml (1%)  1 mL Other Once    polyethylene glycol  17 g Oral Daily    potassium phosphate IVPB  20 mmol Intravenous Q6H MAGDALENA    senna-docusate 8.6-50 mg  1 tablet Oral BID    spironolactone  50 mg Oral Daily    ursodioL  300 mg Oral BID     PRN Meds:acetaminophen, albumin human 25%, albuterol-ipratropium, dextrose 50%, dextrose 50%, glucagon (human recombinant), glucose, glucose, insulin aspart U-100, melatonin, ondansetron, oxyCODONE, promethazine (PHENERGAN) IVPB, traMADoL     Review of patient's allergies indicates:   Allergen Reactions    Epinephrine Anaphylaxis and Other (See Comments)     Can cause Carcinoid Crisis    Sulfa (sulfonamide antibiotics) Other (See Comments)     Urticaria        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    Secondary malignant neoplasm of liver     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) 5/7/2013    Thrombocytopenia 11/12/2013     Thyroid disease     Unspecified essential hypertension 11/12/2013     Past Surgical History:   Procedure Laterality Date    ERCP N/A 6/21/2018    Procedure: ERCP, stent exchange;  Surgeon: Jake Lieberman MD;  Location: [facility];  Service: Endoscopy;  Laterality: N/A;    ERCP N/A 5/23/2019    Procedure: ERCP (ENDOSCOPIC RETROGRADE CHOLANGIOPANCREATOGRAPHY), stent exchange;  Surgeon: Jake Lieberman MD;  Location: [facility] END[facility];  Service: Endoscopy;  Laterality: N/A;    ERCP N/A 11/14/2019    Procedure: ERCP (ENDOSCOPIC RETROGRADE CHOLANGIOPANCREATOGRAPHY), stent exchange;  Surgeon: Jake Lieberman MD;  Location: [facility] END[facility];  Service: Endoscopy;  Laterality: N/A;    ERCP      ERCP N/A 9/22/2020    Procedure: ERCP (ENDOSCOPIC RETROGRADE CHOLANGIOPANCREATOGRAPHY);  Surgeon: Abdulaziz Owen MD;  Location: [facility];  Service: Endoscopy;  Laterality: N/A;    THYROIDECTOMY  2011     Family History     Problem Relation (Age of Onset)    Cancer Maternal Grandmother    Diabetes Father        Tobacco Use    Smoking status: Never Smoker    Smokeless tobacco: Never Used   Substance and Sexual Activity    Alcohol use: No    Drug use: No    Sexual activity: Not on file       Review of Systems   Constitutional: Positive for fatigue and unexpected weight change. Negative for chills and fever.   HENT: Negative for congestion, hearing loss and nosebleeds.    Eyes: Negative for visual disturbance.   Respiratory: Negative for cough and shortness of breath.    Cardiovascular: Positive for leg swelling. Negative for chest pain and palpitations.   Gastrointestinal: Positive for abdominal distention. Negative for abdominal pain, blood in stool, constipation, diarrhea, nausea and vomiting.   Genitourinary: Negative for dysuria.   Musculoskeletal: Negative for back pain and gait problem.   Skin: Negative for color change and rash.   Neurological: Negative for dizziness, weakness and headaches.   Hematological: Negative for  adenopathy. Does not bruise/bleed easily.   Psychiatric/Behavioral: Negative for confusion.     Objective:     Vital Signs (Most Recent):  Temp: 98.4 °F (36.9 °C) (11/03/20 1242)  Pulse: 69 (11/03/20 1542)  Resp: 16 (11/03/20 1242)  BP: (!) 101/51 (11/03/20 1242)  SpO2: 100 % (11/03/20 1530) Vital Signs (24h Range):  Temp:  [98.2 °F (36.8 °C)-98.4 °F (36.9 °C)] 98.4 °F (36.9 °C)  Pulse:  [68-81] 69  Resp:  [16-20] 16  SpO2:  [95 %-100 %] 100 %  BP: ()/(44-58) 101/51     Weight: 71.3 kg (157 lb 3 oz)  Body mass index is 24.62 kg/m².  Body surface area is 1.84 meters squared.      Intake/Output Summary (Last 24 hours) at 11/3/2020 1755  Last data filed at 11/3/2020 0500  Gross per 24 hour   Intake 735 ml   Output 900 ml   Net -165 ml       Physical Exam  Constitutional:       General: She is awake. She is not in acute distress.     Appearance: She is well-developed. She is cachectic.   HENT:      Head: Normocephalic.      Mouth/Throat:      Pharynx: No oropharyngeal exudate.   Eyes:      General: No scleral icterus.  Neck:      Musculoskeletal: Neck supple.      Thyroid: No thyromegaly.      Trachea: No tracheal deviation.   Cardiovascular:      Rate and Rhythm: Normal rate and regular rhythm.   Pulmonary:      Effort: Pulmonary effort is normal. No respiratory distress.      Breath sounds: Normal breath sounds. No wheezing or rales.   Abdominal:      General: There is distension.      Palpations: Abdomen is soft. There is no mass.      Tenderness: There is no abdominal tenderness. There is no guarding or rebound.   Musculoskeletal: Normal range of motion.      Right lower leg: Edema present.      Left lower leg: Edema present.   Lymphadenopathy:      Cervical: No cervical adenopathy.   Skin:     General: Skin is warm and dry.   Neurological:      Mental Status: She is alert and oriented to person, place, and time.      Cranial Nerves: No cranial nerve deficit.         Significant Labs:   All pertinent labs from  the last 24 hours have been reviewed.    Diagnostic Results:  I have reviewed all pertinent imaging results/findings within the past 24 hours.

## 2020-11-03 NOTE — PROGRESS NOTES
GENERAL SURGERY  PROGRESS NOTE    Admit Date: 10/26/2020  Hospital Day: 6    NAEON, afebrile, HDS  S/p therapeutic paracentesis yesterday, pt tolerated well  3900mL cloudy, yellow fluid removed from the RLQ  Following procedure patient complained of RUQ skin blistering/rash, assessed at bedside yesterday afternoon. Improving as of this morning, minimal pain.     Physical Exam:  Gen: NAD, awake/alert, interactive   Cardio: RR  Pulm: normal WOB on RA  Abdomen: soft, NT, ND   MSK: moving all extremities, bilateral LE with edema, RUE swelling  Neuro: alert    Inpatient Data      Vitals:   Temp:  [98.2 °F (36.8 °C)-98.9 °F (37.2 °C)]   Pulse:  [70-91]   Resp:  [16-20]   BP: ()/(50-65)   SpO2:  [95 %-100 %]     Diet Adult Regular (IDDSI Level 7)   Intake/Output Summary (Last 24 hours) at 11/3/2020 0719  Last data filed at 11/3/2020 0500  Gross per 24 hour   Intake 735 ml   Output 1250 ml   Net -515 ml          Recent Labs     11/01/20  0528 11/01/20  0529 11/02/20  1030 11/03/20  0457   WBC 5.73  --  5.99 5.78   HGB 7.9*  --  7.8* 7.8*   HCT 24.4*  --  24.6* 24.5*   *  --  112* 116*     --  141 141   K 3.4*  --  2.8* 2.8*     --  102 101   CO2 29  --  29 28   BUN 18  --  20 21   CREATININE 1.0  --  1.1 1.1   BILITOT 2.3*  --  2.6* 2.4*   AST 16  --  16 16   ALT 12  --  11 11   ALKPHOS 435*  --  391* 385*   CALCIUM 9.6  --  10.1 9.8   ALBUMIN 2.4*  --  2.8* 2.7*   PROT 6.1  --  6.3 6.0   MG  --  1.8 1.8 1.7   PHOS  --  2.8 2.8 3.9     Scheduled Meds: cinacalcet, 90 mg, BID WM  dronabinoL, 2.5 mg, BID AC  enoxaparin, 40 mg, Q24H  ferrous gluconate, 324 mg, Daily  furosemide (LASIX) IV, 60 mg, BID loop  gabapentin, 300 mg, TID  levothyroxine, 175 mcg, Before breakfast  lidocaine (PF) 10 mg/ml (1%), 1 mL, Once  polyethylene glycol, 17 g, Daily  senna-docusate 8.6-50 mg, 1 tablet, BID  spironolactone, 50 mg, Daily  ursodioL, 300 mg, BID       octreotide infusion (50 mcg/mL) 250 mcg/hr (11/03/20 0330)      A/P:   68yo F with metastatic NET (dx 2012) s/p ERCP, sphincterectomy, and biliary stent placement with removal 9/22, hyperparathyroidism, parathyroid adenoma, and HFpEF (EF 60%, G1DD) who presents with recurrent symptomatic hypercalcemia s/p IR venous sampling.     -PTH localized to right neck from selective venous sampling  -CT of the chest shows innumerable pulmonary nodules  -cincalcet, lasix BID, spironolactone  -regular diet at this time  -K this AM 2.8, will replace   -s/p therapeutic paracentesis yesterday  -PT/OT, encourage ambulation, deep breathing, cough, IS  -wound care on board for stage 1 pressure wound of lower back   -patient with new RUQ abdominal blistering that is improving, continue to monitor  -levothyroxine   -SSI, monitor glucose   -pain control as needed  -Dvt ppx: Lovenox      Dispo: will discuss surgical plan of care with staff and patient due to new finding of innumerable pulmonary nodules indicative of metastatic disease. Will also notify patient's oncologist Dr. Rodriguez.     Rogelio Renee MD  [facility], PGY-2

## 2020-11-03 NOTE — PROCEDURES
Interventional Radiology Post-Procedure Note    Pre Op Diagnosis: NET, ascites  Post Op Diagnosis: Same    Procedure: US-guided paracentesis    Procedure performed by: Ha    Written Informed Consent Obtained: Yes  Specimen Sent: No: NA  Estimated Blood Loss: Minimal    Findings:   Large ascites. 3900 mL cloudy yellow fluid removed from the RLQ.    No immediate complications. Patient tolerated procedure well. Please see full dictated procedure report for additional details and recommendations.       Colt Bonner MD  [facility]  Pager [phone]

## 2020-11-03 NOTE — PT/OT/SLP PROGRESS
Occupational Therapy   Treatment    Name: Ching Bruce  MRN: [id number]  Admitting Diagnosis:  <principal problem not specified>       Recommendations:     Discharge Recommendations: other (see comments)(TBD)  Discharge Equipment Recommendations:  other (see comments)(TBD)  Barriers to discharge:  None    Assessment:     Ching Bruce is a 69 y.o. female with a medical diagnosis of <principal problem not specified>.  Performance deficits affecting function are weakness, impaired endurance, impaired self care skills, impaired functional mobilty, gait instability, impaired balance, decreased upper extremity function, decreased lower extremity function, decreased safety awareness, impaired skin. Pt found in bed, agreeable to therapy.  RN ok'ed for OOB.  BP improved.  Pt requires Min A for bed mobility and Mod/Max A to transfer <>BSC secondary to weakness and posterior lean.  Tolerated xt fairly well. Continue OT services to address functional goals, progressing as able.      Rehab Prognosis:  Fair; patient would benefit from acute skilled OT services to address these deficits and reach maximum level of function.       Plan:     Patient to be seen 5 x/week to address the above listed problems via self-care/home management, therapeutic activities, therapeutic exercises  · Plan of Care Expires: 11/27/20  · Plan of Care Reviewed with: patient    Subjective     Pain/Comfort:  · Pain Rating 1: 0/10  · Pain Rating Post-Intervention 1: 0/10    Objective:     Communicated with: RN prior to session.  Patient found HOB elevated with PureWick, peripheral IV, telemetry, bed alarm upon OT entry to room.    General Precautions: Standard, fall   Orthopedic Precautions:N/A   Braces: N/A     Occupational Performance:     Bed Mobility:    · Patient completed Rolling/Turning to Left with  stand by assistance and with side rail  · Patient completed Rolling/Turning to Right with stand by assistance and with side rail  · Patient completed  Scooting/Bridging with stand by assistance and scoot seated to EOB  · Patient completed Supine to Sit with minimum assistance and with side rail and HOB elevated, increased time and effort, vc's for effective technique  · Patient completed Sit to Supine with minimum assistance     Functional Mobility/Transfers:  · Patient completed Sit <> Stand Transfer with moderate assistance and maximal assistance  with  rolling walker and vc's for hand placement   · Patient completed Toilet Transfer Stand Pivot technique with moderate assistance and maximal assistance with  rolling walker and bedside commode  · Functional Mobility: Pt ambulated 6 steps for/back x 2 trials with Mod/Max A using RW.  Pt with posterior lean, able to correct briefly with vc's and Mod/Max A.     Activities of Daily Living:  · Toileting: moderate assistance for hygiene after BM.  Pt able to perform perineal hygiene without assist.       [facility] 6 Click ADL: 15    Treatment & Education:  Pt sat EOB with SBA for seated rest breaks.     Patient left HOB elevated with all lines intact, call button in reach, bed alarm on and RN notifiedEducation:      GOALS:   Multidisciplinary Problems     Occupational Therapy Goals        Problem: Occupational Therapy Goal    Goal Priority Disciplines Outcome Interventions   Occupational Therapy Goal     OT, PT/OT Ongoing, Progressing    Description: Goals to be met by: 11/27     Patient will increase functional independence with ADLs by performing:    UE Dressing with Stand-by Assistance.  LE Dressing with Minimal Assistance.  Grooming while seated at sink with Modified [place].  Toileting from bedside commode with Stand-by Assistance for hygiene and clothing management.   Toilet transfer to bedside commode with Stand-by Assistance.  Upper extremity exercise program x10 reps per handout, with independence.                     Time Tracking:     OT Date of Treatment: 11/03/20  OT Start Time: 1340  OT Stop Time:  1410  OT Total Time (min): 30 min    Billable Minutes:Self Care/Home Management 15  Therapeutic Activity 15    HELEN Worthington  11/3/2020

## 2020-11-03 NOTE — HPI
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  She has progressive disease and was slated to begin treatment with PRRT, however, her clinical status has deteriorated and she has had multiple admissions for hypercalcemia related to PTH related peptide secondary to her malignancy.  Additionally, she has had worsening performance status and ascites requiring multiple paracenteses.  She states that she has ongoing abdominal distension despite having a paracentesis yesterday.  She has not been out of bed today.  She does have an appetite and is able to eat.  Her pain is well controlled.  She denies any nausea, vomiting.  She denies shortness of breath.  She has no other new complaints.  Oncology history is noted below:    Oncologic History:    Oncologic History Pancreatic neuroendocrine tumor with metasatic disease to liver diagnosed 10/12  Progressive disease in liver 7/2020    Oncologic Treatment Capecitabine/Temozolomide (CAPTEM) 1/13 -11/2018     Pathology 2012:  Pancreatic/liver aspirate- pancreatic endocrine neoplasm,  Ki-67 1%  7/2020: Liver biopsy - well differentiated neuroendocrine tumor, grade 2, Ki-67 15%, Mitotic rate <2/2mm2

## 2020-11-03 NOTE — NURSING
Pt bp 83/44.  notified. Telephone order with readback for another dose of 50ml albumin 25% and told lasix at this time.

## 2020-11-03 NOTE — PHYSICIAN QUERY
PT Name: Ching Bruce  MR #: [id number]    Nutrition Clarification     CDS: Brandy Capley, RN  Email: [email or web address]    This form is a permanent document in the medical record.     Query Date: November 3, 2020    By submitting this query, we are merely seeking further clarification of documentation.. Please utilize your independent clinical judgment when addressing the question(s) below.    The medical record contains the following:   Indicators  Supporting Clinical Findings Location in Medical Record   X % of Estimated Energy Intake over a time frame from p.o., TF, or TPN Energy Intake: <75% of estimated energy requirement for at least 1 week    Energy Intake (Malnutrition): less than 75% for greater than 7 days    Nutrition progress notes 10/27   X Weight Status over a time frame Weight Loss: not significant    Nutrition progress notes 10/27   X Subcutaneous Fat and/or Muscle Loss Body Fat Depletion: moderate depletion of orbitals, triceps and thoracic and lumbar region   Muscle Mass Depletion: moderate depletion of temples, scapular region, interosseous muscle and lower extremities and severe depletion of clavicles     Orbital Region (Subcutaneous Fat Loss): moderate depletion  Upper Arm Region (Subcutaneous Fat Loss): moderate depletion  Thoracic and Lumbar Region: moderate depletion   Rastafari Region (Muscle Loss): moderate depletion  Clavicle Bone Region (Muscle Loss): severe depletion  Dorsal Hand (Muscle Loss): moderate depletion    Nutrition progress notes 10/27    Fluid Accumulation or Edema     X Wt/BMI/Usual Body Weight Weight: 66.5 kg (146 lb 9.7 oz)  Weight (lb): 146.61 lb  Ideal Body Weight (IBW), Female: 135 lb  % Ideal Body Weight, Female (lb): 108.6 %  BMI (Calculated): 23  BMI Grade: 18.5-24.9 - normal Nutrition progress notes 10/27    Delayed Wound Healing/Failure to Thrive     X Acute or Chronic Illness 70yo F with metastatic NET (dx 2012) s/p ERCP, sphincterectomy, and biliary stent placement  with removal 9/22, hyperparathyroidism, parathyroid adenoma, and HFpEF (EF 60%, G1DD) who presents with recurrent symptomatic hypercalcemia s/p IR venous sampling.     Neuroendocrine progress notes 11/3    Medication      Treatment     X Other Recommendation:   1. Encourage PO intake at every meals.   2. Encourage Boost plus between meals TID  3. RD to follow to monitor nutrition status    Relevant Medical History: HT, kidney insufficiency, thyroid disease, anemia, HLD, secondary malignant neoplasm of liver, primary pancreatic neuroendocrine tumor, hypercalcemia, thrombocytopenia, chemotherapy    C/o poor appetite at home and during hospital stay.     Discussed eating slow and trying to eat as much as tolerated with ONS between meals. Pt reports some difficulty swallowing, will continue to monitor. Ruddy 15 skin intact. NFPE- completed today 10/27 moderate subcutaneous fat loss and muscle wasting      Energy Calories Required: not meeting needs  Protein Required: not meeting needs  Fluid Required: not meeting needs    (Moderate/Severe) Protein-Calorie Malnutrition  Malnutrition in the context of Chronic Illness/Injury     Related to (etiology): Cancer/decreased appetite     Nutrition progress notes 10/27     AND / ASPEN Clinical Characteristics (October 2011)  A minimum of two characteristics is recommended for diagnosing either moderate or severe malnutrition   Mild Malnutrition Moderate Malnutrition Severe Malnutrition   Energy Intake from p.o., TF or TPN. < 75% intake of estimated energy needs for less than 7 days < 75% intake of estimated energy needs for greater than 7 days < 50% intake of estimated energy needs for > 5 days   Weight Loss 1-2% in 1 month  5% in 3 months  7.5% in 6 months  10% in 1 year 1-2 % in 1 week  5% in 1 month  7.5% in 3 months  10% in 6 months  20% in 1 year > 2% in 1 week  > 5% in 1 month  > 7.5% in 3 months  > 10% in 6 months  > 20% in 1 year   Physical Findings     None *Mild  subcutaneous fat and/or muscle loss  *Mild fluid accumulation  *Stage II decubitus  *Surgical wound or non-healing wound *Mod/severe subcutaneous fat and/or muscle loss  *Mod/severe fluid accumulation  *Stage III or IV decubitus  *Non-healing surgical wound     Provider, please specify diagnosis or diagnoses associated with above clinical findings.    [  ] Mild Protein-Calorie Malnutrition     [ x ] Moderate Protein-Calorie Malnutrition     [  ] Other Nutritional Diagnosis (please specify): _______     [  ] Clinically Undetermined         Please document in your progress notes daily for the duration of treatment until resolved and include in your discharge summary.

## 2020-11-03 NOTE — PLAN OF CARE
AAOx4. Complaints of RUQ pain. Tolerating regular diet. Incontinence care provided. Cornell cont. @ 5 ml/hr. Abd distention noted. Blood glucose and cardiac monitoring maintained. Bed locked in lowest position, bed alarm set and call bell within reach.

## 2020-11-03 NOTE — PT/OT/SLP PROGRESS
Physical Therapy Treatment    Patient Name:  Ching Bruce   MRN:  [id number]    Recommendations:     Discharge Recommendations:  (TBD Pending, Need to determine daughter's ability to provide supervision and physical assistance.)   Discharge Equipment Recommendations: none   Barriers to discharge: decreased functional mobility tolerance    Assessment:     Ching Bruce is a 69 y.o. female admitted with a medical diagnosis of <principal problem not specified>.  She presents with the following impairments/functional limitations:  weakness, impaired endurance, impaired self care skills, impaired functional mobilty, gait instability, impaired balance, impaired cardiopulmonary response to activity, decreased lower extremity function. Pt with low BP per nurse, requested not getting pt into chair 2/2 low BP.  Rx of supine to sit with min assist of 1.  Pt sat at EOB 13 min performing BLE exercises.  Pt scooted to HOB x 4 in sitting with CG/sup.  Pt sit to stand x 1 with CG with use of bed rail.      Rehab Prognosis: Fair; patient would benefit from acute skilled PT services to address these deficits and reach maximum level of function.    Recent Surgery: Procedure(s) (LRB):  EXPLORATION, NECK (Right)      Plan:     During this hospitalization, patient to be seen 6 x/week to address the identified rehab impairments via gait training, therapeutic activities, therapeutic exercises, neuromuscular re-education and progress toward the following goals:    · Plan of Care Expires:  12/02/20    Subjective     Chief Complaint: do feel a bit whoozy, post sitting 13 min.  Patient/Family Comments/goals: get stronger  Pain/Comfort:  · Pain Rating 1: 0/10      Objective:     Communicated with nurse prior to session.  Patient found HOB elevated with bed alarm, PureWick, peripheral IV, telemetry upon PT entry to room.     General Precautions: Standard, fall   Orthopedic Precautions:N/A   Braces:       Functional Mobility:   Pt with low BP per  nurse, requested not getting pt into chair 2/2 low BP.  Rx of supine to sit with min assist of 1.  Pt sat at EOB 13 min performing BLE exercises.  Pt scooted to HOB x 4 in sitting with CG/sup.  Pt sit to stand x 1 with CG with use of bed rail.       AM-PAC 6 CLICK MOBILITY  Turning over in bed (including adjusting bedclothes, sheets and blankets)?: 4  Sitting down on and standing up from a chair with arms (e.g., wheelchair, bedside commode, etc.): 3  Moving from lying on back to sitting on the side of the bed?: 3  Moving to and from a bed to a chair (including a wheelchair)?: 3  Need to walk in hospital room?: 2  Climbing 3-5 steps with a railing?: 1  Basic Mobility Total Score: 16       Therapeutic Activities and Exercises:   Pt performed seated and supine BLE exercises 10 x 2 in all available motions.    Patient left HOB elevated with all lines intact, call button in reach, bed alarm on and nurse notified..    GOALS:   Multidisciplinary Problems     Physical Therapy Goals        Problem: Physical Therapy Goal    Goal Priority Disciplines Outcome Goal Variances Interventions   Physical Therapy Goal     PT, PT/OT Ongoing, Progressing     Description: Goals to be met by: 11/27/2020     Patient will increase functional independence with mobility by performing:    Supine<>sit SPVN  Sit<>stand CGA  Step transfer bed to bedside chair with RW CGA  Ambulate 30 feet within room with RW and CGA/min A  Up in chair x 2 hours  LE TE X 10 reps BLE                     Time Tracking:     PT Received On: 11/03/20  PT Start Time: 0850     PT Stop Time: 0919  PT Total Time (min): 29 min     Billable Minutes: Therapeutic Activity 13 and Therapeutic Exercise 16    Treatment Type: Treatment  PT/PTA: PT     PTA Visit Number: 0     Nieves Be, PT  11/03/2020

## 2020-11-03 NOTE — PHYSICIAN QUERY
PT Name: Ching Bruce  MR #: [id number]     PHYSICIAN QUERY -  INTEGUMENTARY CLARIFICATION     CDS: Brandy Capley, RN  Email: [email or web address]    This form is a permanent document in the medical record.     Query Date: November 3, 2020    By submitting this query, we are merely seeking further clarification of documentation.  Please utilize your independent clinical judgment when addressing the question(s) below.  The Medical Record contains the following:   Indicators   Supporting Clinical Findings Location in Medical Record    Redness     X Decubitus, Pressure, Ulcer, etc. wound care consulted for evaluation of pressure wounds    wound care on board for stage 1 pressure wound of lower back    H&P 10/26    Neuroendocrine progress notes 11/2    Deep Tissue Injury     X Wound care consult Pt has no wounds, consult not required.   Wound care consult 10/28, filed 10/30    Medication:      Treatment:      Other:          National Pressure Ulcer Advisory Panel (2007) Pressure Ulcer Definitions & Stages:  Stage I:                     Intact skin with non-blanchable redness of a localized area usually over a bony prominence.   Stage II:                    Partial thickness loss of dermis presenting as a shallow open ulcer with a red pink wound bed, without slough.   Stage III:                   Full thickness tissue loss. Subcutaneous fat may be visible but bone, tendon or muscle is not exposed. Slough may be                                      present but does not obscure the depth of tissue loss. May include undermining and tunneling.  Stage IV:                  Full thickness tissue loss with exposed bone, tendon or muscle. Slough or eschar may be present on some parts of the                                      wound bed. Often include undermining and tunneling.  Unstageable:           Full thickness tissue loss but base of ulcer is covered by slough and/or eschar in the wound bed. Until enough                                         slough and/or eschar is removed to expose wound base, its true depth, and therefore stage, cannot be determined  Deep Tissue Injury: Purple or maroon localized area of discolored intact skin or blood-filled blister due to damage of underlying soft tissue   from pressure and/or shear.  Suspected deep tissue injuries may develop into a stageable ulcer or turn out to be another diagnosis (e.g. an ecchymosis)     Provider, please clarify the lower back pressure wound.      SITE LATERALITY STAGE PRESENT ON ADMISSION?   [  ] lower back [  ]  right         [  ]  Left  [  ]  bilateral    [  ] 1           [  ] other (please specify):________  [  ] unspecified   [  ] Yes      [  ] No      [  ] Clinically Undeterminable (W)                [  ] Documentation Insufficient (U)   [ x ] lower back pressure wound ruled out        [  ] other site (please specify): ______ [  ]  right         [  ]  left [  ] 1           [  ] 2  [  ] 3           [  ] 4  [  ]Unstageable  [  ] DTI  [  ] unspecified   [  ] Yes      [  ] No      [  ] Clinically Undetermined (W)                [  ] Documentation Insufficient (U)       [  ] Clinically undetermined         Please document in your progress notes daily for the duration of treatment until resolved and include in your discharge summary.

## 2020-11-03 NOTE — PLAN OF CARE
Patient AAOx4, VSS, Patient denies any pain only discomfort to abdomen MD JALEEL order gabapentin. Patient free from falls. Tolerating regular diet, no nausea or vomiting. Call bell in reach. Safety maintained. Patient in NAD. Will continue to monitor.   Problem: Adult Inpatient Plan of Care  Goal: Plan of Care Review  Outcome: Ongoing, Progressing  Goal: Patient-Specific Goal (Individualization)  Outcome: Ongoing, Progressing  Goal: Absence of Hospital-Acquired Illness or Injury  Outcome: Ongoing, Progressing  Goal: Optimal Comfort and Wellbeing  Outcome: Ongoing, Progressing  Goal: Readiness for Transition of Care  Outcome: Ongoing, Progressing  Goal: Rounds/Family Conference  Outcome: Ongoing, Progressing     Problem: Coping Ineffective (Oncology Care)  Goal: Effective Coping  Outcome: Ongoing, Progressing     Problem: Oral Intake Altered (Oncology Care)  Goal: Optimal Oral Intake  Outcome: Ongoing, Progressing     Problem: Pain Acute (Oncology Care)  Goal: Optimal Pain Control  Outcome: Ongoing, Progressing     Problem: Electrolyte Imbalance  Goal: Electrolyte Balance  Outcome: Ongoing, Progressing     Problem: Fall Injury Risk  Goal: Absence of Fall and Fall-Related Injury  Outcome: Ongoing, Progressing     Problem: Skin Injury Risk Increased  Goal: Skin Health and Integrity  Outcome: Ongoing, Progressing

## 2020-11-03 NOTE — PLAN OF CARE
Pt aaox4 throughout the day. No c/o pain or N/V throughout the day. Pt up with PT and OT. BP controlled with albumin and holding lasix. Call light and personal belongings in reach. Pt instructed to press the call light. Pt verbalizes understanding. Pt turned q2h. Tolerating regular diet well. Medications given per MAR.     Problem: Adult Inpatient Plan of Care  Goal: Plan of Care Review  Outcome: Ongoing, Not Progressing  Goal: Patient-Specific Goal (Individualization)  Outcome: Ongoing, Not Progressing  Goal: Absence of Hospital-Acquired Illness or Injury  Outcome: Ongoing, Not Progressing  Goal: Optimal Comfort and Wellbeing  Outcome: Ongoing, Not Progressing  Goal: Readiness for Transition of Care  Outcome: Ongoing, Not Progressing  Goal: Rounds/Family Conference  Outcome: Ongoing, Not Progressing     Problem: Coping Ineffective (Oncology Care)  Goal: Effective Coping  Outcome: Ongoing, Not Progressing     Problem: Fatigue (Oncology Care)  Goal: Improved Activity Tolerance  Outcome: Ongoing, Not Progressing     Problem: Oral Intake Altered (Oncology Care)  Goal: Optimal Oral Intake  Outcome: Ongoing, Not Progressing     Problem: Oral Mucositis (Oncology Care)  Goal: Improved Oral Mucous Membrane Integrity  Outcome: Ongoing, Not Progressing     Problem: Pain Acute (Oncology Care)  Goal: Optimal Pain Control  Outcome: Ongoing, Not Progressing     Problem: Electrolyte Imbalance  Goal: Electrolyte Balance  Outcome: Ongoing, Not Progressing     Problem: Balance Impairment (Functional Deficit)  Goal: Improved Balance and Postural Control  Outcome: Ongoing, Not Progressing     Problem: Cognitive Impairment  Goal: Optimal Functional [place]  Outcome: Ongoing, Not Progressing     Problem: Coordination Impairment (Functional Deficit)  Goal: Optimal Coordination  Outcome: Ongoing, Not Progressing     Problem: Muscle Strength Impairment  Goal: Improved Muscle Strength  Outcome: Ongoing, Not Progressing     Problem:  Muscle Tone Impairment  Goal: Improved Muscle Tone  Outcome: Ongoing, Not Progressing     Problem: Range of Motion Impairment (Functional Deficit)  Goal: Optimal Range of Motion  Outcome: Ongoing, Not Progressing     Problem: Sensory Impairment (Functional Deficit)  Goal: Compensation for Sensory Deficit  Outcome: Ongoing, Not Progressing     Problem: Skin Injury Risk Increased  Goal: Skin Health and Integrity  Outcome: Ongoing, Not Progressing     Problem: Fall Injury Risk  Goal: Absence of Fall and Fall-Related Injury  Outcome: Ongoing, Not Progressing     Problem: Heart Failure Comorbidity  Goal: Maintenance of Heart Failure Symptom Control  Outcome: Ongoing, Not Progressing     Problem: Coping Ineffective  Goal: Effective Coping  Outcome: Ongoing, Not Progressing

## 2020-11-04 PROBLEM — Z71.89 COUNSELING REGARDING ADVANCE CARE PLANNING AND GOALS OF CARE: Status: ACTIVE | Noted: 2020-01-01

## 2020-11-04 NOTE — PLAN OF CARE
Problem: Physical Therapy Goal  Goal: Physical Therapy Goal  Description: Goals to be met by: 11/27/2020     Patient will increase functional independence with mobility by performing:    Supine<>sit SPVN  Sit<>stand CGA  Step transfer bed to bedside chair with RW CGA  Ambulate 30 feet within room with RW and CGA/min A  Up in chair x 2 hours  LE TE X 10 reps BLE    Outcome: Ongoing, Progressing   Pt continues to work and progress toward all goals. Able to perform 4 sit to stand transfers 1 from EOB 2 from B/S chair and 1 from B/S commode all with RW requiring mod/max A. Pt's BLE's very weak.

## 2020-11-04 NOTE — PROGRESS NOTES
Notified MD of patient's decreased BP this morning. No orders received, will continue to monitor.

## 2020-11-04 NOTE — PROGRESS NOTES
NET Team Rounds with Dr. Joshua Valerio MD    Admit Date: 10/26/2020                    Length of Stay Days: 7                 NET Physician:  [facility]                    Local Treating Physician:Gaby Corea MD    Reason for admission:  Hypercalcemia [E83.52]    HPI: metastatic NET (dx 2012) s/p ERCP, sphincterectomy, and biliary stent placement with removal 9/22, hyperparathyroidism, parathyroid adenoma, and HFpEF (EF 60%, G1DD) who presents with recurrent symptomatic hypercalcemia    Surgery/Procedure:   9/22/20- ERCP  11/2/20- paracentesis    Assessment  Diet: dysphsia soft              Oral Supplement: Boost Plus with meals   Nutrition Support - none   Appetite - fair                     Nausea - No                Vomiting- No  BM-  Urine-  voiding without difficulty  Abdomen- nontender and distended  Incision- none  Pain-none    IV Access- Peripheral   Drains- none    Edema - 2+ bilat lower edema, abd swelling noted, denies SOB     Vital Signs (Most Recent):  Temp: 98.1 °F (36.7 °C) (11/04/20 0827)  Pulse: 77 (11/04/20 0827)  Resp: 18 (11/04/20 0827)  BP: (!) 87/48 (11/04/20 0827)  SpO2: 97 % (11/04/20 0901) Vital Signs Range (Last 24H):  Temp:  [98.1 °F (36.7 °C)-98.4 °F (36.9 °C)]   Pulse:  [68-83]   Resp:  [16-18]   BP: ()/(45-54)   SpO2:  [96 %-100 %]            Labs:   Recent Labs   Lab 11/02/20  1030 11/03/20  0457 11/04/20  0516   WBC 5.99 5.78 6.65   HGB 7.8* 7.8* 7.7*   HCT 24.6* 24.5* 24.2*   * 116* 115*   * 103* 104*   RDW 17.4* 17.4* 17.6*    141 138   K 2.8* 2.8* 3.3*    101 100   CO2 29 28 27   BUN 20 21 26*   CREATININE 1.1 1.1 1.6*   GLU 99 92 109   PROT 6.3 6.0 5.9*   ALBUMIN 2.8* 2.7* 2.6*   BILITOT 2.6* 2.4* 2.0*   AST 16 16 18   ALKPHOS 391* 385* 349*   ALT 11 11 9*      Recent Labs   Lab 10/29/20  1333 11/01/20  1545   PREALBUMIN 9* 8*   CRP 27.9* 23.7*     Recent Labs   Lab 11/02/20  1030 11/03/20  0457 11/04/20  0516    CALCIUM 10.1 9.8 9.0   MG 1.8 1.7 1.6   PHOS 2.8 3.9 5.2*     Cultures: none to date this admission    Scheduled Meds   cinacalcet  90 mg Oral BID WM    dronabinoL  2.5 mg Oral BID AC    enoxaparin  40 mg Subcutaneous Q24H    ferrous gluconate  324 mg Oral Daily    furosemide (LASIX) IV  60 mg Intravenous BID loop    gabapentin  300 mg Oral TID    levothyroxine  175 mcg Oral Before breakfast    lidocaine (PF) 10 mg/ml (1%)  1 mL Other Once    polyethylene glycol  17 g Oral Daily    potassium chloride in water  10 mEq Intravenous Q1H    senna-docusate 8.6-50 mg  1 tablet Oral BID    spironolactone  50 mg Oral Daily    ursodioL  300 mg Oral BID       Continuous Infusion   octreotide infusion (50 mcg/mL) 250 mcg/hr (11/04/20 0519)       PRN Meds  acetaminophen, albumin human 25%, albuterol-ipratropium, dextrose 50%, dextrose 50%, glucagon (human recombinant), glucose, glucose, insulin aspart U-100, melatonin, ondansetron, oxyCODONE, promethazine (PHENERGAN) IVPB, traMADoL     Assessment/Plan:  -octreotide drip at 250 mcg/hr  -verbalized that she is feeling ok, in chair  -Palliative Care consult in place to discuss goals of care            Discharge Planning         ---     Anticipated Date of D/C:   11/5/20         Appointments: planning Hospice    Dispo / Needs: Home and hospice care    Nutrition: Regular    Home support system- daughters    Barriers to Care- none  ___________________________________________  TIMMY KhanN, RN, ONN-CG  Nurse Navigator [facility]    Tracie Weil Cavalier, GILLIANN, LDN, CNSC  Registered Dietitian [facility]      Face to Face Time: 15 minutes

## 2020-11-04 NOTE — PROGRESS NOTES
GENERAL SURGERY  PROGRESS NOTE    Admit Date: 10/26/2020  Hospital Day: 7    NAEON, afebrile, HDS  Heme/onc saw pt yesterday, do not rec further systemic tx for NET  Palliative care was consulted  S/p paracentesis 11/2  Pt a little hypotensive this am, asymptomatic   Pain well controlled  Tolerating diet  No n/v/d  No sob, cp    Physical Exam:  Gen: NAD, awake/alert, interactive   Cardio: RR  Pulm: normal WOB on RA  Abdomen: soft, NT, ND   MSK: moving all extremities, bilateral LE with edema, RUE swelling  Neuro: alert    Inpatient Data      Vitals:   Temp:  [98.2 °F (36.8 °C)-98.4 °F (36.9 °C)]   Pulse:  [68-83]   Resp:  [16-18]   BP: ()/(44-54)   SpO2:  [96 %-100 %]     Diet Adult Regular (IDDSI Level 7)   Intake/Output Summary (Last 24 hours) at 11/4/2020 0736  Last data filed at 11/3/2020 1801  Gross per 24 hour   Intake 1100 ml   Output 200 ml   Net 900 ml          Recent Labs     11/02/20  1030 11/03/20  0457 11/04/20  0516   WBC 5.99 5.78 6.65   HGB 7.8* 7.8* 7.7*   HCT 24.6* 24.5* 24.2*   * 116* 115*    141 138   K 2.8* 2.8* 3.3*    101 100   CO2 29 28 27   BUN 20 21 26*   CREATININE 1.1 1.1 1.6*   BILITOT 2.6* 2.4* 2.0*   AST 16 16 18   ALT 11 11 9*   ALKPHOS 391* 385* 349*   CALCIUM 10.1 9.8 9.0   ALBUMIN 2.8* 2.7* 2.6*   PROT 6.3 6.0 5.9*   MG 1.8 1.7 1.6   PHOS 2.8 3.9 5.2*     Scheduled Meds: cinacalcet, 90 mg, BID WM  dronabinoL, 2.5 mg, BID AC  enoxaparin, 40 mg, Q24H  ferrous gluconate, 324 mg, Daily  furosemide (LASIX) IV, 60 mg, BID loop  gabapentin, 300 mg, TID  levothyroxine, 175 mcg, Before breakfast  lidocaine (PF) 10 mg/ml (1%), 1 mL, Once  polyethylene glycol, 17 g, Daily  senna-docusate 8.6-50 mg, 1 tablet, BID  spironolactone, 50 mg, Daily  ursodioL, 300 mg, BID       octreotide infusion (50 mcg/mL) 250 mcg/hr (11/04/20 0519)     A/P:   70yo F with metastatic NET (dx 2012) s/p ERCP, sphincterectomy, and biliary stent placement with removal 9/22,  hyperparathyroidism, parathyroid adenoma, and HFpEF (EF 60%, G1DD) who presents with recurrent symptomatic hypercalcemia s/p IR venous sampling.     -PTH localized to right neck from selective venous sampling  -CT of the chest shows innumerable pulmonary nodules  -Heme/Onc do not rec further systemic tx for NET  -cincalcet, lasix BID, spironolactone  -regular diet at this time  -K this AM 3.3, improving from yesterday (2.8), cont to replace  -Replacing mag as well  -Bump in Cr 1.6 (1.1 yesterday), monitoring, avoiding nephrotoxins   -s/p therapeutic paracentesis 11/2  -PT/OT, encourage ambulation, deep breathing, cough, IS  -wound care on board for stage 1 pressure wound of lower back   -patient with new RUQ abdominal blistering that is improving, continue to monitor  -levothyroxine   -SSI, monitor glucose   -pain control as needed  -Dvt ppx: Lovenox      Dispo: Palliative care has been consulted and will plan to see today, pt with extensive dx and prognosis is poor. Will continue to correct electrolyte abnormalities in meantime.      Enrique Fine MD, MSc   [facility] PGY-2  11/04/2020

## 2020-11-04 NOTE — CONSULTS
[facility]  Palliative Medicine  Consult Note    Patient Name: Ching Bruce  MRN: [id number]  Admission Date: 10/26/2020  Hospital Length of Stay: 7 days  Code Status: Full Code   Attending Provider: Joshua Valerio MD  Consulting Provider: Chloe Wilkerson NP  Primary Care Physician: Gaby Corea MD  Principal Problem:<principal problem not specified>    Patient information was obtained from patient, relative(s), past medical records and ER records.      Inpatient consult to Palliative Care  Consult performed by: Chloe Wilkerson NP  Consult ordered by: Abraham Lantigua MD        Assessment/Plan:     Palliative care encounter  GOC  Disease education   DNR education   Hospice education       Hypercalcemia  This has improved and will need ongoing treatment.  This is likely related to hypercalcemia of malignancy and possibly from a parathyroid adenoma-  Primary team    Secondary neuroendocrine tumor of liver  Secondary neuroendocrine tumor of liver  She was discussed in tumor board this morning and her images were reviewed.  She has widespread disease within the liver with recurrent ascites and elevated bilirubin.  Given her poor performance status, ECOG PS 3, along with laboratory abnormalities I do not think further systemic treatment would be beneficial at this time.  I have discussed this with her and her daughter in detail today and also introduced the concept of hospice.  They do wish to think about this further and see if her abnormalities improve but I would recommend we consult palliative Care for symptom management and consideration of transition to hospice.  -Primary team        Thank you for your consult. I will follow-up with patient. Please contact us if you have any additional questions.    Subjective:     HPI:   Ching Bruce is a 68 yo female with PMH of pancreatic neuroendocrine tumor  (dx 2012) s/p ERCP, sphincterectomy, and biliary stent placement with removal  "9/22, hyperparathyroidism, parathyroid adenoma, and HFpEF (EF 60%, G1DD) that was admitted to [facility] on 10/19 for generalized weakness, AMS and 3 non-traumatic falls. The patient has struggled with recurrent episodes of hypercalcemia and has been on alendronate, cinacalet, zoledronic acid, and IVF infusions. She was admitted to medicine for management of severe hypercalcemia. Started on IVF, calcitonin, lasix, cinacalcet (continued from home), and given one dose of zometa on 10/20. Cinacalcet dose increased to 60 BID on 10/23. KUB ordered showed large stool burden without obstruction/impaction. Palliative care was consulted to establish baseline given patient's decline and recurrent episodes of hypercalcemia with seemingly limited treatment options. Calcium and mental status improved with treatments, however calcium level stable between 12-13. Consultation placed to endocrinology for assistance and recommendations on further treatment options, recommend uptitration of cinacalcet to 90 BID on 10/30 and evaluate for repeat zometa on 10/27. Today the patient left AMA to pursue care at our facility. In the ED the patient is afebrile with stable vitals. Her labs show WBC 8.95, H/H 8.3/27, Plt 88, K 3.8, Cr 0.8, Ca 11.1, T bili 2.1, ALT/AST 28/12. Neuroendocrine surgery was consulted for evaluation.    Palliative medicine has been consulted for Hospice referral and discussion.    Palliative medicine met with patient. Awake and alert. "Feeling better." Spoke with daughter via phone. Will meet with daughter at 10 am for Family meeting.     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    Secondary malignant neoplasm of liver     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) 5/7/2013    Thrombocytopenia 11/12/2013    Thyroid disease     Unspecified essential hypertension 11/12/2013       Past Surgical History:   Procedure Laterality Date    ERCP N/A 6/21/2018    Procedure: ERCP, stent exchange;  Surgeon: Jake Lieberman MD;  Location: [facility] ENDO;  Service: Endoscopy;  Laterality: N/A;    ERCP N/A 5/23/2019    Procedure: ERCP (ENDOSCOPIC RETROGRADE CHOLANGIOPANCREATOGRAPHY), stent exchange;  Surgeon: Jake Lieberman MD;  Location: [facility] ENDO;  Service: Endoscopy;  Laterality: N/A;    ERCP N/A 11/14/2019    Procedure: ERCP (ENDOSCOPIC RETROGRADE CHOLANGIOPANCREATOGRAPHY), stent exchange;  Surgeon: Jake Lieberman MD;  Location: [facility] ENDO;  Service: Endoscopy;  Laterality: N/A;    ERCP      ERCP N/A 9/22/2020    Procedure: ERCP (ENDOSCOPIC RETROGRADE CHOLANGIOPANCREATOGRAPHY);  Surgeon: Abdulaziz Owen MD;  Location: [facility];  Service: Endoscopy;  Laterality: N/A;    THYROIDECTOMY  2011       Review of patient's allergies indicates:   Allergen Reactions    Epinephrine Anaphylaxis and Other (See Comments)     Can cause Carcinoid Crisis    Sulfa (sulfonamide antibiotics) Other (See Comments)     Urticaria       Medications:  Continuous Infusions:   octreotide infusion (50 mcg/mL) 250 mcg/hr (11/04/20 0519)     Scheduled Meds:   cinacalcet  90 mg Oral BID WM    dronabinoL  2.5 mg Oral BID AC    enoxaparin  40 mg Subcutaneous Q24H    ferrous gluconate  324 mg Oral Daily    furosemide (LASIX) IV  60 mg Intravenous BID loop    gabapentin  300 mg Oral TID    levothyroxine   175 mcg Oral Before breakfast    lidocaine (PF) 10 mg/ml (1%)  1 mL Other Once    magnesium sulfate IVPB  2 g Intravenous Once    polyethylene glycol  17 g Oral Daily    potassium chloride in water  10 mEq Intravenous Q1H    senna-docusate 8.6-50 mg  1 tablet Oral BID    spironolactone  50 mg Oral Daily    ursodioL  300 mg Oral BID     PRN Meds:acetaminophen, albumin human 25%, albuterol-ipratropium, dextrose 50%, dextrose 50%, glucagon (human recombinant), glucose, glucose, insulin aspart U-100, melatonin, ondansetron, oxyCODONE, promethazine (PHENERGAN) IVPB, traMADoL    Family History     Problem Relation (Age of Onset)    Cancer Maternal Grandmother    Diabetes Father        Tobacco Use    Smoking status: Never Smoker    Smokeless tobacco: Never Used   Substance and Sexual Activity    Alcohol use: No    Drug use: No    Sexual activity: Not on file       Review of Systems   Constitutional: Positive for fatigue and unexpected weight change. Negative for chills and fever.   HENT: Negative for congestion, hearing loss and nosebleeds.    Eyes: Negative for visual disturbance.   Respiratory: Negative for cough and shortness of breath.    Cardiovascular: Positive for leg swelling. Negative for chest pain and palpitations.   Gastrointestinal: Positive for abdominal distention. Negative for abdominal pain, blood in stool, constipation, diarrhea, nausea and vomiting.   Genitourinary: Negative for dysuria.   Musculoskeletal: Negative for back pain and gait problem.   Skin: Negative for color change and rash.   Neurological: Negative for dizziness, weakness and headaches.   Hematological: Negative for adenopathy. Does not bruise/bleed easily.   Psychiatric/Behavioral: Negative for confusion.     Objective:     Vital Signs (Most Recent):  Temp: 98.1 °F (36.7 °C) (11/04/20 0827)  Pulse: 77 (11/04/20 0827)  Resp: 18 (11/04/20 0827)  BP: (!) 87/48 (11/04/20 0827)  SpO2: 97 % (11/04/20 0430) Vital Signs (24h  Range):  Temp:  [98.1 °F (36.7 °C)-98.4 °F (36.9 °C)] 98.1 °F (36.7 °C)  Pulse:  [68-83] 77  Resp:  [16-18] 18  SpO2:  [96 %-100 %] 97 %  BP: ()/(45-54) 87/48     Weight: 71.3 kg (157 lb 3 oz)  Body mass index is 24.62 kg/m².    Physical Exam  Constitutional:       General: She is awake. She is not in acute distress.     Appearance: She is well-developed. She is cachectic.   HENT:      Head: Normocephalic.      Mouth/Throat:      Pharynx: No oropharyngeal exudate.   Eyes:      General: No scleral icterus.  Neck:      Musculoskeletal: Neck supple.      Thyroid: No thyromegaly.      Trachea: No tracheal deviation.   Cardiovascular:      Rate and Rhythm: Normal rate and regular rhythm.   Pulmonary:      Effort: Pulmonary effort is normal. No respiratory distress.      Breath sounds: Normal breath sounds. No wheezing or rales.   Abdominal:      General: There is distension.      Palpations: Abdomen is soft. There is no mass.      Tenderness: There is no abdominal tenderness. There is no guarding or rebound.   Musculoskeletal: Normal range of motion.      Right lower leg: Edema present.      Left lower leg: Edema present.   Lymphadenopathy:      Cervical: No cervical adenopathy.   Skin:     General: Skin is warm and dry.   Neurological:      Mental Status: She is alert and oriented to person, place, and time.      Cranial Nerves: No cranial nerve deficit.         Review of Symptoms    Symptom Assessment (ESAS 0-10 Scale)  Pain:  0  Dyspnea:  0  Anxiety:  0  Nausea:  0  Depression:  0  Anorexia:  0  Fatigue:  0  Insomnia:  0  Restlessness:  0  Agitation:  0     CAM / Delirium:  Negative  Constipation:  Negative  Diarrhea:  Negative    Bowel Management Plan (BMP):  Yes            ECOG Performance Status Grade:  2 - Ambulates, capable of self care only    Living Arrangements:  Lives with family    Psychosocial/Cultural: Yes    Spiritual:  F - Nisreen and Belief:  Buddhist  A - Address in Care:  Yes      Advance Care  Planning   Advance Directives:   Living Will: No        Oral Declaration: No      Decision Making:  Patient answered questions and Family answered questions         Significant Labs: None  CBC:   Recent Labs   Lab 11/04/20  0516   WBC 6.65   HGB 7.7*   HCT 24.2*   *   *     BMP:  Recent Labs   Lab 11/04/20  0516         K 3.3*      CO2 27   BUN 26*   CREATININE 1.6*   CALCIUM 9.0   MG 1.6     LFT:  Lab Results   Component Value Date    AST 18 11/04/2020    ALKPHOS 349 (H) 11/04/2020    BILITOT 2.0 (H) 11/04/2020     Albumin:   Albumin   Date Value Ref Range Status   11/04/2020 2.6 (L) 3.5 - 5.2 g/dL Final     Protein:   Total Protein   Date Value Ref Range Status   11/04/2020 5.9 (L) 6.0 - 8.4 g/dL Final     Lactic acid:   Lab Results   Component Value Date    LACTATE 1.2 09/01/2020    LACTATE 1.0 06/29/2014       Significant Imaging: None       Recommendations  Disease education   Hospice education   DNR education   Family meeting at 10am      > 50% of 60 min visit spent in chart review, face to face discussion of goals of care,  symptom assessment, coordination of care and emotional support.    Chloe Wilkerson NP  Palliative Medicine  [facility]

## 2020-11-04 NOTE — PLAN OF CARE
Patient resting in bed, AAOx4. Cornell infusing as ordered. Medications administered as ordered. No complaints of pain overnight. Repositioned through the night. BM this shift. Encouraged to call with needs or concerns. Will continue to monitor.

## 2020-11-04 NOTE — PLAN OF CARE
TN met with patient for continued discharge planning. Patient is scheduled for pleurex cath placement for tomorrow 11/5. Per patient, her two daughters are currently discussing hospice companies and states that they will make a decision tomorrow. TN will continue to follow.

## 2020-11-04 NOTE — HPI
"Ching Bruce is a 70 yo female with PMH of pancreatic neuroendocrine tumor  (dx 2012) s/p ERCP, sphincterectomy, and biliary stent placement with removal 9/22, hyperparathyroidism, parathyroid adenoma, and HFpEF (EF 60%, G1DD) that was admitted to [facility] on 10/19 for generalized weakness, AMS and 3 non-traumatic falls. The patient has struggled with recurrent episodes of hypercalcemia and has been on alendronate, cinacalet, zoledronic acid, and IVF infusions. She was admitted to medicine for management of severe hypercalcemia. Started on IVF, calcitonin, lasix, cinacalcet (continued from home), and given one dose of zometa on 10/20. Cinacalcet dose increased to 60 BID on 10/23. KUB ordered showed large stool burden without obstruction/impaction. Palliative care was consulted to establish baseline given patient's decline and recurrent episodes of hypercalcemia with seemingly limited treatment options. Calcium and mental status improved with treatments, however calcium level stable between 12-13. Consultation placed to endocrinology for assistance and recommendations on further treatment options, recommend uptitration of cinacalcet to 90 BID on 10/30 and evaluate for repeat zometa on 10/27. Today the patient left AMA to pursue care at our facility. In the ED the patient is afebrile with stable vitals. Her labs show WBC 8.95, H/H 8.3/27, Plt 88, K 3.8, Cr 0.8, Ca 11.1, T bili 2.1, ALT/AST 28/12. Neuroendocrine surgery was consulted for evaluation.    Palliative medicine has been consulted for Hospice referral and discussion.    Palliative medicine met with patient. Awake and alert. "Feeling better." Spoke with daughter via phone. Will meet with daughter at 10 am for Family meeting.   "

## 2020-11-04 NOTE — PROGRESS NOTES
Pharmacist Renal Dose Adjustment Note    Ching Bruce is a 69 y.o. female being treated with the medication valacyclovir    Patient Data:    Vital Signs (Most Recent):  Temp: 97.8 °F (36.6 °C) (11/04/20 1218)  Pulse: 72 (11/04/20 1218)  Resp: 18 (11/04/20 1218)  BP: (!) 95/52 (11/04/20 1218)  SpO2: 97 % (11/04/20 1142)   Vital Signs (72h Range):  Temp:  [97.8 °F (36.6 °C)-99.2 °F (37.3 °C)]   Pulse:  [68-91]   Resp:  [16-20]   BP: ()/(44-76)   SpO2:  [95 %-100 %]      Recent Labs   Lab 11/02/20  1030 11/03/20  0457 11/04/20  0516   CREATININE 1.1 1.1 1.6*     Serum creatinine: 1.6 mg/dL (H) 11/04/20 0516  Estimated creatinine clearance: 32.3 mL/min (A)    Medication valacyclovir dose: 1000 mg frequency q 8h will be changed to medication valacyclovir dose 1000 mg  frequency q 12h    Pharmacist's Name: Jacki Carpenter  Pharmacist's Extension: [phone]

## 2020-11-04 NOTE — PROGRESS NOTES
"[facility]  Adult Nutrition  Progress Note    SUMMARY       Recommendations    Recommendation:   1. Encourage intake of meals & Boost as tolerated.   2. Noted possible hospice.   3. RD to continue to follow to monitor po intake    Goals:   Pt will consume at least 50-75% intake at meals by RD follow up  Nutrition Goal Status: new  Communication of RD Recs: reviewed with RN    Reason for Assessment  Reason For Assessment: RD follow-up  Diagnosis: other (see comments)(hypercalcemia)  Relevant Medical History: HT, kidney insufficiency, thyroid disease, anemia, HLD, secondary malignant neoplasm of liver, primary pancreatic neuroendocrine tumor, hypercalcemia, thrombocytopenia, chemotherapy  Interdisciplinary Rounds: attended  General Information Comments: Pt on Regular diet with Boost Plus. Pt reports she is eating much better and drinking Boost. Reports appetite is improved since marinol started. Reports some difficulty chewing foods but refuses softer diet.Ruddy 17- R groin incision. NFPE completed 10/27-moderate subcutaneous fat loss and muscle wasting.  Nutrition Discharge Planning: pt to d/c on Regular diet    Nutrition Risk Screen  Nutrition Risk Screen: no indicators present    Nutrition/Diet History  Food Preferences: no Restorationist or cultural food prefs  Spiritual, Cultural Beliefs, Mandaen Practices, Values that Affect Care: no  Food Allergies: NKFA  Factors Affecting Nutritional Intake: difficulty/impaired swallowing, altered taste, decreased appetite    Anthropometrics  Temp: 98.1 °F (36.7 °C)  Height Method: Stated  Height: 5' 7" (170.2 cm)  Height (inches): 67 in  Weight Method: Bed Scale  Weight: 71.3 kg (157 lb 3 oz)  Weight (lb): 157.19 lb  Ideal Body Weight (IBW), Female: 135 lb  % Ideal Body Weight, Female (lb): 108.6 %  BMI (Calculated): 24.6  BMI Grade: 18.5-24.9 - normal     Lab/Procedures/Meds  Pertinent Labs Reviewed: reviewed  Pertinent Labs Comments: K 3.3L, BUN 26H, Crea " 1.6H, Phos 5.2H, Alb 2.6L, PAB 8L  Pertinent Medications Reviewed: reviewed  Pertinent Medications Comments: marinol, Lasix, octreotide    Estimated/Assessed Needs  Weight Used For Calorie Calculations: 71.3 kg (157 lb 3 oz)  Energy Calorie Requirements (kcal): 5134-1651 (25-30 kcal/kg)  Energy Need Method: Kcal/kg  Protein Requirements: 71g (1.0g/kg)  Weight Used For Protein Calculations: 71.3 kg (157 lb 3 oz)  Estimated Fluid Requirement Method: RDA Method  RDA Method (mL): 1782     Nutrition Prescription Ordered  Current Diet Order: Regular  Oral Nutrition Supplement: Boost Plus    Evaluation of Received Nutrient/Fluid Intake  Other Fluid (mL): 1800(D5 and NaCl with KCl)  I/O: 1100/200  Energy Calories Required: meeting needs  Protein Required: meeting needs  Fluid Required: meeting needs  Comments: LBM 11/3  % Intake of Estimated Energy Needs: 50 - 75 %  % Meal Intake: 50 - 75 %    Nutrition Risk  Level of Risk/Frequency of Follow-up: (2xweekly)     Assessment and Plan  Moderate malnutrition  Nutrition Problem:  Moderate Protein-Calorie Malnutrition  Malnutrition in the context of Chronic Illness/Injury    Related to (etiology):  Cancer/decreased appetite    Signs and Symptoms (as evidenced by):  Energy Intake: <75% of estimated energy requirement for at least 1 week  Body Fat Depletion: moderate depletion of orbitals, triceps and thoracic and lumbar region   Muscle Mass Depletion: moderate depletion of temples, scapular region, interosseous muscle and lower extremities and severe depletion of clavicles   Weight Loss: not significant     Interventions(treatment strategy):  Commercial beverage  Collaboration with other providers    Nutrition Diagnosis Status:  Continues       Monitor and Evaluation  Food and Nutrient Intake: energy intake  Food and Nutrient Adminstration: diet order  Physical Activity and Function: nutrition-related ADLs and IADLs  Anthropometric Measurements: weight  Biochemical Data, Medical  Tests and Procedures: glucose/endocrine profile, electrolyte and renal panel  Nutrition-Focused Physical Findings: overall appearance     Malnutrition Assessment  Energy Intake (Malnutrition): less than 75% for greater than 7 days   Orbital Region (Subcutaneous Fat Loss): moderate depletion  Upper Arm Region (Subcutaneous Fat Loss): moderate depletion  Thoracic and Lumbar Region: moderate depletion   [place] Region (Muscle Loss): moderate depletion  Clavicle Bone Region (Muscle Loss): severe depletion  Dorsal Hand (Muscle Loss): moderate depletion   Subcutaneous Fat Loss (Final Summary): moderate protein-calorie malnutrition  Muscle Loss Evaluation (Final Summary): moderate protein-calorie malnutrition       Nutrition Follow-Up  RD Follow-up?: Yes

## 2020-11-04 NOTE — PT/OT/SLP PROGRESS
Occupational Therapy   Treatment    Name: Ching Bruce  MRN: [id number]  Admitting Diagnosis:  <principal problem not specified>       Recommendations:     Discharge Recommendations: other (see comments)(TBD)  Discharge Equipment Recommendations:  other (see comments)(TBD)  Barriers to discharge:  None    Assessment:     Ching Bruce is a 69 y.o. female with a medical diagnosis of <principal problem not specified>.  Performance deficits affecting function are weakness, impaired endurance, impaired self care skills, impaired functional mobilty, gait instability, impaired balance, decreased upper extremity function, decreased lower extremity function, decreased safety awareness. Pt found in chair, requesting back to bed 2/2 soiled.  Pt required Mod/Max A for sit>stand from chair and to transfer back to bed.  Tot A for cleaning.  Pt with fair tolerance of therapy. Continue OT services to address functional goals, progressing as able.      Rehab Prognosis:  Fair; patient would benefit from acute skilled OT services to address these deficits and reach maximum level of function.       Plan:     Patient to be seen 5 x/week to address the above listed problems via self-care/home management, therapeutic activities, therapeutic exercises  · Plan of Care Expires: 11/27/20  · Plan of Care Reviewed with: patient, daughter    Subjective     Pain/Comfort:  · Pain Rating 1: 0/10  · Pain Rating Post-Intervention 1: 0/10    Objective:     Communicated with: RN prior to session.  Patient found up in chair with Emmy, peripheral IV, telemetry upon OT entry to room.    General Precautions: Standard, fall   Orthopedic Precautions:N/A   Braces: N/A     Occupational Performance:     Bed Mobility:    · Patient completed Sit to Supine with moderate assistance     Functional Mobility/Transfers:  · Patient completed Sit <> Stand Transfer with moderate assistance and maximal assistance  with  rolling walker and vc's for hand placement    · Patient completed Bed <> Chair Transfer using Stand Pivot technique with moderate assistance and maximal assistance with rolling walker  · Functional Mobility: Pt took a couple of steps during transfer with Mod/Max A using RW.  Posterior lean, knees buckling.      Activities of Daily Living:  · Toileting: total assistance pt stood ~4 min with Min/Mod A using RW while PCT performing hygiene after BM      AMPAC 6 Click ADL: 15    Patient left HOB elevated with all lines intact, call button in reach, bed alarm on and dtr presentEducation:      GOALS:   Multidisciplinary Problems     Occupational Therapy Goals        Problem: Occupational Therapy Goal    Goal Priority Disciplines Outcome Interventions   Occupational Therapy Goal     OT, PT/OT Ongoing, Progressing    Description: Goals to be met by: 11/27     Patient will increase functional independence with ADLs by performing:    UE Dressing with Stand-by Assistance.  LE Dressing with Minimal Assistance.  Grooming while seated at sink with Modified [place].  Toileting from bedside commode with Stand-by Assistance for hygiene and clothing management.   Toilet transfer to bedside commode with Stand-by Assistance.  Upper extremity exercise program x10 reps per handout, with independence.                     Time Tracking:     OT Date of Treatment: 11/04/20  OT Start Time: 1133  OT Stop Time: 1200  OT Total Time (min): 27 min    Billable Minutes:Therapeutic Activity 27    HELEN Worthington  11/4/2020

## 2020-11-04 NOTE — CARE UPDATE
"   .Advance Care Planning     [facility]  I engaged the patient and family in a conversation about advance care planning and we specifically addressed what the goals of care would be moving forward, in light of the patient's change in clinical status, specifically  Palliative medicine met with patient and daughter. Discussed patient current diagnosis/prognosis. Verbalized understanding . Discussed recent CTA of chest. Hospice education provided. Daughter stated, "My dad had to go on hospice care." Discussed with patient possibly needing plurex cath for ascetics.   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    Chest CTA-There are innumerable pulmonary nodules throughout both lungs, consistent with metastatic disease.  For reference, there is a 8 mm pulmonary nodule in the left upper lobe (series 4, image 174) a 6 mm pulmonary nodule in the lingula (series 4, image 218) a 7 mm pulmonary nodule in the left lower lobe (series 4, image 232) a 6 mm pulmonary nodule in the right upper lobe (series 4, image 157), a 5 mm pulmonary nodule in the right middle lobe (series 4, image 1 97).  There is a small left pleural effusion.  No right pleural fluid effusion.  There is bibasilar atelectasis.  No pneumothorax.      The liver is markedly enlarged and near completely replaced by multiple hepatic lesions, consistent with known metastatic disease.  There is heterogeneous enhancement of the spleen.  There is ascites.     On bone windows, there is a 6 mm sclerotic lesion within the scapula (series 2, image 16) which may represent a bone island.  No additional lytic or blastic osseous lesions are identified    We did specifically address the patient's likely prognosis, which is poor.  We explored the patient's values and preferences for future care.  The patient " and family endorses that what is most important right now is to focus on spending time at home, symptom/pain control and quality of life, even if it means sacrificing a little time    Accordingly, we have decided that the best plan to meet the patient's goals includes enrolling in hospice care    I did explain the role for hospice care at this stage of the patient's illness, including its ability to help the patient live with the best quality of life possible.  We will be making a hospice referral. Primary team and case management notified of patient and family wishes.     I spent a total of 90 minutes engaging the patient in this advance care planning discussion.         Code Status  In light of the patients advanced and life limiting illness,I engaged the the patient and family in a conversation about the patient's preferences for care  at the very end of life. Patient and family not ready to make a decision. DNR education provided. I spent a total of 45  minutes engaging the patient in this advance care planning discussion.      Recommendations  Full code  Hospice care upon discharge  DME for home setting  Emotional support  Plurex catheter.     Chloe Wilkerson NP  Palliative Medicine  [facility]  [phone]

## 2020-11-04 NOTE — PT/OT/SLP PROGRESS
Physical Therapy Treatment    Patient Name:  Ching Bruce   MRN:  [id number]    Recommendations:     Discharge Recommendations:  (TBD Pending, Need to determine daughter's ability to provide supervision and physical assistance)   Discharge Equipment Recommendations: none   Barriers to discharge:  Decreased mobility tolerance    Assessment:     Ching Bruce is a 69 y.o. female admitted with a medical diagnosis of <principal problem not specified>.  She presents with the following impairments/functional limitations:  weakness, impaired endurance, impaired self care skills, impaired functional mobilty, gait instability, impaired balance, decreased coordination, decreased upper extremity function, decreased lower extremity function, decreased safety awareness, decreased ROM, impaired coordination, impaired skin. Pt able to perform 4 sit to stand trials 1 from EOB, 2 from B/S chair, and 1 from B/S commode all with RW requiring mod/max A. Pt's BLE's are very weak and demonstrates increased bilateral knee flexion which pt can eventually decrease given extra time. Also performed ~4-5 turning steps x 3 trials 1 from EOB to  B/S chair and 2 from B/S chair to B/S commode then from B/S commode back to B/S chair with RW and max A. BP's taken throughout session: Sitting at EOB after transferring supine to sit 103/49 HR 77, after ~4-5 turning steps taken again in sitting 95/49 HR 79, and taken after 2nd stand trial 98/48 HR 76. Pt did not complain of any dizziness this session. Would benefit from continued PT services to increase pt's out of bed therapeutic activity and exercise.    Rehab Prognosis: Fair; patient would benefit from acute skilled PT services to address these deficits and reach maximum level of function.    Recent Surgery: Procedure(s) (LRB):  EXPLORATION, NECK (Right)      Plan:     During this hospitalization, patient to be seen 6 x/week to address the identified rehab impairments via gait training, therapeutic  activities, therapeutic exercises, neuromuscular re-education and progress toward the following goals:    · Plan of Care Expires:  12/02/20    Subjective     Chief Complaint: None Expressed  Patient/Family Comments/goals: None Expressed  Pain/Comfort:  · Pain Rating 1: 0/10  · Pain Rating Post-Intervention 1: 0/10      Objective:     Communicated with nurse prior to session.  Patient found supine with bed alarm, peripheral IV, PureWick, telemetry upon PT entry to room.     General Precautions: Standard, fall   Orthopedic Precautions:N/A   Braces: N/A     Functional Mobility:  · Bed Mobility:     · Rolling Right: contact guard assistance and minimum assistance  · Scooting: contact guard assistance and  to EOB  · Supine to Sit: minimum assistance  · Transfers:     · Sit to Stand:  moderate assistance and maximal assistance with rolling walker  · Gait:  ~4-5 turning steps  x 3 trials 1 from EOB to B/S chair, and 2 from B/S chait to B/S commode and back all with RW requiiring max A      AM-PAC 6 CLICK MOBILITY  Turning over in bed (including adjusting bedclothes, sheets and blankets)?: 4  Sitting down on and standing up from a chair with arms (e.g., wheelchair, bedside commode, etc.): 3  Moving from lying on back to sitting on the side of the bed?: 3  Moving to and from a bed to a chair (including a wheelchair)?: 3  Need to walk in hospital room?: 2  Climbing 3-5 steps with a railing?: 1  Basic Mobility Total Score: 16       Therapeutic Activities and Exercises:   Pt able to perform 4 sit to stand transfer trials 1 from EOB, 2 from B/S chair, and 1 from B/S commode all with RW requiring mod/max A. Pt requires extra time to decrease bilateral knee flexion which increased with each standing trial. Also performed 3 trials of ambulation training ~4-5 turning steps from EOB to B/S chair, then 2 trials from B/S chair to B/S commode then back all with RW requiring max A. While in standing and while performing ambulation pt  demonstrates an increased posterior lean which she cannot correct with max verbal/tactile cueing. Also demonstrates an increased bilateral knee flexion in stance and while performing ambulation. Statically stood within RW for cleaning secondary to having a bowel movement while on B/S commode. Clean adult diaper donned. BP's taken throughout session please see assessment note above for all readings.    Patient left up in chair with all lines intact, call button in reach, chair alarm on and  nurse notified..    GOALS:   Multidisciplinary Problems     Physical Therapy Goals        Problem: Physical Therapy Goal    Goal Priority Disciplines Outcome Goal Variances Interventions   Physical Therapy Goal     PT, PT/OT Ongoing, Progressing     Description: Goals to be met by: 11/27/2020     Patient will increase functional independence with mobility by performing:    Supine<>sit SPVN  Sit<>stand CGA  Step transfer bed to bedside chair with RW CGA  Ambulate 30 feet within room with RW and CGA/min A  Up in chair x 2 hours  LE TE X 10 reps BLE                     Time Tracking:     PT Received On: 11/04/20  PT Start Time: 0916     PT Stop Time: 1005  PT Total Time (min): 49 min     Billable Minutes: Gait Training  15 and Therapeutic Activity  34    Treatment Type: Treatment  PT/PTA: PTA     PTA Visit Number: 1     Brandee Preciado, PTA  11/04/2020

## 2020-11-04 NOTE — ASSESSMENT & PLAN NOTE
She is being followed by dietary.  She is eating, however, very low pre-albumin contributing to her overall decline in performance status.

## 2020-11-04 NOTE — CONSULTS
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  She has progressive disease and was slated to begin treatment with PRRT, however, her clinical status has deteriorated and she has had multiple admissions for hypercalcemia related to PTH related peptide secondary to her malignancy.  Additionally, she has had worsening performance status and ascites requiring multiple paracenteses.  She states that she has ongoing abdominal distension despite having a paracentesis yesterday.  She has not been out of bed today.  She does have an appetite and is able to eat.  Her pain is well controlled.  She denies any nausea, vomiting.  She denies shortness of breath.  She has no other new complaints.  Oncology history is noted below:    Oncologic History:    Oncologic History Pancreatic neuroendocrine tumor with metasatic disease to liver diagnosed 10/12  Progressive disease in liver 7/2020    Oncologic Treatment Capecitabine/Temozolomide (CAPTEM) 1/13 -11/2018     Pathology 2012:  Pancreatic/liver aspirate- pancreatic endocrine neoplasm,  Ki-67 1%  7/2020: Liver biopsy - well differentiated neuroendocrine tumor, grade 2, Ki-67 15%, Mitotic rate <2/2mm2       Oncology Treatment Plan:   [No treatment plan]    Medications:  Continuous Infusions:   octreotide infusion (50 mcg/mL) 250 mcg/hr (11/03/20 0330)     Scheduled Meds:   cinacalcet  90 mg Oral BID WM    dronabinoL  2.5 mg Oral BID AC    enoxaparin  40 mg Subcutaneous Q24H    ferrous  gluconate  324 mg Oral Daily    furosemide (LASIX) IV  60 mg Intravenous BID loop    gabapentin  300 mg Oral TID    levothyroxine  175 mcg Oral Before breakfast    lidocaine (PF) 10 mg/ml (1%)  1 mL Other Once    polyethylene glycol  17 g Oral Daily    potassium phosphate IVPB  20 mmol Intravenous Q6H MAGDALENA    senna-docusate 8.6-50 mg  1 tablet Oral BID    spironolactone  50 mg Oral Daily    ursodioL  300 mg Oral BID     PRN Meds:acetaminophen, albumin human 25%, albuterol-ipratropium, dextrose 50%, dextrose 50%, glucagon (human recombinant), glucose, glucose, insulin aspart U-100, melatonin, ondansetron, oxyCODONE, promethazine (PHENERGAN) IVPB, traMADoL     Review of patient's allergies indicates:   Allergen Reactions    Epinephrine Anaphylaxis and Other (See Comments)     Can cause Carcinoid Crisis    Sulfa (sulfonamide antibiotics) Other (See Comments)     Urticaria        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    Secondary malignant neoplasm of liver     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) 5/7/2013    Thrombocytopenia 11/12/2013    Thyroid disease     Unspecified essential hypertension 11/12/2013     Past Surgical History:   Procedure Laterality Date    ERCP N/A 6/21/2018    Procedure: ERCP, stent exchange;  Surgeon: Jake Lieberman MD;  Location: [facility];  Service: Endoscopy;  Laterality: N/A;    ERCP N/A  5/23/2019    Procedure: ERCP (ENDOSCOPIC RETROGRADE CHOLANGIOPANCREATOGRAPHY), stent exchange;  Surgeon: Jake Lieberman MD;  Location: [facility] ENDO;  Service: Endoscopy;  Laterality: N/A;    ERCP N/A 11/14/2019    Procedure: ERCP (ENDOSCOPIC RETROGRADE CHOLANGIOPANCREATOGRAPHY), stent exchange;  Surgeon: Jake Lieberman MD;  Location: [facility] ENDO;  Service: Endoscopy;  Laterality: N/A;    ERCP      ERCP N/A 9/22/2020    Procedure: ERCP (ENDOSCOPIC RETROGRADE CHOLANGIOPANCREATOGRAPHY);  Surgeon: Abdulaziz Owen MD;  Location: [facility] ENDO;  Service: Endoscopy;  Laterality: N/A;    THYROIDECTOMY  2011     Family History     Problem Relation (Age of Onset)    Cancer Maternal Grandmother    Diabetes Father        Tobacco Use    Smoking status: Never Smoker    Smokeless tobacco: Never Used   Substance and Sexual Activity    Alcohol use: No    Drug use: No    Sexual activity: Not on file       Review of Systems   Constitutional: Positive for fatigue and unexpected weight change. Negative for chills and fever.   HENT: Negative for congestion, hearing loss and nosebleeds.    Eyes: Negative for visual disturbance.   Respiratory: Negative for cough and shortness of breath.    Cardiovascular: Positive for leg swelling. Negative for chest pain and palpitations.   Gastrointestinal: Positive for abdominal distention. Negative for abdominal pain, blood in stool, constipation, diarrhea, nausea and vomiting.   Genitourinary: Negative for dysuria.   Musculoskeletal: Negative for back pain and gait problem.   Skin: Negative for color change and rash.   Neurological: Negative for dizziness, weakness and headaches.   Hematological: Negative for adenopathy. Does not bruise/bleed easily.   Psychiatric/Behavioral: Negative for confusion.     Objective:     Vital Signs (Most Recent):  Temp: 98.4 °F (36.9 °C) (11/03/20 1242)  Pulse: 69 (11/03/20 1542)  Resp: 16 (11/03/20 1242)  BP: (!) 101/51 (11/03/20 1242)  SpO2: 100 % (11/03/20 1530)  Vital Signs (24h Range):  Temp:  [98.2 °F (36.8 °C)-98.4 °F (36.9 °C)] 98.4 °F (36.9 °C)  Pulse:  [68-81] 69  Resp:  [16-20] 16  SpO2:  [95 %-100 %] 100 %  BP: ()/(44-58) 101/51     Weight: 71.3 kg (157 lb 3 oz)  Body mass index is 24.62 kg/m².  Body surface area is 1.84 meters squared.      Intake/Output Summary (Last 24 hours) at 11/3/2020 1755  Last data filed at 11/3/2020 0500  Gross per 24 hour   Intake 735 ml   Output 900 ml   Net -165 ml       Physical Exam  Constitutional:       General: She is awake. She is not in acute distress.     Appearance: She is well-developed. She is cachectic.   HENT:      Head: Normocephalic.      Mouth/Throat:      Pharynx: No oropharyngeal exudate.   Eyes:      General: No scleral icterus.  Neck:      Musculoskeletal: Neck supple.      Thyroid: No thyromegaly.      Trachea: No tracheal deviation.   Cardiovascular:      Rate and Rhythm: Normal rate and regular rhythm.   Pulmonary:      Effort: Pulmonary effort is normal. No respiratory distress.      Breath sounds: Normal breath sounds. No wheezing or rales.   Abdominal:      General: There is distension.      Palpations: Abdomen is soft. There is no mass.      Tenderness: There is no abdominal tenderness. There is no guarding or rebound.   Musculoskeletal: Normal range of motion.      Right lower leg: Edema present.      Left lower leg: Edema present.   Lymphadenopathy:      Cervical: No cervical adenopathy.   Skin:     General: Skin is warm and dry.   Neurological:      Mental Status: She is alert and oriented to person, place, and time.      Cranial Nerves: No cranial nerve deficit.         Significant Labs:   All pertinent labs from the last 24 hours have been reviewed.    Diagnostic Results:  I have reviewed all pertinent imaging results/findings within the past 24 hours.    Assessment/Plan:     Malnutrition compromising bodily function  She is being followed by dietary.  She is eating, however, very low pre-albumin  contributing to her overall decline in performance status.    Other ascites  She is status post paracentesis.  She may benefit from a permanent drain.    Hypercalcemia  This has improved and will need ongoing treatment.  This is likely related to hypercalcemia of malignancy and possibly from a parathyroid adenoma    Secondary neuroendocrine tumor of liver  She was discussed in tumor board this morning and her images were reviewed.  She has widespread disease within the liver with recurrent ascites and elevated bilirubin.  Given her poor performance status, ECOG PS 3, along with laboratory abnormalities I do not think further systemic treatment would be beneficial at this time.  I have discussed this with her and her daughter in detail today and also introduced the concept of hospice.  They do wish to think about this further and see if her abnormalities improve but I would recommend we consult palliative Care for symptom management and consideration of transition to hospice.        Thank you for your consult. I will follow-up with patient. Please contact us if you have any additional questions.    Cory Rodriguez DO, FACP  Hematology/Oncology  [facility]

## 2020-11-04 NOTE — ASSESSMENT & PLAN NOTE
Secondary neuroendocrine tumor of liver  She was discussed in tumor board this morning and her images were reviewed.  She has widespread disease within the liver with recurrent ascites and elevated bilirubin.  Given her poor performance status, ECOG PS 3, along with laboratory abnormalities I do not think further systemic treatment would be beneficial at this time.  I have discussed this with her and her daughter in detail today and also introduced the concept of hospice.  They do wish to think about this further and see if her abnormalities improve but I would recommend we consult palliative Care for symptom management and consideration of transition to hospice.  -Primary team

## 2020-11-04 NOTE — PLAN OF CARE
Recommendation:   1. Encourage intake of meals & Boost as tolerated.   2. Noted possible hospice.   3. RD to continue to follow to monitor po intake    Goals:   Pt will consume at least 50-75% intake at meals by RD follow up  Nutrition Goal Status: new  Communication of RD Recs: reviewed with RN

## 2020-11-04 NOTE — PLAN OF CARE
Problem: Occupational Therapy Goal  Goal: Occupational Therapy Goal  Description: Goals to be met by: 11/27     Patient will increase functional independence with ADLs by performing:    UE Dressing with Stand-by Assistance.  LE Dressing with Minimal Assistance.  Grooming while seated at sink with Modified [place].  Toileting from bedside commode with Stand-by Assistance for hygiene and clothing management.   Toilet transfer to bedside commode with Stand-by Assistance.  Upper extremity exercise program x10 reps per handout, with independence.    Outcome: Ongoing, Progressing    Ching Bruce is a 69 y.o. female with a medical diagnosis of <principal problem not specified>.  Performance deficits affecting function are weakness, impaired endurance, impaired self care skills, impaired functional mobilty, gait instability, impaired balance, decreased upper extremity function, decreased lower extremity function, decreased safety awareness. Pt found in chair, requesting back to bed 2/2 soiled.  Pt required Mod/Max A for sit>stand from chair and to transfer back to bed.  Tot A for cleaning.  Pt with fair tolerance of therapy. Continue OT services to address functional goals, progressing as able.    JOSE A Worthington/L

## 2020-11-04 NOTE — SUBJECTIVE & OBJECTIVE
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    Secondary malignant neoplasm of liver     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) 5/7/2013    Thrombocytopenia 11/12/2013    Thyroid disease     Unspecified essential hypertension 11/12/2013       Past Surgical History:   Procedure Laterality Date    ERCP N/A 6/21/2018    Procedure: ERCP, stent exchange;  Surgeon: Jake Lieberman MD;  Location: [facility];  Service: Endoscopy;  Laterality: N/A;    ERCP N/A 5/23/2019    Procedure: ERCP (ENDOSCOPIC RETROGRADE CHOLANGIOPANCREATOGRAPHY), stent exchange;  Surgeon: Jake Lieberman MD;  Location: [facility];  Service: Endoscopy;  Laterality: N/A;    ERCP N/A 11/14/2019    Procedure: ERCP (ENDOSCOPIC RETROGRADE CHOLANGIOPANCREATOGRAPHY), stent exchange;  Surgeon: Jake Lieberman MD;  Location: [facility] ENDO;  Service: Endoscopy;  Laterality: N/A;    ERCP      ERCP N/A 9/22/2020    Procedure: ERCP (ENDOSCOPIC RETROGRADE CHOLANGIOPANCREATOGRAPHY);  Surgeon: Abdulaziz Owen MD;  Location: [facility];  Service: Endoscopy;  Laterality: N/A;    THYROIDECTOMY  2011       Review of patient's allergies indicates:   Allergen Reactions    Epinephrine Anaphylaxis and Other (See Comments)     Can cause Carcinoid Crisis    Sulfa (sulfonamide antibiotics) Other (See Comments)     Urticaria       Medications:  Continuous Infusions:   octreotide infusion  (50 mcg/mL) 250 mcg/hr (11/04/20 0519)     Scheduled Meds:   cinacalcet  90 mg Oral BID WM    dronabinoL  2.5 mg Oral BID AC    enoxaparin  40 mg Subcutaneous Q24H    ferrous gluconate  324 mg Oral Daily    furosemide (LASIX) IV  60 mg Intravenous BID loop    gabapentin  300 mg Oral TID    levothyroxine  175 mcg Oral Before breakfast    lidocaine (PF) 10 mg/ml (1%)  1 mL Other Once    magnesium sulfate IVPB  2 g Intravenous Once    polyethylene glycol  17 g Oral Daily    potassium chloride in water  10 mEq Intravenous Q1H    senna-docusate 8.6-50 mg  1 tablet Oral BID    spironolactone  50 mg Oral Daily    ursodioL  300 mg Oral BID     PRN Meds:acetaminophen, albumin human 25%, albuterol-ipratropium, dextrose 50%, dextrose 50%, glucagon (human recombinant), glucose, glucose, insulin aspart U-100, melatonin, ondansetron, oxyCODONE, promethazine (PHENERGAN) IVPB, traMADoL    Family History     Problem Relation (Age of Onset)    Cancer Maternal Grandmother    Diabetes Father        Tobacco Use    Smoking status: Never Smoker    Smokeless tobacco: Never Used   Substance and Sexual Activity    Alcohol use: No    Drug use: No    Sexual activity: Not on file       Review of Systems   Constitutional: Positive for fatigue and unexpected weight change. Negative for chills and fever.   HENT: Negative for congestion, hearing loss and nosebleeds.    Eyes: Negative for visual disturbance.   Respiratory: Negative for cough and shortness of breath.    Cardiovascular: Positive for leg swelling. Negative for chest pain and palpitations.   Gastrointestinal: Positive for abdominal distention. Negative for abdominal pain, blood in stool, constipation, diarrhea, nausea and vomiting.   Genitourinary: Negative for dysuria.   Musculoskeletal: Negative for back pain and gait problem.   Skin: Negative for color change and rash.   Neurological: Negative for dizziness, weakness and headaches.   Hematological: Negative for  adenopathy. Does not bruise/bleed easily.   Psychiatric/Behavioral: Negative for confusion.     Objective:     Vital Signs (Most Recent):  Temp: 98.1 °F (36.7 °C) (11/04/20 0827)  Pulse: 77 (11/04/20 0827)  Resp: 18 (11/04/20 0827)  BP: (!) 87/48 (11/04/20 0827)  SpO2: 97 % (11/04/20 0430) Vital Signs (24h Range):  Temp:  [98.1 °F (36.7 °C)-98.4 °F (36.9 °C)] 98.1 °F (36.7 °C)  Pulse:  [68-83] 77  Resp:  [16-18] 18  SpO2:  [96 %-100 %] 97 %  BP: ()/(45-54) 87/48     Weight: 71.3 kg (157 lb 3 oz)  Body mass index is 24.62 kg/m².    Physical Exam  Constitutional:       General: She is awake. She is not in acute distress.     Appearance: She is well-developed. She is cachectic.   HENT:      Head: Normocephalic.      Mouth/Throat:      Pharynx: No oropharyngeal exudate.   Eyes:      General: No scleral icterus.  Neck:      Musculoskeletal: Neck supple.      Thyroid: No thyromegaly.      Trachea: No tracheal deviation.   Cardiovascular:      Rate and Rhythm: Normal rate and regular rhythm.   Pulmonary:      Effort: Pulmonary effort is normal. No respiratory distress.      Breath sounds: Normal breath sounds. No wheezing or rales.   Abdominal:      General: There is distension.      Palpations: Abdomen is soft. There is no mass.      Tenderness: There is no abdominal tenderness. There is no guarding or rebound.   Musculoskeletal: Normal range of motion.      Right lower leg: Edema present.      Left lower leg: Edema present.   Lymphadenopathy:      Cervical: No cervical adenopathy.   Skin:     General: Skin is warm and dry.   Neurological:      Mental Status: She is alert and oriented to person, place, and time.      Cranial Nerves: No cranial nerve deficit.         Review of Symptoms    Symptom Assessment (ESAS 0-10 Scale)  Pain:  0  Dyspnea:  0  Anxiety:  0  Nausea:  0  Depression:  0  Anorexia:  0  Fatigue:  0  Insomnia:  0  Restlessness:  0  Agitation:  0     CAM / Delirium:  Negative  Constipation:   Negative  Diarrhea:  Negative    Bowel Management Plan (BMP):  Yes            ECOG Performance Status Grade:  2 - Ambulates, capable of self care only    Living Arrangements:  Lives with family    Psychosocial/Cultural: Yes    Spiritual:  F - Nisreen and Belief:  Advent  A - Address in Care:  Yes      Advance Care Planning   Advance Directives:   Living Will: No        Oral Declaration: No      Decision Making:  Patient answered questions and Family answered questions         Significant Labs: None  CBC:   Recent Labs   Lab 11/04/20  0516   WBC 6.65   HGB 7.7*   HCT 24.2*   *   *     BMP:  Recent Labs   Lab 11/04/20  0516         K 3.3*      CO2 27   BUN 26*   CREATININE 1.6*   CALCIUM 9.0   MG 1.6     LFT:  Lab Results   Component Value Date    AST 18 11/04/2020    ALKPHOS 349 (H) 11/04/2020    BILITOT 2.0 (H) 11/04/2020     Albumin:   Albumin   Date Value Ref Range Status   11/04/2020 2.6 (L) 3.5 - 5.2 g/dL Final     Protein:   Total Protein   Date Value Ref Range Status   11/04/2020 5.9 (L) 6.0 - 8.4 g/dL Final     Lactic acid:   Lab Results   Component Value Date    LACTATE 1.2 09/01/2020    LACTATE 1.0 06/29/2014       Significant Imaging: None       Recommendations  Disease education   Hospice education   DNR education   Family meeting at 10am

## 2020-11-04 NOTE — ASSESSMENT & PLAN NOTE
This has improved and will need ongoing treatment.  This is likely related to hypercalcemia of malignancy and possibly from a parathyroid adenoma-  Primary team

## 2020-11-05 PROBLEM — K74.60 CIRRHOSIS: Status: ACTIVE | Noted: 2020-01-01

## 2020-11-05 PROBLEM — C7B.8 NEUROENDOCRINE CARCINOMA METASTATIC TO MULTIPLE SITES: Status: ACTIVE | Noted: 2020-01-01

## 2020-11-05 PROBLEM — C78.01 BILATERAL PULMONARY METASTASES: Status: ACTIVE | Noted: 2020-01-01

## 2020-11-05 PROBLEM — C78.02 BILATERAL PULMONARY METASTASES: Status: ACTIVE | Noted: 2020-01-01

## 2020-11-05 NOTE — OP NOTE
[facility]   Operative Note    SUMMARY     Surgery Date: 11/5/2020     Surgeon(s) and Role:     * ELISE Mcknight MD - Primary    Assisting Surgeon: None    Pre-op Diagnosis:  Other ascites [R18.8]  Secondary neuroendocrine tumor of liver [C7B.8]    Post-op Diagnosis:  Post-Op Diagnosis Codes:     * Other ascites [R18.8]     * Secondary neuroendocrine tumor of liver [C7B.8]    Procedure(s) (LRB):  INSERTION, CATHETER, INTRAPERITONEAL (N/A)   FLUOROSCOPY < 1 HR    Anesthesia: Monitor Anesthesia Care    Description of Procedure:  With the patient supine the operating table under intravenous sedation sterile prep and drape and after time-out after instillation of local anesthesia with Marcaine Exparel, the sheath to finer needle was inserted into the abdomen until clear peritoneal fluid was obtained.  Guidewire was advanced into the peritoneal cavity under fluoroscopic control the sheath removed.  The exit site for the catheter was then chosen injected with a local anesthetic a counter incision was made with additional local anesthetic the catheter was drawn through the tunnel to place the a cuffed Jose portion of the catheter into the subcutaneous tissues.  Next the introducer dilator was advanced over the wire under fluoroscopic control the wire removed the dilator removed and clear fluid was obtained.  The catheter was advanced in the peritoneal cavity again and fluoroscopic control placed in the catheter in the pelvis using the peel-away sheath introducer.  The peel-away sheath was removed the catheter was was positioned in a comfortable arc and tested for patency, clear ascites fluid was returned.  The catheter was flushed and capped.  The incision at the insertion site was closed with 4 O Monocryl.  The catheter exit site was dressed with Dermabond and a sterile dressing.  Estimated blood loss was minimal.  Sponge needle counts were correct.  Patient was taken to the recovery room in  stable condition    Description of the findings of the procedure:  Ascites,   Estimated Blood Loss:  Minimal    Estimated Blood Loss has been documented.         Specimens:   Specimen (12h ago, onward)    None          [id number]

## 2020-11-05 NOTE — PT/OT/SLP PROGRESS
Occupational Therapy      Patient Name:  Ching Bruce   MRN:  [id number]    Patient not seen today secondary to Unavailable (Comment)(AM/PM: Pt in OR.)Will follow-up tomorrow.    HELEN Worthington  11/5/2020

## 2020-11-05 NOTE — PROGRESS NOTES
GENERAL SURGERY  PROGRESS NOTE    Admit Date: 10/26/2020  Hospital Day: 8    NAEON, afebrile, HDS  Pressures remain soft s/p 50g albumin, lasix was held, asymptomatic   Cr elevating since yesterday, previously normal 2.1 today, yesterday 1.6, previously around 1  Palliative care saw yesterday, agreed on plan for hospice care  Will plan for PleurX catheter this am  Pain well controlled  Tolerating diet  No n/v/d  No sob, cp    Physical Exam:  Gen: NAD, awake/alert, interactive   Cardio: RR  Pulm: normal WOB on RA  Abdomen: soft, NT, ND, new vesicular rash  MSK: moving all extremities, bilateral LE with edema, RUE swelling  Neuro: alert    Inpatient Data      Vitals:   Temp:  [97.8 °F (36.6 °C)-98.4 °F (36.9 °C)]   Pulse:  [68-77]   Resp:  [18]   BP: ()/(40-52)   SpO2:  [97 %-99 %]     Diet NPO Except for: Sips with Medication   Intake/Output Summary (Last 24 hours) at 11/5/2020 0741  Last data filed at 11/5/2020 0600  Gross per 24 hour   Intake 260 ml   Output 250 ml   Net 10 ml          Recent Labs     11/03/20  0457 11/04/20  0516 11/05/20  0614   WBC 5.78 6.65 7.39   HGB 7.8* 7.7* 7.7*   HCT 24.5* 24.2* 23.9*   * 115* 116*    138 137   K 2.8* 3.3* 3.8    100 100   CO2 28 27 25   BUN 21 26* 33*   CREATININE 1.1 1.6* 2.1*   BILITOT 2.4* 2.0* 2.1*   AST 16 18 17   ALT 11 9* 10   ALKPHOS 385* 349* 332*   CALCIUM 9.8 9.0 8.9   ALBUMIN 2.7* 2.6* 3.0*   PROT 6.0 5.9* 6.1   MG 1.7 1.6 2.1   PHOS 3.9 5.2* 4.5     Scheduled Meds: cinacalcet, 90 mg, BID WM  dronabinoL, 2.5 mg, BID AC  enoxaparin, 40 mg, Q24H  ferrous gluconate, 324 mg, Daily  gabapentin, 300 mg, TID  levothyroxine, 175 mcg, Before breakfast  lidocaine (PF) 10 mg/ml (1%), 1 mL, Once  midodrine, 5 mg, Q8H  polyethylene glycol, 17 g, Daily  senna-docusate 8.6-50 mg, 1 tablet, BID  spironolactone, 50 mg, Daily  ursodioL, 300 mg, BID  valACYclovir, 1,000 mg, BID       octreotide infusion (50 mcg/mL) 250 mcg/hr (11/04/20 0519)     A/P:    68yo F with metastatic NET (dx 2012) s/p ERCP, sphincterectomy, and biliary stent placement with removal 9/22, hyperparathyroidism, parathyroid adenoma, and HFpEF (EF 60%, G1DD) who presents with recurrent symptomatic hypercalcemia s/p IR venous sampling.     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  -Heme/Onc do not rec further systemic tx for NET  -Cr now 2.1, has been trending up, NAZ 2/2 fluid restriction and BID lasix vs hepatorenal 2/2 mets, will also consider adding midodrine bc also with soft pressures, avoiding nephrotoxins and holding lasix  -Albumin  -cincalcet, lasix BID (held due to soft pressures and NAZ), spironolactone  -s/p therapeutic paracentesis 11/2  -Lytes wnl this am  -Valacyclovir for suspected shingles  -Plan for PleurX catheter today  -Fluid restriction 1200 per d  -Palliative care met with pt yesterday, agree on hospice  -PT/OT, encourage ambulation, deep breathing, cough, IS  -wound care on board for stage 1 pressure wound of lower back   -levothyroxine   -SSI, monitor glucose   -pain control as needed  -Dvt ppx: Lovenox      Dispo: PleurX catheter today, setting up hospice.     Enrique Fine MD, MSc   [facility] PGY-2  11/05/2020

## 2020-11-05 NOTE — ANESTHESIA POSTPROCEDURE EVALUATION
Anesthesia Post Evaluation    Patient: Ching Bruce    Procedure(s) Performed: Procedure(s) (LRB):  INSERTION, CATHETER, INTRAPERITONEAL (N/A)    Final Anesthesia Type: MAC    Patient location during evaluation: PACU  Patient participation: Yes- Able to Participate  Level of consciousness: awake and alert, oriented and awake  Post-procedure vital signs: reviewed and stable  Pain management: adequate  Airway patency: patent    PONV status at discharge: No PONV  Anesthetic complications: no      Cardiovascular status: blood pressure returned to baseline  Respiratory status: unassisted and room air  Hydration status: euvolemic  Follow-up not needed.          Vitals Value Taken Time   BP 92/53 11/05/20 1336   Temp 36.6 °C (97.8 °F) 11/05/20 1336   Pulse 62 11/05/20 1336   Resp 16 11/05/20 1336   SpO2 94 % 11/05/20 1336         Event Time   Out of Recovery 11/05/2020 13:02:08         Pain/John Score: Pain Rating Prior to Med Admin: 7 (11/5/2020  1:36 PM)  John Score: 10 (11/5/2020  1:00 PM)

## 2020-11-05 NOTE — ANESTHESIA PREPROCEDURE EVALUATION
11/05/2020  Ching Bruce is a 69 y.o., female presents for catheter placement under MAC.    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    Secondary malignant neoplasm of liver     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) 5/7/2013    Thrombocytopenia 11/12/2013    Thyroid disease     Unspecified essential hypertension 11/12/2013     Past Surgical History:   Procedure Laterality Date    ERCP N/A 6/21/2018    Procedure: ERCP, stent exchange;  Surgeon: Jake Lieberman MD;  Location: [facility];  Service: Endoscopy;  Laterality: N/A;    ERCP N/A 5/23/2019    Procedure: ERCP (ENDOSCOPIC RETROGRADE CHOLANGIOPANCREATOGRAPHY), stent exchange;  Surgeon: Jake Lieberman MD;  Location: [facility];  Service: Endoscopy;  Laterality: N/A;    ERCP N/A 11/14/2019    Procedure: ERCP (ENDOSCOPIC RETROGRADE CHOLANGIOPANCREATOGRAPHY), stent exchange;  Surgeon: Jake Lieberman MD;  Location: [facility];  Service: Endoscopy;  Laterality: N/A;    ERCP      ERCP N/A 9/22/2020    Procedure: ERCP (ENDOSCOPIC RETROGRADE CHOLANGIOPANCREATOGRAPHY);  Surgeon: Abdulaziz Owen MD;  Location: [facility];  Service: Endoscopy;  Laterality: N/A;    THYROIDECTOMY  2011         Pre-op Assessment    I have reviewed the Patient Summary Reports.     I have reviewed  the Nursing Notes. I have reviewed the NPO Status.   I have reviewed the Medications.     Review of Systems  Anesthesia Hx:  No problems with previous Anesthesia    Cardiovascular:   Hypertension    Pulmonary:  Pulmonary Normal    Renal/:   Chronic Renal Disease    Hepatic/GI:   Liver Disease,    Neurological:  Neurology Normal    Endocrine:  Endocrine Normal      Physical Exam  General:  Well nourished    Airway/Jaw/Neck:  Airway Findings: Mouth Opening: Normal Tongue: Normal  General Airway Assessment: Adult  Mallampati: II  TM Distance: Normal, at least 6 cm       Chest/Lungs:  Chest/Lungs Findings: Clear to auscultation, Normal Respiratory Rate     Heart/Vascular:  Heart Findings: Rate: Normal  Rhythm: Regular Rhythm  Sounds: Normal        Mental Status:  Mental Status Findings:  Cooperative, Alert and Oriented       Lab Results   Component Value Date    WBC 4.4 11/04/2019    HGB 12.3 11/04/2019    HCT 36.7 11/04/2019    MCV 96.1 11/04/2019     11/04/2019       BMP  Lab Results   Component Value Date     11/05/2020    K 3.8 11/05/2020     11/05/2020    CO2 25 11/05/2020    BUN 33 (H) 11/05/2020    CREATININE 2.1 (H) 11/05/2020    CALCIUM 8.9 11/05/2020    ANIONGAP 12 11/05/2020    ESTGFRAFRICA 27 (A) 11/05/2020    EGFRNONAA 23 (A) 11/05/2020         Anesthesia Plan  Type of Anesthesia, risks & benefits discussed:  Anesthesia Type:  MAC  Patient's Preference:   Intra-op Monitoring Plan: standard ASA monitors  Intra-op Monitoring Plan Comments:   Post Op Pain Control Plan: per primary service following discharge from PACU  Post Op Pain Control Plan Comments:   Induction:   IV  Beta Blocker:         Informed Consent: Patient understands risks and agrees with Anesthesia plan.  Questions answered. Anesthesia consent signed with patient.  ASA Score: 3     Day of Surgery Review of History & Physical:  There are no significant changes.          Ready For Surgery From Anesthesia Perspective.

## 2020-11-05 NOTE — PT/OT/SLP PROGRESS
Physical Therapy      Patient Name:  Ching Bruce   MRN:  [id number]    1213 - Patient not seen today secondary to off unit in surgery.  Will follow-up next tx date.     Nayana Vasquez, PTA

## 2020-11-05 NOTE — PLAN OF CARE
Pt aaox4 throughout the day. No c/o pain or N/V throughout the day. Call light and personal belongings in reach. Pt instructed to press the call light. Pt verbalizes understanding. Pt turned q2h. Tolerating regular diet well. Pt to be NPO at midnight. Pt up with PT and OT today. Pt given medications per MAR. BP monitored closely.

## 2020-11-05 NOTE — PROGRESS NOTES
Pharmacist Renal Dose Adjustment Note    Ching Bruce is a 69 y.o. female being treated with the medication enoxaparin, valacyclovir    Patient Data:    Vital Signs (Most Recent):  Temp: 97.7 °F (36.5 °C) (11/05/20 1300)  Pulse: 66 (11/05/20 1300)  Resp: 16 (11/05/20 1336)  BP: (!) 97/53 (11/05/20 1300)  SpO2: 98 % (11/05/20 1300)   Vital Signs (72h Range):  Temp:  [97.7 °F (36.5 °C)-98.4 °F (36.9 °C)]   Pulse:  [65-88]   Resp:  [11-20]   BP: ()/(40-63)   SpO2:  [95 %-100 %]      Recent Labs   Lab 11/03/20  0457 11/04/20  0516 11/05/20  0614   CREATININE 1.1 1.6* 2.1*     Serum creatinine: 2.1 mg/dL (H) 11/05/20 0614  Estimated creatinine clearance: 24.6 mL/min (A)    Medication:Enoxaparin dose: 40 mg frequency q 24 hours will be changed to medication:enoxaparin dose:30 mg frequency:daily    Medication:Valacyclovir dose: 1000 gm frequency BID will be changed to medication:valacyclovir dose:1000mg frequency:daily    Pharmacist's Name: Ching Bowie  Pharmacist's Extension: [phone]

## 2020-11-05 NOTE — PLAN OF CARE
Patient resting in bed, AAOX4. Cornell infusing as ordered. Medications administered as ordered. No complaints of pain or discomfort. Asleep majority of the shift. Encouraged to call with needs or concerns. Will continue to monitor.

## 2020-11-05 NOTE — PLAN OF CARE
[place] called and spoke with pt's daughter Isabelle     pt set up with [facility] - -- paperwork has been signed  equipment will be delivered to pt's home today   Pleuracath placed -  per daughter - pt for d/c to home tomorrow, Fri 11/06 11/05/20 1420   Discharge Reassessment   Assessment Type Discharge Planning Reassessment   Provided patient/caregiver education on the expected discharge date and the discharge plan Yes

## 2020-11-06 NOTE — PLAN OF CARE
[facility]  Department of Hospital Medicine  [address] [place] [address]  [phone] after hours  [phone] fax    HOSPICE  ORDERS    11/06/2020    Admit to Hospice:  [facility]    Diagnoses:   Active Hospital Problems    Diagnosis  POA    *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 [C7A.8, C7B.8]  Yes    Bilateral pulmonary metastases [C78.01, C78.02]  Yes     Priority: 1 - High    Cirrhosis [K74.60]  Yes    Counseling regarding advance care planning and goals of care [Z71.89]  Not Applicable    Chest pain [R07.9]  Yes    Fatigue [R53.83]  Yes    Moderate malnutrition [E44.0]  Yes    History of thyroidectomy [Z90.09]  Not Applicable    Palliative care encounter [Z51.5]  Not Applicable    Hyperparathyroidism, unspecified [E21.3]  Yes    Jaundice [R17]  Yes    Malnutrition compromising bodily function [E46]  Yes    Other ascites [R18.8]  Yes    Hypercalcemia [E83.52]  Yes    Secondary neuroendocrine tumor of liver [C7B.8]  Yes      Resolved Hospital Problems   No resolved problems to display.       Hospice Qualifying Diagnoses:        Patient has a life expectancy < 6 months due to:  1) Primary Hospice Diagnosis:  Neuroendocrine Carcinoma with metastasis to multiple sites  2) Comorbid Conditions Contributing to Decline:  Cirrhosis, Hypercalcemia, RF    Vital Signs: Routine per Hospice Protocol.    Code Status: FULL    Allergies:   Review of patient's allergies indicates:   Allergen Reactions    Epinephrine Anaphylaxis and Other (See Comments)     Can cause Carcinoid Crisis    Sulfa (sulfonamide antibiotics) Other (See Comments)     Urticaria       Diet: Regular     Activities: As tolerated    Nursing: Per Hospice Routine.      Drain Care: PleurX catheter in place, pt educated on routine maintenance, how to use, and return precautions, please check on drain as needed    Jorge Care: Empty Jorge bag Q shift and PRN.  Change Jorge  every month.    Routine Skin for Bedridden Patients: Apply moisture barrier cream to all skin folds and   wet areas in perineal area daily and after baths and all bowel movements.    Medications:      Ching Bruce   Home Medication Instructions JANET:[id number]    Printed on:11/06/20 1347   Medication Information                      cinacalcet (SENSIPAR) 90 MG Tab  Take 1 tablet (90 mg total) by mouth 2 (two) times daily with meals.             dronabinoL (MARINOL) 2.5 MG capsule  Take 1 capsule (2.5 mg total) by mouth 2 (two) times daily before meals.             furosemide (LASIX) 20 MG tablet  Take 1 tablet (20 mg total) by mouth 2 (two) times daily.             gabapentin (NEURONTIN) 300 MG capsule  Take 1 capsule (300 mg total) by mouth 3 (three) times daily.             levothyroxine (SYNTHROID, LEVOTHROID) 175 MCG tablet  Take 1 tablet (175 mcg total) by mouth before breakfast.             midodrine (PROAMATINE) 5 MG Tab  Take 1 tablet (5 mg total) by mouth every 8 (eight) hours.             spironolactone (ALDACTONE) 50 MG tablet  Take 1 tablet (50 mg total) by mouth once daily.             ursodioL (ACTIGALL) 300 mg capsule  Take 1 capsule (300 mg total) by mouth 2 (two) times daily.                 Future Orders:  Hospice Medical Director may dictate new orders for comfortable care measures & sign death certificate.        _________________________________  Enrique Fine MD  11/06/2020

## 2020-11-06 NOTE — PROGRESS NOTES
GENERAL SURGERY  PROGRESS NOTE    Admit Date: 10/26/2020  Hospital Day: 9    NAEON, afebrile, HDS  Pressures improved   Am labs pending  S/p PleurX catheter yesterday, tolerated well  Pain well controlled  Tolerating diet  No n/v/d  No sob, cp    Physical Exam:  Gen: NAD, awake/alert, interactive   Cardio: RR  Pulm: normal WOB on RA  Abdomen: soft, NT, ND, small vesicular rash, PleurX catheter in place c/d/i  MSK: moving all extremities, bilateral LE with edema, RUE swelling  Neuro: alert    Inpatient Data      Vitals:   Temp:  [97.1 °F (36.2 °C)-98.6 °F (37 °C)]   Pulse:  [58-78]   Resp:  [11-18]   BP: ()/(49-54)   SpO2:  [94 %-98 %]     Diet Adult Regular (IDDSI Level 7) [facility]   Intake/Output Summary (Last 24 hours) at 11/6/2020 0737  Last data filed at 11/5/2020 0900  Gross per 24 hour   Intake --   Output 150 ml   Net -150 ml          Recent Labs     11/04/20  0516 11/05/20  0614   WBC 6.65 7.39   HGB 7.7* 7.7*   HCT 24.2* 23.9*   * 116*    137   K 3.3* 3.8    100   CO2 27 25   BUN 26* 33*   CREATININE 1.6* 2.1*   BILITOT 2.0* 2.1*   AST 18 17   ALT 9* 10   ALKPHOS 349* 332*   CALCIUM 9.0 8.9   ALBUMIN 2.6* 3.0*   PROT 5.9* 6.1   MG 1.6 2.1   PHOS 5.2* 4.5     Scheduled Meds: albumin human 25%, 12.5 g, Once  cinacalcet, 90 mg, BID WM  dronabinoL, 2.5 mg, BID AC  enoxaparin, 30 mg, Q24H  ferrous gluconate, 324 mg, Daily  furosemide, 20 mg, BID  gabapentin, 300 mg, TID  levothyroxine, 175 mcg, Before breakfast  lidocaine (PF) 10 mg/ml (1%), 1 mL, Once  midodrine, 5 mg, Q8H  polyethylene glycol, 17 g, Daily  senna-docusate 8.6-50 mg, 1 tablet, BID  spironolactone, 50 mg, Daily  ursodioL, 300 mg, BID  valACYclovir, 1,000 mg, Daily       octreotide infusion (50 mcg/mL) 250 mcg/hr (11/06/20 0144)     A/P:   68yo F with metastatic NET (dx 2012) s/p ERCP, sphincterectomy, and biliary stent placement with removal 9/22, hyperparathyroidism, parathyroid adenoma, and HFpEF (EF 60%,  G1DD) who presents with recurrent symptomatic hypercalcemia s/p IR venous sampling.     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  -Heme/Onc do not rec further systemic tx for NET  -Cr has been trending up, NAZ 2/2 fluid restriction and BID lasix vs hepatorenal 2/2 mets, decreased lasix, lifting fluid restriction now that PleurX catheter in place, pending am labs  -cincalcet, lasix, spironolactone, albumin  -Palliative care met with pt, agree on hospice  -PT/OT, encourage ambulation, deep breathing, cough, IS  -wound care on board for stage 1 pressure wound of lower back   -levothyroxine   -SSI, monitor glucose   -pain control as needed  -Dvt ppx: Lovenox      Dispo: Home today with hospice care     Enrique Fine MD, MSc   [facility] PGY-2  11/06/2020

## 2020-11-06 NOTE — DISCHARGE SUMMARY
Discharge Summary                [facility]    Admit Date: 10/26/2020  1:18 PM    Discharge Date and Time: 11/06/2020    Attending Physician: Joshua Valerio MD    Discharge Physician: Enrique Fine    Consults: Hematology/Oncology    Diagnoses:   Problem Noted   Bilateral Pulmonary Metastases 11/5/2020   Cirrhosis 11/5/2020   Counseling Regarding Advance Care Planning and Goals of Care 11/4/2020   Chest Pain    Fatigue    Moderate Malnutrition 10/27/2020   History of Thyroidectomy    Palliative Care Encounter 10/19/2020   Hyperparathyroidism, Unspecified    Jaundice 9/22/2020   Malnutrition Compromising Bodily Function 9/4/2020   Other Ascites    Neuroendocrine Carcinoma Metastatic to Multiple Sites 9/1/2020   Secondary Neuroendocrine Tumor of Liver 5/7/2013   Hypercalcemia 5/7/2013     Discharged Condition: Stable     Hospital Course:   Ching Bruce is a 70 yo female with PMH of pancreatic neuroendocrine tumor  (dx 2012) s/p ERCP, sphincterectomy, and biliary stent placement with removal 9/22, hyperparathyroidism, parathyroid adenoma, and HFpEF (EF 60%, G1DD) that was admitted to [facility] on 10/19 for generalized weakness, AMS and 3 non-traumatic falls. The patient has struggled with recurrent episodes of hypercalcemia and has been on alendronate, cinacalet, zoledronic acid, and IVF infusions. She was admitted to medicine for management of severe hypercalcemia. Started on IVF, calcitonin, lasix, cinacalcet (continued from home), and given one dose of zometa on 10/20. Cinacalcet dose increased to 60 BID on 10/23. KUB ordered showed large stool burden without obstruction/impaction. Palliative care was consulted to establish baseline given patient's decline and recurrent episodes of hypercalcemia with seemingly limited treatment options. Calcium and mental status improved with treatments, however calcium level stable between 12-13. Consultation placed to endocrinology for  assistance and recommendations on further treatment options, recommended uptitration of cinacalcet to 90 BID on 10/30 and evaluate for repeat zometa on 10/27. Patient left AMA to pursue care at our facility.    On the floor, medical management for hypercalcemia was initiated.  Ultrasound of abdomen showing moderate ascites.  Discussions about surgical intervention and further workup were initiated in depth.  ENT consulted for evaluation of vocal cords which showed normal cord mobility.  Interventional Radiology was also consulted for venous sampling that was able to nicely elevated PTH which found to be localized to right neck.  Due to cirrhosis and liver failure this presented quite a challenge for possible surgical intervention.  Meanwhile therapeutic paracentesis was done to remove fluid off abdomen.  Fluid restriction initiated.  Hematology and oncology also consulted and did not think that further intervention at this time was warranted.  CT of the chest also showing innumerable pulmonary nodules consistent with worsening metastatic disease most likely contributing to presenting complaint of symptomatic hypercalcemia.  Due to extent of disease and poor candidacy for surgical intervention it was concluded that no surgery was necessary at this time.  Palliative Care consulted and met with patient who agreed that home hospice with best suit her.  Meanwhile our  patient would benefit from peritoneal catheter to allow patient to drain abdomen as needed.  PleurX catheter placed.  Home hospice was set up.  Normal diet resumed.  During hospital stay patient also with elevating creatinine and worsening kidney function.  This was believed to be secondary to higher dose Lasix, fluid restriction, and extensive disease with possible developing hepatorenal syndrome.  Due to this Lasix dose was decreased, regular diet was re-initiated and PleurX catheter placed to allow for drainage of abdomen as needed.    Patient  to be discharged with home hospice.  Patient in agreement with plan and is okay with being discharged home.  Catheter care and management along with return precautions explained patient by nursing staff.  All questions asked and answered.    Disposition: Home w/ Home Hospice    Diet: general    Activity: activity as tolerated    Discharge Medications: Refer to Discharge Medication/Medcard

## 2020-11-06 NOTE — PT/OT/SLP PROGRESS
Physical Therapy Treatment    Patient Name:  Ching Bruce   MRN:  [id number]    Recommendations:     Discharge Recommendations:  home with hospice   Discharge Equipment Recommendations: none   Barriers to discharge: decreased functional mobility    Assessment:     Ching Bruce is a 69 y.o. 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  She presents with the following impairments/functional limitations:  weakness, impaired functional mobilty, impaired cardiopulmonary response to activity, impaired endurance, gait instability, impaired balance, impaired self care skills, decreased lower extremity function.  Pt supine to sit with Min assist of 1.  Pt transferred bed to chair with RW with Mod assist of 1 taking 6 steps.  Pt sit to stand from chair with Mod assist, standing 1 1/2 minutes to place pillow in chair.  Chair reclined, nurse notified of patient being up in chair.    Rehab Prognosis: Fair; patient would benefit from acute skilled PT services to address these deficits and reach maximum level of function.    Recent Surgery: Procedure(s) (LRB):  INSERTION, CATHETER, INTRAPERITONEAL (N/A) 1 Day Post-Op    Plan:     During this hospitalization, patient to be seen 6 x/week to address the identified rehab impairments via gait training, therapeutic activities, therapeutic exercises, neuromuscular re-education and progress toward the following goals:    · Plan of Care Expires:  12/05/20    Subjective     Chief Complaint: I feel ok  Patient/Family Comments/goals: get in chair  Pain/Comfort:  · Pain Rating 1: 0/10      Objective:     Communicated with nurse prior to session.  Patient found HOB elevated with bed alarm, peripheral IV, PureWick, telemetry upon PT entry to room.     General Precautions: Standard, fall   Orthopedic Precautions:N/A   Braces:       Functional Mobility:  Pt supine to sit with Min assist of 1.  Pt transferred bed to chair with RW with Mod  assist of 1 taking 6 steps.  Pt sit to stand from chair with Mod assist, standing 1 1/2 minutes to place pillow in chair.  Chair reclined, nurse notified of patient being up in chair.      AM-PAC 6 CLICK MOBILITY          Therapeutic Activities and Exercises:   Pt performed BLE at EOB AP 20 x 2, hip flexion and LAQ 10 x 2.    Patient left up in chair with all lines intact, call button in reach, chair alarm on and nurse notified..    GOALS:   Multidisciplinary Problems     Physical Therapy Goals        Problem: Physical Therapy Goal    Goal Priority Disciplines Outcome Goal Variances Interventions   Physical Therapy Goal     PT, PT/OT Ongoing, Progressing     Description: Goals to be met by: 11/27/2020     Patient will increase functional independence with mobility by performing:    Supine<>sit SPVN  Sit<>stand CGA  Step transfer bed to bedside chair with RW CGA  Ambulate 30 feet within room with RW and CGA/min A  Up in chair x 2 hours  LE TE X 10 reps BLE                     Time Tracking:     PT Received On: 11/06/20  PT Start Time: 0854     PT Stop Time: 0922  PT Total Time (min): 28 min     Billable Minutes: TA 15 TE 13    Treatment Type: Treatment  PT/PTA: PT     PTA Visit Number: 0     Nieves Be, PT  11/06/2020

## 2020-11-06 NOTE — PLAN OF CARE
pt discharged to home with daughter   TN confirmed with Bonita with [facility] - all equipment in place at pt's home   Bonita discussed pt's intraperitoneal cath (pleurex) -- hospice nurses are able to care for pt and provide supplies such as collection containers.       pt's nurse reviewed d/c instructions     all info to [facility] per [facility]        11/06/20 3043   Final Note   Assessment Type Final Discharge Note   Anticipated Discharge Disposition [facility]  ([facility])   What phone number can be called within the next 1-3 days to see how you are doing after discharge? [phone]   Hospital Follow Up  Appt(s) scheduled?   (n/a)   Discharge plans and expectations educations in teach back method with documentation complete?   (n/a)   [facility] Referral Info   Post Acute Recommendation SNF / Sub-Acute Rehab   Referral Type home hospice   Facility Name [facility]

## 2020-11-06 NOTE — PLAN OF CARE
Patient is AAOX4. Pt denies pain. Patient received all medications per MAR. Patient vitals are stable and safety maintained. Will continue to monitor.

## 2020-11-20 NOTE — H&P
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 The patient is unable to supplement the HPI due to confusion but is awake/alert and able to answer simple yes or no questions on my exam. She denies any pain at this time or abdominal discomfort. In the ED, patient hypercalcemic with calcium of 14.7 and hypokalemic with K of 2.7. The NET team was consulted for further evaluation.     Allergies:  Review of patient's allergies indicates:   Allergen Reactions    Epinephrine Anaphylaxis and Other (See Comments)     Can cause Carcinoid Crisis    Sulfa (sulfonamide antibiotics) Other (See Comments)     Urticaria     Home Medications:  No current facility-administered medications on file prior to encounter.      Current Outpatient Medications on File Prior to Encounter   Medication Sig    cinacalcet (SENSIPAR) 90 MG Tab Take 1 tablet (90 mg total) by mouth 2 (two) times daily with meals.    dronabinoL (MARINOL) 2.5 MG capsule Take 1 capsule (2.5 mg total) by mouth 2 (two) times daily before meals.    ferrous gluconate (FERGON) 324 MG tablet Take 1 tablet by mouth once daily.    furosemide (LASIX) 20 MG tablet Take 1 tablet (20 mg total) by mouth 2 (two) times daily.    levothyroxine (SYNTHROID, LEVOTHROID) 175 MCG tablet Take 1 tablet (175 mcg total) by mouth before breakfast.    midodrine (PROAMATINE) 5 MG Tab Take 1 tablet (5 mg total) by mouth every 8 (eight) hours.    ondansetron (ZOFRAN-ODT) 8 MG TbDL Take 1 tablet by mouth every 8 (eight) hours as needed.     spironolactone (ALDACTONE) 50 MG tablet Take 1 tablet (50 mg total) by mouth once daily.    STIMULANT LAXATIVE PLUS 8.6-50 mg per tablet Take 1 tablet by mouth once daily.    ursodioL (ACTIGALL) 300 mg capsule Take 1 capsule (300 mg total) by mouth 2 (two) times daily.    gabapentin (NEURONTIN) 300 MG capsule Take 1 capsule (300 mg total) by mouth 3 (three) times daily.    olmesartan (BENICAR) 40 MG tablet Take 40 mg by mouth once daily.       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    Secondary malignant neoplasm of liver     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) 5/7/2013    Thrombocytopenia 11/12/2013    Thyroid disease     Unspecified essential hypertension 11/12/2013     Past Surgical History:   Procedure Laterality Date    ERCP N/A 6/21/2018    Procedure: ERCP, stent exchange;  Surgeon: Jake Lieberman MD;  [facility];  Service: Endoscopy;  Laterality: N/A;    ERCP N/A 5/23/2019    Procedure: ERCP (ENDOSCOPIC RETROGRADE CHOLANGIOPANCREATOGRAPHY), stent exchange;  Surgeon: Jake Lieberman MD;  Location: [facility] ENDO;  Service: Endoscopy;  Laterality: N/A;    ERCP N/A 11/14/2019    Procedure: ERCP (ENDOSCOPIC RETROGRADE CHOLANGIOPANCREATOGRAPHY), stent exchange;  Surgeon: Jake Lieberman MD;  Location: [facility] ENDO;  Service: Endoscopy;  Laterality: N/A;    ERCP      ERCP N/A 9/22/2020    Procedure: ERCP  (ENDOSCOPIC RETROGRADE CHOLANGIOPANCREATOGRAPHY);  Surgeon: Abdulaziz Owen MD;  Location: [facility] ENDO;  Service: Endoscopy;  Laterality: N/A;    PERITONEAL CATHETER INSERTION N/A 11/5/2020    Procedure: INSERTION, CATHETER, INTRAPERITONEAL;  Surgeon: ELISE Mcknight MD;  Location: [facility] OR;  Service: General;  Laterality: N/A;    THYROIDECTOMY  2011     Family History   Problem Relation Age of Onset    Cancer Maternal Grandmother     Diabetes Father     Breast cancer Neg Hx     Colon cancer Neg Hx     Ovarian cancer Neg Hx      Social History     Tobacco Use    Smoking status: Never Smoker    Smokeless tobacco: Never Used   Substance Use Topics    Alcohol use: No    Drug use: No      Review of Systems:  Unable to obtain due to confusion/AMS    OBJECTIVE:     Vital Signs:  Temp: 98 °F (36.7 °C) (11/20/20 0630)  Pulse: 91 (11/20/20 0802)  Resp: 16 (11/20/20 0802)  BP: (!) 127/59 (11/20/20 0802)  SpO2: 100 % (11/20/20 0802)    Physical Exam:  GEN: NAD, awake/alert, interactive. Oriented to self/place only.  CV: regular rate  PULM: normal WOB on RA  ABD: soft, NT, mild ascites, stable  MSK/EXT: moves all ext spontaneously  L/D/A: abdominal catheter in place, dressing secured, no leakage    Laboratory:  Labs Reviewed   CBC W/ AUTO DIFFERENTIAL - Abnormal; Notable for the following components:       Result Value    RBC 2.93 (*)     Hemoglobin 10.1 (*)     Hematocrit 31.9 (*)      (*)     MCH 34.5 (*)     MCHC 31.7 (*)     RDW 20.0 (*)     Platelets 127 (*)     Immature Granulocytes 0.8 (*)     Immature Grans (Abs) 0.07 (*)     All other components within normal limits   URINALYSIS, REFLEX TO URINE CULTURE - Abnormal; Notable for the following components:    Protein, UA 2+ (*)     Nitrite, UA Positive (*)     All other components within normal limits    Narrative:     Specimen Source->Urine   B-TYPE NATRIURETIC PEPTIDE - Abnormal; Notable for the following components:     (*)     All other  components within normal limits   COMPREHENSIVE METABOLIC PANEL - Abnormal; Notable for the following components:    Potassium 2.7 (*)     BUN 33 (*)     Creatinine 1.8 (*)     Calcium 14.7 (*)     Albumin 2.6 (*)     Total Bilirubin 1.6 (*)     Alkaline Phosphatase 387 (*)     eGFR if  33 (*)     eGFR if non  28 (*)     All other components within normal limits    Narrative:       Potassium and Calcium critical result(s) called and verbal readback                   obtained from Capri Saucedo RN by [facility] 11/20/2020 01:06   URINALYSIS MICROSCOPIC - Abnormal; Notable for the following components:    Bacteria Many (*)     Granular Casts, UA 3 (*)     All other components within normal limits    Narrative:     Specimen Source->Urine   COMPREHENSIVE METABOLIC PANEL - Abnormal; Notable for the following components:    Potassium 2.6 (*)     BUN 32 (*)     Creatinine 1.7 (*)     Calcium 14.0 (*)     Albumin 2.4 (*)     Total Bilirubin 1.5 (*)     Alkaline Phosphatase 366 (*)     eGFR if  35 (*)     eGFR if non  30 (*)     All other components within normal limits    Narrative:       Potassium and Calcium critical result(s) called and verbal readback                   obtained from Petra Crespo RN at 0735. by [facility] 11/20/2020 07:35   CBC W/ AUTO DIFFERENTIAL - Abnormal; Notable for the following components:    RBC 2.35 (*)     Hemoglobin 8.1 (*)     Hematocrit 25.7 (*)      (*)     MCH 34.5 (*)     MCHC 31.5 (*)     RDW 19.9 (*)     Platelets 115 (*)     Immature Granulocytes 1.0 (*)     Immature Grans (Abs) 0.08 (*)     All other components within normal limits   SARS-COV-2 RNA AMPLIFICATION, QUAL   TROPONIN I   LACTIC ACID, PLASMA   MAGNESIUM   MAGNESIUM   PHOSPHORUS   POCT GLUCOSE       Diagnostic Results:  Imaging Results          X-Ray Chest AP Portable (Final result)  Result time 11/19/20 22:02:09    Final result by Bill Clarke MD (11/19/20  22:02:09)                 Impression:      Platelike opacity in the right lower lung zone is new since the 10/19/2020 examination, but is favored to represent atelectasis.  Otherwise, there appears to be no substantial interval detrimental change.  As before, the possibility of pulmonary edema or interstitial lung disease is raised.      Electronically signed by: Bill Clarke  Date:    11/19/2020  Time:    22:02             Narrative:    EXAMINATION:  XR CHEST AP PORTABLE    CLINICAL HISTORY:  Fatigue;    TECHNIQUE:  Single frontal view of the chest was performed.    COMPARISON:  Chest radiograph performed 10/19/2020    FINDINGS:  Lung for differences in patient positioning and rotation, there is likely no substantial change in appearance of the cardiomediastinal contour.  Chronic appearing interstitial increased attenuation is again appreciated.  There is elevation of the right hemidiaphragm.  A new platelike opacities noted at the right lung base which may represent atelectasis.  No definite pneumothorax or pleural effusion.  No acute findings are suggested in the visualized upper abdomen.  Osseous and soft tissue structures without definite acute change.                                ASSESSMENT:     69 y.o.F on home hospice with widely metastatic NET and resultant hx of hypercalcemia presents with AMS/confusion secondary to hypercalcemia.    PLAN:     -admit to neuroendocrine surgery service for electrolyte replacement and calcium binders.  -calcium binders, though patient has chronic hypercalcemia 2/2 metastatic disease, not amenable to treatment  -case management consult, hopeful for DC home today back to home hospice following electrolyte replacement  -okay for regular diet    Rogelio Renee MD  [facility] General Surgery PGY-2  11/20/2020, 8:38 AM

## 2020-11-20 NOTE — CONSULTS
[facility]  Palliative Medicine  Consult Note    Patient Name: Ching Bruce  MRN: [id number]  Admission Date: 11/19/2020  Hospital Length of Stay: 1 days  Code Status: Full Code   Attending Provider: No att. providers found  Consulting Provider: Chloe Wilkerson NP  Primary Care Physician: Gaby Corea MD  Principal Problem:<principal problem not specified>    Patient information was obtained from relative(s), past medical records and ER records.      Inpatient consult to Palliative Care  Consult performed by: Chloe Wilkerson NP  Consult ordered by: Rogelio Renee MD    Inpatient consult to Palliative Care  Consult performed by: Chloe Wilkerson NP  Consult ordered by: Rogelio Renee MD        Assessment/Plan:     Palliative care encounter  Home hospice  [facility]  Can discharge home resume hospice care when medically stable.         Thank you for your consult.    Subjective:     HPI:   Patient presents with her family member who reports worsening confusion for the past 5 days. She has decreased appetite. She had nausea and vomiting yesterday. Her family member reports that she gets like this when her calcium level is high. Her family member reports that she was admitted for the same problems two weeks ago. The patient is on home hospice for metastatic pancreatic cancer. The patient states that she feels fine and denies pain.     Patient has a past medical history of Abdominal swelling (9/1/2020), Anemia, Chemotherapy follow-up examination (5/27/2014), Confusion (9/1/2020), Encounter for blood transfusion, Falls (9/1/2020), Generalized abdominal pain (9/1/2020), HTN (hypertension), Hypercalcemia (5/7/2013), Hyperlipidemia, Increased bilirubin level (9/15/2020), Kidney insufficiency, Maintenance chemotherapy following disease, Primary malignant neuroendocrine tumor of pancreas, Primary pancreatic neuroendocrine tumor (8/2012), Secondary malignant neoplasm of liver, Secondary  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) (5/7/2013), Thrombocytopenia (11/12/2013), Thyroid disease, and Unspecified essential hypertension (11/12/2013)    Palliative medicine has followed this patient during previous admission. She was discharged home with hospice care.Spoke with [facility] via phone. Patient has clinically declined due to her disease. She has not been eating. [facility] will resume services once patient is discharged.     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    Secondary malignant neoplasm of liver     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) 5/7/2013    Thrombocytopenia 11/12/2013    Thyroid disease     Unspecified essential hypertension 11/12/2013       Past Surgical History:   Procedure Laterality Date    ERCP N/A 6/21/2018    Procedure: ERCP, stent exchange;  Surgeon: Jake Lieberman MD;  Location: [facility];  Service: Endoscopy;  Laterality: N/A;    ERCP N/A 5/23/2019    Procedure: ERCP (ENDOSCOPIC RETROGRADE CHOLANGIOPANCREATOGRAPHY), stent exchange;  Surgeon: Jake Lieberman MD;  Location: [facility] ENDO;  Service: Endoscopy;  Laterality: N/A;    ERCP N/A 11/14/2019    Procedure: ERCP (ENDOSCOPIC RETROGRADE CHOLANGIOPANCREATOGRAPHY), stent exchange;  Surgeon: Jake Lieberman MD;  Location: [facility] ENDO;  Service: Endoscopy;  Laterality: N/A;    ERCP       ERCP N/A 9/22/2020    Procedure: ERCP (ENDOSCOPIC RETROGRADE CHOLANGIOPANCREATOGRAPHY);  Surgeon: Abdulaziz Owen MD;  Location: [facility] ENDO;  Service: Endoscopy;  Laterality: N/A;    PERITONEAL CATHETER INSERTION N/A 11/5/2020    Procedure: INSERTION, CATHETER, INTRAPERITONEAL;  Surgeon: ELISE Mcknight MD;  Location: [facility] OR;  Service: General;  Laterality: N/A;    THYROIDECTOMY  2011       Review of patient's allergies indicates:   Allergen Reactions    Epinephrine Anaphylaxis and Other (See Comments)     Can cause Carcinoid Crisis    Sulfa (sulfonamide antibiotics) Other (See Comments)     Urticaria       Medications:  Continuous Infusions:   sodium chloride 0.9% 150 mL/hr at 11/20/20 0937     Scheduled Meds:   cefTRIAXone (ROCEPHIN) IVPB  1 g Intravenous Q24H    cinacalcet  90 mg Oral BID WM    enoxparin  30 mg Subcutaneous Q24H    levothyroxine  175 mcg Oral Before breakfast    midodrine  5 mg Oral Q8H    potassium chloride in water  10 mEq Intravenous Q1H    potassium, sodium phosphates  1 packet Oral QID (AC & HS)    spironolactone  50 mg Oral Daily     PRN Meds:acetaminophen, acetaminophen, melatonin, metoclopramide HCl, ondansetron, sodium chloride 0.9%    Family History     Problem Relation (Age of Onset)    Cancer Maternal Grandmother    Diabetes Father        Tobacco Use    Smoking status: Never Smoker    Smokeless tobacco: Never Used   Substance and Sexual Activity    Alcohol use: No    Drug use: No    Sexual activity: Not Currently       Review of Systems   Unable to perform ROS: Acuity of condition     Objective:     Vital Signs (Most Recent):  Temp: 98 °F (36.7 °C) (11/20/20 0630)  Pulse: 94 (11/20/20 1139)  Resp: 15 (11/20/20 1139)  BP: (!) 113/57 (11/20/20 1132)  SpO2: 100 % (11/20/20 1139) Vital Signs (24h Range):  Temp:  [98 °F (36.7 °C)-98.8 °F (37.1 °C)] 98 °F (36.7 °C)  Pulse:  [73-94] 94  Resp:  [13-16] 15  SpO2:  [97 %-100 %] 100 %  BP: (103-127)/(53-59) 113/57      Weight: 49.9 kg (110 lb)  Body mass index is 17.23 kg/m².    Physical Exam  Constitutional:       Appearance: She is ill-appearing.   HENT:      Head: Normocephalic.   Cardiovascular:      Rate and Rhythm: Normal rate.   Skin:     General: Skin is warm.   Neurological:      Mental Status: She is lethargic and disoriented.         Review of Symptoms    Symptom Assessment (ESAS 0-10 Scale)  Pain:  0  Dyspnea:  0  Anxiety:  0  Nausea:  0  Depression:  0  Anorexia:  0  Fatigue:  0  Insomnia:  0  Restlessness:  0  Agitation:  0     CAM / Delirium:  Negative  Constipation:  Negative  Diarrhea:  Negative          ECOG Performance Status Grade:  4 - Completely disabled    Living Arrangements:  Lives with family    Spiritual:  F - Nisreen and Belief:  Wilmer REED - Address in Care:  Yes      Advance Care Planning   Advance Care Planning       Significant Labs: None  CBC:   Recent Labs   Lab 11/20/20  0611   WBC 8.24   HGB 8.1*   HCT 25.7*   *   *     BMP:  Recent Labs   Lab 11/20/20  1233   GLU 66*   *   K 2.8*   *   CO2 24   BUN 31*   CREATININE 1.6*   CALCIUM 13.7*   MG 1.7     LFT:  Lab Results   Component Value Date    AST 20 11/20/2020    ALKPHOS 366 (H) 11/20/2020    BILITOT 1.5 (H) 11/20/2020     Albumin:   Albumin   Date Value Ref Range Status   11/20/2020 2.4 (L) 3.5 - 5.2 g/dL Final     Protein:   Total Protein   Date Value Ref Range Status   11/20/2020 6.4 6.0 - 8.4 g/dL Final     Lactic acid:   Lab Results   Component Value Date    LACTATE 1.0 11/19/2020    LACTATE 1.2 09/01/2020       Significant Imaging: None             > 50% of 60 min visit spent in chart review, face to face discussion of goals of care,  symptom assessment, coordination of care and emotional support.    Chloe Wilkerson NP  Palliative Medicine  [facility]

## 2020-11-20 NOTE — ED NOTES
Per report from night shift pt pocketing food and medication and takes several minutes to swallow. Message sent to admitting team regarding further PO medication orders and diet will await response.

## 2020-11-20 NOTE — PT/OT/SLP PROGRESS
Speech Language Pathology      Ching Bruce  MRN: [id number]      Orders for swallow eval received this date, dtr asking RN to request it. Pt holding PO, refusing meds and refusing to eat.   SLP did communicate with Dr. Renee from surgery team who asked to defer eval at this time as pt on hospice. Palliative care has been consulted and Surgery Team MD will come down to discuss with family member at bedside. The above was also communicated with ED RN.         GENNY Staples, CCC-SLP  11/20/2020

## 2020-11-20 NOTE — ED NOTES
Per speech therapy (ELISE Leahy)- Dr. Lantigua does not want a swallow study completed on the pt as she is in hospice. This RN requested that the declining MD come down to room and speak to pt and family.

## 2020-11-20 NOTE — ED NOTES
Report received from JORGE Frederick. Assumed care of pt. Pt resting in stretcher, oriented to self. Comfort measures reinforced, side rails up x 2, call light within reach, and lights dimmed for comfort. Will continue to closely monitor.

## 2020-11-20 NOTE — HPI
Patient presents with her family member who reports worsening confusion for the past 5 days. She has decreased appetite. She had nausea and vomiting yesterday. Her family member reports that she gets like this when her calcium level is high. Her family member reports that she was admitted for the same problems two weeks ago. The patient is on home hospice for metastatic pancreatic cancer. The patient states that she feels fine and denies pain.     Patient has a past medical history of Abdominal swelling (9/1/2020), Anemia, Chemotherapy follow-up examination (5/27/2014), Confusion (9/1/2020), Encounter for blood transfusion, Falls (9/1/2020), Generalized abdominal pain (9/1/2020), HTN (hypertension), Hypercalcemia (5/7/2013), Hyperlipidemia, Increased bilirubin level (9/15/2020), Kidney insufficiency, Maintenance chemotherapy following disease, Primary malignant neuroendocrine tumor of pancreas, Primary pancreatic neuroendocrine tumor (8/2012), Secondary malignant neoplasm of liver, 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) (5/7/2013), Thrombocytopenia (11/12/2013), Thyroid disease, and Unspecified essential hypertension (11/12/2013)    Palliative medicine has followed this patient during previous admission. She was discharged home with hospice care.Spoke with [facility] via phone. Patient has clinically declined due to her disease. She has not been eating. [facility] will resume services once patient is discharged.

## 2020-11-20 NOTE — ED NOTES
Daughter at bedside expresses concerns for treatment of UTI diagnoses she was informed of last night and current treatment. Admitting team paged and informed of daughter's concern reports they will come down to speak with daughter soon

## 2020-11-20 NOTE — ED NOTES
Pt repositioned in stretcher. Comfort measures reinforced. Lights dimmed for comfort. Will continue to monitor.

## 2020-11-20 NOTE — ED PROVIDER NOTES
Encounter Date: 11/19/2020       History     Chief Complaint   Patient presents with    Altered Mental Status     Pt. to the ER with her dtr. for increased confusion and lethargy that began 3-4 days ago. Pt. was discharged from the hospital two weeks ago and is a pt. of Dr. Mcknight. Pt. was placed on Hospice for pancreatic cancer. Pt. last had abdomen paracentesis on Thursday, Friday and Saturday of last week. Was unable to have fluid drained today per Hospice because of low BP.     Patient presents with her family member who reports worsening confusion for the past 5 days. She has decreased appetite. She had nausea and vomiting yesterday. Her family member reports that she gets like this when her calcium level is high. Her family member reports that she was admitted for the same problems two weeks ago. The patient is on home hospice for metastatic pancreatic cancer. The patient states that she feels fine and denies pain.    Patient has a past medical history of Abdominal swelling (9/1/2020), Anemia, Chemotherapy follow-up examination (5/27/2014), Confusion (9/1/2020), Encounter for blood transfusion, Falls (9/1/2020), Generalized abdominal pain (9/1/2020), HTN (hypertension), Hypercalcemia (5/7/2013), Hyperlipidemia, Increased bilirubin level (9/15/2020), Kidney insufficiency, Maintenance chemotherapy following disease, Primary malignant neuroendocrine tumor of pancreas, Primary pancreatic neuroendocrine tumor (8/2012), Secondary malignant neoplasm of liver, 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      The history is provided by the patient and a relative. No  was used.     Review of patient's allergies indicates:   Allergen Reactions    Epinephrine Anaphylaxis and Other (See Comments)     Can cause Carcinoid Crisis    Sulfa (sulfonamide antibiotics) Other (See Comments)      Urticaria     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    Secondary malignant neoplasm of liver     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) 5/7/2013    Thrombocytopenia 11/12/2013    Thyroid disease     Unspecified essential hypertension 11/12/2013     Past Surgical History:   Procedure Laterality Date    ERCP N/A 6/21/2018    Procedure: ERCP, stent exchange;  Surgeon: Jake Lieberman MD;  Location: [facility] ENDO;  Service: Endoscopy;  Laterality: N/A;    ERCP N/A 5/23/2019    Procedure: ERCP (ENDOSCOPIC RETROGRADE CHOLANGIOPANCREATOGRAPHY), stent exchange;  Surgeon: Jake Lieberman MD;  Location: [facility] ENDO;  Service: Endoscopy;  Laterality: N/A;    ERCP N/A 11/14/2019    Procedure: ERCP (ENDOSCOPIC RETROGRADE CHOLANGIOPANCREATOGRAPHY), stent exchange;  Surgeon: Jake Lieberman MD;  Location: [facility] ENDO;  Service: Endoscopy;  Laterality: N/A;    ERCP      ERCP N/A 9/22/2020    Procedure: ERCP (ENDOSCOPIC RETROGRADE CHOLANGIOPANCREATOGRAPHY);  Surgeon: Abdulaziz Owen MD;  Location: [facility] ENDO;  Service: Endoscopy;  Laterality: N/A;    PERITONEAL CATHETER INSERTION N/A 11/5/2020    Procedure: INSERTION, CATHETER, INTRAPERITONEAL;  Surgeon: ELIES Mcknight MD;  Location: [facility] OR;  Service: General;  Laterality: N/A;    THYROIDECTOMY  2011     Family History   Problem Relation Age of Onset    Cancer Maternal Grandmother      Diabetes Father     Breast cancer Neg Hx     Colon cancer Neg Hx     Ovarian cancer Neg Hx      Social History     Tobacco Use    Smoking status: Never Smoker    Smokeless tobacco: Never Used   Substance Use Topics    Alcohol use: No    Drug use: No     Review of Systems   Constitutional: Positive for fatigue. Negative for fever.   Respiratory: Negative for shortness of breath.    Gastrointestinal: Positive for nausea and vomiting. Negative for abdominal pain.   Musculoskeletal: Negative for arthralgias and myalgias.   Neurological: Negative for headaches.   Psychiatric/Behavioral: Positive for confusion.       Physical Exam     Initial Vitals [11/19/20 2031]   BP Pulse Resp Temp SpO2   (!) 114/58 80 16 98.2 °F (36.8 °C) 99 %      MAP       --         Physical Exam    Nursing note and vitals reviewed.  Constitutional: She appears lethargic. She is not diaphoretic. She appears ill. No distress.   HENT:   Mouth/Throat: Oropharynx is clear and moist.   Eyes: Conjunctivae are normal. Pupils are equal, round, and reactive to light. No scleral icterus.   Neck: No JVD present.   Cardiovascular: Normal rate, regular rhythm and normal heart sounds.   No murmur heard.  Pulmonary/Chest: Breath sounds normal. No stridor. No respiratory distress. She has no wheezes. She has no rhonchi. She has no rales.   Abdominal: Soft. She exhibits no distension. There is no abdominal tenderness.   Neurological: She appears lethargic. No cranial nerve deficit. GCS eye subscore is 4. GCS verbal subscore is 5. GCS motor subscore is 6.   Equal strength throughout all extremities.   Skin: Skin is warm and dry. No pallor.   Psychiatric: She has a normal mood and affect. Thought content normal. Her speech is delayed. She is slowed. She is not actively hallucinating. She is attentive.         ED Course   Procedures  Labs Reviewed   CBC W/ AUTO DIFFERENTIAL - Abnormal; Notable for the following components:       Result Value    RBC 2.93  (*)     Hemoglobin 10.1 (*)     Hematocrit 31.9 (*)      (*)     MCH 34.5 (*)     MCHC 31.7 (*)     RDW 20.0 (*)     Platelets 127 (*)     Immature Granulocytes 0.8 (*)     Immature Grans (Abs) 0.07 (*)     All other components within normal limits   URINALYSIS, REFLEX TO URINE CULTURE - Abnormal; Notable for the following components:    Protein, UA 2+ (*)     Nitrite, UA Positive (*)     All other components within normal limits    Narrative:     Specimen Source->Urine   B-TYPE NATRIURETIC PEPTIDE - Abnormal; Notable for the following components:     (*)     All other components within normal limits   COMPREHENSIVE METABOLIC PANEL - Abnormal; Notable for the following components:    Potassium 2.7 (*)     BUN 33 (*)     Creatinine 1.8 (*)     Calcium 14.7 (*)     Albumin 2.6 (*)     Total Bilirubin 1.6 (*)     Alkaline Phosphatase 387 (*)     eGFR if  33 (*)     eGFR if non  28 (*)     All other components within normal limits    Narrative:       Potassium and Calcium critical result(s) called and verbal readback   obtained from Capri Saucedo RN by [facility] 11/20/2020 01:06   URINALYSIS MICROSCOPIC - Abnormal; Notable for the following components:    Bacteria Many (*)     Granular Casts, UA 3 (*)     All other components within normal limits    Narrative:     Specimen Source->Urine   COMPREHENSIVE METABOLIC PANEL - Abnormal; Notable for the following components:    Potassium 2.6 (*)     BUN 32 (*)     Creatinine 1.7 (*)     Calcium 14.0 (*)     Albumin 2.4 (*)     Total Bilirubin 1.5 (*)     Alkaline Phosphatase 366 (*)     eGFR if  35 (*)     eGFR if non  30 (*)     All other components within normal limits    Narrative:       Potassium and Calcium critical result(s) called and verbal readback   obtained from Petra Crespo RN at 0735. by \[facility]\"" 11/20/2020 07:35   CBC W/ AUTO DIFFERENTIAL - Abnormal; Notable for the following components:    RBC 2.35  (*)     Hemoglobin 8.1 (*)     Hematocrit 25.7 (*)      (*)     MCH 34.5 (*)     MCHC 31.5 (*)     RDW 19.9 (*)     Platelets 115 (*)     Immature Granulocytes 1.0 (*)     Immature Grans (Abs) 0.08 (*)     All other components within normal limits   BASIC METABOLIC PANEL - Abnormal; Notable for the following components:    Sodium 146 (*)     Potassium 2.8 (*)     Chloride 111 (*)     Glucose 66 (*)     BUN 31 (*)     Creatinine 1.6 (*)     Calcium 13.7 (*)     eGFR if  38 (*)     eGFR if non  33 (*)     All other components within normal limits    Narrative:        Calcium critical result(s) called and verbal readback obtained   from Tonie Mcknight RN at 1328.  by PLKEMI 11/20/2020 13:29   SARS-COV-2 RNA AMPLIFICATION, QUAL   TROPONIN I   LACTIC ACID, PLASMA   MAGNESIUM   MAGNESIUM   PHOSPHORUS   MAGNESIUM   PHOSPHORUS   POCT GLUCOSE     EKG Readings: (Independently Interpreted)   Rhythm: Sinus Bradycardia.   11/19/2020 21:57  Atrial flutter with 3:1 AV conduction.  Normal axis.  Incomplete left bundle branch block.  No ST segment elevation or depression.     ECG Results          EKG 12-lead (Final result)  Result time 11/22/20 18:00:10    Final result by Interface, Lab In [facility] (11/22/20 18:00:10)                 Narrative:    Test Reason : R53.83,    Vent. Rate : 088 BPM     Atrial Rate : 088 BPM     P-R Int : 176 ms          QRS Dur : 110 ms      QT Int : 278 ms       P-R-T Axes : 053 002 063 degrees     QTc Int : 336 ms    Normal sinus rhythm  Nonspecific ST and T wave abnormality  Abnormal ECG  When compared with ECG of 26-OCT-2020 13:40,  No significant change was found  Confirmed by Chantel Watson MD ([id number]) on 11/22/2020 6:00:02 PM    Referred By: [email or web address]   SELF           Confirmed By:Chantel Watson MD                            Imaging Results          X-Ray Chest AP Portable (Final result)  Result time 11/19/20 22:02:09    Final result by Bill CEJA  MD Vince (11/19/20 22:02:09)                 Impression:      Platelike opacity in the right lower lung zone is new since the 10/19/2020 examination, but is favored to represent atelectasis.  Otherwise, there appears to be no substantial interval detrimental change.  As before, the possibility of pulmonary edema or interstitial lung disease is raised.      Electronically signed by: Bill Clarke  Date:    11/19/2020  Time:    22:02             Narrative:    EXAMINATION:  XR CHEST AP PORTABLE    CLINICAL HISTORY:  Fatigue;    TECHNIQUE:  Single frontal view of the chest was performed.    COMPARISON:  Chest radiograph performed 10/19/2020    FINDINGS:  Lung for differences in patient positioning and rotation, there is likely no substantial change in appearance of the cardiomediastinal contour.  Chronic appearing interstitial increased attenuation is again appreciated.  There is elevation of the right hemidiaphragm.  A new platelike opacities noted at the right lung base which may represent atelectasis.  No definite pneumothorax or pleural effusion.  No acute findings are suggested in the visualized upper abdomen.  Osseous and soft tissue structures without definite acute change.                                                   ED Course as of Nov 23 1006 Fri Nov 20, 2020   0121 Called and discussed patient's presentation, exam, workup with the on-call [facility] surgery resident who will evaluate the patient for admission.    [LP]      ED Course User Index  [LP] Carlos Manuel Mello III, MD            Clinical Impression:     ICD-10-CM ICD-9-CM   1. Hypercalcemia  E83.52 275.42   2. Fatigue  R53.83 780.79   3. Hypokalemia  E87.6 276.8     209.20                      Disposition:   Disposition: Placed in Observation  Condition: Fair     ED Disposition Condition    Observation                             Carlos Manuel Mello III, MD  11/23/20 1008

## 2020-11-20 NOTE — ED NOTES
Followed up with Dr. Renee regarding PO medication and diet order. She reports team is aware of pt pocketing food and medication. Pt to be discharged back to hospice care post correcting electrolyte imbalance. Will change PO medications to IV if possible, otherwise continue PO medications as ordered.

## 2020-11-20 NOTE — PROGRESS NOTES
TN spoke with Dr. Renee - per MD - pt can return to home at discharge from ED  --   pt is current with [facility] -- TN spoke with Bonita with [facility] -- ok for pt to return to home today.       Dr. Renee will call pt's daughter Isabelle to update her on pt's status.       message sent to pt's ED nurse per Secure Chat.  TN is available to assist as needed.

## 2020-11-20 NOTE — PLAN OF CARE
[facility] Provider  Hospice Services  11/20/20 1059 -- [phone]     Notified Bonita with [facility] their patient is in ED.    Patient is receiving electrolyte repletion and is expected to DC afterwards. Patient downgraded to Observation status.    Bonita stated will resume providing hospice care once patient has arrived home.    Please notify them of DC once patient is ready.

## 2020-11-20 NOTE — SUBJECTIVE & OBJECTIVE
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    Secondary malignant neoplasm of liver     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) 5/7/2013    Thrombocytopenia 11/12/2013    Thyroid disease     Unspecified essential hypertension 11/12/2013       Past Surgical History:   Procedure Laterality Date    ERCP N/A 6/21/2018    Procedure: ERCP, stent exchange;  Surgeon: Jake Lieberman MD;  Location: [facility] ENDO;  Service: Endoscopy;  Laterality: N/A;    ERCP N/A 5/23/2019    Procedure: ERCP (ENDOSCOPIC RETROGRADE CHOLANGIOPANCREATOGRAPHY), stent exchange;  Surgeon: Jake Lieberman MD;  Location: [facility] ENDO;  Service: Endoscopy;  Laterality: N/A;    ERCP N/A 11/14/2019    Procedure: ERCP (ENDOSCOPIC RETROGRADE CHOLANGIOPANCREATOGRAPHY), stent exchange;  Surgeon: Jake Lieberman MD;  Location: [facility] ENDO;  Service: Endoscopy;  Laterality: N/A;    ERCP      ERCP N/A 9/22/2020    Procedure: ERCP (ENDOSCOPIC RETROGRADE CHOLANGIOPANCREATOGRAPHY);  Surgeon: Abdulaziz Owen MD;  Location: [facility] ENDO;  Service: Endoscopy;  Laterality: N/A;    PERITONEAL CATHETER INSERTION N/A 11/5/2020    Procedure: INSERTION, CATHETER, INTRAPERITONEAL;  Surgeon: ELISE Mcknight MD;  Location: [facility] OR;  Service: General;  Laterality: N/A;    THYROIDECTOMY  2011       Review of patient's allergies indicates:   Allergen Reactions    Epinephrine Anaphylaxis  and Other (See Comments)     Can cause Carcinoid Crisis    Sulfa (sulfonamide antibiotics) Other (See Comments)     Urticaria       Medications:  Continuous Infusions:   sodium chloride 0.9% 150 mL/hr at 11/20/20 0937     Scheduled Meds:   cefTRIAXone (ROCEPHIN) IVPB  1 g Intravenous Q24H    cinacalcet  90 mg Oral BID WM    enoxparin  30 mg Subcutaneous Q24H    levothyroxine  175 mcg Oral Before breakfast    midodrine  5 mg Oral Q8H    potassium chloride in water  10 mEq Intravenous Q1H    potassium, sodium phosphates  1 packet Oral QID (AC & HS)    spironolactone  50 mg Oral Daily     PRN Meds:acetaminophen, acetaminophen, melatonin, metoclopramide HCl, ondansetron, sodium chloride 0.9%    Family History     Problem Relation (Age of Onset)    Cancer Maternal Grandmother    Diabetes Father        Tobacco Use    Smoking status: Never Smoker    Smokeless tobacco: Never Used   Substance and Sexual Activity    Alcohol use: No    Drug use: No    Sexual activity: Not Currently       Review of Systems   Unable to perform ROS: Acuity of condition     Objective:     Vital Signs (Most Recent):  Temp: 98 °F (36.7 °C) (11/20/20 0630)  Pulse: 94 (11/20/20 1139)  Resp: 15 (11/20/20 1139)  BP: (!) 113/57 (11/20/20 1132)  SpO2: 100 % (11/20/20 1139) Vital Signs (24h Range):  Temp:  [98 °F (36.7 °C)-98.8 °F (37.1 °C)] 98 °F (36.7 °C)  Pulse:  [73-94] 94  Resp:  [13-16] 15  SpO2:  [97 %-100 %] 100 %  BP: (103-127)/(53-59) 113/57     Weight: 49.9 kg (110 lb)  Body mass index is 17.23 kg/m².    Physical Exam  Constitutional:       Appearance: She is ill-appearing.   HENT:      Head: Normocephalic.   Cardiovascular:      Rate and Rhythm: Normal rate.   Skin:     General: Skin is warm.   Neurological:      Mental Status: She is lethargic and disoriented.         Review of Symptoms    Symptom Assessment (ESAS 0-10 Scale)  Pain:  0  Dyspnea:  0  Anxiety:  0  Nausea:  0  Depression:  0  Anorexia:  0  Fatigue:  0  Insomnia:   0  Restlessness:  0  Agitation:  0     CAM / Delirium:  Negative  Constipation:  Negative  Diarrhea:  Negative          ECOG Performance Status Grade:  4 - Completely disabled    Living Arrangements:  Lives with family    Spiritual:  F - Nisreen and Belief:  Wilmer REED - Address in Care:  Yes      Advance Care Planning   Advance Care Planning       Significant Labs: None  CBC:   Recent Labs   Lab 11/20/20  0611   WBC 8.24   HGB 8.1*   HCT 25.7*   *   *     BMP:  Recent Labs   Lab 11/20/20  1233   GLU 66*   *   K 2.8*   *   CO2 24   BUN 31*   CREATININE 1.6*   CALCIUM 13.7*   MG 1.7     LFT:  Lab Results   Component Value Date    AST 20 11/20/2020    ALKPHOS 366 (H) 11/20/2020    BILITOT 1.5 (H) 11/20/2020     Albumin:   Albumin   Date Value Ref Range Status   11/20/2020 2.4 (L) 3.5 - 5.2 g/dL Final     Protein:   Total Protein   Date Value Ref Range Status   11/20/2020 6.4 6.0 - 8.4 g/dL Final     Lactic acid:   Lab Results   Component Value Date    LACTATE 1.0 11/19/2020    LACTATE 1.2 09/01/2020       Significant Imaging: None

## 2020-11-20 NOTE — ED TRIAGE NOTES
Pt arrived to room 14 with daughter at bedside. Daughter, Isabelle, reports that she brought pt to hospital because her mother usually walks with a walker at home and is able to feed herself without difficulty. Daughter reports that pt's reaction time and motor movements seem slow and diminished. She is unable to walk today. Daughter reports these symptoms have gotten progressively worse over the past 3 days. Pt is able to answer questions appropriately at this time and is oriented X 4 but responses are delayed and affect is flat.

## 2020-11-21 NOTE — PLAN OF CARE
Reviewed notes, labs, and orders of the past 24 hours.  Care plan reviewed.  Will continue to monitor. Latest calcium level improving though remains elevated at 12.2 mg /dl

## 2020-11-21 NOTE — CONSULTS
"  [facility]  Adult Nutrition  Consult Note    SUMMARY     Recommendations    Recommendation:   1. Add Boost Plus bid.   2. Encourage intake at meals as tolerated. 3. RD to follow to monitor po intake    Goals:   Pt will tolerate po diet with at least 50-75% intake at meals by RD follow up  Nutrition Goal Status: new  Communication of RD Recs: other (comment)(POC)    Reason for Assessment  Reason For Assessment: consult(stg II coccyx)  Diagnosis: cancer diagnosis/related complications  Relevant Medical History: HTN, thryoid disease, HLD, primary pancreatic neuroendocrine tumor, secondry neuroendocrine tumor of liver  General Information Comments: Pt on Regular diet. Noted SLP eval was declined today. Pt was on home hospice but family wanting to rescind. Unable to assess NFPE at this time. Noted 28# weight loss since July.  Nutrition Discharge Planning: d/c needs to be determined    Nutrition Risk Screen  Nutrition Risk Screen: dysphagia or difficulty swallowing    Nutrition/Diet History  Food Preferences: no Methodist or cultural food prefs identified  Spiritual, Cultural Beliefs, Uatsdin Practices, Values that Affect Care: yes  Factors Affecting Nutritional Intake: decreased appetite    Anthropometrics  Temp: 98 °F (36.7 °C)  Height Method: Stated  Height: 5' 7" (170.2 cm)  Height (inches): 67 in  Weight Method: Bed Scale  Weight: 55.2 kg (121 lb 11.1 oz)  Weight (lb): 121.7 lb  Ideal Body Weight (IBW), Female: 135 lb  % Ideal Body Weight, Female (lb): 90.15 %  BMI (Calculated): 19.1  BMI Grade: 18.5-24.9 - normal  Usual Body Weight (UBW), k.9 kg(2020)  % Usual Body Weight: 81.47  % Weight Change From Usual Weight: -18.7 %     Lab/Procedures/Meds  Pertinent Labs Reviewed: reviewed  Pertinent Labs Comments: Na 146H, K 3.1L, BUN 30H, Crea 1.5H, Glu 53L, Ca 13.2H, Alb 2.4L  Pertinent Medications Reviewed: reviewed  Pertinent Medications Comments: rocephin    Estimated/Assessed " Needs  Weight Used For Calorie Calculations: 55.2 kg (121 lb 11.1 oz)  Energy Calorie Requirements (kcal): 1932 (35 kcal/kg)  Energy Need Method: Kcal/kg  Protein Requirements: 77g (1.4g/kg)  Weight Used For Protein Calculations: 55.2 kg (121 lb 11.1 oz)  Estimated Fluid Requirement Method: RDA Method  RDA Method (mL): 1932     Nutrition Prescription Ordered  Current Diet Order: Regular    Evaluation of Received Nutrient/Fluid Intake  I/O: 250/1500  Energy Calories Required: not meeting needs  Protein Required: not meeting needs  Fluid Required: not meeting needs  Comments: LBM 11/19  % Intake of Estimated Energy Needs: Other: diet just started  % Meal Intake: Other: diet just started    Nutrition Risk  Level of Risk/Frequency of Follow-up: (2xweekly)     Assessment and Plan  Nutrition Problem  Inadequate energy intake    Related to (etiology):   cancer    Signs and Symptoms (as evidenced by):   28# weight loss x 4 months    Interventions:  Collaboration with other providers  Commercial beverage    Nutrition Diagnosis Status:   New     Monitor and Evaluation  Food and Nutrient Intake: food and beverage intake  Food and Nutrient Adminstration: diet order  Physical Activity and Function: nutrition-related ADLs and IADLs  Anthropometric Measurements: weight  Biochemical Data, Medical Tests and Procedures: electrolyte and renal panel  Nutrition-Focused Physical Findings: overall appearance     Malnutrition Assessment  Weight Loss (Malnutrition): greater than 7.5% in 3 months(18% x 4 months)   Severe Weight Loss (Malnutrition): greater than 7.5% in 3 months    Nutrition Follow-Up  RD Follow-up?: Yes

## 2020-11-21 NOTE — PROGRESS NOTES
GENERAL SURGERY  PROGRESS NOTE    Admit Date: 11/19/2020  Hospital Day: 1    Pt admitted for observation and electrolyte replacement   Calcium down-trending, 12.2 from 14.7 today  Patient more oriented this morning, but still with slow thinking  Not interested in oral intake     Physical Exam:  GEN: NAD, interactive  NEURO: alert, oriented to self  CV: regular rate  PULM: normal WOB  ABD: soft, NT. Distended, which is patient's baseline due to ascites. Peritoneal catheter in place, dressing C/D/I.  MSK/EXT: moves all ext spontaneously    Inpatient Data      Vitals:   Temp:  [97.8 °F (36.6 °C)-98.9 °F (37.2 °C)]   Pulse:  [85-92]   Resp:  [15-19]   BP: ()/(51-60)   SpO2:  [97 %-100 %]     Diet Adult Regular (IDDSI Level 7)     Intake/Output Summary (Last 24 hours) at 11/21/2020 1348  Last data filed at 11/21/2020 0900  Gross per 24 hour   Intake 100 ml   Output 2900 ml   Net -2800 ml          Recent Labs     11/19/20  2235  11/19/20  2333  11/20/20  0611 11/20/20  1233 11/20/20  1801 11/20/20  1949 11/20/20  2305 11/21/20  0612   WBC 8.52  --   --   --  8.24  --   --   --   --  9.68   HGB 10.1*  --   --   --  8.1*  --   --   --   --  8.8*   HCT 31.9*  --   --   --  25.7*  --   --   --   --  28.7*   *  --   --   --  115*  --   --   --   --  115*   NA  --    < > 144  --  144 146* 146* 146* 146* 145   K  --    < > 2.7*  --  2.6* 2.8* 3.1* 3.1* 3.3* 3.7   CL  --    < > 106  --  110 111* 111* 111* 111* 113*   CO2  --    < > 25  --  25 24 24 24 24 17*   BUN  --    < > 33*  --  32* 31* 30* 30* 29* 28*   CREATININE  --    < > 1.8*  --  1.7* 1.6* 1.5* 1.5* 1.5* 1.4   BILITOT  --   --  1.6*  --  1.5*  --   --   --  1.8*  --    AST  --   --  21  --  20  --   --   --  22  --    ALT  --   --  11  --  10  --   --   --  10  --    ALKPHOS  --   --  387*  --  366*  --   --   --  393*  --    CALCIUM  --    < > 14.7*  --  14.0* 13.7* 13.2* 13.3* 12.8* 12.2*   ALBUMIN  --   --  2.6*  --  2.4*  --   --   --  2.5*  --    PROT   --   --  6.8  --  6.4  --   --   --  6.6  --    MG  --    < > 1.8  --  1.8 1.7 1.7  --   --  1.6   PHOS  --   --   --    < > 4.3 3.9 3.8  --   --  3.1    < > = values in this interval not displayed.     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    -patient's family has revoked her Hospice status while she is inpatient and wishes to pursue treatment for her hypercalcemia  -they would like the patient to be treated until she has normal mental status, at which time they will request discharge back home with home hospice  -have had many long discussions with multiple family members about the goals of care for this patient, as her hypercalcemia is secondary to malignancy. They understand that all measures to lower her serum calcium are only temporary and will not be fixing the underlying problem.   -palliative care on board, appreciate their assistance with goals of care discussions  -will continue to treat hypercalcemia per family's requests  -regular diet as tolerated, IVF   -continue IV ceftriaxone for UTI  -continuing home calcium-lowering medications, have switched to IV where possible  -Dispo: eventual home with home hospice, pending continued improvement of hypercalcemia    Rogelio Renee MD  [facility] General Surgery PGY-2  11/21/2020, 1:48 PM

## 2020-11-21 NOTE — PLAN OF CARE
Problem: Adult Inpatient Plan of Care  Goal: Plan of Care Review  Outcome: Ongoing, Progressing     VIRTUAL NURSE:  Cued into patient's room.  Permission received per patient to turn camera to view patient.  Introduced as VN for night shift that will be working with floor nurse and nursing assistant.  Patient is slow to respond.  Educated patient on VN's role in patient care and  VIP model.  Plan of care reviewed with patient.  Education per flowsheet.   Informed patient that staff will round on them every 2 hours but to use call light for any other needs they may have; informed of fall risk and fall precautions.  Patient verbalized understanding.  Call light within reach; bed siderails up x2.  Opportunity given for questions and questions answered.  Admission assessment questions answered using previous record d/t patient's inability to give history and list home meds.  Patient denies complaints or any needs at this time. Instructed to call for assistance.  Will cont to monitor and intervene as needed.    Labs, notes, orders, and careplan reviewed.

## 2020-11-21 NOTE — PLAN OF CARE
Problem: Fall Injury Risk  Goal: Absence of Fall and Fall-Related Injury  Outcome: Ongoing, Progressing     Problem: Adult Inpatient Plan of Care  Goal: Plan of Care Review  11/21/2020 0355 by Pamela Hong RN  Outcome: Ongoing, Progressing  11/21/2020 0355 by Pamela Hong RN  Flowsheets (Taken 11/21/2020 0355)  Plan of Care Reviewed With: patient  Goal: Patient-Specific Goal (Individualization)  Outcome: Ongoing, Progressing  Goal: Absence of Hospital-Acquired Illness or Injury  Outcome: Ongoing, Progressing  Goal: Optimal Comfort and Wellbeing  Outcome: Ongoing, Progressing  Goal: Readiness for Transition of Care  Outcome: Ongoing, Progressing  Goal: Rounds/Family Conference  Outcome: Ongoing, Progressing     Problem: Coping Ineffective  Goal: Effective Coping  Outcome: Ongoing, Progressing     Problem: Wound  Goal: Optimal Wound Healing  Outcome: Ongoing, Progressing     Problem: Skin Injury Risk Increased  Goal: Skin Health and Integrity  Outcome: Ongoing, Progressing     Problem: UTI (Urinary Tract Infection)  Goal: Improved Infection Symptoms  Outcome: Ongoing, Progressing     Problem: Electrolyte Imbalance  Goal: Electrolyte Balance  Outcome: Ongoing, Progressing     Problem: End-of-Life Care  Goal: Comfort, Peace and Preserved Dignity  Outcome: Ongoing, Progressing     Problem: Palliative Care  Goal: Enhanced Quality of Life  Outcome: Ongoing, Progressing

## 2020-11-21 NOTE — PLAN OF CARE
Recommendation:   1. Add Boost Plus bid.   2. Encourage intake at meals as tolerated. 3. RD to follow to monitor po intake    Goals:   Pt will tolerate po diet with at least 50-75% intake at meals by RD follow up  Nutrition Goal Status: new  Communication of RD Recs: other (comment)(POC)

## 2020-11-22 NOTE — PLAN OF CARE
Pt received on RA.  SPO2  100%.  Pt in no apparent respiratory distress.  Will continue to monitor.

## 2020-11-22 NOTE — PLAN OF CARE
Problem: Adult Inpatient Plan of Care  Goal: Plan of Care Review  Outcome: Ongoing, Progressing     VIRTUAL NURSE:  Cued into patient's room.  Permission received per bedside nurse, JORGE Bowie, to turn camera to view patient.  Administering night meds with aspiration precautions; patient trying to pocket meds with nurse taking her through swallowing them.  Patient denies complaints or any needs at this time.  Will cont to monitor and intervene as needed.    Labs, notes, orders, and careplan reviewed.

## 2020-11-22 NOTE — PLAN OF CARE
Potassium, Magnesium riders given as ordered. Fluids infusing as ordered. Potassium phosphates oral given as ordered. Patient weight shifted in bed using pillows. Remains on telemetry. Incontinence care provided. KENNA, telesitter at bedside. Call light in reach, remains free from falls, bed alarm in use.

## 2020-11-23 PROBLEM — G93.41 METABOLIC ENCEPHALOPATHY: Status: ACTIVE | Noted: 2020-01-01

## 2020-11-23 NOTE — PROGRESS NOTES
Pharmacist Renal Dose Adjustment Note    Ching Bruce is a 69 y.o. female being treated with the medication enoxaparin    Patient Data:    Vital Signs (Most Recent):  Temp: 97.5 °F (36.4 °C) (11/23/20 1131)  Pulse: 78 (11/23/20 1150)  Resp: 17 (11/23/20 1131)  BP: (!) 117/58 (11/23/20 1131)  SpO2: 98 % (11/23/20 1151)   Vital Signs (72h Range):  Temp:  [97.2 °F (36.2 °C)-98.9 °F (37.2 °C)]   Pulse:  [54-92]   Resp:  [15-20]   BP: ()/(48-60)   SpO2:  [97 %-100 %]      Recent Labs   Lab 11/21/20  1632 11/22/20  1009 11/23/20  0654   CREATININE 1.3 1.2 1.2     Serum creatinine: 1.2 mg/dL 11/23/20 0654  Estimated creatinine clearance: 38.6 mL/min    Medication:Enoxaparin dose: 30 mg frequency daily will be changed to medication:enoxaparin dose:40 mg frequency:daily    Pharmacist's Name: Ching Bowie  Pharmacist's Extension: [phone]

## 2020-11-23 NOTE — PLAN OF CARE
Fluids infusing overnight. Remains on telemetry. Pure Wick in place. Patient weight shifted in bed using pillows. Remains NPO after MN for paracentesis. Remains free from falls, bed alarm in use.

## 2020-11-23 NOTE — PLAN OF CARE
Pt awake and alert. But confused able to reorient.  No complaints of pain pain.  Dr. Renee up to bedside with paracentesis with 1850 removed.  Plurex cathter in place . Pt assisted with turing every 2 hours.

## 2020-11-23 NOTE — CONSULTS
"IR consulted for help with draining ascites. Patient has indwelling pleurx catheter however no longer has bottles to collect fluid at home. She was drained yesterday of 1100cc of fluid. IR will bring up more drainage bottles to allow patient to be further drained. Home health should help with ordering more bottles for home use.    Mychal Raymundo MD ([facility])  Interventional Radiology  [phone]      "

## 2020-11-23 NOTE — CONSULTS
Brief Hematology/Oncology Note:    I spoke with Dr. Rodriguez, who is aware that his patient is hospitalized.    Thong Velazquez M.D.  Hematology/Oncology  [facility] - [address], [address]  [place] [address]  Phone: [phone]  Fax: [phone]

## 2020-11-23 NOTE — PROGRESS NOTES
NET Team Rounds     Admit Date: 11/19/2020                    Length of Stay Days: 1                NET Physician:  Cory Rodriguez DO, FACP                    Local Treating Physician:Gaby Corea MD    Reason for admission:   Hypercalcemia [E83.52]  Hypokalemia [E87.6]  Fatigue [R53.83]  Weakness [R53.1]    HPI: 69 y.o.F on home hospice with widely metastatic NET and resultant hx of hypercalcemia presents with AMS/confusion secondary to hypercalcemia.    Surgery/Procedure: none       Assessment  Diet: NPO  For procedure then resume Regular Diet      Oral Supplement: None                  Nutrition Support - none   Appetite - poor             Nausea - No                Vomiting- No  BM- last reported - none  Urine-  voiding without difficulty  Abdomen- soft, nontender, distended and + flatus  Incision- none  Pain-none    IV Access- Peripheral   Drains- Pleurax Cath - placed 11/5/20     Vital Signs (Most Recent):  Temp: 97.7 °F (36.5 °C) (11/23/20 0741)  Pulse: 69 (11/23/20 0754)  Resp: 17 (11/23/20 0741)  BP: (!) 116/54 (11/23/20 0741)  SpO2: 100 % (11/23/20 0741) Vital Signs Range (Last 24H):  Temp:  [97.2 °F (36.2 °C)-97.7 °F (36.5 °C)]   Pulse:  [54-84]   Resp:  [15-18]   BP: ()/(48-56)   SpO2:  [98 %-100 %]            Labs:   Recent Labs   Lab 11/19/20  2333 11/20/20  0611 11/20/20  2305 11/21/20  0612 11/21/20  1632 11/22/20  0555 11/22/20  1009 11/23/20  0654   WBC  --  8.24  --  9.68  --  11.21  --  11.50   HGB  --  8.1*  --  8.8*  --  8.3*  --  8.9*   HCT  --  25.7*  --  28.7*  --  26.0*  --  28.7*   PLT  --  115*  --  115*  --  99*  --  108*   MCV  --  109*  --  113*  --  111*  --  113*   RDW  --  19.9*  --  20.5*  --  20.5*  --  20.9*    144 146* 145 146*  --  147* 146*   K 2.7* 2.6* 3.3* 3.7 3.2*  --  3.4* 3.5    110 111* 113* 114*  --  116* 116*   CO2 25 25 24 17* 20*  --  18* 17*   BUN 33* 32* 29* 28* 26*  --  25* 24*   CREATININE 1.8* 1.7* 1.5* 1.4 1.3  --  1.2 1.2   GLU 85 77  53* 63* 82  --  67* 77   PROT 6.8 6.4 6.6  --   --   --   --   --    ALBUMIN 2.6* 2.4* 2.5*  --   --   --   --   --    BILITOT 1.6* 1.5* 1.8*  --   --   --   --   --    AST 21 20 22  --   --   --   --   --    ALKPHOS 387* 366* 393*  --   --   --   --   --    ALT 11 10 10  --   --   --   --   --     < > = values in this interval not displayed.      No results for input(s): PREALBUMIN, CRP, TRIG in the last 168 hours.     Recent Labs   Lab 11/21/20  1632 11/22/20  0555 11/22/20  1009 11/23/20  0654   CALCIUM 11.1*  --  10.7* 10.8*   MG 1.5* 1.7  --  1.5*   PHOS 2.7 2.7  --  3.1       Urinalysis  Recent Labs   Lab 11/19/20  2250   COLORU Yellow   SPECGRAV 1.020   PHUR 5.0   PROTEINUA 2+*   BACTERIA Many*   NITRITE Positive*   LEUKOCYTESUR Negative   UROBILINOGEN Negative   HYALINECASTS 1       Cultures: none to date this admission    Scheduled Meds:   cefTRIAXone (ROCEPHIN) IVPB, 1 g, Q24H  cinacalcet, 90 mg, BID WM  enoxparin, 30 mg, Q24H  furosemide (LASIX) IV, 20 mg, Once  levothyroxine, 175 mcg, Before breakfast  magnesium sulfate IVPB, 2 g, Once  midodrine, 5 mg, Q8H  potassium phosphate IVPB, 15 mmol, Once  potassium, sodium phosphates, 1 packet, QID (AC & HS)  spironolactone, 50 mg, Daily  zoledronic acid (ZOMETA) IVPB, 3 mg, Once       sodium chloride 0.9% 100 mL/hr at 11/23/20 0058       PRN Meds  acetaminophen, acetaminophen, melatonin, ondansetron, sodium chloride 0.9%     Assessment/Plan:  -PT/OT for progressive mobility  -Encourage cough/deep breath/IS  -UA- pos nitrates, bacteria, protein on rocephin  -NS at 100 ml/hr  -Ca -10.8, on zoledronic acid and cinacalcet  for elevated ca.  -Hospice revoked at this time per daughter- they wish to treat her as outpatient to keep ca down.  -needs weekly zoledronic acid with cmp at home or outpt infusion on discharge        Discharge Planning         ---     Anticipated Date of D/C:   11/23/20         Appointments: Joshua Valerio MD - TBD    Dispo / Needs:  Home and Home Health    weekly zoledronic acid with cmp at home or outpt infusion     Nutrition: Regular diet as AMBIKA     Home support system- Daughter, family    Barriers to Care- none  ___________________________________________  TIMMY KhanN, RN, ONN-CG  Nurse Navigator [facility]    Tracie Weil Cavalier, GILLIANN, LDN, CNSC  Registered Dietitian [facility]      Face to Face Time: 15 minutes

## 2020-11-23 NOTE — PROGRESS NOTES
TN spoke with ALLI Benito RN  ---   Hospice has been rescinded.   Pt needs Zoladronic Acid infusion - 4 mg --- weekly   prefers to receive this medication at home;   has gotten this infusion at the [facility] in the past.       TN spoke with Ambreen with [facility] - she will check with agency Pharmacist to see if this infusion can be arranged.

## 2020-11-23 NOTE — PLAN OF CARE
Problem: Adult Inpatient Plan of Care  Goal: Plan of Care Review  Outcome: Ongoing, Progressing     VIRTUAL NURSE:  Cued into patient's room.  Permission received per patient to turn camera to view patient.  Introduced as VN for night shift that will be working with floor nurse and nursing assistant.  Patient slow to respond but is holding a conversation.  Call light within reach; bed siderails up x3.  Opportunity given for questions and questions answered.  Patient denies complaints or any needs at this time.  Will cont to monitor and intervene as needed.    Labs, notes, orders, and careplan reviewed.

## 2020-11-23 NOTE — PROGRESS NOTES
GENERAL SURGERY  PROGRESS NOTE    Admit Date: 11/19/2020  Hospital Day: 1    Calcium stabilized, 10.8 from 10.7 yesterday (down from 14.7 at admission)  Patient entirely lucid this morning; alert and oriented x3, states it [birth year]  Will complete bedside therapeutic paracentesis today with use of indwelling peritoneal catheter  Minimal intake 2/2 abdominal distension from ascites, low appetite  Otherwise no acute events, no significant clinical changes, no complaints    Physical Exam:  GEN: NAD, interactive  NEURO: alert, oriented to self/place/year  CV: regular rate  PULM: normal WOB  ABD: soft, NT. Distended, at baseline. 1,000mL of clear, yellow peritoneal fluid drained at bedside using patient's home peritoneal PleurX drainage equipment. Following drainage  Of 1L, patient's abdomen still distended (patient only had 1 drainage container available for bedside drainage)  MSK/EXT: moves all ext spontaneously    Inpatient Data      Vitals:   Temp:  [97.2 °F (36.2 °C)-97.7 °F (36.5 °C)]   Pulse:  [54-84]   Resp:  [15-18]   BP: ()/(48-58)   SpO2:  [98 %-100 %]     Diet NPO Except for: Medication     Intake/Output Summary (Last 24 hours) at 11/23/2020 1222  Last data filed at 11/23/2020 0600  Gross per 24 hour   Intake 2475 ml   Output 400 ml   Net 2075 ml          Recent Labs     11/20/20  2305 11/21/20  0612 11/21/20  1632 11/22/20  0555 11/22/20  1009 11/23/20  0654   WBC  --  9.68  --  11.21  --  11.50   HGB  --  8.8*  --  8.3*  --  8.9*   HCT  --  28.7*  --  26.0*  --  28.7*   PLT  --  115*  --  99*  --  108*   * 145 146*  --  147* 146*   K 3.3* 3.7 3.2*  --  3.4* 3.5   * 113* 114*  --  116* 116*   CO2 24 17* 20*  --  18* 17*   BUN 29* 28* 26*  --  25* 24*   CREATININE 1.5* 1.4 1.3  --  1.2 1.2   BILITOT 1.8*  --   --   --   --   --    AST 22  --   --   --   --   --    ALT 10  --   --   --   --   --    ALKPHOS 393*  --   --   --   --   --    CALCIUM 12.8* 12.2* 11.1*  --  10.7*  10.8*   ALBUMIN 2.5*  --   --   --   --   --    PROT 6.6  --   --   --   --   --    MG  --  1.6 1.5* 1.7  --  1.5*   PHOS  --  3.1 2.7 2.7  --  3.1     Scheduled Meds: cefTRIAXone (ROCEPHIN) IVPB, 1 g, Q24H  cinacalcet, 90 mg, BID WM  enoxparin, 30 mg, Q24H  levothyroxine, 175 mcg, Before breakfast  midodrine, 5 mg, Q8H  potassium phosphate IVPB, 15 mmol, Once  potassium, sodium phosphates, 1 packet, QID (AC & HS)  spironolactone, 50 mg, Daily       sodium chloride 0.9% 100 mL/hr at 11/23/20 1113     ASSESSMENT/PLAN:  69 y.o.F on home hospice with widely metastatic NET and resultant hx of hypercalcemia presents with AMS/confusion secondary to hypercalcemia that has since stabilized though still with high Ca levels.     -plan to discharge today following therapeutic paracentesis  -patient will be discharged with previously arranged home hospice services  -will set up for outpatient weekly 4g zoledronic acid infusions  -mental status at baseline and patient would like to be DC'd home for her birthday, which is tomorrow  -dispo pending case management and setup for outpatient home hospice services  -will transition to PO antibiotics for UTI on discharge and resume maximal medical therapy for hypercalcemia at home     Rogelio Renee MD  [facility] General Surgery PGY-2  11/23/2020, 1:48 PM

## 2020-11-24 NOTE — ASSESSMENT & PLAN NOTE
Contributing Nutrition Diagnosis  Inadequate energy intake    Related to (etiology):   cancer    Signs and Symptoms (as evidenced by):   28# weight loss x 4 months, poor po intake,ascites    Interventions:  Collaboration with other providers    Nutrition Diagnosis Status:   New

## 2020-11-24 NOTE — PROGRESS NOTES
NET Team Rounds     Admit Date: 11/19/2020                    Length of Stay Days: 1                NET Physician:  Cory Rodriguez DO, FACP                    Local Treating Physician:Gaby Corea MD    Reason for admission:   Hypercalcemia [E83.52]  Hypokalemia [E87.6]  Fatigue [R53.83]  Weakness [R53.1]    HPI: 69 y.o.F on home hospice with widely metastatic NET and resultant hx of hypercalcemia presents with AMS/confusion secondary to hypercalcemia.    Surgery/Procedure: none       Assessment  Diet: Regular       Oral Supplement: None                  Nutrition Support - none   Appetite - poor             Nausea - No                Vomiting- No  BM- last reported - none  Urine-  voiding without difficulty  Abdomen- soft, nontender, distended and + flatus  Incision- none  Pain-none    IV Access- Peripheral   Drains- Pleurax Cath - placed 11/5/20     Vital Signs (Most Recent):  Temp: 98.1 °F (36.7 °C) (11/24/20 1109)  Pulse: 72 (11/24/20 1109)  Resp: 20 (11/24/20 1109)  BP: (!) 121/58 (11/24/20 1109)  SpO2: 100 % (11/24/20 1156) Vital Signs Range (Last 24H):  Temp:  [97.5 °F (36.4 °C)-98.3 °F (36.8 °C)]   Pulse:  [70-80]   Resp:  [16-20]   BP: (102-125)/(49-74)   SpO2:  [100 %]            Labs:   Recent Labs   Lab 11/19/20  2333 11/20/20  0611 11/20/20  2305 11/21/20  0612 11/21/20  1632 11/22/20  0555 11/22/20  1009 11/23/20  0654   WBC  --  8.24  --  9.68  --  11.21  --  11.50   HGB  --  8.1*  --  8.8*  --  8.3*  --  8.9*   HCT  --  25.7*  --  28.7*  --  26.0*  --  28.7*   PLT  --  115*  --  115*  --  99*  --  108*   MCV  --  109*  --  113*  --  111*  --  113*   RDW  --  19.9*  --  20.5*  --  20.5*  --  20.9*    144 146* 145 146*  --  147* 146*   K 2.7* 2.6* 3.3* 3.7 3.2*  --  3.4* 3.5    110 111* 113* 114*  --  116* 116*   CO2 25 25 24 17* 20*  --  18* 17*   BUN 33* 32* 29* 28* 26*  --  25* 24*   CREATININE 1.8* 1.7* 1.5* 1.4 1.3  --  1.2 1.2   GLU 85 77 53* 63* 82  --  67* 77   PROT 6.8 6.4  6.6  --   --   --   --   --    ALBUMIN 2.6* 2.4* 2.5*  --   --   --   --   --    BILITOT 1.6* 1.5* 1.8*  --   --   --   --   --    AST 21 20 22  --   --   --   --   --    ALKPHOS 387* 366* 393*  --   --   --   --   --    ALT 11 10 10  --   --   --   --   --     < > = values in this interval not displayed.      No results for input(s): PREALBUMIN, CRP, TRIG in the last 168 hours.     Recent Labs   Lab 11/22/20  0555 11/22/20  1009 11/23/20  0654 11/24/20  0533 11/24/20  1227   CALCIUM  --  10.7* 10.8*  --  10.6*   MG 1.7  --  1.5* 1.9  --    PHOS 2.7  --  3.1 4.3  --        Urinalysis  Recent Labs   Lab 11/19/20  2250   COLORU Yellow   SPECGRAV 1.020   PHUR 5.0   PROTEINUA 2+*   BACTERIA Many*   NITRITE Positive*   LEUKOCYTESUR Negative   UROBILINOGEN Negative   HYALINECASTS 1       Cultures: none to date this admission    Scheduled Meds:   cefTRIAXone (ROCEPHIN) IVPB, 1 g, Q24H  cinacalcet, 90 mg, BID WM  enoxparin, 40 mg, Q24H  levothyroxine, 175 mcg, Before breakfast  midodrine, 5 mg, Q8H  potassium, sodium phosphates, 1 packet, QID (AC & HS)  spironolactone, 50 mg, Daily       sodium chloride 0.9% 100 mL/hr at 11/24/20 0428       PRN Meds  acetaminophen, acetaminophen, melatonin, ondansetron, sodium chloride 0.9%     Assessment/Plan:  -PT/OT for progressive mobility  -Encourage cough/deep breath/IS  -UA-11/19/20- pos nitrates, bacteria, protein covered with rocephin  -NS d/c'ed  -Ca - 10.6- on zoledronic acid and cinacalcet  for elevated ca.  -Hospice revoked at this time per daughter- they wish to treat her as outpatient to keep ca down.  -needs weekly zoledronic acid with cmp at home or outpt infusion on discharge. Dr Rodriguez will manage this as outpatient.        Discharge Planning         ---     Anticipated Date of D/C:   11/23/20         Appointments: Cory Rodriguez DO, FACP  - 1 wk    Dispo / Needs: Home and Home Health    weekly zoledronic acid with cmp at home or outpt infusion     Nutrition:  Regular diet as AMBIKA     Home support system- Daughter, family    Barriers to Care- none  ___________________________________________  TIMMY KhanN, RN, ONN-CG  Nurse Navigator [facility]    Tracie Weil Cavalier, RDN, LDN, CNSC  Registered Dietitian [facility]      Face to Face Time: 15 minutes

## 2020-11-24 NOTE — PLAN OF CARE
AAOX person; reoriented/ reassured.  Follows commands but slow to respond.  Asking when she's going to be discharged every times someone enters the room; did not sleep all night.  Respirations even and unlabored; breath sounds clear.  RA with saturation 100%.  Denies pain, n/v, and sob.  Abd rounded with ascites; bowel sounds active.  Tolerating minimal po; pills crushed and mix in apple sauce.  Turning with assistance; mepilex placed on sacrum.  Pure wick changed; bath given.  Linen changed.  On waffle mattress.  Education per flowsheet; needs reinforcement.  Bed alarm on; bed awareness on.  Fall precaution sign on door; fall precaution armband on.  Fall precaution socks on.  Instructed to call for assistance.  Will cont to monitor.        Problem: Fall Injury Risk  Goal: Absence of Fall and Fall-Related Injury  Outcome: Ongoing, Progressing     Problem: Adult Inpatient Plan of Care  Goal: Plan of Care Review  Outcome: Ongoing, Progressing  Goal: Optimal Comfort and Wellbeing  Outcome: Ongoing, Progressing     Problem: Wound  Goal: Optimal Wound Healing  Outcome: Ongoing, Progressing     Problem: Swallowing Impairment  Goal: Improved Swallowing Without Aspiration  Outcome: Ongoing, Progressing

## 2020-11-24 NOTE — PLAN OF CARE
pt and family known to TN   family has rescinded hospice - Bonita with [facility] aware     pt may need home iv infusion   HH per [facility]     [facility]         11/23/20 8272   Discharge Assessment   Assessment Type Discharge Planning Assessment

## 2020-11-24 NOTE — PLAN OF CARE
TN spoke with pt's daughter Rocio - pt for d/c to home today   daughter will pick pt up at 2pm  --  pt has rw, sc bsc and rw at home   pe Timbo - [facility] --- no home infusion  TN confirmed with Hortencia with [facility] - pt's daughter will be contacted tomorrow to arrange delivery of additional PleurX supplies.   All info/order rec'd     p      Future Appointments   Date Time Provider Department Center   11/30/2020 10:00 AM APPOINTMENT LAB, [facility] LAB [facility]   11/30/2020 11:20 AM Cory Rodriguez DO, FACP [facility] TUMOR [facility]   12/1/2020  2:00 PM [address] CHEMO [facility]   12/7/2020  9:40 AM APPOINTMENT LAB, [facility] LAB [facility]   12/7/2020 10:40 AM Cory Rodriguez DO, FACP [facility] TUMOR [facility]   12/14/2020 10:10 AM APPOINTMENT LAB, [facility]   12/14/2020 11:20 AM Cory Rodriguez DO, FACP [facility] TUMOR [facility]        11/24/20 1748   Final Note   Assessment Type Final Discharge Note   Anticipated Discharge Disposition Home-Health  ([facility])   What phone number can be called within the next 1-3 days to see how you are doing after discharge? [phone]   Hospital Follow Up  Appt(s) scheduled? Yes   Discharge plans and expectations educations in teach back method with documentation complete? Yes   Right Care Referral Info   Post Acute Recommendation SNF / Sub-Acute Rehab   Referral Type home care   Facility Name [facility]

## 2020-11-24 NOTE — DISCHARGE SUMMARY
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 The patient is unable to supplement the HPI due to confusion but is awake/alert and able to answer simple yes or no questions on my exam. She denies any pain at this time or abdominal discomfort. In the ED, patient hypercalcemic with calcium of 14.7 and hypokalemic with K of 2.7. The NET team was consulted for further evaluation.     Hospital Course:   Patient was admitted for treatment of hypercalcemia and observation of her associated with her altered mental status, which resolved with resolution of hypercalcemia. During her hospital stay, her family decided to revoke her hospice status in efforts to treat her hypercalcemia of malignancy. They request she be discharged with home health and continue to undergo treatment of her hypercalcemia though they express they do not desire aggressive medical treatment of the patient's malignancy, as previously discussed prior to admission. Their goals include increasing the patient's quality of life by way of normalizing her mental status for as long as possible (via controlling her hypercalcemia). They wish to continue these efforts at home to whatever capacity is possible with a home health organization and do not wish to pursue LTAC at this point in time. During her stay, the patient underwent bedside  therapeutic paracentesis via her indwelling PleurX catheter (3L total drained over 2 days) as she was undergoing at home. Prior to discharge, the patient was in her normal state of cognition and was alert/oriented x3. She will be discharged home today with family and home health services. Prior to discharge she was not ambulating independently but was at her baseline functional status. She was tolerating regular diet though with low appetite secondary to her malignancy. She was discharged in stable condition with close follow-up with Home Health, the neuroendocrine surgery team, and her oncologist.     She will be discharged home with home health who will draw weekly labs and assist pt/family as needed with as-needed paracentesis via indwelling PleurX catheter. She will undergo weekly infusion of Zoledronic acid at an outpatient infusion center which will be managed by Dr. Rodriguez.     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     Discharged Condition: Stable  Disposition: Home or Self Care with Home health services     Discharge Instructions:    Discharge Procedure Orders   Ambulatory referral/consult to Home Health   Standing Status: Future   Referral Priority: Routine Referral Type: Home Health   Referral Reason: Specialty Services Required   Requested Specialty: Home Health Services   Number of Visits Requested: 1     Ambulatory referral/consult to Home Health   Standing Status: Future   Referral Priority: Routine Referral  Type: Home Health   Referral Reason: Specialty Services Required   Requested Specialty: Home Health Services   Number of Visits Requested: 1     Follow-up Information     [facility].    Specialty: Home Health Services  Contact information:  [address]  [place] [address]  [phone]             Please follow up.    Why: PLEUREX CATHETER SYSTEM SUPPLIES [phone]  -- MS. EDGAR WILL BE CONTACTED AT [phone]  ON WED., 11/25 ABOUT DELIVERY OF PRODUCTS            Please follow up.    Why: [facility] - YOU WILL BE CONTACTED WITH TIME/DATE FOR WEEKLY INFUSIONS               Current Discharge Medication List      START taking these medications    Details   potassium, sodium phosphates (PHOS-NAK) 280-160-250 mg PwPk Take 1 packet by mouth 3 (three) times daily.  Qty: 90 packet, Refills: 0         CONTINUE these medications which have NOT CHANGED    Details   cinacalcet (SENSIPAR) 90 MG Tab Take 1 tablet (90 mg total) by mouth 2 (two) times daily with meals.  Qty: 60 tablet, Refills: 11      dronabinoL (MARINOL) 2.5 MG capsule Take 1 capsule (2.5 mg total) by mouth 2 (two) times daily before meals.  Qty: 60 capsule, Refills: 3    Associated Diagnoses: Anorexia; Malnutrition compromising bodily function      ferrous gluconate (FERGON) 324 MG tablet Take 1 tablet by mouth once daily.      furosemide (LASIX) 20 MG tablet Take 1 tablet (20 mg total) by mouth 2 (two) times daily.  Qty: 60 tablet, Refills: 11      levothyroxine (SYNTHROID, LEVOTHROID) 175 MCG tablet Take 1 tablet (175 mcg total) by mouth before breakfast.  Qty: 30 tablet, Refills: 11      midodrine (PROAMATINE) 5 MG Tab Take 1 tablet (5 mg total) by mouth every 8 (eight) hours.  Qty: 90 tablet, Refills: 11      ondansetron (ZOFRAN-ODT) 8 MG TbDL Take 1 tablet by mouth every 8 (eight) hours as needed.      spironolactone (ALDACTONE) 50 MG tablet Take 1 tablet (50 mg total) by mouth once daily.  Qty: 30 tablet,  Refills: 11    Comments: .      STIMULANT LAXATIVE PLUS 8.6-50 mg per tablet Take 1 tablet by mouth once daily.      ursodioL (ACTIGALL) 300 mg capsule Take 1 capsule (300 mg total) by mouth 2 (two) times daily.  Qty: 60 capsule, Refills: 11      olmesartan (BENICAR) 40 MG tablet Take 40 mg by mouth once daily.    Comments: .         STOP taking these medications       gabapentin (NEURONTIN) 300 MG capsule Comments:   Reason for Stopping:               Rogelio Renee MD  [facility] General Surgery PGY-2  11/24/2020, 8:58 AM

## 2020-11-24 NOTE — PROGRESS NOTES
"[facility]  Adult Nutrition  Progress Note    SUMMARY       Recommendations    Recommendation:   1. Add Boost Plus bid.   2. Encourage intake at meals as tolerated.   3. RD to follow to monitor po intake    Goals:   Pt will tolerate po diet with at least 50-75% intake at meals by RD follow up  Nutrition Goal Status: (continues)  Communication of RD Recs: reviewed with RN    Reason for Assessment  Reason For Assessment: RD follow-up  Diagnosis: cancer diagnosis/related complications  Relevant Medical History: HTN, thryoid disease, HLD, primary pancreatic neuroendocrine tumor, secondry neuroendocrine tumor of liver  General Information Comments: Pt on Regular Mech Soft diet. Pt with poor po intake. Pt with decreased appetite 2/2 abd distension from ascites. s/p paracentesis today. Noted 28# weight losss since July. Unable to assess NFPE at this time. Noted family rescinded hospice.  Nutrition Discharge Planning: d/c needs to be determined    Nutrition Risk Screen  Nutrition Risk Screen: dysphagia or difficulty swallowing    Nutrition/Diet History  Food Preferences: no Amish or cultural food prefs identified  Spiritual, Cultural Beliefs, Methodist Practices, Values that Affect Care: yes  Factors Affecting Nutritional Intake: decreased appetite    Anthropometrics  Temp: 97.6 °F (36.4 °C)  Height Method: Stated  Height: 5' 7" (170.2 cm)  Height (inches): 67 in  Weight Method: Bed Scale  Weight: 55.2 kg (121 lb 11.1 oz)  Weight (lb): 121.7 lb  Ideal Body Weight (IBW), Female: 135 lb  % Ideal Body Weight, Female (lb): 90.15 %  BMI (Calculated): 19.1  BMI Grade: 18.5-24.9 - normal  Usual Body Weight (UBW), k.9 kg(2020)  % Usual Body Weight: 81.47  % Weight Change From Usual Weight: -18.7 %     Lab/Procedures/Meds  Pertinent Labs Reviewed: reviewed  Pertinent Labs Comments: Na 146H, BUN 24H, Ca 10.8H, Mg 1.5L, Alb 2.5L, PAB 9L  Pertinent Medications Reviewed: reviewed  Pertinent Medications " Comments: Cecilia chavez    Estimated/Assessed Needs  Weight Used For Calorie Calculations: 55.2 kg (121 lb 11.1 oz)  Energy Calorie Requirements (kcal): 1932 (35 kcal/kg)  Energy Need Method: Kcal/kg  Protein Requirements: 77g (1.4g/kg)  Weight Used For Protein Calculations: 55.2 kg (121 lb 11.1 oz)  Estimated Fluid Requirement Method: RDA Method  RDA Method (mL): 1932     Nutrition Prescription Ordered  Current Diet Order: Regular    Evaluation of Received Nutrient/Fluid Intake  I/O: 2475/1500  Energy Calories Required: not meeting needs  Protein Required: not meeting needs  Fluid Required: not meeting needs  Comments: LBM 11/22  % Intake of Estimated Energy Needs: 0 - 25 %  % Meal Intake: 0 - 25 %    Nutrition Risk  Level of Risk/Frequency of Follow-up: (2xweekly)     Assessment and Plan  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  Contributing Nutrition Diagnosis  Inadequate energy intake    Related to (etiology):   cancer    Signs and Symptoms (as evidenced by):   28# weight loss x 4 months, poor po intake, ascites    Interventions:  Collaboration with other providers    Nutrition Diagnosis Status:   New      Monitor and Evaluation  Food and Nutrient Intake: food and beverage intake  Food and Nutrient Adminstration: diet order  Physical Activity and Function: nutrition-related ADLs and IADLs  Anthropometric Measurements: weight  Biochemical Data, Medical Tests and Procedures: electrolyte and renal panel  Nutrition-Focused Physical Findings: overall appearance     Malnutrition Assessment  Weight Loss (Malnutrition): greater than 7.5% in 3 months(18% x 4 months)   Severe Weight Loss (Malnutrition): greater than 7.5% in 3 months    Nutrition Follow-Up  RD Follow-up?: Yes

## 2020-11-24 NOTE — PLAN OF CARE
Recommendation:   1. Add Boost Plus bid.   2. Encourage intake at meals as tolerated.   3. RD to follow to monitor po intake    Goals:   Pt will tolerate po diet with at least 50-75% intake at meals by RD follow up  Nutrition Goal Status: (continues)  Communication of RD Recs: reviewed with RN

## 2020-11-24 NOTE — ASSESSMENT & PLAN NOTE
Contributing Nutrition Diagnosis  Inadequate energy intake    Related to (etiology):   cancer    Signs and Symptoms (as evidenced by):   28# weight loss x 4 months, poor po intake, ascites    Interventions:  Collaboration with other providers    Nutrition Diagnosis Status:   New

## 2020-11-24 NOTE — PLAN OF CARE
VN reviewed discharge instructions with pt. And daughter.AVS printed and handed to pt by bedside nurse.  Reviewed follow-up appointments, medications, diet, and importance of medication compliance.  Reviewed home care instructions, treatment plan, self-management, and when to seek medical attention.  Allowed time for questions.  All questions answered.  Patient and daughter verbalized complete understanding of discharge instructions and voices no concerns.     Discharge instructions complete. Printed RX given to daughter.  Transport/wheelchair requested.  Bedside nurse notified.

## 2020-11-27 NOTE — TELEPHONE ENCOUNTER
----- Message from Roverto Sanders sent at 11/27/2020 10:25 AM CST -----  Regarding: Appointment cancelled  Type:  Needs Medical Advice    Who Called: Pranav Bruce (daughter)  Would the patient rather a call back or a response via [facility]?  Call back  Best Call Back Number:  [phone]  Additional Information:  wants to change appointment that is currently scheduled for Monday, 11/30/2020

## 2020-11-30 NOTE — Clinical Note
Continue weekly labs and IVF  Awaiting ca today to see if she needs zometa  See me virtual in 3 weeks

## 2020-11-30 NOTE — PROGRESS NOTES
NOLANETS:  [facility]  A collaboration between [facility]    PATIENT: Ching Bruce  MRN: [id number]  DATE: 11/30/2020      Diagnosis:   1. Primary pancreatic neuroendocrine tumor    2. Secondary neuroendocrine tumor of liver    3. 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 diagnosed 10/12  Progressive disease in liver 7/2020    Oncologic Treatment Capecitabine/Temozolomide (CAPTEM) 1/13 -11/2018     Pathology 2012:  Pancreatic/liver aspirate- pancreatic endocrine neoplasm,  Ki-67 1%  7/2020: Liver biopsy - well differentiated neuroendocrine tumor, grade 2, Ki-67 15%, Mitotic rate <2/2mm2      The patient location is: home  The chief complaint leading to consultation is: follow up pancreatic neuroendocrine tumor    Visit type: audiovisual    Face to Face time with patient: 10 min   15 minutes of total time spent on the encounter, which includes face to face time and non-face to face time preparing to see the patient (eg, review of tests), Obtaining and/or reviewing separately obtained history, Documenting clinical information in the electronic or other health record, Independently interpreting results (not separately reported) and communicating results to the patient/family/caregiver, or Care coordination (not separately reported).         Each patient to whom he or she provides medical services by telemedicine is:  (1) informed of the relationship between the physician and patient and the respective role of any other health care provider with respect to management of the patient; and (2) notified that he or she may decline to receive medical services by telemedicine and may withdraw from such care at any time.    Notes:       Subjective:    Interval History: Ms. Bruce is a 70 y.o.  female who returns for follow up for her pancreatic neuroendocrine tumor. Since I had seen her last she started hospice.  She has since come off and was admitted to the hospital for confusion and worsening hypercalcemia.  She is seen with her daughter today.  Her confusion has improved but has had a few episodes.  Her appetite and intake are poor.  She is getting around but went to lab today and now more fatigued.  She denies pain.  She has no other new complaints.      ONCOLOGIC HISTORY:   A 70 y.o. year-old -American female who I had initially   seen on 12/18/2012. Her history dates back to September 2012 when she was noted  to have several weeks of feeling fatigued. She sought treatment with her   primary care doctor who did a CBC and found her to be severely anemic. She was   seen at [facility] and transfused 3 units of packed red blood cells  and no source of bleeding had been found. Records at that time had shown   hemoglobin of 5. She sought followup in September 2012 with Dr. Guillen who   performed an EGD and colonoscopy with an EGD showing an ulcerated and fungating   nonbleeding 2 cm mass, malignant in appearance. It did cause partial   obstructions. Biopsies were taken, which showed no evidence of malignancy at   that time. She had a CT of the abdomen and pelvis showing multiple metastatic   lesions and the liver biopsy was performed on 10/17/2012 showed pancreatic   neuroendocrine neoplasm in the left lobe of the liver aspirate. She went on to   have an ERCP along with sphincterotomy and biliary stent placement and   subsequent PET had shown numerous hypodense lesions in the liver. Octreotide   scan was performed, which showed multiple right and left hepatic metastases.   MRI of the abdomen was performed on 12/05/2012 showing innumerable lesions that   demonstrated peripheral to hyperintensity consistent with hypervascular   metastasis. She was seen by Dr. Humphrey, who thought she  was not a candidate   for surgical resection during that time. Her pathology from her tumor came back  positive for chromogranin, synaptophysin and had a Ki-67 of less than 1%. She   was started on treatment with temozolomide and capecitabine with cycle   1 being on 01/22/2013. This was discontinued in 11/2018 due to prolonged stability of disease.  In 7/2020 she had progressive disease within the liver.  A biopsy showed a grade 2 NET with Ki-67 of 15%.  Gallium 68 PET/CT showed uptake within the liver.    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    Secondary malignant neoplasm of liver     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) 5/7/2013    Thrombocytopenia 11/12/2013    Thyroid disease     Unspecified essential hypertension 11/12/2013       Past Surgical HIstory:   Past Surgical History:   Procedure Laterality Date    ERCP N/A 6/21/2018    Procedure: ERCP, stent exchange;  Surgeon: Jake Lieberman MD;  Location: [facility];  Service: Endoscopy;  Laterality: N/A;    ERCP N/A 5/23/2019    Procedure: ERCP (ENDOSCOPIC RETROGRADE CHOLANGIOPANCREATOGRAPHY), stent exchange;  Surgeon: Jake Lieberman MD;  Location: [facility] ENDO;  Service: Endoscopy;  Laterality: N/A;    ERCP N/A 11/14/2019    Procedure: ERCP (ENDOSCOPIC RETROGRADE CHOLANGIOPANCREATOGRAPHY), stent exchange;  Surgeon: Jake Lieberman MD;  Location:  [facility] ENDO;  Service: Endoscopy;  Laterality: N/A;    ERCP      ERCP N/A 9/22/2020    Procedure: ERCP (ENDOSCOPIC RETROGRADE CHOLANGIOPANCREATOGRAPHY);  Surgeon: Abdulaziz Owen MD;  Location: [facility] ENDO;  Service: Endoscopy;  Laterality: N/A;    PERITONEAL CATHETER INSERTION N/A 11/5/2020    Procedure: INSERTION, CATHETER, INTRAPERITONEAL;  Surgeon: ELISE Mcknight MD;  Location: [facility] OR;  Service: General;  Laterality: N/A;    THYROIDECTOMY  2011       Family History:   Family History   Problem Relation Age of Onset    Cancer Maternal Grandmother     Diabetes Father     Breast cancer Neg Hx     Colon cancer Neg Hx     Ovarian cancer Neg Hx        Social History:  reports that she has never smoked. She has never used smokeless tobacco. She reports that she does not drink alcohol or use drugs.    Allergies:  Review of patient's allergies indicates:   Allergen Reactions    Epinephrine Other (See Comments)     Carcinoid patient    Sulfa (sulfonamide antibiotics) Other (See Comments)     Urticaria       Medications:  Current Outpatient Medications   Medication Sig Dispense Refill    cinacalcet (SENSIPAR) 90 MG Tab Take 1 tablet (90 mg total) by mouth 2 (two) times daily with meals. 60 tablet 11    dronabinoL (MARINOL) 2.5 MG capsule Take 1 capsule (2.5 mg total) by mouth 2 (two) times daily before meals. 60 capsule 3    ferrous gluconate (FERGON) 324 MG tablet Take 1 tablet by mouth once daily.      furosemide (LASIX) 20 MG tablet Take 1 tablet (20 mg total) by mouth 2 (two) times daily. 60 tablet 11    levothyroxine (SYNTHROID, LEVOTHROID) 175 MCG tablet Take 1 tablet (175 mcg total) by mouth before breakfast. 30 tablet 11    midodrine (PROAMATINE) 5 MG Tab Take 1 tablet (5 mg total) by mouth every 8 (eight) hours. 90 tablet 11    olmesartan (BENICAR) 40 MG tablet Take 40 mg by mouth once daily.      ondansetron (ZOFRAN-ODT) 8 MG TbDL Take 1 tablet by mouth every 8 (eight) hours as needed.       potassium, sodium phosphates (PHOS-NAK) 280-160-250 mg PwPk Take 1 packet by mouth 3 (three) times daily. 90 packet 0    spironolactone (ALDACTONE) 50 MG tablet Take 1 tablet (50 mg total) by mouth once daily. 30 tablet 11    STIMULANT LAXATIVE PLUS 8.6-50 mg per tablet Take 1 tablet by mouth once daily.      ursodioL (ACTIGALL) 300 mg capsule Take 1 capsule (300 mg total) by mouth 2 (two) times daily. 60 capsule 11     No current facility-administered medications for this visit.          Review of Systems   Constitutional: Positive for malaise/fatigue and weight loss. Negative for chills and fever.   HENT: Negative for congestion.    Eyes: Negative for blurred vision.   Respiratory: Negative for cough and shortness of breath.    Cardiovascular: Negative for chest pain and leg swelling.   Gastrointestinal: Negative for abdominal pain, constipation, diarrhea, nausea and vomiting.        Abdominal distension    Genitourinary: Negative for dysuria.   Musculoskeletal: Negative for myalgias.   Skin: Negative for rash.   Neurological: Positive for weakness. Negative for focal weakness and headaches.             Endo/Heme/Allergies: Does not bruise/bleed easily.         ECOG Performance Status: 3  Objective:      Vitals:   There were no vitals filed for this visit.  BMI: There is no height or weight on file to calculate BMI.      Physical Exam   Constitutional: She is oriented to person, place, and time. She appears unhealthy.   Lying on couch wrapped in blanket    Pulmonary/Chest: Effort normal. No respiratory distress.   Neurological: She is oriented to person, place, and time.   Psychiatric: Affect normal.         Laboratory Data:  Abstract on 12/06/2016   Component Date Value Ref Range Status    EXT WBC 12/03/2016 4.1  3.8 - 10.8 1000/ul Final    EXT Hemoglobin 12/03/2016 12.9  11.7 - 15.5 g/dl Final    EXT Hematocrit 12/03/2016 38.5  35.0 - 45.0 % Final    EXT Platelets 12/03/2016 138* 140 - 400 1000/ul Final     EXT Glucose 12/03/2016 88  65 - 99 mg/dl Final    EXT BUN 12/03/2016 13  7 - 25 mg/dl Final    EXT Creatinine 12/03/2016 0.90  0.50 - 0.99 mg/dl Final    EXT Sodium 12/03/2016 141  135 - 146 mmol/l Final    EXT Potassium 12/03/2016 4.2  3.5 - 5.3 mmol/l Final    EXT Chloride 12/03/2016 105  98 - 110 mmol/l Final    EXT CO2 12/03/2016 29  20 - 31 mmol/l Final    EXT Calcium 12/03/2016 9.7  8.6 - 10.4 mg/dl Final    EXT Protein total 12/03/2016 6.9  6.1 - 8.1 g/dl Final    EXT Albumin 12/03/2016 4.0  3.6 - 5.1 g/dl Final    EXT BilirubiN Total 12/03/2016 1.1  0.2 - 1.2 mg/dl Final    EXT Alkaline Phosphatase 12/03/2016 181* 33 - 130 u/l Final    EXT AST 12/03/2016 21  10 - 35 u/l Final    EXT ALT 12/03/2016 28  6 - 29 u/l Final   Office Visit on 11/14/2016   Component Date Value Ref Range Status    Blood Stool 11/14/2016 Negative  Negative Final     Acceptable 11/14/2016 Yes   Final    SOURCE: 11/14/2016 Cervical   Final    Slides: 11/14/2016 1   Final    LMP: 11/14/2016 NA   Final    Specimen adequacy: 11/14/2016 (NOTE)   Final    Comment:      Satisfactory for evaluation.          Endocervical cells absent.  Metaplastic cells indicative       of transformation zone cannot be reliably distinguished from       parabasal or atrophic cells.                      Interpretation 11/14/2016 NO EPITHELIAL ABNORMALITY SEE BELOW   Final    Comment: ----------------------------------------------------------------------       *NEGATIVE FOR INTRAEPITHELIAL LESION OR MALIGNANCY   ----------------------------------------------------------------------         Comments: 11/14/2016 (NOTE)   Final    Comment: Due to technical or specimen issues, imaging could not be  performed. A cytotechnologist has manually screened this slide.         Cytotechnologist: 11/14/2016 BRENDON Ca([facility]   Final    LOCATION 11/14/2016 (NOTE)   Final    Comment: Specimens processed and interpreted at  :[facility], [address] [place] [id number], Phone: [phone], CLIA: [id number]      CPT Codes: 11/14/2016 (NOTE)   Final    Comment: [id number]        UNLESS OTHERWISE INDICATED, COMPUTER AIDED AND CYTOTECHNOLOGIST     SCREENING PERFORMED.          The Pap test is a screening test with an inherent, but low       probability of error.  Your patient should be reminded to       consult you immediately if she experiences any suspicious       signs or symptoms, regardless of her Pap test result.       An alternate report format containing images or consolidated       prior Pap history is available as applicable.         HPV HIGH RISK IF ASC-US, [facility] 11/14/2016 CRITERIA NOT MET   Final    Comment:       UNLESS OTHERWISE INDICATED, ALL TESTING PERFORMED AT  [facility]  [address] [place] [address]            :  CARLOS MENDEZ M.D.        CLIA NUMBER [id number]  [place]. [id number]                 Imaging:   MRI 07/07/2020  COMPARISON:  09/30/2019     FINDINGS:  Compared with the prior study there is considerable interval worsening of extensive neoplastic disease throughout the liver with innumerable confluent lesions along the periphery of the liver in both the left and right hepatic lobes.  Previously measured lesion in the left hepatic lobe is now obscured by innumerable confluent lesions.  Superior right hepatic lobe lesion that previously measured 2 cm now is at least 6 cm.  There is hypertrophy/enlargement of the left hepatic lobe.  Moderate splenomegaly has developed in the interim.     There is a metallic wall stent identified within the distal common bile duct.  No definite central biliary ductal dilatation.  No definite pancreatic ductal dilatation or discrete mass is seen within the pancreas but there is nonspecific fullness of the pancreatic head.     The kidneys and adrenal glands are remarkable for small bilateral  renal cysts.  The retroperitoneal vessels enhance normally.     Miscellaneous: There is no ascites.     MRCP: Not performed     Impression:     Significant interval worsening of extensive neoplastic/metastatic disease throughout the liver.     Interval development of splenomegaly        Gallium 68 PET/CT 7/20/2020  COMPARISON:  [place] PET-CT 11/30/2018, MRI abdomen 07/07/2020, neck ultrasound 03/05/2013     FINDINGS:  Quality of the study: Adequate.     Small focus of radiotracer uptake within the right posterior thyroid resection bed and tracheoesophageal groove.  SUV max of 2.9.     New focal radiotracer uptake within a 0.6 cm right internal mammary lymph node with maximum SUV of 5.8.     Numerous diffuse foci of increased radiotracer uptake throughout the liver.  Overall number of hepatic lesions is likely similar compared to prior exam however lesions demonstrate decreased intensity of radiotracer uptake.  Index right hepatic dome lesion with maximum SUV 19 (previously 29.64).     Physiologic distribution of the tracer is seen within the pituitary, salivary, stomach, pancreas uncinate process, spleen, adrenal glands,  tract, and bowel.     Incidental CT findings: Small volume ascites.  Common bile duct stent in place with expected pneumobilia.     Impression:     Numerous diffuse tracer avid hepatic lesions which demonstrate decreased intensity of uptake compared to prior exam.     New radiotracer uptake within a small right internal mammary lymph node concerning for metastatic disease.     Small focus of radiotracer uptake within the right posterior thyroid resection bed likely correlating with a 0.7 cm low-density nodule.  Similar sized nodule was described on prior ultrasound 03/05/2013.  Differential considerations include parathyroid adenoma versus residual thyroid tissue.     I, Jake Horowitz MD, attest that I reviewed and interpreted the images.            Assessment:       1. Primary pancreatic  neuroendocrine tumor    2. Secondary neuroendocrine tumor of liver    3. Hypercalcemia           Plan:     Mrs. Bruce is now off hospice and we will be checking weekly labs and giving IVF weekly.  If her calcium is elevated she will also receive zoledronic acid.  She has poor performance status and not a candidate for treatment but does not want to go back on hospice at this time.  I'll plan to see her back in 3 weeks. She is to report any new symptoms in the interim. All questions were answered and she is agreeable with this plan.    Cory Rodriguez DO, FACP  Hematology & Oncology, [facility]  [address]  [place]  [address]  ph. [phone]; [phone]  fax. [phone]

## 2020-12-08 NOTE — PLAN OF CARE
Pt arrived to unit. Very weak, not eating. Denies any pain. Tolerated IVF. Pt in wheelchair, discharged from unit with friend. No distress noted.

## 2020-12-09 PROBLEM — I95.9 HYPOTENSION: Status: ACTIVE | Noted: 2020-01-01

## 2020-12-09 PROBLEM — J18.9 PNEUMONIA: Status: ACTIVE | Noted: 2020-01-01

## 2020-12-09 PROBLEM — N17.9 ACUTE RENAL FAILURE: Status: ACTIVE | Noted: 2020-01-01

## 2020-12-10 PROBLEM — R79.89 ELEVATED TROPONIN: Status: ACTIVE | Noted: 2020-01-01

## 2020-12-10 NOTE — ED NOTES
Pt. Awake and resting quietly without distress presently. Pt. Transported to ICU per stretcher by ED staff. Daughter with pt..

## 2020-12-10 NOTE — PROGRESS NOTES
Therapy with vancomycin complete and/or consult discontinued by provider.  Pharmacy will sign off, please re-consult as needed.    Ching Bowie, PharmD, BCPS  Clinical Pharmacist  [phone]

## 2020-12-10 NOTE — CONSULTS
[facility] Consult Note    Date of Admit: 12/9/2020    Chief Complaint/Reason for Consult         Nephrology team consulted for NAZ  Subjective:      limited history obtained form the patient   History of Present Illness:  Ching Bruce is a 70 y.o. female,  Who o  has a past medical history of metastatic neuroendocrine tumor, HTN (hypertension), Hypercalcemia (5/7/2013).   The patient presented to [facility] on 12/9/2020 with a primary complaint of not urinating for 2-3 days.     Patient stated that she has not been making urine since Monday. She also described decrease oral intake. She also had loss of appetite.    No nausea or vomiting. No dysgeusia  No SOB, or LL swelling   Patient has not urinated, but stated no burning on micturition before this started. She is not sure about changes in urine color     Patient was previously on home hospice, and was revoked recently for treatment of hypercalcemia.Howwever, family requested no aggressive medical treatment of patient's malignancy     Past Medical History:  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    Secondary malignant neoplasm of liver     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) 5/7/2013    Thrombocytopenia 11/12/2013    Thyroid disease     Unspecified essential hypertension 11/12/2013       Past Surgical History:  Past Surgical History:   Procedure  Laterality Date    ERCP N/A 6/21/2018    Procedure: ERCP, stent exchange;  Surgeon: Jake Lieberman MD;  Location: [facility] ENDO;  Service: Endoscopy;  Laterality: N/A;    ERCP N/A 5/23/2019    Procedure: ERCP (ENDOSCOPIC RETROGRADE CHOLANGIOPANCREATOGRAPHY), stent exchange;  Surgeon: Jake Lieberman MD;  Location: [facility] ENDO;  Service: Endoscopy;  Laterality: N/A;    ERCP N/A 11/14/2019    Procedure: ERCP (ENDOSCOPIC RETROGRADE CHOLANGIOPANCREATOGRAPHY), stent exchange;  Surgeon: Jake Lieberman MD;  Location: [facility] ENDO;  Service: Endoscopy;  Laterality: N/A;    ERCP      ERCP N/A 9/22/2020    Procedure: ERCP (ENDOSCOPIC RETROGRADE CHOLANGIOPANCREATOGRAPHY);  Surgeon: Abdulaziz Owen MD;  Location: [facility] ENDO;  Service: Endoscopy;  Laterality: N/A;    PERITONEAL CATHETER INSERTION N/A 11/5/2020    Procedure: INSERTION, CATHETER, INTRAPERITONEAL;  Surgeon: ELISE Mcknight MD;  Location: [facility] OR;  Service: General;  Laterality: N/A;    THYROIDECTOMY  2011       Allergies:  Review of patient's allergies indicates:   Allergen Reactions    Epinephrine Anaphylaxis and Other (See Comments)     Can cause Carcinoid Crisis    Sulfa (sulfonamide antibiotics) Other (See Comments)     Urticaria       Home Medications:  Prior to Admission medications    Medication Sig Start Date End Date Taking? Authorizing Provider   cinacalcet (SENSIPAR) 90 MG Tab Take 1 tablet (90 mg total) by mouth 2 (two) times daily with meals. 11/6/20 11/6/21  Enrique Fine MD   dronabinoL (MARINOL) 2.5 MG capsule Take 1 capsule (2.5 mg total) by mouth 2 (two) times daily before meals. 10/14/20   Cory Rodriguez DO, FACP   ferrous gluconate (FERGON) 324 MG tablet Take 1 tablet by mouth once daily. 10/26/20   Historical Provider   furosemide (LASIX) 20 MG tablet Take 1 tablet (20 mg total) by mouth 2 (two) times daily. 11/6/20 11/6/21  Enrique Fine MD   levothyroxine (SYNTHROID, LEVOTHROID) 175 MCG tablet Take 1 tablet (175 mcg total) by  "mouth before breakfast. 20  Enrique Fine MD   midodrine (PROAMATINE) 5 MG Tab Take 1 tablet (5 mg total) by mouth every 8 (eight) hours. 20  Enrique Fine MD   olmesartan (BENICAR) 40 MG tablet Take 40 mg by mouth once daily. 10/26/20   Historical Provider   ondansetron (ZOFRAN-ODT) 8 MG TbDL Take 1 tablet by mouth every 8 (eight) hours as needed. 10/12/20   Historical Provider   potassium, sodium phosphates (PHOS-NAK) 280-160-250 mg PwPk Take 1 packet by mouth 3 (three) times daily. 20  Rogelio Renee MD   spironolactone (ALDACTONE) 50 MG tablet Take 1 tablet (50 mg total) by mouth once daily. 10/8/20 10/8/21  Navid Guerrero MD   STIMULANT LAXATIVE PLUS 8.6-50 mg per tablet Take 1 tablet by mouth once daily. 20   Historical Provider   ursodioL (ACTIGALL) 300 mg capsule Take 1 capsule (300 mg total) by mouth 2 (two) times daily. 10/7/20 10/7/21  Navid Guerrero MD       Family History:  Family History   Problem Relation Age of Onset    Cancer Maternal Grandmother     Diabetes Father     Breast cancer Neg Hx     Colon cancer Neg Hx     Ovarian cancer Neg Hx        Social History:  Social History     Tobacco Use    Smoking status: Never Smoker    Smokeless tobacco: Never Used   Substance Use Topics    Alcohol use: No    Drug use: No       Review of Systems:  Pertinent positives and negatives are listed in HPI.  All other systems are reviewed and are negative.     Objective:   Last 24 Hour Vital Signs:  BP  Min: 69/35  Max: 112/58  Temp  Av.3 °F (36.3 °C)  Min: 97.1 °F (36.2 °C)  Max: 97.6 °F (36.4 °C)  Pulse  Av.9  Min: 66  Max: 87  Resp  Av.4  Min: 15  Max: 40  SpO2  Av.5 %  Min: 84 %  Max: 100 %  Height  Av' 7" (170.2 cm)  Min: 5' 7" (170.2 cm)  Max: 5' 7" (170.2 cm)  Weight  Av.4 kg (120 lb)  Min: 54.4 kg (120 lb)  Max: 54.4 kg (120 lb)  Body mass index is 18.79 kg/m².  I/O last 3 completed shifts:  In: 250 [IV " Piggyback:250]  Out: -     Physical Examination:  General: No acute distress.  HEENT: EOMI, PERRL  Cardiovascular:  NRRR, without appreciated murmurs or rubs, without extra heart sounds  Chest/Pulmonary: normal work of breathing without accessory muscle use, right sided crackles   Abdomen: Soft, nontender, nondistended.  MSKL: without gross deformity.   Lymphatic: no appreciated LAD  Skin: without cyanosis or clubbing, without peripheral edema   Neurologic: AAOx3.      Laboratory:  Most Recent Data:  CBC:   Lab Results   Component Value Date    WBC 15.70 (H) 12/10/2020    HGB 8.9 (L) 12/10/2020    HCT 28.0 (L) 12/10/2020    PLT 89 (L) 12/10/2020     (H) 12/10/2020    RDW 22.5 (H) 12/10/2020     BMP:   Lab Results   Component Value Date     12/10/2020    K 4.4 12/10/2020     12/10/2020    CO2 13 (L) 12/10/2020    BUN 83 (H) 12/10/2020    CREATININE 4.8 (H) 12/10/2020     (H) 12/10/2020    CALCIUM 7.1 (L) 12/10/2020    MG 1.5 (L) 12/10/2020    PHOS 9.2 (HH) 12/10/2020     LFTs:   Lab Results   Component Value Date    PROT 5.9 (L) 12/10/2020    ALBUMIN 1.7 (L) 12/10/2020    BILITOT 3.2 (H) 12/10/2020    AST 74 (H) 12/10/2020    ALKPHOS 426 (H) 12/10/2020    ALT 29 12/10/2020     Coags:   Lab Results   Component Value Date    INR 1.3 (H) 11/01/2020     Urinalysis:   Lab Results   Component Value Date    LABURIN No significant growth 08/31/2020    COLORU [place] (A) 12/09/2020    SPECGRAV 1.025 12/09/2020    NITRITE Negative 12/09/2020    KETONESU Negative 12/09/2020    UROBILINOGEN Negative 12/09/2020    WBCUA 40 (H) 12/09/2020       Trended Lab Data:  Recent Labs   Lab 12/07/20  0951 12/09/20  2036 12/10/20  0620   WBC 15.42* 13.20* 15.70*   HGB 9.0* 11.2* 8.9*   HCT 27.2* 33.6* 28.0*   PLT 85* 61* 89*   * 106* 113*   RDW 21.9* 22.3* 22.5*   * 145 143   K 4.0 5.1 4.4   * 106 107   CO2 20* 14* 13*   BUN 68* 95* 83*   CREATININE 3.9* 5.1* 4.8*   GLU 57* 48* 131*   PROT 6.2 6.1  5.9*   ALBUMIN 1.9* 1.8* 1.7*   BILITOT 2.7* 3.3* 3.2*   AST 49* 82* 74*   ALKPHOS 473* 467* 426*   ALT 19 31 29       Trended Cardiac Data:  Recent Labs   Lab 12/09/20 2036   TROPONINI 0.131*   BNP 1,596*           Radiology:  Imaging Results          X-Ray Chest AP Portable (Final result)  Result time 12/09/20 19:36:08    Final result by Aleks Chance MD (12/09/20 19:36:08)                 Impression:      New airspace opacity in the right lung base, suggestive of pneumonia.      Electronically signed by: Aleks Chance MD  Date:    12/09/2020  Time:    19:36             Narrative:    EXAMINATION:  XR CHEST AP PORTABLE    CLINICAL HISTORY:  weak;    TECHNIQUE:  Single frontal view of the chest was performed.    COMPARISON:  11/19/2020.    FINDINGS:  There are postoperative changes in the base of the neck.  Monitoring EKG leads are present.  There is a probable peritoneal catheter in the abdomen.    The trachea is unremarkable.  The cardiac silhouette is enlarged.  There is no evidence of free air beneath the hemidiaphragms.  There are no pleural effusions.  There is no evidence of a pneumothorax.  There is no evidence of pneumomediastinum.  There is a new airspace opacity in the right lung base.  There are chronic interstitial findings.  The osseous structures demonstrate degenerative changes.                                   Assessment and Plan:      Ching Bruce is a 70 y.o.female with     Oliguric  NAZ stage 3 on CKD stage    -baseline Cr ~ 1.0,  Had an episode of NAZ Nov 2020, with a baseline of cr 1.3 on discharge   - BUN:Cr on admission 95:5.1 trended down slightly after IV fluids, 83: 4.8   - etiology can be prerenal injury secondary to decreased oral intake, vs ATN secondary to infection, hypotension on admission requiring pressors   -UOP ~ 250m ml over night, 25ml/hr since morning   -continue IV fluids   -please given NA bicarb  meq /liter  -avoid nephrotoxic medications   - we recommend renally  dosing medications to GFR<10 , Cr Clearance < 30  - strict I/O charts   -please request Urine electrolytes including urine Na, urea and cr , kidney US , CPK  -no acute indication for dialysis. Patient previously on home hospice that was reversed for management of hypercalcemia. However, family requested no aggressive medical treament of malignancy   -primary team ordered dopamine, albumin and bumex drips. Can continue albumin and dopamine, however, we strongly recommend stopping bumex as there are no signs of volume overload       #) Wide Anion gap metabolic acidosis   -most likely secondary to NAZ and lactic acidosis  -please start patient on NaHCO3 , 3 ampules in dextrose water 5 %     Septic Shock  -Chest x ray showing possible right sided pneumonia   - urine analysis positive for bacteria, cultures still pending   -management per primary  team       Jil Nix MD  [facility]   [phone]    If after 5pm please forward any questions to the [facility] Fellow/Attending on call.

## 2020-12-10 NOTE — CARE UPDATE
FM Team rounded on patient this morning. Recommend Trending Troponin from initial elevated Trop 0.131. Also recommend U/S of the bladder to identify structural abnormalities. Deferring management of anuria and CKD per nephrology. Deferring management of NET of pancreas per primary team. Please contact us if there is anything else we can help with. Family medicine team will sign off.

## 2020-12-10 NOTE — HPI
Ms. Ching Bruce is a 71yo female  presenting to ED on 12/9/2020 with CC of urinary retention. Patient has significant PMH of primary NET of Pancreas, HTN, thyroid disease, HLD, secondary NET of Liver. Patient's labs demonstrate acute renal failure with Cr 5.1, BUN 95, eGFR 8-9. Also present were lactic acidosis, elevated procal, elevated troponin, stable hypocalcemia. Patient WBC 13.20, H/H 11.2/33.6. Patient thrombocytopenic at 61. EKG stable, CXR significant for right-sided lung opacity suggestive of PNA. COVID rapid test negative. Patient hypoglycemic on admission at 45.    Patient admitted to neuroendocrine surgery service, with consults to [facility] for evaluation for dialysis in setting of ARF, and family medicine for medical management of PNA.

## 2020-12-10 NOTE — ASSESSMENT & PLAN NOTE
Patient presents with phos of 10.3, eGFR of 8-9, anuria, BUN of 95 and Cr of 5.1  [facility] consulted by primary for evaluation, defer to their treatment  Patient may require dialysis  Patient currently receiving 2L bolus  Patient may benefit from additional imaging of bladder and kidneys, as bladder scan demonstrated 'pockets' of urine in bladder, indicative of structural abnormality

## 2020-12-10 NOTE — ASSESSMENT & PLAN NOTE
-H/H of 11.2/33.6, increased from 27.2/9.9 3 days ago, likely hemoconcentration due to dehydration  -Continue to monitor  -Given patient poor nutritional status, would consider nutrition consult at this time

## 2020-12-10 NOTE — ASSESSMENT & PLAN NOTE
Patient appears extremely dehydrated on physical exam, evidence of hemoconcentration on CBC  Patient would benefit from ongoing rehydration therapy with nephrology consult as patient currently anuric, likely ARF in need of dialysis  Patient CXR significant for PNA, likely aspiration, possible cause of hypotension if systemic infection (patient does not meet SIRS criteria)

## 2020-12-10 NOTE — ANESTHESIA PROCEDURE NOTES
Arterial    Diagnosis: hypotension    Patient location during procedure: ICU  Procedure start time: 12/10/2020 4:31 PM  Timeout: 12/10/2020 4:30 PM  Procedure end time: 12/10/2020 4:40 PM    Staffing  Authorizing Provider: Thong Loco MD  Performing Provider: Thong Loco MD    Anesthesiologist was present at the time of the procedure.    Preanesthetic Checklist  Completed: patient identified, site marked, surgical consent, pre-op evaluation, timeout performed, IV checked, risks and benefits discussed, monitors and equipment checked and anesthesia consent givenArterial  Skin Prep: chlorhexidine gluconate and isopropyl alcohol  Local Infiltration: lidocaine  Orientation: right  Location: radial  Catheter Size: 20 G  Catheter placement by Ultrasound guidance. Heme positive aspiration all ports.  Vessel Caliber: small, patent, compressibility normal  Vascular Doppler:  not done  Needle advanced into vessel with real time Ultrasound guidance.  Guidewire confirmed in vessel.  Sterile sheath used.  Image recorded and saved.Insertion Attempts: 1  Assessment  Dressing: secured with tape and tegaderm  Patient: Tolerated well

## 2020-12-10 NOTE — PROGRESS NOTES
Pharmacokinetic Initial Assessment: IV Vancomycin    Assessment/Plan:    Initiate intravenous vancomycin with loading dose of 1250 mg once with subsequent doses when random concentrations are less than 20 mcg/mL  Desired empiric serum trough concentration is 10 to 15 mcg/mL  Draw vancomycin random level on 12-11 at 03:00.  Pharmacy will continue to follow and monitor vancomycin.      Please contact pharmacy at extension [phone] with any questions regarding this assessment.     Thank you for the consult,   Sergio Mcdowell       Patient brief summary:  Ching Bruce is a 70 y.o. female initiated on antimicrobial therapy with IV Vancomycin for treatment of suspected bacteremia    Drug Allergies:   Review of patient's allergies indicates:   Allergen Reactions    Epinephrine Anaphylaxis and Other (See Comments)     Can cause Carcinoid Crisis    Sulfa (sulfonamide antibiotics) Other (See Comments)     Urticaria       Actual Body Weight:   54.4 kg    Renal Function:   Estimated Creatinine Clearance: 8.8 mL/min (A) (based on SCr of 5.1 mg/dL (H)).,     Dialysis Method (if applicable):  N/A    CBC (last 72 hours):  Recent Labs   Lab Result Units 12/07/20 0951 12/09/20 2036   WBC K/uL 15.42* 13.20*   Hemoglobin g/dL 9.0* 11.2*   Hematocrit % 27.2* 33.6*   Platelets K/uL 85* 61*   Gran % %  --  84.1*   Lymph % %  --  7.6*   Mono % %  --  6.7   Eosinophil % %  --  0.1   Basophil % %  --  0.1   Differential Method   --  Automated       Metabolic Panel (last 72 hours):  Recent Labs   Lab Result Units 12/07/20  0951 12/09/20 2036 12/09/20  2253   Sodium mmol/L 148* 145  --    Potassium mmol/L 4.0 5.1  --    Chloride mmol/L 111* 106  --    CO2 mmol/L 20* 14*  --    Glucose mg/dL 57* 48*  --    Glucose, UA   --   --  Negative   BUN mg/dL 68* 95*  --    Creatinine mg/dL 3.9* 5.1*  --    Albumin g/dL 1.9* 1.8*  --    Total Bilirubin mg/dL 2.7* 3.3*  --    Alkaline Phosphatase U/L 473* 467*  --    AST U/L 49* 82*  --    ALT U/L 19 31   --    Magnesium mg/dL  --  1.7  --    Phosphorus mg/dL  --  10.3*  --        Drug levels (last 3 results):  No results for input(s): VANCOMYCINRA, VANCOMYCINPE, VANCOMYCINTR in the last 72 hours.    Microbiologic Results:  Microbiology Results (last 7 days)       Procedure Component Value Units Date/Time    Blood culture [[id number]] Collected: 12/09/20 2215    Order Status: Sent Specimen: Blood from Peripheral, Forearm, Right Updated: 12/10/20 0134    Urine culture [[id number]] Collected: 12/09/20 2253    Order Status: No result Specimen: Urine Updated: 12/10/20 0048    Culture, Respiratory with Gram Stain [[id number]]     Order Status: No result Specimen: Respiratory     Blood culture [[id number]]     Order Status: Sent Specimen: Blood     Blood culture [[id number]]     Order Status: Sent Specimen: Blood     Blood culture [[id number]] Collected: 12/09/20 2034    Order Status: Sent Specimen: Blood from Peripheral, Forearm, Left Updated: 12/09/20 2211

## 2020-12-10 NOTE — ED NOTES
Care hand off from LUIS FERNANDO Horowitz RN. The patient is sleeping without distress. Easily arrousable to voice and nods appropriately and answers simple questions. She has no complaints of pain/sob presently. Repositioned on rt. Side for comfort.

## 2020-12-10 NOTE — ED NOTES
Spoke to nephrology resident Dr. Nix regarding pt. Status and verification that consult was received last p.m.. reports she is reviewing pt. Repeat a.m. labs and will see pt. This morning.

## 2020-12-10 NOTE — PLAN OF CARE
History of present illness:  Patient is a 69 yo F w/ a MH of NET that is metastatic to the liver and lungs.  She has recently been admitted for hypercalcemia.  Today, she presents with weakness and AMS.  In the ER, she was hypotensive to systolics in the 70s.  She received 2 L of NS in the ER without sustained improvement of the BP.  Of note, it is reported that she has not urinated since Monday.  Her Cr is greater than 5.   She had minimal UOP when catheter was placed.     The pt's currently in ICU. The Sw met with the pt and her dtr Isabelle Bruce [phone] who was in the room during the assessment. The pt and her dtr Isabelle live in [place]. The pt's dtr 's assist her with her adl's and she uses a w/c,r walker and bsc at home. The pt's current with [facility] and wants to resume at d/c. The pt's dtr's transports her to her dr appointments and will transport her home at d/c. The Sw completed the white board in the pt's room and gave her a d/c brochure. The Sw encouraged them to call if they have any further questions or concerns.        12/10/20 1242   Discharge Assessment   Assessment Type Discharge Planning Assessment   Confirmed/corrected address and phone number on facesheet? Yes   Assessment information obtained from? Caregiver   Prior to hospitilization cognitive status: Alert/Oriented   Prior to hospitalization functional status: Assistive Equipment;Needs Assistance   Current cognitive status: Alert/Oriented   Current Functional Status: Assistive Equipment;Needs Assistance   Lives With child(shilpa), adult   Able to Return to Prior Arrangements yes   Is patient able to care for self after discharge? Unable to determine at this time (comments)   Who are your caregiver(s) and their phone number(s)? Isabelle Bruce(dtr)[phone]   Patient's perception of discharge disposition home health  (the pt's current with [facility] and wants to resume at d/c)   Readmission Within the Last 30 Days no previous admission in last 30  days   Patient currently being followed by outpatient case management? No   Patient currently receives any other outside agency services? No   Equipment Currently Used at Home wheelchair;walker, rolling;bedside commode   Do you have any problems affording any of your prescribed medications? No  (the pt receives her meds affordably at [facility] in [place])   Is the patient taking medications as prescribed? yes   Does the patient have transportation home? Yes   Transportation Anticipated family or friend will provide   Does the patient receive services at the [facility]? No   Discharge Plan A Home Health   Discharge Plan B Home with family   DME Needed Upon Discharge  other (see comments)  (TBD)   Patient/Family in Agreement with Plan yes

## 2020-12-10 NOTE — CONSULTS
[facility]  Consult Note    Patient Name: Ching Bruce  MRN: [id number]  Admission Date: 12/9/2020  Hospital Length of Stay: 1 days  Attending Physician: No att. providers found   Primary Care Provider: Gaby Corea MD           Patient information was obtained from patient, relative(s), past medical records and ER records.     Inpatient consult to [facility]  Consult performed by: Bibi Metzger MD  Consult ordered by: Rush Sidhu Jr., MD  Reason for consult: PNA, medical         Subjective:     Principal Problem: Hypotension    Chief Complaint:   Chief Complaint   Patient presents with    Urinary Retention     pt's daughter states pt has not urinated since Monday. pt denies pain         HPI: Ms. Ching Bruce is a 71yo female  presenting to ED on 12/9/2020 with CC of urinary retention. Patient has significant PMH of primary NET of Pancreas, HTN, thyroid disease, HLD, secondary NET of Liver. Patient's labs demonstrate acute renal failure with Cr 5.1, BUN 95, eGFR 8-9. Also present were lactic acidosis, elevated procal, elevated troponin, stable hypocalcemia. Patient WBC 13.20, H/H 11.2/33.6. Patient thrombocytopenic at 61. EKG stable, CXR significant for right-sided lung opacity suggestive of PNA. COVID rapid test negative. Patient hypoglycemic on admission at 45.    Patient admitted to neuroendocrine surgery service, with consults to [facility] for evaluation for dialysis in setting of ARF, and family medicine for medical management of PNA.    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    Secondary malignant neoplasm of liver     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) 5/7/2013    Thrombocytopenia 11/12/2013    Thyroid disease     Unspecified essential hypertension 11/12/2013       Past Surgical History:   Procedure Laterality Date    ERCP N/A 6/21/2018    Procedure: ERCP, stent exchange;  Surgeon: Jake Lieberman MD;  Location: [facility] ENDO;  Service: Endoscopy;  Laterality: N/A;    ERCP N/A 5/23/2019    Procedure: ERCP (ENDOSCOPIC RETROGRADE CHOLANGIOPANCREATOGRAPHY), stent exchange;  Surgeon: Jake Lieberman MD;  Location: [facility] ENDO;  Service: Endoscopy;  Laterality: N/A;    ERCP N/A 11/14/2019    Procedure: ERCP (ENDOSCOPIC RETROGRADE CHOLANGIOPANCREATOGRAPHY), stent exchange;  Surgeon: Jake Lieberman MD;  Location: [facility] ENDO;  Service: Endoscopy;  Laterality: N/A;    ERCP      ERCP N/A 9/22/2020    Procedure: ERCP (ENDOSCOPIC RETROGRADE CHOLANGIOPANCREATOGRAPHY);  Surgeon: Abdulaziz Owen MD;  Location: [facility] ENDO;  Service: Endoscopy;  Laterality: N/A;    PERITONEAL CATHETER INSERTION N/A 11/5/2020    Procedure: INSERTION, CATHETER, INTRAPERITONEAL;  Surgeon: ELISE Mcknight MD;  Location: [facility] OR;  Service: General;  Laterality: N/A;    THYROIDECTOMY  2011       Review of patient's allergies indicates:   Allergen Reactions    Epinephrine Anaphylaxis and Other (See Comments)     Can cause Carcinoid Crisis    Sulfa (sulfonamide antibiotics) Other (See Comments)     Urticaria       No current facility-administered medications on file prior to encounter.      Current Outpatient Medications on File Prior to Encounter   Medication Sig    cinacalcet (SENSIPAR) 90 MG Tab Take 1 tablet (90 mg total) by mouth 2 (two) times daily with meals.    dronabinoL (MARINOL) 2.5  MG capsule Take 1 capsule (2.5 mg total) by mouth 2 (two) times daily before meals.    ferrous gluconate (FERGON) 324 MG tablet Take 1 tablet by mouth once daily.    furosemide (LASIX) 20 MG tablet Take 1 tablet (20 mg total) by mouth 2 (two) times daily.    levothyroxine (SYNTHROID, LEVOTHROID) 175 MCG tablet Take 1 tablet (175 mcg total) by mouth before breakfast.    midodrine (PROAMATINE) 5 MG Tab Take 1 tablet (5 mg total) by mouth every 8 (eight) hours.    olmesartan (BENICAR) 40 MG tablet Take 40 mg by mouth once daily.    ondansetron (ZOFRAN-ODT) 8 MG TbDL Take 1 tablet by mouth every 8 (eight) hours as needed.    potassium, sodium phosphates (PHOS-NAK) 280-160-250 mg PwPk Take 1 packet by mouth 3 (three) times daily.    spironolactone (ALDACTONE) 50 MG tablet Take 1 tablet (50 mg total) by mouth once daily.    STIMULANT LAXATIVE PLUS 8.6-50 mg per tablet Take 1 tablet by mouth once daily.    ursodioL (ACTIGALL) 300 mg capsule Take 1 capsule (300 mg total) by mouth 2 (two) times daily.     Family History     Problem Relation (Age of Onset)    Cancer Maternal Grandmother    Diabetes Father        Tobacco Use    Smoking status: Never Smoker    Smokeless tobacco: Never Used   Substance and Sexual Activity    Alcohol use: No    Drug use: No    Sexual activity: Not Currently     Review of Systems   Constitutional: Positive for activity change, appetite change and fatigue.        Cachectic, ill appearing woman, very thin   HENT: Positive for trouble swallowing. Negative for dental problem, facial swelling, sinus pain, tinnitus and voice change.         Per daughter, patient has only been able to eat soft or liquid foods, with little appetite, and has choking while eating sometimes     Eyes: Negative.    Respiratory: Positive for cough and choking. Negative for apnea, chest tightness, shortness of breath, wheezing and stridor.    Cardiovascular: Negative for chest pain, palpitations and leg swelling.    Gastrointestinal: Positive for abdominal distention. Negative for abdominal pain, constipation, diarrhea, nausea and vomiting.        Patient frequently requires paracentesis, removed 500mL earlier today per daughter   Genitourinary: Positive for decreased urine volume and difficulty urinating. Negative for dysuria, flank pain and frequency.        Patient has not urinated since the weekend, feels urge to void but is unable to do so.   Musculoskeletal: Positive for gait problem. Negative for arthralgias and back pain.        Patient confined to bed, works with PT at home   Skin: Positive for wound.        Stage II ulcer at sacrum, 1cm diameter   Neurological: Positive for weakness. Negative for dizziness, syncope, facial asymmetry and headaches.   Psychiatric/Behavioral:        Patient unable to state month, date, or year accurately. Stated it was October, but was not able to state any further information when prompted.     Objective:     Vital Signs (Most Recent):  Temp: 97.6 °F (36.4 °C) (12/09/20 1830)  Pulse: 78 (12/10/20 0021)  Resp: 19 (12/10/20 0021)  BP: (!) 86/50 (12/10/20 0021)  SpO2: 100 % (12/10/20 0021) Vital Signs (24h Range):  Temp:  [97.6 °F (36.4 °C)] 97.6 °F (36.4 °C)  Pulse:  [66-79] 78  Resp:  [15-21] 19  SpO2:  [84 %-100 %] 100 %  BP: ()/(35-58) 86/50     Weight: 54.4 kg (120 lb)  Body mass index is 18.79 kg/m².    Physical Exam  Vitals signs and nursing note reviewed.   Constitutional:       Appearance: She is ill-appearing.   HENT:      Head: Normocephalic and atraumatic.      Nose: Nose normal.      Mouth/Throat:      Mouth: Mucous membranes are dry.      Pharynx: Oropharynx is clear.   Eyes:      Pupils: Pupils are equal, round, and reactive to light.   Pulmonary:      Effort: Pulmonary effort is normal. No respiratory distress.      Comments: Diffuse crackles in lower lobes  Abdominal:      General: Bowel sounds are normal. There is distension.      Palpations: Abdomen is soft.  There is no mass.      Tenderness: There is no abdominal tenderness. There is no guarding or rebound.   Neurological:      Mental Status: She is alert.           CRANIAL NERVES     CN III, IV, VI   Pupils are equal, round, and reactive to light.       Significant Labs:   A1C: No results for input(s): HGBA1C in the last 4320 hours.  ABGs:   Recent Labs   Lab 12/09/20 2217   PH 7.366   PCO2 25.7*   HCO3 14.7*   POCSATURATED 56*   BE -11     Lactic Acid:   Recent Labs   Lab 12/09/20 2036   LACTATE 3.3*     Lipid Panel: No results for input(s): CHOL, HDL, LDLCALC, TRIG, CHOLHDL in the last 48 hours.  Magnesium:   Recent Labs   Lab 12/09/20 2036   MG 1.7     POCT Glucose:   Recent Labs   Lab 12/09/20 2223   POCTGLUCOSE 122*     Troponin:   Recent Labs   Lab 12/09/20 2036   TROPONINI 0.131*     TSH:   Recent Labs   Lab 10/19/20  1700   TSH 0.279*     Recent Lab Results       12/09/20  2253   12/09/20  2223   12/09/20  2217   12/09/20  2153   12/09/20 2036        Procalcitonin         3.43  Comment:  A concentration < 0.25 ng/mL represents a low risk bacterial   infection.  Procalcitonin may not be accurate among patients with localized   infection, recent trauma or major surgery, immunosuppressed state,   invasive fungal infection, renal dysfunction. Decisions regarding   initiation or continuation of antibiotic therapy should not be based   solely on procalcitonin levels.       Albumin         1.8     Alkaline Phosphatase         467     Allens Test     N/A         ALT         31     Anion Gap         25     Aniso         Moderate     Appearance, UA Cloudy             AST         82     Baso #         0.01     Basophil %         0.1     Bilirubin (UA) 1+  Comment:  Positive urine bilirubin is not confirmed. Correlate with   serum bilirubin and clinical presentation.               BILIRUBIN TOTAL         3.3  Comment:  For infants and newborns, interpretation of results should be based  on gestational age, weight  and in agreement with clinical  observations.  Premature Infant recommended reference ranges:  Up to 24 hours.............<8.0 mg/dL  Up to 48 hours............<12.0 mg/dL  3-5 days..................<15.0 mg/dL  6-29 days.................<15.0 mg/dL       BNP         1,596  Comment:  Values of less than 100 pg/ml are consistent with non-CHF populations.     Site     RF         BUN         95     [place] Cells         Occasional     Calcium         7.4     Chloride         106     CO2         14     Color, UA Orange             Creatinine         5.1     Differential Method         Automated     eGFR if          9     eGFR if non          8  Comment:  Calculation used to obtain the estimated glomerular filtration  rate (eGFR) is the CKD-EPI equation.        Eos #         0.0     Eosinophil %         0.1     Fragmented Cells         Occasional     Large/Giant Platelets         Present     Glucose         48  Comment:  GLU critical result(s) called and verbal readback obtained from Carol Horowitz RN by MAURICIO 12/09/2020 21:28       Glucose, UA Negative             Gran # (ANC)         11.1     Gran %         84.1     Hematocrit         33.6     Hemoglobin         11.2     [place] Bodies         Occasional     Immature Grans (Abs)         0.19  Comment:  Mild elevation in immature granulocytes is non specific and   can be seen in a variety of conditions including stress response,   acute inflammation, trauma and pregnancy. Correlation with other   laboratory and clinical findings is essential.       Immature Granulocytes         1.4     Ketones, UA Negative             Lactate, Harjinder         3.3  Comment:  Falsely low lactic acid results can be found in samples   containing >=13.0 mg/dL total bilirubin and/or >=3.5 mg/dL   direct bilirubin.       Leukocytes, UA Trace             Lymph #         1.0     Lymph %         7.6     Magnesium         1.7     MCH         35.4     MCHC         33.3      MCV         106     Mono #         0.9     Mono %         6.7     MPV         13.5     NITRITE UA Negative             nRBC         12     Occult Blood UA 3+             Pappenheimer Bodies         Present     pH, UA 6.0             Phosphorus         10.3  Comment:  PHOS  critical result(s) called and verbal readback obtained from   Mike Jernigan RN by MAURICIO 12/09/2020 22:47       Platelet Estimate         Decreased     Platelets         61     POC BE     -11         POC HCO3     14.7         POC PCO2     25.7         POC PH     7.366         POC PO2     29         POC SATURATED O2     56         POC TCO2     15         POCT Glucose   122           Poik         Slight     Poly         Occasional     Potassium         5.1     PROTEIN TOTAL         6.1     Protein, UA 3+  Comment:  Recommend a 24 hour urine protein or a urine   protein/creatinine ratio if globulin induced proteinuria is  clinically suspected.                Acceptable       Yes       RBC         3.16     RDW         22.3     Sample     VENOUS         SARS-CoV-2 RNA, Amplification, Qual       Negative       Sodium         145     Specific [place], UA 1.025             Specimen UA Urine, Catheterized             Troponin I         0.131  Comment:  The reference interval for Troponin I represents the 99th percentile   cutoff   for our facility and is consistent with 3rd generation assay   performance.       UROBILINOGEN UA Negative             WBC         13.20                        All pertinent labs within the past 24 hours have been reviewed.    Significant Imaging: I have reviewed all pertinent imaging results/findings within the past 24 hours.     X-Ray Chest AP Portable   Final Result      New airspace opacity in the right lung base, suggestive of pneumonia.         Electronically signed by: Aleks Chance MD   Date:    12/09/2020   Time:    19:36            Assessment/Plan:     * Hypotension  Patient appears extremely dehydrated on  physical exam, evidence of hemoconcentration on CBC  Patient would benefit from ongoing rehydration therapy with nephrology consult as patient currently anuric, likely ARF in need of dialysis  Patient CXR significant for PNA, likely aspiration, possible cause of hypotension if systemic infection (patient does not meet SIRS criteria)        Acute renal failure  Patient presents with phos of 10.3, eGFR of 8-9, anuria, BUN of 95 and Cr of 5.1  [facility] consulted by primary for evaluation, defer to their treatment  Patient may require dialysis  Patient currently receiving 2L bolus  Patient may benefit from additional imaging of bladder and kidneys, as bladder scan demonstrated 'pockets' of urine in bladder, indicative of structural abnormality    Elevated troponin  Telemetry  Trend q4-6h  Would advise cardiology consult, echocardiogram      Pneumonia  Right lower lobe consolidation suggestive of PNA  WBC elevation, LA 3.3  Recent hospitalization in November  Patient may have aspirated food per daughter report  Will give broad spectrum Abx vanc and zosyn  Daughter reports mother having choking episodes, patient would benefit from NPO status and SLP consult  Continue to monitor, trend LA    Physical debility  Recommend PT/OT       Anemia  -H/H of 11.2/33.6, increased from 27.2/9.9 3 days ago, likely hemoconcentration due to dehydration  -Continue to monitor  -Given patient poor nutritional status, would consider nutrition consult at this time      Hypercalcemia  Patient has recurrent hyper Ca, likely due to cancer  Presents today with hypoCa at 7.4, with albumin corrected to 9.2  Continue to monitor for hyperCa      Secondary neuroendocrine tumor of liver  Defer to NE surgery team        Primary pancreatic neuroendocrine tumor  Defer to [facility] surgery team        VTE Risk Mitigation (From admission, onward)         Ordered     IP VTE HIGH RISK PATIENT  Once      12/10/20 0001     Place sequential compression device   Until discontinued      12/10/20 0001                    Thank you for your consult. I will follow-up with patient. Please contact us if you have any additional questions.    Bibi Metzger MD  Department of Hospital Medicine   [facility]

## 2020-12-10 NOTE — NURSING
Was given verbal by Dr. Eckert to place an order for lidocaine 1% injection for arterial line placement. Order read back and confirmed.

## 2020-12-10 NOTE — ASSESSMENT & PLAN NOTE
Patient has recurrent hyper Ca, likely due to cancer  Presents today with hypoCa at 7.4, with albumin corrected to 9.2  Continue to monitor for hyperCa

## 2020-12-10 NOTE — PROCEDURES
"Ching Bruce is a 70 y.o. female patient.    Temp: 96.8 °F (36 °C) (12/10/20 1057)  Pulse: 79 (12/10/20 1400)  Resp: 17 (12/10/20 1400)  BP: (!) 85/60 (12/10/20 1400)  SpO2: (!) 87 % (12/10/20 1400)  Weight: 54.4 kg (120 lb) (12/10/20 0944)  Height: 5' 7" (170.2 cm) (12/10/20 0944)    PICC  Date/Time: 12/10/2020 2:38 PM  Performed by: Roverto Pardo RN  Consent Done: Yes  Time out: Immediately prior to procedure a time out was called to verify the correct patient, procedure, equipment, support staff and site/side marked as required  Indications: med administration and vascular access  Anesthesia: local infiltration  Local anesthetic: lidocaine 1% without epinephrine  Anesthetic Total (mL): 3  Preparation: skin prepped with ChloraPrep  Skin prep agent dried: skin prep agent completely dried prior to procedure  Sterile barriers: all five maximum sterile barriers used - cap, mask, sterile gown, sterile gloves, and large sterile sheet  Hand hygiene: hand hygiene performed prior to central venous catheter insertion  Location details: right brachial  Catheter type: triple lumen  Catheter size: 5 Fr  Catheter Length: 33cm    Ultrasound guidance: yes  Vessel Caliber: large, compressibility normal  Needle advanced into vessel with real time Ultrasound guidance.  Guidewire confirmed in vessel.  Sterile sheath used.  Number of attempts: 1  Post-procedure: blood return through all ports, chlorhexidine patch and sterile dressing applied            Roverto Pardo  12/10/2020  "

## 2020-12-10 NOTE — PROGRESS NOTES
This is an assumption of care note.     Upon shift change, the patient was transferred to me from Dr. Henry @2000  in stable condition.     Patient presented to ED with chief complaint of urine retention    Update:   No acute events during shift.  Plan at shift change was to follow up labs.    Patient's labs are demonstrate acute renal failure.  Lactic acidosis.  Procalcitonin elevated.  Troponin elevatedStable hypocalcemia.  Leukocytosis.    Chest x-ray shows right-sided opacity.    Patient given IV Rocephin by Dr. Moser.  I added azithromycin.  She is COVID negative.    Discussed with [facility] who will evaluate for hemodialysis.  VBG does not show significant acidemia.    [facility]  [facility] requested [facility] to evaluate and provide medical recommendations.    Patient currently receiving for her 30 cc/kilos bolus.    She is mentating normally.  She is full code.    EKG:    At 9:02 p.m.:  Rate 69.  Normal sinus rhythm.  Elevation in ST elevation V2 and lead 1.  No STEMI.  No ectopy.  Reviewed EKG with Dr. Carrington given elevation of troponin.          IMAGING:  Imaging Results          X-Ray Chest AP Portable (Final result)  Result time 12/09/20 19:36:08    Final result by Aleks Chance MD (12/09/20 19:36:08)                 Impression:      New airspace opacity in the right lung base, suggestive of pneumonia.      Electronically signed by: Aleks Chance MD  Date:    12/09/2020  Time:    19:36             Narrative:    EXAMINATION:  XR CHEST AP PORTABLE    CLINICAL HISTORY:  weak;    TECHNIQUE:  Single frontal view of the chest was performed.    COMPARISON:  11/19/2020.    FINDINGS:  There are postoperative changes in the base of the neck.  Monitoring EKG leads are present.  There is a probable peritoneal catheter in the abdomen.    The trachea is unremarkable.  The cardiac silhouette is enlarged.  There is no evidence of free air beneath the hemidiaphragms.  There are no  pleural effusions.  There is no evidence of a pneumothorax.  There is no evidence of pneumomediastinum.  There is a new airspace opacity in the right lung base.  There are chronic interstitial findings.  The osseous structures demonstrate degenerative changes.                                  LABS:  Labs Reviewed   CBC W/ AUTO DIFFERENTIAL - Abnormal; Notable for the following components:       Result Value    WBC 13.20 (*)     RBC 3.16 (*)     Hemoglobin 11.2 (*)     Hematocrit 33.6 (*)      (*)     MCH 35.4 (*)     RDW 22.3 (*)     Platelets 61 (*)     MPV 13.5 (*)     Immature Granulocytes 1.4 (*)     Gran # (ANC) 11.1 (*)     Immature Grans (Abs) 0.19 (*)     nRBC 12 (*)     Gran % 84.1 (*)     Lymph % 7.6 (*)     Platelet Estimate Decreased (*)     All other components within normal limits   COMPREHENSIVE METABOLIC PANEL - Abnormal; Notable for the following components:    CO2 14 (*)     Glucose 48 (*)     BUN 95 (*)     Creatinine 5.1 (*)     Calcium 7.4 (*)     Albumin 1.8 (*)     Total Bilirubin 3.3 (*)     Alkaline Phosphatase 467 (*)     AST 82 (*)     Anion Gap 25 (*)     eGFR if  9 (*)     eGFR if non  8 (*)     All other components within normal limits    Narrative:     GLU critical result(s) called and verbal readback obtained from Carol Horowitz RN by MAURICIO 12/09/2020 21:28   B-TYPE NATRIURETIC PEPTIDE - Abnormal; Notable for the following components:    BNP 1,596 (*)     All other components within normal limits   TROPONIN I - Abnormal; Notable for the following components:    Troponin I 0.131 (*)     All other components within normal limits   LACTIC ACID, PLASMA - Abnormal; Notable for the following components:    Lactate (Lactic Acid) 3.3 (*)     All other components within normal limits   PROCALCITONIN - Abnormal; Notable for the following components:    Procalcitonin 3.43 (*)     All other components within normal limits   ISTAT PROCEDURE - Abnormal; Notable for  the following components:    POC PCO2 25.7 (*)     POC PO2 29 (*)     POC HCO3 14.7 (*)     POC SATURATED O2 56 (*)     POC TCO2 15 (*)     All other components within normal limits   POCT GLUCOSE - Abnormal; Notable for the following components:    POCT Glucose 122 (*)     All other components within normal limits   CULTURE, BLOOD   CULTURE, BLOOD   PROCALCITONIN   MAGNESIUM   PHOSPHORUS   URINALYSIS, REFLEX TO URINE CULTURE   MAGNESIUM   PHOSPHORUS   SARS-COV-2 RDRP GENE    Narrative:     This test utilizes isothermal nucleic acid amplification   technology to detect the SARS-CoV-2 RdRp nucleic acid segment.   The analytical sensitivity (limit of detection) is 125 genome   equivalents/mL.   A POSITIVE result implies infection with the SARS-CoV-2 virus;   the patient is presumed to be contagious.     A NEGATIVE result means that SARS-CoV-2 nucleic acids are not   present above the limit of detection. A NEGATIVE result should be   treated as presumptive. It does not rule out the possibility of   COVID-19 and should not be the sole basis for treatment decisions.   If COVID-19 is strongly suspected based on clinical and exposure   history, re-testing using an alternate molecular assay should be   considered.   This test is only for use under the Food and Drug   Administration s Emergency Use Authorization (EUA).   Commercial kits are provided by [employer].   Performance characteristics of the EUA have been independently   verified by [facility] Department of   Pathology and Laboratory Medicine.   _________________________________________________________________   The authorized Fact Sheet for Healthcare Providers and the authorized Fact   Sheet for Patients of the ID NOW COVID-19 are available on the FDA   website:     https://www.fda.gov/media/767404/download  https://www.fda.gov/media/915899/download       POCT GLUCOSE MONITORING CONTINUOUS         MEDICATIONS:  Medications   cefTRIAXone  (ROCEPHIN) 2 g/50 mL D5W IVPB (2 g Intravenous New Bag 12/9/20 2228)   azithromycin 500 mg in dextrose 5 % 250 mL IVPB (ready to mix system) (has no administration in time range)   sodium chloride 0.9% bolus 1,000 mL (0 mLs Intravenous Stopped 12/9/20 2223)   dextrose 50 % in water (D50W) injection 25 g (25 g Intravenous Given 12/9/20 2139)   aspirin EC tablet 325 mg (325 mg Oral Given 12/9/20 2148)   sodium chloride 0.9% bolus 1,000 mL (0 mLs Intravenous Stopped 12/9/20 2223)         IMPRESSION:  1. Weak    2. Abnormal ECG    3. Hypotension           DISCHARGE MEDICATIONS:  New Prescriptions    No medications on file         DISPOSITION:  Admit

## 2020-12-10 NOTE — NURSING
picc line placed by JORGE Layne. See time out note. Safety maintained. Awaiting x-ray to arrive at the bedside.

## 2020-12-10 NOTE — ED NOTES
Pt awake at this time, no complaints voiced. Pt being turned q 2 hrs. Pt on CM cont pulse ox and automatic BP cuff. Pt is ICU boarder in ED. SR up Bed locked and low CB in reach. Will continue to monitor.

## 2020-12-10 NOTE — SUBJECTIVE & OBJECTIVE
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    Secondary malignant neoplasm of liver     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) 5/7/2013    Thrombocytopenia 11/12/2013    Thyroid disease     Unspecified essential hypertension 11/12/2013       Past Surgical History:   Procedure Laterality Date    ERCP N/A 6/21/2018    Procedure: ERCP, stent exchange;  Surgeon: Jake Lieberman MD;  Location: [facility] ENDO;  Service: Endoscopy;  Laterality: N/A;    ERCP N/A 5/23/2019    Procedure: ERCP (ENDOSCOPIC RETROGRADE CHOLANGIOPANCREATOGRAPHY), stent exchange;  Surgeon: Jake Lieberman MD;  Location: [facility] ENDO;  Service: Endoscopy;  Laterality: N/A;    ERCP N/A 11/14/2019    Procedure: ERCP (ENDOSCOPIC RETROGRADE CHOLANGIOPANCREATOGRAPHY), stent exchange;  Surgeon: Jake Lieberman MD;  Location: [facility] ENDO;  Service: Endoscopy;  Laterality: N/A;    ERCP      ERCP N/A 9/22/2020    Procedure: ERCP (ENDOSCOPIC RETROGRADE CHOLANGIOPANCREATOGRAPHY);  Surgeon: Abdulaziz Owen MD;  Location: [facility] ENDO;  Service: Endoscopy;  Laterality: N/A;    PERITONEAL CATHETER INSERTION N/A 11/5/2020    Procedure: INSERTION, CATHETER, INTRAPERITONEAL;  Surgeon: ELISE Mcknight MD;  Location: [facility] OR;  Service: General;  Laterality: N/A;    THYROIDECTOMY  2011       Review of patient's allergies indicates:   Allergen Reactions    Epinephrine Anaphylaxis and  Other (See Comments)     Can cause Carcinoid Crisis    Sulfa (sulfonamide antibiotics) Other (See Comments)     Urticaria       No current facility-administered medications on file prior to encounter.      Current Outpatient Medications on File Prior to Encounter   Medication Sig    cinacalcet (SENSIPAR) 90 MG Tab Take 1 tablet (90 mg total) by mouth 2 (two) times daily with meals.    dronabinoL (MARINOL) 2.5 MG capsule Take 1 capsule (2.5 mg total) by mouth 2 (two) times daily before meals.    ferrous gluconate (FERGON) 324 MG tablet Take 1 tablet by mouth once daily.    furosemide (LASIX) 20 MG tablet Take 1 tablet (20 mg total) by mouth 2 (two) times daily.    levothyroxine (SYNTHROID, LEVOTHROID) 175 MCG tablet Take 1 tablet (175 mcg total) by mouth before breakfast.    midodrine (PROAMATINE) 5 MG Tab Take 1 tablet (5 mg total) by mouth every 8 (eight) hours.    olmesartan (BENICAR) 40 MG tablet Take 40 mg by mouth once daily.    ondansetron (ZOFRAN-ODT) 8 MG TbDL Take 1 tablet by mouth every 8 (eight) hours as needed.    potassium, sodium phosphates (PHOS-NAK) 280-160-250 mg PwPk Take 1 packet by mouth 3 (three) times daily.    spironolactone (ALDACTONE) 50 MG tablet Take 1 tablet (50 mg total) by mouth once daily.    STIMULANT LAXATIVE PLUS 8.6-50 mg per tablet Take 1 tablet by mouth once daily.    ursodioL (ACTIGALL) 300 mg capsule Take 1 capsule (300 mg total) by mouth 2 (two) times daily.     Family History     Problem Relation (Age of Onset)    Cancer Maternal Grandmother    Diabetes Father        Tobacco Use    Smoking status: Never Smoker    Smokeless tobacco: Never Used   Substance and Sexual Activity    Alcohol use: No    Drug use: No    Sexual activity: Not Currently     Review of Systems   Constitutional: Positive for activity change, appetite change and fatigue.        Cachectic, ill appearing woman, very thin   HENT: Positive for trouble swallowing. Negative for dental problem,  facial swelling, sinus pain, tinnitus and voice change.         Per daughter, patient has only been able to eat soft or liquid foods, with little appetite, and has choking while eating sometimes     Eyes: Negative.    Respiratory: Positive for cough and choking. Negative for apnea, chest tightness, shortness of breath, wheezing and stridor.    Cardiovascular: Negative for chest pain, palpitations and leg swelling.   Gastrointestinal: Positive for abdominal distention. Negative for abdominal pain, constipation, diarrhea, nausea and vomiting.        Patient frequently requires paracentesis, removed 500mL earlier today per daughter   Genitourinary: Positive for decreased urine volume and difficulty urinating. Negative for dysuria, flank pain and frequency.        Patient has not urinated since the weekend, feels urge to void but is unable to do so.   Musculoskeletal: Positive for gait problem. Negative for arthralgias and back pain.        Patient confined to bed, works with PT at home   Skin: Positive for wound.        Stage II ulcer at sacrum, 1cm diameter   Neurological: Positive for weakness. Negative for dizziness, syncope, facial asymmetry and headaches.   Psychiatric/Behavioral:        Patient unable to state month, date, or year accurately. Stated it was October, but was not able to state any further information when prompted.     Objective:     Vital Signs (Most Recent):  Temp: 97.6 °F (36.4 °C) (12/09/20 1830)  Pulse: 78 (12/10/20 0021)  Resp: 19 (12/10/20 0021)  BP: (!) 86/50 (12/10/20 0021)  SpO2: 100 % (12/10/20 0021) Vital Signs (24h Range):  Temp:  [97.6 °F (36.4 °C)] 97.6 °F (36.4 °C)  Pulse:  [66-79] 78  Resp:  [15-21] 19  SpO2:  [84 %-100 %] 100 %  BP: ()/(35-58) 86/50     Weight: 54.4 kg (120 lb)  Body mass index is 18.79 kg/m².    Physical Exam  Vitals signs and nursing note reviewed.   Constitutional:       Appearance: She is ill-appearing.   HENT:      Head: Normocephalic and atraumatic.       Nose: Nose normal.      Mouth/Throat:      Mouth: Mucous membranes are dry.      Pharynx: Oropharynx is clear.   Eyes:      Pupils: Pupils are equal, round, and reactive to light.   Pulmonary:      Effort: Pulmonary effort is normal. No respiratory distress.      Comments: Diffuse crackles in lower lobes  Abdominal:      General: Bowel sounds are normal. There is distension.      Palpations: Abdomen is soft. There is no mass.      Tenderness: There is no abdominal tenderness. There is no guarding or rebound.   Neurological:      Mental Status: She is alert.           CRANIAL NERVES     CN III, IV, VI   Pupils are equal, round, and reactive to light.       Significant Labs:   A1C: No results for input(s): HGBA1C in the last 4320 hours.  ABGs:   Recent Labs   Lab 12/09/20 2217   PH 7.366   PCO2 25.7*   HCO3 14.7*   POCSATURATED 56*   BE -11     Lactic Acid:   Recent Labs   Lab 12/09/20 2036   LACTATE 3.3*     Lipid Panel: No results for input(s): CHOL, HDL, LDLCALC, TRIG, CHOLHDL in the last 48 hours.  Magnesium:   Recent Labs   Lab 12/09/20 2036   MG 1.7     POCT Glucose:   Recent Labs   Lab 12/09/20 2223   POCTGLUCOSE 122*     Troponin:   Recent Labs   Lab 12/09/20 2036   TROPONINI 0.131*     TSH:   Recent Labs   Lab 10/19/20  1700   TSH 0.279*     Recent Lab Results       12/09/20  2253   12/09/20  2223   12/09/20  2217   12/09/20  2153   12/09/20  2036        Procalcitonin         3.43  Comment:  A concentration < 0.25 ng/mL represents a low risk bacterial   infection.  Procalcitonin may not be accurate among patients with localized   infection, recent trauma or major surgery, immunosuppressed state,   invasive fungal infection, renal dysfunction. Decisions regarding   initiation or continuation of antibiotic therapy should not be based   solely on procalcitonin levels.       Albumin         1.8     Alkaline Phosphatase         467     Allens Test     N/A         ALT         31     Anion Gap         25      Aniso         Moderate     Appearance, UA Cloudy             AST         82     Baso #         0.01     Basophil %         0.1     Bilirubin (UA) 1+  Comment:  Positive urine bilirubin is not confirmed. Correlate with   serum bilirubin and clinical presentation.               BILIRUBIN TOTAL         3.3  Comment:  For infants and newborns, interpretation of results should be based  on gestational age, weight and in agreement with clinical  observations.  Premature Infant recommended reference ranges:  Up to 24 hours.............<8.0 mg/dL  Up to 48 hours............<12.0 mg/dL  3-5 days..................<15.0 mg/dL  6-29 days.................<15.0 mg/dL       BNP         1,596  Comment:  Values of less than 100 pg/ml are consistent with non-CHF populations.     Site     RF         BUN         95     [place] Cells         Occasional     Calcium         7.4     Chloride         106     CO2         14     Color, UA Orange             Creatinine         5.1     Differential Method         Automated     eGFR if          9     eGFR if non          8  Comment:  Calculation used to obtain the estimated glomerular filtration  rate (eGFR) is the CKD-EPI equation.        Eos #         0.0     Eosinophil %         0.1     Fragmented Cells         Occasional     Large/Giant Platelets         Present     Glucose         48  Comment:  GLU critical result(s) called and verbal readback obtained from Carol Horowitz RN by MAURICIO 12/09/2020 21:28       Glucose, UA Negative             Gran # (ANC)         11.1     Gran %         84.1     Hematocrit         33.6     Hemoglobin         11.2     [place] Bodies         Occasional     Immature Grans (Abs)         0.19  Comment:  Mild elevation in immature granulocytes is non specific and   can be seen in a variety of conditions including stress response,   acute inflammation, trauma and pregnancy. Correlation with other   laboratory and clinical findings is  essential.       Immature Granulocytes         1.4     Ketones, UA Negative             Lactate, Harjinder         3.3  Comment:  Falsely low lactic acid results can be found in samples   containing >=13.0 mg/dL total bilirubin and/or >=3.5 mg/dL   direct bilirubin.       Leukocytes, UA Trace             Lymph #         1.0     Lymph %         7.6     Magnesium         1.7     MCH         35.4     MCHC         33.3     MCV         106     Mono #         0.9     Mono %         6.7     MPV         13.5     NITRITE UA Negative             nRBC         12     Occult Blood UA 3+             Pappenheimer Bodies         Present     pH, UA 6.0             Phosphorus         10.3  Comment:  PHOS  critical result(s) called and verbal readback obtained from   Mike Jernigan RN by [facility] 12/09/2020 22:47       Platelet Estimate         Decreased     Platelets         61     POC BE     -11         POC HCO3     14.7         POC PCO2     25.7         POC PH     7.366         POC PO2     29         POC SATURATED O2     56         POC TCO2     15         POCT Glucose   122           Poik         Slight     Poly         Occasional     Potassium         5.1     PROTEIN TOTAL         6.1     Protein, UA 3+  Comment:  Recommend a 24 hour urine protein or a urine   protein/creatinine ratio if globulin induced proteinuria is  clinically suspected.                Acceptable       Yes       RBC         3.16     RDW         22.3     Sample     VENOUS         SARS-CoV-2 RNA, Amplification, Qual       Negative       Sodium         145     Specific [place], UA 1.025             Specimen UA Urine, Catheterized             Troponin I         0.131  Comment:  The reference interval for Troponin I represents the 99th percentile   cutoff   for our facility and is consistent with 3rd generation assay   performance.       UROBILINOGEN UA Negative             WBC         13.20                        All pertinent labs within the past 24 hours have  been reviewed.    Significant Imaging: I have reviewed all pertinent imaging results/findings within the past 24 hours.     X-Ray Chest AP Portable   Final Result      New airspace opacity in the right lung base, suggestive of pneumonia.         Electronically signed by: Aleks Chance MD   Date:    12/09/2020   Time:    19:36

## 2020-12-10 NOTE — ASSESSMENT & PLAN NOTE
Right lower lobe consolidation suggestive of PNA  WBC elevation, LA 3.3  Recent hospitalization in November  Patient may have aspirated food per daughter report  Will give broad spectrum Abx vanc and zosyn  Daughter reports mother having choking episodes, patient would benefit from NPO status and SLP consult  Continue to monitor, trend LA

## 2020-12-10 NOTE — ED NOTES
Spoke to Dr. Scheuermann (surgery) regarding pt. Status and need for central access for meds./iv treatments.

## 2020-12-10 NOTE — NURSING
Arterial line placed as order. Levo is currently infusing at 0.38mcg. Artline was placed in the R radial.-see time out note and was preformed by Dr. Eckert-anesthesia.

## 2020-12-10 NOTE — PROGRESS NOTES
HR 160s-170s. Dr. Lantigua notified. Bedside EKG done. Afib with RVR. HR 160s. Dr. Lantigua came to bedside. Orders given to stop Levo and Dopamine. Order received to give Metoprolol 5mg IVP x3 every 5 min. B/P 76/53. Dr. Lantigua at bedside. States to hold Metoprolol and restart Levo. Levo restarted at 0.1 mcg/kg/hr. B/P 91/59, HR 150s. Dr. Lantigua remains at the bedside.

## 2020-12-10 NOTE — ED PROVIDER NOTES
Chief Complaint  Chief Complaint   Patient presents with    Urinary Retention     pt's daughter states pt has not urinated since Monday. pt denies pain        HPI   Ching Bruce is a 70 y.o. female who presents with generalized weakness.  Patient reports she is not urinating.  No abdominal pain or distention.  No fever vomiting or diarrhea or cough.  No exacerbating or relieving factors.  Patient does report some mild generalized weakness and lightheadedness.  No confusion or seizures or headache.  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    Secondary malignant neoplasm of liver     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) 5/7/2013    Thrombocytopenia 11/12/2013    Thyroid disease     Unspecified essential hypertension 11/12/2013       Current Medications    Current Facility-Administered Medications:     0.9%  NaCl infusion, , Intravenous, Continuous, Abraham Lantigua MD, Last Rate: 75 mL/hr at 12/10/20 1204, 75 mL/hr at 12/10/20 1204    albuterol sulfate nebulizer solution 2.5 mg, 2.5 mg, Nebulization, PRN, Bibi Metzger MD    amiodarone 360 mg/200 mL (1.8 mg/mL) infusion, 1 mg/min, Intravenous, Continuous, Abraham Lantigua MD, Last Rate: 33.3 mL/hr at 12/10/20 1145, 1 mg/min at 12/10/20 1145    amiodarone 360 mg/200 mL (1.8 mg/mL) infusion, 0.5 mg/min, Intravenous,  Continuous, Abraham Lantigua MD    bumetanide (BUMEX) 12.5 mg, mannitol 25% 12.5 g in sodium chloride 0.9% 200 mL infusion, , Intravenous, Continuous, Alexis Scheuermann, MD, Stopped at 12/10/20 0830    cefepime in dextrose 5 % 1 gram/50 mL IVPB 1 g, 1 g, Intravenous, Q24H, Abraham Lantigua MD, Stopped at 12/10/20 0430    dextrose 50% injection 12.5 g, 12.5 g, Intravenous, PRN, Bibi Metzger MD    dextrose 50% injection 25 g, 25 g, Intravenous, PRN, Abraham Lantigua MD    glucagon (human recombinant) injection 1 mg, 1 mg, Intramuscular, PRN, Bibi Metzger MD    glucose chewable tablet 16 g, 16 g, Oral, PRN, Bibi Metzger MD    glucose chewable tablet 24 g, 24 g, Oral, PRN, Bibi Metzger MD    levothyroxine tablet 175 mcg, 175 mcg, Oral, Before breakfast, Alexis Scheuermann, MD, 175 mcg at 12/10/20 0606    linezolid 600 mg/300 mL IVPB 600 mg, 600 mg, Intravenous, Q12H, Abraham Lantigua MD, Stopped at 12/10/20 0114    melatonin tablet 6 mg, 6 mg, Oral, Nightly PRN, Rush Sidhu Jr., MD    mupirocin 2 % ointment, , Nasal, BID, ELISE Mcknight MD    norepinephrine 32 mg in dextrose 5 % 250 mL infusion, 0.02 mcg/kg/min, Intravenous, Continuous, Abraham Lantigua MD, Last Rate: 6.6 mL/hr at 12/10/20 1253, 0.26 mcg/kg/min at 12/10/20 1253    sodium chloride 0.9% flush 10 mL, 10 mL, Intravenous, PRN, Rush Sidhu Jr., MD    sodium chloride 3% nebulizer solution 4 mL, 4 mL, Nebulization, PRN, Bibi Metzger MD    Allergies  Review of patient's allergies indicates:   Allergen Reactions    Epinephrine Anaphylaxis and Other (See Comments)     Can cause Carcinoid Crisis    Sulfa (sulfonamide antibiotics) Other (See Comments)     Urticaria       Surgical history  Past Surgical History:   Procedure Laterality Date    ERCP N/A 6/21/2018    Procedure: ERCP, stent exchange;  Surgeon: Jake Lieberman MD;  Location: [facility];  Service: Endoscopy;   Laterality: N/A;    ERCP N/A 5/23/2019    Procedure: ERCP (ENDOSCOPIC RETROGRADE CHOLANGIOPANCREATOGRAPHY), stent exchange;  Surgeon: Jake Lieberman MD;  Location: [facility] ENDO;  Service: Endoscopy;  Laterality: N/A;    ERCP N/A 11/14/2019    Procedure: ERCP (ENDOSCOPIC RETROGRADE CHOLANGIOPANCREATOGRAPHY), stent exchange;  Surgeon: Jake Lieberman MD;  Location: [facility] ENDO;  Service: Endoscopy;  Laterality: N/A;    ERCP      ERCP N/A 9/22/2020    Procedure: ERCP (ENDOSCOPIC RETROGRADE CHOLANGIOPANCREATOGRAPHY);  Surgeon: Abdulaziz Owen MD;  Location: [facility] ENDO;  Service: Endoscopy;  Laterality: N/A;    PERITONEAL CATHETER INSERTION N/A 11/5/2020    Procedure: INSERTION, CATHETER, INTRAPERITONEAL;  Surgeon: ELISE Mcknight MD;  Location: [facility] OR;  Service: General;  Laterality: N/A;    THYROIDECTOMY  2011       Social history  Social History     Socioeconomic History    Marital status:      Spouse name: Not on file    Number of children: Not on file    Years of education: Not on file    Highest education level: Not on file   Occupational History     Employer: [employer]   Social Needs    Financial resource strain: Not on file    Food insecurity     Worry: Not on file     Inability: Not on file    Transportation needs     Medical: Not on file     Non-medical: Not on file   Tobacco Use    Smoking status: Never Smoker    Smokeless tobacco: Never Used   Substance and Sexual Activity    Alcohol use: No    Drug use: No    Sexual activity: Not Currently   Lifestyle    Physical activity     Days per week: Not on file     Minutes per session: Not on file    Stress: Not on file   Relationships    Social connections     Talks on phone: Not on file     Gets together: Not on file     Attends Confucianist service: Not on file     Active member of club or organization: Not on file     Attends meetings of clubs or organizations: Not on file     Relationship status: Not on file   Other Topics Concern  "   Not on file   Social History Narrative    Not on file       Family History  Family History   Problem Relation Age of Onset    Cancer Maternal Grandmother     Diabetes Father     Breast cancer Neg Hx     Colon cancer Neg Hx     Ovarian cancer Neg Hx        Review of systems  : No dysuria or discharge.  Musculoskeletal: No injury; full range of motion.  Skin: No rash, abscess, or laceration.  Neurologic: No new focal weakness or sensory changes.  All systems otherwise negative except as noted in ROS and HPI    Physical Exam  Vital signs: BP (!) 84/61 (BP Location: Right arm, Patient Position: Lying)   Pulse 92   Temp 96.8 °F (36 °C) (Axillary)   Resp (!) 22   Ht 5' 7" (1.702 m)   Wt 54.4 kg (120 lb)   LMP  (LMP Unknown)   SpO2 (!) 86%   Breastfeeding No   BMI 18.79 kg/m²   Constitutional: No acute distress.  alert, oriented and appropriate.  HENT: Normocephalic, atraumatic. Normal ear, nose, and throat.  Eyes: PERRL, EOMI, normal conjunctiva.  Neck: Normal range of motion, no tenderness; supple.  Respiratory: Nonlabored breathing with normal breath sounds.  Cardiovascular: RRR with no pulse deficit.  GI: Soft, nontender, no rebound or guarding.  Neurologic: Normal motor, sensation with no new focal deficit.  Psychiatric: Affect normal, judgement normal, mood normal.  No SI, HI, and not gravely disabled.    Labs  Pertinent labs reviewed (see chart for details)  Labs Reviewed   CBC W/ AUTO DIFFERENTIAL - Abnormal; Notable for the following components:       Result Value    WBC 13.20 (*)     RBC 3.16 (*)     Hemoglobin 11.2 (*)     Hematocrit 33.6 (*)      (*)     MCH 35.4 (*)     RDW 22.3 (*)     Platelets 61 (*)     MPV 13.5 (*)     Immature Granulocytes 1.4 (*)     Gran # (ANC) 11.1 (*)     Immature Grans (Abs) 0.19 (*)     nRBC 12 (*)     Gran % 84.1 (*)     Lymph % 7.6 (*)     Platelet Estimate Decreased (*)     All other components within normal limits   COMPREHENSIVE METABOLIC PANEL - " Abnormal; Notable for the following components:    CO2 14 (*)     Glucose 48 (*)     BUN 95 (*)     Creatinine 5.1 (*)     Calcium 7.4 (*)     Albumin 1.8 (*)     Total Bilirubin 3.3 (*)     Alkaline Phosphatase 467 (*)     AST 82 (*)     Anion Gap 25 (*)     eGFR if  9 (*)     eGFR if non  8 (*)     All other components within normal limits    Narrative:     GLU critical result(s) called and verbal readback obtained from Carol Horowitz RN by [facility] 12/09/2020 21:28   B-TYPE NATRIURETIC PEPTIDE - Abnormal; Notable for the following components:    BNP 1,596 (*)     All other components within normal limits   TROPONIN I - Abnormal; Notable for the following components:    Troponin I 0.131 (*)     All other components within normal limits   URINALYSIS, REFLEX TO URINE CULTURE - Abnormal; Notable for the following components:    Color, UA Orange (*)     Appearance, UA Cloudy (*)     Protein, UA 3+ (*)     Bilirubin (UA) 1+ (*)     Occult Blood UA 3+ (*)     Leukocytes, UA Trace (*)     All other components within normal limits    Narrative:     Specimen Source->Urine   LACTIC ACID, PLASMA - Abnormal; Notable for the following components:    Lactate (Lactic Acid) 3.3 (*)     All other components within normal limits   PROCALCITONIN - Abnormal; Notable for the following components:    Procalcitonin 3.43 (*)     All other components within normal limits   PHOSPHORUS - Abnormal; Notable for the following components:    Phosphorus 10.3 (*)     All other components within normal limits    Narrative:      PHOS  critical result(s) called and verbal readback obtained from   Mike Jernigan RN by [facility] 12/09/2020 22:47   URINALYSIS MICROSCOPIC - Abnormal; Notable for the following components:    RBC, UA 6 (*)     WBC, UA 40 (*)     Bacteria Many (*)     Yeast, UA Few (*)     Granular Casts, UA 2 (*)     All other components within normal limits    Narrative:     Specimen Source->Urine   LACTIC ACID, PLASMA -  Abnormal; Notable for the following components:    Lactate (Lactic Acid) 3.7 (*)     All other components within normal limits    Narrative:     la   critical result(s) called and verbal readback obtained from   angelica webb by [facility] 12/10/2020 07:33  Collection has been rescheduled by LLR at 12/10/2020 01:18 Reason:   blood culture drawn n lact ac drawn labs to be cancel per nurse Hensel   COMPREHENSIVE METABOLIC PANEL - Abnormal; Notable for the following components:    CO2 13 (*)     Glucose 131 (*)     BUN 83 (*)     Creatinine 4.8 (*)     Calcium 7.1 (*)     Total Protein 5.9 (*)     Albumin 1.7 (*)     Total Bilirubin 3.2 (*)     Alkaline Phosphatase 426 (*)     AST 74 (*)     Anion Gap 23 (*)     eGFR if  10 (*)     eGFR if non  9 (*)     All other components within normal limits    Narrative:     Collection has been rescheduled by LLR at 12/10/2020 01:18 Reason:   blood culture drawn n lact ac drawn labs to be cancel per nurse Hensel   CBC W/ AUTO DIFFERENTIAL - Abnormal; Notable for the following components:    WBC 15.70 (*)     RBC 2.47 (*)     Hemoglobin 8.9 (*)     Hematocrit 28.0 (*)      (*)     MCH 36.0 (*)     MCHC 31.8 (*)     RDW 22.5 (*)     Platelets 89 (*)     nRBC 12 (*)     Gran % 92.0 (*)     Lymph % 8.0 (*)     Mono % 0.0 (*)     Platelet Estimate Decreased (*)     All other components within normal limits    Narrative:     Collection has been rescheduled by LLR at 12/10/2020 01:18 Reason:   blood culture drawn n lact ac drawn labs to be cancel per nurse Hensel   MAGNESIUM - Abnormal; Notable for the following components:    Magnesium 1.5 (*)     All other components within normal limits    Narrative:     Collection has been rescheduled by LLR at 12/10/2020 01:18 Reason:   blood culture drawn n lact ac drawn labs to be cancel per nurse Hensel   PHOSPHORUS - Abnormal; Notable for the following components:    Phosphorus 9.2 (*)     All other components within normal  limits    Narrative:     phos   critical result(s) called and verbal readback obtained from   daniels rn by [facility] 12/10/2020 07:48  Collection has been rescheduled by LLR at 12/10/2020 01:18 Reason:   blood culture drawn n lact ac drawn labs to be cancel per nurse Lorado   ISTAT PROCEDURE - Abnormal; Notable for the following components:    POC PCO2 25.7 (*)     POC PO2 29 (*)     POC HCO3 14.7 (*)     POC SATURATED O2 56 (*)     POC TCO2 15 (*)     All other components within normal limits   POCT GLUCOSE - Abnormal; Notable for the following components:    POCT Glucose 122 (*)     All other components within normal limits   POCT GLUCOSE - Abnormal; Notable for the following components:    POCT Glucose 141 (*)     All other components within normal limits   CULTURE, BLOOD   CULTURE, BLOOD   CULTURE, RESPIRATORY   CULTURE, URINE   PROCALCITONIN   MAGNESIUM   PHOSPHORUS   MAGNESIUM   TROPONIN I    Narrative:     Collection has been rescheduled by LLR at 12/10/2020 01:18 Reason:   blood culture drawn n lact ac drawn labs to be cancel per nurse Lorado   SARS-COV-2 RDRP GENE    Narrative:     This test utilizes isothermal nucleic acid amplification   technology to detect the SARS-CoV-2 RdRp nucleic acid segment.   The analytical sensitivity (limit of detection) is 125 genome   equivalents/mL.   A POSITIVE result implies infection with the SARS-CoV-2 virus;   the patient is presumed to be contagious.     A NEGATIVE result means that SARS-CoV-2 nucleic acids are not   present above the limit of detection. A NEGATIVE result should be   treated as presumptive. It does not rule out the possibility of   COVID-19 and should not be the sole basis for treatment decisions.   If COVID-19 is strongly suspected based on clinical and exposure   history, re-testing using an alternate molecular assay should be   considered.   This test is only for use under the Food and Drug   Administration s Emergency Use Authorization (EUA).   Commercial kits  are provided by [employer].   Performance characteristics of the EUA have been independently   verified by [facility] Department of   Pathology and Laboratory Medicine.   _________________________________________________________________   The authorized Fact Sheet for Healthcare Providers and the authorized Fact   Sheet for Patients of the ID NOW COVID-19 are available on the FDA   website:     https://www.fda.gov/media/542798/download  https://www.fda.gov/media/780954/download       POCT GLUCOSE MONITORING CONTINUOUS   POCT GLUCOSE MONITORING CONTINUOUS   POCT GLUCOSE MONITORING CONTINUOUS       ECG  Results for orders placed or performed during the hospital encounter of 12/09/20   Repeat EKG 12-lead    Collection Time: 12/10/20 11:09 AM    Narrative    Test Reason : R94.31,    Vent. Rate : 165 BPM     Atrial Rate : 178 BPM     P-R Int : 000 ms          QRS Dur : 130 ms      QT Int : 260 ms       P-R-T Axes : 000 070 -56 degrees     QTc Int : 430 ms    Atrial fibrillation with rapid ventricular response  Nonspecific intraventricular block  Cannot rule out Septal infarct ,age undetermined  Abnormal ECG  When compared with ECG of 20-NOV-2020 05:54,  Atrial fibrillation has replaced Sinus rhythm  Vent. rate has increased BY  77 BPM  QRS duration has increased  QRS voltage has decreased  ST no longer depressed in Lateral leads    Referred By: [email or web address]   SELF           Confirmed By:      ECG interpreted by ED MD    Radiology    X-Ray Chest AP Portable   Final Result      New airspace opacity in the right lung base, suggestive of pneumonia.         Electronically signed by: Aleks Chance MD   Date:    12/09/2020   Time:    19:36          Procedures    Procedures    Medications   sodium chloride 0.9% flush 10 mL (has no administration in time range)   melatonin tablet 6 mg (has no administration in time range)   linezolid 600 mg/300 mL IVPB 600 mg (0 mg Intravenous Stopped 12/10/20 0114)   dextrose 50%  injection 25 g (has no administration in time range)   glucose chewable tablet 16 g (has no administration in time range)   glucose chewable tablet 24 g (has no administration in time range)   dextrose 50% injection 12.5 g (has no administration in time range)   glucagon (human recombinant) injection 1 mg (has no administration in time range)   sodium chloride 3% nebulizer solution 4 mL (has no administration in time range)   albuterol sulfate nebulizer solution 2.5 mg (has no administration in time range)   0.9%  NaCl infusion (75 mL/hr Intravenous New Bag 12/10/20 1204)   cefepime in dextrose 5 % 1 gram/50 mL IVPB 1 g (0 g Intravenous Hold 12/10/20 0430)   levothyroxine tablet 175 mcg (175 mcg Oral Given 12/10/20 0606)   bumetanide (BUMEX) 12.5 mg, mannitol 25% 12.5 g in sodium chloride 0.9% 200 mL infusion ( Intravenous Hold 12/10/20 0830)   amiodarone 360 mg/200 mL (1.8 mg/mL) infusion (1 mg/min Intravenous New Bag 12/10/20 1145)   amiodarone 360 mg/200 mL (1.8 mg/mL) infusion (has no administration in time range)   mupirocin 2 % ointment ( Nasal Given 12/10/20 1142)   norepinephrine 32 mg in dextrose 5 % 250 mL infusion (0.26 mcg/kg/min × 54.4 kg Intravenous New Bag 12/10/20 1253)   sodium chloride 0.9% bolus 1,000 mL (0 mLs Intravenous Stopped 12/9/20 2223)   cefTRIAXone (ROCEPHIN) 2 g/50 mL D5W IVPB (0 g Intravenous Stopped 12/9/20 2252)   azithromycin 500 mg in dextrose 5 % 250 mL IVPB (ready to mix system) (0 mg Intravenous Stopped 12/10/20 0001)   dextrose 50 % in water (D50W) injection 25 g (25 g Intravenous Given 12/9/20 2139)   aspirin EC tablet 325 mg (325 mg Oral Given 12/9/20 2148)   sodium chloride 0.9% bolus 1,000 mL (0 mLs Intravenous Stopped 12/9/20 2223)   albumin human 25% bottle 12.5 g (0 g Intravenous Stopped 12/10/20 1101)   metoprolol injection 5 mg (5 mg Intravenous Given 12/10/20 1128)   amiodarone in dextrose 150 mg/100 mL (1.5 mg/mL) loading dose 150 mg (150 mg Intravenous New Bag  12/10/20 1132)   albumin human 25% bottle 12.5 g (12.5 g Intravenous New Bag 12/10/20 1208)       ED course    ED Course as of Dec 10 1321   Wed Dec 09, 2020   2137 Reviewed ECG with Dr. Carrington, No STEMI.     [RN]      ED Course User Index  [RN] Rush Sidhu Jr., MD         Medical Decision Making:    History:  Old medical records:  I decided to obtain old medical records.  Old records summarized:  Seen for chemo recently    Initial assessment:  70-year-old female with generalized weakness and relative a new area    Differential diagnosis:  Stroke, demyelination or mass, radiculopathy, cauda equina syndrome, central cord syndrome, traumatic injury, limb ischemia, DVT, alternate possible global cause  UTI, dehydration    Clinical tests:   Labs:  Ordered and reviewed  Imaging:  Ordered and reviewed    ED management:  We will get labs and give IV fluids patient care and workup will be turned over to nighttime physician             Tanmay Henry MD  12/09/20 2005       Tanmay Henry MD  12/10/20 1321

## 2020-12-10 NOTE — ED NOTES
Surgery resident (Dr. Interiano) at bedside. Pt. Is awake and answers questions appropriately at this time. Discussed nephrology consult and plan for dialysis. Pt. Is agreeable to instructions.

## 2020-12-10 NOTE — ED NOTES
Notified Vj in lab that I was unable to draw blood sample on patient. He is sending a phlebotimist.

## 2020-12-10 NOTE — NURSING
Notified Dr. Interiano of pt bp being unstable while on levophed. Was given a telephone order for an arterial line placement and consult to anesthesia. Order read back and confirmed.

## 2020-12-10 NOTE — NURSING
Notified Dr. Slaughter of pt hr dropping to 96 to 53. Was given telephone order for stat ekg. EKG shows sinus bradycardia. HR remains at 54. Pt is drowsy and has no c/o chest pain.     EKG completed shows sinus jenise. Notified Dr. Scheuermann. Was given telephone order to continue amio as ordered.       Pt is currently   Sinus jenise 53   109/52(map)73-artline  Sats 100%. RR 18  Levo is currently infusing at 0.38mcg.

## 2020-12-10 NOTE — ED NOTES
Pt presents stating she has not urinated since Monday. Pt is neuroendocrine pt. Pt c/o pain to coccyx area. Pt also c/o increased weakness and has an ulcer to her coccyx that had dressing. Pt extremely weak and lethargic but Awake and alert.  Patient identifiers   Review of patient's allergies indicates:   Allergen Reactions    Epinephrine Anaphylaxis and Other (See Comments)     Can cause Carcinoid Crisis    Sulfa (sulfonamide antibiotics) Other (See Comments)     Urticaria        APPEARANCE: Alert, oriented and in no acute distress.  CARDIAC: Normal rate and rhythm, no murmur heard.   PERIPHERAL VASCULAR: peripheral pulses present. Normal cap refill. No edema. Warm to touch.    RESPIRATORY:Normal rate and effort, breath sounds clear bilaterally throughout chest. Respirations are equal and unlabored no obvious signs of distress.  GASTRO: soft, bowel sounds normal, no tenderness, no abdominal distention.  MUSC: Full ROM. No bony tenderness or soft tissue tenderness. No obvious deformity.  SKIN: Skin is warm and dry, normal skin turgor, mucous membranes moist.  NEURO: 5/5 strength major flexors/extensors bilaterally. Sensory intact to light touch bilaterally. Radha coma scale: eyes open spontaneously-4, oriented & converses-5, obeys commands-6. No neurological abnormalities.   MENTAL STATUS: awake, alert and aware of environment.  EYE: PERRL, both eyes: pupils brisk and reactive to light. Normal size.  ENT: EARS: no obvious drainage. NOSE: no active bleeding.       Patient verbalized understanding of status and plan of care. Patient changed into hospital gown. Daughter at bedside.Patient side rails are up x 2, bed is low and locked, call light is in reach , and fall risk band applied to Right wrist. Pt educated on fall risk and verbalized understanding.   Cardiac monitor (alarms on, set, and audible), pulse oximeter, and automatic blood pressure cuff applied.   Will continue to monitor.

## 2020-12-10 NOTE — ED NOTES
Pt awake, on CM cont pulse ox and automatic BP cuff. SR up Bed locked and low. Norepinephrine, Vancomycin, and NS infusing without difficulty.

## 2020-12-10 NOTE — NURSING
Notified Dr. Interiano of pt c/o chest pain and sob. She explained that she is on the icu unit and will come and assess the pt.     Levo is currently at 0.4 due to drops in bp and not maintaining maps >65.

## 2020-12-10 NOTE — H&P
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  She has recently been admitted for hypercalcemia.  Today, she presents with weakness and AMS.  In the ER, she was hypotensive to systolics in the 70s.  She received 2 L of NS in the ER without sustained improvement of the BP.  Of note, it is reported that she has not urinated since Monday.  Her Cr is greater than 5.   She had minimal UOP when catheter was placed.    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    Secondary malignant neoplasm of liver     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) 5/7/2013    Thrombocytopenia 11/12/2013    Thyroid disease     Unspecified essential hypertension 11/12/2013     Past Surgical History:   Procedure Laterality Date    ERCP N/A 6/21/2018    Procedure: ERCP, stent exchange;  Surgeon: Jake Lieberman MD;  Location: [facility];  Service: Endoscopy;  Laterality: N/A;    ERCP N/A 5/23/2019    Procedure: ERCP (ENDOSCOPIC RETROGRADE CHOLANGIOPANCREATOGRAPHY), stent exchange;  Surgeon: Jake Lieberman MD;  Location: [facility];  Service: Endoscopy;  Laterality: N/A;    ERCP N/A 11/14/2019    Procedure: ERCP (ENDOSCOPIC RETROGRADE CHOLANGIOPANCREATOGRAPHY), stent exchange;  Surgeon: Jake Lieberman MD;  Location: [facility];  Service:  Endoscopy;  Laterality: N/A;    ERCP      ERCP N/A 9/22/2020    Procedure: ERCP (ENDOSCOPIC RETROGRADE CHOLANGIOPANCREATOGRAPHY);  Surgeon: Abdulaziz Owen MD;  Location: [facility] ENDO;  Service: Endoscopy;  Laterality: N/A;    PERITONEAL CATHETER INSERTION N/A 11/5/2020    Procedure: INSERTION, CATHETER, INTRAPERITONEAL;  Surgeon: ELISE Mcknight MD;  Location: [facility] OR;  Service: General;  Laterality: N/A;    THYROIDECTOMY  2011     Family History   Problem Relation Age of Onset    Cancer Maternal Grandmother     Diabetes Father     Breast cancer Neg Hx     Colon cancer Neg Hx     Ovarian cancer Neg Hx      Social History     Tobacco Use    Smoking status: Never Smoker    Smokeless tobacco: Never Used   Substance Use Topics    Alcohol use: No    Drug use: No     Review of patient's allergies indicates:   Allergen Reactions    Epinephrine Anaphylaxis and Other (See Comments)     Can cause Carcinoid Crisis    Sulfa (sulfonamide antibiotics) Other (See Comments)     Urticaria     Review of systems: see HPI; otherwise, 12-point ROS negative.     Vitals:    12/10/20 0312   BP: (!) 108/55   Pulse: 76   Resp: (!) 22   Temp:        Gen: Confused, AMS  Neck: supple. Normal ROM.  Resp: unlabored respirations. Stable on room air.  CV: regular rate.  Abd: soft, nondistended, nontender.  Pleurex in place  Ext: warm and well perfused. No clubbing, cyanosis, or edema.  Neuro: CN grossly intact. No focal neurological deficits.      Labs  Recent Labs     12/07/20  0951 12/09/20 2036   WBC 15.42* 13.20*   HGB 9.0* 11.2*   HCT 27.2* 33.6*   PLT 85* 61*   * 145   K 4.0 5.1   * 106   CO2 20* 14*   BUN 68* 95*   CREATININE 3.9* 5.1*   BILITOT 2.7* 3.3*   AST 49* 82*   ALT 19 31   ALKPHOS 473* 467*   CALCIUM 9.8 7.4*   ALBUMIN 1.9* 1.8*   PROT 6.2 6.1   MG  --  1.7   PHOS         Assessment and plan:  69 yo F with widely metastatic NET, presenting with multisystem organ failure    -Broad spectrum antibiotics,  will avoid vanc/zosyn combination due to kidney function  -Admit to ICU  -Levo for maps >65  -Medicine consult  -Daily labs  -PICC line lacement  -Cultures pending    Abraham Lantigua MD  [employer] General Surgery

## 2020-12-11 PROBLEM — Z51.5 COMFORT MEASURES ONLY STATUS: Status: ACTIVE | Noted: 2020-01-01

## 2020-12-11 NOTE — PROGRESS NOTES
[facility] ICU 5th Floor  Critical Care - Medicine  Progress Note    Patient Name: Ching Bruce  MRN: [id number]  Admission Date: 12/9/2020  Hospital Length of Stay: 2 days   Code Status: Full Code  Attending Provider: ELISE Mcknight MD  Primary Care Provider: Gaby Corea MD   Principal Problem: Hypotension    Subjective:       Interval History/Significant Events: Overnight patient with seizures, profound hypotension.  Maxed out on pressors.  Minimal response to stimuli.  Intubated, ventilated    Objective:     Vital Signs (Most Recent):  Temp: (!) 93 °F (33.9 °C) (12/11/20 0815)  Pulse: (!) 130 (12/11/20 0815)  Resp: (!) 35 (12/11/20 0815)  BP: (!) 63/21 (12/11/20 0815)  SpO2: (!) 57 % (12/11/20 0630) Vital Signs (24h Range):  Temp:  [91.6 °F (33.1 °C)-97.4 °F (36.3 °C)] 93 °F (33.9 °C)  Pulse:  [] 130  Resp:  [14-39] 35  SpO2:  [23 %-100 %] 57 %  BP: ()/(21-93) 63/21  Arterial Line BP: ()/(40-71) 107/68     Weight: 54.4 kg (120 lb)  Body mass index is 18.79 kg/m².      Intake/Output Summary (Last 24 hours) at 12/11/2020 0843  Last data filed at 12/11/2020 0803  Gross per 24 hour   Intake 4117.58 ml   Output 515 ml   Net 3602.58 ml       Physical Exam   Intubated, no sedation, minimal purposeful movement  Afib with RVR  Abdomen with significant ascites  No appreciable edema    Vents:  Vent Mode: A/C (12/11/20 0716)  Ventilator Initiated: Yes (12/11/20 0604)  Set Rate: 26 BPM (12/11/20 0716)  Vt Set: 350 mL (12/11/20 0716)  PEEP/CPAP: 5 cmH20 (12/11/20 0716)  Oxygen Concentration (%): 60 (12/11/20 0716)  Peak Airway Pressure: 36 cmH2O (12/11/20 0716)  Total Ve: 10.6 mL (12/11/20 0716)  F/VT Ratio<105 (RSBI): (!) 56.15 (12/11/20 0604)    Lines/Drains/Airways     Peripherally Inserted Central Catheter Line            PICC Triple Lumen 12/10/20 1438 right brachial less than 1 day          Drain                 NG/OG Tube 12/11/20 0531 [place] sump 16 Fr. Center mouth less  than 1 day         Urethral Catheter 12/10/20 1900 less than 1 day          Airway                 Airway - Non-Surgical 12/11/20 0526 Endotracheal Tube less than 1 day          Arterial Line            Arterial Line 12/10/20 1631 Right Radial less than 1 day          Peripheral Intravenous Line                 Peripheral IV - Single Lumen 12/09/20 2036 20 G Left Forearm 1 day         Peripheral IV - Single Lumen 12/10/20 0246 22 G Right Wrist 1 day                Significant Labs:    CBC/Anemia Profile:  Recent Labs   Lab 12/09/20  2036 12/10/20  0620   WBC 13.20* 15.70*   HGB 11.2* 8.9*   HCT 33.6* 28.0*   PLT 61* 89*   * 113*   RDW 22.3* 22.5*        Chemistries:  Recent Labs   Lab 12/09/20  2036 12/10/20  0620 12/11/20  0526    143 134*   K 5.1 4.4 5.0    107 101   CO2 14* 13* 9*   BUN 95* 83* 82*   CREATININE 5.1* 4.8* 4.4*   CALCIUM 7.4* 7.1* 7.0*   ALBUMIN 1.8* 1.7* 1.8*   PROT 6.1 5.9*  --    BILITOT 3.3* 3.2*  --    ALKPHOS 467* 426*  --    ALT 31 29  --    AST 82* 74*  --    MG 1.7 1.5* 1.5*   PHOS 10.3* 9.2* 10.1*       ABGs:   Recent Labs   Lab 12/11/20  0830   PH 7.050*   PCO2 36.4   HCO3 10.1*   POCSATURATED 92*   BE -20     Bilirubin:   Recent Labs   Lab 11/24/20  1227 11/30/20  1026 12/07/20  0951 12/09/20  2036 12/10/20  0620   BILITOT 1.5* 2.4* 2.7* 3.3* 3.2*     Blood Culture:   Recent Labs   Lab 12/09/20 2034 12/09/20 2215   LABBLOO No Growth to date  No Growth to date No Growth to date  No Growth to date     BMP:   Recent Labs   Lab 12/11/20  0526   *   *   K 5.0      CO2 9*   BUN 82*   CREATININE 4.4*   CALCIUM 7.0*   MG 1.5*     CMP:   Recent Labs   Lab 12/09/20  2036 12/10/20  0620 12/11/20  0526    143 134*   K 5.1 4.4 5.0    107 101   CO2 14* 13* 9*   GLU 48* 131* 310*   BUN 95* 83* 82*   CREATININE 5.1* 4.8* 4.4*   CALCIUM 7.4* 7.1* 7.0*   PROT 6.1 5.9*  --    ALBUMIN 1.8* 1.7* 1.8*   BILITOT 3.3* 3.2*  --    ALKPHOS 467* 426*  --     AST 82* 74*  --    ALT 31 29  --    ANIONGAP 25* 23* 24*   EGFRNONAA 8* 9* 10*     Lactic Acid:   Recent Labs   Lab 12/09/20  2036 12/10/20  0620   LACTATE 3.3* 3.7*     POCT Glucose:   Recent Labs   Lab 12/10/20  2050 12/11/20  0512 12/11/20  0730   POCTGLUCOSE 121* 102 155*     Troponin:   Recent Labs   Lab 12/09/20  2036 12/10/20  0620 12/10/20  1634   TROPONINI 0.131* 0.108* 0.161*     Urine Studies:   Recent Labs   Lab 12/09/20  2253   COLORU Orange*   APPEARANCEUA Cloudy*   PHUR 6.0   SPECGRAV 1.025   PROTEINUA 3+*   GLUCUA Negative   KETONESU Negative   BILIRUBINUA 1+*   OCCULTUA 3+*   NITRITE Negative   UROBILINOGEN Negative   LEUKOCYTESUR Trace*   RBCUA 6*   WBCUA 40*   BACTERIA Many*   HYALINECASTS 1         Assessment/Plan:     Active Diagnoses:    Diagnosis Date Noted POA    PRINCIPAL PROBLEM:  Hypotension [I95.9] 12/09/2020 Yes    Elevated troponin [R77.8] 12/10/2020 Unknown    Acute renal failure [N17.9] 12/09/2020 Yes    Pneumonia [J18.9] 12/09/2020 Unknown    Physical debility [R53.81]  Yes    Thrombocytopenia [D69.6] 11/12/2013 Yes    Unspecified essential hypertension [I10] 11/12/2013 Yes    Primary pancreatic neuroendocrine tumor [D3A.8] 05/07/2013 Yes    Anemia [D64.9] 05/07/2013 Yes    Secondary neuroendocrine tumor of liver [C7B.8] 05/07/2013 Yes    Hypercalcemia [E83.52] 05/07/2013 Yes      Problems Resolved During this Admission:     71 yo F with widely metastatic NET, presenting with multisystem organ failure  Neuro:   - Minimal response to stimuli  - Concern for cerebral ischemia     Pulmonary:   - Ventilated  -Hyperventilating to compensate for metabolic acidosis     Cardiac:  - No on norepi, vaso, levo  -Afib with RVR, on amio    Fluids/Electrolytes/Nutrition/GI:   - Bicarb drip  -OGT  -NPO    Renal:   - Minimal UOP  -Cr increasing  -Nephrology on board for evaluation for dialysis      Infectious Disease:   -Continue emperic abx     Hematology/Oncology:  - CBC this  AM     Endocrine:  - Octreotide drip      Dispo:  Continue ICU care  Prognosis guarded        Abraham Lantigua MD  Critical Care - Medicine  [facility] ICU 5th Floor

## 2020-12-11 NOTE — PROGRESS NOTES
Pharmacist Renal Dose Adjustment Note    Ching Bruce is a 70 y.o. female being treated with the medication enoxaparin    Patient Data:    Vital Signs (Most Recent):  Temp: (!) 92.5 °F (33.6 °C) (12/11/20 0745)  Pulse: (!) 125 (12/11/20 0745)  Resp: (!) 34 (12/11/20 0745)  BP: (!) 113/58 (12/11/20 0745)  SpO2: (!) 57 % (12/11/20 0630)   Vital Signs (72h Range):  Temp:  [91.6 °F (33.1 °C)-97.6 °F (36.4 °C)]   Pulse:  []   Resp:  [14-40]   BP: ()/(22-93)   SpO2:  [23 %-100 %]   Arterial Line BP: ()/(40-71)      Recent Labs   Lab 12/09/20  2036 12/10/20  0620 12/11/20  0526   CREATININE 5.1* 4.8* 4.4*     Serum creatinine: 4.4 mg/dL (H) 12/11/20 0526  Estimated creatinine clearance: 10.2 mL/min (A)    Medication:Enoxaparin dose: 40 mg frequency daily will be changed to medication:enoxaparin dose:30 mg frequency:daily    Pharmacist's Name: Ching Bowie  Pharmacist's Extension: [phone]

## 2020-12-11 NOTE — RESPIRATORY THERAPY
Called to room due to patient having seizure.  Immediately started to bag patient with ambu bag.  Intubation equipment was gathered by another RT.  Once patient was bagged for approx. 5 minutes, she began to aspirate.  Suctioned patient and began prepping for intubation.      Once ER MD arrived, assisted with intubation.  Patient intubated with 7.5 ETT secured with tube garcia at 20 cm at the lip.  RT placed vent changes, but waiting for MD to confirm those settings.

## 2020-12-11 NOTE — NURSING
Discussed pain management/sedation with Dr. Lantigua. MD to add PRN fentanyl to ensure that patient is comfortable since no sedation is in use at this time.

## 2020-12-11 NOTE — PLAN OF CARE
Pt is aaox4. Vss. On levo at 0.28mcg. Amio drip infusing. NSR on monitor. Pt converted to Sinus jenise at 1722.  Arterial line and picc line were placed. Levo requirements are coming down. Bumex/mannitol drip infusing. Pt made a total of 125ml in urine for day shift. Urine was not produced hourly. Pt daughter, toñito has been updated on poc. Pt is having c/o chest pain but points to her stomach. Troponin was 0.161 at 1730. Safety maintained.

## 2020-12-11 NOTE — PLAN OF CARE
20 1559   Final Note   Assessment Type Final Discharge Note   Anticipated Discharge Disposition

## 2020-12-11 NOTE — NURSING
[facility] notified of GCS of 3. Referral #[id number]. Possible candidate for organ and eye donation.

## 2020-12-11 NOTE — PROGRESS NOTES
Pharmacist Renal Dose Adjustment Note    Ching Bruce is a 70 y.o. female being treated with the medication famotidine    Patient Data:    Vital Signs (Most Recent):  Temp: 97.1 °F (36.2 °C) (12/11/20 0345)  Pulse: 77 (12/11/20 0430)  Resp: 16 (12/11/20 0430)  BP: 121/60 (12/11/20 0400)  SpO2: 95 % (12/11/20 0430) Vital Signs (72h Range):  Temp:  [96.7 °F (35.9 °C)-97.6 °F (36.4 °C)]   Pulse:  []   Resp:  [14-40]   BP: ()/(35-93)   SpO2:  [69 %-100 %]   Arterial Line BP: ()/(40-63)      Recent Labs   Lab 12/07/20  0951 12/09/20  2036 12/10/20  0620   CREATININE 3.9* 5.1* 4.8*     Serum creatinine: 4.8 mg/dL (H) 12/10/20 0620  Estimated creatinine clearance: 9.4 mL/min (A)    Medication:famotidine dose: 20 mg frequency q12 will be changed to medication famotidine dose: 20mgfrequency:daily    Pharmacist's Name: Kam Tavares  Pharmacist's Extension: [phone]

## 2020-12-11 NOTE — PHYSICIAN QUERY
PT Name: Ching Bruce  MR #: [id number]     RESPIRATORY CONDITION CLARIFICATION     CDS: Brandy Capley, RN  Email: [email or web address]    This form is a permanent document in the medical record.    Query Date: December 11, 2020    By submitting this query, we are merely seeking further clarification of documentation.  Please utilize your independent clinical judgment when addressing the question(s) below.  The Medical Record contains the following:   Indicators   Supporting Clinical Findings Location in Medical Record   X Pneumonia documented   sepsis due to RLL pneumonia    CXR significant for right-sided lung opacity suggestive of PNA. COVID rapid test negative.    Pneumonia  Right lower lobe consolidation suggestive of PNA  WBC elevation, LA 3.3  Recent hospitalization in November  Patient may have aspirated food per daughter report  Will give broad spectrum Abx vanc and zosyn  Daughter reports mother having choking episodes, patient would benefit from NPO status and SLP consult    Patient CXR significant for PNA, likely aspiration, possible cause of hypotension if systemic infection     Hospital medicine consult 12/10   X Chest X-Ray/CT Scan New airspace opacity in the right lung base, suggestive of pneumonia.   CXR 12/9   X PaO2    PaCO2     O2 sat     12/11/2020 06:18 12/11/2020 08:30   POC PH 6.931 (LL) 7.050 (LL)   POC PCO2 47.5 (H) 36.4   POC PO2 218 (H) 89   POC BE -22 -20   POC HCO3 10.0 (L) 10.1 (L)   POC SATURATED O2 99 92 (L)   POC TCO2 11 (L) 11 (L)   FiO2 100 50   Vt 350 350   PEEP 5 5   Sample ARTERIAL ARTERIAL   DelSys Adult Vent Adult Vent   Allens Test N/A N/A   Site Dick/UAC Dick/UAC   Mode AC/PRVC AC/PRVC   Rate 20 32      Resp:  [15-21] 19  SpO2:  [84 %-100 %] 100 %    Resp:  [14-39] 35  SpO2:  [23 %-100 %] 57 %   ABG's 12/11                                            Hospital medicine consult 12/10      Surgery progress notes 12/11   X WBC     12/9/2020 20:36 12/10/2020 06:20 12/11/2020  08:48   WBC 13.20 (H) 15.70 (H) 17.26 (H)        Labs -   X Vital Signs Last 24 Hour Vital Signs:  BP  Min: 69/35  Max: 112/58  Temp  Av.3 °F (36.3 °C)  Min: 97.1 °F (36.2 °C)  Max: 97.6 °F (36.4 °C)  Pulse  Av.9  Min: 66  Max: 87  Resp  Av.4  Min: 15  Max: 40  SpO2  Av.5 %  Min: 84 %  Max: 100 %   Nephrology consult 12/10    Cultures       Treatment      X Supplemental O2 Room air -12/10 1105   O2 2L NC 12/10 1334  O2 3L NC 12/10 1900 -  0400  Ventilator Oxygen concentration 100  0604   RT PCS flowsheet    Dysphagia/Swallow study      Other       Provider, please further specify pneumonia (select all that apply):    [   ] Bacterial, gram negative organism Pneumonia     [   ] Bacterial, gram positive organism Pneumonia     [   ] Unspecified bacterial pneumonia     [   ] Aspiration Pneumonia/Pneumonitis     [x   ] Unspecified Pneumonia     [   ] Other type of pneumonia (please specify): ________     [  ] Clinically undetermined         Please document in your progress notes daily for the duration of treatment, until resolved, and include in your discharge summary.     Form No. [id number]

## 2020-12-11 NOTE — PHYSICIAN QUERY
PT Name: Ching Bruce  MR #: [id number]     ELECTROLYTE CLARIFICATION      CDS: Brandy Capley, RN  Email: [email or web address]    This form is a permanent document in the medical record.    Query Date: December 11, 2020    By submitting this query, we are merely seeking further clarification of documentation to reflect the severity of illness of your patient. Please utilize your independent clinical judgment when addressing the question(s) below.    The Medical Record reflects the following:     Indicators   Supporting Clinical Findings Location in Medical Record   X Lab Value(s)    12/9/2020 20:36 12/10/2020 06:20 12/11/2020 05:26   Sodium   134 (L)   Phosphorus 10.3 (HH) 9.2 (HH) 10.1 (HH)   Magnesium  1.5 (L) 1.5 (L)      Labs 12/9-12/11   X Treatment/Medication Nephrology team consulted for NAZ    -continue IV fluids   -please given NA bicarb  meq /liter  -avoid nephrotoxic medications   - we recommend renally dosing medications to GFR<10 , Cr Clearance < 30  - strict I/O charts     magnesium sulfate in dextrose IVPB (premix) 1 g   Start: 12/11/20 0800 End: 12/11/20 0903   Nephrology consult 12/10                MAR 12/11    Other       Provider, please specify the diagnosis or diagnoses that correspond(s) to the above indicators. Vj all that apply:    [ x  ] Hypomagnesemia   [ x  ] Hyponatremia   [x   ] Hyperphosphatemia   [   ] Other electrolyte disturbance (please specify): __________   [   ] Clinically Undetermined       Please document in your progress notes daily for the duration of treatment until resolved, and include in your discharge summary.

## 2020-12-11 NOTE — PHYSICIAN QUERY
PT Name: Ching Bruce  MR #: [id number]     INTEGUMENTARY CLARIFICATION     CDS: Brandy Capley, RN  Email: [email or web address]    This form is a permanent document in the medical record.     Query Date: December 11, 2020    By submitting this query, we are merely seeking further clarification of documentation.  Please utilize your independent clinical judgment when addressing the question(s) below.    The Medical Record contains the following:   Indicators   Supporting Clinical Findings Location in Medical Record    Non-blanchable erythema/redness     X Ulcer/Injury/Skin Breakdown Stage II ulcer at sacrum, 1cm diameter    Hospital medicine consult 12/10    Deep Tissue Injury      Wound care consult      Acute/Chronic Illness      Medication/Treatment      Other       The clinical guidelines noted are only a system guideline. It does not replace the providers clinical judgment.    Per the National Pressure Injury Advisory Panel:   A pressure injury is localized damage to the skin and underlying soft tissue usually over a bony prominence or related to a medical or other device. The injury can present as intact skin or an open ulcer and may be painful. The injury occurs as a result of intense and/or prolonged pressure or pressure in combination with shear. The tolerance of soft tissue for pressure and shear may also be affected by microclimate, nutrition, perfusion, co-morbidities and condition of the soft tissue.       Stage 1 Pressure Injury:  Intact skin with a localized area of non-blanchable erythema, which may appear differently in darkly pigmented skin. Color changes do not include purple or maroon discoloration; these may indicate deep tissue pressure injury.    Stage 2 Pressure Injury:  Partial-thickness loss of skin with exposed dermis. The wound bed is viable, pink or red, moist, and may also present as an intact or ruptured serum-filled blister.    Stage 3 Pressure Injury:  Full-thickness loss of skin, in  which adipose (fat) is visible in the ulcer and granulation tissue and epibole (rolled wound edges) are often present. Slough and/or eschar may be visible. Undermining and tunneling may occur.    Stage 4 Pressure Injury:  Full-thickness skin and tissue loss with exposed or directly palpable fascia, muscle, tendon, ligament, cartilage or bone in the ulcer. Slough and/or eschar may be visible. Epibole (rolled edges), undermining and/or tunneling often occur.    Unstageable Pressure Injury:  Full-thickness skin and tissue loss in which the extent of tissue damage within the ulcer cannot be confirmed because it is obscured by slough or eschar. If slough or eschar is removed, a Stage 3 or Stage 4 pressure injury will be revealed.    Deep Tissue Pressure Injury:  Intact or non-intact skin with localized area of persistent non-blanchable deep red, maroon, purple discoloration or epidermal separation revealing a dark wound bed or blood filled blister. This injury results from intense and/or prolonged pressure and shear forces at the bone-muscle interface. The wound may evolve rapidly to reveal the actual extent of tissue injury, or may resolve without tissue loss. If necrotic tissue, subcutaneous tissue, granulation tissue, fascia, muscle or other underlying structures are visible, this indicates a full thickness pressure injury (Unstageable, Stage 3 or Stage 4). Do not use DTPI to describe vascular, traumatic, neuropathic, or dermatologic conditions.       Provider, please provide the integumentary diagnosis related to the documentation of sacrum:     [ x  ] Pressure Injury     [   ] Other Integumentary Diagnosis (please specify):______________     [  ] Clinically Undetermined         Please document in your progress notes daily for the duration of treatment until resolved and include in your discharge summary.    Reference:    ADALBERTO Trivedi., ELISE Russo., Goldberg, M., FREDDY Real., ADALBERTO Ryan, & REZA Corral (2016).  Revised National Pressure Ulcer Advisory Panel Pressure Injury Staging System: Revised Pressure Injury Staging System. J Wound Ostomy Continence Nurs, 43(6), 585-597. doi:10.1097/won.4548936996851905    Form No.[id number]  (11/05/2020)

## 2020-12-11 NOTE — PLAN OF CARE
Patient wearing 3L nasal cannula with documented SpO2.  Patient breath sounds indicated that she could benefit from PRN tx, go tx given and tolerated well.  Breath sounds remained unchanged, with slight increase in aeration. Pt unable to cough productively, so unable to obtain sputum sample at this time.  Will continue to monitor.

## 2020-12-11 NOTE — CONSULTS
"  [facility] ICU 5th Floor  Adult Nutrition  Consult Note    SUMMARY     Recommendations    Recommendation:   1. As medically acceptable initiate TF regimen of Novasource Renal initiated at 10 mL/hr and advanced as tolerated to goal rate of 30 mL/hr to provide 1440 kcal, 65 gm pro, and 516 mL free water. Additional 230 mL free water flush QID or per MD.   2. RD to follow and monitor nutrition status/TF tolerance    Goals:   Pt to recieve nutrition by RD follow up    Nutrition Goal Status: new  Communication of RD Recs: other (comment)(POC)    Reason for Assessment    Reason For Assessment: consult(intubated)  Diagnosis: other (see comments)(Hypotension)  Relevant Medical History: HTN, thyroid disease, HLD, malignant neoplasm of liver, PNET, liver NET, hypercalemia, thrombocytopenia, HTN, confusion, thyroidectomy  Interdisciplinary Rounds: did not attend  General Information Comments: Pt intubated on mac pressors. Minimal response to stimuli per chart review. Pt had seizure with aspiration leadign to intubation. Profound hypotension. PIV, PICC and NGT in place. Ruddy Score: 10 NFPE not completed today 2/2 pt intubated with multiple lines Pt familiar to team, pt with history of severe malnutrition   Nutrition Discharge Planning: d/c to be determined    Nutrition Risk Screen    Nutrition Risk Screen: no indicators present    Nutrition/Diet History    Food Preferences: WYATT  Food Allergies: NKFA  Factors Affecting Nutritional Intake: NPO, on mechanical ventilation    Anthropometrics    Temp: 96.4 °F (35.8 °C)  Height Method: Stated  Height: 5' 7" (170.2 cm)  Height (inches): 67 in  Weight Method: Bed Scale  Weight: 54.4 kg (119 lb 14.9 oz)  Weight (lb): 119.93 lb  Ideal Body Weight (IBW), Female: 135 lb  % Ideal Body Weight, Female (lb): 88.84 %  BMI (Calculated): 18.8  BMI Grade: 18.5-24.9 - normal       Lab/Procedures/Meds    Pertinent Labs Reviewed: reviewed  Pertinent Labs Comments: H/H " 8.9/28.0, Na 134, CO2 9, BUN 82, Cr 4.4, eGFR 11, Glu 310, Ca 7.0, Phos 10.1, Mg 1.5, Alb 1.8  Pertinent Medications Reviewed: reviewed  Pertinent Medications Comments: albumin, cefepime, chlorhexidine, enoxaparin, famotidine, levothyroxine, linezolid, mag sulfate, phenylephrine, amiodarone, norepinephrine, octreotide, phenylephrine, propofol,. Na bicarb, vasopressin, NaCl @ 75 mL/hr    Physical Findings/Assessment     Ruddy Score: 10    Estimated/Assessed Needs    Weight Used For Calorie Calculations: 54.4 kg (119 lb 14.9 oz)  Energy Calorie Requirements (kcal): 1305  Energy Need Method: [facility]  Protein Requirements: (1.5-2.0 gm/kg)  Weight Used For Protein Calculations: 54.4 kg (119 lb 14.9 oz)     Estimated Fluid Requirement Method: RDA Method(or PER MD)  RDA Method (mL): 1305         Nutrition Prescription Ordered    Current Diet Order: NPO    Evaluation of Received Nutrient/Fluid Intake    IV Fluid (mL): 1800(NaCl)  I/O: 2429.9/230  Energy Calories Required: not meeting needs  Protein Required: not meeting needs  Fluid Required: not meeting needs  Comments: LBM 12/9  % Intake of Estimated Energy Needs: 0 - 25 %  % Meal Intake: NPO    Nutrition Risk    Level of Risk/Frequency of Follow-up: (2 x/week)     Assessment and Plan    * Hypotension  Contributing Nutrition Diagnosis  Increased nutrient needs, energy/protein    Related to (etiology):   Physiological state    Signs and Symptoms (as evidenced by):   Pt NPO on mechanical ventilation, with NAZ      Interventions:  Collaboration with other providers    Nutrition Diagnosis Status:   New         Monitor and Evaluation    Food and Nutrient Intake: energy intake, food and beverage intake, enteral nutrition intake  Food and Nutrient Adminstration: diet order, enteral and parenteral nutrition administration  Knowledge/Beliefs/Attitudes: food and nutrition knowledge/skill  Biochemical Data, Medical Tests and Procedures: electrolyte and renal panel,  gastrointestinal profile, glucose/endocrine profile, inflammatory profile, lipid profile       Nutrition Follow-Up    RD Follow-up?: Yes

## 2020-12-11 NOTE — ASSESSMENT & PLAN NOTE
Contributing Nutrition Diagnosis  Increased nutrient needs, energy/protein    Related to (etiology):   Physiological state    Signs and Symptoms (as evidenced by):   Pt NPO on mechanical ventilation, with NAZ      Interventions:  Collaboration with other providers    Nutrition Diagnosis Status:   New

## 2020-12-11 NOTE — PLAN OF CARE
Recommendation:   1. As medically acceptable initiate TF regimen of Novasource Renal initiated at 10 mL/hr and advanced as tolerated to goal rate of 30 mL/hr to provide 1440 kcal, 65 gm pro, and 516 mL free water. Additional 230 mL free water flush QID or per MD.   2. RD to follow and monitor nutrition status/TF tolerance    Goals:   Pt to recieve nutrition by RD follow up    Nutrition Goal Status: new  Communication of RD Recs: other (comment)(POC)      Problem: Wound  Goal: Optimal Wound Healing  Outcome: Ongoing, Progressing     Problem: Oral Intake Inadequate (Acute Kidney Injury/Impairment)  Goal: Optimal Nutrition Intake  Outcome: Ongoing, Progressing     Problem: Nutrition Impairment (Mechanical Ventilation, Invasive)  Goal: Optimal Nutrition Delivery  Outcome: Ongoing, Progressing      Normal rate, regular rhythm.  Heart sounds S1, S2.  No murmurs, rubs or gallops.

## 2020-12-11 NOTE — NURSING
Dr. Lantigua at bedside. RN expressed concern that patient may be seizing due to rhythmic eye movements and lack of reflexes. MD to place orders.

## 2020-12-11 NOTE — NURSING
Daughter (Isabelle) at bedside. Updated on patient's condition and plan of care. Emotional support provided.

## 2020-12-11 NOTE — EICU
EICU Physician Virtual/Remote Brief Note - Overnight Events      Patient had seizure and required intubation  Had aspiration  On cefepime and linezolid  Ventilator on sedation orders placed  Requiring Levophed, vasopressin and Tutu-Synephrine for blood pressure maintenance  Had negative head CT in October but would consider repeat head CT as patient has metastatic disease  Endotracheal tube approximately 4 cm from pablo.  NG tube in good position diffuse infiltrates bilaterally    This report has been created through the use of M-Modal dictation software. Typographical and content errors may occur with this process. While efforts are made to detect and correct such errors, in some cases errors will persist. For this reason, wording in this document should be considered in the proper context and not strictly verbatim

## 2020-12-11 NOTE — PHYSICIAN QUERY
PT Name: Ching Bruce  MR #: [id number]     RESPIRATORY CONDITION CLARIFICATION     CDS: Brandy Capley, RN  Email: [email or web address]    This form is a permanent document in the medical record.     Query Date: December 11, 2020    By submitting this query, we are merely seeking further clarification of documentation.  Please utilize your independent clinical judgment when addressing the question(s) below.  The Medical Record contains the following   Indicators   Supporting Clinical Findings Location in Medical Record   X SOB, WESTFALL, Wheezing, Productive Cough, Use of Accessory Muscles, etc.   Notified Dr. Interiano of pt c/o chest pain and sob.     Nursing note filed 12/10 1704   X RR         ABGs         O2 sat   Resp:  [15-21] 19  SpO2:  [84 %-100 %] 100 %     12/11/2020 06:18 12/11/2020 08:30   POC PH 6.931 (LL) 7.050 (LL)   POC PCO2 47.5 (H) 36.4   POC PO2 218 (H) 89   POC BE -22 -20   POC HCO3 10.0 (L) 10.1 (L)   POC SATURATED O2 99 92 (L)   POC TCO2 11 (L) 11 (L)   FiO2 100 50   Vt 350 350   PEEP 5 5   Sample ARTERIAL ARTERIAL   DelSys Adult Vent Adult Vent   Allens Test N/A N/A   Site Dick/UAC Dick/UAC   Mode AC/PRVC AC/PRVC   Rate 20 32       Resp:  [14-39] 35  SpO2:  [23 %-100 %] 57 %     Hospital medicine consult 12/10    ABG's 12/11                                                  Surgery progress notes 12/11   X Hypoxia/Hypercapnia     X BiPAP/Intubation/Mechanical Ventilation Was asked by ICU to intubate patient for airway protection.  Anesthesia was unavailable at this time.  Patient had seizure-like activity, and vomited in airway.  Arrived in the ER, patient on maxed out on Levophed, hypotensive, with respiratory bagging patient, with active vomitus in her mouth.     Patient was intubated, via MAC 4 glide scope, in 1 attempt at 20 cc at the lip.  With equal bilateral breath sounds, improvement in SpO2, and rise of end-tidal.     ED provider notes 12/11   X Supplemental O2 Room air 12/9-12/10 1105    O2 2L NC 12/10 1334  O2 3L NC 12/10 1900 - 12/11 0400  Ventilator Oxygen concentration 100 12/11 0604   RT PCS flowsheet    Home O2, Oxygen Dependence      Respiratory Distress or Failure     X Radiology Findings Multiple pulmonary nodules in keeping with known metastatic disease.     More consolidative appearance at the right lung base may represent infection versus metastatic lesion.    Patient frequently requires paracentesis, removed 500mL earlier today per daughter         CXR 12/10   X Acute/Chronic Illness Consult in setting of acute renal failure, sepsis due to RLL pneumonia    Secondary neuroendocrine tumor of liver  Primary pancreatic neuroendocrine tumor   Hospital medicine consult 12/10    Treatment     X Other Pulmonary effort is normal. No respiratory distress.      Comments: Diffuse crackles in lower lobes    Pneumonia  Right lower lobe consolidation suggestive of PNA  WBC elevation, LA 3.3  Recent hospitalization in November  Patient may have aspirated food per daughter report  Will give broad spectrum Abx vanc and zosyn  Daughter reports mother having choking episodes, patient would benefit from  Hospital medicine consult 12/10     Respiratory failure can be acute, chronic or both. It is generally further specified as hypoxic, hypercapnic or both. Lastly, it is important to identify an etiology, if known or suspected.   References:: https://www.acphospitalist.org/archives/2013/10/coding.htm    http://Accera.com/acute-respiratory-failure-know    The noted clinical guidelines are only system guidelines and do not replace the providers clinical judgment.    Provider, please specify diagnosis or diagnoses associated with above clinical findings.     [    ] Acute Respiratory Failure with Hypoxia   - ABG pO2 < 60 mmHg or O2 sat of <91% on room air and respiratory symptoms documented     [  x  ] No Respiratory Failure, Maintained on Vent for Routine Care or Airway Protection   -  purposely  intubated for airway protection (e.g.: angioedema, stroke, trauma); without meeting the criteria for respiratory failure.     [    ] Other Respiratory Diagnosis (please specify): _________________     [   ] Clinically Undetermined          Please document in your progress notes daily for the duration of treatment until resolved and include in your discharge summary.

## 2020-12-11 NOTE — ED PROVIDER NOTES
This is an intubation note:    Was asked by ICU to intubate patient for airway protection.  Anesthesia was unavailable at this time.  Patient had seizure-like activity, and vomited in airway.  Arrived in the ER, patient on maxed out on Levophed, hypotensive, with respiratory bagging patient, with active vomitus in her mouth.  Vomitus was suctioned out.  Patient was given 2x 1 mg bumps of Levophed followed by 100 microgram bolus of albert with improvement in blood pressure.    Patient was given 5 mg of etomidate and 100 mg o succs.  Potassium was checked and was within normal limits.  Patient was intubated, via MAC 4 glide scope, in 1 attempt at 20 cc at the lip.  With equal bilateral breath sounds, improvement in SpO2, and rise of end-tidal.  The ICU bedside.  Primary care team to be made aware.     Rosario Fishman MD  12/11/20 0536       Rosario Fishman MD  12/11/20 0537

## 2020-12-11 NOTE — NURSING
Body preparation completed. No  home selected at this time. Family to call security with information. Patient's body to go to [facility]. Notified security.

## 2020-12-11 NOTE — NURSING
1515: Family at bedside. Emotional support provided. Family expresses desire to withdraw care and transition to comfort measures. Notified Dr. Lantigua.     1520: Dr. Lantigua at bedside. MAP 40-50s. HR 30s.     1525: Asystole noted on monitor. No pulse detected. Patient pronounced by Dr. Lantigua.     All IV medications discontinued at 1525 and patient extubated following TOD.

## 2020-12-11 NOTE — PROGRESS NOTES
Pt currently maxed out on levo, vaso and albert. Dr. Lantigua at bedside to assess pt. MD called family to notify of recent events. Will report off to oncoming nurse.

## 2020-12-11 NOTE — SIGNIFICANT EVENT
Noted change in VS on monitor. Went to bedside and patient was noticeably seizing. Her BP began to rapidly drop. Seizure lasted for approx 8 mins total. 1 mg. Ativan was given. Seizure activity seemed to improve once ativan was given but pt was unresponsive and agonally breathing. Pt started vomiting dark brown emesis. eICU Dr. Juárez then made the decision to emergently intubate pt. Dr. Fishman came to bedside to intubate pt.

## 2020-12-11 NOTE — PROGRESS NOTES
"[facility] Progress Note    Date of Admit: 2020    Chief Complaint/Reason for Consult         Nephrology team consulted for NAZ  Subjective:       seizing this am, intubated.   On levophed 3 mcg, phenylephrine 5 , vasopressin 0.04   Code status changed to DNR    Review of Systems:  Pertinent positives and negatives are listed in HPI.  All other systems are reviewed and are negative.     Objective:   Last 24 Hour Vital Signs:  BP  Min: 47/22  Max: 151/66  Temp  Av.9 °F (34.4 °C)  Min: 91.6 °F (33.1 °C)  Max: 97.4 °F (36.3 °C)  Pulse  Av.4  Min: 56  Max: 232  Resp  Av.3  Min: 14  Max: 39  SpO2  Av %  Min: 23 %  Max: 100 %  Height  Av' 7" (170.2 cm)  Min: 5' 7" (170.2 cm)  Max: 5' 7" (170.2 cm)  Body mass index is 18.79 kg/m².  I/O last 3 completed shifts:  In: 4027.8 [I.V.:2727.8; IV Piggyback:1300]  Out: 510 [Urine:260; Drains:250]    Physical Examination:  General: intubated  HEENT: EOMI, PERRL  Cardiovascular:  NRRR, without appreciated murmurs or rubs, without extra heart sounds  Chest/Pulmonary: ventilator sounds  Abdomen: Soft, nontender, nondistended.  MSKL: without gross deformity.   Lymphatic: no appreciated LAD  Skin: without cyanosis or clubbing, without peripheral edema         Laboratory:  Most Recent Data:  CBC:   Lab Results   Component Value Date    WBC 17.26 (H) 2020    HGB 8.3 (L) 2020    HCT 26.9 (L) 2020    PLT 91 (L) 2020     (H) 2020    RDW 21.8 (H) 2020     BMP:   Lab Results   Component Value Date     (L) 2020    K 5.0 2020     2020    CO2 9 (LL) 2020    BUN 82 (H) 2020    CREATININE 4.4 (H) 2020     (H) 2020    CALCIUM 7.0 (L) 2020    MG 1.5 (L) 2020    PHOS 10.1 (HH) 2020     LFTs:   Lab Results   Component Value Date    PROT 5.9 (L) 12/10/2020    ALBUMIN 1.8 (L) 2020    BILITOT 3.2 (H) 12/10/2020    AST 74 (H) 12/10/2020    ALKPHOS " 426 (H) 12/10/2020    ALT 29 12/10/2020     Coags:   Lab Results   Component Value Date    INR 1.3 (H) 11/01/2020     Urinalysis:   Lab Results   Component Value Date    LABURIN No growth 12/09/2020    COLORU [place] (A) 12/09/2020    SPECGRAV 1.025 12/09/2020    NITRITE Negative 12/09/2020    KETONESU Negative 12/09/2020    UROBILINOGEN Negative 12/09/2020    WBCUA 40 (H) 12/09/2020       Trended Lab Data:  Recent Labs   Lab 12/07/20  0951 12/09/20  2036 12/10/20  0620 12/11/20  0526 12/11/20  0848   WBC 15.42* 13.20* 15.70*  --  17.26*   HGB 9.0* 11.2* 8.9*  --  8.3*   HCT 27.2* 33.6* 28.0*  --  26.9*   PLT 85* 61* 89*  --  91*   * 106* 113*  --  117*   RDW 21.9* 22.3* 22.5*  --  21.8*   * 145 143 134*  --    K 4.0 5.1 4.4 5.0  --    * 106 107 101  --    CO2 20* 14* 13* 9*  --    BUN 68* 95* 83* 82*  --    CREATININE 3.9* 5.1* 4.8* 4.4*  --    GLU 57* 48* 131* 310*  --    PROT 6.2 6.1 5.9*  --   --    ALBUMIN 1.9* 1.8* 1.7* 1.8*  --    BILITOT 2.7* 3.3* 3.2*  --   --    AST 49* 82* 74*  --   --    ALKPHOS 473* 467* 426*  --   --    ALT 19 31 29  --   --        Trended Cardiac Data:  Recent Labs   Lab 12/09/20  2036 12/10/20  0620 12/10/20  1634   TROPONINI 0.131* 0.108* 0.161*   BNP 1,596*  --   --            Radiology:  Imaging Results          X-Ray Chest AP Portable (Final result)  Result time 12/09/20 19:36:08    Final result by Aleks Chance MD (12/09/20 19:36:08)                 Impression:      New airspace opacity in the right lung base, suggestive of pneumonia.      Electronically signed by: Aleks Chance MD  Date:    12/09/2020  Time:    19:36             Narrative:    EXAMINATION:  XR CHEST AP PORTABLE    CLINICAL HISTORY:  weak;    TECHNIQUE:  Single frontal view of the chest was performed.    COMPARISON:  11/19/2020.    FINDINGS:  There are postoperative changes in the base of the neck.  Monitoring EKG leads are present.  There is a probable peritoneal catheter in the abdomen.    The  trachea is unremarkable.  The cardiac silhouette is enlarged.  There is no evidence of free air beneath the hemidiaphragms.  There are no pleural effusions.  There is no evidence of a pneumothorax.  There is no evidence of pneumomediastinum.  There is a new airspace opacity in the right lung base.  There are chronic interstitial findings.  The osseous structures demonstrate degenerative changes.                                   Assessment and Plan:      Ching Bruce is a 70 y.o.female with     Oliguric  NAZ stage 3 on CKD stage    -baseline Cr ~ 1.0,  Had an episode of NAZ Nov 2020, with a baseline of cr 1.3 on discharge   - BUN:Cr on admission 95:5.1 trended down to 83: 4.4   - etiology can be prerenal injury secondary to decreased oral intake, vs ATN secondary to infection, hypotension on admission requiring pressors   -[id number]   -on Na bicarb  meq /liter  -avoid nephrotoxic medications   - we recommend renally dosing medications to GFR<10 , Cr Clearance < 30  - strict I/O charts   -patient is critically ill, on 3 pressors, with metastatic NET. Currently she is not a candidate for HD. We discussed plan with surgery team.    #) Wide Anion gap metabolic acidosis   -most likely secondary to NAZ and lactic acidosis  -please start patient on NaHCO3 , 3 ampules in dextrose water 5 %         Jil Nix MD  [facility]   [phone]    If after 5pm please forward any questions to the [facility] Fellow/Attending on call.

## 2020-12-11 NOTE — RESPIRATORY THERAPY
MD made aware that patient wasn't able to maintain adequate sats and was breathing agonally following seizure. Assisted with bagging patient using 100% O2.. Patient began to vomit shortly afterwards. Patient suctioned. Assisted MD with intubation. Patient placed on mechanical ventilator. Please view ventilator and LDA documentation by ICU RT for further information.

## 2020-12-11 NOTE — PHYSICIAN QUERY
PT Name: Ching Bruce  MR #: [id number]     CDS: Brandy Capley, RN  Email: [email or web address]    This form is a permanent document in the medical record.    Query Date: December 11, 2020    Neurological Condition Clarification    By submitting this query, we are merely seeking further clarification of documentation to reflect the severity of illness of your patient. Please utilize your independent clinical judgment when addressing the question(s) below.    The Medical record reflects the following:     Indicators   Supporting Clinical Findings Location in Medical Record   X AMS, Confusion,  LOC, etc.     Today, she presents with weakness and AMS.     In the ER, she was hypotensive to systolics in the 70s.  She received 2 L of NS in the ER without sustained improvement of the BP.    Gen: Confused, AMS     H&P 12/10   X Acute/Chronic Illness Consult in setting of acute renal failure, sepsis due to RLL pneumonia    Patient's labs demonstrate acute renal failure with Cr 5.1, BUN 95, eGFR 8-9. Also present were lactic acidosis, elevated procal, elevated troponin, stable hypocalcemia    Secondary neuroendocrine tumor of liver  Primary pancreatic neuroendocrine tumor   Hospital medicine consult 12/10    Radiology Findings     X Electrolyte Imbalance    12/9/2020 20:36   Sodium 145   Potassium 5.1   Chloride 106   CO2 14 (L)   Anion Gap 25 (H)   BUN 95 (H)   Creatinine 5.1 (H)   eGFR if non  8 (A)   eGFR if African American 9 (A)   Glucose 48 (LL)   Calcium 7.4 (L)   Phosphorus 10.3 (HH)   Magnesium 1.7   Alkaline Phosphatase 467 (H)   PROTEIN TOTAL 6.1   Albumin 1.8 (L)   BILIRUBIN TOTAL 3.3 (H)   AST 82 (H)   ALT 31   BNP 1,596 (H)   Troponin I 0.131 (H)   Lactate, Harjinder 3.3 (H)   Procalcitonin 3.43 (H)      Labs 12/9    Medication Will give broad spectrum Abx [facility] and zon [facility] medicine consult 12/10   X Treatment         -continue IV fluids   -please given NA bicarb  meq /liter  -avoid  nephrotoxic medications   - we recommend renally dosing medications to GFR<10 , Cr Clearance < 30  - strict I/O charts  Nephrology consult 12/10    Other       Encephalopathy- is a general term for any diffuse disease of the brain that alters brain function or structure. Treatment of the cognitive dysfunction varies but is ultimately dependent on the treatment of the underlying condition.    Major Symptoms of Encephalopathy - Decreased level of consciousness, fluctuating alertness/concentration, confusion, agitation, lethargy, somnolence, drowsiness, obtundation, stupor, or coma.  References: National Institutes of Healths (NIH) National Gordon of Neurological Disorders and Strokes;  HCPro 2016; Advisory Board     The noted clinical guidelines are only system guidelines and do not replace the providers clinical judgment.  Provider, please specify the diagnosis or diagnoses associated with above clinical findings.    [ x  ] Metabolic Encephalopathy   - Due to electrolyte imbalance, metabolic derangements, or infectious processes, includes Septic Encephalopathy, Uremic Encephalopathy     [   ] Encephalopathy, unspecified        [   ] Other Encephalopathy (please specify): ____________________     [   ] Other Neurological Condition- Includes Post-ictal altered mental status (please specify condition): _________     [   ]  Clinically Undetermined             Please document in your progress notes daily for the duration of treatment until resolved, and include in your discharge summary.

## 2020-12-14 LAB — BACTERIA BLD CULT: NORMAL

## 2020-12-15 ENCOUNTER — TELEPHONE (OUTPATIENT)
Dept: NEUROLOGY | Facility: HOSPITAL | Age: 70
End: 2020-12-15

## 2020-12-15 LAB — BACTERIA BLD CULT: NORMAL

## 2020-12-15 NOTE — DISCHARGE SUMMARY
[facility] ICU 5th Floor  General Surgery  Discharge Summary      Patient Name: Ching Bruce  MRN: [id number]  Admission Date: 12/9/2020  Hospital Length of Stay: 2 days  Discharge Date and Time: 12/11/2020   Attending Physician: No att. 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  She has recently been admitted for hypercalcemia.  She presented with weakness and AMS.  In the ER, she was hypotensive to systolics in the 70s.  She received 2 L of NS in the ER without sustained improvement of the BP.  Of note, it is reported that she has not urinated for approximately 1 week prior to admission.  Her Cr is greater than 5.   She had minimal UOP when catheter was placed.  She had a seizure overnight, on the night of 12/10.  She became profoundly hypotensive and was maxed on 3 pressors.  Despite correction of her electrolyte abnormalities and fluid resucitation, patient continued to be critically ill.  Her family gathered and decided to withdraw care.    Consults:   Consults (From admission, onward)        Status Ordering Provider     Inpatient consult to [facility]  Once     Provider:  Bibi Metzger MD    Completed RAMIRO LIMON JR     Inpatient consult to Nephrology-[facility]  Once     Provider:  Aundrea Cronin MD    Completed RAMIRO LIMON JR     Inpatient consult to PICC team ([facility])  Once     Provider:  (Not yet assigned)    Completed SCHEUERMANN, ALEXIS     Inpatient consult to Registered Dietitian/Nutritionist  Once     Provider:  (Not yet assigned)    Completed MADISON SPANGLER          Pending Diagnostic Studies:     None        Final Active Diagnoses:    Diagnosis Date Noted POA    PRINCIPAL PROBLEM:  Hypotension [I95.9] 12/09/2020 Yes    Comfort measures only status [Z51.5] 12/11/2020 Not Applicable     Elevated troponin [R77.8] 12/10/2020 Unknown    Acute renal failure [N17.9] 2020 Yes    Pneumonia [J18.9] 2020 Unknown    Physical debility [R53.81]  Yes    Thrombocytopenia [D69.6] 2013 Yes    Unspecified essential hypertension [I10] 2013 Yes    Primary pancreatic neuroendocrine tumor [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  Medications:  None (patient  at medical facility)    Abraham Lantigua MD  General Surgery  [facility] ICU 5th Floor

## 2020-12-23 ENCOUNTER — EXTERNAL HOME HEALTH (OUTPATIENT)
Dept: HOME HEALTH SERVICES | Facility: HOSPITAL | Age: 70
End: 2020-12-23

## 2021-04-22 NOTE — PLAN OF CARE
Pt arrived to unit. Pt here for IVf and Zometa. Blood drawn for magnesium. Tolerated infusions. Pt has next appts. Pt in wheelchair, discharged from unit with daughter. No distress noted.   [FreeTextEntry1] : \par - Blood work today \par - Sx likely related to gastritis: Recommending he start with Pepcid BID to help with the nausea. Advised making appt with GI and consulting with transplant team about whether his meds are causing the constipation \par - Explained to patient that he will likely need to go back on cholesterol medication. WIll check blood work. \par \par

## 2021-10-06 NOTE — TELEPHONE ENCOUNTER
Problem: Activity Intolerance  Goal: # Functional status is maintained or returned to baseline  Outcome: Outcome Not Met at Discharge, Continue plan of care     Problem: VTE, Risk for  Goal: # No s/s of VTE  Outcome: Outcome Met, Continue evaluating goal progress toward completion     Problem: Pain  Goal: #Acceptable pain level achieved/maintained at rest using NRS/Faces  Description: This goal is used for patients who can self-report.  Acceptable means the level is at or below the identified comfort/function goal.  Outcome: Outcome Met, Continue evaluating goal progress toward completion      "----- Message from Gurinder Bowie sent at 10/16/2020  1:52 PM CDT -----  Refill     RX Name and Strength:  levothyroxine (SYNTHROID) 200 MCG tablet    How is the patient currently taking it?   Sig - Route: Take 1 tablet (200 mcg total) by mouth before breakfast. - Oral    Is this a 30 day or 90 day Rx? 30 day     Preferred Pharmacy with phone number:  [facility]/pharmacy #[id number] - [place], [place] - [address]. [phone] (Phone)  [phone] (Fax)      Local or Mail Order: Local   Ordering Provider: Dr Rodriguez   Contact Preference: [phone]    Additional Information:  "Thank you for all that you do for our patients"         "

## 2022-10-25 NOTE — PROGRESS NOTES
Pharmacist Intervention IV to PO Note    Ching Bruce is a 69 y.o. female being treated with IV medication famotidine    Patient Data:    Vital Signs (Most Recent):  Temp: 100.1 °F (37.8 °C) (10/02/20 0730)  Pulse: 81 (10/02/20 0830)  Resp: (!) 22 (10/02/20 0830)  BP: (!) 120/57 (10/02/20 0830)  SpO2: (!) 94 % (10/02/20 0830)   Vital Signs (72h Range):  Temp:  [97.6 °F (36.4 °C)-100.1 °F (37.8 °C)]   Pulse:  []   Resp:  [17-49]   BP: ()/(47-87)   SpO2:  [92 %-100 %]      CBC:  Recent Labs   Lab 09/30/20  0437 10/01/20  0416 10/02/20  0409   WBC 6.53 7.45 6.56   RBC 2.26* 2.21* 2.23*   HGB 7.2* 7.1* 7.1*   HCT 23.2* 23.4* 23.2*   * 118* 112*   * 106* 104*   MCH 31.9* 32.1* 31.8*   MCHC 31.0* 30.3* 30.6*     CMP:     Recent Labs   Lab 10/01/20  0416 10/01/20  1639 10/02/20  0409    110 95   CALCIUM 11.5* 11.1* 10.9*   ALBUMIN 2.7* 2.8* 3.4*   PROT 6.6 6.9 6.9   * 146* 147*   K 3.9 4.1 3.2*   CO2 26 28 26   * 110 109   BUN 18 19 19   CREATININE 0.9 0.9 1.0   ALKPHOS 507* 520* 502*   ALT 11 11 9*   AST 18 19 18   BILITOT 1.8* 2.1* 2.6*       Dietary Orders:  Diet Orders            Diet Adult Regular (IDDSI Level 7): Regular starting at 10/02 0709            Based on the following criteria, this patient qualifies for intravenous to oral conversion:  [x] The patients gastrointestinal tract is functioning (tolerating medications via oral or enteral route for 24 hours and tolerating food or enteral feeds for 24 hours).  [x] The patient is hemodynamically stable for 24 hours (heart rate <100 beats per minute, systolic blood pressure >99 mm Hg, and respiratory rate <20 breaths per minute).  [x] The patient shows clinical improvement (afebrile for at least 24 hours and white blood cell count downtrending or normalized). Additionally, the patient must be non-neutropenic (absolute neutrophil count >500 cells/mm3).  [x] For antimicrobials, the patient has received IV therapy for at  least 24 hours.    IV medication famotidine will be changed to oral medication famotidine    Pharmacist's Name: Ching Bowie  Pharmacist's Extension: [phone]     16

## 2023-04-16 NOTE — H&P
CC: stent exchange     HPI 65 y.o. female with pancreatic neuroendocrine tumor here for biliary stent exchange.  Currently, she is doing well with no complaints.        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 No   General appearance: alert, cooperative, no distress  HENT: Normocephalic, atraumatic, neck symmetrical, no nasal discharge   Lungs: clear to auscultation bilaterally, no dullness to percussion bilaterally  Heart: regular rate and rhythm without rub; no displacement of the PMI   Abdomen: soft, non-tender; bowel sounds normoactive; no organomegaly  Extremities: extremities symmetric; no clubbing, cyanosis, or edema    Assessment:   Recurrent biliary obstruction     Plan:  ERCP with stent exchange today       Jake Lieberman MD   [address], [address]   [place] [address] [phone]    
back

## 2023-04-25 NOTE — PLAN OF CARE
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 Progn. poor OD Plan: Hx: [[Fibrosis Hmg rslv'd OD Scar, NEW CNV OS- FAIL Avastin (SRF) FAIL extend '14. Chng EyLea -- PRN RARE PO FA -- Oct '19 last d/t poor veins]] TODAY EyLea OU inj (proc note). YES, SEND pt to LOW VISION 
       RTC  9w dil, colors, eval - h/o Eylea OU. Future exam / ND3? 
      RETINA stable under injx care -- OK for LOW VISION AIDES PRN
 VN cued into room to complete admit assessment, VIP model introduced, VN working alongside bedside treatment team.  Plan of care reviewed with patient and daughter. Patient verbalized understanding. Patient informed of fall risk and fall precautions, call light within reach, 2x bed rails. Patient notified to ask staff for assistance and pt verbalized complete understanding. Time allowed for questions. Will continue to monitor and intervene as needed.

## 2023-06-03 NOTE — TELEPHONE ENCOUNTER
Message placed on voicemail.   Patient discharged home in stable condition. VSS. Patient verbalized understanding of follow up care. Belongings leaving with patient. Ambulating out of ED

## 2024-02-16 NOTE — H&P
[facility]ocrine [facility]/General Surgery  Consultation Note/H&P    SUBJECTIVE:   History of Present Illness:  Ching Bruce is a 70 yo female with PMH of pancreatic neuroendocrine tumor  (dx 2012) s/p ERCP, sphincterectomy, and biliary stent placement with removal 9/22, hyperparathyroidism, parathyroid adenoma, and HFpEF (EF 60%, G1DD) that was admitted to [facility] on 10/19 for generalized weakness, AMS and 3 non-traumatic falls. The patient has struggled with recurrent episodes of hypercalcemia and has been on alendronate, cinacalet, zoledronic acid, and IVF infusions. She was admitted to medicine for management of severe hypercalcemia. Started on IVF, calcitonin, lasix, cinacalcet (continued from home), and given one dose of zometa on 10/20. Cinacalcet dose increased to 60 BID on 10/23. KUB ordered showed large stool burden without obstruction/impaction. Palliative care was consulted to establish baseline given patient's decline and recurrent episodes of hypercalcemia with seemingly limited treatment options. Calcium and mental status improved with treatments, however calcium level stable between 12-13. Consultation placed to endocrinology for assistance and recommendations on further treatment options, recommend uptitration of cinacalcet to 90 BID on 10/30 and evaluate for repeat zometa on 10/27. Today the patient left AMA to pursue care at our facility. In the ED the patient is afebrile with stable vitals. Her labs show WBC 8.95, H/H 8.3/27, Plt 88, K 3.8, Cr 0.8, Ca 11.1, T bili 2.1, ALT/AST 28/12. Neuroendocrine surgery was consulted for evaluation.    Allergies:  Review of patient's allergies indicates:   Allergen Reactions    Epinephrine Anaphylaxis and Other (See Comments)     Can cause Carcinoid Crisis    Sulfa (sulfonamide antibiotics) Other (See Comments)     Urticaria       Home Medications:  Current Facility-Administered Medications on File Prior to Encounter    Medication    [COMPLETED] potassium chloride 10 mEq in 100 mL IVPB    [DISCONTINUED] 0.45% NaCl infusion    [DISCONTINUED] 0.9%  NaCl infusion (for blood administration)    [DISCONTINUED] 0.9%  NaCl infusion (for blood administration)    [DISCONTINUED] acetaminophen tablet 650 mg    [DISCONTINUED] cinacalcet tablet 60 mg    [DISCONTINUED] dextrose 50% injection 12.5 g    [DISCONTINUED] dextrose 50% injection 25 g    [DISCONTINUED] enoxaparin injection 40 mg    [DISCONTINUED] furosemide injection 60 mg    [DISCONTINUED] glucagon (human recombinant) injection 1 mg    [DISCONTINUED] glucose chewable tablet 16 g    [DISCONTINUED] glucose chewable tablet 24 g    [DISCONTINUED] levothyroxine tablet 175 mcg    [DISCONTINUED] levothyroxine tablet 200 mcg    [DISCONTINUED] melatonin tablet 6 mg    [DISCONTINUED] ondansetron disintegrating tablet 8 mg    [DISCONTINUED] polyethylene glycol packet 17 g    [DISCONTINUED] senna-docusate 8.6-50 mg per tablet 1 tablet    [DISCONTINUED] sodium chloride 0.9% flush 10 mL    [DISCONTINUED] sodium phosphates 19-7 gram/118 mL enema 1 enema    [DISCONTINUED] spironolactone tablet 50 mg    [DISCONTINUED] ursodioL capsule 300 mg     Current Outpatient Medications on File Prior to Encounter   Medication Sig    cinacalcet (SENSIPAR) 30 MG Tab Take 2 tablets (60 mg total) by mouth 2 (two) times daily with meals.    dronabinoL (MARINOL) 2.5 MG capsule Take 1 capsule (2.5 mg total) by mouth 2 (two) times daily before meals.    ferrous gluconate (FERGON) 324 MG tablet Take 324 mg by mouth once daily.    furosemide (LASIX) 40 MG tablet Take 1 tablet (40 mg total) by mouth once daily.    [START ON 10/27/2020] levothyroxine (SYNTHROID, LEVOTHROID) 175 MCG tablet Take 1 tablet (175 mcg total) by mouth before breakfast.    spironolactone (ALDACTONE) 50 MG tablet Take 1 tablet (50 mg total) by mouth once daily.    ursodioL (ACTIGALL) 300 mg capsule Take 1 capsule (300 mg  total) by mouth 2 (two) times daily.    [DISCONTINUED] cinacalcet (SENSIPAR) 30 MG Tab Take 1 tablet (30 mg total) by mouth 2 (two) times daily with meals.    [DISCONTINUED] levothyroxine (SYNTHROID) 200 MCG tablet Take 1 tablet (200 mcg total) by mouth before breakfast.       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    Secondary malignant neoplasm of liver     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) 5/7/2013    Thrombocytopenia 11/12/2013    Thyroid disease     Unspecified essential hypertension 11/12/2013     Past Surgical History:   Procedure Laterality Date    ERCP N/A 6/21/2018    Procedure: ERCP, stent exchange;  Surgeon: Jake Lieberman MD;  Location: [facility];  Service: Endoscopy;  Laterality: N/A;    ERCP N/A 5/23/2019    Procedure: ERCP (ENDOSCOPIC RETROGRADE CHOLANGIOPANCREATOGRAPHY), stent exchange;  Surgeon: Jake Lieberman MD;  Location: [facility];  Service: Endoscopy;  Laterality: N/A;    ERCP N/A 11/14/2019    Procedure: ERCP (ENDOSCOPIC RETROGRADE CHOLANGIOPANCREATOGRAPHY), stent exchange;  Surgeon: Jake Lieberman MD;  Location: [facility] ENDO;  Service: Endoscopy;  Laterality: N/A;    ERCP      ERCP N/A 9/22/2020    Procedure: ERCP (ENDOSCOPIC RETROGRADE CHOLANGIOPANCREATOGRAPHY);  Surgeon: Abdulaziz Owen MD;  Location: [facility] ENDO;  Service: Endoscopy;  Laterality: N/A;    THYROIDECTOMY  2011     Family  History   Problem Relation Age of Onset    Cancer Maternal Grandmother     Diabetes Father     Breast cancer Neg Hx     Colon cancer Neg Hx     Ovarian cancer Neg Hx      Social History     Tobacco Use    Smoking status: Never Smoker    Smokeless tobacco: Never Used   Substance Use Topics    Alcohol use: No    Drug use: No        Review of Systems:  As per HPI    OBJECTIVE:     Vital Signs:  Temp: 97.7 °F (36.5 °C) (10/26/20 1314)  Pulse: 82 (10/26/20 1338)  Resp: 16 (10/26/20 1314)  BP: (!) 110/50 (10/26/20 1338)  SpO2: 96 % (10/26/20 1338)    Physical Exam:  Gen: NAD  Cardio: RR  Pulm: Normal respiratory effort on room air  Abdomen soft, nontender, distended  MSK: Moving all extremities, bilateral LE with edema   Neuro: alert    Laboratory:  Labs Reviewed   C-REACTIVE PROTEIN - Abnormal; Notable for the following components:       Result Value    CRP 66.1 (*)     All other components within normal limits   PREALBUMIN   POCT GLUCOSE MONITORING CONTINUOUS       Diagnostic Results:  Imaging Results          US Abdomen Limited (Final result)  Result time 10/26/20 16:27:55   Procedure changed from US Abdomen Complete     Final result by Gerry Travis MD (10/26/20 16:27:55)                 Impression:      Moderate volume diffuse abdominopelvic ascites, slightly decreased in overall volume when compared to 10/19/2020 ultrasound.      Electronically signed by: Gerry Travis MD  Date:    10/26/2020  Time:    16:27             Narrative:    EXAMINATION:  US ABDOMEN LIMITED    CLINICAL HISTORY:  Evaluate for ascites; Hypercalcemia    TECHNIQUE:  Limited ultrasound of the abdomen to assess for evaluation of ascites    COMPARISON:  Limited abdominal ultrasound 10/19/2020    FINDINGS:  There is moderate volume of abdominal and pelvic ascites present, most pronounced within the midline and left lower quadrant at time of acquisition, slightly decreased in overall volume when compared to 10/19/2020 ultrasound.                                 ASSESSMENT:      70 yo female with metastatic NET (dx 2012) s/p ERCP, sphincterectomy, and biliary stent placement with removal 9/22, hyperparathyroidism, parathyroid adenoma, and HFpEF (EF 60%, G1DD) who presents with recurrent symptomatic hypercalcemia.     PLAN:   -admit to NET  -cincalcet, lasix, spironolactone, IVF  -regular diet, dranabinol, nutritional supplements, nutrition labs ordered.   -ultrasound abdomen to evaluate ascites  -PT/OT, encourage ambulation, deep breathing, cough, IS  -wound care consulted for evaluation of pressure wounds  -levothyroxine   -SSI, monitor glucose   -pain control as needed  dvt ppx: misty Guerrero MD  General Surgery PGY-2  10/26/2020  4:42 PM         16-Feb-2024 09:06

## 2025-06-02 NOTE — ASSESSMENT & PLAN NOTE
This has improved and will need ongoing treatment.  This is likely related to hypercalcemia of malignancy and possibly from a parathyroid adenoma

## 2025-06-12 NOTE — PHYSICIAN QUERY
"PT Name: Ching Bruce  MR #: [id number]    Nutrition Clarification     CDS/: April Chaudhary               Contact information: [email or web address]    This form is a permanent document in the medical record.     Query Date: September 10, 2020    By submitting this query, we are merely seeking further clarification of documentation.. Please utilize your independent clinical judgment when addressing the question(s) below.    The medical record contains the following:   Indicators  Supporting Clinical Findings Location in Medical Record   x % of Estimated Energy Intake over a time frame from p.o., TF, or TPN Decreased appetite, activity level  H&P 09/01, filed 09/02    Weight Status over a time frame     x Subcutaneous Fat and/or Muscle Loss She appears unhealthy. Thin.     Well-developed, well-nourished   H&P 09/01, filed 09/02    General Surgery PN 09/02 - 09/07   x Fluid Accumulation or Edema Abdomen feels tight.  She complains of bilateral leg swelling      3rd spacing  Pleural effusion   Ascites   Pulmonary edema   H&P 09/01, filed 09/02      Discharge Summary   x Wt/BMI/Usual Body Weight Height 5' 7" (1.702 m)   Weight 65.2 kg (143 lb 11.8 oz)   BMI 22.5      Anthropometrics    Delayed Wound Healing/Failure to Thrive     x Acute or Chronic Illness Pancreatic neuroendocrine tumor  Presented with hypercalcemia upwards of 13 following Zoledronic acid administered in clinic prior to arrival.  Patient was admitted for significant ascites as well as shortness of breath secondary to 3rd spacing of fluids.  Patient given protein supplementation, started on bowel regimen and Lasix 20 mg q.8 hours, placed on octreotide drip and anticoagulated with Lovenox  Thoracentesis, Paracentesis, Diuresis  Transthoracic echo performed which showed heart failure with preserved ejection fraction with an ejection fraction of 60% and grade 1 diastolic dysfunction.   Secondary neuroendocrine tumor of liver  Bilateral pleural " effusion  Adult myxedema  Malnutrition compromising bodily function  Postoperative hypothyroidism      Malnurtrition severe      Nutritional support,malnutrition   Discharge Summary                                    General Surgery PN Attestation 09/05      General Surgery PN Attestation 09/07   x Medication cyproheptadine (PERIACTIN) 4 mg tablet  Take 2 mg (1/2 tablet) 4 times per day for one week, then 4 mg (one tablet)  4 times per day after that Home Medication, Discharge Summary   x Treatment FLD; Protein supplementation    Patient at baseline has poor nutrition and had a pre-albumin of 9 and subsequently checked several days later with improvement to 11 after p.o. supplements.   Patient started on TPN and albumin drip for poor nutrition/hypoalbuminemia    Dietary nutrition supplements [facility]; [facility] Very High Protein Drink  TPN     H&P 09/01, filed 09/02    Discharge Summary          Physician Orders   x Other Referred for a Nutrition Consultation related to weight loss. She is here for follow up for her pancreatic neuroendocrine tumor. She reports dec oral intake due to early satiety, abdominal distension and bloating.     Reason for Visit: Nutrition Consult for Weight Loss    Malnutrition Assessment:  (Moderate) Protein-Calorie Malnutrition  Malnutrition in the context of Chronic Illness/Injury  Energy Intake: <50% of estimated energy requirement for > 1 month  Body Fat Depletion: mild depletion of orbitals, triceps and thoracic and lumbar region   Muscle Mass Depletion: mild depletion of temples, clavicle region and interosseous muscle   Weight Loss: 2% x 1 month (Not Significant)  Fluid Accumulation: none     Patient has experienced a weight loss of 2% for time frame of 1 month which is not significant. However, she has experienced a 14lbs weight loss in < 1 year.       Diet Education: low calcium and low sodium with 1L fluid restriction   Also discussed low Na diet.    Dietitian PN  08/03/2020                                            Dietitian Consult 09/09/2020     AND / ASPEN Clinical Characteristics (October 2011)  A minimum of two characteristics is recommended for diagnosing either moderate or severe malnutrition   Mild Malnutrition Moderate Malnutrition Severe Malnutrition   Energy Intake from p.o., TF or TPN. < 75% intake of estimated energy needs for less than 7 days < 75% intake of estimated energy needs for greater than 7 days < 50% intake of estimated energy needs for > 5 days   Weight Loss 1-2% in 1 month  5% in 3 months  7.5% in 6 months  10% in 1 year 1-2 % in 1 week  5% in 1 month  7.5% in 3 months  10% in 6 months  20% in 1 year > 2% in 1 week  > 5% in 1 month  > 7.5% in 3 months  > 10% in 6 months  > 20% in 1 year   Physical Findings     None *Mild subcutaneous fat and/or muscle loss  *Mild fluid accumulation  *Stage II decubitus  *Surgical wound or non-healing wound *Mod/severe subcutaneous fat and/or muscle loss  *Mod/severe fluid accumulation  *Stage III or IV decubitus  *Non-healing surgical wound     Provider, please specify diagnosis or diagnoses associated with above clinical findings.    [ xxx ] Mild Protein-Calorie Malnutrition   [  ] Moderate Protein-Calorie Malnutrition   [  ] Severe Protein-Calorie Malnutrition   [  ] Malnutrition, Unspecified degree   [  ] Other Nutritional Diagnosis (please specify): _______   [  ] Clinically Undetermined     Present on admission (POA) status:   [   ] Yes (Y)                          [  ] Clinically Undetermined (W)              [   ] No (N)                            [   ] Documentation insufficient to determine if condition is POA (U)     Please document in your progress notes daily for the duration of treatment until resolved and include in your discharge summary.     [phone] Comprehensive

## (undated) DEVICE — SEE MEDLINE ITEM 157148

## (undated) DEVICE — SEE MEDLINE ITEM 157117

## (undated) DEVICE — COVER OVERHEAD SURG LT BLUE

## (undated) DEVICE — SYR 10CC LUER LOCK

## (undated) DEVICE — PACK BASIC

## (undated) DEVICE — APPLICATOR CHLORAPREP ORN 26ML

## (undated) DEVICE — SEE MEDLINE ITEM 157116

## (undated) DEVICE — SUT 2-0 ETHILON 18 FS

## (undated) DEVICE — GLOVE SURG BIOGEL LATEX SZ 7.5

## (undated) DEVICE — NDL HYPDRM 23X15

## (undated) DEVICE — ADHESIVE DERMABOND ADVANCED

## (undated) DEVICE — DRAPE INCISE IOBAN 2 23X23IN

## (undated) DEVICE — SEE MEDLINE ITEM 156955

## (undated) DEVICE — SEE MEDLINE ITEM 152622

## (undated) DEVICE — GAUZE SPONGE 4X4 12PLY

## (undated) DEVICE — CLOSURE SKIN STERI STRIP 1/2X4